# Patient Record
Sex: MALE | Race: WHITE | NOT HISPANIC OR LATINO | Employment: FULL TIME | URBAN - METROPOLITAN AREA
[De-identification: names, ages, dates, MRNs, and addresses within clinical notes are randomized per-mention and may not be internally consistent; named-entity substitution may affect disease eponyms.]

---

## 2023-02-08 ENCOUNTER — APPOINTMENT (EMERGENCY)
Dept: CT IMAGING | Facility: HOSPITAL | Age: 58
End: 2023-02-08

## 2023-02-08 ENCOUNTER — APPOINTMENT (EMERGENCY)
Dept: RADIOLOGY | Facility: HOSPITAL | Age: 58
End: 2023-02-08

## 2023-02-08 ENCOUNTER — HOSPITAL ENCOUNTER (EMERGENCY)
Facility: HOSPITAL | Age: 58
Discharge: HOME | End: 2023-02-08
Attending: EMERGENCY MEDICINE

## 2023-02-08 ENCOUNTER — TELEPHONE (OUTPATIENT)
Dept: HEMATOLOGY ONCOLOGY | Facility: CLINIC | Age: 58
End: 2023-02-08

## 2023-02-08 VITALS
OXYGEN SATURATION: 100 % | SYSTOLIC BLOOD PRESSURE: 126 MMHG | RESPIRATION RATE: 13 BRPM | HEIGHT: 73 IN | HEART RATE: 79 BPM | TEMPERATURE: 97.7 F | WEIGHT: 172 LBS | DIASTOLIC BLOOD PRESSURE: 81 MMHG | BODY MASS INDEX: 22.8 KG/M2

## 2023-02-08 DIAGNOSIS — M89.9 LYTIC BONE LESIONS ON XRAY: ICD-10-CM

## 2023-02-08 DIAGNOSIS — M54.9 BACK PAIN: Primary | ICD-10-CM

## 2023-02-08 LAB
2HR DELTA HS TROPONIN: 18 NG/L
4HR DELTA HS TROPONIN: -2 NG/L
ALBUMIN SERPL BCP-MCNC: 3.3 G/DL (ref 3.5–5)
ALP SERPL-CCNC: 166 U/L (ref 34–104)
ALT SERPL W P-5'-P-CCNC: 14 U/L (ref 7–52)
ANION GAP SERPL CALCULATED.3IONS-SCNC: 9 MMOL/L (ref 4–13)
AST SERPL W P-5'-P-CCNC: 23 U/L (ref 13–39)
BASOPHILS # BLD AUTO: 0.03 THOUSANDS/ÂΜL (ref 0–0.1)
BASOPHILS NFR BLD AUTO: 1 % (ref 0–1)
BILIRUB SERPL-MCNC: 0.36 MG/DL (ref 0.2–1)
BUN SERPL-MCNC: 21 MG/DL (ref 5–25)
CALCIUM ALBUM COR SERPL-MCNC: 11.3 MG/DL (ref 8.3–10.1)
CALCIUM SERPL-MCNC: 10.7 MG/DL (ref 8.4–10.2)
CARDIAC TROPONIN I PNL SERPL HS: 16 NG/L
CARDIAC TROPONIN I PNL SERPL HS: 18 NG/L
CARDIAC TROPONIN I PNL SERPL HS: 26 NG/L (ref 8–18)
CARDIAC TROPONIN I PNL SERPL HS: 36 NG/L
CHLORIDE SERPL-SCNC: 97 MMOL/L (ref 96–108)
CO2 SERPL-SCNC: 24 MMOL/L (ref 21–32)
CREAT SERPL-MCNC: 1.17 MG/DL (ref 0.6–1.3)
D DIMER PPP FEU-MCNC: 1.91 UG/ML FEU
EOSINOPHIL # BLD AUTO: 0.02 THOUSAND/ÂΜL (ref 0–0.61)
EOSINOPHIL NFR BLD AUTO: 0 % (ref 0–6)
ERYTHROCYTE [DISTWIDTH] IN BLOOD BY AUTOMATED COUNT: 14.5 % (ref 11.6–15.1)
FLUAV RNA RESP QL NAA+PROBE: NEGATIVE
FLUBV RNA RESP QL NAA+PROBE: NEGATIVE
GFR SERPL CREATININE-BSD FRML MDRD: 69 ML/MIN/1.73SQ M
GLUCOSE SERPL-MCNC: 151 MG/DL (ref 65–140)
HCT VFR BLD AUTO: 36 % (ref 36.5–49.3)
HGB BLD-MCNC: 12.6 G/DL (ref 12–17)
IGA SERPL-MCNC: 28 MG/DL (ref 70–400)
IGG SERPL-MCNC: 2900 MG/DL (ref 700–1600)
IGM SERPL-MCNC: 11 MG/DL (ref 40–230)
IMM GRANULOCYTES # BLD AUTO: 0.09 THOUSAND/UL (ref 0–0.2)
IMM GRANULOCYTES NFR BLD AUTO: 1 % (ref 0–2)
LACTATE SERPL-SCNC: 1.2 MMOL/L (ref 0.5–2)
LACTATE SERPL-SCNC: 2.5 MMOL/L (ref 0.5–2)
LIPASE SERPL-CCNC: 26 U/L (ref 11–82)
LYMPHOCYTES # BLD AUTO: 1.5 THOUSANDS/ÂΜL (ref 0.6–4.47)
LYMPHOCYTES NFR BLD AUTO: 23 % (ref 14–44)
MCH RBC QN AUTO: 33.7 PG (ref 26.8–34.3)
MCHC RBC AUTO-ENTMCNC: 35 G/DL (ref 31.4–37.4)
MCV RBC AUTO: 96 FL (ref 82–98)
MONOCYTES # BLD AUTO: 0.54 THOUSAND/ÂΜL (ref 0.17–1.22)
MONOCYTES NFR BLD AUTO: 8 % (ref 4–12)
NEUTROPHILS # BLD AUTO: 4.44 THOUSANDS/ÂΜL (ref 1.85–7.62)
NEUTS SEG NFR BLD AUTO: 67 % (ref 43–75)
NRBC BLD AUTO-RTO: 0 /100 WBCS
PLATELET # BLD AUTO: 194 THOUSANDS/UL (ref 149–390)
PMV BLD AUTO: 8.9 FL (ref 8.9–12.7)
POTASSIUM SERPL-SCNC: 3.8 MMOL/L (ref 3.5–5.3)
PROT SERPL-MCNC: 9.7 G/DL (ref 6.4–8.4)
RBC # BLD AUTO: 3.74 MILLION/UL (ref 3.88–5.62)
RSV RNA RESP QL NAA+PROBE: NEGATIVE
SARS-COV-2 RNA RESP QL NAA+PROBE: NEGATIVE
SODIUM SERPL-SCNC: 130 MMOL/L (ref 135–147)
TSH SERPL DL<=0.05 MIU/L-ACNC: 0.68 UIU/ML (ref 0.45–4.5)
WBC # BLD AUTO: 6.62 THOUSAND/UL (ref 4.31–10.16)

## 2023-02-08 RX ORDER — KETOROLAC TROMETHAMINE 30 MG/ML
15 INJECTION, SOLUTION INTRAMUSCULAR; INTRAVENOUS ONCE
Status: COMPLETED | OUTPATIENT
Start: 2023-02-08 | End: 2023-02-08

## 2023-02-08 RX ORDER — OXYCODONE HYDROCHLORIDE AND ACETAMINOPHEN 5; 325 MG/1; MG/1
1 TABLET ORAL EVERY 4 HOURS PRN
Qty: 18 TABLET | Refills: 0 | Status: SHIPPED | OUTPATIENT
Start: 2023-02-08 | End: 2023-02-11

## 2023-02-08 RX ORDER — OXYCODONE HYDROCHLORIDE AND ACETAMINOPHEN 5; 325 MG/1; MG/1
1 TABLET ORAL ONCE
Status: DISCONTINUED | OUTPATIENT
Start: 2023-02-08 | End: 2023-02-08

## 2023-02-08 RX ORDER — OXYCODONE HYDROCHLORIDE AND ACETAMINOPHEN 5; 325 MG/1; MG/1
1 TABLET ORAL ONCE
Status: COMPLETED | OUTPATIENT
Start: 2023-02-08 | End: 2023-02-08

## 2023-02-08 RX ADMIN — KETOROLAC TROMETHAMINE 15 MG: 30 INJECTION, SOLUTION INTRAMUSCULAR at 15:43

## 2023-02-08 RX ADMIN — IOHEXOL 85 ML: 350 INJECTION, SOLUTION INTRAVENOUS at 11:23

## 2023-02-08 RX ADMIN — SODIUM CHLORIDE 1000 ML: 0.9 INJECTION, SOLUTION INTRAVENOUS at 10:17

## 2023-02-08 RX ADMIN — OXYCODONE HYDROCHLORIDE AND ACETAMINOPHEN 1 TABLET: 5; 325 TABLET ORAL at 12:03

## 2023-02-08 NOTE — DISCHARGE INSTRUCTIONS
Use Tylenol every 4 hours or Motrin every 6 hours; you can alternate the 2 medications taking something every 3 hours for pain, if no improvement use Percocet  Follow-up with Hematologist/Oncologist Dr Edy Duncan

## 2023-02-08 NOTE — ED PROVIDER NOTES
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  None       History reviewed  No pertinent past medical history  Past Surgical History:   Procedure Laterality Date   • APPENDECTOMY         Family History   Problem Relation Age of Onset   • Diabetes Mother    • Heart disease Father    • Diabetes Father      I have reviewed and agree with the history as documented  E-Cigarette/Vaping   • E-Cigarette Use Never User      E-Cigarette/Vaping Substances     Social History     Tobacco Use   • Smoking status: Former   Vaping Use   • Vaping Use: Never used   Substance Use Topics   • Alcohol use: Yes     Comment: Mod   • Drug use: Yes     Types: Marijuana       Review of Systems   Constitutional: Negative for chills and fever  HENT: Negative for congestion, facial swelling, postnasal drip, rhinorrhea and trouble swallowing  Respiratory: Positive for cough  Negative for shortness of breath and wheezing  Cardiovascular: Positive for chest pain  Negative for palpitations and leg swelling  Gastrointestinal: Positive for nausea and vomiting  Genitourinary: Negative for hematuria  Musculoskeletal: Negative for myalgias  Skin: Negative for wound  Neurological: Negative for light-headedness and headaches  All other systems reviewed and are negative  Physical Exam  Physical Exam  Vitals and nursing note reviewed  Constitutional:       General: He is in acute distress (mild)  Appearance: He is well-developed  HENT:      Head: Normocephalic and atraumatic  Right Ear: External ear normal       Left Ear: External ear normal       Nose: Nose normal       Mouth/Throat:      Mouth: Mucous membranes are moist       Dentition: Abnormal dentition (poor dentition)  Pharynx: Oropharynx is clear  Eyes:      Conjunctiva/sclera: Conjunctivae normal    Neck:      Vascular: No JVD  Cardiovascular:      Rate and Rhythm: Regular rhythm  Tachycardia present  Heart sounds: Normal heart sounds  No murmur heard  Pulmonary:      Effort: Pulmonary effort is normal  No tachypnea  Breath sounds: Normal breath sounds  No decreased breath sounds, wheezing or rhonchi  Abdominal:      General: Bowel sounds are normal       Palpations: Abdomen is soft  Musculoskeletal:         General: Normal range of motion  Cervical back: Normal range of motion  No tenderness  Right lower leg: No edema  Left lower leg: No edema  Lymphadenopathy:      Cervical: No cervical adenopathy  Skin:     General: Skin is warm and dry  Neurological:      Mental Status: He is alert and oriented to person, place, and time  Mental status is at baseline  Motor: No weakness     Psychiatric:         Behavior: Behavior normal          Vital Signs  ED Triage Vitals [02/08/23 0947]   Temperature Pulse Respirations Blood Pressure SpO2   97 7 °F (36 5 °C) 102 17 168/98 97 %      Temp Source Heart Rate Source Patient Position - Orthostatic VS BP Location FiO2 (%)   Oral Monitor Lying Left arm --      Pain Score       5           Vitals:    02/08/23 1056 02/08/23 1204 02/08/23 1313 02/08/23 1444   BP: 144/82 144/99 137/74 126/81   Pulse: 83 82 88 79   Patient Position - Orthostatic VS: Lying Lying Lying Lying         Visual Acuity      ED Medications  Medications   ketorolac (TORADOL) injection 15 mg (has no administration in time range)   sodium chloride 0 9 % bolus 1,000 mL (0 mL Intravenous Stopped 2/8/23 1137)   iohexol (OMNIPAQUE) 350 MG/ML injection (SINGLE-DOSE) 100 mL (85 mL Intravenous Given 2/8/23 1123)   oxyCODONE-acetaminophen (PERCOCET) 5-325 mg per tablet 1 tablet (1 tablet Oral Given 2/8/23 1203)       Diagnostic Studies  Results Reviewed     Procedure Component Value Units Date/Time    High Sensitivity Troponin I Random [[id number]]  (Abnormal) Collected: 02/08/23 1401    Lab Status: Final result Specimen: Blood from Arm, Left Updated: 02/08/23 1509     HS TnI random 26 ng/L     Protein electrophoresis, serum [[id number]] Collected: 02/08/23 1401    Lab Status: In process Specimen: Blood from Arm, Left Updated: 02/08/23 1434    IgG, IgA, IgM [[id number]] Collected: 02/08/23 1401    Lab Status: In process Specimen: Blood from Arm, Left Updated: 02/08/23 1434    Immunoglobulin free LT chains blood [[id number]] Collected: 02/08/23 1401    Lab Status: In process Specimen: Blood from Arm, Left Updated: 02/08/23 1434    HS Troponin I 4hr [[id number]]  (Normal) Collected: 02/08/23 1231    Lab Status: Final result Specimen: Blood from Arm, Left Updated: 02/08/23 1312     hs TnI 4hr 16 ng/L      Delta 4hr hsTnI -2 ng/L     HS Troponin I 2hr [[id number]]  (Normal) Collected: 02/08/23 1206    Lab Status: Final result Specimen: Blood from Arm, Right Updated: 02/08/23 1238     hs TnI 2hr 36 ng/L      Delta 2hr hsTnI 18 ng/L     Lactic acid 2 Hours [[id number]] Collected: 02/08/23 1231    Lab Status: In process Specimen: Blood from Arm, Left Updated: 02/08/23 1236    Lactic acid [[id number]]  (Abnormal) Collected: 02/08/23 1016    Lab Status: Final result Specimen: Blood from Arm, Right Updated: 02/08/23 1132     LACTIC ACID 2 5 mmol/L     Narrative:      Result may be elevated if tourniquet was used during collection      FLU/RSV/COVID - if FLU/RSV clinically relevant [[id number]]  (Normal) Collected: 02/08/23 1007    Lab Status: Final result Specimen: Nares from Nasopharyngeal Swab Updated: 02/08/23 1138 SARS-CoV-2 Negative     INFLUENZA A PCR Negative     INFLUENZA B PCR Negative     RSV PCR Negative    Narrative:      FOR PEDIATRIC PATIENTS - copy/paste COVID Guidelines URL to browser: [email or web address]    SARS-CoV-2 assay is a Nucleic Acid Amplification assay intended for the  qualitative detection of nucleic acid from SARS-CoV-2 in nasopharyngeal  swabs  Results are for the presumptive identification of SARS-CoV-2 RNA  Positive results are indicative of infection with SARS-CoV-2, the virus  causing COVID-19, but do not rule out bacterial infection or co-infection  with other viruses  Laboratories within the [place] and its  territories are required to report all positive results to the appropriate  public health authorities  Negative results do not preclude SARS-CoV-2  infection and should not be used as the sole basis for treatment or other  patient management decisions  Negative results must be combined with  clinical observations, patient history, and epidemiological information  This test has not been FDA cleared or approved  This test has been authorized by FDA under an Emergency Use Authorization  (EUA)  This test is only authorized for the duration of time the  declaration that circumstances exist justifying the authorization of the  emergency use of an in vitro diagnostic tests for detection of SARS-CoV-2  virus and/or diagnosis of COVID-19 infection under section 564(b)(1) of  the Act, 21 U  S C  875PYA-0(A)(6), unless the authorization is terminated  or revoked sooner  The test has been validated but independent review by FDA  and CLIA is pending  Test performed using [employer] Gene[employer]pert: This RT-PCR assay targets N2,  a region unique to SARS-CoV-2  A conserved region in the E-gene was chosen  for pan-Sarbecovirus detection which includes SARS-CoV-2      According to CMS-2020-01-R, this platform meets the definition of high-throughput technology  TSH [[id number]]  (Normal) Collected: 02/08/23 1016    Lab Status: Final result Specimen: Blood from Arm, Right Updated: 02/08/23 1059     TSH 3RD GENERATON 0 680 uIU/mL     Narrative:      Patients undergoing fluorescein dye angiography may retain small amounts of fluorescein in the body for 48-72 hours post procedure  Samples containing fluorescein can produce falsely depressed TSH values  If the patient had this procedure,a specimen should be resubmitted post fluorescein clearance        Comprehensive metabolic panel [[id number]]  (Abnormal) Collected: 02/08/23 1016    Lab Status: Final result Specimen: Blood from Arm, Right Updated: 02/08/23 1052     Sodium 130 mmol/L      Potassium 3 8 mmol/L      Chloride 97 mmol/L      CO2 24 mmol/L      ANION GAP 9 mmol/L      BUN 21 mg/dL      Creatinine 1 17 mg/dL      Glucose 151 mg/dL      Calcium 10 7 mg/dL      Corrected Calcium 11 3 mg/dL      AST 23 U/L      ALT 14 U/L      Alkaline Phosphatase 166 U/L      Total Protein 9 7 g/dL      Albumin 3 3 g/dL      Total Bilirubin 0 36 mg/dL      eGFR 69 ml/min/1 73sq m     Narrative:      [facility] guidelines for Chronic Kidney Disease (CKD):   •  Stage 1 with normal or high GFR (GFR > 90 mL/min/1 73 square meters)  •  Stage 2 Mild CKD (GFR = 60-89 mL/min/1 73 square meters)  •  Stage 3A Moderate CKD (GFR = 45-59 mL/min/1 73 square meters)  •  Stage 3B Moderate CKD (GFR = 30-44 mL/min/1 73 square meters)  •  Stage 4 Severe CKD (GFR = 15-29 mL/min/1 73 square meters)  •  Stage 5 End Stage CKD (GFR <15 mL/min/1 73 square meters)  Note: GFR calculation is accurate only with a steady state creatinine    Lipase [[id number]]  (Normal) Collected: 02/08/23 1016    Lab Status: Final result Specimen: Blood from Arm, Right Updated: 02/08/23 1052     Lipase 26 u/L     HS Troponin 0hr (reflex protocol) [[id number]]  (Normal) Collected: 02/08/23 1016    Lab Status: Final result Specimen: Blood from Arm, Right Updated: 02/08/23 1050     hs TnI 0hr 18 ng/L     D-Dimer [[id number]]  (Abnormal) Collected: 02/08/23 1016    Lab Status: Final result Specimen: Blood from Arm, Right Updated: 02/08/23 1047     D-Dimer, Quant 1 91 ug/ml FEU     Narrative: In the evaluation for possible pulmonary embolism, in the appropriate (Well's Score of 4 or less) patient, the age adjusted d-dimer cutoff for this patient can be calculated as:    Age x 0 01 (in ug/mL) for Age-adjusted D-dimer exclusion threshold for a patient over 50 years  CBC and differential [[id number]]  (Abnormal) Collected: 02/08/23 1016    Lab Status: Final result Specimen: Blood from Arm, Right Updated: 02/08/23 1025     WBC 6 62 Thousand/uL      RBC 3 74 Million/uL      Hemoglobin 12 6 g/dL      Hematocrit 36 0 %      MCV 96 fL      MCH 33 7 pg      MCHC 35 0 g/dL      RDW 14 5 %      MPV 8 9 fL      Platelets 606 Thousands/uL      nRBC 0 /100 WBCs      Neutrophils Relative 67 %      Immat GRANS % 1 %      Lymphocytes Relative 23 %      Monocytes Relative 8 %      Eosinophils Relative 0 %      Basophils Relative 1 %      Neutrophils Absolute 4 44 Thousands/µL      Immature Grans Absolute 0 09 Thousand/uL      Lymphocytes Absolute 1 50 Thousands/µL      Monocytes Absolute 0 54 Thousand/µL      Eosinophils Absolute 0 02 Thousand/µL      Basophils Absolute 0 03 Thousands/µL                  CTA ED chest PE study   Final Result by Jazmin Torrez MD (02/08 1149)      No pulmonary embolus  No acute pulmonary disease  Diffuse lytic lesions throughout the skeleton with compression deformities in the spine  This is most likely due to multiple myeloma, less likely due to metastatic disease from an unknown primary         I personally discussed this study with LAURA FOUNTAIN on 2/8/2023 at 11:45 AM                       Workstation performed: [id number]         XR chest 1 view portable   ED Interpretation by Laila Francis PA-C (02/08 1007)   Chronic changes, no infiltrate      Final Result by Antoinette Westfall MD (02/08 1417)      No acute cardiopulmonary disease  Workstation performed: [id number]                    Procedures  ECG 12 Lead Documentation Only    Date/Time: 2/8/2023 10:04 AM  Performed by: Renetta Tirado PA-C  Authorized by: Renetta Tirado PA-C     Indications / Diagnosis:  Cp  ECG reviewed by me, the ED Provider: yes    Patient location:  ED  Previous ECG:     Comparison to cardiac monitor: Yes    Interpretation:     Interpretation: non-specific    Rate:     ECG rate:  106    ECG rate assessment: tachycardic    Rhythm:     Rhythm: sinus rhythm    Comments:      LVH, normal axis, no acute ischemic changes             ED Course  ED Course as of 02/08/23 1524   Wed Feb 08, 2023   1131 Lipase: 26   1131 TSH 3RD GENERATON: 0 680   1131 hs TnI 0hr: 18   1131 D-Dimer, Quant(!): 1 91  Abn, will order cta to r/o PE   1131 WBC: 6 62   1131 Sodium(!): 130  Viral panel negative   1423 Trop was 16, 2hr trop 36, delta 2hr 18, then another trop send about 30 minutes later was 16, so delta 2 5 hrs -2; discussed with attending who would like another repeat trop at 4hr  (Done at 1425)             HEART Risk Score    Flowsheet Row Most Recent Value   Heart Score Risk Calculator    History 0 Filed at: 02/08/2023 1246   ECG 1 Filed at: 02/08/2023 1246   Age 1 Filed at: 02/08/2023 1246   Risk Factors 0 Filed at: 02/08/2023 1246   Troponin 1 Filed at: 02/08/2023 1246   HEART Score 3 Filed at: 02/08/2023 1246                        SBIRT 22yo+    Flowsheet Row Most Recent Value   SBIRT (25 yo +)    In order to provide better care to our patients, we are screening all of our patients for alcohol and drug use  Would it be okay to ask you these screening questions?  No Filed at: 02/08/2023 0950                    Medical Decision Making  Pt presents c/o 5-day mid-back/chest pain, dry cough, daily ETOH,  Tachy, considered: CAD, PE, pancreatitis, chf, lvh, cardiomyopathy  LABS: wbc 6 62 Na 130, Ca 10 7 alk lgenis 166 CTA was done to r/o PE and shows: OSSEOUS STRUCTURES: Diffuse lytic lesions throughout the skeleton with mild compression deformity of T6 and moderate compression deformity of T10    IMPRESSION: No pulmonary embolus  No acute pulmonary disease  Diffuse lytic lesions throughout the skeleton with compression deformities in the spine  This is most likely due to multiple myeloma, less likely due to metastatic disease from an unknown primary  new diagnosis of possible multiple myeloma vs bony mets  Pt pain tolerable, reached out to heme/onc  HEART score 3, Trop 16, 36, 26, reviewed with cardiology  Pt continues to have no cp in ED, states back pain coming back alittle    Back pain: acute illness or injury  Lytic bone lesions on xray: acute illness or injury  Amount and/or Complexity of Data Reviewed  External Data Reviewed: notes  Labs: ordered  Decision-making details documented in ED Course  Radiology: ordered and independent interpretation performed  Discussion of management or test interpretation with external provider(s): [facility] Heme/Onc Dr Ami Rosario, regarding dispo and FU: agrees with plan, "Wouldn't think (cp) myeloma related  Boris Munira Boris Munira Please get, spep, serum free light chains, IgA, IgG, IgM  (ordered) Will get scheduled in office soon  TT cardio on call Dr Pond Pack regarding delta trop 18; states, "Sounds like non cardiac presentation    From cardiac end, seems okay for discharge    probably needs multiple myeloma workup "    Risk  OTC drugs  Prescription drug management  Decision regarding hospitalization            Disposition  Final diagnoses:   Back pain   Lytic bone lesions on xray - Multiple myeloma vs bony mets     Time reflects when diagnosis was documented in both MDM as applicable and the Disposition within this note     Time User Action Codes Description Comment    2/8/2023  1:39 PM Mayra Pineda Add [M54 9] Back pain     2/8/2023 1:40 PM Stone Dear Add [M89 9] Lytic bone lesions on xray     2/8/2023  1:40 PM Stone Dear Modify [M89 9] Lytic bone lesions on xray Multiple myeloma vs bony mets      ED Disposition     ED Disposition   Discharge    Condition   Stable    Date/Time   Wed Feb 8, 2023  3:22 PM    Comment   Jeane Bermudez discharge to home/self care                 Follow-up Information     Follow up With Specialties Details Why Teresa 57, DO Hematology and Oncology, Hematology, Oncology   [address] [place]  [address] [place] [address]  [phone]            Patient's Medications   Discharge Prescriptions    OXYCODONE-ACETAMINOPHEN (PERCOCET) 5-325 MG PER TABLET    Take 1 tablet by mouth every 4 (four) hours as needed for moderate pain or severe pain for up to 3 days Max Daily Amount: 6 tablets       Start Date: 2/8/2023  End Date: 2/11/2023       Order Dose: 1 tablet       Quantity: 18 tablet    Refills: 0           PDMP Review     None          ED Provider  Electronically Signed by           Adolfo Rodriguez PA-C  02/08/23 1689

## 2023-02-08 NOTE — Clinical Note
Martell Linn was seen and treated in our emergency department on 2/8/2023  Diagnosis:     Dilcia Worley  may return to work on return date  He may return on this date: 02/10/2023         If you have any questions or concerns, please don't hesitate to call        Laila Francis PA-C    ______________________________           _______________          _______________  Hospital Representative                              Date                                Time

## 2023-02-08 NOTE — TELEPHONE ENCOUNTER
Reached out to patient to schedule new patient appointment, left VM with [facility] number to schedule appointment

## 2023-02-09 ENCOUNTER — DOCUMENTATION (OUTPATIENT)
Dept: HEMATOLOGY ONCOLOGY | Facility: CLINIC | Age: 58
End: 2023-02-09

## 2023-02-09 LAB
ALBUMIN SERPL ELPH-MCNC: 2.64 G/DL (ref 3.5–5)
ALBUMIN SERPL ELPH-MCNC: 31.8 % (ref 52–65)
ALPHA1 GLOB SERPL ELPH-MCNC: 0.41 G/DL (ref 0.1–0.4)
ALPHA1 GLOB SERPL ELPH-MCNC: 4.9 % (ref 2.5–5)
ALPHA2 GLOB SERPL ELPH-MCNC: 1.13 G/DL (ref 0.4–1.2)
ALPHA2 GLOB SERPL ELPH-MCNC: 13.6 % (ref 7–13)
ATRIAL RATE: 106 BPM
BETA GLOB ABNORMAL SERPL ELPH-MCNC: 0.27 G/DL (ref 0.4–0.8)
BETA1 GLOB SERPL ELPH-MCNC: 3.3 % (ref 5–13)
BETA2 GLOB SERPL ELPH-MCNC: 4 % (ref 2–8)
BETA2+GAMMA GLOB SERPL ELPH-MCNC: 0.33 G/DL (ref 0.2–0.5)
GAMMA GLOB ABNORMAL SERPL ELPH-MCNC: 3.52 G/DL (ref 0.5–1.6)
GAMMA GLOB SERPL ELPH-MCNC: 42.4 % (ref 12–22)
IGG/ALB SER: 0.47 {RATIO} (ref 1.1–1.8)
INTERPRETATION UR IFE-IMP: NORMAL
M PROTEIN 1 MFR SERPL ELPH: 40.6 %
M PROTEIN 1 SERPL ELPH-MCNC: 3.37 G/DL
P AXIS: 75 DEGREES
PR INTERVAL: 130 MS
PROT PATTERN SERPL ELPH-IMP: ABNORMAL
PROT SERPL-MCNC: 8.3 G/DL (ref 6.4–8.2)
QRS AXIS: 51 DEGREES
QRSD INTERVAL: 84 MS
QT INTERVAL: 320 MS
QTC INTERVAL: 425 MS
T WAVE AXIS: 66 DEGREES
VENTRICULAR RATE: 106 BPM

## 2023-02-09 NOTE — PROGRESS NOTES
Intake received/ Chart reviewed for services completed outside of [facility]    Pathology completed: n/a    Imaging completed: xr 2/8/2023    All records needed are in patients chart  No records retrieval needed at this time

## 2023-02-10 LAB
KAPPA LC FREE SER-MCNC: 7.8 MG/L (ref 3.3–19.4)
KAPPA LC FREE/LAMBDA FREE SER: 0.01 {RATIO} (ref 0.26–1.65)
LAMBDA LC FREE SERPL-MCNC: 1176.6 MG/L (ref 5.7–26.3)

## 2023-02-21 ENCOUNTER — TELEPHONE (OUTPATIENT)
Dept: HEMATOLOGY ONCOLOGY | Facility: CLINIC | Age: 58
End: 2023-02-21

## 2023-02-21 NOTE — TELEPHONE ENCOUNTER
Appointment Cancellation Or Reschedule     Person calling in Spouse   If someone other than patient calling, are they listed on the communication consent form? No Patient gave verbal consent for me to speak with [facility]   Provider Dr Dalton Galan   Office Visit Date and Time  03/02/23 3:20PM   Office Visit Location [facility]   Did patient want to reschedule their office appointment? If so, when was it scheduled to? 02/27/23 9:40AM   Did you have STAR scheduled for this appointment? No   Do you need STAR set up for your new appointment? If yes, please send to "PATIENT RIDESHARE" pool for STAR rescheduling N/A   Is this patient calling to reschedule an infusion appointment? No   When is their next infusion appointment? N/A   Is this patient a Chemo patient? No   Reason for Cancellation or Reschedule Patient requesting sooner appointment   Was No show policy reviewed with patient if patient canceling within 24 hours? No     If the patient is cancelling an appointment and needs their STAR Transport cancelled, please route to [facility]  If the patient is a treatment patient, please route this to the office nurse  If the patient is not on treatment, please route to the Clerical pool based on location  If the patient is a surgical oncology patient, please route to surg/onc clinical pool  Route message as high priority if same day cancellation

## 2023-02-27 ENCOUNTER — TELEPHONE (OUTPATIENT)
Dept: HEMATOLOGY ONCOLOGY | Facility: MEDICAL CENTER | Age: 58
End: 2023-02-27

## 2023-02-27 ENCOUNTER — TELEPHONE (OUTPATIENT)
Dept: NUTRITION | Facility: CLINIC | Age: 58
End: 2023-02-27

## 2023-02-27 ENCOUNTER — PATIENT OUTREACH (OUTPATIENT)
Dept: CASE MANAGEMENT | Facility: HOSPITAL | Age: 58
End: 2023-02-27

## 2023-02-27 ENCOUNTER — CONSULT (OUTPATIENT)
Dept: HEMATOLOGY ONCOLOGY | Facility: MEDICAL CENTER | Age: 58
End: 2023-02-27

## 2023-02-27 VITALS
TEMPERATURE: 97.2 F | SYSTOLIC BLOOD PRESSURE: 130 MMHG | OXYGEN SATURATION: 95 % | WEIGHT: 160.8 LBS | BODY MASS INDEX: 21.31 KG/M2 | HEART RATE: 118 BPM | HEIGHT: 73 IN | RESPIRATION RATE: 18 BRPM | DIASTOLIC BLOOD PRESSURE: 80 MMHG

## 2023-02-27 DIAGNOSIS — D47.2 MONOCLONAL GAMMOPATHY: Primary | ICD-10-CM

## 2023-02-27 DIAGNOSIS — M54.9 BACK PAIN: ICD-10-CM

## 2023-02-27 RX ORDER — OXYCODONE AND ACETAMINOPHEN 10; 325 MG/1; MG/1
1 TABLET ORAL EVERY 6 HOURS PRN
Qty: 60 TABLET | Refills: 0 | Status: SHIPPED | OUTPATIENT
Start: 2023-02-27 | End: 2023-03-08 | Stop reason: ALTCHOICE

## 2023-02-27 NOTE — PROGRESS NOTES
Arlin Casillas  1965  [facility] [address] [address]  HEMATOLOGY/ONCOLOGY CONSULTATION REPORT    DISCUSSION/SUMMARY:    55-year-old male with no significant past medical history (but no medical follow-up recently) recently presenting to the ER with mid back pain and weight loss  Issues:    Concern for multiple myeloma  Patient was seen in the ER on February 8, 2023  CTA of the chest did not demonstrate any PE but patient had multiple lytic lesions and compression fractures in T6 and T10  Additional work-up demonstrated elevated total protein, elevated IgG and an elevated lambda  Immunofixation demonstrated IgG lambda monoclonal protein  CBC parameters and renal function were within normal limits  Patient, wife and I discussed the laboratory test results and the bone findings on the recent CTA/chest at length  Mr Andrés Gardner demonstrated a relatively good understanding of the situation, patient is still overwhelmed and in pain  Patient understands that he may have a primary bone marrow disorder, multiple myeloma  Patient is to go for a PET/CT and bone marrow biopsy  Eventually LDH and uric acid will be needed  If myeloma is diagnosed, patient will also be referred to [facility]iet[facility] for evaluation (neoadjuvant chemotherapy here to debulk, transplant, possible posttransplant maintenance treatment etc)  Thoracic lesions  MRI of the T-spine has been requested  Patient may need RT palliatively for pain control  Back pain  Patient is clearly symptomatic  Percocet 5/325 is not effective  Patient was given a prescription for Percocet 10/325 every 6 hours as needed  Nursing staff spent time with patient/wife today  Patient will call if his pain continues to be an issue  Patient has been referred to palliative care  We discussed potential side effects and toxicities including constipation  Weight loss    Patient has lost 20+ pounds over the past 3 months  Patient has been referred to oncology nutrition  Patient is to return in 3 weeks but this may change depending upon the above  Mr Didi Martinez knows to call the hematology/oncology office if there are any other questions or concerns  Carefully review your medication list and verify that the list is accurate and up-to-date  Please call the hematology/oncology office if there are medications missing from the list, medications on the list that you are not currently taking or if there is a dosage or instruction that is different from how you're taking that medication  Patient goals and areas of care: Complete work-up, pain control  Barriers to care: None  Patient is able to self-care   ______________________________________________________________________________________    Chief Complaint   Patient presents with   • Follow-up     Lytic bone lesions on xray     History of Present Illness: 65year old male with relatively good general health recently developing mid back pain  Mr Didi Martinez states that the pain began in early January 2023  Patient has lost approximately 20+ pounds over the past few months  No GI issues, no  issues or bleeding  Appetite has been decreased  Activities have also been decreased, patient was working as a cook at [employer]  No fevers, chills or sweats  No headaches, blurred vision or dizziness  No shortness of breath at rest or dyspnea on exertion  Patient does not have a PCP, no recent medical follow-ups, no COVID vaccines - personal decision  No COVID infections  Review of Systems   Constitutional: Positive for activity change, appetite change, fatigue and unexpected weight change  HENT: Negative  Eyes: Negative  Respiratory: Negative  Cardiovascular: Negative  Gastrointestinal: Negative  Endocrine: Negative  Genitourinary: Negative  Musculoskeletal: Positive for arthralgias and back pain  Skin: Negative  Allergic/Immunologic: Negative  Neurological: Negative  Hematological: Negative  Psychiatric/Behavioral: Negative  All other systems reviewed and are negative  Past Surgical History:   Procedure Laterality Date   • APPENDECTOMY     Past surgical history: No prior blood transfusions    Family History   Problem Relation Age of Onset   • Diabetes Mother    • Heart disease Father    • Diabetes Father    Family history: No known familial or genetic diseases, no children    Social History     Socioeconomic History   • Marital status: /Civil Union     Spouse name: Not on file   • Number of children: Not on file   • Years of education: Not on file   • Highest education level: Not on file   Occupational History   • Not on file   Tobacco Use   • Smoking status: Former   • Smokeless tobacco: Not on file   Vaping Use   • Vaping Use: Never used   Substance and Sexual Activity   • Alcohol use: Yes     Comment: Mod   • Drug use: Yes     Types: Marijuana   • Sexual activity: Not on file   Other Topics Concern   • Not on file   Social History Narrative    Most recent tobacco use screenin2018     Social Determinants of Health     Financial Resource Strain: Not on file   Food Insecurity: Not on file   Transportation Needs: Not on file   Physical Activity: Not on file   Stress: Not on file   Social Connections: Not on file   Intimate Partner Violence: Not on file   Housing Stability: Not on file   Social history: Patient smokes 3 to 4 cigarettes a day, recently discontinued, approximately 5 beers a day, also recently discontinued, no drug abuse, no toxic exposure    No Known Allergies    Vitals:    23 0941   BP: 130/80   Pulse: (!) 118   Resp: 18   Temp: (!) 97 2 °F (36 2 °C)   SpO2: 95%     Physical Exam  Constitutional:       Appearance: He is well-developed  Comments: Middle-age male, thin appearing, + evidence of pain   HENT:      Head: Normocephalic and atraumatic        Right Ear: External ear normal       Left Ear: External ear normal    Eyes:      Conjunctiva/sclera: Conjunctivae normal       Pupils: Pupils are equal, round, and reactive to light  Cardiovascular:      Rate and Rhythm: Normal rate and regular rhythm  Heart sounds: Normal heart sounds  Pulmonary:      Effort: Pulmonary effort is normal       Breath sounds: Normal breath sounds  Comments: Fair to good entry bilaterally, scattered rhonchi  Abdominal:      General: Bowel sounds are normal       Palpations: Abdomen is soft  Comments: + Bowel sounds, nontender, soft, no hepatosplenomegaly, no guarding   Musculoskeletal:         General: Normal range of motion  Cervical back: Normal range of motion and neck supple  Comments: + Tenderness in bilateral thoracic rib cage   Skin:     General: Skin is warm  Comments: Slightly pale, warm, moist, no petechiae or ecchymoses   Neurological:      Mental Status: He is alert and oriented to person, place, and time  Deep Tendon Reflexes: Reflexes are normal and symmetric  Psychiatric:         Behavior: Behavior normal          Thought Content:  Thought content normal          Judgment: Judgment normal      Extremities: Lower extreme edema bilaterally, no cords, pulses are 1+ l  ymphatics: No adenopathy in the neck, supraclavicular region, axilla bilaterally    Labs    2/8/2023 WBC = 6 6 hemoglobin = 12 6 hematocrit = 36 MCV = 96 platelet = 458 neutrophil = 67% bands = 1% lymphocyte = 23% monocyte = 8% basophil = 1% BUN = 21 creatinine = 1 17 calcium = 10 7 AST = 23 ALT = 14 alkaline phosphatase = 166 total protein = 9 7 albumin = 3 3 total bilirubin = 0 36 TSH = 0 680    2/8/2023 immunofixation demonstrated monoclonal gammopathy identified his IgG lambda (3 37 g/deciliter)    2/8/2023 SPEP showed a monoclonal peak in the gamma region, gamma concentration = 3 52 g/deciliter, total protein = 8 3    2/8/2023 IgA = 20 8 = decreased IgG = 2000 900 = elevated IgM = 11 = decreased    2/8/2023 Free kappa = 7 8 Free lambda = 1177 Kappa/lambda ratio = 0 01    2/8/2023 lactic acid = 2 5    Imaging    2/8/2023 CTA chest PE study    OSSEOUS STRUCTURES:  Diffuse lytic lesions throughout the skeleton with mild compression deformity of T6 and moderate compression deformity of T10     IMPRESSION:     No pulmonary embolus  No acute pulmonary disease  Diffuse lytic lesions throughout the skeleton with compression deformities in the spine  This is most likely due to multiple myeloma, less likely due to metastatic disease from an unknown primary  2/8/2023 chest x-ray  Impression stated no acute cardiopulmonary disease

## 2023-02-27 NOTE — TELEPHONE ENCOUNTER
Attempted to reach patient but voicemail was full  He has been scheduled for his follow up on Friday 3/31/23 at 12pm with Dr Joseph Issa

## 2023-02-27 NOTE — TELEPHONE ENCOUNTER
Received notification by Dr George Dong on 2/27/23 that pt has triggered for oncology nutrition care (reason for referral: Ambulatory referral for Patient has lost 20+ pounds over the past 3 months  )  Teresa Venegas today to establish care  No answer  Left voice message with the reason for today's call and this RD’s contact information asking that Lissa MITCHELL call back as able/desired

## 2023-02-28 ENCOUNTER — PATIENT OUTREACH (OUTPATIENT)
Dept: CASE MANAGEMENT | Facility: HOSPITAL | Age: 58
End: 2023-02-28

## 2023-02-28 ENCOUNTER — DOCUMENTATION (OUTPATIENT)
Dept: HEMATOLOGY ONCOLOGY | Facility: MEDICAL CENTER | Age: 58
End: 2023-02-28

## 2023-02-28 NOTE — PROGRESS NOTES
Biopsychosocial and Barriers Assessment    Cancer Diagnosis: multiple myeloma  Home/Cell Phone: [phone]   Emergency Contact: Dee Martinez ([place])[phone] (Home Phone)  Marital Status: Single   Interpretation concerns, speaks another language, preferred language: english  Cultural concerns: no   Ability to read or write: yes    Caregiver/Support: Mayur  Children: no   Child/Elder care: no     Housing: two story   Home Setup: one level   Lives With: SO  Daily Living Activities: independent   Durable Medical Equipment: no  Ambulation: independent    [facility] [place]   High co-pays with insurance: no   High co-pays with medication coverage: no  No medication coverage: no    Primary Care Provider: Joyce Nowak MD   Hx of [address]: no   Hx of Short term rehab: no   Mental Health Hx: no  Substance Abuse Hx: no  Employment: yes    Status/Location: no   Ability to pay bills: yes  No barriers to paying bills  POA/LW/AD: no   Karlie is healthcare representative  MSW emailed advance directive form  Transportation Plan/Concerns:  Patient states he transports self  MSW educated on [employer] transport services  What do you know about your Cancer Diagnosis    What has your doctor told you about your cancer diagnosis:  multiple myeloma    What has your doctor told you about your cancer treatment: chemo and radiation  What specific concerns do you have about your diagnosis and treatment: no concerns    Have you been made aware of any hair loss associated with treatment: yes    Additional Comments:    [email or web address]    MSW phoned and spoke with patient  Patient states he has support from his Karlie and that's all he needs  Patient states his cancer care team are informational and have answered his questions  Patient states she remains positive and rarely allows things to stress him out    Patient states he hopes for the best   MSW provided  Emotional support services  MSW emailed cancer support community calendar, website and phone number to apply for social security disability, and advance directive form to patient's e-mail address: [email or web address]   Patient is provided this MSW's phone number for future resource needs  MSW will close this case however will remain available for future referrals

## 2023-02-28 NOTE — PROGRESS NOTES
I spoke with the patients significant other and provided her with the name and phone number of the Nutritionist (Delilah Klein [phone]) who has tried to get a hold of them to schedule patient

## 2023-03-01 NOTE — PROGRESS NOTES
Outpatient Oncology Nutrition Consultation   Type of Consult: Initial Consult  Care Location: Office Visit  - met w/pt & significant other Rajendra King)    Reason for referral: Received notification by Dr Milagro Mcfadden on 23 that pt has triggered for oncology nutrition care (reason for referral: Ambulatory referral for Patient has lost 20+ pounds over the past 3 months  )  Nutrition Assessment:   Oncology Diagnosis & Treatments: Possible Multiple Myeloma   Planned to have a PET/CT and bone marrow biopsy  Follow up with Dr Milagro Mcfadden 3/24/23  Oncology History    No history exists  Past Medical & Surgical Hx:   Patient Active Problem List   Diagnosis   • Monoclonal gammopathy     No past medical history on file    Past Surgical History:   Procedure Laterality Date   • APPENDECTOMY         Review of Medications:   Vitamins, Supplements and Herbals: Med List Reviewed & pt is only takin-3 MVI and Discussed the potential interactions of high dose antioxidants with cancer tx and recommended exercising caution when taking these supplements    Current Outpatient Medications:   •  oxyCODONE-acetaminophen (Percocet)  mg per tablet, Take 1 tablet by mouth every 6 (six) hours as needed for moderate pain Max Daily Amount: 4 tablets, Disp: 60 tablet, Rfl: 0    Most Recent Lab Results:   Lab Results   Component Value Date    WBC 6 62 2023    NEUTROABS 4 44 2023    ALT 14 2023    AST 23 2023    ALB 3 3 (L) 2023    SODIUM 130 (L) 2023    K 3 8 2023    CL 97 2023    BUN 21 2023    CREATININE 1 17 2023    EGFR 69 2023    GLUC 151 (H) 2023    CALCIUM 10 7 (H) 2023       Anthropometric Measurements:   Height: 73"  Ht Readings from Last 1 Encounters:   23 6' 1" (1 854 m)     Wt Readings from Last 20 Encounters:   23 70 1 kg (154 lb 8 oz)   23 72 9 kg (160 lb 12 8 oz)   23 78 kg (172 lb)     Weight History:   Usual Weight: 230# (2020)  Notes: intentionally lost 30#, was maintaining 200#, then unintentioanlly lost ~40-50# since December 2022    Oncology Nutrition-Anthropometrics    [facility] Nutrition from 3/6/2023 in [facility] Oncology Dietitian Services   Patient age (years): 65 years   Patient (male) height (in): 68 in   Current weight (lbs): 154 5 lbs   Current weight to be used for anthropometric calculations (kg) 70 2 kg   BMI: 20 4   IBW male 184 lb   IBW (kg) male 83 6 kg   IBW % (male) 84 %   Adjusted BW (male): 176 6 lbs   Adjusted BW in kg (male): 80 3 kg   % weight change after 1 week: -3 9 %   Weight change after 1 week (lbs) -6 3 lbs   % weight change after 1 month: -10 2 %   Weight change after 1 month (lbs) -17 5 lbs          Nutrition-Focused Physical Findings: none observed    Food/Nutrition-Related History & Client/Social History:    Current Nutrition Impact Symptoms:  [] Nausea  [x] Reduced Appetite  [] Acid Reflux    [] Vomiting - 1x in the past week, no trigger per pt [x] Unintended Wt Loss -40-50# wt loss since December 2022  -per pt, he was told he has to gain wt  -significant x1 week and 1 mo [] Malabsorption    [] Diarrhea  [] Unintended Wt Gain  [] Dumping Syndrome    [] Constipation   -uses prunes prn [] Thick Mucous/Secretions  [] Abdominal Pain    [] Dysgeusia (Altered Taste)  [] Xerostomia (Dry Mouth)  [] Gas    [] Dysosmia (Altered Smell)  [] Thrush  [] Difficulty Chewing    [] Oral Mucositis (Sore Mouth)  [] Fatigue  [] Hyperglycemia   No results found for: HGBA1C   [] Odynophagia  [] Esophagitis  [] Other:    [] Dysphagia  [] Early Satiety  [] No Problems Eating      Food Allergies & Intolerances: no    Current Diet: Regular Diet, No Restrictions  Current Nutrition Intake: Less than usual   Appetite: Poor, Forcing self to eat  Nutrition Route: PO  Activity level: ADL's, limited d/t back pain    24 Hr Diet Recall:   Breakfast: does not eat breakfast, not sure if he can change that  Snack: none  Lunch: 2 pkts of fig bars  Snack: none  Dinner: 1 cup kraft mac and cheese (felt full)  Snack: 1 pkt fig bar    Beverages: water (32 oz x3), iced tea (occasionally)   -Averaging ~96 oz per day  Supplements:   was taking a wt gain protein powder + whole milk (1 per day)  but stopped d/t concern over elevated "total protien"    Oncology Nutrition-Estimated Needs    Flowsheet Row Nutrition from 3/6/2023 in [facility] Oncology Dietitian Services   Weight type used Actual weight   Weight in kilograms (kg) used for estimated needs 70 2 kg   Energy needs formula:  35-40 kcal/kg   Energy needs based on 35 kcal/k kcal   Energy needs based on 40 kcal/k kcal   Protein needs formula: 1 5-2 g/kg   Protein needs based on 1 5 g/kg 105 g   Protein needs based on 2 g/kg 140 g   Fluid needs formula: 30-35 mL/kg   Fluid needs based on 30 mL/kg 2115 mL   Fluid needs in ounces 71 oz   Fluid needs based on 35 mL/kg 2468 mL   Fluid needs in ounces 83 oz         Discussion & Intervention:   Yazmin Roland was evaluated today for an initial RD consultation regarding unintended weight loss  Yazmin Roland is currenlty undergoing workup for possible multiple myeloma  He reports a 40-50# wt loss in the past 3 months  He has lost ~6# in the past week  He is eating <500 kcal/day d/t a poor appetite  He says he was never a breakfast eater  He was drinking protein shakes but stopped because his doctor mentioned that his labs showed too high protein  Reviewed with pt that it is recommended that he start kcal/protein supplements d/t malnutrition  He is hydrating well and possibly filling up on water  Reviewed 24 hour recall, which revealed an inadequate po intake, and discussed ways to increase kcal, protein, and fluid intakes and optimize nutrient intake    Also reviewed the importance of wt management throughout the tx process and the role of a high kcal/ high protein diet in managing wt and overall health  Based on today's assessment, discussion included: MNT for: wt loss, poor appetite, a high kcal/protein diet & food choices to include at all meals & snacks (Examples of high kcal foods: cheese, full-fat dairy products, nut butter, plant-based fats, coconut oil/milk, avocado, butter, cream soup, etc  Examples of high protein foods: eggs, chicken, fish, beans/legumes, nuts/nut butters, bone broth, etc ) , fortifying foods for added kcal and protein (examples include: adding cheese to foods such as eggs, mashed potatoes, casseroles, etc ; Making oatmeal with whole milk rather than water; Making fortified mashed potatoes with cream, butter, dry milk powder, plain [place] yogurt, and cheese ), adequate hydration & fluid choices, sipping on calorie containing beverages (examples include: adding whole milk or cream to coffee, oral nutrition supplements, juice, electrolyte replacement beverages, milk, etc ), eating smaller more frequent meals every 2-3 hours (5-6 small meals/day), having consistent and planned snacks between meals, choosing higher kcal/protein oral nutrition supplements and starting oral nutrition supplements, recipe suggestions/resources, trying new recipes, & cooking at home more often and adding extra fat to foods while cooking such as butter, plant-based oil, coconut oil/milk, avocado, nut butters, etc    Moving forward, Twan Cope was encouraged to increase kcal, protein, and fluid intakes  Materials Provided: [facility] Eating Hints book, Ensure samples, Ensure Coupons, Oncology Milkshake &  Smoothie Recipe Packet and Commercial Nutrition Supplements List  All questions and concerns addressed during today’s visit  Twan Cope has RD contact information  Nutrition Diagnosis:   Inadequate Energy Intake related to physiological causes, disease state and treatment related issues as evidenced by food recall, wt loss and discussion with pt and/or family    Increased Nutrient Needs (kcal & pro) related to increased demand for nutrients and disease state as evidenced by cancer dx and pt undergoing tx for cancer  Patient has clinical indicators (or ASPEN criteria) consistent with severe protein-calorie malnutrition in the context of Chronic Illness as evidenced by >5% wt loss in 1 month and </=75% energy intake vs  Estimated needs for >/=1 month  Monitoring & Evaluation:   Goals:  weight maintenance/stabilization  adequate nutrition impact symptom management  pt to meet >/=75% estimated nutrition needs daily    Progress Towards Goals: Initiated    Nutrition Rx & Recommendations:  Diet: High Calorie, High Protein (for high calorie foods see pages 52-53, and for high protein foods see pages 49-51 in your Eating Hints book)  Small, frequent meals/snacks may be easier to tolerate than 3 large daily meals  Aim for 5-6 small meals per day (every 2-3 hours)  Include protein at all meals/snacks  Liquid nutrition may be better tolerated than solids at times  For appetite loss: try powdered or liquid nutrition supplements; eating by the clock every 2-3 hours; set a timer to remind yourself to eat, keep snacks nearby; add extra protein & calories to your diet; drink liquids in between meals, choose liquids that add calories; eat a bedtime snack; eat soft, cool or frozen foods; eat a larger meal when you feel well & rested; only sip small amounts of liquids during meals (see pages 10-12 in your Eating Hints book)    For weight loss: monitor your weight at home at least 2x/week, record your weight, start by adding 250-500 extra calories per day, eat 5-6 small scheduled meals every 2-3 hrs, choose foods that are high in protein and calories (see pages 49-53 in your Eating Hints book), drink liquids with calories for example: milkshakes/smoothies/juice/soup/whole milk/chocolate milk, cook with protein fortified milk (see recipe on page 36 in your Eating Hints book), consider ready-to-drink oral nutrition supplements such as Ensure Plus, Ensure Enlive, Boost Plus, or Boost Very High Calorie, avoid "diet" and "light" foods when possible, avoid drinking too much with meals, contact your dietitian with any continued weight loss over the course of 1 week  For more info see pages 35-37 in your Eating Hints book  Weigh yourself regularly  If you notice weight loss, make an effort to increase your daily food/calorie intake  If you continue to notice loss after these efforts, reach out to your dietitian to establish a plan to stabilize weight  Consider stopping all high dose antioxidant supplements (vitamin A, C, E, selenium, etc )  Refer to [facility] "About Herbs" website for more information on the safety of supplements     Choose liquids with calories: whole milk, Fairlife milk (higher protein/lactose-free milk), chocolate milk, drinkable yogurt, kefir, 100% fruit juice, diluted juice, bone broth (higher protein broth), creamy soups, sports drinks (Gatorade, Poweraide, Pedialyte, etc ), [place] ice, popsicles, milkshakes, smoothies, oral nutrition supplements (Ensure, Boost, etc ), gelatin/Jello, etc   Nutrition Supplements: 4-5 high calorie/protein shakes/smoothies daily (make sure each one has at least 300 calories and 15 grams of protein)  Examples: Ensure Complete, Ensure Plus, Boost Plus, Boost Very High Calorie, etc   See recipes for homemade shakes/smoothies    Follow Up Plan: 3/20 at 11am  Recommend Referral to Other Providers: Symptom Management/Palliative Care (has referral but has not made appt yet)

## 2023-03-01 NOTE — TELEPHONE ENCOUNTER
Second attempt made today to reach Mirian Castañeda STEPHEN to establish care and discuss his nutrition  Spoke with Mirian Castañeda today  Introduced myself and explained the reason for my call  Pt reports he has been losing wt and wants to regain wt  He states he has lost 40# in the past 3 months  Discussed oncology nutrition services available (options for in-person and phone consultation) and the benefits of meeting for a consultation  Initial RD consultation set up for 3/6 at 11am       Pt has RD contact info

## 2023-03-06 ENCOUNTER — NUTRITION (OUTPATIENT)
Dept: NUTRITION | Facility: CLINIC | Age: 58
End: 2023-03-06

## 2023-03-06 VITALS — WEIGHT: 154.5 LBS | BODY MASS INDEX: 20.38 KG/M2

## 2023-03-06 DIAGNOSIS — Z71.3 NUTRITIONAL COUNSELING: Primary | ICD-10-CM

## 2023-03-06 NOTE — PATIENT INSTRUCTIONS
Nutrition Rx & Recommendations:  Diet: High Calorie, High Protein (for high calorie foods see pages 52-53, and for high protein foods see pages 49-51 in your Eating Hints book)  Small, frequent meals/snacks may be easier to tolerate than 3 large daily meals  Aim for 5-6 small meals per day (every 2-3 hours)  Include protein at all meals/snacks  Liquid nutrition may be better tolerated than solids at times  For appetite loss: try powdered or liquid nutrition supplements; eating by the clock every 2-3 hours; set a timer to remind yourself to eat, keep snacks nearby; add extra protein & calories to your diet; drink liquids in between meals, choose liquids that add calories; eat a bedtime snack; eat soft, cool or frozen foods; eat a larger meal when you feel well & rested; only sip small amounts of liquids during meals (see pages 10-12 in your Eating Hints book)  For weight loss: monitor your weight at home at least 2x/week, record your weight, start by adding 250-500 extra calories per day, eat 5-6 small scheduled meals every 2-3 hrs, choose foods that are high in protein and calories (see pages 49-53 in your Eating Hints book), drink liquids with calories for example: milkshakes/smoothies/juice/soup/whole milk/chocolate milk, cook with protein fortified milk (see recipe on page 36 in your Eating Hints book), consider ready-to-drink oral nutrition supplements such as Ensure Plus, Ensure Enlive, Boost Plus, or Boost Very High Calorie, avoid "diet" and "light" foods when possible, avoid drinking too much with meals, contact your dietitian with any continued weight loss over the course of 1 week  For more info see pages 35-37 in your Eating Hints book  Weigh yourself regularly  If you notice weight loss, make an effort to increase your daily food/calorie intake  If you continue to notice loss after these efforts, reach out to your dietitian to establish a plan to stabilize weight    Consider stopping all high dose antioxidant supplements (vitamin A, C, E, selenium, etc )  Refer to [facility] "About Herbs" website for more information on the safety of supplements     Choose liquids with calories: whole milk, Fairlife milk (higher protein/lactose-free milk), chocolate milk, drinkable yogurt, kefir, 100% fruit juice, diluted juice, bone broth (higher protein broth), creamy soups, sports drinks (Gatorade, Poweraide, Pedialyte, etc ), [place] ice, popsicles, milkshakes, smoothies, oral nutrition supplements (Ensure, Boost, etc ), gelatin/Jello, etc   Nutrition Supplements: 4-5 high calorie/protein shakes/smoothies daily (make sure each one has at least 300 calories and 15 grams of protein)  Examples: Ensure Complete, Ensure Plus, Boost Plus, Boost Very High Calorie, etc   See recipes for homemade shakes/smoothies    Follow Up Plan: 3/20 at 11am  Recommend Referral to Other Providers: Symptom Management/Palliative Care

## 2023-03-07 ENCOUNTER — HOSPITAL ENCOUNTER (OUTPATIENT)
Dept: NUCLEAR MEDICINE | Facility: HOSPITAL | Age: 58
Discharge: HOME | End: 2023-03-07
Attending: INTERNAL MEDICINE

## 2023-03-07 DIAGNOSIS — D47.2 MONOCLONAL GAMMOPATHY: ICD-10-CM

## 2023-03-07 PROBLEM — M54.9 BACK PAIN: Status: ACTIVE | Noted: 2023-03-07

## 2023-03-07 LAB — GLUCOSE SERPL-MCNC: 113 MG/DL (ref 65–140)

## 2023-03-08 ENCOUNTER — DOCUMENTATION (OUTPATIENT)
Dept: HEMATOLOGY ONCOLOGY | Facility: MEDICAL CENTER | Age: 58
End: 2023-03-08

## 2023-03-08 ENCOUNTER — SOCIAL WORK (OUTPATIENT)
Dept: PALLIATIVE MEDICINE | Facility: CLINIC | Age: 58
End: 2023-03-08

## 2023-03-08 ENCOUNTER — CONSULT (OUTPATIENT)
Dept: PALLIATIVE MEDICINE | Facility: CLINIC | Age: 58
End: 2023-03-08

## 2023-03-08 VITALS
WEIGHT: 153 LBS | TEMPERATURE: 96.6 F | OXYGEN SATURATION: 97 % | DIASTOLIC BLOOD PRESSURE: 84 MMHG | SYSTOLIC BLOOD PRESSURE: 110 MMHG | BODY MASS INDEX: 20.19 KG/M2 | HEART RATE: 138 BPM | RESPIRATION RATE: 18 BRPM

## 2023-03-08 DIAGNOSIS — Z71.89 COUNSELING AND COORDINATION OF CARE: Primary | ICD-10-CM

## 2023-03-08 DIAGNOSIS — Z00.00 PREVENTATIVE HEALTH CARE: ICD-10-CM

## 2023-03-08 DIAGNOSIS — G89.3 CANCER RELATED PAIN: ICD-10-CM

## 2023-03-08 DIAGNOSIS — K59.03 THERAPEUTIC OPIOID-INDUCED CONSTIPATION (OIC): ICD-10-CM

## 2023-03-08 DIAGNOSIS — R63.0 LOSS OF APPETITE: ICD-10-CM

## 2023-03-08 DIAGNOSIS — Z71.89 GOALS OF CARE, COUNSELING/DISCUSSION: ICD-10-CM

## 2023-03-08 DIAGNOSIS — Z51.5 PALLIATIVE CARE PATIENT: ICD-10-CM

## 2023-03-08 DIAGNOSIS — K21.9 ACID REFLUX: ICD-10-CM

## 2023-03-08 DIAGNOSIS — Z71.89 ADVANCED CARE PLANNING/COUNSELING DISCUSSION: ICD-10-CM

## 2023-03-08 DIAGNOSIS — M54.9 BACK PAIN: ICD-10-CM

## 2023-03-08 DIAGNOSIS — T40.2X5A THERAPEUTIC OPIOID-INDUCED CONSTIPATION (OIC): ICD-10-CM

## 2023-03-08 DIAGNOSIS — D47.2 MONOCLONAL GAMMOPATHY: Primary | ICD-10-CM

## 2023-03-08 DIAGNOSIS — F10.11 HISTORY OF ALCOHOL ABUSE: ICD-10-CM

## 2023-03-08 DIAGNOSIS — F17.210 NICOTINE DEPENDENCE, CIGARETTES, UNCOMPLICATED: ICD-10-CM

## 2023-03-08 DIAGNOSIS — R11.2 NAUSEA & VOMITING: ICD-10-CM

## 2023-03-08 DIAGNOSIS — R63.4 UNINTENTIONAL WEIGHT LOSS: ICD-10-CM

## 2023-03-08 RX ORDER — ONDANSETRON 4 MG/1
4 TABLET, FILM COATED ORAL EVERY 8 HOURS PRN
Qty: 20 TABLET | Refills: 0 | Status: SHIPPED | OUTPATIENT
Start: 2023-03-08

## 2023-03-08 RX ORDER — OXYCODONE HYDROCHLORIDE 10 MG/1
10 TABLET ORAL EVERY 4 HOURS PRN
Qty: 90 TABLET | Refills: 0 | Status: SHIPPED | OUTPATIENT
Start: 2023-03-08

## 2023-03-08 RX ORDER — DEXAMETHASONE 1 MG
1 TABLET ORAL
Qty: 30 TABLET | Refills: 0 | Status: SHIPPED | OUTPATIENT
Start: 2023-03-08

## 2023-03-08 RX ORDER — PANTOPRAZOLE SODIUM 40 MG/1
40 TABLET, DELAYED RELEASE ORAL DAILY
Qty: 30 TABLET | Refills: 2 | Status: SHIPPED | OUTPATIENT
Start: 2023-03-08

## 2023-03-08 RX ORDER — ACETAMINOPHEN 500 MG
1000 TABLET ORAL 3 TIMES DAILY
Qty: 180 TABLET | Refills: 2 | Status: SHIPPED | OUTPATIENT
Start: 2023-03-08

## 2023-03-08 NOTE — PATIENT INSTRUCTIONS
It was good to see you today  Thank you for coming in  Stop the Percocet once you have the oxycodone  Take oxycodone, one 10mg tab every 4-6 hours as needed for cancer-related pain  This can be constipating! For back pain:  Try a heating pad or ice pack (you can alternate these about 30 minutes apart) for neck and back pain  You may consider topical lidocaine products as well (available over-the-counter; brand names include Salonpas, Biofreeze, Aspercreme, [address], etc)  These can be patches, creams or roll-on gels  Do not use topical product under a heating pad; ensure skin is clean and dry  Tylenol - take 1000mg three times per day every day  Safe and effective way to reduce chronic pain  Avoid Aleve and Motrn (naproxen and ibuprofen, etc) due to risk of stomach ulcers  Prescribing Zofran to use as needed for nausea or vomiting  When we use opioids for pain control, constipation is common and patients should act to prevent it:  Drink PLENTY of water  This is important to keep the gut moving  Some people have success w/ using prunes, prune juice, certain fruits or vegetables (apples, bananas, prunes, pears, raspberries, and vegetables like string beans, broccoli, spinach, kale, squash, lentils, peas, and beans), or fiber gummies  Try a probiotic  This could be yogurt or kefir, or fermented beverages such as kombucha, but probiotics are also available in capsule form  Aim for 10-15 billion colony-forming-units, w/ bacteria such as Lactobacillus / Saccharomyces / Actinomyces  Osmotic laxatives (Miralax, magnesium citrate, Milk of Magnesia) can be very useful for opioid-induced constipation (OIC); take daily to prevent OIC  Bulk laxatives (Citrucel, Metamucil, Fibercon, Benefiber, wheat germ) are useful for constipation in patients who are not taking opioids, but are not recommended if you are taking opioids    Colace is good for softening hard stools, or preventing constipation when opioids are being used - but does not stimulate the bowel to move things along once constipation has occurred  You can use senna, 1 to 2 tabs, once or twice daily as needed for constipation  Use as directed on the box/bottle  Senna is also available in a tea ("Smooth Move")  Should that not be enough for your constipation, you can try Dulcolax  Should that not be enough, consider an enema  All of these medications are available over-the-counter  Will trial dexamethasone, take 1mg with breakfast every day  This is to stimulate appetite and energy  A copy of the [facility] Advanced Directive was provided by us and the Five Wishes by another person; please review and discuss w/ your family  We can discuss it at our next visit  When complete, you may bring one of the documents in to any [address] [place] [address]'s appointment where staff can witness your signature and scan it in to the system  If there are any issues affording the co-pays on your medications, try GoodRx  This problem, or the pharmacist can use it for you  It can reduce the cost of your prescription present  Return in about 1 month  Call us for refills on medications that we supply, as needed  If something changes and you need to come in sooner, please call our office  PRESCRIPTION REFILL REMINDER:  All medication refills should be requested prior to [facility] on Friday  Any refill requests after noon on Friday would be addressed the following Monday  MEDICATION SAFETY ISSUES:  Do not drive under the influence of narcotics (including opioids), watch for adverse effects including confusion / altered mental status / respiratory depression (slowed breathing), keep medications stored in a safe/locked environment, do not use alcohol while opioids or other narcotics are in your system

## 2023-03-08 NOTE — PROGRESS NOTES
Consult to Palliative and [address] Neopit Mango Cerrato 65 y o  male [phone]    ASSESSMENT & PLAN:  1  Monoclonal gammopathy    2  Back pain    3  Unintentional weight loss    4  Goals of care, counseling/discussion    5  Advanced care planning/counseling discussion    6  Palliative care patient    7  Nicotine dependence, cigarettes, uncomplicated    8  Cancer related pain    9  Acid reflux    10  History of alcohol abuse    11  Nausea & vomiting    12  Loss of appetite          • Time spent introducing the concept of palliative care in general, and the [facility] in specific  Assessed patient's understanding of his palliative diagnosis and current plan of treatment  • Continue disease-directed cares  Patient wishes to pursue any offered treatment which might prolong his life or reduce symptom burden  He states he would not wish to prolong life via mechanical ventilation, etc, if there was no guarantee at ongoing quality of life or meaningful recovery  • Patient endorses multiple sources of pain, including likely cancer-related back pain, and epigastric / anterior chest wall pain which may be related to malignancy, may be related to reflux  He has been vomiting recently  o Start Zofran PRN N/V   o Start pantoprazole 40mg qAM for reflux  o As patient is requiring Tylenol in addition to Percocet 10-325mg, will make analgesic changes  - Stop Percocet  - Start oxyIR 10mg q4-6h PRN moderate-severe pain, max 6 tabs per day  #90 tabs for 15 days provided in Rx   - Recommended patient consider topical OTC products, local application of heat or cold, Tylenol 1000mg TID ATC  Do not use heat on top of topical agents  Do not mix topical agents  • Patient provided [facility] opioid agreement  Referral to [facility] generated  • Counseled on smoking cessation  • Counseled on bowel regimen for constipation  • Start dexamethasone 1mg qAM for N+V, anorexia + weight loss, fatigue    • Patient has abstained from EtOH since 01/2023; prior to that he was drinking about "5 beers" daily  Positive reinforcement provided  • Patient has no PCP listed; [facility] phone message w/ instructions on using [employer] to find a new PCP  • ACP: Patient has not completed any advanced healthcare directives  He accepts a copy of the [facility] Advanced Directive along with counseling today  Another [facility] team member mailed him the Five Wishes recently  ACP may be reviewed in detail at next visit  He states he is comfortable with his wife acting as his surrogate healthcare decision-maker  • Reviewed notes (Nutrition, Medical Oncology, Care Coordination, ED), labs (2/28/23 Cr 1 17, eGFR 69, alb 3 3, cCa 11 3, , lactate 2 5->1 2, Hb 12 6, igG 2900), imaging + procedures (2/8/23 CTA ED chest PE study + CX)  • Return in about 1 month (around 4/8/2023)  • Emotional support provided  • Medication safety issues addressed - no driving under the influence of narcotics (including opioids), watch for adverse effects including AMS or respiratory depression (slowed breathing), keep medications stored in a safe/locked environment, do not use alcohol while opioids or other narcotics are in one's system        Requested Prescriptions     Signed Prescriptions Disp Refills   • oxyCODONE (ROXICODONE) 10 MG TABS 90 tablet 0     Sig: Take 1 tablet (10 mg total) by mouth every 4 (four) hours as needed (cancer-related pain) Max Daily Amount: 60 mg   • pantoprazole (PROTONIX) 40 mg tablet 30 tablet 2     Sig: Take 1 tablet (40 mg total) by mouth daily   • acetaminophen (TYLENOL) 500 mg tablet 180 tablet 2     Sig: Take 2 tablets (1,000 mg total) by mouth 3 (three) times a day   • dexamethasone (DECADRON) 1 mg tablet 30 tablet 0     Sig: Take 1 tablet (1 mg total) by mouth daily with breakfast   • ondansetron (ZOFRAN) 4 mg tablet 20 tablet 0     Sig: Take 1 tablet (4 mg total) by mouth every 8 (eight) hours as needed for nausea or vomiting       Medications Discontinued During This Encounter   Medication Reason   • oxyCODONE-acetaminophen (Percocet)  mg per tablet Alternate therapy       Representatives have queried the patient's controlled substance dispensing history in the Prescription Drug Monitoring Program in compliance with regulations before I have prescribed any controlled substances  The prescription history is consistent with prescribed therapy and our practice policies  70+ minutes were spent in this ambulatory visit with greater than 50% of the time spent face to face with patient and his wife Duke Martin in counseling or coordination of care including symptom assessment and management, medication review, medication adjustment, psychosocial support, chart review, imaging review, lab review, advanced directives, goals of care, opioid titration, supportive listening and anticipatory guidance  All of the patient's questions were answered during this discussion  SUBJECTIVE:  Chief Complaint   Patient presents with   • Cancer   • Cancer Pain   • Back Pain   • Counseling   • Consult   • Goals   • Loss of Appetite   • Weight Loss   • Nausea        LILLIE Reardon is a 65 y o  male w/ monoclonal gammopathy (concern for multiple myeloma based on recent imaging showing multiple lytic lesions); back pain (likely s/t malignancy), unintentional weight loss  He follows w/ Dr Melody Evans (Medical Oncology)  Not yet on systemic treatment for malignancy  Patient is new to [facility] clinic; referred by Medical Oncology for symptom management  Patient endorses severe pain, likely cancer-related, which interferes w/ quality of life, interferes w/ sleep  He has had back pain, but today c/o severe anterior chest wall pain, epigastric pain, and a feeling of reflux into his esophagus  He has nausea and vomited today in the bathroom just prior to our visit       Patient denies depressed mood, denies overt anxiety - though he endorses feeling some stress about financial matters  His appetite is "not good" and he has been losing weight  He endorses constipation (has not tried medications for this yet)  Patient is frustrated, stating that he did not have chronic health issues prior to recent months, and was not requiring medications  He does report that he was drinking about "5 beers" daily prior to 01/2023 ("when this all started") but has been able to abstain from EtOH since  He denies EtOH withdrawal signs/symptoms  He is smoking cigarettes intermittently; last cigarette was about 2 days ago  Patient is  to [place]  They report they have no children  Patient had worked as a cook at [employer], 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 feet with significant physical activity, including frequently lifting 5lb or more  Patient states he has insurance, and so far co-pays for medications have been affordable  He does not have a listed PCP though Care Everywhere reveals there is a scheduled visit w/ Dr Jeri Olivo of [address] [place] [address] Medicine on 7/3/23 (no prior office visits to [address] [place] [address] seen)  PDMP shows no concerns  The following portions of the medical history were reviewed: past medical history, surgical history, problem list, medication list, family history, and social history  History reviewed  No pertinent past medical history  Past Surgical History:   Procedure Laterality Date   • APPENDECTOMY       Social History     Socioeconomic History   • Marital status: /Civil Union     Spouse name: None   • Number of children: None   • Years of education: None   • Highest education level: None   Occupational History   • None   Tobacco Use   • Smoking status: Former   • Smokeless tobacco: None   Vaping Use   • Vaping Use: Never used   Substance and Sexual Activity   • Alcohol use: Yes     Comment:  Mod   • Drug use: Yes     Types: Marijuana   • Sexual activity: None   Other Topics Concern   • None   Social History Narrative    From 2/28/23  note: Emergency Contact: Dee Martinez (IV)[phone] (Home Phone)    Marital Status: Single     Interpretation concerns, speaks another language, preferred language: english    Caregiver/Support: Mayur    Housing: two story     Home Setup: one level     Lives With: SO    Daily Living Activities: independent     Ambulation: independent    Employment: yes     Ira Status/Location: no     Ability to pay bills: yes  No barriers to paying bills  POA/LW/AD: no   Karlie is healthcare representative  MSW emailed advance directive form  Transportation Plan/Concerns: Patient states he transports self  MSW educated on [employer] transport services        Social Determinants of Health     Financial Resource Strain: Not on file   Food Insecurity: Not on file   Transportation Needs: Not on file   Physical Activity: Not on file   Stress: Not on file   Social Connections: Not on file   Intimate Partner Violence: Not on file   Housing Stability: Not on file     Family History   Problem Relation Age of Onset   • Diabetes Mother    • Heart disease Father    • Diabetes Father        Current Outpatient Medications:   •  acetaminophen (TYLENOL) 500 mg tablet, Take 2 tablets (1,000 mg total) by mouth 3 (three) times a day, Disp: 180 tablet, Rfl: 2  •  dexamethasone (DECADRON) 1 mg tablet, Take 1 tablet (1 mg total) by mouth daily with breakfast, Disp: 30 tablet, Rfl: 0  •  ondansetron (ZOFRAN) 4 mg tablet, Take 1 tablet (4 mg total) by mouth every 8 (eight) hours as needed for nausea or vomiting, Disp: 20 tablet, Rfl: 0  •  oxyCODONE (ROXICODONE) 10 MG TABS, Take 1 tablet (10 mg total) by mouth every 4 (four) hours as needed (cancer-related pain) Max Daily Amount: 60 mg, Disp: 90 tablet, Rfl: 0  •  pantoprazole (PROTONIX) 40 mg tablet, Take 1 tablet (40 mg total) by mouth daily, Disp: 30 tablet, Rfl: 2    Review of Systems   Constitutional: Positive for activity change, appetite change, fatigue and unexpected weight change  HENT: Positive for dental problem (chronic)  Cardiovascular: Positive for chest pain (anterior chest wall pain)  Gastrointestinal: Positive for abdominal pain, constipation, nausea and vomiting  Reflux  Musculoskeletal: Positive for back pain and gait problem (antalgic)  Allergic/Immunologic: Positive for immunocompromised state  Neurological: Positive for speech difficulty ("hurts to talk so I talk real fast")  Psychiatric/Behavioral: Positive for sleep disturbance (d/t pain)  All other systems reviewed and are negative  OBJECTIVE:  /84 (BP Location: Left arm, Patient Position: Sitting, Cuff Size: Standard)   Pulse (!) 138   Temp (!) 96 6 °F (35 9 °C) (Temporal)   Resp 18   Wt 69 4 kg (153 lb)   SpO2 97%   BMI 20 19 kg/m²   Physical Exam  Vitals reviewed  Constitutional:       General: He is not in acute distress  Appearance: He is underweight  He is not toxic-appearing  Comments: Reports he vomited in bathroom at start of visit today  HENT:      Head: Normocephalic and atraumatic  Right Ear: External ear normal       Left Ear: External ear normal       Mouth/Throat:      Dentition: Abnormal dentition (missing teeth)  Dental caries present  Eyes:      General: No scleral icterus  Right eye: No discharge  Left eye: No discharge  Extraocular Movements: Extraocular movements intact  Conjunctiva/sclera: Conjunctivae normal       Pupils: Pupils are equal, round, and reactive to light  Cardiovascular:      Rate and Rhythm: Tachycardia present  Pulmonary:      Effort: Pulmonary effort is normal  No tachypnea, bradypnea, accessory muscle usage or respiratory distress  Comments: Able to speak comfortably in complete sentences on room air at rest   Abdominal:      General: There is no distension  Tenderness: There is no guarding  Musculoskeletal:      Cervical back: Normal range of motion        Right lower leg: No edema  Left lower leg: No edema  Skin:     General: Skin is dry  Coloration: Skin is not pale  Neurological:      Mental Status: He is alert and oriented to person, place, and time  Cranial Nerves: No facial asymmetry  Gait: Gait abnormal (antalgic)  Psychiatric:         Attention and Perception: Attention normal          Mood and Affect: Mood normal  Affect is not inappropriate  Behavior: Behavior normal  Behavior is cooperative  Thought Content: Thought content normal          Cognition and Memory: Cognition and memory normal          Judgment: Judgment normal       Comments: Speech frequently rapid ("hurts to talk to I talk real fast")  Jarvis Genao MD  [facility] Palliative and Supportive Care  [phone]    Portions of this document may have been created using dictation software and as such some "sound alike" terms may have been generated by the system  Do not hesitate to contact me with any questions or clarifications

## 2023-03-08 NOTE — PROGRESS NOTES
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  Disability: Pt intends to apply for disability once he receives official diagnosis  Checklist, information booklet on disability application process, as well as local SS office information provided   Concerns regarding literacy: None   Spirituality/ Sabianism: None   Patient's strengths, social supports, and resources: Supportive spouse  Cultural information:   Mental Health current or previous: None  Pt reports current stress is related to financial matters moving forward as he is no longer working  Substance use or history: None   Sleep: Interrupted due to pain  Exercise: As tolerated  Diet/nutrition: Reports lack of appetite  Durable Medical Equipment needs: None  Transportation: Drives self   Financial concerns: Reports some financial stress, however currently able to pay bills  Advanced Directive: AD document given  Other medical or social work providers involved: Oncology  Patient/caregiver current level of coping: Pt appears to be coping well emotionally overall at this time    Understanding: Pt appears understanding of current medical status  Patient/family concerns and areas of need: Oncology appt 3/24; Pain; Lack of appetite; Pt provided info-link contact # to obtain PCP   Patient's interests: Spending time outside by the fire pit;  Watching wrestling    I have spent 30 minutes with Patient and family today in which greater than 50% of this time was spent in counseling/coordination of care regarding SW needs assessment  *All questions may not be answered due to constraints  Follow-up discussions may need to occur    [facility] SW met with patient/family to review Palliative and Supportive Care Psychosocial Contract  Opportunity was provided for questions and/or concerns  Document was signed by patient,  and Attending Physician  Document will be scanned into patient's chart

## 2023-03-08 NOTE — H&P (VIEW-ONLY)
Consult to Palliative and [place]  [address] [place] Mango Cerrato 65 y o  male [phone]    ASSESSMENT & PLAN:  1  Monoclonal gammopathy    2  Back pain    3  Unintentional weight loss    4  Goals of care, counseling/discussion    5  Advanced care planning/counseling discussion    6  Palliative care patient    7  Nicotine dependence, cigarettes, uncomplicated    8  Cancer related pain    9  Acid reflux    10  History of alcohol abuse    11  Nausea & vomiting    12  Loss of appetite          • Time spent introducing the concept of palliative care in general, and the [facility] in specific  Assessed patient's understanding of his palliative diagnosis and current plan of treatment  • Continue disease-directed cares  Patient wishes to pursue any offered treatment which might prolong his life or reduce symptom burden  He states he would not wish to prolong life via mechanical ventilation, etc, if there was no guarantee at ongoing quality of life or meaningful recovery  • Patient endorses multiple sources of pain, including likely cancer-related back pain, and epigastric / anterior chest wall pain which may be related to malignancy, may be related to reflux  He has been vomiting recently  o Start Zofran PRN N/V   o Start pantoprazole 40mg qAM for reflux  o As patient is requiring Tylenol in addition to Percocet 10-325mg, will make analgesic changes  - Stop Percocet  - Start oxyIR 10mg q4-6h PRN moderate-severe pain, max 6 tabs per day  #90 tabs for 15 days provided in Rx   - Recommended patient consider topical OTC products, local application of heat or cold, Tylenol 1000mg TID ATC  Do not use heat on top of topical agents  Do not mix topical agents  • Patient provided [facility] opioid agreement  Referral to [facility] Medicine generated  • Counseled on smoking cessation  • Counseled on bowel regimen for constipation  • Start dexamethasone 1mg qAM for N+V, anorexia + weight loss, fatigue    • Patient has abstained from EtOH since 01/2023; prior to that he was drinking about "5 beers" daily  Positive reinforcement provided  • Patient has no PCP listed; [facility] message w/ instructions on using [employer] to find a new PCP  • ACP: Patient has not completed any advanced healthcare directives  He accepts a copy of the [facility] Advanced Directive along with counseling today  Another [facility] team member mailed him the [facility] recently  ACP may be reviewed in detail at next visit  He states he is comfortable with his wife acting as his surrogate healthcare decision-maker  • Reviewed notes (Nutrition, Medical Oncology, Care Coordination, ED), labs (2/28/23 Cr 1 17, eGFR 69, alb 3 3, cCa 11 3, , lactate 2 5->1 2, Hb 12 6, igG 2900), imaging + procedures (2/8/23 CTA ED chest PE study + CX)  • Return in about 1 month (around 4/8/2023)  • Emotional support provided  • Medication safety issues addressed - no driving under the influence of narcotics (including opioids), watch for adverse effects including AMS or respiratory depression (slowed breathing), keep medications stored in a safe/locked environment, do not use alcohol while opioids or other narcotics are in one's system        Requested Prescriptions     Signed Prescriptions Disp Refills   • oxyCODONE (ROXICODONE) 10 MG TABS 90 tablet 0     Sig: Take 1 tablet (10 mg total) by mouth every 4 (four) hours as needed (cancer-related pain) Max Daily Amount: 60 mg   • pantoprazole (PROTONIX) 40 mg tablet 30 tablet 2     Sig: Take 1 tablet (40 mg total) by mouth daily   • acetaminophen (TYLENOL) 500 mg tablet 180 tablet 2     Sig: Take 2 tablets (1,000 mg total) by mouth 3 (three) times a day   • dexamethasone (DECADRON) 1 mg tablet 30 tablet 0     Sig: Take 1 tablet (1 mg total) by mouth daily with breakfast   • ondansetron (ZOFRAN) 4 mg tablet 20 tablet 0     Sig: Take 1 tablet (4 mg total) by mouth every 8 (eight) hours as needed for nausea or vomiting       Medications Discontinued During This Encounter   Medication Reason   • oxyCODONE-acetaminophen (Percocet)  mg per tablet Alternate therapy       Representatives have queried the patient's controlled substance dispensing history in the Prescription Drug Monitoring Program in compliance with regulations before I have prescribed any controlled substances  The prescription history is consistent with prescribed therapy and our practice policies  70+ minutes were spent in this ambulatory visit with greater than 50% of the time spent face to face with patient and his wife Sandeep Rice in counseling or coordination of care including symptom assessment and management, medication review, medication adjustment, psychosocial support, chart review, imaging review, lab review, advanced directives, goals of care, opioid titration, supportive listening and anticipatory guidance  All of the patient's questions were answered during this discussion  SUBJECTIVE:  Chief Complaint   Patient presents with   • Cancer   • Cancer Pain   • Back Pain   • Counseling   • Consult   • Goals   • Loss of Appetite   • Weight Loss   • Nausea        HPI    Gwen Quintana is a 65 y o  male w/ monoclonal gammopathy (concern for multiple myeloma based on recent imaging showing multiple lytic lesions); back pain (likely s/t malignancy), unintentional weight loss  He follows w/ Dr Odette Becerril (Medical Oncology)  Not yet on systemic treatment for malignancy  Patient is new to [facility] clinic; referred by Medical Oncology for symptom management  Patient endorses severe pain, likely cancer-related, which interferes w/ quality of life, interferes w/ sleep  He has had back pain, but today c/o severe anterior chest wall pain, epigastric pain, and a feeling of reflux into his esophagus  He has nausea and vomited today in the bathroom just prior to our visit       Patient denies depressed mood, denies overt anxiety - though he endorses feeling some stress about financial matters  His appetite is "not good" and he has been losing weight  He endorses constipation (has not tried medications for this yet)  Patient is frustrated, stating that he did not have chronic health issues prior to recent months, and was not requiring medications  He does report that he was drinking about "5 beers" daily prior to 01/2023 ("when this all started") but has been able to abstain from EtOH since  He denies EtOH withdrawal signs/symptoms  He is smoking cigarettes intermittently; last cigarette was about 2 days ago  Patient is  to [place]  They report they have no children  Patient had worked as a cook at [employer], 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 feet with significant physical activity, including frequently lifting 5lb or more  Patient states he has insurance, and so far co-pays for medications have been affordable  He does not have a listed PCP though Care Everywhere reveals there is a scheduled visit w/ Dr Apple Hickey of [address] [place] [address] on 7/3/23 (no prior office visits to [address] [place] [address] seen)  PDMP shows no concerns  The following portions of the medical history were reviewed: past medical history, surgical history, problem list, medication list, family history, and social history  History reviewed  No pertinent past medical history  Past Surgical History:   Procedure Laterality Date   • APPENDECTOMY       Social History     Socioeconomic History   • Marital status: /Civil Union     Spouse name: None   • Number of children: None   • Years of education: None   • Highest education level: None   Occupational History   • None   Tobacco Use   • Smoking status: Former   • Smokeless tobacco: None   Vaping Use   • Vaping Use: Never used   Substance and Sexual Activity   • Alcohol use: Yes     Comment:  Mod   • Drug use: Yes     Types: Marijuana   • Sexual activity: None   Other Topics Concern   • None   Social History Narrative    From 2/28/23  note: Emergency Contact: Dee Martinez (DP)[phone] (Home Phone)    Marital Status: Single     Interpretation concerns, speaks another language, preferred language: english    Caregiver/Support: Mayur    Housing: two story     Home Setup: one level     Lives With: SO    Daily Living Activities: independent     Ambulation: independent    Employment: yes     [place] Status/Location: no     Ability to pay bills: yes  No barriers to paying bills  POA/LW/AD: no   Karlie is healthcare representative  MSW emailed advance directive form  Transportation Plan/Concerns: Patient states he transports self  MSW educated on [employer] transport services        Social Determinants of Health     Financial Resource Strain: Not on file   Food Insecurity: Not on file   Transportation Needs: Not on file   Physical Activity: Not on file   Stress: Not on file   Social Connections: Not on file   Intimate Partner Violence: Not on file   Housing Stability: Not on file     Family History   Problem Relation Age of Onset   • Diabetes Mother    • Heart disease Father    • Diabetes Father        Current Outpatient Medications:   •  acetaminophen (TYLENOL) 500 mg tablet, Take 2 tablets (1,000 mg total) by mouth 3 (three) times a day, Disp: 180 tablet, Rfl: 2  •  dexamethasone (DECADRON) 1 mg tablet, Take 1 tablet (1 mg total) by mouth daily with breakfast, Disp: 30 tablet, Rfl: 0  •  ondansetron (ZOFRAN) 4 mg tablet, Take 1 tablet (4 mg total) by mouth every 8 (eight) hours as needed for nausea or vomiting, Disp: 20 tablet, Rfl: 0  •  oxyCODONE (ROXICODONE) 10 MG TABS, Take 1 tablet (10 mg total) by mouth every 4 (four) hours as needed (cancer-related pain) Max Daily Amount: 60 mg, Disp: 90 tablet, Rfl: 0  •  pantoprazole (PROTONIX) 40 mg tablet, Take 1 tablet (40 mg total) by mouth daily, Disp: 30 tablet, Rfl: 2    Review of Systems   Constitutional: Positive for activity change, appetite change, fatigue and unexpected weight change  HENT: Positive for dental problem (chronic)  Cardiovascular: Positive for chest pain (anterior chest wall pain)  Gastrointestinal: Positive for abdominal pain, constipation, nausea and vomiting  Reflux  Musculoskeletal: Positive for back pain and gait problem (antalgic)  Allergic/Immunologic: Positive for immunocompromised state  Neurological: Positive for speech difficulty ("hurts to talk so I talk real fast")  Psychiatric/Behavioral: Positive for sleep disturbance (d/t pain)  All other systems reviewed and are negative  OBJECTIVE:  /84 (BP Location: Left arm, Patient Position: Sitting, Cuff Size: Standard)   Pulse (!) 138   Temp (!) 96 6 °F (35 9 °C) (Temporal)   Resp 18   Wt 69 4 kg (153 lb)   SpO2 97%   BMI 20 19 kg/m²   Physical Exam  Vitals reviewed  Constitutional:       General: He is not in acute distress  Appearance: He is underweight  He is not toxic-appearing  Comments: Reports he vomited in bathroom at start of visit today  HENT:      Head: Normocephalic and atraumatic  Right Ear: External ear normal       Left Ear: External ear normal       Mouth/Throat:      Dentition: Abnormal dentition (missing teeth)  Dental caries present  Eyes:      General: No scleral icterus  Right eye: No discharge  Left eye: No discharge  Extraocular Movements: Extraocular movements intact  Conjunctiva/sclera: Conjunctivae normal       Pupils: Pupils are equal, round, and reactive to light  Cardiovascular:      Rate and Rhythm: Tachycardia present  Pulmonary:      Effort: Pulmonary effort is normal  No tachypnea, bradypnea, accessory muscle usage or respiratory distress  Comments: Able to speak comfortably in complete sentences on room air at rest   Abdominal:      General: There is no distension  Tenderness: There is no guarding  Musculoskeletal:      Cervical back: Normal range of motion        Right lower leg: No edema  Left lower leg: No edema  Skin:     General: Skin is dry  Coloration: Skin is not pale  Neurological:      Mental Status: He is alert and oriented to person, place, and time  Cranial Nerves: No facial asymmetry  Gait: Gait abnormal (antalgic)  Psychiatric:         Attention and Perception: Attention normal          Mood and Affect: Mood normal  Affect is not inappropriate  Behavior: Behavior normal  Behavior is cooperative  Thought Content: Thought content normal          Cognition and Memory: Cognition and memory normal          Judgment: Judgment normal       Comments: Speech frequently rapid ("hurts to talk to I talk real fast")  Yordan Hernandez MD  [facility]      Portions of this document may have been created using dictation software and as such some "sound alike" terms may have been generated by the system  Do not hesitate to contact me with any questions or clarifications

## 2023-03-08 NOTE — PROGRESS NOTES
Left voicemail for patient that his appointment of 3/24/23 at 12:20pm will be rescheduled to [facility]  3/27/23 at 2:40pm

## 2023-03-10 RX ORDER — SODIUM CHLORIDE 9 MG/ML
30 INJECTION, SOLUTION INTRAVENOUS CONTINUOUS
Status: CANCELLED | OUTPATIENT
Start: 2023-03-10

## 2023-03-10 NOTE — PROGRESS NOTES
Outpatient Oncology Nutrition Consultation   Type of Consult: Follow Up  Care Location: Office Visit  - met w/pt & significant other Calvin Mcfarlane  Nutrition Assessment:   Oncology Diagnosis & Treatments: Possible Multiple Myeloma   S/p PET/CT and bone marrow biopsy  Follow up with Dr Lucas Monteiro 3/27/23  Oncology History    No history exists       Past Medical & Surgical Hx:   Patient Active Problem List   Diagnosis   • Monoclonal gammopathy   • Back pain   • Palliative care patient   • Unintentional weight loss   • Nicotine dependence, cigarettes, uncomplicated   • History of alcohol abuse   • Acid reflux   • Nausea & vomiting   • Loss of appetite   • Therapeutic opioid-induced constipation (OIC)     Past Medical History:   Diagnosis Date   • Cancer (HCC)     bone-   • GERD (gastroesophageal reflux disease)      Past Surgical History:   Procedure Laterality Date   • APPENDECTOMY     • IR BIOPSY BONE MARROW  3/15/2023       Review of Medications:   Vitamins, Supplements and Herbals: No, pt denies taking supplements    Current Outpatient Medications:   •  acetaminophen (TYLENOL) 500 mg tablet, Take 2 tablets (1,000 mg total) by mouth 3 (three) times a day, Disp: 180 tablet, Rfl: 2  •  dexamethasone (DECADRON) 1 mg tablet, Take 1 tablet (1 mg total) by mouth daily with breakfast, Disp: 30 tablet, Rfl: 0  •  ondansetron (ZOFRAN) 4 mg tablet, Take 1 tablet (4 mg total) by mouth every 8 (eight) hours as needed for nausea or vomiting, Disp: 20 tablet, Rfl: 0  •  oxyCODONE (ROXICODONE) 10 MG TABS, Take 1 tablet (10 mg total) by mouth every 4 (four) hours as needed (cancer-related pain) Max Daily Amount: 60 mg, Disp: 90 tablet, Rfl: 0  •  pantoprazole (PROTONIX) 40 mg tablet, Take 1 tablet (40 mg total) by mouth daily, Disp: 30 tablet, Rfl: 2    Most Recent Lab Results:   Lab Results   Component Value Date    WBC 6 86 03/15/2023    NEUTROABS 4 73 03/15/2023    ALT 14 02/08/2023    AST 23 02/08/2023    ALB 3 3 (L) 02/08/2023    SODIUM 130 (L) 02/08/2023    K 3 8 02/08/2023    CL 97 02/08/2023    BUN 21 02/08/2023    CREATININE 1 17 02/08/2023    EGFR 69 02/08/2023    POCGLU 113 03/07/2023    GLUC 151 (H) 02/08/2023    CALCIUM 10 7 (H) 02/08/2023       Anthropometric Measurements:   Height: 73"  Ht Readings from Last 1 Encounters:   03/15/23 6' (1 829 m)     Wt Readings from Last 20 Encounters:   03/20/23 67 7 kg (149 lb 3 2 oz)   03/15/23 68 9 kg (152 lb)   03/08/23 69 4 kg (153 lb)   03/06/23 70 1 kg (154 lb 8 oz)   02/27/23 72 9 kg (160 lb 12 8 oz)   02/08/23 78 kg (172 lb)     Weight History:   Usual Weight: 230# (2020)  Home Weight: (3/15/23) 142#, (3/20/23) 144 8#  Notes: intentionally lost 30#, was maintaining 200#, then unintentioanlly lost ~40-50# since December 2022    Oncology Nutrition-Anthropometrics    Flowsheet Row Nutrition from 3/20/2023 in [facility] Nutrition from 3/6/2023 in [facility]   Patient age (years): 65 years 65 years   Patient (male) height (in): 68 in 68 in   Current weight (lbs): 149 2 lbs 154 5 lbs   Current weight to be used for anthropometric calculations (kg) 67 8 kg 70 2 kg   BMI: 19 7 20 4   IBW male 184 lb 184 lb   IBW (kg) male 83 6 kg 83 6 kg   IBW % (male) 81 1 % 84 %   Adjusted BW (male): 175 3 lbs 176 6 lbs   Adjusted BW in kg (male): 79 7 kg 80 3 kg   % weight change after 1 week: -1 8 % -3 9 %   Weight change after 1 week (lbs) -2 8 lbs -6 3 lbs   % weight change after 1 month: -7 2 % -10 2 %   Weight change after 1 month (lbs) -11 6 lbs -17 5 lbs        Nutrition-Focused Physical Findings: none observed    Food/Nutrition-Related History & Client/Social History:    Current Nutrition Impact Symptoms:  [] Nausea - taking zofran even though he is not nauseous [x] Reduced Appetite -   -now on steroid per [facility] [] Acid Reflux    [] Vomiting - denies [x] Unintended Wt Loss -40-50# wt loss since December 2022  -per pt, he was told he has to gain wt  -+wt loss since last visit but pt reports he is now regaining wt  -significant x1 mo [] Malabsorption    [] Diarrhea  [] Unintended Wt Gain  [] Dumping Syndrome    [] Constipation - last BM was 1 5 weeks ago, ?r/t zofran use  -now eating prunes and taking a stool softener  [] Thick Mucous/Secretions  [] Abdominal Pain    [] Dysgeusia (Altered Taste)  [] Xerostomia (Dry Mouth)  [] Gas    [] Dysosmia (Altered Smell)  [] Thrush  [] Difficulty Chewing    [] Oral Mucositis (Sore Mouth)  [] Fatigue  [] Hyperglycemia   No results found for: HGBA1C   [] Odynophagia  [] Esophagitis  [] Other:    [] Dysphagia  [] Early Satiety  [] No Problems Eating      Food Allergies & Intolerances: no    Current Diet: Regular Diet, No Restrictions  Current Nutrition Intake:  Increased since last visit  Appetite: Poor, Forcing self to eat  Nutrition Route: PO  Activity level: ADL's, limited d/t back pain    24 Hr Diet Recall: reports he recently had 2 slices of pizza with 4 slices monterey mayda cheese  Breakfast: now trying to eat breakfast or something small, yesterday: prunes, cheese Gabonese  Snack: none  Lunch: 2 Oreo's and milk  Snack: 2 tasty cakes  Dinner: chicken, mashed potatoes  Snack: cheese Gabonese    Beverages: water (16 oz x1), diluted Hawaiian punch or orange juice (32-64 oz), whole milk (8 oz x3)   -Averaging ~72 oz per day  Supplements:   Premier Protein Shake (11 oz, 160 kcal, 30 g pro) + vanilla ice cream - has at home, has not had any x9 days  drank 2 Ensure Plus since last visit, did not like strawberry flavor, has chocolate at home to try   "Not in the mood" for shakes d/t sweetness    Oncology Nutrition-Estimated Needs    Flowsheet Row Nutrition from 3/6/2023 in [facility]   Weight type used Actual weight   Weight in kilograms (kg) used for estimated needs 70 2 kg   Energy needs formula:  35-40 kcal/kg   Energy needs based on 35 kcal/k kcal Energy needs based on 40 kcal/k kcal   Protein needs formula: 1 5-2 g/kg   Protein needs based on 1 5 g/kg 105 g   Protein needs based on 2 g/kg 140 g   Fluid needs formula: 30-35 mL/kg   Fluid needs based on 30 mL/kg 2115 mL   Fluid needs in ounces 71 oz   Fluid needs based on 35 mL/kg 2468 mL   Fluid needs in ounces 83 oz         Discussion & Intervention:   Sid Dinero was evaluated today for an RD follow up regarding unintended weight loss  Sid Dinero is currenlty undergoing workup for possible multiple myeloma  He has lost wt since last visit but reports that his home scale is now showing a recent gain  He is trying to force himself to eat more  He has tried some kcal/protien supplements but says he is "not in the mood" for them because they are too sweet; discussed diluting them with milk to reduce sweetness  He is now drinking more calorie-containing fluids (diluted juice, whole milk)  We discussed making double strength milk  He has been taking zofran every day even though he is not nauseous or vomiting  He says he has not had a BM for 1 5 weeks  Reviewed 24 hour recall, which revealed an inadequate po intake, and discussed ways to increase kcal, protein, and fluid intakes and optimize nutrient intake  Also reviewed the importance of wt management throughout the tx process and the role of a high kcal/ high protein diet in managing wt and overall health    Based on today's assessment, discussion included: MNT for: wt loss, poor appetite, constipation, a high kcal/protein diet & food choices to include at all meals & snacks (Examples of high kcal foods: cheese, full-fat dairy products, nut butter, plant-based fats, coconut oil/milk, avocado, butter, cream soup, etc  Examples of high protein foods: eggs, chicken, fish, beans/legumes, nuts/nut butters, bone broth, etc ) , fortifying foods for added kcal and protein (examples include: adding cheese to foods such as eggs, mashed potatoes, casseroles, etc ; Making oatmeal with whole milk rather than water; Making fortified mashed potatoes with cream, butter, dry milk powder, plain [place] yogurt, and cheese ), adequate hydration & fluid choices, sipping on calorie containing beverages (examples include: adding whole milk or cream to coffee, oral nutrition supplements, juice, electrolyte replacement beverages, milk, etc ), eating smaller more frequent meals every 2-3 hours (5-6 small meals/day), having consistent and planned snacks between meals, eating when feeling most hungry, utilizing oral nutrition supplements, recipe suggestions/resources, trying new recipes, & cooking at home more often and adding extra fat to foods while cooking such as butter, plant-based oil, coconut oil/milk, avocado, nut butters, etc    Moving forward, Teresadulce Koehler was encouraged to increase kcal, protein, and fluid intakes  Materials Provided: declined  All questions and concerns addressed during today’s visit  Adrian Koehler has RD contact information  Nutrition Diagnosis:   Inadequate Energy Intake related to physiological causes, disease state and treatment related issues as evidenced by food recall, wt loss and discussion with pt and/or family  Increased Nutrient Needs (kcal & pro) related to increased demand for nutrients and disease state as evidenced by cancer dx and pt undergoing tx for cancer  Patient has clinical indicators (or ASPEN criteria) consistent with severe protein-calorie malnutrition in the context of Chronic Illness as evidenced by >5% wt loss in 1 month and </=75% energy intake vs  Estimated needs for >/=1 month    Monitoring & Evaluation:   Goals:  weight maintenance/stabilization  adequate nutrition impact symptom management  pt to meet >/=75% estimated nutrition needs daily    Progress Towards Goals: Progressing    Nutrition Rx & Recommendations:  Diet: High Calorie, High Protein (for high calorie foods see pages 52-53, and for high protein foods see pages 49-51 in your Eating Hints book)  Small, frequent meals/snacks may be easier to tolerate than 3 large daily meals  Aim for 5-6 small meals per day (every 2-3 hours)  Include protein at all meals/snacks  Liquid nutrition may be better tolerated than solids at times  For appetite loss: try powdered or liquid nutrition supplements; eating by the clock every 2-3 hours; set a timer to remind yourself to eat, keep snacks nearby; add extra protein & calories to your diet; drink liquids in between meals, choose liquids that add calories; eat a bedtime snack; eat soft, cool or frozen foods; eat a larger meal when you feel well & rested; only sip small amounts of liquids during meals (see pages 10-12 in your Eating Hints book)  For weight loss: monitor your weight at home at least 2x/week, record your weight, start by adding 250-500 extra calories per day, eat 5-6 small scheduled meals every 2-3 hrs, choose foods that are high in protein and calories (see pages 49-53 in your Eating Hints book), drink liquids with calories for example: milkshakes/smoothies/juice/soup/whole milk/chocolate milk, cook with protein fortified milk (see recipe on page 36 in your Eating Hints book), consider ready-to-drink oral nutrition supplements such as Ensure Plus, Ensure Enlive, Boost Plus, or Boost Very High Calorie, avoid "diet" and "light" foods when possible, avoid drinking too much with meals, contact your dietitian with any continued weight loss over the course of 1 week  For more info see pages 35-37 in your Eating Hints book  For constipation: drink plenty of fluids (>64 oz/day); drink hot liquids; eat high-fiber foods as tolerated (whole grains, beans, peas, nuts, seeds, fruits, vegetables, etc); increase physical activity as tolerated  Avoid increasing fiber intake too quickly, add fiber into your diet slowly; keep a record of your bowel movements (see pages 13-14 in you Eating Hints book)  Weigh yourself regularly   If you notice weight loss, make an effort to increase your daily food/calorie intake  If you continue to notice loss after these efforts, reach out to your dietitian to establish a plan to stabilize weight     Choose liquids with calories: whole milk, Fairlife milk (higher protein/lactose-free milk), chocolate milk, drinkable yogurt, kefir, 100% fruit juice, diluted juice, bone broth (higher protein broth), creamy soups, sports drinks (Gatorade, Poweraide, Pedialyte, etc ), [place] ice, popsicles, milkshakes, smoothies, oral nutrition supplements (Ensure, Boost, etc ), gelatin/Jello, etc   Try warm prune juice for constipation  Try adding dry milk powder to your whole milk  Nutrition Supplements: 4-5 high calorie/protein shakes/smoothies daily (make sure each one has at least 300 calories and 15 grams of protein)  Try smaller portions throughout the day  Try diluting them with whole milk to reduce the sweetness  Examples: Ensure Complete, Ensure Plus, Boost Plus, Boost Very High Calorie, etc   See recipes for homemade shakes/smoothies    Follow Up Plan: 4/5 at 8am  Recommend Referral to Other Providers: none at this time

## 2023-03-15 ENCOUNTER — HOSPITAL ENCOUNTER (OUTPATIENT)
Dept: CT IMAGING | Facility: HOSPITAL | Age: 58
Discharge: HOME | End: 2023-03-15
Attending: INTERNAL MEDICINE

## 2023-03-15 VITALS
WEIGHT: 152 LBS | OXYGEN SATURATION: 96 % | SYSTOLIC BLOOD PRESSURE: 136 MMHG | HEART RATE: 96 BPM | HEIGHT: 72 IN | DIASTOLIC BLOOD PRESSURE: 83 MMHG | RESPIRATION RATE: 16 BRPM | BODY MASS INDEX: 20.59 KG/M2 | TEMPERATURE: 97 F

## 2023-03-15 DIAGNOSIS — D47.2 MONOCLONAL GAMMOPATHY: ICD-10-CM

## 2023-03-15 LAB
BASOPHILS # BLD AUTO: 0.02 THOUSANDS/ÂΜL (ref 0–0.1)
BASOPHILS NFR BLD AUTO: 0 % (ref 0–1)
EOSINOPHIL # BLD AUTO: 0.01 THOUSAND/ÂΜL (ref 0–0.61)
EOSINOPHIL NFR BLD AUTO: 0 % (ref 0–6)
ERYTHROCYTE [DISTWIDTH] IN BLOOD BY AUTOMATED COUNT: 13.9 % (ref 11.6–15.1)
HCT VFR BLD AUTO: 36.6 % (ref 36.5–49.3)
HGB BLD-MCNC: 12.8 G/DL (ref 12–17)
IMM GRANULOCYTES # BLD AUTO: 0.04 THOUSAND/UL (ref 0–0.2)
IMM GRANULOCYTES NFR BLD AUTO: 1 % (ref 0–2)
LYMPHOCYTES # BLD AUTO: 1.53 THOUSANDS/ÂΜL (ref 0.6–4.47)
LYMPHOCYTES NFR BLD AUTO: 22 % (ref 14–44)
MCH RBC QN AUTO: 33.2 PG (ref 26.8–34.3)
MCHC RBC AUTO-ENTMCNC: 35 G/DL (ref 31.4–37.4)
MCV RBC AUTO: 95 FL (ref 82–98)
MONOCYTES # BLD AUTO: 0.53 THOUSAND/ÂΜL (ref 0.17–1.22)
MONOCYTES NFR BLD AUTO: 8 % (ref 4–12)
NEUTROPHILS # BLD AUTO: 4.73 THOUSANDS/ÂΜL (ref 1.85–7.62)
NEUTS SEG NFR BLD AUTO: 69 % (ref 43–75)
NRBC BLD AUTO-RTO: 0 /100 WBCS
PLATELET # BLD AUTO: 188 THOUSANDS/UL (ref 149–390)
PMV BLD AUTO: 9.4 FL (ref 8.9–12.7)
RBC # BLD AUTO: 3.86 MILLION/UL (ref 3.88–5.62)
WBC # BLD AUTO: 6.86 THOUSAND/UL (ref 4.31–10.16)

## 2023-03-15 RX ORDER — OXYCODONE HYDROCHLORIDE 5 MG/1
10 TABLET ORAL EVERY 6 HOURS PRN
Status: DISCONTINUED | OUTPATIENT
Start: 2023-03-15 | End: 2023-03-16 | Stop reason: HOSPADM

## 2023-03-15 RX ORDER — HYDROMORPHONE HYDROCHLORIDE 4 MG/ML
INJECTION, SOLUTION INTRAMUSCULAR; INTRAVENOUS; SUBCUTANEOUS AS NEEDED
Status: COMPLETED | OUTPATIENT
Start: 2023-03-15 | End: 2023-03-15

## 2023-03-15 RX ORDER — SODIUM CHLORIDE 9 MG/ML
30 INJECTION, SOLUTION INTRAVENOUS CONTINUOUS
Status: DISCONTINUED | OUTPATIENT
Start: 2023-03-15 | End: 2023-03-16 | Stop reason: HOSPADM

## 2023-03-15 RX ORDER — FENTANYL CITRATE 50 UG/ML
INJECTION, SOLUTION INTRAMUSCULAR; INTRAVENOUS AS NEEDED
Status: COMPLETED | OUTPATIENT
Start: 2023-03-15 | End: 2023-03-15

## 2023-03-15 RX ORDER — MIDAZOLAM HYDROCHLORIDE 2 MG/2ML
INJECTION, SOLUTION INTRAMUSCULAR; INTRAVENOUS AS NEEDED
Status: COMPLETED | OUTPATIENT
Start: 2023-03-15 | End: 2023-03-15

## 2023-03-15 RX ADMIN — MIDAZOLAM HYDROCHLORIDE 1 MG: 1 INJECTION, SOLUTION INTRAMUSCULAR; INTRAVENOUS at 08:47

## 2023-03-15 RX ADMIN — FENTANYL CITRATE 50 MCG: 50 INJECTION, SOLUTION INTRAMUSCULAR; INTRAVENOUS at 08:47

## 2023-03-15 RX ADMIN — MIDAZOLAM HYDROCHLORIDE 1 MG: 1 INJECTION, SOLUTION INTRAMUSCULAR; INTRAVENOUS at 08:52

## 2023-03-15 RX ADMIN — FENTANYL CITRATE 50 MCG: 50 INJECTION, SOLUTION INTRAMUSCULAR; INTRAVENOUS at 08:52

## 2023-03-15 RX ADMIN — OXYCODONE HYDROCHLORIDE 10 MG: 5 TABLET ORAL at 09:35

## 2023-03-15 RX ADMIN — HYDROMORPHONE HYDROCHLORIDE 0.5 MG: 4 INJECTION, SOLUTION INTRAMUSCULAR; INTRAVENOUS; SUBCUTANEOUS at 08:35

## 2023-03-15 NOTE — INTERVAL H&P NOTE
Update: (This section must be completed if the H&P was completed greater than 24 hrs to procedure or admission)    H&P reviewed  After examining the patient, I find no changed to the H&P since it had been written  64-year-old with Multiple bone lesions concern for myeloma, risks benefits alternatives discussed    He is clearly in pain we will need to give pain medicine     sedation    Mp2 asa3    Patient re-evaluated   Accept as history and physical     Riccardo Cleveland MD/March 15, 2023/8:48 AM

## 2023-03-15 NOTE — DISCHARGE INSTRUCTIONS
Bone Marrow Biopsy     WHAT YOU NEED TO KNOW:   A bone marrow biopsy is a procedure to remove a small amount of bone marrow from your bone  Bone marrow is the soft tissue inside your bone that helps to make blood cells  The sample is tested for disease or infection  DISCHARGE INSTRUCTIONS:     1  Limit your activities day of biopsy as directed by your doctor  2  Use medication as ordered  3  Return to your normal diet  Small sips of flat soda will help with nausea  4  Remove band-aid or dressing 24 hours after procedure  Contact Interventional Radiology at [phone] Patricia PATIENTS: Contact Interventional Radiology at [phone]) Salome Carlson PATIENTS: Contact Interventional Radiology at [phone]) if:    1  Difficulty breathing, nausea or vomiting  2  Chills or fever above 101 F     3  Pain at biopsy site not relieved by medication  4  Develop any redness, swelling, heat, unusual drainage, heavy bruising or bleeding from biopsy site

## 2023-03-17 ENCOUNTER — TELEPHONE (OUTPATIENT)
Dept: NUTRITION | Facility: CLINIC | Age: 58
End: 2023-03-17

## 2023-03-17 NOTE — TELEPHONE ENCOUNTER
Contacted [facility] Guadarr[facility] to confirm Monday's appointment  Spoke with Levi Murdock and appointment was confirmed  Additional Anesthesia Volume In Cc: 3

## 2023-03-18 LAB — SCAN RESULT: NORMAL

## 2023-03-20 ENCOUNTER — HOSPITAL ENCOUNTER (OUTPATIENT)
Dept: MRI IMAGING | Facility: HOSPITAL | Age: 58
Discharge: HOME | End: 2023-03-20

## 2023-03-20 ENCOUNTER — NUTRITION (OUTPATIENT)
Dept: NUTRITION | Facility: CLINIC | Age: 58
End: 2023-03-20

## 2023-03-20 VITALS — WEIGHT: 149.2 LBS | BODY MASS INDEX: 20.24 KG/M2

## 2023-03-20 DIAGNOSIS — D47.2 MONOCLONAL GAMMOPATHY: ICD-10-CM

## 2023-03-20 DIAGNOSIS — Z71.3 NUTRITIONAL COUNSELING: Primary | ICD-10-CM

## 2023-03-20 RX ADMIN — GADOBUTROL 6 ML: 604.72 INJECTION INTRAVENOUS at 13:29

## 2023-03-20 NOTE — PATIENT INSTRUCTIONS
Nutrition Rx & Recommendations:  Diet: High Calorie, High Protein (for high calorie foods see pages 52-53, and for high protein foods see pages 49-51 in your Eating Hints book)  Small, frequent meals/snacks may be easier to tolerate than 3 large daily meals  Aim for 5-6 small meals per day (every 2-3 hours)  Include protein at all meals/snacks  Liquid nutrition may be better tolerated than solids at times  For appetite loss: try powdered or liquid nutrition supplements; eating by the clock every 2-3 hours; set a timer to remind yourself to eat, keep snacks nearby; add extra protein & calories to your diet; drink liquids in between meals, choose liquids that add calories; eat a bedtime snack; eat soft, cool or frozen foods; eat a larger meal when you feel well & rested; only sip small amounts of liquids during meals (see pages 10-12 in your Eating Hints book)  For weight loss: monitor your weight at home at least 2x/week, record your weight, start by adding 250-500 extra calories per day, eat 5-6 small scheduled meals every 2-3 hrs, choose foods that are high in protein and calories (see pages 49-53 in your Eating Hints book), drink liquids with calories for example: milkshakes/smoothies/juice/soup/whole milk/chocolate milk, cook with protein fortified milk (see recipe on page 36 in your Eating Hints book), consider ready-to-drink oral nutrition supplements such as Ensure Plus, Ensure Enlive, Boost Plus, or Boost Very High Calorie, avoid "diet" and "light" foods when possible, avoid drinking too much with meals, contact your dietitian with any continued weight loss over the course of 1 week  For more info see pages 35-37 in your Eating Hints book  For constipation: drink plenty of fluids (>64 oz/day); drink hot liquids; eat high-fiber foods as tolerated (whole grains, beans, peas, nuts, seeds, fruits, vegetables, etc); increase physical activity as tolerated    Avoid increasing fiber intake too quickly, add fiber into your diet slowly; keep a record of your bowel movements (see pages 13-14 in you Eating Hints book)  Weigh yourself regularly  If you notice weight loss, make an effort to increase your daily food/calorie intake  If you continue to notice loss after these efforts, reach out to your dietitian to establish a plan to stabilize weight     Choose liquids with calories: whole milk, Fairlife milk (higher protein/lactose-free milk), chocolate milk, drinkable yogurt, kefir, 100% fruit juice, diluted juice, bone broth (higher protein broth), creamy soups, sports drinks (Gatorade, Poweraide, Pedialyte, etc ), [place] ice, popsicles, milkshakes, smoothies, oral nutrition supplements (Ensure, Boost, etc ), gelatin/Jello, etc   Try warm prune juice for constipation  Try adding dry milk powder to your whole milk  Nutrition Supplements: 4-5 high calorie/protein shakes/smoothies daily (make sure each one has at least 300 calories and 15 grams of protein)  Try smaller portions throughout the day  Try diluting them with whole milk to reduce the sweetness  Examples: Ensure Complete, Ensure Plus, Boost Plus, Boost Very High Calorie, etc   See recipes for homemade shakes/smoothies    Follow Up Plan: 4/5 at 8am  Recommend Referral to Other Providers: none at this time

## 2023-03-27 ENCOUNTER — TELEPHONE (OUTPATIENT)
Dept: HEMATOLOGY ONCOLOGY | Facility: MEDICAL CENTER | Age: 58
End: 2023-03-27

## 2023-03-27 ENCOUNTER — OFFICE VISIT (OUTPATIENT)
Dept: HEMATOLOGY ONCOLOGY | Facility: MEDICAL CENTER | Age: 58
End: 2023-03-27

## 2023-03-27 VITALS
BODY MASS INDEX: 20.45 KG/M2 | RESPIRATION RATE: 20 BRPM | OXYGEN SATURATION: 99 % | TEMPERATURE: 96.6 F | HEART RATE: 91 BPM | SYSTOLIC BLOOD PRESSURE: 128 MMHG | WEIGHT: 151 LBS | HEIGHT: 72 IN | DIASTOLIC BLOOD PRESSURE: 76 MMHG

## 2023-03-27 DIAGNOSIS — Z51.5 PALLIATIVE CARE PATIENT: ICD-10-CM

## 2023-03-27 DIAGNOSIS — C79.51 BONE METASTASES: ICD-10-CM

## 2023-03-27 DIAGNOSIS — C90.00 MULTIPLE MYELOMA NOT HAVING ACHIEVED REMISSION (HCC): Primary | ICD-10-CM

## 2023-03-27 RX ORDER — DEXAMETHASONE 4 MG/1
40 TABLET ORAL ONCE
Start: 2023-04-18

## 2023-03-27 RX ORDER — DEXAMETHASONE 4 MG/1
40 TABLET ORAL ONCE
Start: 2023-04-11

## 2023-03-27 RX ORDER — ACYCLOVIR 400 MG/1
400 TABLET ORAL 2 TIMES DAILY
Qty: 60 TABLET | Refills: 11 | Status: SHIPPED | OUTPATIENT
Start: 2023-03-27 | End: 2023-04-26

## 2023-03-27 NOTE — TELEPHONE ENCOUNTER
Pipe Pinon from [facility] Dr Cheryl Kaur office calling to obtain records for the patient  Pipe Pinon states Dr Mike Rosenthal referred him and they are looking for OVS from today and any recent ones, Demographics, insurance cards and bmbx report  Informed Pipe Pinon I can fax it to their office  Pipe Pinon provided fax [phone]

## 2023-03-27 NOTE — TELEPHONE ENCOUNTER
Tried to schedule patient for 3 weeks weeks with Dr Garcia for 4/17 at 840AM  I was unable to schedule follow up due to Arsen Josh being locked in patients account, Devante Llilian can you please add patient for 4/17 @840AM I did block the appointment request and put Belen Snare name so no one takes the slot  Thank you!

## 2023-03-27 NOTE — TELEPHONE ENCOUNTER
Patient seen by Dr Amy Ramires  Reviewed RVD treatment plan  Consent signed   [facility]  Patient aware of plan

## 2023-03-27 NOTE — PROGRESS NOTES
Lon Halsted   1965  [facility]  [address] [place] [id number]    DISCUSSION/SUMMARY:    3/27/2023 4 PM addendum  Dr Santos Thomson was kind enough to review the patient's thoracic MRI  He agrees that palliative RT may be indicated; agrees to the radiation oncology consult  Consult has been placed  60-year-old male with no significant past medical history (but no medical follow-up recently) recently presenting to the ER with mid back pain and weight loss  Issues:    Multiple myeloma  Patient was seen in the ER on February 8, 2023  CTA of the chest did not demonstrate any PE but patient had multiple lytic lesions and compression fractures in T6 and T10  Additional work-up demonstrated elevated total protein, elevated IgG and an elevated lambda  Immunofixation demonstrated IgG lambda monoclonal protein  CBC parameters and renal function were within normal limits  Bone marrow biopsy demonstrated a lambda restricted plasma cell neoplasm, 80% of the cells were malignant  PET/CT demonstrated multiple osseous hypermetabolic lytic lesions  We discussed the diagnosis at length; Mr Jacquelyn Goldman demonstrated a fairly good understanding of the situation  Patient understands that he has a malignancy that requires chemotherapy  Patient will also be referred to Dr Caprice Rodriguez, transplant director at [facility]  Patient may be a good candidate for neoadjuvant chemotherapy to debulk and then autotransplant  I will ask radiation oncology to look at the patient's MRI/spine to see if palliative RT is indicated  Respectfully, patient has severe dental caries  Mr Jacquelyn Goldman needs to start working on this now  Patient is absolutely not a candidate for anti-absorption therapy (and unfortunately the PET/CT demonstrated widespread osseous lesions) until the teeth are fixed  LDH, beta-2 microglobulin and uric acid have been requested    MM-ISS will be calculated when the beta-2 microglobulin results are available  I think it is important that patient begin treatment immediately  NCCN guidelines 3 2023 state that for patients with multiple myeloma in need of treatment, possible transplant candidate, preferred regimen is bortezomib, lenalidomide and dexamethasone  Regimen  Bortezomib 1 3 mg/m2 subcu on days 1, 4, 8 and 11  Lenalidomide 25 mg orally days 1-7 (dose reduced on the first cycle)  Dexamethasone 40 mg by mouth on days 1, 8 and 15  Cycle length = 21 days x 3-4 cycles    Back pain  Patient continues to be symptomatic, pain is better with/less than before but clearly not gone/controlled  Patient will call Dr Hattie Hemphill  Weight loss  Patient has lost 20+ pounds over the past 3 months  Patient has been referred to oncology nutrition  Patient is to return in 2-3 weeks but this may change depending upon the above  Mr Carmen Malave knows to call the hematology/oncology office if there are any other questions or concerns  Carefully review your medication list and verify that the list is accurate and up-to-date  Please call the hematology/oncology office if there are medications missing from the list, medications on the list that you are not currently taking or if there is a dosage or instruction that is different from how you're taking that medication  Patient goals and areas of care: Begin neoadjuvant treatment  Barriers to care: None  Patient is able to self-care   ______________________________________________________________________________________    Chief Complaint   Patient presents with   • Follow-up     Monoclonal gammopathy    • Multiple Myeloma   • Multiple myeloma not in remission     History of Present Illness: 65year old male with relatively good general health recently developing mid back pain  Mr Carmen Malave states that the pain began in early January 2023  Patient has lost approximately 20+ pounds over the past few months      Work-up included bone marrow biopsy and PET/CT  Patient has widespread osteolytic lesions  Bone marrow biopsy demonstrated greater than 80% plasma cells  Patient presents with his wife  Biggest complaint continues to be the mid thoracic back pain with radiation to the left rib cage  Limits activities, patient cannot work  No GI or  issues  Appetite is +/-  No respiratory issues  No fevers or signs of infection  Patient does not have a PCP, no recent medical follow-ups, no COVID vaccines - personal decision  No COVID infections  Review of Systems   Constitutional: Positive for activity change, appetite change, fatigue and unexpected weight change  HENT: Negative  Eyes: Negative  Respiratory: Negative  Cardiovascular: Negative  Gastrointestinal: Negative  Endocrine: Negative  Genitourinary: Negative  Musculoskeletal: Positive for arthralgias and back pain  Skin: Negative  Allergic/Immunologic: Negative  Neurological: Negative  Hematological: Negative  Psychiatric/Behavioral: Negative  All other systems reviewed and are negative  Past Surgical History:   Procedure Laterality Date   • APPENDECTOMY     • IR BIOPSY BONE MARROW  3/15/2023   Past surgical history: No prior blood transfusions    Family History   Problem Relation Age of Onset   • Diabetes Mother    • Heart disease Father    • Diabetes Father    Family history: No known familial or genetic diseases, no children    Social History     Socioeconomic History   • Marital status: /Civil Union     Spouse name: Not on file   • Number of children: Not on file   • Years of education: Not on file   • Highest education level: Not on file   Occupational History   • Not on file   Tobacco Use   • Smoking status: Former   • Smokeless tobacco: Not on file   Vaping Use   • Vaping Use: Never used   Substance and Sexual Activity   • Alcohol use: Yes     Comment:  Mod   • Drug use: Not Currently     Types: Marijuana   • Sexual activity: Not on file   Other Topics Concern   • Not on file   Social History Narrative    From 2/28/23  note:    Emergency Contact: JuanDee ([place])[phone] (Home Phone)    Marital Status: Single     Interpretation concerns, speaks another language, preferred language: english    Caregiver/Support: Mayur    Housing: two story     Home Setup: one level     Lives With: SO    Daily Living Activities: independent     Ambulation: independent    Employment: yes     [place] Status/Location: no     Ability to pay bills: yes  No barriers to paying bills  POA/LW/AD: no   ERWINTabby is healthcare representative  MSW emailed advance directive form  Transportation Plan/Concerns: Patient states he transports self  MSW educated on [employer] transport services  Social Determinants of Health     Financial Resource Strain: Not on file   Food Insecurity: Not on file   Transportation Needs: Not on file   Physical Activity: Not on file   Stress: Not on file   Social Connections: Not on file   Intimate Partner Violence: Not on file   Housing Stability: Not on file   Social history: Patient smokes 3 to 4 cigarettes a day, recently discontinued, approximately 5 beers a day, also recently discontinued, no drug abuse, no toxic exposure    No Known Allergies    Vitals:    03/27/23 1442   BP: 128/76   Pulse: 91   Resp: 20   Temp: (!) 96 6 °F (35 9 °C)   SpO2: 99%     Physical Exam  Constitutional:       Appearance: He is well-developed  Comments: Middle-age male, thin appearing, + evidence of pain   HENT:      Head: Normocephalic and atraumatic  Right Ear: External ear normal       Left Ear: External ear normal    Eyes:      Conjunctiva/sclera: Conjunctivae normal       Pupils: Pupils are equal, round, and reactive to light  Cardiovascular:      Rate and Rhythm: Normal rate and regular rhythm  Heart sounds: Normal heart sounds     Pulmonary:      Effort: Pulmonary effort is normal  Breath sounds: Normal breath sounds  Comments: Fair to good entry bilaterally, scattered rhonchi  Abdominal:      General: Bowel sounds are normal       Palpations: Abdomen is soft  Comments: + Bowel sounds, nontender, soft, no hepatosplenomegaly, no guarding   Musculoskeletal:         General: Normal range of motion  Cervical back: Normal range of motion and neck supple  Comments: + Tenderness in bilateral thoracic rib cage   Skin:     General: Skin is warm  Comments: Slightly pale, warm, moist, no petechiae or ecchymoses   Neurological:      Mental Status: He is alert and oriented to person, place, and time  Deep Tendon Reflexes: Reflexes are normal and symmetric  Psychiatric:         Behavior: Behavior normal          Thought Content: Thought content normal          Judgment: Judgment normal      Extremities: Lower extreme edema bilaterally, no cords, pulses are 1+ l  ymphatics: No adenopathy in the neck, supraclavicular region, axilla bilaterally    Labs    3/15/2023 WBC = 6 86 hemoglobin = 12 8 hematocrit = 36 6 platelet = 705 neutrophil = 69%    2/8/2023 WBC = 6 6 hemoglobin = 12 6 hematocrit = 36 MCV = 96 platelet = 018 neutrophil = 67% bands = 1% lymphocyte = 23% monocyte = 8% basophil = 1% BUN = 21 creatinine = 1 17 calcium = 10 7 AST = 23 ALT = 14 alkaline phosphatase = 166 total protein = 9 7 albumin = 3 3 total bilirubin = 0 36 TSH = 0 680  2/8/2023 immunofixation demonstrated monoclonal gammopathy identified his IgG lambda (3 37 g/deciliter)  2/8/2023 SPEP showed a monoclonal peak in the gamma region, gamma concentration = 3 52 g/deciliter, total protein = 8 3  2/8/2023 IgA = 20 8 = decreased IgG = 2000 900 = elevated IgM = 11 = decreased  2/8/2023 Free kappa = 7 8 Free lambda = 1177 Kappa/lambda ratio = 0 01  2/8/2023 lactic acid = 2 5    Imaging    3/20/2023 MRI thoracic spine    IMPRESSION:     1    Widespread infiltrative and discrete osseous lesions involving anterior and posterior elements of the thoracic and visualized cervical and lumbar spine in keeping with history of multiple myeloma  No extraosseous lesion  2   Compared to CTA chest 2/8/2023, progression of T6 wedge compression fracture with severe anterior height loss  There is small posterior osseous buckling without significant canal stenosis  3   Mild T8 compression fracture, progressed from CTA chest 2/8/2023  Small posterior osseous buckling without significant canal stenosis  4   Stable T8 wedge compression deformity with severe anterior height loss  Posterosuperior osseous buckling indents ventral thecal sac without significant canal stenosis  5   Discrete and marrow replacing lesions involving partially imaged ribs and sternum  Displaced sternal fracture, similar to PET CT 3/7/2023, new from CTA chest 2/8/2023  3/7/2023 PET/CT    IMPRESSION:  1  Widespread hypermetabolic lytic lesions throughout the axial and appendicular skeleton  Findings are most suggestive of multiple myeloma  Clinical correlation recommended  2   Several hypermetabolic thoracic compression deformities  T8 compression deformity is new from the prior chest CT  There is also a new displaced sternal fracture  3   No hypermetabolic soft tissue lesions      2/8/2023 CTA chest PE study    OSSEOUS STRUCTURES:  Diffuse lytic lesions throughout the skeleton with mild compression deformity of T6 and moderate compression deformity of T10     IMPRESSION:     No pulmonary embolus  No acute pulmonary disease  Diffuse lytic lesions throughout the skeleton with compression deformities in the spine  This is most likely due to multiple myeloma, less likely due to metastatic disease from an unknown primary  2/8/2023 chest x-ray  Impression stated no acute cardiopulmonary disease      Pathology    Case Report   Surgical Pathology Report                         Case: [id number]                                    Authorizing Provider: Kevin Aparicio Tyler Hatfield MD           Collected:           03/15/2023 0932               Ordering Location:     [facility]        Received:            03/15/2023 0932                                      [facility] CAT Scan                                                             Pathologist:           Christian Tinsley MD                                                           Specimens:   A) - Iliac Crest, Right, core                                                                        B) - Iliac Crest, Right, clot                                                                        C) - Iliac Crest, Right, slide                                                             Final Diagnosis   A -C   Bone marrow,  right iliac crest,  biopsy, clot, and aspirate:  -  Lambda restricted plasma cell neoplasm, >80% of total cellularity consistent with plasma cell myeloma, see note  -  Hypercellular bone marrow with markedly reduced trilineage hematopoiesis and no increase in blasts  -  Reduced iron stores, in a suboptimal sample, correlation with serum iron studies is recommended  -  Moderate to severe increase in reticulin fibrosis status      Note: The patient's presentation of multiple lytic lesions and compression fractures is noted  The current biopsy shows a marked increase in plasma cells comprising >80% of total cellularity  Immunostains show a lambda light chain restriction relative to kappa light chain by -HAILEE studies  Flow cytometry confirms the presence of a lambda restricted plasma cell population  Further supported by an IgG lambda monoclonal protein detected in the serum by immunofixation studies  FISH studies identify a gain in 1q21/CKS1B in 15 33% of evaluated nuclei  Cytogenetic studies reveal a normal male karyotype  Overall the current bone marrow biopsy is diagnostic for a plasma cell neoplasm best classified as plasma cell myeloma in the correct clinical context   Clinical correlation is advised  Electronically signed by Unique Rankin MD on 3/23/2023 at  8:24 PM   Preliminary result electronically signed by Unique Rankin MD on 3/22/2023 at  3:13 PM   Microscopic Description    Bone marrow aspirate smears:  The aspirate smears are aspicular, cellular, and suboptimal for evaluation  The aspirate smear sample is hemodilute with an increase in plasma cells  Mature neutrophils and lymphocytes are present  However, maturing myeloid elements and erythroid elements are not well represented in the aspirate smear slides  There is no definitive evidence of myelodysplasia or increase in blasts       Bone marrow core biopsy and aspirate clot:  Histologic sections of the aspirate clot and decalcified core biopsy are adequate for evaluation   Marrow space cellularity is hypercellular for patient age (>90%)  Myelopoiesis:  Markedly reduced (myeloblasts= <5%)  Erythropoiesis:  Markedly reduced  Megakaryocytes:  Markedly reduced  Lymphocytes:  Increased in small aggregates and interstitially distributed  Plasma cells:  Markedly increased  including a subset of immature forms      Special studies:   * Iron stain performed on the aspirate smear, clot, and core biopsy highlights reduced iron stores with no evidence of ring sideroblasts in a limited sample  Siderotic iron granules are present  Due to the aspiculate nature of the specimen these findings ma  * Reticulin stain performed on the core biopsy shows moderate to severe increase in reticulin fibers   2-3 of 3 by the  Consensus grading scheme  * Trichrome stain performed on the core biopsy show no increase in collagen fibrosis  * Immunohistochemical stains were performed with appropriate controls and show the following results:  -   highlights marked increase in plasma cells (>80%) with a lambda restriction relative to kappa light chain expression by kappa and lambda in situ hybridization studies   The plasma cells are negative for CD30 and GUANACO-HAILEE  Ki67 proliferation index is overall low (<10%)       CD20 highlights an increase in B-cells in aggregates and interstitially distributed (10-20% of total cellularity) and CD3 highlights T-cells in aggregates and interstitially scattered T-cells (5-10% of total cellularity)      CD34 shows no increase in blasts (<5% of total cellularity) and  shows no increase in immature cells (<5% of total cellularity)  Additionally,  highlights scattered mast cells      CD61 highlights a reduction in megakaryocytes       [place] red stain is negative for amyloid  Note    Component Ref Range & Units 3/15/23 0804 2/8/23 1016   WBC 4 31 - 10 16 Thousand/uL 6 86  6 62    RBC 3 88 - 5 62 Million/uL 3 86 Low   3 74 Low     Hemoglobin 12 0 - 17 0 g/dL 12 8  12 6    Hematocrit 36 5 - 49 3 % 36 6  36 0 Low     MCV 82 - 98 fL 95  96    MCH 26 8 - 34 3 pg 33 2  33 7    MCHC 31 4 - 37 4 g/dL 35 0  35 0    RDW 11 6 - 15 1 % 13 9  14 5    MPV 8 9 - 12 7 fL 9 4  8 9    Platelets 344 - 312 Thousands/uL 188  194    nRBC /100 WBCs 0  0       Component Ref Range & Units 2/8/23 1401    IGA 70 0 - 400 0 mg/dL 28 0 Low      0 - 1,600 0 mg/dL 2,900 0 High     IGM 40 0 - 230 0 mg/dL 11 0 Low        SPEP Interpretation   See Comment      Comment: The SPEP shows a monoclonal peak in the gamma region  Immunofixation to be performed  Serum immunofixation shows a monoclonal gammopathy identified as IgG lambda (3 37 g/dL)     10-COLOR FLOW CYTOMETRY ANALYSIS FOR ACUTE LEUKEMIA AND MYELOID/LYMPHOID NEOPLASMS ([facility] # [id number] ; please see separate report for further details):  BONE MARROW ASPIRATE:   1  Clonal plasma cells are detected (4 4% by flow cytometry), consistent with plasma cell neoplasm    - Phenotype: lambda+; CD38+, +, CD20-, CD19-, CD45-, CD56- and -    2  No evidence of acute leukemia or increased blasts  3  No evidence of an abnormal myeloid population   Abnormalities associated with myelodysplasia and myeloproliferative neoplasms are absent  4  No evidence of B-cell lymphoma or atypical T-cell population     Viability 7AAD: 95 57%   Monoclonal CD45(-)/CD38(bright) plasma cells with lambda-restriction are present (4 4%)  There is a mixed population of granulocytes/maturing myeloid precursors, blasts, monocytes and lymphocytes  +/HLA-DR+ myeloblasts comprise (0 18%) of total events  Myeloid-commited CD34+ population comprise (0 16%) of total events  Granulocytes/maturing myeloid precursors (70 32%) do not display an aberrant phenotype  The orthogonal side scatter is normal  No significant number of CD56+ or CD10-/CD16- granulocytes is noted  Monocytes (8 4%) appear mature (CD14+/CD64+) and do not display overt phenotypic atypia  B-cells (1 17%) are polytypic  T-cells (14 55%) show no deletion or abnormal dim expression of pan-T-cell antigens on significant subset of cells     FLUORESCENCE IN-SITU HYBRIDIZATION (FISH)  ([facility] # [id number] ; please see separate report for further details):  INTERPRETATION   1  Positive for 1q21/CKS1B gain in 15 33% nuclei  Comment: Gain of the CKS1B locus (cyclin kinase subunit 1B) in patients with plasma cell myeloma is a poor prognostic indicator associated with significantly shorter progression-free survival and shorter overall survival    2  No evidence of IGH-MAF [translocation t(14;16)] gene rearrangement   3  No evidence of RB1 monosomy (13q14 deletion)  4  No evidence of CCND1-IGH [translocation t(11;14)] gene rearrangement, and no evidence for trisomy 11 or gain of 11q  5  No evidence of p53 (17p13) deletion or amplification  6  No evidence of FGFR3-IGH [translocation t(4;14)] gene rearrangement       CYTOGENETICS Karyotype Analysis ([facility] # [id number] ; please see separate report for further details):  Karyotype: 46,XY[20]   Interpretation:   A normal male karyotype was observed in twenty metaphase cells analyzed

## 2023-03-27 NOTE — PROGRESS NOTES
Outpatient Oncology Nutrition Consultation   Type of Consult: Follow Up  Care Location: Office Visit  - met w/pt & significant other Jonas Dunn)  Nutrition Assessment:   Oncology Diagnosis & Treatments: Multiple Myeloma   Undergoing neoadjuvant (bortezomib, lenalidomide, dexamethasone) starting 4/11/23  Palliative RT to the mid T to L spine started 4/3/23, EOT planned for 4/14/23  Possible transplant post tx  Oncology History   Multiple myeloma not having achieved remission ([facility] )   3/2023 Initial Diagnosis    Multiple myeloma not having achieved remission ([facility] )     3/15/2023 Biopsy    A -C  Bone marrow,  right iliac crest,  biopsy, clot, and aspirate:  -  Lambda restricted plasma cell neoplasm, >80% of total cellularity consistent with plasma cell myeloma, see note  -  Hypercellular bone marrow with markedly reduced trilineage hematopoiesis and no increase in blasts  -  Reduced iron stores, in a suboptimal sample, correlation with serum iron studies is recommended  -  Moderate to severe increase in reticulin fibrosis status  Overall the current bone marrow biopsy is diagnostic for a plasma cell neoplasm best classified as plasma cell myeloma         4/11/2023 -  Chemotherapy    bortezomib (VELCADE), 1 3 mg/m2 = 2 5 mg, Subcutaneous, Once, 0 of 4 cycles     Bone metastases   3/15/2023 Biopsy    A -C  Bone marrow,  right iliac crest,  biopsy, clot, and aspirate:  -  Lambda restricted plasma cell neoplasm, >80% of total cellularity consistent with plasma cell myeloma, see note  -  Hypercellular bone marrow with markedly reduced trilineage hematopoiesis and no increase in blasts  -  Reduced iron stores, in a suboptimal sample, correlation with serum iron studies is recommended  -  Moderate to severe increase in reticulin fibrosis status       Overall the current bone marrow biopsy is diagnostic for a plasma cell neoplasm best classified as plasma cell myeloma         3/2023 Initial Diagnosis    Bone metastases [facility])       Past Medical & Surgical Hx:   Patient Active Problem List   Diagnosis   • Monoclonal gammopathy   • Back pain   • Palliative care patient   • Unintentional weight loss   • Nicotine dependence, cigarettes, uncomplicated   • History of alcohol abuse   • Acid reflux   • Nausea & vomiting   • Loss of appetite   • Therapeutic opioid-induced constipation (OIC)   • Multiple myeloma not having achieved remission ([facility])   • Bone metastases     Past Medical History:   Diagnosis Date   • Cancer ([facility] )     bone-   • GERD (gastroesophageal reflux disease)      Past Surgical History:   Procedure Laterality Date   • APPENDECTOMY     • IR BIOPSY BONE MARROW  3/15/2023     Review of Medications:   Vitamins, Supplements and Herbals: No, pt denies taking supplements    Current Outpatient Medications:   •  acetaminophen (TYLENOL) 500 mg tablet, Take 2 tablets (1,000 mg total) by mouth 3 (three) times a day, Disp: 180 tablet, Rfl: 2  •  acyclovir (ZOVIRAX) 400 MG tablet, Take 1 tablet (400 mg total) by mouth 2 (two) times a day, Disp: 60 tablet, Rfl: 11  •  dexamethasone (DECADRON) 1 mg tablet, Take 1 tablet (1 mg total) by mouth daily with breakfast, Disp: 30 tablet, Rfl: 0  •  lenalidomide (REVLIMID) 25 MG CAPS, 25 mg daily D1-7 of a 21 day cycle  auth [id number], Disp: 7 capsule, Rfl: 3  •  ondansetron (ZOFRAN) 4 mg tablet, Take 1 tablet (4 mg total) by mouth every 8 (eight) hours as needed for nausea or vomiting, Disp: 20 tablet, Rfl: 0  •  oxyCODONE (ROXICODONE) 10 MG TABS, Take 1 tablet (10 mg total) by mouth every 4 (four) hours as needed (cancer-related pain) Max Daily Amount: 60 mg, Disp: 90 tablet, Rfl: 0  •  pantoprazole (PROTONIX) 40 mg tablet, TAKE 1 TABLET BY MOUTH EVERY DAY, Disp: 90 tablet, Rfl: 0    Most Recent Lab Results:   Lab Results   Component Value Date    WBC 6 86 03/15/2023    NEUTROABS 4 73 03/15/2023    ALT 14 02/08/2023    AST 23 02/08/2023    ALB 3 3 (L) 02/08/2023    SODIUM 130 (L) 02/08/2023    K "3 8 02/08/2023    CL 97 02/08/2023    BUN 21 02/08/2023    CREATININE 1 17 02/08/2023    EGFR 69 02/08/2023    POCGLU 113 03/07/2023    GLUC 151 (H) 02/08/2023    CALCIUM 10 7 (H) 02/08/2023       Anthropometric Measurements:   Height: 73\"  Ht Readings from Last 1 Encounters:   03/29/23 6' (1 829 m)     Wt Readings from Last 20 Encounters:   04/05/23 69 6 kg (153 lb 8 oz)   03/29/23 69 3 kg (152 lb 12 8 oz)   03/27/23 68 5 kg (151 lb)   03/20/23 67 7 kg (149 lb 3 2 oz)   03/15/23 68 9 kg (152 lb)   03/08/23 69 4 kg (153 lb)   03/06/23 70 1 kg (154 lb 8 oz)   02/27/23 72 9 kg (160 lb 12 8 oz)   02/08/23 78 kg (172 lb)     Weight History:   Usual Weight: 230# (2020)  Home Weight: (3/15/23) 142#, (3/20/23) 144 8#  Varian: n/a  Notes: intentionally lost 30#, was maintaining 200#, then unintentioanlly lost ~40-50# since December 2022    Oncology Nutrition-Anthropometrics    Flowsheet Row Nutrition from 4/5/2023 in [facility] Nutrition from 3/20/2023 in [facility]   Patient [age] (years): 65 years 65 years   Patient (male) height (in): 68 in 68 in   Current weight (lbs): 153 5 lbs 149 2 lbs   Current weight to be used for anthropometric calculations (kg) 69 8 kg 67 8 kg   BMI: 20 2 19 7   IBW male 184 lb 184 lb   IBW (kg) male 83 6 kg 83 6 kg   IBW % (male) 83 4 % 81 1 %   Adjusted BW (male): 176 4 lbs 175 3 lbs   Adjusted BW in kg (male): 80 2 kg 79 7 kg   % weight change after 1 week: 0 5 % -1 8 %   Weight change after 1 week (lbs) 0 7 lbs -2 8 lbs   % weight change after 1 month: -0 6 % -7 2 %   Weight change after 1 month (lbs) -1 lbs -11 6 lbs        Nutrition-Focused Physical Findings: none observed    Food/Nutrition-Related History & Client/Social History:    Current Nutrition Impact Symptoms:  [] Nausea  [x] Reduced Appetite -   -on steroid per [facility] [] Acid Reflux    [] Vomiting  [] Unintended Wt Loss -  hx [] Malabsorption  " [] Diarrhea  [] Unintended Wt Gain  [] Dumping Syndrome    [] Constipation - eating 5 prunes daily  [] Thick Mucous/Secretions  [] Abdominal Pain    [] Dysgeusia (Altered Taste)  [] Xerostomia (Dry Mouth)  [] Gas    [] Dysosmia (Altered Smell)  [] Thrush  [] Difficulty Chewing    [] Oral Mucositis (Sore Mouth)  [] Fatigue  [] Hyperglycemia   No results found for: HGBA1C   [] Odynophagia  [] Esophagitis  [] Other:    [] Dysphagia  [] Early Satiety  [] No Problems Eating      Food Allergies & Intolerances: no    Current Diet: Regular Diet, No Restrictions  Current Nutrition Intake: Increased since last visit  Appetite: Fair   Nutrition Route: PO  Activity level: ADL's, limited d/t back pain    24 Hr Diet Recall: pt now keeping food journal which was reviewed today - pt lacking protein   Breakfast: nothing yesterday d/t sleeping in, has been having 4-5 prunes, coffee  Snack: none  Lunch: 1 5 c chili and mac and cheese  Snack: none  Dinner: 4 oz pork loin, 2 c mashed potatoes   Snack: 4 pb cookies, 2 twinkies, 1 pkt krimpets    Beverages: water (16 oz x1), diluted juice (32 oz 1-x1  5), whole milk (8 oz x2), coffee   -Averaging ~80 oz per day  Supplements:   Has been having some Ensure Complete and milkshakes made with Ensure when in the mood for them (1x this past week)   Has Premier protein powder that he will add to milkshakes     Oncology Nutrition-Estimated Needs    Flowsheet Row Nutrition from 3/6/2023 in [facility] Oncology Dietitian Services   Weight type used Actual weight   Weight in kilograms (kg) used for estimated needs 70 2 kg   Energy needs formula:  35-40 kcal/kg   Energy needs based on 35 kcal/k kcal   Energy needs based on 40 kcal/k kcal   Protein needs formula: 1 5-2 g/kg   Protein needs based on 1 5 g/kg 105 g   Protein needs based on 2 g/kg 140 g   Fluid needs formula: 30-35 mL/kg   Fluid needs based on 30 mL/kg 2115 mL   Fluid needs in ounces 71 oz   Fluid needs based on 35 mL/kg 2468 mL   Fluid needs in ounces 83 oz         Discussion & Intervention:   Inderjit Sosa was evaluated today for an RD follow up regarding unintended weight loss  Inderjit Sosa is currently undergoing tx for multiple myeloma  He is now getting RT and will be starting infusions next week  He is doing quite well today  His nutrition impact sx have improved/resolved  He is eating and drinking more  He is gaining wt and wishes to continue this trend  Melanie Simon has been keeping a food journal which we reviewed today  Pt has been eating a lot of sweets and is lacking a consistent/adequate protein intake, so we discussed ways to increase protein intake  Reviewed 24 hour recall, which revealed an suboptimal po intake, and discussed ways to increase kcal, protein, and fluid intakes and optimize nutrient intake  Also reviewed the importance of wt management throughout the tx process and the role of a high kcal/ high protein diet in managing wt and overall health    Based on today's assessment, discussion included: MNT for: wt regain, a high kcal/protein diet & food choices to include at all meals & snacks (Examples of high kcal foods: cheese, full-fat dairy products, nut butter, plant-based fats, coconut oil/milk, avocado, butter, cream soup, etc  Examples of high protein foods: eggs, chicken, fish, beans/legumes, nuts/nut butters, bone broth, etc ) , fortifying foods for added kcal and protein (examples include: adding cheese to foods such as eggs, mashed potatoes, casseroles, etc ; Making oatmeal with whole milk rather than water; Making fortified mashed potatoes with cream, butter, dry milk powder, plain [place] yogurt, and cheese ), adequate hydration & fluid choices, sipping on calorie containing beverages (examples include: adding whole milk or cream to coffee, oral nutrition supplements, juice, electrolyte replacement beverages, milk, etc ), eating smaller more frequent meals every 2-3 hours (5-6 small meals/day), eating when feeling most hungry, utilizing oral nutrition supplements, recipe suggestions/resources, trying new recipes, & cooking at home more often and adding extra fat to foods while cooking such as butter, plant-based oil, coconut oil/milk, avocado, nut butters, etc    Moving forward, Levi Hilario was encouraged to increase kcal, protein, and fluid intakes  Materials Provided: declined  All questions and concerns addressed during today’s visit  Levi Hilario has RD contact information  Nutrition Diagnosis:   Inadequate Energy Intake related to physiological causes, disease state and treatment related issues as evidenced by food recall, wt loss and discussion with pt and/or family  Increased Nutrient Needs (kcal & pro) related to increased demand for nutrients and disease state as evidenced by cancer dx and pt undergoing tx for cancer  Monitoring & Evaluation:   Goals:  weight maintenance/stabilization  adequate nutrition impact symptom management  pt to meet >/=75% estimated nutrition needs daily    Progress Towards Goals: Progressing    Nutrition Rx & Recommendations:  Diet: High Calorie, High Protein (for high calorie foods see pages 52-53, and for high protein foods see pages 49-51 in your Eating Hints book)  Small, frequent meals/snacks may be easier to tolerate than 3 large daily meals  Aim for 5-6 small meals per day (every 2-3 hours)  Include protein at all meals/snacks  For appetite loss: try powdered or liquid nutrition supplements; eating by the clock every 2-3 hours; set a timer to remind yourself to eat, keep snacks nearby; add extra protein & calories to your diet; drink liquids in between meals, choose liquids that add calories; eat a bedtime snack; eat soft, cool or frozen foods; eat a larger meal when you feel well & rested; only sip small amounts of liquids during meals (see pages 10-12 in your Eating Hints book)    For weight loss: monitor your weight at home at least 2x/week, "record your weight, start by adding 250-500 extra calories per day, eat 5-6 small scheduled meals every 2-3 hrs, choose foods that are high in protein and calories (see pages 49-53 in your Eating Hints book), drink liquids with calories for example: milkshakes/smoothies/juice/soup/whole milk/chocolate milk, cook with protein fortified milk (see recipe on page 36 in your Eating Hints book), consider ready-to-drink oral nutrition supplements such as Ensure Plus, Ensure Enlive, Boost Plus, or Boost Very High Calorie, avoid \"diet\" and \"light\" foods when possible, avoid drinking too much with meals, contact your dietitian with any continued weight loss over the course of 1 week  For more info see pages 35-37 in your Eating Hints book  Weigh yourself regularly  If you notice weight loss, make an effort to increase your daily food/calorie intake  If you continue to notice loss after these efforts, reach out to your dietitian to establish a plan to stabilize weight     Optimize your intake leading up to your infusions  Try to increase your protein intake, include a protein source at all meals/snacks (meat, poultry, fish, eggs, cheese, yogurt, milk, nuts, beans, lentils, etc )  Nutrition Supplements: aim for 1 per day    Follow Up Plan: 4/25 at 9am  Recommend Referral to Other Providers: none at this time   "

## 2023-03-28 ENCOUNTER — DOCUMENTATION (OUTPATIENT)
Dept: HEMATOLOGY ONCOLOGY | Facility: CLINIC | Age: 58
End: 2023-03-28

## 2023-03-28 NOTE — TELEPHONE ENCOUNTER
Left message on answering machine with time and date of first infusion, patient was provided with my teams number to return my call and confirm appts

## 2023-03-29 ENCOUNTER — DOCUMENTATION (OUTPATIENT)
Dept: NUTRITION | Facility: CLINIC | Age: 58
End: 2023-03-29

## 2023-03-29 ENCOUNTER — CLINICAL SUPPORT (OUTPATIENT)
Dept: RADIATION ONCOLOGY | Facility: HOSPITAL | Age: 58
End: 2023-03-29
Attending: RADIOLOGY

## 2023-03-29 ENCOUNTER — RADIATION ONCOLOGY CONSULT (OUTPATIENT)
Dept: RADIATION ONCOLOGY | Facility: HOSPITAL | Age: 58
End: 2023-03-29

## 2023-03-29 ENCOUNTER — TRANSCRIBE ORDERS (OUTPATIENT)
Dept: RADIATION ONCOLOGY | Facility: HOSPITAL | Age: 58
End: 2023-03-29

## 2023-03-29 VITALS
BODY MASS INDEX: 20.7 KG/M2 | DIASTOLIC BLOOD PRESSURE: 88 MMHG | OXYGEN SATURATION: 98 % | SYSTOLIC BLOOD PRESSURE: 122 MMHG | HEART RATE: 100 BPM | RESPIRATION RATE: 18 BRPM | WEIGHT: 152.8 LBS | HEIGHT: 72 IN | TEMPERATURE: 97.6 F

## 2023-03-29 DIAGNOSIS — C79.51 BONE METASTASES: Primary | ICD-10-CM

## 2023-03-29 DIAGNOSIS — C90.00 MULTIPLE MYELOMA NOT HAVING ACHIEVED REMISSION (HCC): ICD-10-CM

## 2023-03-29 DIAGNOSIS — C79.51 BONE METASTASES: ICD-10-CM

## 2023-03-29 DIAGNOSIS — C90.00 MULTIPLE MYELOMA NOT HAVING ACHIEVED REMISSION (HCC): Primary | ICD-10-CM

## 2023-03-29 DIAGNOSIS — C79.51 BONE METASTASIS: Primary | ICD-10-CM

## 2023-03-29 RX ORDER — LENALIDOMIDE 25 MG/1
CAPSULE ORAL
Qty: 7 CAPSULE | Refills: 3 | Status: SHIPPED | OUTPATIENT
Start: 2023-03-29

## 2023-03-29 NOTE — PROGRESS NOTES
3-27-23  Rcvd new oral chemo start-Revlimid // Per [facility] no rx coverage on file  Completed [employer] application forwarded to provider for signature with request of standalone script  Call to patient to discuss patient assistance program no sobia,    3-29-23  Follow up call to patient spoke with both patient and spouse  They will be stopping off at providers office with required documents and sign application       4-3-23  Received all required documents and signatures, Application faxed for patient assistance

## 2023-03-29 NOTE — PROGRESS NOTES
Received notification by [facility] TEMI KWAN ) on 3/29/23 that pt has triggered for oncology nutrition care (reason for referral: Malnutrition Screening Tool (MST) Triggers: scored a 4 indicating >/=34# (>/=15 4 kg) recent wt loss and is not eating poorly due to a decreased appetite  (Date of MST: 3/29/23))      Pt known to this RD and has a follow up appt on 4/5/23

## 2023-03-29 NOTE — PROGRESS NOTES
Consultation - Radiation Oncology      QED:[id number] : 1965  Encounter: [id number]  Patient Information: Steph Cardoza  CHIEF COMPLAINT  Chief Complaint   Patient presents with   • Consult     Radiation Oncology     Cancer Staging   No matching staging information was found for the patient  History of Present Illness   Steph Cardoza  is a 65y o  year old male who presents with Radiation Oncology consult for multiple myeloma, referred by Dr Lara Almazan for palliative RT to thoracic spine      Steph Cardoza  1965 is a 65 y o  male recently presented to the ED in 2023 with mid back pain and weight loss  CTA chest PE study showed multiple lytic lesions and compression fractures, concerning for multiple myeloma vs bone metastases  He was referred to Medical Oncology for work-up         23 CTA ED chest PE study (indication: chest, back pain, tachy, elevated d dimer)  No pulmonary embolus    No acute pulmonary disease    Diffuse lytic lesions throughout the skeleton with compression deformities in the spine   This is most likely due to multiple myeloma, less likely due to metastatic disease from an unknown primary         23 Med Onc, Dr Lara Almazan  Reviewed recent bloodwork and CTA chest study, discussed this may be primary bone marrow disorder, multiple myeloma   Plan for PET/CT and bone marrow biopsy     If myeloma is diagnosed, patient will also be referred to [facility] for evaluation (neoadjuvant chemotherapy here to debulk, transplant, possible posttransplant maintenance treatment etc)     Thoracic lesions: MRI of the T-spine ordered and patient may need RT palliatively for pain control    Pt has been symptomatic with pain and Percocet 5/325 is not effective   Patient was given a prescription for Percocet 10/325 every 6 hours as needed, and referred to palliative care     Referred to dietician for 20+ lb weight loss over 3 months          3/7/23 PET/CT  1  Romana Myers hypermetabolic lytic lesions throughout the axial and appendicular skeleton   Findings are most suggestive of multiple myeloma   Clinical correlation recommended  2   Several hypermetabolic thoracic compression deformities   T8 compression deformity is new from the prior chest CT   There is also a new displaced sternal fracture  3   No hypermetabolic soft tissue lesions         3/8/23 Palliative care  Pt has multiple sources of pain, likely cancer-related back pain, epigastric/anterior chest wall, he has been vomiting recently  Start zofran and pantoprazole  Stop percocet and start OxyIR 10 mg q 4-6 hrs prn  Start Dex 1 mg in AM for n/v, anorexia, weight loss and fatigue          3/15/23 IR bone marrow biopsy  A -C   Bone marrow,  right iliac crest,  biopsy, clot, and aspirate:  Merary Fuentes restricted plasma cell neoplasm, >80% of total cellularity consistent with plasma cell myeloma, see note  -  Hypercellular bone marrow with markedly reduced trilineage hematopoiesis and no increase in blasts  -  Reduced iron stores, in a suboptimal sample, correlation with serum iron studies is recommended    -  Moderate to severe increase in reticulin fibrosis status         3/20/23 MRI thoracic spine w wo contrast  1   Widespread infiltrative and discrete osseous lesions involving anterior and posterior elements of the thoracic and visualized cervical and lumbar spine in keeping with history of multiple myeloma   No extraosseous lesion    2   Compared to CTA chest 2/8/2023, progression of T6 wedge compression fracture with severe anterior height loss  Hui Moran is small posterior osseous buckling without significant canal stenosis    3   Mild T8 compression fracture, progressed from CTA chest 2/8/2023   Small posterior osseous buckling without significant canal stenosis    4   Stable T8 wedge compression deformity with severe anterior height loss   Posterosuperior osseous buckling indents ventral thecal sac without significant canal stenosis    5   Discrete and marrow replacing lesions involving partially imaged ribs and sternum   Displaced sternal fracture, similar to PET CT 3/7/2023, new from CTA chest 2023         3/27/23 Med Onc, Dr Montiel Him marrow biopsy demonstrated a lambda restricted plasma cell neoplasm, 80% of the cells were malignant   PET/CT demonstrated multiple osseous hypermetabolic lytic lesions     Referred to Dr Adrienne Krabbe, transplant director at [facility] may be a good candidate for neoadjuvant chemotherapy to debulk and then autotransplant    Pt is absolutely not a candidate for anti-absorption therapy until dental caries are fixed     Plan for chemo regimen  Bortezomib 1 3 mg/m2 subcu on days 1, 4, 8 and 11  Lenalidomide 25 mg orally days 1-7 (dose reduced on the first cycle)  Dexamethasone 40 mg by mouth on days 1, 8 and 15  Cycle length = 21 days x 3-4 cycles  **Spoke with Dr Martha Mcfadden to review MRI thoracic, he agreed palliative RT may be indicated, referral placed to Rad Onc          Upcomin23 Palliative care  23 Med Onc          Historical Information   Oncology History   Multiple myeloma not having achieved remission ([address] )   3/2023 Initial Diagnosis    Multiple myeloma not having achieved remission ([address] )     3/15/2023 Biopsy    A -C  Bone marrow,  right iliac crest,  biopsy, clot, and aspirate:  -  Lambda restricted plasma cell neoplasm, >80% of total cellularity consistent with plasma cell myeloma, see note  -  Hypercellular bone marrow with markedly reduced trilineage hematopoiesis and no increase in blasts  -  Reduced iron stores, in a suboptimal sample, correlation with serum iron studies is recommended  -  Moderate to severe increase in reticulin fibrosis status       Overall the current bone marrow biopsy is diagnostic for a plasma cell neoplasm best classified as plasma cell myeloma         2023 -  Chemotherapy    bortezomib (VELCADE), 1 3 mg/m2 = 2 5 mg, Subcutaneous, Once, 0 of 4 cycles     Bone metastases ([address] )   3/15/2023 Biopsy    A -C  Bone marrow,  right iliac crest,  biopsy, clot, and aspirate:  -  Lambda restricted plasma cell neoplasm, >80% of total cellularity consistent with plasma cell myeloma, see note  -  Hypercellular bone marrow with markedly reduced trilineage hematopoiesis and no increase in blasts  -  Reduced iron stores, in a suboptimal sample, correlation with serum iron studies is recommended  -  Moderate to severe increase in reticulin fibrosis status       Overall the current bone marrow biopsy is diagnostic for a plasma cell neoplasm best classified as plasma cell myeloma         3/2023 Initial Diagnosis    Bone metastases (HCC)           Past Medical History:   Diagnosis Date   • Cancer ([facility] [address] )     bone-   • GERD (gastroesophageal reflux disease)      Past Surgical History:   Procedure Laterality Date   • APPENDECTOMY     • IR BIOPSY BONE MARROW  3/15/2023       Family History   Problem Relation Age of Onset   • Diabetes Mother    • Heart disease Father    • Diabetes Father        Social History   Social History     Substance and Sexual Activity   Alcohol Use Not Currently     Social History     Substance and Sexual Activity   Drug Use Yes   • Types: Marijuana    Comment: once a month     Social History     Tobacco Use   Smoking Status Some Days   • Types: Cigarettes   Smokeless Tobacco Not on file   Tobacco Comments    Smoking 1 cigarette daily         Meds/Allergies     Current Outpatient Medications:   •  acetaminophen (TYLENOL) 500 mg tablet, Take 2 tablets (1,000 mg total) by mouth 3 (three) times a day, Disp: 180 tablet, Rfl: 2  •  acyclovir (ZOVIRAX) 400 MG tablet, Take 1 tablet (400 mg total) by mouth 2 (two) times a day, Disp: 60 tablet, Rfl: 11  •  dexamethasone (DECADRON) 1 mg tablet, Take 1 tablet (1 mg total) by mouth daily with breakfast, Disp: 30 tablet, Rfl: 0  •  ondansetron (ZOFRAN) 4 mg tablet, Take 1 tablet (4 mg total) by mouth every 8 (eight) hours as needed for nausea or vomiting, Disp: 20 tablet, Rfl: 0  •  oxyCODONE (ROXICODONE) 10 MG TABS, Take 1 tablet (10 mg total) by mouth every 4 (four) hours as needed (cancer-related pain) Max Daily Amount: 60 mg, Disp: 90 tablet, Rfl: 0  •  pantoprazole (PROTONIX) 40 mg tablet, Take 1 tablet (40 mg total) by mouth daily, Disp: 30 tablet, Rfl: 2  No Known Allergies      Review of Systems   Constitutional: Positive for activity change, appetite change, fatigue and unexpected weight change  Negative for fever  HENT: Negative for congestion, rhinorrhea, sneezing and sore throat  Eyes: Negative  Respiratory: Negative for cough and shortness of breath  Cardiovascular: Negative for chest pain, palpitations and leg swelling  Gastrointestinal: Negative for abdominal distention, abdominal pain, blood in stool, nausea and vomiting  Endocrine: Negative  Genitourinary: Negative for difficulty urinating, dysuria, flank pain and hematuria  Musculoskeletal: Positive for back pain and myalgias  Negative for arthralgias, gait problem and neck pain  Skin: Negative  Allergic/Immunologic: Negative  Neurological: Negative for dizziness, weakness, numbness and headaches  Hematological: Negative  Psychiatric/Behavioral: Negative  OBJECTIVE:   /88 (BP Location: Left arm, Patient Position: Sitting, Cuff Size: Standard)   Pulse 100   Temp 97 6 °F (36 4 °C) (Temporal)   Resp 18   Ht 6' (1 829 m)   Wt 69 3 kg (152 lb 12 8 oz)   SpO2 98%   BMI 20 72 kg/m²   Pain Assessment:  7  Performance Status: Karnofsky: 70 - Cares for self; unable to carry on normal activity or do normal work     Physical Exam  Constitutional:       General: He is not in acute distress  Appearance: He is not ill-appearing  HENT:      Nose: No congestion  Mouth/Throat:      Pharynx: Oropharynx is clear  Eyes:      Extraocular Movements: Extraocular movements intact  Pupils: Pupils are equal, round, and reactive to light  Cardiovascular:      Rate and Rhythm: Normal rate and regular rhythm  Heart sounds: Normal heart sounds  Pulmonary:      Effort: Pulmonary effort is normal       Breath sounds: Normal breath sounds  Abdominal:      Palpations: Abdomen is soft  Tenderness: There is no abdominal tenderness  Musculoskeletal:         General: No swelling or tenderness  Normal range of motion  Cervical back: Normal range of motion and neck supple  Lymphadenopathy:      Cervical: No cervical adenopathy  Skin:     General: Skin is dry  Findings: No lesion or rash  Neurological:      General: No focal deficit present  Mental Status: He is alert and oriented to person, place, and time  Psychiatric:         Mood and Affect: Mood normal          Behavior: Behavior normal             RESULTS  Lab Results    Chemistry        Component Value Date/Time    K 3 8 02/08/2023 1016    CL 97 02/08/2023 1016    CO2 24 02/08/2023 1016    BUN 21 02/08/2023 1016    CREATININE 1 17 02/08/2023 1016        Component Value Date/Time    CALCIUM 10 7 (H) 02/08/2023 1016    ALKPHOS 166 (H) 02/08/2023 1016    AST 23 02/08/2023 1016    ALT 14 02/08/2023 1016            Lab Results   Component Value Date    WBC 6 86 03/15/2023    HGB 12 8 03/15/2023    HCT 36 6 03/15/2023    MCV 95 03/15/2023     03/15/2023         Imaging Studies  MRI thoracic spine w wo contrast    Result Date: 3/21/2023  Narrative: MRI THORACIC SPINE WITH AND WITHOUT CONTRAST INDICATION: D47 2: Monoclonal gammopathy  COMPARISON:  CTA chest 2/8/2023 TECHNIQUE:  Multiplanar, multisequence imaging of the thoracic spine was performed before and after gadolinium administration     IV Contrast:  6 mL of Gadobutrol injection (SINGLE-DOSE) IMAGE QUALITY:  Diagnostic  FINDINGS: ALIGNMENT AND MARROW SIGNAL: There is exaggerated thoracic kyphosis with apex at T9-10   Diffuse infiltrative and discrete marrow replacing lesion involving anterior and posterior elements of thoracic and visualized cervical and lumbar spine  Interval progression of T6 compression fracture with severe anterior height loss  There is small posterior buckling without significant canal stenosis  Grossly stable T10 wedge compression deformity with severe anterior height loss  There is posterosuperior osseous buckling indenting ventral thecal sac without significant canal stenosis  Mild T8 compression fracture deformity, slightly progressed  Small posterior osseous buckling without significant canal stenosis  THORACIC CORD:  Normal signal within the thoracic cord  PREVERTEBRAL AND PARASPINAL SOFT TISSUES:   Normal  THORACIC DEGENERATIVE CHANGE:  Mild degenerative change most pronounced at level T11-12 with disc bulge and mild canal narrowing  Lesser degree disc bulge at level T12-L1 and L1-2 with mild canal narrowing  OTHER FINDINGS:  Additional lesions involving partially imaged bilateral ribs and the sternum  Displaced sternal fracture, similar to PET CT 3/7/2023, new from CTA chest 2/8/2023  Impression: 1  Widespread infiltrative and discrete osseous lesions involving anterior and posterior elements of the thoracic and visualized cervical and lumbar spine in keeping with history of multiple myeloma  No extraosseous lesion  2   Compared to CTA chest 2/8/2023, progression of T6 wedge compression fracture with severe anterior height loss  There is small posterior osseous buckling without significant canal stenosis  3   Mild T8 compression fracture, progressed from CTA chest 2/8/2023  Small posterior osseous buckling without significant canal stenosis  4   Stable T8 wedge compression deformity with severe anterior height loss  Posterosuperior osseous buckling indents ventral thecal sac without significant canal stenosis  5   Discrete and marrow replacing lesions involving partially imaged ribs and sternum    Displaced sternal fracture, similar to PET CT 3/7/2023, new from CTA chest 2/8/2023  Workstation performed: [id number]     NM PET CT skull base to mid thigh    Result Date: 3/8/2023  Narrative: PET/CT SCAN INDICATION: D47 2: Monoclonal gammopathy MODIFIER: PI COMPARISON: CT chest 2/8/2023 CELL TYPE:  None TECHNIQUE:   7 9 mCi F-18-FDG administered IV  Multiplanar attenuation corrected and non attenuation corrected PET images are available for interpretation, and contiguous, low dose, axial CT sections were obtained from the vertex through the femurs  Intravenous contrast material was not utilized  This examination, like all CT scans performed in the [facility], was performed utilizing techniques to minimize radiation dose exposure, including the use of iterative reconstruction and automated exposure control  Fasting serum glucose: 113 mg/dl FINDINGS: VISUALIZED BRAIN:   No acute abnormalities are seen  HEAD/NECK:   There is a physiologic distribution of FDG  No FDG avid cervical adenopathy is seen  CT images: Unremarkable  CHEST:   No FDG avid soft tissue lesions are seen  CT images: Bibasilar atelectasis  Left lower granulomas and calcified left hilar nodes  Coronary artery calcifications  Mild paraseptal emphysema  ABDOMEN:   No FDG avid soft tissue lesions are seen  CT images: Layering gallbladder sludge  Calcified splenic granulomas  PELVIS: No FDG avid soft tissue lesions are seen  CT images: Large left hydrocele  OSSEOUS STRUCTURES: Widespread lytic lesions throughout the axial and appendicular skeleton are again visualized, and demonstrating heterogeneous increased FDG activity  This is most suggestive of multiple myeloma  T10 compression deformity is again visualized, with mild FDG activity, SUV 4 2  There is new a compression deformity at T8 with intense FDG activity, SUV 6 3  T6 compression deformity is again visualized, SUV 4 7  There is a displaced sternal fracture with focal FDG activity, SUV 5 7    This is also new from the prior CT chest  Additional example lesions include the following: Proximal left humerus lesion SUV 5 7  Right lateral 8th rib lesion SUV 5 6  Right L5 lesion SUV 6 4  Right sacral lesion SUV 5 7  Left anterior sacral lesion SUV 6 4  Left posterior iliac lesion SUV 5 6  CT images: As above  Impression: 1  Widespread hypermetabolic lytic lesions throughout the axial and appendicular skeleton  Findings are most suggestive of multiple myeloma  Clinical correlation recommended  2   Several hypermetabolic thoracic compression deformities  T8 compression deformity is new from the prior chest CT  There is also a new displaced sternal fracture  3   No hypermetabolic soft tissue lesions  Workstation performed: [id number]     IR biopsy bone marrow    Result Date: 3/15/2023  Narrative: CT-guided bone marrow biopsy Clinical History: Monoclonal gammopathy biopsy for diagnosis Moderate sedation time: 15 minutes Procedure: After explaining the risks and benefits of the procedure to the patient, informed consent was obtained  CT was used to localize the right iliac bone   Radiation dose length product (DLP) for this visit:  138 mGy   This examination, like all CT scans performed in the [facility], was performed utilizing techniques to minimize radiation dose exposure, including the use of iterative reconstruction and automated exposure control  The overlying skin was prepped and draped in the usual sterile fashion  Local anesthesia was obtained with a 1% lidocaine solution  Using CT guidance, an 11-gauge coaxial needle was advanced  Marrow was aspirated  A core biopsy was obtained  The tissue was given to pathology  The preliminary interpretation is   The patient tolerated the procedure well and left the department in stable condition   Findings: needle in right iliac bone post procedural changes Specimens: core touch prep flow     Impression: Impression: CT guided bone marrow "biopsy Workstation performed: [id number]         Pathology:         ASSESSMENT  1  Multiple myeloma not having achieved remission [facility])  Ambulatory referral to Radiation Oncology      2  Bone metastases [facility])  Ambulatory referral to Radiation Oncology        Cancer Staging   No matching staging information was found for the patient  PLAN/DISCUSSION  No orders of the defined types were placed in this encounter  Fan Wood  is a 65y o  year old male with plasma cell neoplasm pain in the mid lower thoracic and upper lumbar spine pains     Patient already started oxycodone  He returns tomorrow for simulation and treatments to start Friday this week or Monday next week  If will be total dose 25 Gy with minimal side effects  CBC weekly          Karishma Alva MD  3/29/2023,2:00 PM      Portions of the record may have been created with voice recognition software  Occasional wrong word or \"sound a like\" substitutions may have occurred due to the inherent limitations of voice recognition software  Read the chart carefully and recognize, using context, where substitutions have occurred          "

## 2023-03-29 NOTE — PROGRESS NOTES
Radiation Oncology consult for multiple myeloma, referred by Dr Sudhir Kendrick for palliative RT to thoracic spine  Richmond Banks  1965 is a 65 y o  male recently presented to the ED in Feb 2023 with mid back pain and weight loss  CTA chest PE study showed multiple lytic lesions and compression fractures, concerning for multiple myeloma vs bone metastases  He was referred to Medical Oncology for work-up  2/8/23 CTA ED chest PE study (indication: chest, back pain, tachy, elevated d dimer)  No pulmonary embolus  No acute pulmonary disease  Diffuse lytic lesions throughout the skeleton with compression deformities in the spine  This is most likely due to multiple myeloma, less likely due to metastatic disease from an unknown primary  2/27/23 Med Onc, Dr Sudhir Kendrick  Reviewed recent bloodwork and CTA chest study, discussed this may be primary bone marrow disorder, multiple myeloma  Plan for PET/CT and bone marrow biopsy  If myeloma is diagnosed, patient will also be referred to [facility] for evaluation (neoadjuvant chemotherapy here to debulk, transplant, possible posttransplant maintenance treatment etc)  Thoracic lesions: MRI of the T-spine ordered and patient may need RT palliatively for pain control  Pt has been symptomatic with pain and Percocet 5/325 is not effective  Patient was given a prescription for Percocet 10/325 every 6 hours as needed, and referred to palliative care  Referred to dietician for 20+ lb weight loss over 3 months  3/7/23 PET/CT  1  Widespread hypermetabolic lytic lesions throughout the axial and appendicular skeleton  Findings are most suggestive of multiple myeloma  Clinical correlation recommended  2   Several hypermetabolic thoracic compression deformities  T8 compression deformity is new from the prior chest CT  There is also a new displaced sternal fracture  3   No hypermetabolic soft tissue lesions        3/8/23 Palliative care  Pt has multiple sources of pain, likely cancer-related back pain, epigastric/anterior chest wall, he has been vomiting recently  Start zofran and pantoprazole  Stop percocet and start OxyIR 10 mg q 4-6 hrs prn  Start Dex 1 mg in AM for n/v, anorexia, weight loss and fatigue  3/15/23 IR bone marrow biopsy  A -C  Bone marrow,  right iliac crest,  biopsy, clot, and aspirate:  -  Lambda restricted plasma cell neoplasm, >80% of total cellularity consistent with plasma cell myeloma, see note  -  Hypercellular bone marrow with markedly reduced trilineage hematopoiesis and no increase in blasts  -  Reduced iron stores, in a suboptimal sample, correlation with serum iron studies is recommended  -  Moderate to severe increase in reticulin fibrosis status  3/20/23 MRI thoracic spine w wo contrast  1  Widespread infiltrative and discrete osseous lesions involving anterior and posterior elements of the thoracic and visualized cervical and lumbar spine in keeping with history of multiple myeloma  No extraosseous lesion  2   Compared to CTA chest 2/8/2023, progression of T6 wedge compression fracture with severe anterior height loss  There is small posterior osseous buckling without significant canal stenosis  3   Mild T8 compression fracture, progressed from CTA chest 2/8/2023  Small posterior osseous buckling without significant canal stenosis  4   Stable T8 wedge compression deformity with severe anterior height loss  Posterosuperior osseous buckling indents ventral thecal sac without significant canal stenosis  5   Discrete and marrow replacing lesions involving partially imaged ribs and sternum  Displaced sternal fracture, similar to PET CT 3/7/2023, new from CTA chest 2/8/2023       3/27/23 Med Onc, Dr Tyshawn Johnson  Bone marrow biopsy demonstrated a lambda restricted plasma cell neoplasm, 80% of the cells were malignant  PET/CT demonstrated multiple osseous hypermetabolic lytic lesions      Referred to Dr Sabra Moyer, transplant director at [facility]  Patient may be a good candidate for neoadjuvant chemotherapy to debulk and then autotransplant  Pt is absolutely not a candidate for anti-absorption therapy until dental caries are fixed  Plan for chemo regimen  Bortezomib 1 3 mg/m2 subcu on days 1, 4, 8 and 11  Lenalidomide 25 mg orally days 1-7 (dose reduced on the first cycle)  Dexamethasone 40 mg by mouth on days 1, 8 and 15  Cycle length = 21 days x 3-4 cycles  **Spoke with Dr Jessee Arroyo to review MRI thoracic, he agreed palliative RT may be indicated, referral placed to [facility]  Upcomin23 Palliative care  23 Med Onc         Oncology History   Multiple myeloma not having achieved remission ([facility] [address] )   3/2023 Initial Diagnosis    Multiple myeloma not having achieved remission ([facility] [address] )     3/15/2023 Biopsy    A -C  Bone marrow,  right iliac crest,  biopsy, clot, and aspirate:  -  Lambda restricted plasma cell neoplasm, >80% of total cellularity consistent with plasma cell myeloma, see note  -  Hypercellular bone marrow with markedly reduced trilineage hematopoiesis and no increase in blasts  -  Reduced iron stores, in a suboptimal sample, correlation with serum iron studies is recommended  -  Moderate to severe increase in reticulin fibrosis status  Overall the current bone marrow biopsy is diagnostic for a plasma cell neoplasm best classified as plasma cell myeloma         2023 -  Chemotherapy    bortezomib (VELCADE), 1 3 mg/m2 = 2 5 mg, Subcutaneous, Once, 0 of 4 cycles     Bone metastases ([facility] [address] )   3/15/2023 Biopsy    A -C  Bone marrow,  right iliac crest,  biopsy, clot, and aspirate:  -  Lambda restricted plasma cell neoplasm, >80% of total cellularity consistent with plasma cell myeloma, see note  -  Hypercellular bone marrow with markedly reduced trilineage hematopoiesis and no increase in blasts    -  Reduced iron stores, in a suboptimal sample, correlation with serum iron studies is recommended  -  Moderate to severe increase in reticulin fibrosis status  Overall the current bone marrow biopsy is diagnostic for a plasma cell neoplasm best classified as plasma cell myeloma         3/2023 Initial Diagnosis    Bone metastases ([facility] [address] )         Review of Systems:  Review of Systems   Constitutional: Negative for appetite change and fatigue  HENT: Negative  Eyes:        Wears glasses   Respiratory: Positive for shortness of breath (sometimes due to pain, difficulty inhaling deeply)  Cardiovascular: Negative  Gastrointestinal: Positive for constipation (occasional)  Endocrine: Negative  Genitourinary: Negative  Musculoskeletal: Positive for back pain  Pain to right ribs front and back and under right arm   Skin: Negative  Allergic/Immunologic: Negative  Neurological: Negative  Hematological: Negative  Psychiatric/Behavioral: Positive for sleep disturbance         Clinical Trial: no        Pain assessment: 6    Prior Radiation: no    Teaching: NCI radiation packet    MST: completed     Implantable Devices (Port, pacemaker, pain stimulator): no     Hip Replacement: no    Health Maintenance   Topic Date Due   • Hepatitis C Screening  Never done   • COVID-19 Vaccine (1) Never done   • Hepatitis A Vaccine (1 of 2 - Risk 2-dose series) Never done   • Pneumococcal Vaccine: Pediatrics (0 to 5 Years) and At-Risk Patients (6 to 59 Years) (1 - PCV) Never done   • HIV Screening  Never done   • Annual Physical  Never done   • DTaP,Tdap,and Td Vaccines (1 - Tdap) Never done   • Colorectal Cancer Screening  Never done   • Influenza Vaccine (1) Never done   • Depression Screening  03/29/2024   • BMI: Adult  03/29/2024   • Hepatitis B Vaccine (1 of 3 - Risk 3-dose series) 09/16/2026   • HIB Vaccine  Aged Out   • IPV Vaccine  Aged Out   • Meningococcal ACWY Vaccine  Aged Out   • HPV Vaccine  Aged Out       Past Medical History:   Diagnosis Date   • Cancer ([facility] [address] )     bone-   • GERD (gastroesophageal reflux disease)        Past Surgical History:   Procedure Laterality Date   • APPENDECTOMY     • IR BIOPSY BONE MARROW  3/15/2023       Family History   Problem Relation Age of Onset   • Diabetes Mother    • Heart disease Father    • Diabetes Father        Social History     Tobacco Use   • Smoking status: Some Days     Types: Cigarettes   • Tobacco comments:     Smoking 1 cigarette daily   Vaping Use   • Vaping Use: Never used   Substance Use Topics   • Alcohol use: Not Currently   • Drug use: Yes     Types: Marijuana     Comment: once a month          Current Outpatient Medications:   •  acetaminophen (TYLENOL) 500 mg tablet, Take 2 tablets (1,000 mg total) by mouth 3 (three) times a day, Disp: 180 tablet, Rfl: 2  •  acyclovir (ZOVIRAX) 400 MG tablet, Take 1 tablet (400 mg total) by mouth 2 (two) times a day, Disp: 60 tablet, Rfl: 11  •  dexamethasone (DECADRON) 1 mg tablet, Take 1 tablet (1 mg total) by mouth daily with breakfast, Disp: 30 tablet, Rfl: 0  •  ondansetron (ZOFRAN) 4 mg tablet, Take 1 tablet (4 mg total) by mouth every 8 (eight) hours as needed for nausea or vomiting, Disp: 20 tablet, Rfl: 0  •  oxyCODONE (ROXICODONE) 10 MG TABS, Take 1 tablet (10 mg total) by mouth every 4 (four) hours as needed (cancer-related pain) Max Daily Amount: 60 mg, Disp: 90 tablet, Rfl: 0  •  pantoprazole (PROTONIX) 40 mg tablet, Take 1 tablet (40 mg total) by mouth daily, Disp: 30 tablet, Rfl: 2    No Known Allergies     Vitals:    03/29/23 1234   BP: 122/88   BP Location: Left arm   Patient Position: Sitting   Cuff Size: Standard   Pulse: 100   Resp: 18   Temp: 97 6 °F (36 4 °C)   TempSrc: Temporal   SpO2: 98%   Weight: 69 3 kg (152 lb 12 8 oz)   Height: 6' (1 829 m)       Pain Score:   5

## 2023-03-30 ENCOUNTER — HOSPITAL ENCOUNTER (OUTPATIENT)
Dept: RADIOLOGY | Facility: HOSPITAL | Age: 58
Setting detail: RADIATION/ONCOLOGY SERIES
Discharge: HOME | End: 2023-03-30
Attending: INTERNAL MEDICINE

## 2023-03-30 ENCOUNTER — TELEPHONE (OUTPATIENT)
Dept: HEMATOLOGY ONCOLOGY | Facility: MEDICAL CENTER | Age: 58
End: 2023-03-30

## 2023-03-30 ENCOUNTER — TELEPHONE (OUTPATIENT)
Dept: HEMATOLOGY ONCOLOGY | Facility: CLINIC | Age: 58
End: 2023-03-30

## 2023-03-30 ENCOUNTER — APPOINTMENT (OUTPATIENT)
Dept: RADIATION ONCOLOGY | Facility: HOSPITAL | Age: 58
End: 2023-03-30
Attending: RADIOLOGY

## 2023-03-30 DIAGNOSIS — C79.51 BONE METASTASIS: ICD-10-CM

## 2023-03-30 DIAGNOSIS — D47.2 MONOCLONAL GAMMOPATHY: ICD-10-CM

## 2023-03-30 DIAGNOSIS — Z51.5 PALLIATIVE CARE PATIENT: ICD-10-CM

## 2023-03-30 DIAGNOSIS — K21.9 ACID REFLUX: ICD-10-CM

## 2023-03-30 RX ORDER — PANTOPRAZOLE SODIUM 40 MG/1
TABLET, DELAYED RELEASE ORAL
Qty: 90 TABLET | Refills: 0 | Status: SHIPPED | OUTPATIENT
Start: 2023-03-30

## 2023-03-30 NOTE — TELEPHONE ENCOUNTER
Patient does not have 2021 tax returns for revlimid assistance   Patient given [address] number to advise  Will send Epic message as well  Patient aware of plan

## 2023-03-30 NOTE — TELEPHONE ENCOUNTER
Patient Call Form   Reason for patient call? Patient calling in regarding a W2 form he states [facility] requested  Patient would like to know if he could drop it off at the office or how [facility] would like to go about getting this document  Patient can be reached at [phone]   Patient's primary oncologist? Dr Lavonda Dance    Name of person the patient was calling for? [facility]   Does the patient issue require a call back? Yes   If call back required, inform patient that the message will be forwarded to the team and someone will get back to them as soon as possible    Did you relay this information to the patient?  Yes

## 2023-03-31 ENCOUNTER — APPOINTMENT (OUTPATIENT)
Dept: RADIATION ONCOLOGY | Facility: HOSPITAL | Age: 58
End: 2023-03-31
Attending: INTERNAL MEDICINE

## 2023-04-03 ENCOUNTER — APPOINTMENT (OUTPATIENT)
Dept: RADIATION ONCOLOGY | Facility: HOSPITAL | Age: 58
End: 2023-04-03
Attending: RADIOLOGY

## 2023-04-04 ENCOUNTER — TELEPHONE (OUTPATIENT)
Dept: NUTRITION | Facility: CLINIC | Age: 58
End: 2023-04-04

## 2023-04-04 NOTE — TELEPHONE ENCOUNTER
Contacted Fredy MITCHELL to confirm tomorrows appointment  Spoke with Kiran Powers and appointment was confirmed

## 2023-04-05 ENCOUNTER — OFFICE VISIT (OUTPATIENT)
Dept: PALLIATIVE MEDICINE | Facility: CLINIC | Age: 58
End: 2023-04-05

## 2023-04-05 ENCOUNTER — PATIENT OUTREACH (OUTPATIENT)
Dept: CASE MANAGEMENT | Facility: HOSPITAL | Age: 58
End: 2023-04-05

## 2023-04-05 ENCOUNTER — NUTRITION (OUTPATIENT)
Dept: NUTRITION | Facility: CLINIC | Age: 58
End: 2023-04-05

## 2023-04-05 VITALS
TEMPERATURE: 97.6 F | OXYGEN SATURATION: 99 % | WEIGHT: 153.5 LBS | DIASTOLIC BLOOD PRESSURE: 78 MMHG | RESPIRATION RATE: 18 BRPM | BODY MASS INDEX: 20.82 KG/M2 | SYSTOLIC BLOOD PRESSURE: 110 MMHG | HEART RATE: 96 BPM

## 2023-04-05 VITALS — BODY MASS INDEX: 20.82 KG/M2 | WEIGHT: 153.5 LBS

## 2023-04-05 DIAGNOSIS — K21.9 ACID REFLUX: ICD-10-CM

## 2023-04-05 DIAGNOSIS — R63.0 LOSS OF APPETITE: ICD-10-CM

## 2023-04-05 DIAGNOSIS — R11.2 NAUSEA & VOMITING: ICD-10-CM

## 2023-04-05 DIAGNOSIS — C90.00 MULTIPLE MYELOMA NOT HAVING ACHIEVED REMISSION (HCC): Primary | ICD-10-CM

## 2023-04-05 DIAGNOSIS — C79.51 METASTASIS TO BONE (HCC): ICD-10-CM

## 2023-04-05 DIAGNOSIS — C79.51 METASTASIS TO BONE (HCC): Primary | ICD-10-CM

## 2023-04-05 DIAGNOSIS — K59.03 THERAPEUTIC OPIOID-INDUCED CONSTIPATION (OIC): ICD-10-CM

## 2023-04-05 DIAGNOSIS — Z51.5 PALLIATIVE CARE PATIENT: ICD-10-CM

## 2023-04-05 DIAGNOSIS — Z71.89 ADVANCED CARE PLANNING/COUNSELING DISCUSSION: ICD-10-CM

## 2023-04-05 DIAGNOSIS — F17.210 NICOTINE DEPENDENCE, CIGARETTES, UNCOMPLICATED: ICD-10-CM

## 2023-04-05 DIAGNOSIS — Z71.3 NUTRITIONAL COUNSELING: Primary | ICD-10-CM

## 2023-04-05 DIAGNOSIS — M54.9 BACK PAIN: ICD-10-CM

## 2023-04-05 DIAGNOSIS — G89.3 CANCER RELATED PAIN: ICD-10-CM

## 2023-04-05 DIAGNOSIS — R63.4 UNINTENTIONAL WEIGHT LOSS: ICD-10-CM

## 2023-04-05 DIAGNOSIS — T40.2X5A THERAPEUTIC OPIOID-INDUCED CONSTIPATION (OIC): ICD-10-CM

## 2023-04-05 RX ORDER — DEXAMETHASONE 1 MG
1 TABLET ORAL
Qty: 30 TABLET | Refills: 0 | Status: SHIPPED | OUTPATIENT
Start: 2023-04-05

## 2023-04-05 RX ORDER — OXYCODONE HYDROCHLORIDE 10 MG/1
10 TABLET ORAL EVERY 4 HOURS PRN
Qty: 90 TABLET | Refills: 0 | Status: SHIPPED | OUTPATIENT
Start: 2023-04-05

## 2023-04-05 RX ORDER — GABAPENTIN 300 MG/1
600 CAPSULE ORAL
Qty: 60 CAPSULE | Refills: 2 | Status: SHIPPED | OUTPATIENT
Start: 2023-04-05

## 2023-04-05 NOTE — PROGRESS NOTES
"OSW approached by radiation oncology RN manager of clinical operations requesting assistance to Mr  and Mrs Nadya Simmons after their OTV with provider  Mr Nadya Simmons mentioned to team he is confused about SSDI disability  OSW noted MSW colleague s/w him on 2/28 and provided information to his email at that time  Also noted palliative care SW met with pt and wife on 3/8 and assisted as well  OSW introduced self to Mr  and Mrs Nadya Simmons stated he does not complete things online and does not understand the process for SSDI  OSW printed off a paper application and provided an overview of SSDI and a checklist  Also provided address and phone number to the [facility] office in [place]  Explained he will need to set up a telephone interview once the paper application is complete and turned into the local office  He understood same, but stated \"why can't they do these forms for me? \" Inquired if he has difficulty reading and he stated he has some difficulty understanding things  Mrs Nadya Simmons stated she collects SSDI and will try to help him complete the forms  She is also helping him to apply for MA, as his current health insurance from [employer] is not offering a lot of coverage  Provided business card and explained SW teams are available for support  They were both appreciative    "

## 2023-04-05 NOTE — PATIENT INSTRUCTIONS
"Nutrition Rx & Recommendations:  Diet: High Calorie, High Protein (for high calorie foods see pages 52-53, and for high protein foods see pages 49-51 in your Eating Hints book)  Small, frequent meals/snacks may be easier to tolerate than 3 large daily meals  Aim for 5-6 small meals per day (every 2-3 hours)  Include protein at all meals/snacks  For appetite loss: try powdered or liquid nutrition supplements; eating by the clock every 2-3 hours; set a timer to remind yourself to eat, keep snacks nearby; add extra protein & calories to your diet; drink liquids in between meals, choose liquids that add calories; eat a bedtime snack; eat soft, cool or frozen foods; eat a larger meal when you feel well & rested; only sip small amounts of liquids during meals (see pages 10-12 in your Eating Hints book)  For weight loss: monitor your weight at home at least 2x/week, record your weight, start by adding 250-500 extra calories per day, eat 5-6 small scheduled meals every 2-3 hrs, choose foods that are high in protein and calories (see pages 49-53 in your Eating Hints book), drink liquids with calories for example: milkshakes/smoothies/juice/soup/whole milk/chocolate milk, cook with protein fortified milk (see recipe on page 36 in your Eating Hints book), consider ready-to-drink oral nutrition supplements such as Ensure Plus, Ensure Enlive, Boost Plus, or Boost Very High Calorie, avoid \"diet\" and \"light\" foods when possible, avoid drinking too much with meals, contact your dietitian with any continued weight loss over the course of 1 week  For more info see pages 35-37 in your Eating Hints book  Weigh yourself regularly  If you notice weight loss, make an effort to increase your daily food/calorie intake  If you continue to notice loss after these efforts, reach out to your dietitian to establish a plan to stabilize weight     Optimize your intake leading up to your infusions  Try to increase your protein intake, include " a protein source at all meals/snacks (meat, poultry, fish, eggs, cheese, yogurt, milk, nuts, beans, lentils, etc )  Nutrition Supplements: aim for 1 per day    Follow Up Plan: 4/25 at 9am  Recommend Referral to Other Providers: none at this time

## 2023-04-05 NOTE — PATIENT INSTRUCTIONS
"It was good to see you today  Thank you for coming in  Lets try gabapentin, to help with your overnight pain  Start at 300 mg (1 capsule) just before bedtime  If after 3 nights you tolerate this well, but does not help you sleep through the night, increase to 2 capsules (600 mg)  This medication can be slightly constipating, as can your oxycodone  Continue oxycodone as before, continue Protonix, continue Decadron  Protonix should be at your pharmacy; was filled 3/30/23  If you have a lot of gas, consider simethicone (Beano, Gas-X)  When we use opioids for pain control, constipation is common and patients should act to prevent it:  Drink PLENTY of water  This is important to keep the gut moving  Some people have success w/ using prunes, prune juice, certain fruits or vegetables (apples, bananas, prunes, pears, raspberries, and vegetables like string beans, broccoli, spinach, kale, squash, lentils, peas, and beans), or fiber gummies  Try a probiotic  This could be yogurt or kefir, or fermented beverages such as kombucha, but probiotics are also available in capsule form  Aim for 10-15 billion colony-forming-units, w/ bacteria such as Lactobacillus / Saccharomyces / Actinomyces  Osmotic laxatives (Miralax, magnesium citrate, Milk of Magnesia) can be very useful for opioid-induced constipation (OIC); take daily to prevent OIC  Bulk laxatives (Citrucel, Metamucil, Fibercon, Benefiber, wheat germ) are useful for constipation in patients who are not taking opioids, but are not recommended if you are taking opioids  Colace is good for softening hard stools, or preventing constipation when opioids are being used - but does not stimulate the bowel to move things along once constipation has occurred  You can use senna, 1 to 2 tabs, once or twice daily as needed for constipation  Use as directed on the box/bottle  Senna is also available in a tea (\"Smooth Move\")   Should that not be enough for your constipation, " you can try Dulcolax  Should that not be enough, consider an enema  All of these medications are available over-the-counter  Return in about 1 month (around 5/5/2023)  Call us for refills on medications that we supply, as needed  If something changes and you need to come in sooner, please call our office  PRESCRIPTION REFILL REMINDER:  All medication refills should be requested prior to [facility] on Friday  Any refill requests after noon on Friday would be addressed the following Monday  MEDICATION SAFETY ISSUES:  Do not drive under the influence of narcotics (including opioids), watch for adverse effects including confusion / altered mental status / respiratory depression (slowed breathing), keep medications stored in a safe/locked environment, do not use alcohol while opioids or other narcotics are in your system

## 2023-04-05 NOTE — PROGRESS NOTES
Follow-up with Palliative and 101 Dates   65 y o  male [id number]    ASSESSMENT & PLAN:  1  Multiple myeloma not having achieved remission ([facility] )    2  Metastasis to bone ([facility] )    3  Acid reflux    4  Nicotine dependence, cigarettes, uncomplicated    5  Back pain    6  Cancer related pain    7  Unintentional weight loss    8  Nausea & vomiting    9  Loss of appetite    10  Therapeutic opioid-induced constipation (OIC)    11  Palliative care patient          • Continue disease-directed cares  Patient confirms today that he would not wish to prolong life via mechanical ventilation, etc, if there was no guarantee at ongoing quality of life or meaningful recovery  • Patient reports good control of chronic pain on his current regimen, though his pain can wake him from sleep at night as oxyIR 10mg only provides 4-5 hours of analgesia  He has multiple sources of pain, including cancer-related back pain, epigastric / anterior chest wall pain which may be related to malignancy and/or reflux  o Continue pantoprazole 40mg qAM for reflux  o Continue oxyIR 10mg q4-6h PRN moderate-severe pain, max 6 tabs per day  He has #18 tabs remaining from the 3/8/23 Rx of #90   o Consider topical OTC products, local application of heat or cold, Tylenol 1000mg TID ATC (Tylenol has been helpful)  Do not use heat on top of topical agents  Do not mix topical agents  o Continue dexamethasone; may help with pain  This has been helpful for appetite and fatigue   o Start gabapentin 300mg QHS; if tolerated but insufficient to provide overnight analgesia (in addition th QHS dose of oxyIR) can increase to 600mg QHS after three nights  • Counseled on trying simethicone for gas  • Continue Zofran PRN N/V; he has not needed this recently  • Counseled today on smoking cessation  • Counseled today on bowel regimen for constipation    • Patient confirms today that he has abstained from EtOH since around 01/2023; prior to that he "was drinking about \"5 beers\" daily  Positive reinforcement provided  • Patient has no PCP listed; [facility] left a phone message w/ instructions on using [employer] to find a new PCP  • ACP: Separate 20 minutes spent reviewing and completing advanced directives (the [facility] Advanced Directive)  Advanced directive counseling given  Reviewed with patient  All questions answered  Document signed and witnessed and will be scanned into the chart  • Reviewed notes (Dietician, Medical Oncology, Radiation Oncology), labs (3/15/23 Hb 12 8; 2/8/23 Cr 1 17, eGFR 69, alb 3 3, cCa 11 3, , lactate 2 5->1 2, Hb 12 6, igG 2900), imaging + procedures (3/20/23 MRI thoracic spine)  Return in about 1 month (around 5/5/2023)  • Emotional support provided  • Medication safety issues addressed - no driving under the influence of narcotics (including opioids), watch for adverse effects including AMS or respiratory depression (slowed breathing), keep medications stored in a safe/locked environment, do not use alcohol while opioids or other narcotics are in one's system  Requested Prescriptions     Signed Prescriptions Disp Refills   • oxyCODONE (ROXICODONE) 10 MG TABS 90 tablet 0     Sig: Take 1 tablet (10 mg total) by mouth every 4 (four) hours as needed (cancer-related pain) Max Daily Amount: 60 mg   • dexamethasone (DECADRON) 1 mg tablet 30 tablet 0     Sig: Take 1 tablet (1 mg total) by mouth daily with breakfast   • gabapentin (Neurontin) 300 mg capsule 60 capsule 2     Sig: Take 2 capsules (600 mg total) by mouth daily at bedtime       Medications Discontinued During This Encounter   Medication Reason   • oxyCODONE (ROXICODONE) 10 MG TABS Reorder   • dexamethasone (DECADRON) 1 mg tablet Reorder       Representatives have queried the patient's controlled substance dispensing history in the Prescription Drug Monitoring Program in compliance with regulations before I have prescribed any controlled substances   The prescription " history is consistent with prescribed therapy and our practice policies  30+ minutes (separate from and in addition to ACP time detailed above) were spent in this ambulatory visit with greater than 50% of the time spent face to face with patient and his wife Early Rein in counseling or coordination of care including symptom assessment and management, medication review, medication adjustment, psychosocial support, chart review, imaging review, lab review, advanced directives, goals of care, supportive listening and anticipatory guidance  All of the patient's questions were answered during this discussion  SUBJECTIVE:  Chief Complaint   Patient presents with   • Cancer   • Cancer Pain   • Insomnia     d/t pain   • Follow-up   • Counseling   • GERD   • Nicotine Dependence   • Loss of Appetite        LILLIE Miranda  is a 65 y o  male w/ multiple myeloma; back pain (s/t malignancy), unintentional weight loss  He follows w/ Dr Cristian Patel (Medical Oncology), Dr Rupert Kraus (Radiation Oncology)  Not yet on systemic treatment for malignancy, but plan is to start bortozemib + lenalidomide 4/11/23; also RT  Patient is [facility] to [facility]; seen in consult 3/8/23 for symptom assessment and management, medication review, medication adjustment, psychosocial support, chart review, imaging review, lab review, advanced directives, goals of care, opioid titration, supportive listening and anticipatory guidance  Patient states his chronic pain is better controlled since switching to oxyIR last visit  He is taking about 2 to 3 tabs per day based upon his pill count  He will wait until his pain is 7 or higher before using oxyIR  Tylenol has been helpful for pain as well  Unfortunately he wakes most nights after 4-5 hours of sleep as pain returns, despite taking oxyIR at bedtime  Patient is tolerating oxyIR well  Any constipation has thus far been managed by using prunes  He denies recent N/V   His appetite has slightly improved, though he does not eat breakfast most days (this is his baseline)  He endorses some financial stressors but denies overt anxiety  He denies dysphoric mood  His wife and best friend Conrad Jackson remains very supportive  PDMP shows no concerns  The following portions of the medical history were reviewed: past medical history, surgical history, problem list, medication list, family history, and social history  Current Outpatient Medications:   •  acetaminophen (TYLENOL) 500 mg tablet, Take 2 tablets (1,000 mg total) by mouth 3 (three) times a day, Disp: 180 tablet, Rfl: 2  •  dexamethasone (DECADRON) 1 mg tablet, Take 1 tablet (1 mg total) by mouth daily with breakfast, Disp: 30 tablet, Rfl: 0  •  gabapentin (Neurontin) 300 mg capsule, Take 2 capsules (600 mg total) by mouth daily at bedtime, Disp: 60 capsule, Rfl: 2  •  ondansetron (ZOFRAN) 4 mg tablet, Take 1 tablet (4 mg total) by mouth every 8 (eight) hours as needed for nausea or vomiting, Disp: 20 tablet, Rfl: 0  •  oxyCODONE (ROXICODONE) 10 MG TABS, Take 1 tablet (10 mg total) by mouth every 4 (four) hours as needed (cancer-related pain) Max Daily Amount: 60 mg, Disp: 90 tablet, Rfl: 0  •  pantoprazole (PROTONIX) 40 mg tablet, TAKE 1 TABLET BY MOUTH EVERY DAY, Disp: 90 tablet, Rfl: 0  •  acyclovir (ZOVIRAX) 400 MG tablet, Take 1 tablet (400 mg total) by mouth 2 (two) times a day, Disp: 60 tablet, Rfl: 11  •  lenalidomide (REVLIMID) 25 MG CAPS, 25 mg daily D1-7 of a 21 day cycle  auth [id number], Disp: 7 capsule, Rfl: 3    Review of Systems   Constitutional: Positive for activity change (improved), appetite change (improving), fatigue (slightly improved) and unexpected weight change (regaining some last weight)  HENT: Positive for dental problem (chronic)  Gastrointestinal: Positive for constipation (managed w/ prunes)  Negative for abdominal pain, diarrhea, nausea and vomiting  Reflux improved  Some gas / eructation  Musculoskeletal: Positive for back pain  Allergic/Immunologic: Positive for immunocompromised state  Psychiatric/Behavioral: Positive for sleep disturbance (d/t pain)  Negative for dysphoric mood  The patient is not nervous/anxious  All other systems reviewed and are negative  OBJECTIVE:  /78 (BP Location: Left arm, Patient Position: Sitting, Cuff Size: Standard)   Pulse 96   Temp 97 6 °F (36 4 °C) (Temporal)   Resp 18   Wt 69 6 kg (153 lb 8 oz)   SpO2 99%   BMI 20 82 kg/m²   Physical Exam  Vitals reviewed  Constitutional:       General: He is not in acute distress  Appearance: He is well-groomed and underweight  He is ill-appearing (chronically)  He is not toxic-appearing  HENT:      Head: Normocephalic and atraumatic  Right Ear: External ear normal       Left Ear: External ear normal       Mouth/Throat:      Dentition: Abnormal dentition (missing teeth)  Dental caries present  Eyes:      General: No scleral icterus  Right eye: No discharge  Left eye: No discharge  Extraocular Movements: Extraocular movements intact  Conjunctiva/sclera: Conjunctivae normal       Pupils: Pupils are equal, round, and reactive to light  Cardiovascular:      Rate and Rhythm: Normal rate  Pulmonary:      Effort: Pulmonary effort is normal  No tachypnea, bradypnea, accessory muscle usage or respiratory distress  Comments: Able to speak comfortably in complete sentences on room air at rest   Abdominal:      General: There is no distension  Tenderness: There is no guarding  Musculoskeletal:      Cervical back: Normal range of motion  Right lower leg: No edema  Left lower leg: No edema  Skin:     General: Skin is dry  Coloration: Skin is not pale  Neurological:      Mental Status: He is alert and oriented to person, place, and time  Cranial Nerves: No dysarthria or facial asymmetry  Gait: Gait is intact     Psychiatric: "Attention and Perception: Attention normal          Mood and Affect: Mood and affect normal          Speech: Speech normal          Behavior: Behavior normal  Behavior is cooperative  Thought Content: Thought content normal          Cognition and Memory: Cognition and memory normal          Judgment: Judgment normal           Megha Bazzi MD  [facility]      Portions of this document may have been created using dictation software and as such some \"sound alike\" terms may have been generated by the system  Do not hesitate to contact me with any questions or clarifications     "

## 2023-04-06 ENCOUNTER — TELEPHONE (OUTPATIENT)
Dept: HEMATOLOGY ONCOLOGY | Facility: MEDICAL CENTER | Age: 58
End: 2023-04-06

## 2023-04-06 NOTE — TELEPHONE ENCOUNTER
Lvm to the patient in regards to his lab work that needs to be completed prior to his appt on 4/17/23 at 8:40am

## 2023-04-17 NOTE — PROGRESS NOTES
Outpatient Oncology Nutrition Consultation   Type of Consult: Follow Up  Care Location: Office Visit  - met w/pt & significant other Remy Awad)  Nutrition Assessment:   Oncology Diagnosis & Treatments: Multiple Myeloma   S/p palliative RT to the mid T to L spine started 4/3/23-4/14/23  Undergoing neoadjuvant chemotherapy (bortezomib, lenalidomide, dexamethasone) which started 4/11/23  Possible transplant post tx  Oncology History   Multiple myeloma not having achieved remission ([facility] )   3/2023 Initial Diagnosis    Multiple myeloma not having achieved remission ([facility] )     3/15/2023 Biopsy    A -C  Bone marrow,  right iliac crest,  biopsy, clot, and aspirate:  -  Lambda restricted plasma cell neoplasm, >80% of total cellularity consistent with plasma cell myeloma, see note  -  Hypercellular bone marrow with markedly reduced trilineage hematopoiesis and no increase in blasts  -  Reduced iron stores, in a suboptimal sample, correlation with serum iron studies is recommended  -  Moderate to severe increase in reticulin fibrosis status  Overall the current bone marrow biopsy is diagnostic for a plasma cell neoplasm best classified as plasma cell myeloma         4/11/2023 -  Chemotherapy    bortezomib (VELCADE), 1 3 mg/m2 = 2 5 mg, Subcutaneous, Once, 1 of 4 cycles  Administration: 2 5 mg (4/11/2023), 2 5 mg (4/14/2023), 2 5 mg (4/18/2023), 2 5 mg (4/21/2023)     Metastasis to bone ([facility] )   3/15/2023 Biopsy    A -C  Bone marrow,  right iliac crest,  biopsy, clot, and aspirate:  -  Lambda restricted plasma cell neoplasm, >80% of total cellularity consistent with plasma cell myeloma, see note  -  Hypercellular bone marrow with markedly reduced trilineage hematopoiesis and no increase in blasts  -  Reduced iron stores, in a suboptimal sample, correlation with serum iron studies is recommended  -  Moderate to severe increase in reticulin fibrosis status       Overall the current bone marrow biopsy is diagnostic for a plasma cell neoplasm best classified as plasma cell myeloma         3/2023 Initial Diagnosis    Bone metastases [facility])       Past Medical & Surgical Hx:   Patient Active Problem List   Diagnosis   • Monoclonal gammopathy   • Back pain   • Palliative care patient   • Unintentional weight loss   • Nicotine dependence, cigarettes, uncomplicated   • History of alcohol abuse   • Acid reflux   • Nausea & vomiting   • Loss of appetite   • Therapeutic opioid-induced constipation (OIC)   • Multiple myeloma not having achieved remission (HCC)   • Metastasis to bone [facility])   • Cancer related pain     Past Medical History:   Diagnosis Date   • Cancer ([address] )     bone-   • GERD (gastroesophageal reflux disease)      Past Surgical History:   Procedure Laterality Date   • APPENDECTOMY     • IR BIOPSY BONE MARROW  3/15/2023     Review of Medications:   Vitamins, Supplements and Herbals: No, pt denies taking supplements    Current Outpatient Medications:   •  oxyCODONE (ROXICODONE) 10 MG TABS, Take 1 tablet (10 mg total) by mouth every 4 (four) hours as needed (cancer-related pain) Max Daily Amount: 60 mg, Disp: 90 tablet, Rfl: 0  •  acetaminophen (TYLENOL) 500 mg tablet, Take 2 tablets (1,000 mg total) by mouth 3 (three) times a day, Disp: 180 tablet, Rfl: 2  •  acyclovir (ZOVIRAX) 400 MG tablet, Take 1 tablet (400 mg total) by mouth 2 (two) times a day, Disp: 60 tablet, Rfl: 11  •  dexamethasone (DECADRON) 1 mg tablet, Take 1 tablet (1 mg total) by mouth daily with breakfast, Disp: 30 tablet, Rfl: 0  •  gabapentin (Neurontin) 300 mg capsule, Take 2 capsules (600 mg total) by mouth daily at bedtime, Disp: 60 capsule, Rfl: 2  •  lenalidomide (REVLIMID) 25 MG CAPS, 25 mg daily D1-7 of a 21 day cycle  auth [id number], Disp: 7 capsule, Rfl: 3  •  naloxone (NARCAN) 4 mg/0 1 mL nasal spray, Administer 1 spray into a nostril   If no response after 2-3 minutes, give another dose in the other nostril using a new spray , Disp: 1 each, Rfl: 1  • "ondansetron (ZOFRAN) 4 mg tablet, Take 1 tablet (4 mg total) by mouth every 8 (eight) hours as needed for nausea or vomiting, Disp: 20 tablet, Rfl: 0  •  oxyCODONE HCl ER (OxyCONTIN) 30 MG T12A, Take 1 tablet (30 mg total) by mouth every 12 (twelve) hours Max Daily Amount: 60 mg, Disp: 60 tablet, Rfl: 0  •  pantoprazole (PROTONIX) 40 mg tablet, TAKE 1 TABLET BY MOUTH EVERY DAY, Disp: 90 tablet, Rfl: 0    Most Recent Lab Results:   Lab Results   Component Value Date    WBC 2 82 (L) 04/18/2023    NEUTROABS 4 73 03/15/2023    ALT 11 04/18/2023    AST 22 04/18/2023    ALB 3 0 (L) 04/18/2023    SODIUM 134 (L) 04/18/2023    SODIUM 133 (L) 04/10/2023    K 3 8 04/18/2023    K 3 7 04/10/2023     04/18/2023    BUN 12 04/18/2023    BUN 10 04/10/2023    CREATININE 0 76 04/18/2023    CREATININE 0 89 04/10/2023    EGFR 101 04/18/2023    POCGLU 113 03/07/2023    GLUF 117 (H) 04/18/2023    GLUC 117 04/18/2023    CALCIUM 9 6 04/18/2023       Anthropometric Measurements:   Height: 73\"  Ht Readings from Last 1 Encounters:   04/21/23 5' 10 5\" (1 791 m)     Wt Readings from Last 20 Encounters:   04/25/23 68 7 kg (151 lb 8 oz)   04/21/23 66 5 kg (146 lb 8 oz)   04/18/23 65 8 kg (145 lb)   04/17/23 66 1 kg (145 lb 11 2 oz)   04/14/23 68 2 kg (150 lb 5 7 oz)   04/11/23 68 6 kg (151 lb 3 8 oz)   04/05/23 69 6 kg (153 lb 8 oz)   04/05/23 69 6 kg (153 lb 8 oz)   03/29/23 69 3 kg (152 lb 12 8 oz)   03/27/23 68 5 kg (151 lb)   03/20/23 67 7 kg (149 lb 3 2 oz)   03/15/23 68 9 kg (152 lb)   03/08/23 69 4 kg (153 lb)   03/06/23 70 1 kg (154 lb 8 oz)   02/27/23 72 9 kg (160 lb 12 8 oz)   02/08/23 78 kg (172 lb)     Weight History:   Usual Weight: 230# (2020)  Home Weight: (3/15/23) 142#, (3/20/23) 144 8#  Varian: n/a  Notes: intentionally lost 30#, was maintaining 200#, then unintentioanlly lost ~40-50# since December 2022    Oncology Nutrition-Anthropometrics    Flowsheet Row Nutrition from 4/25/2023 in [facility] Oncology " Dietitian Services Nutrition from 4/5/2023 in [facility] Oncology Dietitian Services   Patient age (years): 65 years 65 years   Patient (male) height (in): 68 in 68 in   Current weight (lbs): 151 5 lbs 153 5 lbs   Current weight to be used for anthropometric calculations (kg) 68 9 kg 69 8 kg   BMI: 20 20 2   IBW male 184 lb 184 lb   IBW (kg) male 83 6 kg 83 6 kg   IBW % (male) 82 3 % 83 4 %   Adjusted BW (male): 175 9 lbs 176 4 lbs   Adjusted BW in kg (male): 80 kg 80 2 kg   % weight change after 1 week: 4 5 % 0 5 %   Weight change after 1 week (lbs) 6 5 lbs 0 7 lbs   % weight change after 1 month: 0 3 % -0 6 %   Weight change after 1 month (lbs) 0 5 lbs -1 lbs        Nutrition-Focused Physical Findings: none observed    Food/Nutrition-Related History & Client/Social History:    Current Nutrition Impact Symptoms:  [] Nausea  [x] Reduced Appetite - when having pain  -on steroid per [facility] [] Acid Reflux    [] Vomiting  [x] Unintended Wt Loss -  Last week d/t pain and skipping meals/snacks, improved this week [] Malabsorption    [] Diarrhea  [] Unintended Wt Gain  [] Dumping Syndrome    [] Constipation - eating 5 prunes daily  [] Thick Mucous/Secretions  [] Abdominal Pain    [] Dysgeusia (Altered Taste)  [] Xerostomia (Dry Mouth)  [] Gas    [] Dysosmia (Altered Smell)  [] Thrush  [] Difficulty Chewing    [] Oral Mucositis (Sore Mouth)  [] Fatigue  [] Hyperglycemia   No results found for: HGBA1C   [] Odynophagia  [] Esophagitis  [x] Other: Pain has been affecting eating    [] Dysphagia  [] Early Satiety  [] No Problems Eating      Food Allergies & Intolerances: no    Current Diet: Regular Diet, No Restrictions  Current Nutrition Intake: Unchanged from last visit  Appetite: Good (Poor when having severe pain)  Nutrition Route: PO  Activity level: ADL's, limited d/t back pain    24 Hr Diet Recall: food journal reviewed today: some meal skipping, frequent snack skipping, some new protein foods "added  Breakfast: yesterday: coffee, poptart, 5 prunes; today: 2 cupcakes, 5 prunes, coffee  Snack: none  Lunch:  (3\") Luxbrendan hoagie, potato chips  Snack: none  Dinner: 12 pkt ramen noodles with cheese  Snack: swiss roll, coffee cake    Beverages: water (32 oz x1), nuvia aide (32 oz x1), whole milk (8 oz x1), coffee, Sprite or Coke (occasionally)  Supplements:   None   Jevity 1 5 + chocolate syrup - drank 1, did not like the taste  Has Ensure and Premier protein powder at home    [facility] Nutrition from 3/6/2023 in [facility] 107 Oncology Dietitian Services   Weight type used Actual weight   Weight in kilograms (kg) used for estimated needs 70 2 kg   Energy needs formula:  35-40 kcal/kg   Energy needs based on 35 kcal/k kcal   Energy needs based on 40 kcal/k kcal   Protein needs formula: 1 5-2 g/kg   Protein needs based on 1 5 g/kg 105 g   Protein needs based on 2 g/kg 140 g   Fluid needs formula: 30-35 mL/kg   Fluid needs based on 30 mL/kg 2115 mL   Fluid needs in ounces 71 oz   Fluid needs based on 35 mL/kg 2468 mL   Fluid needs in ounces 83 oz         Discussion & Intervention:   Loretta Hernandez was evaluated today for an RD follow up regarding unintended weight loss  Loretta Hernandez is currently undergoing tx for multiple myeloma  Since last visit he has finished RT and started chemotherapy  He is doing well overall but is having pain which will affect his eating when severe  He is trying to increase his protein intake and has tried different oral nutrition supplements  A family member bought him Jevity 1 5 which he tried but needed to add chocolate syrup to because he did not prefer the taste  He lost some wt last week but was able to regain it this week  Reviewed 24 hour recall, which revealed an suboptimal po intake, and discussed ways to increase kcal, protein, and fluid intakes and optimize nutrient intake    Also reviewed the importance " of wt management throughout the tx process and the role of a high kcal/ high protein diet in managing wt and overall health  Based on today's assessment, discussion included: MNT for: wt regain, a high kcal/protein diet & food choices to include at all meals & snacks (Examples of high kcal foods: cheese, full-fat dairy products, nut butter, plant-based fats, coconut oil/milk, avocado, butter, cream soup, etc  Examples of high protein foods: eggs, chicken, fish, beans/legumes, nuts/nut butters, bone broth, etc ) , fortifying foods for added kcal and protein (examples include: adding cheese to foods such as eggs, mashed potatoes, casseroles, etc ; Making oatmeal with whole milk rather than water; Making fortified mashed potatoes with cream, butter, dry milk powder, plain [place] yogurt, and cheese ), adequate hydration & fluid choices, sipping on calorie containing beverages (examples include: adding whole milk or cream to coffee, oral nutrition supplements, juice, electrolyte replacement beverages, milk, etc ), eating smaller more frequent meals every 2-3 hours (5-6 small meals/day), eating when feeling most hungry, utilizing oral nutrition supplements, recipe suggestions/resources, trying new recipes, & cooking at home more often and adding extra fat to foods while cooking such as butter, plant-based oil, coconut oil/milk, avocado, nut butters, etc    Moving forward, Teresomomo Angnt was encouraged to increase kcal, protein, and fluid intakes  Materials Provided: Ensure samples  All questions and concerns addressed during today’s visit  Tereso Lopez has RD contact information  Nutrition Diagnosis:   Inadequate Energy Intake related to physiological causes, disease state and treatment related issues as evidenced by food recall, wt loss and discussion with pt and/or family    Increased Nutrient Needs (kcal & pro) related to increased demand for nutrients and disease state as evidenced by cancer dx and pt undergoing tx for "cancer  Monitoring & Evaluation:   Goals:  weight maintenance/stabilization  adequate nutrition impact symptom management  pt to meet >/=75% estimated nutrition needs daily    Progress Towards Goals: Progressing    Nutrition Rx & Recommendations:  Diet: High Calorie, High Protein (for high calorie foods see pages 52-53, and for high protein foods see pages 49-51 in your Eating Hints book)  Small, frequent meals/snacks may be easier to tolerate than 3 large daily meals  Aim for 5-6 small meals per day (every 2-3 hours)  Include protein at all meals/snacks  For appetite loss: try powdered or liquid nutrition supplements; eating by the clock every 2-3 hours; set a timer to remind yourself to eat, keep snacks nearby; add extra protein & calories to your diet; drink liquids in between meals, choose liquids that add calories; eat a bedtime snack; eat soft, cool or frozen foods; eat a larger meal when you feel well & rested; only sip small amounts of liquids during meals (see pages 10-12 in your Eating Hints book)  For weight loss: monitor your weight at home at least 2x/week, record your weight, start by adding 250-500 extra calories per day, eat 5-6 small scheduled meals every 2-3 hrs, choose foods that are high in protein and calories (see pages 49-53 in your Eating Hints book), drink liquids with calories for example: milkshakes/smoothies/juice/soup/whole milk/chocolate milk, cook with protein fortified milk (see recipe on page 36 in your Eating Hints book), consider ready-to-drink oral nutrition supplements such as Ensure Plus, Ensure Enlive, Boost Plus, or Boost Very High Calorie, avoid \"diet\" and \"light\" foods when possible, avoid drinking too much with meals, contact your dietitian with any continued weight loss over the course of 1 week  For more info see pages 35-37 in your Eating Hints book  Weigh yourself regularly  If you notice weight loss, make an effort to increase your daily food/calorie intake   If " you continue to notice loss after these efforts, reach out to your dietitian to establish a plan to stabilize weight     Optimize your intake leading up to your infusions  Try to increase your protein intake, include a protein source at all meals/snacks (meat, poultry, fish, eggs, cheese, yogurt, milk, nuts, beans, lentils, etc )  Nutrition Supplements: drink 1/2 bottle daily as a snack  Avoid meal and snack skipping    Follow Up Plan: 5/9 during infusion  Recommend Referral to Other Providers: none at this time

## 2023-04-18 ENCOUNTER — HOSPITAL ENCOUNTER (OUTPATIENT)
Dept: INFUSION CENTER | Facility: CLINIC | Age: 58
Discharge: HOME | End: 2023-04-18

## 2023-04-18 ENCOUNTER — HOSPITAL ENCOUNTER (OUTPATIENT)
Dept: INFUSION CENTER | Facility: HOSPITAL | Age: 58
Discharge: HOME | End: 2023-04-18
Attending: INTERNAL MEDICINE

## 2023-04-18 VITALS
DIASTOLIC BLOOD PRESSURE: 70 MMHG | BODY MASS INDEX: 20.3 KG/M2 | HEART RATE: 98 BPM | HEIGHT: 71 IN | TEMPERATURE: 99.3 F | SYSTOLIC BLOOD PRESSURE: 106 MMHG | OXYGEN SATURATION: 99 % | RESPIRATION RATE: 18 BRPM | WEIGHT: 145 LBS

## 2023-04-18 DIAGNOSIS — C90.00 MULTIPLE MYELOMA NOT HAVING ACHIEVED REMISSION (HCC): Primary | ICD-10-CM

## 2023-04-18 LAB
ALBUMIN SERPL BCP-MCNC: 3 G/DL (ref 3.5–5)
ALP SERPL-CCNC: 258 U/L (ref 34–104)
ALT SERPL W P-5'-P-CCNC: 11 U/L (ref 7–52)
ANION GAP SERPL CALCULATED.3IONS-SCNC: 7 MMOL/L (ref 4–13)
AST SERPL W P-5'-P-CCNC: 22 U/L (ref 13–39)
BASOPHILS # BLD MANUAL: 0 THOUSAND/UL (ref 0–0.1)
BASOPHILS NFR MAR MANUAL: 0 % (ref 0–1)
BILIRUB SERPL-MCNC: 0.48 MG/DL (ref 0.2–1)
BUN SERPL-MCNC: 12 MG/DL (ref 5–25)
CALCIUM ALBUM COR SERPL-MCNC: 10.4 MG/DL (ref 8.3–10.1)
CALCIUM SERPL-MCNC: 9.6 MG/DL (ref 8.4–10.2)
CHLORIDE SERPL-SCNC: 103 MMOL/L (ref 96–108)
CO2 SERPL-SCNC: 24 MMOL/L (ref 21–32)
CREAT SERPL-MCNC: 0.76 MG/DL (ref 0.6–1.3)
EOSINOPHIL # BLD MANUAL: 0.06 THOUSAND/UL (ref 0–0.4)
EOSINOPHIL NFR BLD MANUAL: 2 % (ref 0–6)
ERYTHROCYTE [DISTWIDTH] IN BLOOD BY AUTOMATED COUNT: 15.5 % (ref 11.6–15.1)
GFR SERPL CREATININE-BSD FRML MDRD: 101 ML/MIN/1.73SQ M
GLUCOSE P FAST SERPL-MCNC: 117 MG/DL (ref 65–99)
GLUCOSE SERPL-MCNC: 117 MG/DL (ref 65–140)
HCT VFR BLD AUTO: 34.3 % (ref 36.5–49.3)
HGB BLD-MCNC: 11.4 G/DL (ref 12–17)
LYMPHOCYTES # BLD AUTO: 0.28 THOUSAND/UL (ref 0.6–4.47)
LYMPHOCYTES # BLD AUTO: 10 % (ref 14–44)
MCH RBC QN AUTO: 32.8 PG (ref 26.8–34.3)
MCHC RBC AUTO-ENTMCNC: 33.2 G/DL (ref 31.4–37.4)
MCV RBC AUTO: 99 FL (ref 82–98)
MONOCYTES # BLD AUTO: 0.31 THOUSAND/UL (ref 0–1.22)
MONOCYTES NFR BLD: 11 % (ref 4–12)
NEUTROPHILS # BLD MANUAL: 2.17 THOUSAND/UL (ref 1.85–7.62)
NEUTS SEG NFR BLD AUTO: 77 % (ref 43–75)
PLATELET # BLD AUTO: 142 THOUSANDS/UL (ref 149–390)
PLATELET BLD QL SMEAR: ABNORMAL
PMV BLD AUTO: 8.6 FL (ref 8.9–12.7)
POTASSIUM SERPL-SCNC: 3.8 MMOL/L (ref 3.5–5.3)
PROT SERPL-MCNC: 9.4 G/DL (ref 6.4–8.4)
RBC # BLD AUTO: 3.48 MILLION/UL (ref 3.88–5.62)
RBC MORPH BLD: NORMAL
SODIUM SERPL-SCNC: 134 MMOL/L (ref 135–147)
WBC # BLD AUTO: 2.82 THOUSAND/UL (ref 4.31–10.16)

## 2023-04-18 RX ORDER — DEXAMETHASONE 4 MG/1
40 TABLET ORAL ONCE
Status: COMPLETED | OUTPATIENT
Start: 2023-04-18 | End: 2023-04-18

## 2023-04-18 RX ADMIN — DEXAMETHASONE 40 MG: 4 TABLET ORAL at 09:12

## 2023-04-18 RX ADMIN — BORTEZOMIB 2.5 MG: 1 INJECTION, POWDER, LYOPHILIZED, FOR SOLUTION INTRAVENOUS; SUBCUTANEOUS at 09:45

## 2023-04-18 NOTE — PROGRESS NOTES
Patient is here for cycle 1 day 8 of velcade  Labs draw per dr 's orders and will wait for results  Patient complains of sternal pain which started 2 weeks ago  5/10 pain  He complains of back pain when moving and states that has been since November  Dr's are aware   He is taking ocycodone for the pain

## 2023-04-18 NOTE — PROGRESS NOTES
Patient due for cycle 1 day 8 Vecade today  Patient has blood work ordered for today  No blood work drawn  Subsequent cycles only have blood work ordered for Day 1 of each cycle  Messaged Julian Munoz RN in Dr Abi Solomon office for clarification  Per Julian Munoz, per Dr Emmanuel Creedward draw STAT CBC and CMP today  Can proceed with just CBC results

## 2023-04-21 ENCOUNTER — HOSPITAL ENCOUNTER (OUTPATIENT)
Dept: INFUSION CENTER | Facility: CLINIC | Age: 58
Discharge: HOME | End: 2023-04-21

## 2023-04-21 VITALS
WEIGHT: 146.5 LBS | OXYGEN SATURATION: 94 % | DIASTOLIC BLOOD PRESSURE: 78 MMHG | SYSTOLIC BLOOD PRESSURE: 119 MMHG | RESPIRATION RATE: 18 BRPM | TEMPERATURE: 98.6 F | BODY MASS INDEX: 20.51 KG/M2 | HEIGHT: 71 IN

## 2023-04-21 DIAGNOSIS — C90.00 MULTIPLE MYELOMA NOT HAVING ACHIEVED REMISSION (HCC): Primary | ICD-10-CM

## 2023-04-21 RX ADMIN — BORTEZOMIB 2.5 MG: 1 INJECTION, POWDER, LYOPHILIZED, FOR SOLUTION INTRAVENOUS; SUBCUTANEOUS at 08:55

## 2023-04-21 NOTE — PROGRESS NOTES
Pt tolerated velcade injection to right abd well, no adverse reaction noted  Pt offered AVS, declined as they have a print out already and told me there next appt date and time

## 2023-04-21 NOTE — PROGRESS NOTES
Pt here for velcade injection  Pt offered no complaints today  Labs 4/18 reviewed  Pt resting in chair comfortably with call bell in place

## 2023-04-24 ENCOUNTER — TELEPHONE (OUTPATIENT)
Dept: NUTRITION | Facility: CLINIC | Age: 58
End: 2023-04-24

## 2023-04-24 NOTE — TELEPHONE ENCOUNTER
Contacted Fredy MITCHELL to confirm tomorrows appointment  Spoke with Gaby Potter and appointment was confirmed

## 2023-04-25 ENCOUNTER — TELEPHONE (OUTPATIENT)
Dept: HEMATOLOGY ONCOLOGY | Facility: CLINIC | Age: 58
End: 2023-04-25

## 2023-04-25 ENCOUNTER — TELEPHONE (OUTPATIENT)
Dept: SURGICAL ONCOLOGY | Facility: CLINIC | Age: 58
End: 2023-04-25

## 2023-04-25 ENCOUNTER — NUTRITION (OUTPATIENT)
Dept: NUTRITION | Facility: CLINIC | Age: 58
End: 2023-04-25

## 2023-04-25 VITALS — WEIGHT: 151.5 LBS | BODY MASS INDEX: 21.43 KG/M2

## 2023-04-25 DIAGNOSIS — Z71.3 NUTRITIONAL COUNSELING: Primary | ICD-10-CM

## 2023-04-25 NOTE — TELEPHONE ENCOUNTER
Velcade assistance was no longer needed so I called [facility] and spoke with Kennedy Hazard 04/24/2023 at [phone] to cancel the request for Velcade

## 2023-04-25 NOTE — TELEPHONE ENCOUNTER
LVM to the patient in regards to some questions I have in regards to filling out his disability paperwork  Informed patient to call the office back at [phone]

## 2023-04-25 NOTE — PATIENT INSTRUCTIONS
"Nutrition Rx & Recommendations:  Diet: High Calorie, High Protein (for high calorie foods see pages 52-53, and for high protein foods see pages 49-51 in your Eating Hints book)  Small, frequent meals/snacks may be easier to tolerate than 3 large daily meals  Aim for 5-6 small meals per day (every 2-3 hours)  Include protein at all meals/snacks  For appetite loss: try powdered or liquid nutrition supplements; eating by the clock every 2-3 hours; set a timer to remind yourself to eat, keep snacks nearby; add extra protein & calories to your diet; drink liquids in between meals, choose liquids that add calories; eat a bedtime snack; eat soft, cool or frozen foods; eat a larger meal when you feel well & rested; only sip small amounts of liquids during meals (see pages 10-12 in your Eating Hints book)  For weight loss: monitor your weight at home at least 2x/week, record your weight, start by adding 250-500 extra calories per day, eat 5-6 small scheduled meals every 2-3 hrs, choose foods that are high in protein and calories (see pages 49-53 in your Eating Hints book), drink liquids with calories for example: milkshakes/smoothies/juice/soup/whole milk/chocolate milk, cook with protein fortified milk (see recipe on page 36 in your Eating Hints book), consider ready-to-drink oral nutrition supplements such as Ensure Plus, Ensure Enlive, Boost Plus, or Boost Very High Calorie, avoid \"diet\" and \"light\" foods when possible, avoid drinking too much with meals, contact your dietitian with any continued weight loss over the course of 1 week  For more info see pages 35-37 in your Eating Hints book  Weigh yourself regularly  If you notice weight loss, make an effort to increase your daily food/calorie intake  If you continue to notice loss after these efforts, reach out to your dietitian to establish a plan to stabilize weight     Optimize your intake leading up to your infusions  Try to increase your protein intake, include " a protein source at all meals/snacks (meat, poultry, fish, eggs, cheese, yogurt, milk, nuts, beans, lentils, etc )  Nutrition Supplements: drink 1/2 bottle daily as a snack  Avoid meal and snack skipping    Follow Up Plan: 5/9 during infusion  Recommend Referral to Other Providers: none at this time

## 2023-04-25 NOTE — TELEPHONE ENCOUNTER
Per oral chemo team:  4-24-23  Rcvd notice from [address] [place] [address]   REMS correction verified and patients file has been transferred to free drug program  Must allow 48-72 hrs for final determination    Patient aware of status via voicemail  Patient has my TEAMs number for contact

## 2023-04-27 ENCOUNTER — TELEPHONE (OUTPATIENT)
Dept: HEMATOLOGY ONCOLOGY | Facility: CLINIC | Age: 58
End: 2023-04-27

## 2023-04-27 RX ORDER — DEXAMETHASONE 4 MG/1
40 TABLET ORAL ONCE
Start: 2023-05-09

## 2023-04-27 RX ORDER — DEXAMETHASONE 4 MG/1
40 TABLET ORAL ONCE
Status: CANCELLED
Start: 2023-05-02

## 2023-04-27 NOTE — TELEPHONE ENCOUNTER
I spoke with patient in regards to his Disability paperwork  Patient answered some questions that were on the paperwork  Informed patient that it will be ready tomorrow and he can either pick it up then or on Monday  Patient states he will pick it up on Monday

## 2023-04-27 NOTE — TELEPHONE ENCOUNTER
Patient Call    Who are you speaking with? wife   If it is not the patient, are they listed on an active communication consent form? yes   What is the reason for this call? Returning call about disability   Does this require a call back? yes   If a call back is required, please list best call back number [phone]   If a call back is required, advise that a message will be forwarded to their care team and someone will return their call as soon as possible  Did you relay this information to the patient?  yes

## 2023-04-28 DIAGNOSIS — C90.00 MULTIPLE MYELOMA NOT HAVING ACHIEVED REMISSION (HCC): ICD-10-CM

## 2023-04-28 RX ORDER — LENALIDOMIDE 25 MG/1
CAPSULE ORAL
Qty: 7 CAPSULE | Refills: 0 | Status: SHIPPED | OUTPATIENT
Start: 2023-04-28 | End: 2023-05-03 | Stop reason: SDUPTHER

## 2023-05-01 ENCOUNTER — APPOINTMENT (OUTPATIENT)
Dept: LAB | Facility: HOSPITAL | Age: 58
End: 2023-05-01
Attending: INTERNAL MEDICINE

## 2023-05-01 DIAGNOSIS — C90.00 MULTIPLE MYELOMA NOT HAVING ACHIEVED REMISSION (HCC): ICD-10-CM

## 2023-05-01 LAB
ALBUMIN SERPL BCP-MCNC: 2.8 G/DL (ref 3.5–5)
ALP SERPL-CCNC: 326 U/L (ref 34–104)
ALT SERPL W P-5'-P-CCNC: 19 U/L (ref 7–52)
ANION GAP SERPL CALCULATED.3IONS-SCNC: 7 MMOL/L (ref 4–13)
AST SERPL W P-5'-P-CCNC: 28 U/L (ref 13–39)
BASOPHILS # BLD AUTO: 0 THOUSANDS/ΜL (ref 0–0.1)
BASOPHILS NFR BLD AUTO: 0 % (ref 0–1)
BILIRUB SERPL-MCNC: 0.37 MG/DL (ref 0.2–1)
BUN SERPL-MCNC: 8 MG/DL (ref 5–25)
CALCIUM ALBUM COR SERPL-MCNC: 9.5 MG/DL (ref 8.3–10.1)
CALCIUM SERPL-MCNC: 8.5 MG/DL (ref 8.4–10.2)
CHLORIDE SERPL-SCNC: 102 MMOL/L (ref 96–108)
CO2 SERPL-SCNC: 23 MMOL/L (ref 21–32)
CREAT SERPL-MCNC: 0.68 MG/DL (ref 0.6–1.3)
EOSINOPHIL # BLD AUTO: 0.02 THOUSAND/ΜL (ref 0–0.61)
EOSINOPHIL NFR BLD AUTO: 0 % (ref 0–6)
ERYTHROCYTE [DISTWIDTH] IN BLOOD BY AUTOMATED COUNT: 17.2 % (ref 11.6–15.1)
GFR SERPL CREATININE-BSD FRML MDRD: 106 ML/MIN/1.73SQ M
GLUCOSE SERPL-MCNC: 128 MG/DL (ref 65–140)
HCT VFR BLD AUTO: 31.5 % (ref 36.5–49.3)
HGB BLD-MCNC: 10.3 G/DL (ref 12–17)
IMM GRANULOCYTES # BLD AUTO: 0.07 THOUSAND/UL (ref 0–0.2)
IMM GRANULOCYTES NFR BLD AUTO: 1 % (ref 0–2)
LYMPHOCYTES # BLD AUTO: 0.22 THOUSANDS/ΜL (ref 0.6–4.47)
LYMPHOCYTES NFR BLD AUTO: 4 % (ref 14–44)
MCH RBC QN AUTO: 32.8 PG (ref 26.8–34.3)
MCHC RBC AUTO-ENTMCNC: 32.7 G/DL (ref 31.4–37.4)
MCV RBC AUTO: 100 FL (ref 82–98)
MONOCYTES # BLD AUTO: 0.48 THOUSAND/ΜL (ref 0.17–1.22)
MONOCYTES NFR BLD AUTO: 9 % (ref 4–12)
NEUTROPHILS # BLD AUTO: 4.74 THOUSANDS/ΜL (ref 1.85–7.62)
NEUTS SEG NFR BLD AUTO: 86 % (ref 43–75)
NRBC BLD AUTO-RTO: 1 /100 WBCS
PLATELET # BLD AUTO: 165 THOUSANDS/UL (ref 149–390)
PMV BLD AUTO: 8.8 FL (ref 8.9–12.7)
POTASSIUM SERPL-SCNC: 4 MMOL/L (ref 3.5–5.3)
PROT SERPL-MCNC: 8.6 G/DL (ref 6.4–8.4)
RBC # BLD AUTO: 3.14 MILLION/UL (ref 3.88–5.62)
SODIUM SERPL-SCNC: 132 MMOL/L (ref 135–147)
WBC # BLD AUTO: 5.53 THOUSAND/UL (ref 4.31–10.16)

## 2023-05-02 ENCOUNTER — HOSPITAL ENCOUNTER (OUTPATIENT)
Dept: INFUSION CENTER | Facility: CLINIC | Age: 58
Discharge: HOME | End: 2023-05-02

## 2023-05-02 ENCOUNTER — HOSPITAL ENCOUNTER (OUTPATIENT)
Dept: INFUSION CENTER | Facility: HOSPITAL | Age: 58
End: 2023-05-02
Attending: INTERNAL MEDICINE

## 2023-05-02 ENCOUNTER — TELEPHONE (OUTPATIENT)
Dept: HEMATOLOGY ONCOLOGY | Facility: CLINIC | Age: 58
End: 2023-05-02

## 2023-05-02 VITALS
RESPIRATION RATE: 18 BRPM | SYSTOLIC BLOOD PRESSURE: 115 MMHG | OXYGEN SATURATION: 97 % | HEIGHT: 71 IN | TEMPERATURE: 98.3 F | DIASTOLIC BLOOD PRESSURE: 74 MMHG | WEIGHT: 149.5 LBS | HEART RATE: 93 BPM | BODY MASS INDEX: 20.93 KG/M2

## 2023-05-02 DIAGNOSIS — C90.00 MULTIPLE MYELOMA NOT HAVING ACHIEVED REMISSION (HCC): Primary | ICD-10-CM

## 2023-05-02 RX ORDER — DEXAMETHASONE 4 MG/1
40 TABLET ORAL ONCE
Status: COMPLETED | OUTPATIENT
Start: 2023-05-02 | End: 2023-05-02

## 2023-05-02 RX ADMIN — DEXAMETHASONE 40 MG: 4 TABLET ORAL at 08:47

## 2023-05-02 RX ADMIN — BORTEZOMIB 2.5 MG: 1 INJECTION, POWDER, LYOPHILIZED, FOR SOLUTION INTRAVENOUS; SUBCUTANEOUS at 09:17

## 2023-05-02 NOTE — TELEPHONE ENCOUNTER
Patient Call    Who are you speaking with? Anisha Whitney from [address] [place] [address]    If it is not the patient, are they listed on an active communication consent form? N/A   What is the reason for this call? Anisha Whitney calling to confirm whether or not patient has received his first dispense of revlimid at this time and/or when he is expected to begin treatment  Does this require a call back? Yes   If a call back is required, please list best call back number [phone]   If a call back is required, advise that a message will be forwarded to their care team and someone will return their call as soon as possible  Did you relay this information to the patient?  Yes

## 2023-05-03 ENCOUNTER — TELEPHONE (OUTPATIENT)
Dept: HEMATOLOGY ONCOLOGY | Facility: CLINIC | Age: 58
End: 2023-05-03

## 2023-05-03 DIAGNOSIS — C90.00 MULTIPLE MYELOMA NOT HAVING ACHIEVED REMISSION (HCC): ICD-10-CM

## 2023-05-03 RX ORDER — LENALIDOMIDE 25 MG/1
CAPSULE ORAL
Qty: 7 CAPSULE | Refills: 0 | Status: SHIPPED | OUTPATIENT
Start: 2023-05-03

## 2023-05-03 NOTE — TELEPHONE ENCOUNTER
"Received voicemail: \"Hi, this is Dawson Shafer by Mendy Martinez  We are needing some and you [employer] authorization number for a patient Marlon Hou AM last name LE date of birth September 16, 1965  This is in regards to Revlimid  If we could please get a call back here, Phone number is [phone]  Again that's [phone] option 3  Thank you  \"    Returned phone call to [place] to provide them with new authorization code for patient's Revlimid  Auth # [id number]    No other questions or concerns at this moment        "

## 2023-05-04 ENCOUNTER — TELEPHONE (OUTPATIENT)
Dept: HEMATOLOGY ONCOLOGY | Facility: CLINIC | Age: 58
End: 2023-05-04

## 2023-05-04 NOTE — TELEPHONE ENCOUNTER
Spoke with pt regarding new start Revlimid  Pt notified he was approved for free drug program      Consent uploaded: yes    Confirmed drug, dose and schedule: Revlimid 25 mg days 1-7 every 21 days    Start date: awaiting shipment; will confirm start date with RN    Adherence: no barriers identified during this call; discussed no cutting or crushing med and to take with or without food at same time each day    Med storage discussed: discussed cool dry place away from pets or children    Confirmed pt reviewed [employer] med sheet: mailed to pt per his request     Side effect discussion: side effect profile reviewed  Advised pt may want to have Imodium on hand  Pt knows how to refill med: deferred     Pt has contact info for office RN, Glo and Oral Chemo Coordinator: Pt requested these be included on the [employer] printout that is mailed to him    Other topics discussed:     Pt aware of follow up appt/labs/testing: follow up appt with Dr Av Dunbar 5/12  Labs orders with Velcade  Plan for next call: Confirm start date  Next outreach approx  7 days after starting med

## 2023-05-04 NOTE — TELEPHONE ENCOUNTER
Left voicemail for pt requesting call back to discuss Revlimid  Provided my direct line and office hours

## 2023-05-05 ENCOUNTER — HOSPITAL ENCOUNTER (OUTPATIENT)
Dept: INFUSION CENTER | Facility: CLINIC | Age: 58
End: 2023-05-05

## 2023-05-05 VITALS
HEART RATE: 91 BPM | SYSTOLIC BLOOD PRESSURE: 122 MMHG | HEIGHT: 71 IN | WEIGHT: 150 LBS | TEMPERATURE: 97 F | BODY MASS INDEX: 21 KG/M2 | DIASTOLIC BLOOD PRESSURE: 72 MMHG | OXYGEN SATURATION: 97 % | RESPIRATION RATE: 16 BRPM

## 2023-05-05 DIAGNOSIS — G89.3 CANCER RELATED PAIN: ICD-10-CM

## 2023-05-05 DIAGNOSIS — M54.9 BACK PAIN: ICD-10-CM

## 2023-05-05 DIAGNOSIS — Z51.5 PALLIATIVE CARE PATIENT: ICD-10-CM

## 2023-05-05 DIAGNOSIS — C90.00 MULTIPLE MYELOMA NOT HAVING ACHIEVED REMISSION (HCC): Primary | ICD-10-CM

## 2023-05-05 DIAGNOSIS — C79.51 METASTASIS TO BONE (HCC): ICD-10-CM

## 2023-05-05 DIAGNOSIS — C90.00 MULTIPLE MYELOMA NOT HAVING ACHIEVED REMISSION (HCC): ICD-10-CM

## 2023-05-05 RX ORDER — OXYCODONE HYDROCHLORIDE 10 MG/1
10 TABLET ORAL EVERY 4 HOURS PRN
Qty: 90 TABLET | Refills: 0 | Status: SHIPPED | OUTPATIENT
Start: 2023-05-05

## 2023-05-05 RX ADMIN — BORTEZOMIB 2.5 MG: 1 INJECTION, POWDER, LYOPHILIZED, FOR SOLUTION INTRAVENOUS; SUBCUTANEOUS at 09:48

## 2023-05-05 NOTE — PROGRESS NOTES
Patient tolerated Velcade injection to right abdomen, tolerated without incident  Next appointment confirmed  AVS offered and declined

## 2023-05-05 NOTE — TELEPHONE ENCOUNTER
Primary palliative medicine provider:   Dr Yaw Muniz    Last appointment:  4/5/2023  Next scheduled appointment:  5/9/2023    Pharmacy: [facility]  [address]  [place]

## 2023-05-05 NOTE — PROGRESS NOTES
"Patient presents today for day 4 Velcade  Patient complains of right shoulder pain  Patient states he \"tweaked\" his shoulder while working at home  Relieved with his pain medication  No lab parameters on plan     "

## 2023-05-07 DIAGNOSIS — G89.3 CANCER RELATED PAIN: ICD-10-CM

## 2023-05-07 DIAGNOSIS — M54.9 BACK PAIN: ICD-10-CM

## 2023-05-07 DIAGNOSIS — C90.00 MULTIPLE MYELOMA NOT HAVING ACHIEVED REMISSION (HCC): ICD-10-CM

## 2023-05-07 DIAGNOSIS — R11.2 NAUSEA & VOMITING: ICD-10-CM

## 2023-05-07 DIAGNOSIS — C79.51 METASTASIS TO BONE (HCC): ICD-10-CM

## 2023-05-07 DIAGNOSIS — R63.4 UNINTENTIONAL WEIGHT LOSS: ICD-10-CM

## 2023-05-07 DIAGNOSIS — R63.0 LOSS OF APPETITE: ICD-10-CM

## 2023-05-07 DIAGNOSIS — Z51.5 PALLIATIVE CARE PATIENT: ICD-10-CM

## 2023-05-08 RX ORDER — DEXAMETHASONE 1 MG
TABLET ORAL
Qty: 30 TABLET | Refills: 0 | Status: SHIPPED | OUTPATIENT
Start: 2023-05-08

## 2023-05-09 ENCOUNTER — HOSPITAL ENCOUNTER (OUTPATIENT)
Dept: INFUSION CENTER | Facility: CLINIC | Age: 58
Discharge: HOME | End: 2023-05-09

## 2023-05-09 ENCOUNTER — TELEPHONE (OUTPATIENT)
Dept: HEMATOLOGY ONCOLOGY | Facility: CLINIC | Age: 58
End: 2023-05-09

## 2023-05-09 ENCOUNTER — TELEPHONE (OUTPATIENT)
Age: 58
End: 2023-05-09

## 2023-05-09 VITALS
OXYGEN SATURATION: 96 % | SYSTOLIC BLOOD PRESSURE: 121 MMHG | TEMPERATURE: 97.8 F | BODY MASS INDEX: 20.62 KG/M2 | WEIGHT: 144 LBS | DIASTOLIC BLOOD PRESSURE: 74 MMHG | HEIGHT: 70 IN | RESPIRATION RATE: 18 BRPM | HEART RATE: 96 BPM

## 2023-05-09 DIAGNOSIS — C90.00 MULTIPLE MYELOMA NOT HAVING ACHIEVED REMISSION (HCC): Primary | ICD-10-CM

## 2023-05-09 RX ORDER — DEXAMETHASONE 4 MG/1
40 TABLET ORAL ONCE
Status: COMPLETED | OUTPATIENT
Start: 2023-05-09 | End: 2023-05-09

## 2023-05-09 RX ADMIN — DEXAMETHASONE 40 MG: 4 TABLET ORAL at 08:14

## 2023-05-09 RX ADMIN — BORTEZOMIB 2.5 MG: 1 INJECTION, POWDER, LYOPHILIZED, FOR SOLUTION INTRAVENOUS; SUBCUTANEOUS at 08:45

## 2023-05-09 NOTE — TELEPHONE ENCOUNTER
Spoke with pt's wife who is requesting transportation for 5/12 follow up appt with Dr Colin Camejo  She reports pt is no longer able to drive  Per wife they have not yet signed up for [facility]  Advised I will request office call back - please call [phone]

## 2023-05-09 NOTE — TELEPHONE ENCOUNTER
I spoke with patient and reviewed [facility] [employer] paperwork  Patient understood and gave his consent  Informed patient that I have signed on his behalf and when he comes into his appt on Friday 5/12/23 I will have him sign the paperwork so I can resend to have on file  Patient states understanding

## 2023-05-09 NOTE — PROGRESS NOTES
Patient tolerated Velcade injection into left lower abdomen today without complications   Patient confirmed next appointment and declined AVS

## 2023-05-09 NOTE — TELEPHONE ENCOUNTER
Spoke with Oncology DAVY Brandon who confirmed she received request today to complete [facility] application for pt  She will be reaching out to pt/family to complete

## 2023-05-09 NOTE — TELEPHONE ENCOUNTER
Left voicemail for pt asking to confirm he he received/started Revlimid yet  Provided my direct line

## 2023-05-09 NOTE — TELEPHONE ENCOUNTER
I spoke with the patients wife and informed her that the patient is now set up for [facility] for his appointments on 5/12/23  Maria Eugenia states understanding

## 2023-05-09 NOTE — TELEPHONE ENCOUNTER
Patient called office stating he is unable to come to appointment tomorrow due to transportation  He is not able to drive  He is having increase  pain in back today  Patient does not have virtual capabilities  We will need to reschedule apptointment  Spoke to patient and spouse about possibly getting set up with [facility]  They were interested in this  Pt called office regarding back pain  Pt was asked the following questions to get a better understanding of their concern  WHERE is the pain:  Upper back area  WHAT kind of pain? (Burning/Sharp/Aching/Dull): What relieves the pain? HOW are you taking your pain medication:     10:00am - 2 Oxycodone 10mg tablets    2:00pm - 1 Oxycodone 10mg tablet with 1 extra strength Tylenol tablet    6:00pm -1 Oxycodone 10mg tablet with 1 extra strength Tylenol tablet  10:00pm -  2 Oxycodone 10mg tablet     2:00am - 2 capsules Gabapentin 300mg      Patient stated he did not  Oxycodone 30mg 12 hour tablets due to cost of medication  It looks like Janine Arciniega (nurse) was in process of trying to pursue a prior authorization

## 2023-05-09 NOTE — TELEPHONE ENCOUNTER
"Called patient and his wife  Patient reports his pain has improved somewhat but that overall he is having a \"bad day\" in terms of pain  They received his new Medicaid card today  Laid out three plans to move forward:  1  Take the Medicaid card to the pharmacy and ask them to run the oxyIR 30mg Rx again; if affordable continue with that  2  If cost prohibitive with new insurance let us know and we can instead prescribe MSER 30mg q12h ATC instead of oxyER (OME is lower but this is a fair dose to start at to assess tolerance and response)  GoodRx cost without insurance at [facility] is $30 73   3  Alternately can try fentanyl patch 25mcg/hr  In all cases would continue oxyIR for breakthrough pain, continue gabapentin  They are amenable to any alternative but will try path 1 first     Radha Luke re: potential prior auths            "

## 2023-05-09 NOTE — TELEPHONE ENCOUNTER
LVM to patient to give office a call back to go over [employer] paperwork so we can get him set up for his appt

## 2023-05-10 ENCOUNTER — TELEPHONE (OUTPATIENT)
Dept: OTHER | Facility: OTHER | Age: 58
End: 2023-05-10

## 2023-05-10 NOTE — TELEPHONE ENCOUNTER
Pt gf states plan A did not work so they would need to go with plan B     Please give her a call back

## 2023-05-11 ENCOUNTER — APPOINTMENT (EMERGENCY)
Dept: CT IMAGING | Facility: HOSPITAL | Age: 58
End: 2023-05-11

## 2023-05-11 ENCOUNTER — APPOINTMENT (EMERGENCY)
Dept: RADIOLOGY | Facility: HOSPITAL | Age: 58
End: 2023-05-11

## 2023-05-11 ENCOUNTER — HOSPITAL ENCOUNTER (EMERGENCY)
Facility: HOSPITAL | Age: 58
Discharge: HOME | End: 2023-05-11
Attending: EMERGENCY MEDICINE

## 2023-05-11 ENCOUNTER — TELEPHONE (OUTPATIENT)
Dept: PALLIATIVE MEDICINE | Facility: CLINIC | Age: 58
End: 2023-05-11

## 2023-05-11 VITALS
BODY MASS INDEX: 20.38 KG/M2 | SYSTOLIC BLOOD PRESSURE: 135 MMHG | OXYGEN SATURATION: 98 % | HEART RATE: 118 BPM | RESPIRATION RATE: 20 BRPM | WEIGHT: 143.96 LBS | DIASTOLIC BLOOD PRESSURE: 86 MMHG | TEMPERATURE: 98.3 F

## 2023-05-11 DIAGNOSIS — G89.3 CANCER RELATED PAIN: ICD-10-CM

## 2023-05-11 DIAGNOSIS — M54.9 BACK PAIN: ICD-10-CM

## 2023-05-11 DIAGNOSIS — C90.00 MULTIPLE MYELOMA NOT HAVING ACHIEVED REMISSION (HCC): Primary | ICD-10-CM

## 2023-05-11 DIAGNOSIS — C79.51 METASTASIS TO BONE (HCC): ICD-10-CM

## 2023-05-11 DIAGNOSIS — R07.9 CHEST PAIN, UNSPECIFIED TYPE: Primary | ICD-10-CM

## 2023-05-11 DIAGNOSIS — C90.00 MULTIPLE MYELOMA (HCC): ICD-10-CM

## 2023-05-11 DIAGNOSIS — Z51.5 PALLIATIVE CARE PATIENT: ICD-10-CM

## 2023-05-11 LAB
2HR DELTA HS TROPONIN: 0 NG/L
ALBUMIN SERPL BCP-MCNC: 2.7 G/DL (ref 3.5–5)
ALP SERPL-CCNC: 302 U/L (ref 34–104)
ALT SERPL W P-5'-P-CCNC: 9 U/L (ref 7–52)
ANION GAP SERPL CALCULATED.3IONS-SCNC: 8 MMOL/L (ref 4–13)
AST SERPL W P-5'-P-CCNC: 41 U/L (ref 13–39)
ATRIAL RATE: 127 BPM
BASOPHILS # BLD AUTO: 0.01 THOUSANDS/ÂΜL (ref 0–0.1)
BASOPHILS NFR BLD AUTO: 0 % (ref 0–1)
BILIRUB SERPL-MCNC: 0.37 MG/DL (ref 0.2–1)
BUN SERPL-MCNC: 20 MG/DL (ref 5–25)
CALCIUM ALBUM COR SERPL-MCNC: 10.3 MG/DL (ref 8.3–10.1)
CALCIUM SERPL-MCNC: 9.3 MG/DL (ref 8.4–10.2)
CARDIAC TROPONIN I PNL SERPL HS: 13 NG/L
CARDIAC TROPONIN I PNL SERPL HS: 13 NG/L
CHLORIDE SERPL-SCNC: 101 MMOL/L (ref 96–108)
CO2 SERPL-SCNC: 23 MMOL/L (ref 21–32)
CREAT SERPL-MCNC: 0.54 MG/DL (ref 0.6–1.3)
D DIMER PPP FEU-MCNC: 4.25 UG/ML FEU
EOSINOPHIL # BLD AUTO: 0 THOUSAND/ÂΜL (ref 0–0.61)
EOSINOPHIL NFR BLD AUTO: 0 % (ref 0–6)
ERYTHROCYTE [DISTWIDTH] IN BLOOD BY AUTOMATED COUNT: 16.3 % (ref 11.6–15.1)
GFR SERPL CREATININE-BSD FRML MDRD: 117 ML/MIN/1.73SQ M
GLUCOSE SERPL-MCNC: 114 MG/DL (ref 65–140)
HCT VFR BLD AUTO: 32.7 % (ref 36.5–49.3)
HGB BLD-MCNC: 11.2 G/DL (ref 12–17)
IMM GRANULOCYTES # BLD AUTO: 0.11 THOUSAND/UL (ref 0–0.2)
IMM GRANULOCYTES NFR BLD AUTO: 2 % (ref 0–2)
LYMPHOCYTES # BLD AUTO: 0.21 THOUSANDS/ÂΜL (ref 0.6–4.47)
LYMPHOCYTES NFR BLD AUTO: 3 % (ref 14–44)
MCH RBC QN AUTO: 32.3 PG (ref 26.8–34.3)
MCHC RBC AUTO-ENTMCNC: 34.3 G/DL (ref 31.4–37.4)
MCV RBC AUTO: 94 FL (ref 82–98)
MONOCYTES # BLD AUTO: 0.43 THOUSAND/ÂΜL (ref 0.17–1.22)
MONOCYTES NFR BLD AUTO: 6 % (ref 4–12)
NEUTROPHILS # BLD AUTO: 6.68 THOUSANDS/ÂΜL (ref 1.85–7.62)
NEUTS SEG NFR BLD AUTO: 89 % (ref 43–75)
NRBC BLD AUTO-RTO: 0 /100 WBCS
P AXIS: 59 DEGREES
PLATELET # BLD AUTO: 155 THOUSANDS/UL (ref 149–390)
PMV BLD AUTO: 9.5 FL (ref 8.9–12.7)
POTASSIUM SERPL-SCNC: 3.9 MMOL/L (ref 3.5–5.3)
PR INTERVAL: 128 MS
PROT SERPL-MCNC: 8.4 G/DL (ref 6.4–8.4)
QRS AXIS: 10 DEGREES
QRSD INTERVAL: 84 MS
QT INTERVAL: 310 MS
QTC INTERVAL: 450 MS
RBC # BLD AUTO: 3.47 MILLION/UL (ref 3.88–5.62)
SODIUM SERPL-SCNC: 132 MMOL/L (ref 135–147)
T WAVE AXIS: 41 DEGREES
VENTRICULAR RATE: 127 BPM
WBC # BLD AUTO: 7.44 THOUSAND/UL (ref 4.31–10.16)

## 2023-05-11 RX ORDER — MORPHINE SULFATE 30 MG/1
30 TABLET, FILM COATED, EXTENDED RELEASE ORAL 2 TIMES DAILY
Qty: 60 TABLET | Refills: 0 | Status: SHIPPED | OUTPATIENT
Start: 2023-05-11

## 2023-05-11 RX ORDER — HYDROMORPHONE HCL/PF 1 MG/ML
1 SYRINGE (ML) INJECTION ONCE
Status: COMPLETED | OUTPATIENT
Start: 2023-05-11 | End: 2023-05-11

## 2023-05-11 RX ORDER — FENTANYL CITRATE 50 UG/ML
1 INJECTION, SOLUTION INTRAMUSCULAR; INTRAVENOUS ONCE
Status: COMPLETED | OUTPATIENT
Start: 2023-05-11 | End: 2023-05-11

## 2023-05-11 RX ADMIN — HYDROMORPHONE HYDROCHLORIDE 1 MG: 1 INJECTION, SOLUTION INTRAMUSCULAR; INTRAVENOUS; SUBCUTANEOUS at 11:11

## 2023-05-11 RX ADMIN — SODIUM CHLORIDE 1000 ML: 0.9 INJECTION, SOLUTION INTRAVENOUS at 10:30

## 2023-05-11 RX ADMIN — IOHEXOL 85 ML: 350 INJECTION, SOLUTION INTRAVENOUS at 11:44

## 2023-05-11 NOTE — DISCHARGE INSTRUCTIONS
Encourage fluids    Follow up with your oncologist - Dr Trini Gardner- tomorrow as scheduled  Follow up with your palliative care physician - Dr Margaret Shipley- regarding management of your pain     Return to ED for new or worsening symptoms

## 2023-05-11 NOTE — TELEPHONE ENCOUNTER
Wife called stated he just got home about half hr ago and the pain only subsided a very little  Patient wants to discuss morphine they would like a call back to discuss next steps

## 2023-05-11 NOTE — TELEPHONE ENCOUNTER
Returned call  Ramandeep Roberson is in the process of getting the patient to the ED for evaluation

## 2023-05-11 NOTE — TELEPHONE ENCOUNTER
Thang Adame  Canceling oxyER  Prescribing MSER  Will require prior auth  Counseled on GoodRx use, patient should pay out of pocket at this time  Continue oxyIR PRN breakthrough pain  See note from 5/9/23

## 2023-05-11 NOTE — ED PROVIDER NOTES
History  Chief Complaint   Patient presents with   • Chest Pain     Pt reports chest pain as well as all over pain, history of Multiple Myeloma  Patient is a 50-year-old white male with history of multiple myeloma who  reports several week history  of pain felt to his torso front and back  States he is on oxycodone 10 mg every 4 hours  Reports this has not been helping his pain for the last 3 days  He comes to the ER via rescue squad  He was given fentanyl 100 mcg en route  Denies fever or chills  Denies shortness of breath  Denies abdominal pain, nausea vomiting or diarrhea  Prior to Admission Medications   Prescriptions Last Dose Informant Patient Reported? Taking?   acetaminophen (TYLENOL) 500 mg tablet   No No   Sig: Take 2 tablets (1,000 mg total) by mouth 3 (three) times a day   acyclovir (ZOVIRAX) 400 MG tablet   No No   Sig: Take 1 tablet (400 mg total) by mouth 2 (two) times a day   dexamethasone (DECADRON) 1 mg tablet   No No   Sig: TAKE 1 TABLET BY MOUTH DAILY WITH BREAKFAST    gabapentin (Neurontin) 300 mg capsule   No No   Sig: Take 2 capsules (600 mg total) by mouth daily at bedtime   lenalidomide (REVLIMID) 25 MG CAPS   No No   Sig: Take 25 mg by mouth daily on days 1-7 of a 21 day cycle   naloxone (NARCAN) 4 mg/0 1 mL nasal spray   No No   Sig: Administer 1 spray into a nostril  If no response after 2-3 minutes, give another dose in the other nostril using a new spray     ondansetron (ZOFRAN) 4 mg tablet   No No   Sig: Take 1 tablet (4 mg total) by mouth every 8 (eight) hours as needed for nausea or vomiting   oxyCODONE (ROXICODONE) 10 MG TABS   No No   Sig: Take 1 tablet (10 mg total) by mouth every 4 (four) hours as needed (cancer-related pain) Max Daily Amount: 60 mg   oxyCODONE HCl ER (OxyCONTIN) 30 MG T12A   No No   Sig: Take 1 tablet (30 mg total) by mouth every 12 (twelve) hours Max Daily Amount: 60 mg   pantoprazole (PROTONIX) 40 mg tablet   No No   Sig: TAKE 1 TABLET BY MOUTH EVERY DAY      Facility-Administered Medications: None       Past Medical History:   Diagnosis Date   • Cancer ([address] )     bone-   • GERD (gastroesophageal reflux disease)    • Multiple myeloma (HCC)        Past Surgical History:   Procedure Laterality Date   • APPENDECTOMY     • IR BIOPSY BONE MARROW  3/15/2023       Family History   Problem Relation Age of Onset   • Diabetes Mother    • Heart disease Father    • Diabetes Father      I have reviewed and agree with the history as documented  E-Cigarette/Vaping   • E-Cigarette Use Never User      E-Cigarette/Vaping Substances     Social History     Tobacco Use   • Smoking status: Some Days     Types: Cigarettes   • Tobacco comments:     Smoking 1 cigarette daily   Vaping Use   • Vaping Use: Never used   Substance Use Topics   • Alcohol use: Not Currently   • Drug use: Not Currently     Types: Marijuana     Comment: once a month       Review of Systems   Constitutional: Negative for chills and fever  HENT: Negative for ear pain and sore throat  Respiratory: Negative for cough and shortness of breath  Cardiovascular: Positive for chest pain  Negative for palpitations  Gastrointestinal: Negative for abdominal pain, diarrhea, nausea and vomiting  Genitourinary: Negative for dysuria and hematuria  Musculoskeletal: Positive for back pain  Negative for arthralgias  Skin: Negative for color change and rash  Neurological: Negative for syncope  All other systems reviewed and are negative  Physical Exam  Physical Exam  Vitals and nursing note reviewed  Constitutional:       General: He is not in acute distress  Appearance: He is well-developed  He is not ill-appearing, toxic-appearing or diaphoretic  HENT:      Head: Atraumatic  Eyes:      Extraocular Movements: Extraocular movements intact  Pupils: Pupils are equal, round, and reactive to light  Cardiovascular:      Rate and Rhythm: Tachycardia present        Heart sounds: Normal heart sounds  Pulmonary:      Effort: Pulmonary effort is normal       Breath sounds: Normal breath sounds  Chest:      Chest wall: No tenderness  Abdominal:      General: Bowel sounds are normal       Palpations: Abdomen is soft  Musculoskeletal:         General: Normal range of motion  Cervical back: Normal range of motion and neck supple  Comments: (+) kyphosis    Skin:     General: Skin is warm and dry  Capillary Refill: Capillary refill takes less than 2 seconds  Neurological:      General: No focal deficit present  Mental Status: He is alert and oriented to person, place, and time           Vital Signs  ED Triage Vitals   Temperature Pulse Respirations Blood Pressure SpO2   05/11/23 0934 05/11/23 0934 05/11/23 0934 05/11/23 0934 05/11/23 0934   98 3 °F (36 8 °C) (!) 142 22 126/86 98 %      Temp Source Heart Rate Source Patient Position - Orthostatic VS BP Location FiO2 (%)   05/11/23 0934 05/11/23 0934 05/11/23 1121 05/11/23 1121 --   Oral Monitor Lying Right arm       Pain Score       05/11/23 0934       5           Vitals:    05/11/23 0934 05/11/23 1121   BP: 126/86 135/86   Pulse: (!) 142 (!) 118   Patient Position - Orthostatic VS:  Lying         Visual Acuity      ED Medications  Medications   fentanyl citrate (PF) (FOR EMS ONLY) 100 mcg/2 mL injection 100 mcg (0 mcg Does not apply Given to EMS 5/11/23 1028)   sodium chloride 0 9 % bolus 1,000 mL (0 mL Intravenous Stopped 5/11/23 1120)   HYDROmorphone (DILAUDID) injection 1 mg (1 mg Intravenous Given 5/11/23 1111)   iohexol (OMNIPAQUE) 350 MG/ML injection (SINGLE-DOSE) 85 mL (85 mL Intravenous Given 5/11/23 1144)       Diagnostic Studies  Results Reviewed     Procedure Component Value Units Date/Time    HS Troponin I 2hr [[id number]]  (Normal) Collected: 05/11/23 1121    Lab Status: Final result Specimen: Blood from Arm, Left Updated: 05/11/23 1231     hs TnI 2hr 13 ng/L      Delta 2hr hsTnI 0 ng/L     HS Troponin I 4hr [[id number]]     Lab Status: No result Specimen: Blood     D-Dimer [[id number]]  (Abnormal) Collected: 05/11/23 0951    Lab Status: Final result Specimen: Blood from Arm, Left Updated: 05/11/23 1120     D-Dimer, Quant 4 25 ug/ml FEU     Narrative: In the evaluation for possible pulmonary embolism, in the appropriate (Well's Score of 4 or less) patient, the age adjusted d-dimer cutoff for this patient can be calculated as:    Age x 0 01 (in ug/mL) for Age-adjusted D-dimer exclusion threshold for a patient over 50 years      HS Troponin 0hr (reflex protocol) [[id number]]  (Normal) Collected: 05/11/23 0951    Lab Status: Final result Specimen: Blood from Arm, Left Updated: 05/11/23 1018     hs TnI 0hr 13 ng/L     Comprehensive metabolic panel [[id number]]  (Abnormal) Collected: 05/11/23 0951    Lab Status: Final result Specimen: Blood from Arm, Left Updated: 05/11/23 1014     Sodium 132 mmol/L      Potassium 3 9 mmol/L      Chloride 101 mmol/L      CO2 23 mmol/L      ANION GAP 8 mmol/L      BUN 20 mg/dL      Creatinine 0 54 mg/dL      Glucose 114 mg/dL      Calcium 9 3 mg/dL      Corrected Calcium 10 3 mg/dL      AST 41 U/L      ALT 9 U/L      Alkaline Phosphatase 302 U/L      Total Protein 8 4 g/dL      Albumin 2 7 g/dL      Total Bilirubin 0 37 mg/dL      eGFR 117 ml/min/1 73sq m     Narrative:      Kit guidelines for Chronic Kidney Disease (CKD):   •  Stage 1 with normal or high GFR (GFR > 90 mL/min/1 73 square meters)  •  Stage 2 Mild CKD (GFR = 60-89 mL/min/1 73 square meters)  •  Stage 3A Moderate CKD (GFR = 45-59 mL/min/1 73 square meters)  •  Stage 3B Moderate CKD (GFR = 30-44 mL/min/1 73 square meters)  •  Stage 4 Severe CKD (GFR = 15-29 mL/min/1 73 square meters)  •  Stage 5 End Stage CKD (GFR <15 mL/min/1 73 square meters)  Note: GFR calculation is accurate only with a steady state creatinine    CBC and differential [[id number]]  (Abnormal) Collected: 05/11/23 0951    Lab Status: Final result Specimen: Blood from Arm, Left Updated: 05/11/23 1002     WBC 7 44 Thousand/uL      RBC 3 47 Million/uL      Hemoglobin 11 2 g/dL      Hematocrit 32 7 %      MCV 94 fL      MCH 32 3 pg      MCHC 34 3 g/dL      RDW 16 3 %      MPV 9 5 fL      Platelets 951 Thousands/uL      nRBC 0 /100 WBCs      Neutrophils Relative 89 %      Immat GRANS % 2 %      Lymphocytes Relative 3 %      Monocytes Relative 6 %      Eosinophils Relative 0 %      Basophils Relative 0 %      Neutrophils Absolute 6 68 Thousands/µL      Immature Grans Absolute 0 11 Thousand/uL      Lymphocytes Absolute 0 21 Thousands/µL      Monocytes Absolute 0 43 Thousand/µL      Eosinophils Absolute 0 00 Thousand/µL      Basophils Absolute 0 01 Thousands/µL                  CTA ED chest PE study   Final Result by Doreen Nino MD (05/11 1235)      No pulmonary embolus  No acute pulmonary disease  Multiple lytic lesions in the skeleton from multiple myeloma with worsening lytic lesions in a few ribs with developing soft tissue masses, 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  Healing sternal body fracture, new compression deformities, and new kyphosis since February 2023  Workstation performed: [id number]         XR chest 1 view portable   Final Result by Doreen Nino MD (05/11 1024)      No acute cardiopulmonary disease  Lucency in the skeleton and old bilateral rib fractures due to multiple myeloma           Workstation performed: [id number]                    Procedures  ECG 12 Lead Documentation Only    Date/Time: 5/11/2023 10:42 AM  Performed by: Marge Henson PA-C  Authorized by: Marge Henson PA-C     ECG reviewed by me, the ED Provider: yes    Patient location:  ED  Rate:     ECG rate:  127    ECG rate assessment: tachycardic    Rhythm:     Rhythm: sinus tachycardia    Ectopy:     Ectopy: PAC    QRS:     QRS intervals:  Normal  ST segments:     ST segments:  Normal             ED Course SBIRT 20yo+    Flowsheet Row Most Recent Value   Initial Alcohol Screen: US AUDIT-C     1  How often do you have a drink containing alcohol? 0 Filed at: 05/11/2023 0936   2  How many drinks containing alcohol do you have on a typical day you are drinking? 0 Filed at: 05/11/2023 0936   3a  Male UNDER 65: How often do you have five or more drinks on one occasion? 0 Filed at: 05/11/2023 0936   Audit-C Score 0 Filed at: 05/11/2023 5385   DAYNA: How many times in the past year have you    Used an illegal drug or used a prescription medication for non-medical reasons? Never Filed at: 05/11/2023 0155                    Medical Decision Making  52-year-old white male with history of multiple myeloma presenting with progression of pain felt circumferentially to his torso  Ordered blood work  Elevation of D-dimer was followed by CT PE study which shows no acute pulmonary embolism or acute pulmonary pathology  Patient was given IV fluid hydration  He was given IV Dilaudid for pain  Patient adamantly wants to be discharged  States he has chemotherapy at 8 AM tomorrow morning and appointment with his oncologist Dr Jovani Aly tomorrow afternoon at 1 PM   Noelle Tan texted Dr Jovani Aly to make him aware of patient's presentation to the ER  Of note, patient was persistently tachycardic 115-120 in the ED  Suspect secondary to pain  History of tachycardia on chart review of prior ED visits  Return precautions given     Amount and/or Complexity of Data Reviewed  Labs: ordered  Radiology: ordered  Risk  Prescription drug management            Disposition  Final diagnoses:   Chest pain, unspecified type   Multiple myeloma ([facility] [address] )     Time reflects when diagnosis was documented in both MDM as applicable and the Disposition within this note     Time User Action Codes Description Comment    5/11/2023  1:10 PM Lianne Wong Add [R07 9] Chest pain, unspecified type     5/11/2023  1:10 PM Zaina Vidales Add [C90 00] Multiple myeloma [facility])       ED Disposition     ED Disposition   Discharge    Condition   Stable    Date/Time   Thu May 11, 2023  1:10 PM    Comment   Delmis Gaona  discharge to home/self care  Follow-up Information     Follow up With Specialties Details Why Contact Info    Komal Smith MD Hematology and Oncology, Hematology, Oncology   [address] [place] [address]  [phone]      Ed Edmondson MD Palliative Care, Family Medicine   [place] [phone]            Patient's Medications   Discharge Prescriptions    No medications on file       No discharge procedures on file      PDMP Review       Value Time User    PDMP Reviewed  Yes 5/9/2023  3:15 PM Ed Edmondson MD          ED Provider  Electronically Signed by           Adan Yoo PA-C  05/11/23 1866

## 2023-05-11 NOTE — TELEPHONE ENCOUNTER
Please call the pharmacy to check on the prior auth status of oxyER now that Honey Gonzalez has provided the new insurance information  I had anticipated they would need a prior auth but did not hear that one was initiated  We need to do that before I prescribe an alternate, would only try option 2 if option 1 (oxyER) is truly invalid  See my 5/9/23 3:21pm note  When speaking to [address] [place] [address], please  on patience as the authorization process can be challenging and can take time

## 2023-05-11 NOTE — TELEPHONE ENCOUNTER
None of his medications are working anymore and he is having  chest pain  Also having pain all over meds not working anymore for him  I informed her being that she is calling and stated he has chest pain I informed her she needs to take him to the ER Immediately for a work up  She would like a call back on her cell asap

## 2023-05-12 ENCOUNTER — OFFICE VISIT (OUTPATIENT)
Dept: HEMATOLOGY ONCOLOGY | Facility: MEDICAL CENTER | Age: 58
End: 2023-05-12

## 2023-05-12 ENCOUNTER — HOSPITAL ENCOUNTER (OUTPATIENT)
Dept: INFUSION CENTER | Facility: CLINIC | Age: 58
Discharge: HOME | End: 2023-05-12

## 2023-05-12 ENCOUNTER — TELEPHONE (OUTPATIENT)
Dept: NUTRITION | Facility: CLINIC | Age: 58
End: 2023-05-12

## 2023-05-12 VITALS
HEART RATE: 136 BPM | SYSTOLIC BLOOD PRESSURE: 133 MMHG | WEIGHT: 143.96 LBS | OXYGEN SATURATION: 98 % | HEIGHT: 70 IN | BODY MASS INDEX: 20.61 KG/M2 | TEMPERATURE: 98.9 F | DIASTOLIC BLOOD PRESSURE: 83 MMHG | RESPIRATION RATE: 22 BRPM

## 2023-05-12 VITALS
OXYGEN SATURATION: 93 % | HEART RATE: 140 BPM | TEMPERATURE: 98.2 F | SYSTOLIC BLOOD PRESSURE: 134 MMHG | DIASTOLIC BLOOD PRESSURE: 84 MMHG | RESPIRATION RATE: 20 BRPM

## 2023-05-12 DIAGNOSIS — C79.51 METASTASIS TO BONE (HCC): Primary | ICD-10-CM

## 2023-05-12 DIAGNOSIS — C90.00 MULTIPLE MYELOMA NOT HAVING ACHIEVED REMISSION (HCC): ICD-10-CM

## 2023-05-12 DIAGNOSIS — C90.00 MULTIPLE MYELOMA NOT HAVING ACHIEVED REMISSION (HCC): Primary | ICD-10-CM

## 2023-05-12 RX ADMIN — BORTEZOMIB 2.5 MG: 1 INJECTION, POWDER, LYOPHILIZED, FOR SOLUTION INTRAVENOUS; SUBCUTANEOUS at 09:35

## 2023-05-12 NOTE — TELEPHONE ENCOUNTER
Contacted pt again today to reschedule his missed follow up  No answer  Left a voice message requesting a call back to discuss

## 2023-05-12 NOTE — TELEPHONE ENCOUNTER
FU call placed to [facility] pharmacy re new  MS Contin order sent yesterday  Per pharmacist, pt picked up script as ordered  Was paid by primary insurnace ([employer]) out of pocket to pt was $27    Has 2nd insurance [facility]  Not needed  Verified short acting Oxycodone was paid for also  At this time no indication for PA   LMOM of pt  Inquired on effect of his new medication  Brief review on how to administer( scheduled) instructed on continued use of Oxycodone every 4 hours IF Needed for breakthrough pain  Instructed to keep a journal/log on times he takes his meds, severity of his pain to best manage and evaluate response to medications  Instructed to call office to speak with nurse if he needs

## 2023-05-12 NOTE — PROGRESS NOTES
Pt here for D11 Velcade  Vitals stable; labs within parameters for treatment  Pt reporting severe pain in his chest and shoulder for which he had gone to the ED yesterday  The ED wanted to admit him but he declined  Pt does have pain medications at home that he says do help  Pt does want chemo today  He has a follow up appt with Dr Vickie Castellano this afternoon and Ant Coulter in  [facility] office notified of pt's condition  Callbell within reach

## 2023-05-12 NOTE — PROGRESS NOTES
Melvin Diaz   1965  [facility]  [address] [place] [id number]    DISCUSSION/SUMMARY:    60-year-old male with no significant past medical history (but no medical follow-up recently) recently presenting to the ER with mid back pain and weight loss  Issues:    Multiple myeloma  Patient was seen in the ER on February 8, 2023  CTA of the chest did not demonstrate any PE but patient had multiple lytic lesions and compression fractures in T6 and T10  Additional work-up demonstrated elevated total protein, elevated IgG and an elevated lambda  Immunofixation demonstrated IgG lambda monoclonal protein  CBC parameters and renal function were within normal limits  Bone marrow biopsy demonstrated a lambda restricted plasma cell neoplasm, 80% of the cells were malignant  PET/CT demonstrated multiple osseous hypermetabolic lytic lesions  Patient was seen by Dr Wilian Nye in second opinion  The plan is RVD x 4 cycles and then auto stem cell transplant if no evidence of progression and no complications  Multiple myeloma international staging system = II, median survival is 44 months (albumin = 2 2, beta-2 microglobulin = 2 8)    NCCN guidelines 3 2023 state that for patients with multiple myeloma in need of treatment, possible transplant candidate, preferred regimen is bortezomib, lenalidomide and dexamethasone  Regimen  Bortezomib 1 3 mg/m2 subcu on days 1, 4, 8 and 11  Lenalidomide 25 mg orally days 1-7 (dose reduced on the first cycle)  Dexamethasone 40 mg by mouth on days 1, 8 and 15  Cycle length = 21 days x 3-4 cycles    Patient is in his second cycle  Once again there have been issues with insurance and payment; Revlimid was not given during the first cycle  Patient's performance status is also not very good    Mr Yang Lowery will need to get significantly better with good/acceptable laboratory test results, response in the tumor markers and a better performance status before he can be reevaluated for transplant  Back pain - better  Dr Jonelle Kc was kind enough to review the patient's thoracic MRI (unofficially) and agreed that a radiation oncology evaluation was indicated  RT to the spine was completed last week  Other body aches  Patient has sternal and rib cage pain  Patient has been seen by palliative care  Pain control is ongoing, this has been difficult because of patient's insurance and inability to pay out-of-pocket  Weight loss  Patient has lost 20+ pounds over the past 3 months  Patient has been referred to oncology nutrition  Poor dentition  Patient denies any pain, swelling or bleeding  Apparently Mr Safia Mirza has had significantly carried teeth for many years  Because of this, antiresorptive therapy is contraindicated  Patient is to return in 4 weeks  Mr Safia Mirza knows to call the hematology/oncology office if there are any other questions or concerns  Carefully review your medication list and verify that the list is accurate and up-to-date  Please call the hematology/oncology office if there are medications missing from the list, medications on the list that you are not currently taking or if there is a dosage or instruction that is different from how you're taking that medication  Patient goals and areas of care: Continue with neoadjuvant treatment  Barriers to care: None  Patient is able to self-care   ______________________________________________________________________________________    Chief Complaint   Patient presents with   • Follow-up     Metastasis to bone    • Multiple Myeloma     History of Present Illness: 65year old male with relatively good general health recently developing mid back pain  Mr Safia Mirza states that the pain began in early January 2023  Patient had lost approximately 20+ pounds over the past few months  Work-up included bone marrow biopsy and PET/CT    Patient has widespread osteolytic lesions  Bone marrow biopsy demonstrated greater than 80% plasma cells  Patient received RT to the spine  Mr Sushma Reyes is now on systemic treatment - RVD  Patient states feeling +/-  Still with chest and rib cage pain  Activities are extremely limited  Fatigue is about the same as before  Appetite is +/- but no nausea or vomiting  No specific problems with the chemotherapy  Patient does not have a PCP, no recent medical follow-ups, no COVID vaccines - personal decision  No COVID infections  Review of Systems   Constitutional: Positive for activity change, appetite change, fatigue and unexpected weight change  HENT: Negative  Eyes: Negative  Respiratory: Negative  Cardiovascular: Negative  Gastrointestinal: Negative  Endocrine: Negative  Genitourinary: Negative  Musculoskeletal: Positive for arthralgias  Negative for back pain  Skin: Negative  Allergic/Immunologic: Negative  Neurological: Negative  Hematological: Negative  Psychiatric/Behavioral: Negative  All other systems reviewed and are negative      Past Surgical History:   Procedure Laterality Date   • APPENDECTOMY     • IR BIOPSY BONE MARROW  3/15/2023   Past surgical history: No prior blood transfusions    Family History   Problem Relation Age of Onset   • Diabetes Mother    • Heart disease Father    • Diabetes Father    Family history: No known familial or genetic diseases, no children    Social History     Socioeconomic History   • Marital status: /Civil Union     Spouse name: Not on file   • Number of children: Not on file   • Years of education: Not on file   • Highest education level: Not on file   Occupational History   • Not on file   Tobacco Use   • Smoking status: Some Days     Types: Cigarettes   • Smokeless tobacco: Not on file   • Tobacco comments:     Smoking 1 cigarette daily   Vaping Use   • Vaping Use: Never used   Substance and Sexual Activity   • Alcohol use: Not Currently   • Drug use: Not Currently     Types: Marijuana     Comment: once a month   • Sexual activity: Not on file   Other Topics Concern   • Not on file   Social History Narrative    From 2/28/23  note:    Emergency Contact: Dee Martinez (CATHY)[phone] (Home Phone)    Marital Status: Single     Interpretation concerns, speaks another language, preferred language: english    Caregiver/Support: Mayur    Housing: two story     Home Setup: one level     Lives With: SO    Daily Living Activities: independent     Ambulation: independent    Employment: yes      Status/Location: no     Ability to pay bills: yes  No barriers to paying bills  POA/LW/AD: no   NAVIJesus is healthcare representative  MSW emailed advance directive form  Transportation Plan/Concerns: Patient states he transports self  MSW educated on [employer] transport services  Social Determinants of Health     Financial Resource Strain: Not on file   Food Insecurity: Not on file   Transportation Needs: Not on file   Physical Activity: Not on file   Stress: Not on file   Social Connections: Not on file   Intimate Partner Violence: Not on file   Housing Stability: Not on file   Social history: Patient smokes 3 to 4 cigarettes a day, recently discontinued, approximately 5 beers a day, also recently discontinued, no drug abuse, no toxic exposure    No Known Allergies    Vitals:    05/12/23 1019   BP: 134/84   Pulse: (!) 140   Resp: 20   Temp: 98 2 °F (36 8 °C)   SpO2: 93%     Physical Exam  Constitutional:       Appearance: He is well-developed  Comments: Middle-age male, thin appearing, + evidence of pain   HENT:      Head: Normocephalic and atraumatic  Right Ear: External ear normal       Left Ear: External ear normal    Eyes:      Conjunctiva/sclera: Conjunctivae normal       Pupils: Pupils are equal, round, and reactive to light  Cardiovascular:      Rate and Rhythm: Normal rate and regular rhythm  Heart sounds: Normal heart sounds  Pulmonary:      Effort: Pulmonary effort is normal       Breath sounds: Normal breath sounds  Comments: Fair to good entry bilaterally, scattered rhonchi  Abdominal:      General: Bowel sounds are normal       Palpations: Abdomen is soft  Comments: + Bowel sounds, nontender, soft, no hepatosplenomegaly, no guarding   Musculoskeletal:         General: Normal range of motion  Cervical back: Normal range of motion and neck supple  Comments: + Tenderness in bilateral thoracic rib cage   Skin:     General: Skin is warm  Comments: Slightly pale, warm, moist, no petechiae or ecchymoses   Neurological:      Mental Status: He is alert and oriented to person, place, and time  Deep Tendon Reflexes: Reflexes are normal and symmetric  Psychiatric:         Behavior: Behavior normal          Thought Content:  Thought content normal          Judgment: Judgment normal      Extremities: Lower extreme edema bilaterally, no cords, pulses are 1+ l  ymphatics: No adenopathy in the neck, supraclavicular region, axilla bilaterally    Labs    4/10/2023 WBC = 3 14 hemoglobin = 10 3 hematocrit = 32 5 platelet = 986 neutrophil = 81% calcium = 10 8 BUN = 10 creatinine = 0 89 total protein = 9 8 albumin = 2 2 LDH = 347 beta-2 microglobulin = 2 8    3/15/2023 WBC = 6 86 hemoglobin = 12 8 hematocrit = 36 6 platelet = 343 neutrophil = 69%  2/8/2023 WBC = 6 6 hemoglobin = 12 6 hematocrit = 36 MCV = 96 platelet = 818 neutrophil = 67% bands = 1% lymphocyte = 23% monocyte = 8% basophil = 1% BUN = 21 creatinine = 1 17 calcium = 10 7 AST = 23 ALT = 14 alkaline phosphatase = 166 total protein = 9 7 albumin = 3 3 total bilirubin = 0 36 TSH = 0 680  2/8/2023 immunofixation demonstrated monoclonal gammopathy identified his IgG lambda (3 37 g/deciliter)  2/8/2023 SPEP showed a monoclonal peak in the gamma region, gamma concentration = 3 52 g/deciliter, total protein = 8 3  2/8/2023 IgA = 20 8 = decreased IgG = 2000 900 = elevated IgM = 11 = decreased  2/8/2023 Free kappa = 7 8 Free lambda = 1177 Kappa/lambda ratio = 0 01  2/8/2023 lactic acid = 2 5    Imaging    3/20/2023 MRI thoracic spine    IMPRESSION:     1  Widespread infiltrative and discrete osseous lesions involving anterior and posterior elements of the thoracic and visualized cervical and lumbar spine in keeping with history of multiple myeloma  No extraosseous lesion  2   Compared to CTA chest 2/8/2023, progression of T6 wedge compression fracture with severe anterior height loss  There is small posterior osseous buckling without significant canal stenosis  3   Mild T8 compression fracture, progressed from CTA chest 2/8/2023  Small posterior osseous buckling without significant canal stenosis  4   Stable T8 wedge compression deformity with severe anterior height loss  Posterosuperior osseous buckling indents ventral thecal sac without significant canal stenosis  5   Discrete and marrow replacing lesions involving partially imaged ribs and sternum  Displaced sternal fracture, similar to PET CT 3/7/2023, new from CTA chest 2/8/2023  3/7/2023 PET/CT    IMPRESSION:  1  Widespread hypermetabolic lytic lesions throughout the axial and appendicular skeleton  Findings are most suggestive of multiple myeloma  Clinical correlation recommended  2   Several hypermetabolic thoracic compression deformities  T8 compression deformity is new from the prior chest CT  There is also a new displaced sternal fracture  3   No hypermetabolic soft tissue lesions      2/8/2023 CTA chest PE study    OSSEOUS STRUCTURES:  Diffuse lytic lesions throughout the skeleton with mild compression deformity of T6 and moderate compression deformity of T10     IMPRESSION:     No pulmonary embolus  No acute pulmonary disease  Diffuse lytic lesions throughout the skeleton with compression deformities in the spine    This is most likely due to multiple myeloma, less likely due to metastatic disease from an unknown primary  2/8/2023 chest x-ray  Impression stated no acute cardiopulmonary disease  Pathology    Case Report   Surgical Pathology Report                         Case: [id number]                                    Authorizing Provider: Jeanette Martin MD           Collected:           03/15/2023 0932               Ordering Location:     [facility]        Received:            03/15/2023 0932                                      [facility] Scan                                                             Pathologist:           Enoch Jasso MD                                                           Specimens:   A) - Iliac Crest, Right, core                                                                        B) - Iliac Crest, Right, clot                                                                        C) - Iliac Crest, Right, slide                                                             Final Diagnosis   A -C   Bone marrow,  right iliac crest,  biopsy, clot, and aspirate:  -  Lambda restricted plasma cell neoplasm, >80% of total cellularity consistent with plasma cell myeloma, see note  -  Hypercellular bone marrow with markedly reduced trilineage hematopoiesis and no increase in blasts  -  Reduced iron stores, in a suboptimal sample, correlation with serum iron studies is recommended  -  Moderate to severe increase in reticulin fibrosis status      Note: The patient's presentation of multiple lytic lesions and compression fractures is noted  The current biopsy shows a marked increase in plasma cells comprising >80% of total cellularity  Immunostains show a lambda light chain restriction relative to kappa light chain by -HAILEE studies  Flow cytometry confirms the presence of a lambda restricted plasma cell population  Further supported by an IgG lambda monoclonal protein detected in the serum by immunofixation studies   FISH studies identify a gain in 1q21/CKS1B in 15 33% of evaluated nuclei  Cytogenetic studies reveal a normal male karyotype  Overall the current bone marrow biopsy is diagnostic for a plasma cell neoplasm best classified as plasma cell myeloma in the correct clinical context  Clinical correlation is advised  Electronically signed by Dwight Hubbard MD on 3/23/2023 at  8:24 PM   Preliminary result electronically signed by Dwight Hubbard MD on 3/22/2023 at  3:13 PM   Microscopic Description    Bone marrow aspirate smears:  The aspirate smears are aspicular, cellular, and suboptimal for evaluation  The aspirate smear sample is hemodilute with an increase in plasma cells  Mature neutrophils and lymphocytes are present  However, maturing myeloid elements and erythroid elements are not well represented in the aspirate smear slides  There is no definitive evidence of myelodysplasia or increase in blasts       Bone marrow core biopsy and aspirate clot:  Histologic sections of the aspirate clot and decalcified core biopsy are adequate for evaluation   Marrow space cellularity is hypercellular for patient age (>90%)  Myelopoiesis:  Markedly reduced (myeloblasts= <5%)  Erythropoiesis:  Markedly reduced  Megakaryocytes:  Markedly reduced  Lymphocytes:  Increased in small aggregates and interstitially distributed  Plasma cells:  Markedly increased  including a subset of immature forms      Special studies:   * Iron stain performed on the aspirate smear, clot, and core biopsy highlights reduced iron stores with no evidence of ring sideroblasts in a limited sample  Siderotic iron granules are present  Due to the aspiculate nature of the specimen these findings ma  * Reticulin stain performed on the core biopsy shows moderate to severe increase in reticulin fibers   2-3 of 3 by the  Consensus grading scheme  * Trichrome stain performed on the core biopsy show no increase in collagen fibrosis     * Immunohistochemical stains were performed with appropriate controls and show the following results:  -   highlights marked increase in plasma cells (>80%) with a lambda restriction relative to kappa light chain expression by kappa and lambda in situ hybridization studies  The plasma cells are negative for CD30 and GUANACO-HAILEE  Ki67 proliferation index is overall low (<10%)       CD20 highlights an increase in B-cells in aggregates and interstitially distributed (10-20% of total cellularity) and CD3 highlights T-cells in aggregates and interstitially scattered T-cells (5-10% of total cellularity)      CD34 shows no increase in blasts (<5% of total cellularity) and  shows no increase in immature cells (<5% of total cellularity)  Additionally,  highlights scattered mast cells      CD61 highlights a reduction in megakaryocytes       [place] red stain is negative for amyloid  Note    Component Ref Range & Units 3/15/23 0804 2/8/23 1016   WBC 4 31 - 10 16 Thousand/uL 6 86  6 62    RBC 3 88 - 5 62 Million/uL 3 86 Low   3 74 Low     Hemoglobin 12 0 - 17 0 g/dL 12 8  12 6    Hematocrit 36 5 - 49 3 % 36 6  36 0 Low     MCV 82 - 98 fL 95  96    MCH 26 8 - 34 3 pg 33 2  33 7    MCHC 31 4 - 37 4 g/dL 35 0  35 0    RDW 11 6 - 15 1 % 13 9  14 5    MPV 8 9 - 12 7 fL 9 4  8 9    Platelets 005 - 294 Thousands/uL 188  194    nRBC /100 WBCs 0  0       Component Ref Range & Units 2/8/23 1401    IGA 70 0 - 400 0 mg/dL 28 0 Low      0 - 1,600 0 mg/dL 2,900 0 High     IGM 40 0 - 230 0 mg/dL 11 0 Low        SPEP Interpretation   See Comment      Comment: The SPEP shows a monoclonal peak in the gamma region  Immunofixation to be performed  Serum immunofixation shows a monoclonal gammopathy identified as IgG lambda (3 37 g/dL)     10-COLOR FLOW CYTOMETRY ANALYSIS FOR ACUTE LEUKEMIA AND MYELOID/LYMPHOID NEOPLASMS ([facility] # [id number] ; please see separate report for further details):  BONE MARROW ASPIRATE:   1   Clonal plasma cells are detected (4 4% by flow cytometry), consistent with plasma cell neoplasm    - Phenotype: lambda+; CD38+, +, CD20-, CD19-, CD45-, CD56- and -    2  No evidence of acute leukemia or increased blasts  3  No evidence of an abnormal myeloid population  Abnormalities associated with myelodysplasia and myeloproliferative neoplasms are absent  4  No evidence of B-cell lymphoma or atypical T-cell population     Viability 7AAD: 95 57%   Monoclonal CD45(-)/CD38(bright) plasma cells with lambda-restriction are present (4 4%)  There is a mixed population of granulocytes/maturing myeloid precursors, blasts, monocytes and lymphocytes  +/HLA-DR+ myeloblasts comprise (0 18%) of total events  Myeloid-commited CD34+ population comprise (0 16%) of total events  Granulocytes/maturing myeloid precursors (70 32%) do not display an aberrant phenotype  The orthogonal side scatter is normal  No significant number of CD56+ or CD10-/CD16- granulocytes is noted  Monocytes (8 4%) appear mature (CD14+/CD64+) and do not display overt phenotypic atypia  B-cells (1 17%) are polytypic  T-cells (14 55%) show no deletion or abnormal dim expression of pan-T-cell antigens on significant subset of cells     FLUORESCENCE IN-SITU HYBRIDIZATION (FISH)  ([facility] # [id number] ; please see separate report for further details):  INTERPRETATION   1  Positive for 1q21/CKS1B gain in 15 33% nuclei  Comment: Gain of the CKS1B locus (cyclin kinase subunit 1B) in patients with plasma cell myeloma is a poor prognostic indicator associated with significantly shorter progression-free survival and shorter overall survival    2  No evidence of IGH-MAF [translocation t(14;16)] gene rearrangement   3  No evidence of RB1 monosomy (13q14 deletion)  4  No evidence of CCND1-IGH [translocation t(11;14)] gene rearrangement, and no evidence for trisomy 11 or gain of 11q  5  No evidence of p53 (17p13) deletion or amplification     6  No evidence of FGFR3-IGH [translocation t(4;14)] gene rearrangement       CYTOGENETICS Karyotype Analysis ([facility] # [id number] ; please see separate report for further details):  Karyotype: 46,XY[20]   Interpretation:   A normal male karyotype was observed in twenty metaphase cells analyzed

## 2023-05-15 ENCOUNTER — TELEPHONE (OUTPATIENT)
Dept: HEMATOLOGY ONCOLOGY | Facility: CLINIC | Age: 58
End: 2023-05-15

## 2023-05-15 ENCOUNTER — TELEPHONE (OUTPATIENT)
Dept: HEMATOLOGY ONCOLOGY | Facility: MEDICAL CENTER | Age: 58
End: 2023-05-15

## 2023-05-15 NOTE — TELEPHONE ENCOUNTER
Received voicemail from pt's wife asking about transportation to 5/17 radiation appt  Email sent to [facility] for follow up

## 2023-05-15 NOTE — TELEPHONE ENCOUNTER
Voicemail left for patient:  [facility] has been set up for 9am with radiation dept - [address]     Will send message to [facility] to also transport to chemo appts

## 2023-05-16 RX ORDER — DEXAMETHASONE 4 MG/1
40 TABLET ORAL ONCE
Status: CANCELLED
Start: 2023-05-23

## 2023-05-16 RX ORDER — DEXAMETHASONE 4 MG/1
40 TABLET ORAL ONCE
Start: 2023-05-30

## 2023-05-17 ENCOUNTER — RADIATION ONCOLOGY FOLLOW-UP (OUTPATIENT)
Dept: RADIATION ONCOLOGY | Facility: HOSPITAL | Age: 58
End: 2023-05-17

## 2023-05-17 ENCOUNTER — CLINICAL SUPPORT (OUTPATIENT)
Dept: RADIATION ONCOLOGY | Facility: HOSPITAL | Age: 58
End: 2023-05-17
Attending: INTERNAL MEDICINE

## 2023-05-17 VITALS
RESPIRATION RATE: 18 BRPM | TEMPERATURE: 97.5 F | BODY MASS INDEX: 20.47 KG/M2 | WEIGHT: 143 LBS | SYSTOLIC BLOOD PRESSURE: 126 MMHG | HEART RATE: 129 BPM | HEIGHT: 70 IN | DIASTOLIC BLOOD PRESSURE: 80 MMHG | OXYGEN SATURATION: 96 %

## 2023-05-17 DIAGNOSIS — C90.00 MULTIPLE MYELOMA NOT HAVING ACHIEVED REMISSION (HCC): Primary | ICD-10-CM

## 2023-05-17 DIAGNOSIS — C79.51 METASTASIS TO BONE (HCC): ICD-10-CM

## 2023-05-17 DIAGNOSIS — C79.51 METASTASIS TO BONE (HCC): Primary | ICD-10-CM

## 2023-05-17 NOTE — PROGRESS NOTES
Dominik Retana  1965 is a 65 y o  male  With history of multiple myeloma  Completed palliative radiation to thoracic spine on 4/14/23  Today's visit is an EOT follow-up  Currently receiving Velcade injections  4/10/23 seen by [facility] for evaluation of bone marrow transplant  5/02/23 Bone Marrow Bx  Final Diagnosis   A -C  Bone marrow,  right iliac crest,  biopsy, clot, and aspirate:  -  Lambda restricted plasma cell neoplasm, >80% of total cellularity consistent with plasma cell myeloma, see note  -  Hypercellular bone marrow with markedly reduced trilineage hematopoiesis and no increase in blasts  -  Reduced iron stores, in a suboptimal sample, correlation with serum iron studies is recommended  -  Moderate to severe increase in reticulin fibrosis status  5/11/23 in [facility] ED for chest pain  CTA completed showing:  Multiple lytic lesions in the skeleton from multiple myeloma with worsening lytic lesions in a few ribs with developing soft tissue masses, 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  Healing sternal body fracture, new compression deformities, and new kyphosis since February 2023 5/12/23 Infusion   Received velcade injection     5/12/23 Dr Awa Jewell marrow biopsy demonstrated a lambda restricted plasma cell neoplasm, 80% of the cells were malignant  PET/CT demonstrated multiple osseous hypermetabolic lytic lesions  Patient was seen by Dr Abbi Huynh in second opinion  The plan is RVD x 4 cycles and then auto stem cell transplant if no evidence of progression and no complications  Patient is in his second cycle  Once again there have been issues with insurance and payment; Revlimid was not given during the first cycle  Patient's performance status is also not very good  Mr Alina Becerril will need to get significantly better with good/acceptable laboratory test results, response in the tumor markers and a better performance status before he can be reevaluated for transplant  Upcoming  5/24/23 Palliative         Follow up visit     Oncology History   Multiple myeloma not having achieved remission ([address] )   3/2023 Initial Diagnosis    Multiple myeloma not having achieved remission ([facility] [address] )     3/15/2023 Biopsy    A -C  Bone marrow,  right iliac crest,  biopsy, clot, and aspirate:  -  Lambda restricted plasma cell neoplasm, >80% of total cellularity consistent with plasma cell myeloma, see note  -  Hypercellular bone marrow with markedly reduced trilineage hematopoiesis and no increase in blasts  -  Reduced iron stores, in a suboptimal sample, correlation with serum iron studies is recommended  -  Moderate to severe increase in reticulin fibrosis status  Overall the current bone marrow biopsy is diagnostic for a plasma cell neoplasm best classified as plasma cell myeloma         4/11/2023 -  Chemotherapy    bortezomib (VELCADE), 1 3 mg/m2 = 2 5 mg, Subcutaneous, Once, 2 of 4 cycles  Administration: 2 5 mg (4/11/2023), 2 5 mg (4/14/2023), 2 5 mg (4/18/2023), 2 5 mg (4/21/2023), 2 5 mg (5/2/2023), 2 5 mg (5/5/2023), 2 5 mg (5/9/2023), 2 5 mg (5/12/2023)     Metastasis to bone ([facility] [address] )   3/15/2023 Biopsy    A -C  Bone marrow,  right iliac crest,  biopsy, clot, and aspirate:  -  Lambda restricted plasma cell neoplasm, >80% of total cellularity consistent with plasma cell myeloma, see note  -  Hypercellular bone marrow with markedly reduced trilineage hematopoiesis and no increase in blasts  -  Reduced iron stores, in a suboptimal sample, correlation with serum iron studies is recommended  -  Moderate to severe increase in reticulin fibrosis status       Overall the current bone marrow biopsy is diagnostic for a plasma cell neoplasm best classified as plasma cell myeloma         3/2023 Initial Diagnosis    Bone metastases ([facility] [address] )     4/3/2023 - 4/14/2023 Radiation    Treatment:  Course: C1    Plan ID Energy Fractions Dose per Fraction (cGy) Dose Correction (cGy) Total Dose Delivered (cGy) Elapsed Days   [id number] Spine 10X 10 / 10 250 0 2,500 11      Treatment dates:  C1: 4/3/2023 - 4/14/2023         Review of Systems:  Review of Systems   Constitutional: Negative  HENT: Negative  Eyes: Negative  Wears glasses    Respiratory: Positive for shortness of breath  Cardiovascular: Negative  Gastrointestinal: Negative  Endocrine: Negative  Genitourinary: Negative  Musculoskeletal: Positive for back pain and gait problem  In wheelchair    Skin: Negative  Allergic/Immunologic: Negative  Hematological: Negative  Psychiatric/Behavioral: Negative          Clinical Trial: no    Teaching no    Health Maintenance   Topic Date Due   • Hepatitis C Screening  Never done   • COVID-19 Vaccine (1) Never done   • Hepatitis A Vaccine (1 of 2 - Risk 2-dose series) Never done   • Pneumococcal Vaccine: Pediatrics (0 to 5 Years) and At-Risk Patients (6 to 59 Years) (1 - PCV) Never done   • HIV Screening  Never done   • Annual Physical  Never done   • DTaP,Tdap,and Td Vaccines (1 - Tdap) Never done   • Colorectal Cancer Screening  Never done   • Influenza Vaccine (Season Ended) 09/01/2023   • Depression Screening  03/29/2024   • BMI: Adult  05/12/2024   • Hepatitis B Vaccine (1 of 3 - Risk 3-dose series) 09/16/2026   • HIB Vaccine  Aged Out   • IPV Vaccine  Aged Out   • Meningococcal ACWY Vaccine  Aged Out   • HPV Vaccine  Aged Out     Patient Active Problem List   Diagnosis   • Monoclonal gammopathy   • Back pain   • Palliative care patient   • Unintentional weight loss   • Nicotine dependence, cigarettes, uncomplicated   • History of alcohol abuse   • Acid reflux   • Nausea & vomiting   • Loss of appetite   • Therapeutic opioid-induced constipation (OIC)   • Multiple myeloma not having achieved remission (HCC)   • Metastasis [facility] bone [facility])   • Cancer related pain     Past Medical History:   Diagnosis Date   • Cancer ([facility] [address] )     bone-   • GERD (gastroesophageal reflux disease)    • Multiple myeloma (HCC)      Past Surgical History:   Procedure Laterality Date   • APPENDECTOMY     • IR BIOPSY BONE MARROW  3/15/2023     Family History   Problem Relation Age of Onset   • Diabetes Mother    • Heart disease Father    • Diabetes Father      Social History     Socioeconomic History   • Marital status: /Civil Union     Spouse name: Not on file   • Number of children: Not on file   • Years of education: Not on file   • Highest education level: Not on file   Occupational History   • Not on file   Tobacco Use   • Smoking status: Some Days     Types: Cigarettes   • Smokeless tobacco: Not on file   • Tobacco comments:     Smoking 1 cigarette daily   Vaping Use   • Vaping Use: Never used   Substance and Sexual Activity   • Alcohol use: Not Currently   • Drug use: Not Currently     Types: Marijuana     Comment: once a month   • Sexual activity: Not on file   Other Topics Concern   • Not on file   Social History Narrative    From 2/28/23  note:    Emergency Contact: Dee Martinez ([phone] (Home Phone)    Marital Status: Single     Interpretation concerns, speaks another language, preferred language: english    Caregiver/Support: Mayur    Housing: two story     Home Setup: one level     Lives With: SO    Daily Living Activities: independent     Ambulation: independent    Employment: yes      Status/Location: no     Ability to pay bills: yes  No barriers to paying bills  POA/LW/AD: no   Karlie is healthcare representative  MSW emailed advance directive form  Transportation Plan/Concerns: Patient states he transports self  MSW educated on [employer] transport services        Social Determinants of Health     Financial Resource Strain: Not on file   Food Insecurity: Not on file   Transportation Needs: Not on file   Physical Activity: Not on file   Stress: Not on file   Social Connections: Not on file   Intimate Partner Violence: Not on file   Housing Stability: Not on file       Current Outpatient Medications:   •  acetaminophen (TYLENOL) 500 mg tablet, Take 2 tablets (1,000 mg total) by mouth 3 (three) times a day, Disp: 180 tablet, Rfl: 2  •  acyclovir (ZOVIRAX) 400 MG tablet, Take 1 tablet (400 mg total) by mouth 2 (two) times a day, Disp: 60 tablet, Rfl: 11  •  dexamethasone (DECADRON) 1 mg tablet, TAKE 1 TABLET BY MOUTH DAILY WITH BREAKFAST , Disp: 30 tablet, Rfl: 0  •  gabapentin (Neurontin) 300 mg capsule, Take 2 capsules (600 mg total) by mouth daily at bedtime, Disp: 60 capsule, Rfl: 2  •  lenalidomide (REVLIMID) 25 MG CAPS, Take 25 mg by mouth daily on days 1-7 of a 21 day cycle, Disp: 7 capsule, Rfl: 0  •  morphine (MS CONTIN) 30 mg 12 hr tablet, Take 1 tablet (30 mg total) by mouth 2 (two) times a day Max Daily Amount: 60 mg, Disp: 60 tablet, Rfl: 0  •  naloxone (NARCAN) 4 mg/0 1 mL nasal spray, Administer 1 spray into a nostril  If no response after 2-3 minutes, give another dose in the other nostril using a new spray , Disp: 1 each, Rfl: 1  •  ondansetron (ZOFRAN) 4 mg tablet, Take 1 tablet (4 mg total) by mouth every 8 (eight) hours as needed for nausea or vomiting, Disp: 20 tablet, Rfl: 0  •  oxyCODONE (ROXICODONE) 10 MG TABS, Take 1 tablet (10 mg total) by mouth every 4 (four) hours as needed (cancer-related pain) Max Daily Amount: 60 mg, Disp: 90 tablet, Rfl: 0  •  pantoprazole (PROTONIX) 40 mg tablet, TAKE 1 TABLET BY MOUTH EVERY DAY, Disp: 90 tablet, Rfl: 0  No Known Allergies  There were no vitals filed for this visit

## 2023-05-17 NOTE — PROGRESS NOTES
Follow-up - Radiation Oncology   Keila Sims  1965 65 y o  male [id number]      History of Present Illness   Cancer Staging   No matching staging information was found for the patient  Keila Sims  1965 is a 65 y o  male  With history of multiple myeloma  Completed palliative radiation to thoracic spine on 4/14/23  Today's visit is an EOT follow-up      Currently receiving Velcade injections       4/10/23 seen by [facility] for evaluation of bone marrow transplant       5/02/23 Bone Marrow Bx  Final Diagnosis   A -C   Bone marrow,  right iliac crest,  biopsy, clot, and aspirate:  Maxine Dial restricted plasma cell neoplasm, >80% of total cellularity consistent with plasma cell myeloma, see note  -  Hypercellular bone marrow with markedly reduced trilineage hematopoiesis and no increase in blasts  -  Reduced iron stores, in a suboptimal sample, correlation with serum iron studies is recommended  -  Moderate to severe increase in reticulin fibrosis status          5/11/23 in [facility] ED for chest pain  CTA completed showing:  Multiple lytic lesions in the skeleton from multiple myeloma with worsening lytic lesions in a few ribs with developing soft tissue masses, 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  Healing sternal body fracture, new compression deformities, and new kyphosis since February 2023 5/12/23 Infusion   Received velcade injection      5/12/23 Dr Alona Swenson marrow biopsy demonstrated a lambda restricted plasma cell neoplasm, 80% of the cells were malignant   PET/CT demonstrated multiple osseous hypermetabolic lytic lesions   Patient was seen by Dr Dennis Morales in second opinion  Lynnette East is RVD x 4 cycles and then auto stem cell transplant if no evidence of progression and no complications     Patient is in his second cycle   Once again there have been issues with insurance and payment;  Revlimid was not given during the first cycle   Patient's performance status is also not very good   Mr Mary Dee will need to get significantly better with good/acceptable laboratory test results, response in the tumor markers and a better performance status before he can be reevaluated for transplant         Upcoming  5/24/23 Palliative            Interval History: Overall pain is better however it is difficult for him to travel  Historical Information   Oncology History   Multiple myel[facility] not having achieved [facility] )   3/2023 Initial Diagnosis    Multiple myeloma not having achieved remission ([facility] )     3/15/2023 Biopsy    A -C  Bone marrow,  right iliac crest,  biopsy, clot, and aspirate:  -  Lambda restricted plasma cell neoplasm, >80% of total cellularity consistent with plasma cell myeloma, see note  -  Hypercellular bone marrow with markedly reduced trilineage hematopoiesis and no increase in blasts  -  Reduced iron stores, in a suboptimal sample, correlation with serum iron studies is recommended  -  Moderate to severe increase in reticulin fibrosis status  Overall the current bone marrow biopsy is diagnostic for a plasma cell neoplasm best classified as plasma cell myeloma         4/11/2023 -  Chemotherapy    bortezomib (VELCADE), 1 3 mg/m2 = 2 5 mg, Subcutaneous, Once, 2 of 4 cycles  Administration: 2 5 mg (4/11/2023), 2 5 mg (4/14/2023), 2 5 mg (4/18/2023), 2 5 mg (4/21/2023), 2 5 mg (5/2/2023), 2 5 mg (5/5/2023), 2 5 mg (5/9/2023), 2 5 mg (5/12/2023)     Metastasis to bone ([facility] )   3/15/2023 Biopsy    A -C  Bone marrow,  right iliac crest,  biopsy, clot, and aspirate:  -  Lambda restricted plasma cell neoplasm, >80% of total cellularity consistent with plasma cell myeloma, see note  -  Hypercellular bone marrow with markedly reduced trilineage hematopoiesis and no increase in blasts  -  Reduced iron stores, in a suboptimal sample, correlation with serum iron studies is recommended  -  Moderate to severe increase in reticulin fibrosis status       Overall the current bone marrow biopsy is diagnostic for a plasma cell neoplasm best classified as plasma cell myeloma         3/2023 Initial Diagnosis    Bone metastases ([facility] )     4/3/2023 - 4/14/2023 Radiation    Treatment:  Course: C1    Plan ID Energy Fractions Dose per Fraction (cGy) Dose Correction (cGy) Total Dose Delivered (cGy) Elapsed Days   [id number] Spine 10X 10 / 10 250 0 2,500 11      Treatment dates:  C1: 4/3/2023 - 4/14/2023         Past Medical History:   Diagnosis Date   • Cancer ([facility] )     bone-   • GERD (gastroesophageal reflux disease)    • Multiple myeloma (HCC)      Past Surgical History:   Procedure Laterality Date   • APPENDECTOMY     • IR BIOPSY BONE MARROW  3/15/2023       Social History   Social History     Substance and Sexual Activity   Alcohol Use Not Currently     Social History     Substance and Sexual Activity   Drug Use Not Currently   • Types: Marijuana    Comment: once a month     Social History     Tobacco Use   Smoking Status Some Days   • Types: Cigarettes   Smokeless Tobacco Not on file   Tobacco Comments    Smoking 1 cigarette daily         Meds/Allergies     Current Outpatient Medications:   •  acetaminophen (TYLENOL) 500 mg tablet, Take 2 tablets (1,000 mg total) by mouth 3 (three) times a day, Disp: 180 tablet, Rfl: 2  •  dexamethasone (DECADRON) 1 mg tablet, TAKE 1 TABLET BY MOUTH DAILY WITH BREAKFAST , Disp: 30 tablet, Rfl: 0  •  gabapentin (Neurontin) 300 mg capsule, Take 2 capsules (600 mg total) by mouth daily at bedtime, Disp: 60 capsule, Rfl: 2  •  lenalidomide (REVLIMID) 25 MG CAPS, Take 25 mg by mouth daily on days 1-7 of a 21 day cycle, Disp: 7 capsule, Rfl: 0  •  morphine (MS CONTIN) 30 mg 12 hr tablet, Take 1 tablet (30 mg total) by mouth 2 (two) times a day Max Daily Amount: 60 mg, Disp: 60 tablet, Rfl: 0  •  naloxone (NARCAN) 4 mg/0 1 mL nasal spray, Administer 1 spray into a nostril   If no response after 2-3 minutes, give another dose in the other nostril using a new spray , Disp: 1 each, Rfl: 1  • "ondansetron (ZOFRAN) 4 mg tablet, Take 1 tablet (4 mg total) by mouth every 8 (eight) hours as needed for nausea or vomiting, Disp: 20 tablet, Rfl: 0  •  oxyCODONE (ROXICODONE) 10 MG TABS, Take 1 tablet (10 mg total) by mouth every 4 (four) hours as needed (cancer-related pain) Max Daily Amount: 60 mg, Disp: 90 tablet, Rfl: 0  •  pantoprazole (PROTONIX) 40 mg tablet, TAKE 1 TABLET BY MOUTH EVERY DAY, Disp: 90 tablet, Rfl: 0  •  acyclovir (ZOVIRAX) 400 MG tablet, Take 1 tablet (400 mg total) by mouth 2 (two) times a day, Disp: 60 tablet, Rfl: 11  No Known Allergies      Review of Systems   Constitutional: Positive for activity change, appetite change and fatigue  Negative for fever and unexpected weight change  HENT: Negative for congestion, rhinorrhea, sneezing and sore throat  Eyes: Negative  Respiratory: Negative for cough and choking  Cardiovascular: Positive for palpitations  Negative for chest pain and leg swelling  Gastrointestinal: Negative for abdominal pain, blood in stool, diarrhea and nausea  Endocrine: Negative  Genitourinary: Negative for difficulty urinating, dysuria, flank pain and hematuria  Musculoskeletal: Positive for back pain and gait problem  Skin: Negative  Allergic/Immunologic: Negative  Neurological: Negative for dizziness, weakness, numbness and headaches  Hematological: Negative  Psychiatric/Behavioral: Negative  OBJECTIVE:   /80 (BP Location: Left arm, Patient Position: Sitting, Cuff Size: Standard)   Pulse (!) 129   Temp 97 5 °F (36 4 °C) (Temporal)   Resp 18   Ht 5' 10\" (1 778 m)   Wt 64 9 kg (143 lb)   SpO2 96%   BMI 20 52 kg/m²   Pain Assessment:  5  Karnofsky: 70 - Cares for self; unable to carry on normal activity or do normal work     Physical Exam  Constitutional:       General: He is not in acute distress  Appearance: Normal appearance  HENT:      Nose: No congestion        Mouth/Throat:      Pharynx: Oropharynx is " clear    Eyes:      Extraocular Movements: Extraocular movements intact  Pupils: Pupils are equal, round, and reactive to light  Cardiovascular:      Rate and Rhythm: Regular rhythm  Tachycardia present  Heart sounds: Normal heart sounds  Pulmonary:      Effort: Pulmonary effort is normal       Breath sounds: Normal breath sounds  Abdominal:      Palpations: Abdomen is soft  There is no mass  Tenderness: There is no abdominal tenderness  Musculoskeletal:         General: No swelling  Normal range of motion  Cervical back: Normal range of motion  Skin:     General: Skin is dry  Findings: No lesion or rash  Neurological:      General: No focal deficit present  Mental Status: He is alert and oriented to person, place, and time     Psychiatric:         Mood and Affect: Mood normal          Behavior: Behavior normal               RESULTS    Lab Results:   Recent Results (from the past 672 hour(s))   CBC and differential    Collection Time: 05/01/23 11:17 AM   Result Value Ref Range    WBC 5 53 4 31 - 10 16 Thousand/uL    RBC 3 14 (L) 3 88 - 5 62 Million/uL    Hemoglobin 10 3 (L) 12 0 - 17 0 g/dL    Hematocrit 31 5 (L) 36 5 - 49 3 %     (H) 82 - 98 fL    MCH 32 8 26 8 - 34 3 pg    MCHC 32 7 31 4 - 37 4 g/dL    RDW 17 2 (H) 11 6 - 15 1 %    MPV 8 8 (L) 8 9 - 12 7 fL    Platelets 109 556 - 236 Thousands/uL    nRBC 1 /100 WBCs    Neutrophils Relative 86 (H) 43 - 75 %    Immat GRANS % 1 0 - 2 %    Lymphocytes Relative 4 (L) 14 - 44 %    Monocytes Relative 9 4 - 12 %    Eosinophils Relative 0 0 - 6 %    Basophils Relative 0 0 - 1 %    Neutrophils Absolute 4 74 1 85 - 7 62 Thousands/µL    Immature Grans Absolute 0 07 0 00 - 0 20 Thousand/uL    Lymphocytes Absolute 0 22 (L) 0 60 - 4 47 Thousands/µL    Monocytes Absolute 0 48 0 17 - 1 22 Thousand/µL    Eosinophils Absolute 0 02 0 00 - 0 61 Thousand/µL    Basophils Absolute 0 00 0 00 - 0 10 Thousands/µL   Comprehensive metabolic panel    Collection Time: 05/01/23 11:17 AM   Result Value Ref Range    Sodium 132 (L) 135 - 147 mmol/L    Potassium 4 0 3 5 - 5 3 mmol/L    Chloride 102 96 - 108 mmol/L    CO2 23 21 - 32 mmol/L    ANION GAP 7 4 - 13 mmol/L    BUN 8 5 - 25 mg/dL    Creatinine 0 68 0 60 - 1 30 mg/dL    Glucose 128 65 - 140 mg/dL    Calcium 8 5 8 4 - 10 2 mg/dL    Corrected Calcium 9 5 8 3 - 10 1 mg/dL    AST 28 13 - 39 U/L    ALT 19 7 - 52 U/L    Alkaline Phosphatase 326 (H) 34 - 104 U/L    Total Protein 8 6 (H) 6 4 - 8 4 g/dL    Albumin 2 8 (L) 3 5 - 5 0 g/dL    Total Bilirubin 0 37 0 20 - 1 00 mg/dL    eGFR 106 ml/min/1 73sq m   ECG 12 lead    Collection Time: 05/11/23  9:39 AM   Result Value Ref Range    Ventricular Rate 127 BPM    Atrial Rate 127 BPM    PA Interval 128 ms    QRSD Interval 84 ms    QT Interval 310 ms    QTC Interval 450 ms    P [place] 59 degrees    QRS Axis 10 degrees    T Wave Axis 41 degrees   CBC and differential    Collection Time: 05/11/23  9:51 AM   Result Value Ref Range    WBC 7 44 4 31 - 10 16 Thousand/uL    RBC 3 47 (L) 3 88 - 5 62 Million/uL    Hemoglobin 11 2 (L) 12 0 - 17 0 g/dL    Hematocrit 32 7 (L) 36 5 - 49 3 %    MCV 94 82 - 98 fL    MCH 32 3 26 8 - 34 3 pg    MCHC 34 3 31 4 - 37 4 g/dL    RDW 16 3 (H) 11 6 - 15 1 %    MPV 9 5 8 9 - 12 7 fL    Platelets 474 769 - 896 Thousands/uL    nRBC 0 /100 WBCs    Neutrophils Relative 89 (H) 43 - 75 %    Immat GRANS % 2 0 - 2 %    Lymphocytes Relative 3 (L) 14 - 44 %    Monocytes Relative 6 4 - 12 %    Eosinophils Relative 0 0 - 6 %    Basophils Relative 0 0 - 1 %    Neutrophils Absolute 6 68 1 85 - 7 62 Thousands/µL    Immature Grans Absolute 0 11 0 00 - 0 20 Thousand/uL    Lymphocytes Absolute 0 21 (L) 0 60 - 4 47 Thousands/µL    Monocytes Absolute 0 43 0 17 - 1 22 Thousand/µL    Eosinophils Absolute 0 00 0 00 - 0 61 Thousand/µL    Basophils Absolute 0 01 0 00 - 0 10 Thousands/µL   Comprehensive metabolic panel    Collection Time: 05/11/23  9:51 AM   Result "Value Ref Range    Sodium 132 (L) 135 - 147 mmol/L    Potassium 3 9 3 5 - 5 3 mmol/L    Chloride 101 96 - 108 mmol/L    CO2 23 21 - 32 mmol/L    ANION GAP 8 4 - 13 mmol/L    BUN 20 5 - 25 mg/dL    Creatinine 0 54 (L) 0 60 - 1 30 mg/dL    Glucose 114 65 - 140 mg/dL    Calcium 9 3 8 4 - 10 2 mg/dL    Corrected Calcium 10 3 (H) 8 3 - 10 1 mg/dL    AST 41 (H) 13 - 39 U/L    ALT 9 7 - 52 U/L    Alkaline Phosphatase 302 (H) 34 - 104 U/L    Total Protein 8 4 6 4 - 8 4 g/dL    Albumin 2 7 (L) 3 5 - 5 0 g/dL    Total Bilirubin 0 37 0 20 - 1 00 mg/dL    eGFR 117 ml/min/1 73sq m   HS Troponin 0hr (reflex protocol)    Collection Time: 05/11/23  9:51 AM   Result Value Ref Range    hs TnI 0hr 13 \"Refer to ACS Flowchart\"- see link ng/L   D-Dimer    Collection Time: 05/11/23  9:51 AM   Result Value Ref Range    D-Dimer, Quant 4 25 (H) <0 50 ug/ml FEU   HS Troponin I 2hr    Collection Time: 05/11/23 11:21 AM   Result Value Ref Range    hs TnI 2hr 13 \"Refer to ACS Flowchart\"- see link ng/L    Delta 2hr hsTnI 0 <20 ng/L       Imaging Studies:XR chest 1 view portable    Result Date: 5/11/2023  Narrative: CHEST INDICATION:   chest pain  History of multiple myeloma  COMPARISON: Radiation therapy planning CT 3/30/2023  EXAM PERFORMED/VIEWS:  XR CHEST PORTABLE  FINDINGS: Cardiomediastinal silhouette normal  Lungs clear  No effusion or pneumothorax  Upper abdomen normal  Lucency in the skeleton and old bilateral rib fractures due to multiple myeloma  Impression: No acute cardiopulmonary disease  Lucency in the skeleton and old bilateral rib fractures due to multiple myeloma  Workstation performed: [id number]     CTA ED chest PE study    Result Date: 5/11/2023  Narrative: CTA - CHEST WITH IV CONTRAST - PULMONARY ANGIOGRAM INDICATION:   mult myeloma, tachycardic, d dimer 4 25  COMPARISON: CXR from today, radiation therapy planning CT 3/30/2023, PET/CT 3/7/2023, chest CT 2/8/2023   TECHNIQUE: CT angiogram timed for optimal opacification of " the pulmonary arteries  Axial, sagittal, and coronal 2D reformats created from source data  Coronal 3D MIP postprocessing on the acquisition scanner  Radiation dose length product (DLP):  352 mGy-cm   Radiation dose exposure minimized using iterative reconstruction and automated exposure control  IV Contrast:  85 mL of iohexol (OMNIPAQUE) FINDINGS: PULMONARY ARTERIES:  No pulmonary embolus  LUNGS: No acute disease  Mild benign bilateral lower lobe atelectasis  Benign calcified granulomas  Mild paraseptal emphysema  AIRWAYS: No significant filling defects  PLEURA:  Unremarkable  HEART/GREAT VESSELS:  Normal for age  MEDIASTINUM AND PADMINI:  Unremarkable  CHEST WALL AND LOWER NECK: Unremarkable  UPPER ABDOMEN: Benign calcified splenic granuloma  OSSEOUS STRUCTURES: Multiple lytic lesions throughout the skeleton from multiple myeloma  Stable 2 5 cm soft tissue lesion associated with the anterior left second rib compared with 3/30/2023  New 2 0 cm soft tissue lesion associated with new destruction  of the anterolateral left third rib (407/59) and progressive with mild new soft tissue associated with several right ribs compared with 3/30/2023  The 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  Healing sternal body fracture, new compression deformities, and new kyphosis since 2/8/2023  Impression: No pulmonary embolus  No acute pulmonary disease  Multiple lytic lesions in the skeleton from multiple myeloma with worsening lytic lesions in a few ribs with developing soft tissue masses, 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  Healing sternal body fracture, new compression deformities, and new kyphosis since February 2023  Workstation performed: [id number]           Assessment/Plan:  No orders of the defined types were placed in this encounter  Luis Juarez  is a 65y o  year old male with advanced multiple myeloma not in remission and is 1 month after palliative radiation therapy to the lower thoracic and lumbar spine   He is "residential through his chemotherapy course  At home he is comfortable and not in any severe pain as long as he does not move around  He takes morphine and oxycodone managed by palliative care  Today when he is in some pain because the wheelchairr is uncomfortable too small for his height  He himself states that overall his pain is better manage  Hopefully can get a good response from his chemotherapy so will be evaluated for stem cell transplant in [place]  We will see him as needed  Cristina Davis MD  0/00/7626,1:56 AM    Portions of the record may have been created with voice recognition software   Occasional wrong word or \"sound a like\" substitutions may have occurred due to the inherent limitations of voice recognition software   Read the chart carefully and recognize, using context, where substitutions have occurred        "

## 2023-05-19 ENCOUNTER — TELEPHONE (OUTPATIENT)
Dept: HEMATOLOGY ONCOLOGY | Facility: MEDICAL CENTER | Age: 58
End: 2023-05-19

## 2023-05-19 NOTE — TELEPHONE ENCOUNTER
Per [facility] team:  Mr Aric Hernández will not be able to come to [facility] BMT with his current insurance plan  We wanted your team to be aware  If Mr Aric Hernández is interested in pursuing Autologous Stem Cell Transplant he should contact Libra Oliver from [employer] to discuss switching insurance policies  Ten Garcia’s number is [phone]  Thank you!     SHERYL Person, RN, OCN    Left voicemail for patient to call Pull number to discuss

## 2023-05-23 ENCOUNTER — HOSPITAL ENCOUNTER (OUTPATIENT)
Dept: INFUSION CENTER | Facility: CLINIC | Age: 58
Discharge: HOME | End: 2023-05-23

## 2023-05-23 VITALS
BODY MASS INDEX: 20.47 KG/M2 | TEMPERATURE: 98.9 F | SYSTOLIC BLOOD PRESSURE: 122 MMHG | HEART RATE: 105 BPM | OXYGEN SATURATION: 96 % | RESPIRATION RATE: 18 BRPM | WEIGHT: 143 LBS | DIASTOLIC BLOOD PRESSURE: 76 MMHG | HEIGHT: 70 IN

## 2023-05-23 DIAGNOSIS — C90.00 MULTIPLE MYELOMA NOT HAVING ACHIEVED REMISSION (HCC): ICD-10-CM

## 2023-05-23 DIAGNOSIS — C90.00 MULTIPLE MYELOMA NOT HAVING ACHIEVED REMISSION (HCC): Primary | ICD-10-CM

## 2023-05-23 LAB
ALBUMIN SERPL BCP-MCNC: 2.9 G/DL (ref 3.5–5)
ALP SERPL-CCNC: 530 U/L (ref 34–104)
ALT SERPL W P-5'-P-CCNC: 10 U/L (ref 7–52)
ANION GAP SERPL CALCULATED.3IONS-SCNC: 4 MMOL/L (ref 4–13)
AST SERPL W P-5'-P-CCNC: 13 U/L (ref 13–39)
BASOPHILS # BLD AUTO: 0.02 THOUSANDS/ÂΜL (ref 0–0.1)
BASOPHILS NFR BLD AUTO: 0 % (ref 0–1)
BILIRUB SERPL-MCNC: 0.42 MG/DL (ref 0.2–1)
BUN SERPL-MCNC: 8 MG/DL (ref 5–25)
CALCIUM ALBUM COR SERPL-MCNC: 8.8 MG/DL (ref 8.3–10.1)
CALCIUM SERPL-MCNC: 7.9 MG/DL (ref 8.4–10.2)
CHLORIDE SERPL-SCNC: 100 MMOL/L (ref 96–108)
CO2 SERPL-SCNC: 28 MMOL/L (ref 21–32)
CREAT SERPL-MCNC: 0.45 MG/DL (ref 0.6–1.3)
EOSINOPHIL # BLD AUTO: 0.01 THOUSAND/ÂΜL (ref 0–0.61)
EOSINOPHIL NFR BLD AUTO: 0 % (ref 0–6)
ERYTHROCYTE [DISTWIDTH] IN BLOOD BY AUTOMATED COUNT: 17 % (ref 11.6–15.1)
GFR SERPL CREATININE-BSD FRML MDRD: 126 ML/MIN/1.73SQ M
GLUCOSE SERPL-MCNC: 115 MG/DL (ref 65–140)
HCT VFR BLD AUTO: 36.1 % (ref 36.5–49.3)
HGB BLD-MCNC: 11.7 G/DL (ref 12–17)
IMM GRANULOCYTES # BLD AUTO: 0.09 THOUSAND/UL (ref 0–0.2)
IMM GRANULOCYTES NFR BLD AUTO: 1 % (ref 0–2)
LYMPHOCYTES # BLD AUTO: 0.64 THOUSANDS/ÂΜL (ref 0.6–4.47)
LYMPHOCYTES NFR BLD AUTO: 9 % (ref 14–44)
MCH RBC QN AUTO: 31.7 PG (ref 26.8–34.3)
MCHC RBC AUTO-ENTMCNC: 32.4 G/DL (ref 31.4–37.4)
MCV RBC AUTO: 98 FL (ref 82–98)
MONOCYTES # BLD AUTO: 0.77 THOUSAND/ÂΜL (ref 0.17–1.22)
MONOCYTES NFR BLD AUTO: 11 % (ref 4–12)
NEUTROPHILS # BLD AUTO: 5.37 THOUSANDS/ÂΜL (ref 1.85–7.62)
NEUTS SEG NFR BLD AUTO: 79 % (ref 43–75)
NRBC BLD AUTO-RTO: 0 /100 WBCS
PLATELET # BLD AUTO: 242 THOUSANDS/UL (ref 149–390)
PMV BLD AUTO: 8.2 FL (ref 8.9–12.7)
POTASSIUM SERPL-SCNC: 3.4 MMOL/L (ref 3.5–5.3)
PROT SERPL-MCNC: 6.4 G/DL (ref 6.4–8.4)
RBC # BLD AUTO: 3.69 MILLION/UL (ref 3.88–5.62)
SODIUM SERPL-SCNC: 132 MMOL/L (ref 135–147)
WBC # BLD AUTO: 6.9 THOUSAND/UL (ref 4.31–10.16)

## 2023-05-23 RX ORDER — LENALIDOMIDE 25 MG/1
CAPSULE ORAL
Qty: 7 CAPSULE | Refills: 0 | Status: SHIPPED | OUTPATIENT
Start: 2023-05-23

## 2023-05-23 RX ORDER — DEXAMETHASONE 4 MG/1
40 TABLET ORAL ONCE
Status: COMPLETED | OUTPATIENT
Start: 2023-05-23 | End: 2023-05-23

## 2023-05-23 RX ADMIN — BORTEZOMIB 2.5 MG: 1 INJECTION, POWDER, LYOPHILIZED, FOR SOLUTION INTRAVENOUS; SUBCUTANEOUS at 09:46

## 2023-05-23 RX ADMIN — DEXAMETHASONE 40 MG: 4 TABLET ORAL at 09:21

## 2023-05-23 NOTE — PROGRESS NOTES
Patient is here for day 1 cycle 3 of velcade  Patient states his pain is better with the pain medication  Patient is ambulating with a cane and this is improved from wheelchair  Patient did not have his labs drawn  He states this is because he does not drive and has no way to get to the lab  He states Dr Dedrick Nicole told him he can have same day labs drawn on day of treatment  Patient and wife given number to [facility]'s mobile lab and instructed patient to call and see if the lab is able to come to patient's home to draw labs for treatment  They verbalized understanding

## 2023-05-24 ENCOUNTER — OFFICE VISIT (OUTPATIENT)
Dept: PALLIATIVE MEDICINE | Facility: CLINIC | Age: 58
End: 2023-05-24

## 2023-05-24 ENCOUNTER — TELEPHONE (OUTPATIENT)
Dept: LAB | Facility: HOSPITAL | Age: 58
End: 2023-05-24

## 2023-05-24 VITALS
SYSTOLIC BLOOD PRESSURE: 110 MMHG | OXYGEN SATURATION: 97 % | BODY MASS INDEX: 20.76 KG/M2 | WEIGHT: 145 LBS | HEART RATE: 98 BPM | TEMPERATURE: 99.5 F | DIASTOLIC BLOOD PRESSURE: 80 MMHG | HEIGHT: 70 IN

## 2023-05-24 DIAGNOSIS — R63.0 LOSS OF APPETITE: ICD-10-CM

## 2023-05-24 DIAGNOSIS — K59.03 THERAPEUTIC OPIOID-INDUCED CONSTIPATION (OIC): ICD-10-CM

## 2023-05-24 DIAGNOSIS — R11.2 NAUSEA & VOMITING: ICD-10-CM

## 2023-05-24 DIAGNOSIS — R63.4 UNINTENTIONAL WEIGHT LOSS: ICD-10-CM

## 2023-05-24 DIAGNOSIS — F17.210 NICOTINE DEPENDENCE, CIGARETTES, UNCOMPLICATED: ICD-10-CM

## 2023-05-24 DIAGNOSIS — C79.51 METASTASIS TO BONE (HCC): ICD-10-CM

## 2023-05-24 DIAGNOSIS — G89.3 CANCER RELATED PAIN: ICD-10-CM

## 2023-05-24 DIAGNOSIS — G47.00 INSOMNIA: ICD-10-CM

## 2023-05-24 DIAGNOSIS — M54.9 BACK PAIN: ICD-10-CM

## 2023-05-24 DIAGNOSIS — C90.00 MULTIPLE MYELOMA NOT HAVING ACHIEVED REMISSION (HCC): Primary | ICD-10-CM

## 2023-05-24 DIAGNOSIS — Z51.5 PALLIATIVE CARE PATIENT: ICD-10-CM

## 2023-05-24 DIAGNOSIS — T40.2X5A THERAPEUTIC OPIOID-INDUCED CONSTIPATION (OIC): ICD-10-CM

## 2023-05-24 DIAGNOSIS — K21.9 ACID REFLUX: ICD-10-CM

## 2023-05-24 DIAGNOSIS — F10.11 HISTORY OF ALCOHOL ABUSE: ICD-10-CM

## 2023-05-24 RX ORDER — DEXAMETHASONE 1 MG
1 TABLET ORAL
Qty: 30 TABLET | Refills: 0 | Status: SHIPPED | OUTPATIENT
Start: 2023-06-05

## 2023-05-24 RX ORDER — MORPHINE SULFATE 30 MG/1
30 TABLET, FILM COATED, EXTENDED RELEASE ORAL 2 TIMES DAILY
Qty: 60 TABLET | Refills: 0 | Status: SHIPPED | OUTPATIENT
Start: 2023-06-08

## 2023-05-24 RX ORDER — OXYCODONE HYDROCHLORIDE 10 MG/1
10 TABLET ORAL EVERY 4 HOURS PRN
Qty: 90 TABLET | Refills: 0 | Status: SHIPPED | OUTPATIENT
Start: 2023-05-27

## 2023-05-24 NOTE — PROGRESS NOTES
Follow-up with Palliative and 101 Dates   65 y o  male [id number]    ASSESSMENT & PLAN:  1  Multiple myeloma not having achieved remission ([facility] [address] )    2  Metastasis to bone ([facility] [address] )    3  Acid reflux    4  History of alcohol abuse    5  Nicotine dependence, cigarettes, uncomplicated    6  Cancer related pain    7  Back pain    8  Unintentional weight loss    9  Insomnia    10  Loss of appetite    11  Therapeutic opioid-induced constipation (OIC)    12  Palliative care patient    13  Nausea & vomiting          • Continue disease-directed cares  • ACP: Patient has completed advanced directives (the [facility] Advanced Directive) naming his wife as default surrogate healthcare decision-maker(s)  In EMR, reviewed today  Advanced directive counseling given  • Patient reports control of chronic pain has improved since last visit  He has multiple sources of pain, including cancer-related back pain, epigastric / anterior chest wall pain which may be related to malignancy and/or reflux  o Continue pantoprazole 40mg qAM for reflux  o Continue MSER 30mg q12h ATC for long-acting analgesia  Tolerated well  Effective  He declines offer of increased frequency / q8h dosing  o Continue oxyIR 10mg q4-6h PRN moderate-severe pain, max 6 tabs per day  He states today he has #18 tabs remaining from his 5/7/23 Rx of #90 tabs  o Recommended topical OTC products, local application of heat or cold, Tylenol 1000mg TID ATC  Do not use heat on top of topical agents  Do not mix topical agents  o Continue dexamethasone 1mg qAM for appetite and fatigue; may also help with pain  Skip on chemo days as he is getting a large amount of dexamethasone in separate dose that day   o Patient discontinue gabapentin  • May continue Zofran PRN N/V; not needed recently  • Counseled on bowel regimen for constipation  • Patient declined offer of medication for insomnia    • Patient confirms again today that he has abstained from EtOH since "around 01/2023; prior to that he was drinking about \"5 beers\" daily  Positive reinforcement provided  • Reviewed notes (Dietician, [facility], [facility]), labs (5/23/23 Cr 0 45, Na 132, K 3 4, Ca 7 9, , alb 2 9, Hb 11 7; 5/1/23 Na 132, Cr 0 68, alb 2 8, tProt 8 6, , Hb 10 3; 4/10/23 cca 12 2, LDH 2 8), imaging + procedures (5/11/23 CTA ED chest PE study + CXR)  • Return in about 1 month (around 6/24/2023)  • Emotional support provided  • Medication safety issues addressed - no driving under the influence of narcotics (including opioids), watch for adverse effects including AMS or respiratory depression (slowed breathing), keep medications stored in a safe/locked environment, do not use alcohol while opioids or other narcotics are in one's system  Requested Prescriptions     Signed Prescriptions Disp Refills   • oxyCODONE (ROXICODONE) 10 MG TABS 90 tablet 0     Sig: Take 1 tablet (10 mg total) by mouth every 4 (four) hours as needed (cancer-related pain) Max Daily Amount: 60 mg Do not start before May 27, 2023  • morphine (MS CONTIN) 30 mg 12 hr tablet 60 tablet 0     Sig: Take 1 tablet (30 mg total) by mouth 2 (two) times a day Max Daily Amount: 60 mg Do not start before June 8, 2023  • dexamethasone (DECADRON) 1 mg tablet 30 tablet 0     Sig: Take 1 tablet (1 mg total) by mouth daily with breakfast - Skip on chemo day  Do not start before June 5, 2023  Medications Discontinued During This Encounter   Medication Reason   • gabapentin (Neurontin) 300 mg capsule Alternate therapy   • oxyCODONE (ROXICODONE) 10 MG TABS Reorder   • dexamethasone (DECADRON) 1 mg tablet Reorder   • morphine (MS CONTIN) 30 mg 12 hr tablet Reorder       Representatives have queried the patient's controlled substance dispensing history in the Prescription Drug Monitoring Program in compliance with regulations before I have prescribed any controlled substances   The prescription history is " "consistent with prescribed therapy and our practice policies  20+ minutes were spent in this ambulatory visit with greater than 50% of the time spent face to face with patient and his wife in counseling or coordination of care including symptom assessment and management, medication review, psychosocial support, chart review, imaging review, lab review, advanced directives, supportive listening and anticipatory guidance  All of the patient's questions were answered during this discussion  SUBJECTIVE:  Chief Complaint   Patient presents with   • Follow-up   • Medication Refill   • Cancer   • Pain   • Nicotine Dependence   • Counseling   • Weight Loss        HPI    Juan Carlos Bronson  is a 65 y o  male w/ multiple myeloma s/p RT, on RVd therapy; back pain (s/t malignancy), unintentional weight loss  He follows w/ Dr Eunice Perez (Medical Oncology), Dr Mark Lion (Radiation Oncology), Dr Mcbride Sexton [facility])  Patient is known to [facility] [place], operated by [facility]; seen 4/18/23 for symptom assessment and management, medication review, medication adjustment, psychosocial support, chart review, imaging review, lab review, advanced directives, goals of care, supportive listening and anticipatory guidance  Patient reports that with MSER on board, his chronic pain has overall improved  He is not using 10mg oxyIR about 4 times per day  He has discontinued Tylenol (though [facility] recommends he continue) and discontinued gabapentin, as he felt those medications were not helpful  He is tolerating his opioid regimen with only some intermittent constipation, which is managed w/ dietary changes and OTC medications  With better pain control, patient reports he can be more active (though he is responsible about his activity and does not push himself too hard)  When pain was poorly controlled, he could \"barely get out of the chair\"  He has ongoing sleep issues despite pain relief, though   He states \"I don't sleep\", later clarifying that some nights he can get " "\"three or four hours\" of sleep  He is comfortable resting and napping during they day, as \"I got nothing else to do\"  Patient endorses some stress / anxiousness about financial concerns  His new insurance is helping with that aspect, as medications are better-covered  His appetite has improved somewhat and weight loss seems to have slowed (but he has not regained weight)  PDMP shows no concerns  The following portions of the medical history were reviewed: past medical history, surgical history, problem list, medication list, family history, and social history  Current Outpatient Medications:   •  acetaminophen (TYLENOL) 500 mg tablet, Take 2 tablets (1,000 mg total) by mouth 3 (three) times a day, Disp: 180 tablet, Rfl: 2  •  [START ON 6/5/2023] dexamethasone (DECADRON) 1 mg tablet, Take 1 tablet (1 mg total) by mouth daily with breakfast - Skip on chemo day  Do not start before June 5, 2023 , Disp: 30 tablet, Rfl: 0  •  lenalidomide (REVLIMID) 25 MG CAPS, Take 25 mg by mouth daily on days 1-7 of a 21 day cycle [id number], Disp: 7 capsule, Rfl: 0  •  [START ON 6/8/2023] morphine (MS CONTIN) 30 mg 12 hr tablet, Take 1 tablet (30 mg total) by mouth 2 (two) times a day Max Daily Amount: 60 mg Do not start before June 8, 2023 , Disp: 60 tablet, Rfl: 0  •  naloxone (NARCAN) 4 mg/0 1 mL nasal spray, Administer 1 spray into a nostril   If no response after 2-3 minutes, give another dose in the other nostril using a new spray , Disp: 1 each, Rfl: 1  •  ondansetron (ZOFRAN) 4 mg tablet, Take 1 tablet (4 mg total) by mouth every 8 (eight) hours as needed for nausea or vomiting, Disp: 20 tablet, Rfl: 0  •  [START ON 5/27/2023] oxyCODONE (ROXICODONE) 10 MG TABS, Take 1 tablet (10 mg total) by mouth every 4 (four) hours as needed (cancer-related pain) Max Daily Amount: 60 mg Do not start before May 27, 2023 , Disp: 90 tablet, Rfl: 0  •  pantoprazole (PROTONIX) 40 mg tablet, TAKE 1 TABLET BY MOUTH EVERY DAY, Disp: 90 " "tablet, Rfl: 0  •  acyclovir (ZOVIRAX) 400 MG tablet, Take 1 tablet (400 mg total) by mouth 2 (two) times a day, Disp: 60 tablet, Rfl: 11  No current facility-administered medications for this visit  Facility-Administered Medications Ordered in Other Visits:   •  alteplase (CATHFLO) injection 2 mg, 2 mg, Intracatheter, Q1MIN PRN, Felix Cannon MD    Review of Systems   Constitutional: Positive for activity change (improving), appetite change (improved), fatigue and unexpected weight change (weight loss has stabilized in recent weeks)  HENT: Positive for dental problem (chronic)  Gastrointestinal: Positive for constipation (managed w/ diet / OTC medications)  Negative for nausea and vomiting  Reflux managed  Some gas / bloating / eructation  Musculoskeletal: Positive for back pain (improved)  Allergic/Immunologic: Positive for immunocompromised state  Psychiatric/Behavioral: Positive for sleep disturbance  The patient is nervous/anxious (stress d/t financial issues)  All other systems reviewed and are negative  OBJECTIVE:  /80 (BP Location: Left arm, Patient Position: Sitting, Cuff Size: Standard)   Pulse 98   Temp 99 5 °F (37 5 °C) (Temporal)   Ht 5' 10\" (1 778 m)   Wt 65 8 kg (145 lb)   SpO2 97%   BMI 20 81 kg/m²   Physical Exam  Vitals reviewed  Constitutional:       General: He is not in acute distress  Appearance: He is well-groomed and underweight  He is ill-appearing (chronically)  He is not toxic-appearing  HENT:      Head: Normocephalic and atraumatic  Right Ear: External ear normal       Left Ear: External ear normal       Mouth/Throat:      Dentition: Abnormal dentition (missing teeth)  Dental caries present  Eyes:      General: No scleral icterus  Right eye: No discharge  Left eye: No discharge  Extraocular Movements: Extraocular movements intact        Conjunctiva/sclera: Conjunctivae normal       Pupils: Pupils are equal, round, " "and reactive to light  Cardiovascular:      Rate and Rhythm: Normal rate  Pulmonary:      Effort: Pulmonary effort is normal  No tachypnea, bradypnea, accessory muscle usage or respiratory distress  Comments: Able to speak comfortably in complete sentences on room air at rest   Abdominal:      General: There is no distension  Tenderness: There is no guarding  Musculoskeletal:      Cervical back: Normal range of motion  Right lower leg: No edema  Left lower leg: No edema  Skin:     General: Skin is dry  Coloration: Skin is not pale  Neurological:      Mental Status: He is alert and oriented to person, place, and time  Cranial Nerves: No dysarthria or facial asymmetry  Gait: Gait abnormal (using 1PC)  Psychiatric:         Attention and Perception: Attention normal          Mood and Affect: Mood and affect normal          Speech: Speech is rapid and pressured (at times)  Behavior: Behavior normal  Behavior is cooperative  Thought Content: Thought content normal          Cognition and Memory: Cognition and memory normal           Patrice Hagan MD  [facility]  [phone]    Portions of this document may have been created using dictation software and as such some \"sound alike\" terms may have been generated by the system  Do not hesitate to contact me with any questions or clarifications     "

## 2023-05-24 NOTE — PATIENT INSTRUCTIONS
It was good to see you today  Thank you for coming in  Tylenol, 1000mg three times per day every day, can be a safe and effective way to reduce chronic pain  I'm glad the long-acting morphine is helping  Continue at every 12 hours for now  Refill on/after 6/8/23  Continue oxycodone as needed / as indicated  Refill on/after 5/27/23 (Saturday)  For the back / side pain:  Try a heating pad or ice pack (you can alternate these about 30 minutes apart) for neck and back pain  You may consider topical 4% lidocaine products as well (available over-the-counter; brand names include Salonpas, Biofreeze, Aspercreme, [address], etc)  These can be patches, creams or roll-on gels  Do not use topical product under a heating pad; ensure skin is clean and dry  When we use opioids for pain control, constipation is common and patients should act to prevent it:  Drink PLENTY of water  This is important to keep the gut moving  Some people have success w/ using prunes, prune juice, certain fruits or vegetables (apples, bananas, prunes, pears, raspberries, and vegetables like string beans, broccoli, spinach, kale, squash, lentils, peas, and beans), or fiber gummies  Try a probiotic  This could be yogurt or kefir, or fermented beverages such as kombucha, but probiotics are also available in capsule form  Aim for 10-15 billion colony-forming-units, w/ bacteria such as Lactobacillus / Saccharomyces / Actinomyces  Osmotic laxatives (Miralax, magnesium citrate, Milk of Magnesia) can be very useful for opioid-induced constipation (OIC); take daily to prevent OIC  Bulk laxatives (Citrucel, Metamucil, Fibercon, Benefiber, wheat germ) are useful for constipation in patients who are not taking opioids, but are not recommended if you are taking opioids  Colace is good for softening hard stools, or preventing constipation when opioids are being used - but does not stimulate the bowel to move things along once constipation has occurred    You "can use senna, 1 to 2 tabs, once or twice daily as needed for constipation  Use as directed on the box/bottle  Senna is also available in a tea (\"Smooth Move\")  Should that not be enough for your constipation, you can try Dulcolax  Should that not be enough, consider an enema  All of these medications are available over-the-counter  Return in about 1 month (around 6/24/2023)  Call us for refills on medications that we supply, as needed  If something changes and you need to come in sooner, please call our office  PRESCRIPTION REFILL REMINDER:  All medication refills should be requested prior to [facility] on Friday  Any refill requests after noon on Friday would be addressed the following Monday  MEDICATION SAFETY ISSUES:  Do not drive under the influence of narcotics (including opioids), watch for adverse effects including confusion / altered mental status / respiratory depression (slowed breathing), keep medications stored in a safe/locked environment, do not use alcohol while opioids or other narcotics are in your system     "

## 2023-05-26 ENCOUNTER — HOSPITAL ENCOUNTER (OUTPATIENT)
Dept: INFUSION CENTER | Facility: CLINIC | Age: 58
End: 2023-05-26

## 2023-05-26 VITALS
RESPIRATION RATE: 16 BRPM | SYSTOLIC BLOOD PRESSURE: 114 MMHG | HEIGHT: 70 IN | WEIGHT: 147.5 LBS | DIASTOLIC BLOOD PRESSURE: 66 MMHG | BODY MASS INDEX: 21.11 KG/M2 | HEART RATE: 92 BPM | TEMPERATURE: 97.9 F | OXYGEN SATURATION: 94 %

## 2023-05-26 DIAGNOSIS — C90.00 MULTIPLE MYELOMA NOT HAVING ACHIEVED REMISSION (HCC): Primary | ICD-10-CM

## 2023-05-26 RX ADMIN — BORTEZOMIB 2.5 MG: 1 INJECTION, POWDER, LYOPHILIZED, FOR SOLUTION INTRAVENOUS; SUBCUTANEOUS at 08:54

## 2023-05-26 NOTE — PROGRESS NOTES
Patient here for Carlie 32 reviewed form 5/23/23  Injection given in Right Abdomen and he tolerated well    Next appointment confirmed, he declined AVS

## 2023-05-30 ENCOUNTER — HOSPITAL ENCOUNTER (OUTPATIENT)
Dept: INFUSION CENTER | Facility: CLINIC | Age: 58
Discharge: HOME | End: 2023-05-30
Payer: COMMERCIAL

## 2023-05-30 VITALS
OXYGEN SATURATION: 98 % | TEMPERATURE: 97.5 F | DIASTOLIC BLOOD PRESSURE: 69 MMHG | WEIGHT: 153 LBS | HEIGHT: 70 IN | SYSTOLIC BLOOD PRESSURE: 109 MMHG | RESPIRATION RATE: 16 BRPM | BODY MASS INDEX: 21.9 KG/M2 | HEART RATE: 98 BPM

## 2023-05-30 DIAGNOSIS — C90.00 MULTIPLE MYELOMA NOT HAVING ACHIEVED REMISSION (HCC): Primary | ICD-10-CM

## 2023-05-30 PROCEDURE — 96401 CHEMO ANTI-NEOPL SQ/IM: CPT

## 2023-05-30 RX ORDER — DEXAMETHASONE 4 MG/1
40 TABLET ORAL ONCE
Status: COMPLETED | OUTPATIENT
Start: 2023-05-30 | End: 2023-05-30

## 2023-05-30 RX ADMIN — DEXAMETHASONE 40 MG: 4 TABLET ORAL at 08:18

## 2023-05-30 RX ADMIN — BORTEZOMIB 2.5 MG: 1 INJECTION, POWDER, LYOPHILIZED, FOR SOLUTION INTRAVENOUS; SUBCUTANEOUS at 08:20

## 2023-05-30 NOTE — PROGRESS NOTES
Pt to clinic for day 8 of velcade   Pt tolerated in LLQ without complications, aware of next appointment, declined avs

## 2023-06-02 ENCOUNTER — HOSPITAL ENCOUNTER (OUTPATIENT)
Dept: INFUSION CENTER | Facility: CLINIC | Age: 58
End: 2023-06-02
Payer: COMMERCIAL

## 2023-06-02 VITALS
SYSTOLIC BLOOD PRESSURE: 103 MMHG | WEIGHT: 156 LBS | HEIGHT: 70 IN | RESPIRATION RATE: 16 BRPM | OXYGEN SATURATION: 97 % | HEART RATE: 91 BPM | DIASTOLIC BLOOD PRESSURE: 70 MMHG | TEMPERATURE: 98.5 F | BODY MASS INDEX: 22.33 KG/M2

## 2023-06-02 DIAGNOSIS — C90.00 MULTIPLE MYELOMA NOT HAVING ACHIEVED REMISSION (HCC): Primary | ICD-10-CM

## 2023-06-02 PROCEDURE — 96401 CHEMO ANTI-NEOPL SQ/IM: CPT

## 2023-06-02 RX ADMIN — BORTEZOMIB 2.5 MG: 1 INJECTION, POWDER, LYOPHILIZED, FOR SOLUTION INTRAVENOUS; SUBCUTANEOUS at 08:46

## 2023-06-02 NOTE — PROGRESS NOTES
Patient tolerated Velcade injection into right lower abdomen today without complications   Patient confirmed upcoming appointment and declined AVS

## 2023-06-05 ENCOUNTER — APPOINTMENT (OUTPATIENT)
Dept: LAB | Facility: HOSPITAL | Age: 58
End: 2023-06-05
Attending: INTERNAL MEDICINE
Payer: COMMERCIAL

## 2023-06-05 DIAGNOSIS — C90.00 MULTIPLE MYELOMA NOT HAVING ACHIEVED REMISSION (HCC): ICD-10-CM

## 2023-06-05 LAB
ALBUMIN SERPL BCP-MCNC: 2 G/DL (ref 3.5–5)
ALP SERPL-CCNC: 814 U/L (ref 46–116)
ALT SERPL W P-5'-P-CCNC: 12 U/L (ref 12–78)
ANION GAP SERPL CALCULATED.3IONS-SCNC: 2 MMOL/L (ref 4–13)
ANISOCYTOSIS BLD QL SMEAR: PRESENT
AST SERPL W P-5'-P-CCNC: 14 U/L (ref 5–45)
BASOPHILS # BLD MANUAL: 0 THOUSAND/UL (ref 0–0.1)
BASOPHILS NFR MAR MANUAL: 0 % (ref 0–1)
BILIRUB SERPL-MCNC: 0.57 MG/DL (ref 0.2–1)
BUN SERPL-MCNC: 6 MG/DL (ref 5–25)
CALCIUM ALBUM COR SERPL-MCNC: 9.4 MG/DL (ref 8.3–10.1)
CALCIUM SERPL-MCNC: 7.8 MG/DL (ref 8.3–10.1)
CHLORIDE SERPL-SCNC: 108 MMOL/L (ref 96–108)
CO2 SERPL-SCNC: 25 MMOL/L (ref 21–32)
CREAT SERPL-MCNC: 0.42 MG/DL (ref 0.6–1.3)
EOSINOPHIL # BLD MANUAL: 0 THOUSAND/UL (ref 0–0.4)
EOSINOPHIL NFR BLD MANUAL: 0 % (ref 0–6)
ERYTHROCYTE [DISTWIDTH] IN BLOOD BY AUTOMATED COUNT: 18.3 % (ref 11.6–15.1)
GFR SERPL CREATININE-BSD FRML MDRD: 130 ML/MIN/1.73SQ M
GLUCOSE P FAST SERPL-MCNC: 112 MG/DL (ref 65–99)
HCT VFR BLD AUTO: 31.3 % (ref 36.5–49.3)
HGB BLD-MCNC: 10.2 G/DL (ref 12–17)
LG PLATELETS BLD QL SMEAR: PRESENT
LYMPHOCYTES # BLD AUTO: 0.15 THOUSAND/UL (ref 0.6–4.47)
LYMPHOCYTES # BLD AUTO: 3 % (ref 14–44)
MCH RBC QN AUTO: 32.6 PG (ref 26.8–34.3)
MCHC RBC AUTO-ENTMCNC: 32.6 G/DL (ref 31.4–37.4)
MCV RBC AUTO: 100 FL (ref 82–98)
MONOCYTES # BLD AUTO: 0.15 THOUSAND/UL (ref 0–1.22)
MONOCYTES NFR BLD: 3 % (ref 4–12)
NEUTROPHILS # BLD MANUAL: 4.69 THOUSAND/UL (ref 1.85–7.62)
NEUTS BAND NFR BLD MANUAL: 4 % (ref 0–8)
NEUTS SEG NFR BLD AUTO: 87 % (ref 43–75)
PLATELET # BLD AUTO: 58 THOUSANDS/UL (ref 149–390)
PLATELET BLD QL SMEAR: ABNORMAL
PMV BLD AUTO: 10.8 FL (ref 8.9–12.7)
POIKILOCYTOSIS BLD QL SMEAR: PRESENT
POLYCHROMASIA BLD QL SMEAR: PRESENT
POTASSIUM SERPL-SCNC: 3.9 MMOL/L (ref 3.5–5.3)
PROMYELOCYTES NFR BLD MANUAL: 1 % (ref 0–0)
PROT SERPL-MCNC: 5.3 G/DL (ref 6.4–8.4)
RBC # BLD AUTO: 3.13 MILLION/UL (ref 3.88–5.62)
RBC MORPH BLD: PRESENT
SODIUM SERPL-SCNC: 135 MMOL/L (ref 135–147)
TARGETS BLD QL SMEAR: PRESENT
VARIANT LYMPHS # BLD AUTO: 2 %
WBC # BLD AUTO: 5.15 THOUSAND/UL (ref 4.31–10.16)

## 2023-06-05 PROCEDURE — 36415 COLL VENOUS BLD VENIPUNCTURE: CPT

## 2023-06-05 PROCEDURE — 80053 COMPREHEN METABOLIC PANEL: CPT

## 2023-06-05 PROCEDURE — 85007 BL SMEAR W/DIFF WBC COUNT: CPT

## 2023-06-05 PROCEDURE — 85027 COMPLETE CBC AUTOMATED: CPT

## 2023-06-06 RX ORDER — DEXAMETHASONE 4 MG/1
40 TABLET ORAL ONCE
Start: 2023-06-20

## 2023-06-06 RX ORDER — DEXAMETHASONE 4 MG/1
40 TABLET ORAL ONCE
Status: CANCELLED
Start: 2023-06-13

## 2023-06-07 ENCOUNTER — TELEPHONE (OUTPATIENT)
Dept: HEMATOLOGY ONCOLOGY | Facility: MEDICAL CENTER | Age: 58
End: 2023-06-07

## 2023-06-07 DIAGNOSIS — C90.00 MULTIPLE MYELOMA NOT HAVING ACHIEVED REMISSION (HCC): Primary | ICD-10-CM

## 2023-06-07 NOTE — TELEPHONE ENCOUNTER
Per Chelsi Banuelos RN:  Patient will need PFT,ECHO, EKG and CXR prior to stem cell transplant  Spoke with wife Trenton Marquez set up [facility] transport

## 2023-06-08 ENCOUNTER — TELEPHONE (OUTPATIENT)
Dept: HEMATOLOGY ONCOLOGY | Facility: CLINIC | Age: 58
End: 2023-06-08

## 2023-06-08 NOTE — TELEPHONE ENCOUNTER
Returned call to spouse  Patient is scheduled for [employer] [facility] duty on July 10th   She is aware I will write letter excusing patient and will fax to [phone]

## 2023-06-08 NOTE — TELEPHONE ENCOUNTER
Patient Call    Who are you speaking with? Spouse    If it is not the patient, are they listed on an active communication consent form? Yes   What is the reason for this call? Brooklyn Xie calling in stating that she needs a written excuse stating that the patient is unable to do jury duty  She states it can be faxed to [phone]   Does this require a call back? No   If a call back is required, please list best call back number [phone]   If a call back is required, advise that a message will be forwarded to their care team and someone will return their call as soon as possible  Did you relay this information to the patient?  No

## 2023-06-13 ENCOUNTER — HOSPITAL ENCOUNTER (OUTPATIENT)
Dept: INFUSION CENTER | Facility: CLINIC | Age: 58
Discharge: HOME | End: 2023-06-13
Payer: COMMERCIAL

## 2023-06-13 VITALS
WEIGHT: 154.5 LBS | DIASTOLIC BLOOD PRESSURE: 74 MMHG | OXYGEN SATURATION: 97 % | HEART RATE: 79 BPM | SYSTOLIC BLOOD PRESSURE: 105 MMHG | HEIGHT: 70 IN | TEMPERATURE: 97.1 F | BODY MASS INDEX: 22.12 KG/M2 | RESPIRATION RATE: 16 BRPM

## 2023-06-13 DIAGNOSIS — C90.00 MULTIPLE MYELOMA NOT HAVING ACHIEVED REMISSION (HCC): Primary | ICD-10-CM

## 2023-06-13 RX ORDER — DEXAMETHASONE 4 MG/1
40 TABLET ORAL ONCE
Status: COMPLETED | OUTPATIENT
Start: 2023-06-13 | End: 2023-06-13

## 2023-06-13 RX ADMIN — DEXAMETHASONE 40 MG: 4 TABLET ORAL at 09:21

## 2023-06-13 RX ADMIN — BORTEZOMIB 2.5 MG: 1 INJECTION, POWDER, LYOPHILIZED, FOR SOLUTION INTRAVENOUS; SUBCUTANEOUS at 10:09

## 2023-06-13 NOTE — PROGRESS NOTES
Pt tolerated velcade injection well to LQ abd  Pt and wife aware of next appt and stated already having a print out

## 2023-06-13 NOTE — PROGRESS NOTES
Pt here for velcade injection  Pt offered no complaints today  Platelets=58 Messaged Quinteros Crimes RN with Dr Ely Plummer  Aware of count  And treatment parameters adjusted  Ok to treat today  And ok to use labs from 6/5/23 (8 days)

## 2023-06-14 RX ORDER — DEXAMETHASONE 4 MG/1
40 TABLET ORAL ONCE
Start: 2023-07-03

## 2023-06-14 RX ORDER — DEXAMETHASONE 4 MG/1
40 TABLET ORAL ONCE
Start: 2023-07-10

## 2023-06-16 ENCOUNTER — HOSPITAL ENCOUNTER (OUTPATIENT)
Dept: INFUSION CENTER | Facility: CLINIC | Age: 58
End: 2023-06-16
Payer: COMMERCIAL

## 2023-06-16 ENCOUNTER — TELEPHONE (OUTPATIENT)
Dept: HEMATOLOGY ONCOLOGY | Facility: CLINIC | Age: 58
End: 2023-06-16

## 2023-06-16 VITALS
OXYGEN SATURATION: 98 % | DIASTOLIC BLOOD PRESSURE: 63 MMHG | RESPIRATION RATE: 18 BRPM | BODY MASS INDEX: 22.6 KG/M2 | HEART RATE: 71 BPM | SYSTOLIC BLOOD PRESSURE: 101 MMHG | HEIGHT: 70 IN | TEMPERATURE: 96.9 F | WEIGHT: 157.85 LBS

## 2023-06-16 DIAGNOSIS — Z51.5 PALLIATIVE CARE PATIENT: ICD-10-CM

## 2023-06-16 DIAGNOSIS — M54.9 BACK PAIN: ICD-10-CM

## 2023-06-16 DIAGNOSIS — C79.51 METASTASIS TO BONE (HCC): ICD-10-CM

## 2023-06-16 DIAGNOSIS — G89.3 CANCER RELATED PAIN: ICD-10-CM

## 2023-06-16 DIAGNOSIS — C90.00 MULTIPLE MYELOMA NOT HAVING ACHIEVED REMISSION (HCC): Primary | ICD-10-CM

## 2023-06-16 DIAGNOSIS — C90.00 MULTIPLE MYELOMA NOT HAVING ACHIEVED REMISSION (HCC): ICD-10-CM

## 2023-06-16 PROCEDURE — 96401 CHEMO ANTI-NEOPL SQ/IM: CPT

## 2023-06-16 RX ORDER — OXYCODONE HYDROCHLORIDE 10 MG/1
10 TABLET ORAL EVERY 4 HOURS PRN
Qty: 90 TABLET | Refills: 0 | Status: SHIPPED | OUTPATIENT
Start: 2023-06-16

## 2023-06-16 RX ADMIN — BORTEZOMIB 2.5 MG: 1 INJECTION, POWDER, LYOPHILIZED, FOR SOLUTION INTRAVENOUS; SUBCUTANEOUS at 08:57

## 2023-06-16 NOTE — PROGRESS NOTES
Velcade admin  SQ in RUQ without incident  Pt  states he will contact Dr Beckwith Section office to see if he should have labs drawn before next appt  for day 8 or day 11 treatment  AVS declined  Next appt  confirmed

## 2023-06-16 NOTE — TELEPHONE ENCOUNTER
Lab Inquiry   Who are you speaking with? wife     If it is not the patient, are they listed on an active communication consent form? yes   Name of ordering provider Li Liu   What is being requested? Lab orders to mobile lab coming to home to draw samples   Lab draw location home   What is the best call back number?  [phone]

## 2023-06-16 NOTE — PROGRESS NOTES
Pt  offers no complaints  Labs are dated 6/5  Okay to proceed with treatment today, Day 4, without drawing new labs per Kyle Marroquin RN  Discussed with the patient and his wife the need for labwork to be done within 7 days of his next treatment cycle, they both verbalized understanding

## 2023-06-19 ENCOUNTER — DOCUMENTATION (OUTPATIENT)
Dept: HEMATOLOGY ONCOLOGY | Facility: MEDICAL CENTER | Age: 58
End: 2023-06-19

## 2023-06-19 ENCOUNTER — TELEPHONE (OUTPATIENT)
Dept: LAB | Facility: HOSPITAL | Age: 58
End: 2023-06-19

## 2023-06-19 NOTE — PROGRESS NOTES
Left voicemail message informing patient of his follow up with Dr Kaylyn Clark on Wed 6/21/23 at [address] [place] [address] Transport was also notified  I asked that he call back to confirm appointment and confirm if [facility] is needed

## 2023-06-20 ENCOUNTER — HOSPITAL ENCOUNTER (OUTPATIENT)
Dept: INFUSION CENTER | Facility: CLINIC | Age: 58
Discharge: HOME | End: 2023-06-20
Payer: COMMERCIAL

## 2023-06-20 VITALS
BODY MASS INDEX: 21.21 KG/M2 | WEIGHT: 148.15 LBS | SYSTOLIC BLOOD PRESSURE: 116 MMHG | OXYGEN SATURATION: 97 % | RESPIRATION RATE: 16 BRPM | DIASTOLIC BLOOD PRESSURE: 76 MMHG | HEIGHT: 70 IN | HEART RATE: 90 BPM | TEMPERATURE: 98.5 F

## 2023-06-20 DIAGNOSIS — C90.00 MULTIPLE MYELOMA NOT HAVING ACHIEVED REMISSION (HCC): Primary | ICD-10-CM

## 2023-06-20 PROCEDURE — 96401 CHEMO ANTI-NEOPL SQ/IM: CPT

## 2023-06-20 RX ORDER — DEXAMETHASONE 4 MG/1
40 TABLET ORAL ONCE
Status: COMPLETED | OUTPATIENT
Start: 2023-06-20 | End: 2023-06-20

## 2023-06-20 RX ADMIN — DEXAMETHASONE 40 MG: 4 TABLET ORAL at 08:49

## 2023-06-20 RX ADMIN — BORTEZOMIB 2.5 MG: 1 INJECTION, POWDER, LYOPHILIZED, FOR SOLUTION INTRAVENOUS; SUBCUTANEOUS at 09:06

## 2023-06-21 ENCOUNTER — OFFICE VISIT (OUTPATIENT)
Dept: HEMATOLOGY ONCOLOGY | Facility: CLINIC | Age: 58
End: 2023-06-21
Payer: COMMERCIAL

## 2023-06-21 VITALS
DIASTOLIC BLOOD PRESSURE: 74 MMHG | BODY MASS INDEX: 21.33 KG/M2 | TEMPERATURE: 97.9 F | WEIGHT: 149 LBS | HEART RATE: 90 BPM | OXYGEN SATURATION: 98 % | SYSTOLIC BLOOD PRESSURE: 126 MMHG | HEIGHT: 70 IN

## 2023-06-21 DIAGNOSIS — C79.51 METASTASIS TO BONE (HCC): Primary | ICD-10-CM

## 2023-06-21 DIAGNOSIS — C90.00 MULTIPLE MYELOMA NOT HAVING ACHIEVED REMISSION (HCC): ICD-10-CM

## 2023-06-21 PROCEDURE — 99214 OFFICE O/P EST MOD 30 MIN: CPT | Performed by: INTERNAL MEDICINE

## 2023-06-21 NOTE — PROGRESS NOTES
Gallito Nunez   1965  [facility]  [address] [place] [id number]    DISCUSSION/SUMMARY:    42-year-old male with no significant past medical history (but no medical follow-up recently) recently presenting to the ER with mid back pain and weight loss  Issues:    Multiple myeloma  Patient was seen in the ER on February 8, 2023  CTA of the chest did not demonstrate any PE but patient had multiple lytic lesions and compression fractures in T6 and T10  Additional work-up demonstrated elevated total protein, elevated IgG and an elevated lambda  Immunofixation demonstrated IgG lambda monoclonal protein  CBC parameters and renal function were within normal limits  Bone marrow biopsy demonstrated a lambda restricted plasma cell neoplasm, 80% of the cells were malignant  PET/CT demonstrated multiple osseous hypermetabolic lytic lesions  Patient was seen by Dr Fredi Ulloa in second opinion  The plan is RVD x 4 cycles and then auto stem cell transplant if no evidence of progression and no complications  Multiple myeloma international staging system = II, median survival is 44 months (albumin = 2 2, beta-2 microglobulin = 2 8)    NCCN guidelines 3 2023 state that for patients with multiple myeloma in need of treatment, possible transplant candidate, preferred regimen is bortezomib, lenalidomide and dexamethasone  Regimen  Bortezomib 1 3 mg/m2 subcu on days 1, 4, 8 and 11  Lenalidomide 25 mg orally days 1-7 (dose reduced on the first cycle)  Dexamethasone 40 mg by mouth on days 1, 8 and 15  Cycle length = 21 days x 3-4 cycles    Patient is in the middle of his fourth cycle  Patient seems to be tolerating the chemotherapy relatively well  Back pain and rib cage pain is less/better  WBC and hemoglobin level are okay; platelet count has been more effective, 58 K  Patient denies any bruising or bleeding issues    This is being monitored  The plan is to complete out the fourth cycle  Patient has a pending tentative appointment for mid July 2023 down at [facility]  Back pain - better  Dr Mike Kaur was kind enough to review the patient's thoracic MRI (unofficially) previously and agreed that a radiation oncology evaluation was indicated  RT to the spine has been completed  Other body aches  Patient has sternal and rib cage pain  Patient has been seen by palliative care  Pain control is ongoing, this has been difficult because of patient's insurance and inability to pay out-of-pocket  Weight loss  This has been more stable recently  Patient will continue to monitor  Poor dentition  Patient denies any pain, swelling or bleeding  Apparently Mr Reyes Lie has had significantly carried teeth for many years  Because of this, antiresorptive therapy is contraindicated  Patient is to return in 2 months but this may change depending upon the above     Mr Reyes Lie knows to call the hematology/oncology office if there are any other questions or concerns  Patient states having all required medications at home  Carefully review your medication list and verify that the list is accurate and up-to-date  Please call the hematology/oncology office if there are medications missing from the list, medications on the list that you are not currently taking or if there is a dosage or instruction that is different from how you're taking that medication  Patient goals and areas of care: Continue with neoadjuvant treatment  Barriers to care: None  Patient is able to self-care  ______________________________________________________________________________________    Chief Complaint   Patient presents with   • Follow-up   • Multiple myeloma on chemotherapy     History of Present Illness: 65year old male with relatively good general health recently developing mid back pain  Mr Reyes Lie states that the pain began in early January 2023    Patient had lost approximately 20+ pounds over the past few months  Work-up included bone marrow biopsy and PET/CT  Patient has widespread osteolytic lesions  Bone marrow biopsy demonstrated greater than 80% plasma cells  Patient received RT to the spine  Mr Marleen Pearson is now on systemic treatment - RVD  Patient is in the middle of his fourth cycle  Patient states feeling okay, better than before  Back pain and rib cage pain is less/better than before  Activities have improved  No fevers or signs of infection  Appetite is okay, weight is stable  No other problems with the chemotherapy  Patient does not have a PCP, no recent medical follow-ups, no COVID vaccines - personal decision  No COVID infections  Review of Systems   Constitutional: Positive for activity change, appetite change, fatigue and unexpected weight change  HENT: Negative  Eyes: Negative  Respiratory: Negative  Cardiovascular: Negative  Gastrointestinal: Negative  Endocrine: Negative  Genitourinary: Negative  Musculoskeletal: Positive for arthralgias  Negative for back pain  Skin: Negative  Allergic/Immunologic: Negative  Neurological: Negative  Hematological: Negative  Psychiatric/Behavioral: Negative  All other systems reviewed and are negative      Past Surgical History:   Procedure Laterality Date   • APPENDECTOMY     • IR BIOPSY BONE MARROW  3/15/2023   Past surgical history: No prior blood transfusions    Family History   Problem Relation Age of Onset   • Diabetes Mother    • Heart disease Father    • Diabetes Father    Family history: No known familial or genetic diseases, no children    Social History     Socioeconomic History   • Marital status: /Civil Union     Spouse name: Not on file   • Number of children: Not on file   • Years of education: Not on file   • Highest education level: Not on file   Occupational History   • Not on file   Tobacco Use   • Smoking status: Some Days     Types: Cigarettes   • Smokeless tobacco: Not on file   • Tobacco comments:     Smoking 1 cigarette daily   Vaping Use   • Vaping Use: Never used   Substance and Sexual Activity   • Alcohol use: Not Currently   • Drug use: Not Currently     Types: Marijuana     Comment: once a month   • Sexual activity: Not on file   Other Topics Concern   • Not on file   Social History Narrative    From 2/28/23  note:    Emergency Contact: Dee Martinez (LINDA)[phone] (Home Phone)    Marital Status: Single     Interpretation concerns, speaks another language, preferred language: english    Caregiver/Support: Mayur    Housing: two story     Home Setup: one level     Lives With: SO    Daily Living Activities: independent     Ambulation: independent    Employment: yes     [place] Status/Location: no     Ability to pay bills: yes  No barriers to paying bills  POA/LW/AD: no   Karlie is healthcare representative  MSW emailed advance directive form  Transportation Plan/Concerns: Patient states he transports self  MSW educated on [employer] transport services  Social Determinants of Health     Financial Resource Strain: Not on file   Food Insecurity: Not on file   Transportation Needs: Not on file   Physical Activity: Not on file   Stress: Not on file   Social Connections: Not on file   Intimate Partner Violence: Not on file   Housing Stability: Not on file   Social history: Patient smokes 3 to 4 cigarettes a day, recently discontinued, approximately 5 beers a day, also recently discontinued, no drug abuse, no toxic exposure    No Known Allergies    Vitals:    06/21/23 0824   BP: 126/74   Pulse: 90   Temp: 97 9 °F (36 6 °C)   SpO2: 98%     Physical Exam  Constitutional:       Appearance: He is well-developed  Comments: Middle-age male, thin appearing, + evidence of pain   HENT:      Head: Normocephalic and atraumatic        Right Ear: External ear normal       Left Ear: External ear normal    Eyes: Conjunctiva/sclera: Conjunctivae normal       Pupils: Pupils are equal, round, and reactive to light  Cardiovascular:      Rate and Rhythm: Normal rate and regular rhythm  Heart sounds: Normal heart sounds  Pulmonary:      Effort: Pulmonary effort is normal       Breath sounds: Normal breath sounds  Comments: Fair to good entry bilaterally, scattered rhonchi  Abdominal:      General: Bowel sounds are normal       Palpations: Abdomen is soft  Comments: + Bowel sounds, nontender, soft, no hepatosplenomegaly, no guarding   Musculoskeletal:         General: Normal range of motion  Cervical back: Normal range of motion and neck supple  Comments: + Tenderness in bilateral thoracic rib cage   Skin:     General: Skin is warm  Comments: Slightly pale, warm, moist, no petechiae or ecchymoses   Neurological:      Mental Status: He is alert and oriented to person, place, and time  Deep Tendon Reflexes: Reflexes are normal and symmetric  Psychiatric:         Behavior: Behavior normal          Thought Content:  Thought content normal          Judgment: Judgment normal      Extremities: Lower extreme edema bilaterally, no cords, pulses are 1+ l  ymphatics: No adenopathy in the neck, supraclavicular region, axilla bilaterally    Labs    6/5/2023 WBC = 5 15 hemoglobin = 10 2 hematocrit = 31 3 platelet = 58 BUN = 6 creatinine = 0 42 calcium = 7 8 AST = 14 ALT = 12 alkaline phosphatase = 814 total protein = 5 3 total bilirubin = 0 57    4/10/2023 WBC = 3 14 hemoglobin = 10 3 hematocrit = 32 5 platelet = 104 neutrophil = 81% calcium = 10 8 BUN = 10 creatinine = 0 89 total protein = 9 8 albumin = 2 2 LDH = 347 beta-2 microglobulin = 2 8    3/15/2023 WBC = 6 86 hemoglobin = 12 8 hematocrit = 36 6 platelet = 374 neutrophil = 69%  2/8/2023 WBC = 6 6 hemoglobin = 12 6 hematocrit = 36 MCV = 96 platelet = 636 neutrophil = 67% bands = 1% lymphocyte = 23% monocyte = 8% basophil = 1% BUN = 21 creatinine = 1 17 calcium = 10 7 AST = 23 ALT = 14 alkaline phosphatase = 166 total protein = 9 7 albumin = 3 3 total bilirubin = 0 36 TSH = 0 680  2/8/2023 immunofixation demonstrated monoclonal gammopathy identified his IgG lambda (3 37 g/deciliter)  2/8/2023 SPEP showed a monoclonal peak in the gamma region, gamma concentration = 3 52 g/deciliter, total protein = 8 3  2/8/2023 IgA = 20 8 = decreased IgG = 2000 900 = elevated IgM = 11 = decreased  2/8/2023 Free kappa = 7 8 Free lambda = 1177 Kappa/lambda ratio = 0 01  2/8/2023 lactic acid = 2 5    Imaging    3/20/2023 MRI thoracic spine    IMPRESSION:     1  Widespread infiltrative and discrete osseous lesions involving anterior and posterior elements of the thoracic and visualized cervical and lumbar spine in keeping with history of multiple myeloma  No extraosseous lesion  2   Compared to CTA chest 2/8/2023, progression of T6 wedge compression fracture with severe anterior height loss  There is small posterior osseous buckling without significant canal stenosis  3   Mild T8 compression fracture, progressed from CTA chest 2/8/2023  Small posterior osseous buckling without significant canal stenosis  4   Stable T8 wedge compression deformity with severe anterior height loss  Posterosuperior osseous buckling indents ventral thecal sac without significant canal stenosis  5   Discrete and marrow replacing lesions involving partially imaged ribs and sternum  Displaced sternal fracture, similar to PET CT 3/7/2023, new from CTA chest 2/8/2023  3/7/2023 PET/CT    IMPRESSION:  1  Widespread hypermetabolic lytic lesions throughout the axial and appendicular skeleton  Findings are most suggestive of multiple myeloma  Clinical correlation recommended  2   Several hypermetabolic thoracic compression deformities  T8 compression deformity is new from the prior chest CT  There is also a new displaced sternal fracture    3   No hypermetabolic soft tissue lesions      2/8/2023 CTA chest PE study    OSSEOUS STRUCTURES:  Diffuse lytic lesions throughout the skeleton with mild compression deformity of T6 and moderate compression deformity of T10     IMPRESSION:     No pulmonary embolus  No acute pulmonary disease  Diffuse lytic lesions throughout the skeleton with compression deformities in the spine  This is most likely due to multiple myeloma, less likely due to metastatic disease from an unknown primary  2/8/2023 chest x-ray  Impression stated no acute cardiopulmonary disease  Pathology    Case Report   Surgical Pathology Report                         Case: [id number]                                    Authorizing Provider: Valerie Gomez MD           Collected:           03/15/2023 0932               Ordering Location:     [facility]        Received:            03/15/2023 09                                      [facility] CAT Scan                                                             Pathologist:           Candice Sanabria MD                                                           Specimens:   A) - Iliac Crest, Right, core                                                                        B) - Iliac Crest, Right, clot                                                                        C) - Iliac Crest, Right, slide                                                             Final Diagnosis   A -C   Bone marrow,  right iliac crest,  biopsy, clot, and aspirate:  -  Lambda restricted plasma cell neoplasm, >80% of total cellularity consistent with plasma cell myeloma, see note  -  Hypercellular bone marrow with markedly reduced trilineage hematopoiesis and no increase in blasts  -  Reduced iron stores, in a suboptimal sample, correlation with serum iron studies is recommended  -  Moderate to severe increase in reticulin fibrosis status      Note: The patient's presentation of multiple lytic lesions and compression fractures is noted  The current biopsy shows a marked increase in plasma cells comprising >80% of total cellularity  Immunostains show a lambda light chain restriction relative to kappa light chain by -HAILEE studies  Flow cytometry confirms the presence of a lambda restricted plasma cell population  Further supported by an IgG lambda monoclonal protein detected in the serum by immunofixation studies  FISH studies identify a gain in 1q21/CKS1B in 15 33% of evaluated nuclei  Cytogenetic studies reveal a normal male karyotype  Overall the current bone marrow biopsy is diagnostic for a plasma cell neoplasm best classified as plasma cell myeloma in the correct clinical context  Clinical correlation is advised  Electronically signed by Vanessa Foote MD on 3/23/2023 at  8:24 PM   Preliminary result electronically signed by Vanessa Foote MD on 3/22/2023 at  3:13 PM   Microscopic Description    Bone marrow aspirate smears:  The aspirate smears are aspicular, cellular, and suboptimal for evaluation  The aspirate smear sample is hemodilute with an increase in plasma cells  Mature neutrophils and lymphocytes are present  However, maturing myeloid elements and erythroid elements are not well represented in the aspirate smear slides  There is no definitive evidence of myelodysplasia or increase in blasts       Bone marrow core biopsy and aspirate clot:  Histologic sections of the aspirate clot and decalcified core biopsy are adequate for evaluation   Marrow space cellularity is hypercellular for patient age (>90%)  Myelopoiesis:  Markedly reduced (myeloblasts= <5%)    Erythropoiesis:  Markedly reduced  Megakaryocytes:  Markedly reduced  Lymphocytes:  Increased in small aggregates and interstitially distributed  Plasma cells:  Markedly increased  including a subset of immature forms      Special studies:   * Iron stain performed on the aspirate smear, clot, and core biopsy highlights reduced iron stores with no evidence of ring sideroblasts in a limited sample  Siderotic iron granules are present  Due to the aspiculate nature of the specimen these findings ma  * Reticulin stain performed on the core biopsy shows moderate to severe increase in reticulin fibers   2-3 of 3 by the  Consensus grading scheme  * Trichrome stain performed on the core biopsy show no increase in collagen fibrosis  * Immunohistochemical stains were performed with appropriate controls and show the following results:  -   highlights marked increase in plasma cells (>80%) with a lambda restriction relative to kappa light chain expression by kappa and lambda in situ hybridization studies  The plasma cells are negative for CD30 and GUANACO-HAILEE  Ki67 proliferation index is overall low (<10%)       CD20 highlights an increase in B-cells in aggregates and interstitially distributed (10-20% of total cellularity) and CD3 highlights T-cells in aggregates and interstitially scattered T-cells (5-10% of total cellularity)      CD34 shows no increase in blasts (<5% of total cellularity) and  shows no increase in immature cells (<5% of total cellularity)  Additionally,  highlights scattered mast cells      CD61 highlights a reduction in megakaryocytes       [place] red stain is negative for amyloid     Note    Component Ref Range & Units 3/15/23 0804 2/8/23 1016   WBC 4 31 - 10 16 Thousand/uL 6 86  6 62    RBC 3 88 - 5 62 Million/uL 3 86 Low   3 74 Low     Hemoglobin 12 0 - 17 0 g/dL 12 8  12 6    Hematocrit 36 5 - 49 3 % 36 6  36 0 Low     MCV 82 - 98 fL 95  96    MCH 26 8 - 34 3 pg 33 2  33 7    MCHC 31 4 - 37 4 g/dL 35 0  35 0    RDW 11 6 - 15 1 % 13 9  14 5    MPV 8 9 - 12 7 fL 9 4  8 9    Platelets 300 - 966 Thousands/uL 188  194    nRBC /100 WBCs 0  0       Component Ref Range & Units 2/8/23 1401    IGA 70 0 - 400 0 mg/dL 28 0 Low      0 - 1,600 0 mg/dL 2,900 0 High     IGM 40 0 - 230 0 mg/dL 11 0 Low        SPEP Interpretation   See Comment    Comment: The SPEP shows a monoclonal peak in the gamma region  Immunofixation to be performed  Serum immunofixation shows a monoclonal gammopathy identified as IgG lambda (3 37 g/dL)     10-COLOR FLOW CYTOMETRY ANALYSIS FOR ACUTE LEUKEMIA AND MYELOID/LYMPHOID NEOPLASMS ([facility] # [id number] ; please see separate report for further details):  BONE MARROW ASPIRATE:   1  Clonal plasma cells are detected (4 4% by flow cytometry), consistent with plasma cell neoplasm    - Phenotype: lambda+; CD38+, +, CD20-, CD19-, CD45-, CD56- and -    2  No evidence of acute leukemia or increased blasts  3  No evidence of an abnormal myeloid population  Abnormalities associated with myelodysplasia and myeloproliferative neoplasms are absent  4  No evidence of B-cell lymphoma or atypical T-cell population     Viability 7AAD: 95 57%   Monoclonal CD45(-)/CD38(bright) plasma cells with lambda-restriction are present (4 4%)  There is a mixed population of granulocytes/maturing myeloid precursors, blasts, monocytes and lymphocytes  +/HLA-DR+ myeloblasts comprise (0 18%) of total events  Myeloid-commited CD34+ population comprise (0 16%) of total events  Granulocytes/maturing myeloid precursors (70 32%) do not display an aberrant phenotype  The orthogonal side scatter is normal  No significant number of CD56+ or CD10-/CD16- granulocytes is noted  Monocytes (8 4%) appear mature (CD14+/CD64+) and do not display overt phenotypic atypia  B-cells (1 17%) are polytypic  T-cells (14 55%) show no deletion or abnormal dim expression of pan-T-cell antigens on significant subset of cells     FLUORESCENCE IN-SITU HYBRIDIZATION (FISH)  ([facility] # [id number] ; please see separate report for further details):  INTERPRETATION   1  Positive for 1q21/CKS1B gain in 15 33% nuclei     Comment: Gain of the CKS1B locus (cyclin kinase subunit 1B) in patients with plasma cell myeloma is a poor prognostic indicator associated with significantly shorter progression-free survival and shorter overall survival    2  No evidence of IGH-MAF translocation t(14;16) gene rearrangement   3  No evidence of RB1 monosomy (13q14 deletion)  4  No evidence of CCND1-IGH translocation t(11;14) gene rearrangement, and no evidence for trisomy 11 or gain of 11q  5  No evidence of p53 (17p13) deletion or amplification  6  No evidence of FGFR3-IGH translocation t(4;14) gene rearrangement       CYTOGENETICS Karyotype Analysis ([facility] # [id number] ; please see separate report for further details):  Karyotype: 46,XY 20  Interpretation:   A normal male karyotype was observed in twenty metaphase cells analyzed

## 2023-06-22 ENCOUNTER — OFFICE VISIT (OUTPATIENT)
Dept: PALLIATIVE MEDICINE | Facility: CLINIC | Age: 58
End: 2023-06-22

## 2023-06-22 VITALS
OXYGEN SATURATION: 97 % | SYSTOLIC BLOOD PRESSURE: 110 MMHG | HEIGHT: 70 IN | BODY MASS INDEX: 21.83 KG/M2 | HEART RATE: 102 BPM | WEIGHT: 152.5 LBS | TEMPERATURE: 97.3 F | DIASTOLIC BLOOD PRESSURE: 70 MMHG | RESPIRATION RATE: 18 BRPM

## 2023-06-22 DIAGNOSIS — R63.4 UNINTENTIONAL WEIGHT LOSS: ICD-10-CM

## 2023-06-22 DIAGNOSIS — C79.51 METASTASIS TO BONE (HCC): ICD-10-CM

## 2023-06-22 DIAGNOSIS — K08.9 DENTAL DISEASE: ICD-10-CM

## 2023-06-22 DIAGNOSIS — K21.9 ACID REFLUX: ICD-10-CM

## 2023-06-22 DIAGNOSIS — G89.3 CANCER RELATED PAIN: ICD-10-CM

## 2023-06-22 DIAGNOSIS — Z51.5 PALLIATIVE CARE PATIENT: ICD-10-CM

## 2023-06-22 DIAGNOSIS — F17.210 NICOTINE DEPENDENCE, CIGARETTES, UNCOMPLICATED: ICD-10-CM

## 2023-06-22 DIAGNOSIS — C90.00 MULTIPLE MYELOMA NOT HAVING ACHIEVED REMISSION (HCC): Primary | ICD-10-CM

## 2023-06-22 DIAGNOSIS — M54.9 BACK PAIN: ICD-10-CM

## 2023-06-22 DIAGNOSIS — R63.0 LOSS OF APPETITE: ICD-10-CM

## 2023-06-22 RX ORDER — LENALIDOMIDE 25 MG/1
CAPSULE ORAL
Qty: 7 CAPSULE | Refills: 0 | Status: SHIPPED | OUTPATIENT
Start: 2023-06-22

## 2023-06-22 RX ORDER — DEXAMETHASONE 1 MG
1 TABLET ORAL
Qty: 30 TABLET | Refills: 0 | Status: SHIPPED | OUTPATIENT
Start: 2023-06-22

## 2023-06-22 RX ORDER — MORPHINE SULFATE 30 MG/1
30 TABLET, FILM COATED, EXTENDED RELEASE ORAL 2 TIMES DAILY
Qty: 60 TABLET | Refills: 0 | Status: SHIPPED | OUTPATIENT
Start: 2023-07-07

## 2023-06-22 RX ORDER — PANTOPRAZOLE SODIUM 40 MG/1
40 TABLET, DELAYED RELEASE ORAL DAILY
Qty: 90 TABLET | Refills: 0 | Status: SHIPPED | OUTPATIENT
Start: 2023-06-22

## 2023-06-22 NOTE — PROGRESS NOTES
"Follow-up with Palliative and [facility] Dates   65 y o  male [id number]    ASSESSMENT & PLAN:  1  Multiple myeloma not having achieved remission ([facility] [address] )    2  Metastasis to bone ([facility] [address] )    3  Nicotine dependence, cigarettes, uncomplicated    4  Acid reflux    5  Dental disease    6  Cancer related pain    7  Loss of appetite    8  Unintentional weight loss    9  Back pain    10  Palliative care patient          • Continue disease-directed cares  • ACP: Patient has completed advanced directives (the [facility] Advanced Directive) naming his wife Brenda Palma as default surrogate healthcare decision-maker(s)  In EMR  Advanced directive counseling given  • Brenda Palma provides a portion of history today, as she participates in his care  • Patient reports his chronic pain is well-controlled on his current regimen  Etiology of his pain includes cancer-related back pain, epigastric / anterior chest wall pain which may be related to malignancy and/or reflux, MSK pain  o Continue pantoprazole 40mg qAM for reflux  Recommended daily use instead of PRN use   o Continue MSER 30mg q12h ATC for long-acting analgesia  Tolerating  Effective   o Continue oxyIR 10mg q4-6h PRN breakthrough pain  o Recommend topical OTC products, local application of heat or cold, Tylenol 1000mg TID ATC  Do not use heat on top of topical agents  Do not mix topical agents  • Continue dexamethasone 1mg qAM for appetite and fatigue; may also help with pain  Skip on chemo days as he is getting a large amount of dexamethasone in separate dose that day  Appetite waxes and wanes, patient is defending his weight  • Patient declines offer of additional appetite stimulant  He declines offer of mood medication / sleep medication  • May use Zofran PRN N/V  No recent nausea  • Patient states today that he has been able to abstain from EtOH \"since this all started\" (01/2023); prior to that he was drinking about \"5 beers\" daily   Positive reinforcement " provided  • Patient has significant chronic dental disease - recommended Dental f/u but patient defers for now  • Counseled on nicotine cessation - he is not interested in quitting at this time  • Reviewed notes ([address] BMT), labs (6/5/23 Cr 0 42, cCa 9 4, alb 2 0, tProt 5 3, , Hb 10 2; 5/23/23 Cr 0 45, Na 132, K 3 4, Ca 7 9, , alb 2 9, Hb 11 7), imaging + procedures (5/11/23 CTA ED chest PE study)  Return in about 2 months (around 8/22/2023)  • Emotional support provided  • Medication safety issues addressed - no driving under the influence of narcotics (including opioids), watch for adverse effects including AMS or respiratory depression (slowed breathing), keep medications stored in a safe/locked environment, do not use alcohol while opioids or other narcotics are in one's system  Requested Prescriptions     Signed Prescriptions Disp Refills   • dexamethasone (DECADRON) 1 mg tablet 30 tablet 0     Sig: Take 1 tablet (1 mg total) by mouth daily with breakfast - Skip on chemo day  • pantoprazole (PROTONIX) 40 mg tablet 90 tablet 0     Sig: Take 1 tablet (40 mg total) by mouth daily - in the morning   • morphine (MS CONTIN) 30 mg 12 hr tablet 60 tablet 0     Sig: Take 1 tablet (30 mg total) by mouth 2 (two) times a day Max Daily Amount: 60 mg Do not start before July 7, 2023  Medications Discontinued During This Encounter   Medication Reason   • pantoprazole (PROTONIX) 40 mg tablet Reorder   • morphine (MS CONTIN) 30 mg 12 hr tablet Reorder   • dexamethasone (DECADRON) 1 mg tablet Reorder       Representatives have queried the patient's controlled substance dispensing history in the Prescription Drug Monitoring Program in compliance with regulations before I have prescribed any controlled substances  The prescription history is consistent with prescribed therapy and our practice policies        30+ minutes were spent in this ambulatory visit with greater than "50% of the time spent face to face with patient and his wife Ivone Chow in counseling or coordination of care including symptom assessment and management, medication review, psychosocial support, chart review, imaging review, lab review, goals of care, supportive listening and anticipatory guidance  All of the patient's questions were answered during this discussion  SUBJECTIVE:  Chief Complaint   Patient presents with   • Follow-up   • Cancer   • Cancer Pain   • Loss of Appetite   • Counseling   • Weight Loss        LILLIE Nunez  is a 65 y o  male w/ multiple myeloma on RVd therapy; back pain (s/t malignancy, s/p localized RT), unintentional weight loss  He follows w/ Dr Jaylene Marinelli (Medical Oncology), Dr Syl Zamora (Radiation Oncology), Dr Rai Buffalo [facility])  Patient is known to [facility] clinic; seen 5/24/23 for symptom assessment and management, medication review, psychosocial support, chart review, imaging review, lab review, advanced directives, supportive listening and anticipatory guidance  Patient states he feels \"the same\" as last visit, and indicates that his chronic pain is well-controlled on his current regimen  He is smoking about 1/2 to 1 pack of cigarettes daily, though, with no interest in quitting  He confirms he has abstained from EtOH since January 2023  Patient denies N/V, denies diarrhea, denies constipation  His appetite can rise and fall, but he has done well in terms of maintaining his weight in recent weeks  He states his sleep is \"alright\"; he is getting about \"five hours\" nightly but will often nap during the daytime  Patient has significant dental caries and missing teeth are noted  He confirms that he had been recommended for dental extraction in the past but felt such procedures would be too much for him to tolerate in addition to all of his other health concerns  He does not wish to see a dentist or oral surgeon at this time  PDMP shows no concerns      The following " portions of the medical history were reviewed: past medical history, surgical history, problem list, medication list, family history, and social history  Current Outpatient Medications:   •  acetaminophen (TYLENOL) 500 mg tablet, Take 2 tablets (1,000 mg total) by mouth 3 (three) times a day, Disp: 180 tablet, Rfl: 2  •  acyclovir (ZOVIRAX) 400 MG tablet, Take 1 tablet (400 mg total) by mouth 2 (two) times a day, Disp: 60 tablet, Rfl: 11  •  dexamethasone (DECADRON) 1 mg tablet, Take 1 tablet (1 mg total) by mouth daily with breakfast - Skip on chemo day , Disp: 30 tablet, Rfl: 0  •  lenalidomide (REVLIMID) 25 MG CAPS, Take 25 mg by mouth daily on days 1-7 of a 21 day cycle [id number], Disp: 7 capsule, Rfl: 0  •  [START ON 7/7/2023] morphine (MS CONTIN) 30 mg 12 hr tablet, Take 1 tablet (30 mg total) by mouth 2 (two) times a day Max Daily Amount: 60 mg Do not start before July 7, 2023 , Disp: 60 tablet, Rfl: 0  •  naloxone (NARCAN) 4 mg/0 1 mL nasal spray, Administer 1 spray into a nostril  If no response after 2-3 minutes, give another dose in the other nostril using a new spray , Disp: 1 each, Rfl: 1  •  ondansetron (ZOFRAN) 4 mg tablet, Take 1 tablet (4 mg total) by mouth every 8 (eight) hours as needed for nausea or vomiting, Disp: 20 tablet, Rfl: 0  •  oxyCODONE (ROXICODONE) 10 MG TABS, Take 1 tablet (10 mg total) by mouth every 4 (four) hours as needed (cancer-related pain) Max Daily Amount: 60 mg, Disp: 90 tablet, Rfl: 0  •  pantoprazole (PROTONIX) 40 mg tablet, Take 1 tablet (40 mg total) by mouth daily - in the morning, Disp: 90 tablet, Rfl: 0  No current facility-administered medications for this visit      Facility-Administered Medications Ordered in Other Visits:   •  alteplase (CATHFLO) injection 2 mg, 2 mg, Intracatheter, Q1MIN PRN, Ronny Loya MD    Review of Systems   Constitutional: Positive for activity change, appetite change (waxes and wanes), fatigue and unexpected weight change (defending "his weight recently)  HENT: Positive for dental problem (chronic)  Gastrointestinal:        Bowel function is \"pretty good\"  GERD is managed / at goal  Eructation ongoing  Musculoskeletal: Positive for back pain  Psychiatric/Behavioral: Positive for sleep disturbance  All other systems reviewed and are negative  OBJECTIVE:  /70 (BP Location: Left arm, Patient Position: Sitting, Cuff Size: Standard)   Pulse 102   Temp (!) 97 3 °F (36 3 °C) (Temporal)   Resp 18   Ht 5' 10\" (1 778 m)   Wt 69 2 kg (152 lb 8 oz)   SpO2 97%   BMI 21 88 kg/m²   Physical Exam  Vitals reviewed  Constitutional:       General: He is not in acute distress  Appearance: He is well-groomed and underweight  He is not toxic-appearing  HENT:      Head: Normocephalic and atraumatic  Right Ear: External ear normal       Left Ear: External ear normal       Mouth/Throat:      Dentition: Abnormal dentition  Dental caries present  Eyes:      General: No scleral icterus  Right eye: No discharge  Left eye: No discharge  Extraocular Movements: Extraocular movements intact  Conjunctiva/sclera: Conjunctivae normal       Pupils: Pupils are equal, round, and reactive to light  Cardiovascular:      Rate and Rhythm: Tachycardia present  Pulmonary:      Effort: Pulmonary effort is normal  No tachypnea, bradypnea, accessory muscle usage or respiratory distress  Comments: Able to speak comfortably in complete sentences    at rest   Abdominal:      General: There is no distension  Tenderness: There is no guarding  Musculoskeletal:      Cervical back: Normal range of motion  Right lower leg: No edema  Left lower leg: No edema  Skin:     General: Skin is dry  Coloration: Skin is not pale  Neurological:      Mental Status: He is alert and oriented to person, place, and time  Cranial Nerves: No dysarthria or facial asymmetry  Gait: Gait abnormal (using 1PC)   " "  Psychiatric:         Attention and Perception: Attention normal          Mood and Affect: Mood and affect normal          Speech: Speech is rapid and pressured  Behavior: Behavior normal  Behavior is cooperative  Thought Content: Thought content normal          Cognition and Memory: Cognition and memory normal          Judgment: Judgment normal           Prem Mayes MD  [facility]      Portions of this document may have been created using dictation software and as such some \"sound alike\" terms may have been generated by the system  Do not hesitate to contact me with any questions or clarifications     "

## 2023-06-22 NOTE — PATIENT INSTRUCTIONS
It was good to see you today  Thank you for coming in  Continue current medications  Return in about 2 months (around 8/22/2023)  Call us for refills on medications that we supply, as needed  If something changes and you need to come in sooner, please call our office  PRESCRIPTION REFILL REMINDER:  All medication refills should be requested prior to [facility] on Friday  Any refill requests after noon on Friday would be addressed the following Monday  MEDICATION SAFETY ISSUES:  Do not drive under the influence of narcotics (including opioids), watch for adverse effects including confusion / altered mental status / respiratory depression (slowed breathing), keep medications stored in a safe/locked environment, do not use alcohol while opioids or other narcotics are in your system

## 2023-06-23 ENCOUNTER — HOSPITAL ENCOUNTER (OUTPATIENT)
Dept: INFUSION CENTER | Facility: CLINIC | Age: 58
End: 2023-06-23
Payer: COMMERCIAL

## 2023-06-23 VITALS
SYSTOLIC BLOOD PRESSURE: 111 MMHG | OXYGEN SATURATION: 98 % | HEIGHT: 70 IN | HEART RATE: 78 BPM | DIASTOLIC BLOOD PRESSURE: 70 MMHG | RESPIRATION RATE: 18 BRPM | WEIGHT: 151.5 LBS | TEMPERATURE: 98.4 F | BODY MASS INDEX: 21.69 KG/M2

## 2023-06-23 DIAGNOSIS — C90.00 MULTIPLE MYELOMA NOT HAVING ACHIEVED REMISSION (HCC): Primary | ICD-10-CM

## 2023-06-23 PROCEDURE — 96401 CHEMO ANTI-NEOPL SQ/IM: CPT

## 2023-06-23 RX ADMIN — BORTEZOMIB 2.5 MG: 1 INJECTION, POWDER, LYOPHILIZED, FOR SOLUTION INTRAVENOUS; SUBCUTANEOUS at 09:17

## 2023-06-23 NOTE — PROGRESS NOTES
Patient is here for day 11 cycle 4 of velcade, offers no complaints  Injection given in right abdomen  Patient does have a 5th cycle scheduled; per Juan Luis Singh she will be coordinating with Elia Jimenez to see whether he will need this cycle and she will let Mr Martin Cmapos know   He is aware of next appointments, declined AVS

## 2023-06-28 ENCOUNTER — TELEPHONE (OUTPATIENT)
Dept: HEMATOLOGY ONCOLOGY | Facility: CLINIC | Age: 58
End: 2023-06-28

## 2023-06-28 ENCOUNTER — TELEPHONE (OUTPATIENT)
Dept: HEMATOLOGY ONCOLOGY | Facility: MEDICAL CENTER | Age: 58
End: 2023-06-28

## 2023-06-28 NOTE — TELEPHONE ENCOUNTER
Reviewed treatment plan with patient's wife  Patient will complete one week on of revlimid on 7/1/2023 with 2 weeks off there after  And C5 of chemo on 7/13/2023    Patient has appt with [facility] on 7/17/2023 to plan stem cell transplant   Discussed  plan with [facility] coordinator See Lucas RN  No further local treatment needed per coordinator    Will update [facility] team

## 2023-06-28 NOTE — TELEPHONE ENCOUNTER
Patient Call    Who are you speaking with? wife   If it is not the patient, are they listed on an active communication consent form? yes   What is the reason for this call? Questions about treatment plan   Does this require a call back? yes   If a call back is required, please list best call back number [phone]   If a call back is required, advise that a message will be forwarded to their care team and someone will return their call as soon as possible  Did you relay this information to the patient?  yes

## 2023-06-29 ENCOUNTER — APPOINTMENT (OUTPATIENT)
Dept: LAB | Facility: HOSPITAL | Age: 58
End: 2023-06-29
Attending: INTERNAL MEDICINE
Payer: COMMERCIAL

## 2023-06-29 DIAGNOSIS — C90.00 MULTIPLE MYELOMA NOT HAVING ACHIEVED REMISSION (HCC): ICD-10-CM

## 2023-06-29 LAB
ALBUMIN SERPL BCP-MCNC: 3.1 G/DL (ref 3.5–5)
ALP SERPL-CCNC: 315 U/L (ref 34–104)
ALT SERPL W P-5'-P-CCNC: 6 U/L (ref 7–52)
ANION GAP SERPL CALCULATED.3IONS-SCNC: 6 MMOL/L
AST SERPL W P-5'-P-CCNC: 12 U/L (ref 13–39)
BASOPHILS # BLD AUTO: 0.02 THOUSANDS/ÂΜL (ref 0–0.1)
BASOPHILS NFR BLD AUTO: 0 % (ref 0–1)
BILIRUB SERPL-MCNC: 0.62 MG/DL (ref 0.2–1)
BUN SERPL-MCNC: 7 MG/DL (ref 5–25)
CALCIUM ALBUM COR SERPL-MCNC: 8.7 MG/DL (ref 8.3–10.1)
CALCIUM SERPL-MCNC: 8 MG/DL (ref 8.4–10.2)
CHLORIDE SERPL-SCNC: 105 MMOL/L (ref 96–108)
CO2 SERPL-SCNC: 26 MMOL/L (ref 21–32)
CREAT SERPL-MCNC: 0.44 MG/DL (ref 0.6–1.3)
EOSINOPHIL # BLD AUTO: 0.14 THOUSAND/ÂΜL (ref 0–0.61)
EOSINOPHIL NFR BLD AUTO: 2 % (ref 0–6)
ERYTHROCYTE [DISTWIDTH] IN BLOOD BY AUTOMATED COUNT: 17.4 % (ref 11.6–15.1)
GFR SERPL CREATININE-BSD FRML MDRD: 127 ML/MIN/1.73SQ M
GLUCOSE SERPL-MCNC: 76 MG/DL (ref 65–140)
HCT VFR BLD AUTO: 38.9 % (ref 36.5–49.3)
HGB BLD-MCNC: 12.3 G/DL (ref 12–17)
IMM GRANULOCYTES # BLD AUTO: 0.12 THOUSAND/UL (ref 0–0.2)
IMM GRANULOCYTES NFR BLD AUTO: 2 % (ref 0–2)
LYMPHOCYTES # BLD AUTO: 0.18 THOUSANDS/ÂΜL (ref 0.6–4.47)
LYMPHOCYTES NFR BLD AUTO: 3 % (ref 14–44)
MCH RBC QN AUTO: 31.9 PG (ref 26.8–34.3)
MCHC RBC AUTO-ENTMCNC: 31.6 G/DL (ref 31.4–37.4)
MCV RBC AUTO: 101 FL (ref 82–98)
MONOCYTES # BLD AUTO: 0.43 THOUSAND/ÂΜL (ref 0.17–1.22)
MONOCYTES NFR BLD AUTO: 7 % (ref 4–12)
NEUTROPHILS # BLD AUTO: 5.72 THOUSANDS/ÂΜL (ref 1.85–7.62)
NEUTS SEG NFR BLD AUTO: 86 % (ref 43–75)
NRBC BLD AUTO-RTO: 1 /100 WBCS
PLATELET # BLD AUTO: 93 THOUSANDS/UL (ref 149–390)
PMV BLD AUTO: 11.1 FL (ref 8.9–12.7)
POTASSIUM SERPL-SCNC: 3.7 MMOL/L (ref 3.5–5.3)
PROT SERPL-MCNC: 5.4 G/DL (ref 6.4–8.4)
RBC # BLD AUTO: 3.86 MILLION/UL (ref 3.88–5.62)
SODIUM SERPL-SCNC: 137 MMOL/L (ref 135–147)
WBC # BLD AUTO: 6.61 THOUSAND/UL (ref 4.31–10.16)

## 2023-06-29 PROCEDURE — 80053 COMPREHEN METABOLIC PANEL: CPT

## 2023-06-29 PROCEDURE — 36415 COLL VENOUS BLD VENIPUNCTURE: CPT

## 2023-06-29 PROCEDURE — 85025 COMPLETE CBC W/AUTO DIFF WBC: CPT

## 2023-07-03 ENCOUNTER — APPOINTMENT (OUTPATIENT)
Dept: LAB | Facility: CLINIC | Age: 58
End: 2023-07-03
Payer: COMMERCIAL

## 2023-07-03 ENCOUNTER — TELEPHONE (OUTPATIENT)
Dept: HEMATOLOGY ONCOLOGY | Facility: CLINIC | Age: 58
End: 2023-07-03

## 2023-07-03 ENCOUNTER — HOSPITAL ENCOUNTER (OUTPATIENT)
Dept: RADIOLOGY | Facility: HOSPITAL | Age: 58
Discharge: HOME | End: 2023-07-03
Payer: COMMERCIAL

## 2023-07-03 ENCOUNTER — HOSPITAL ENCOUNTER (OUTPATIENT)
Dept: INFUSION CENTER | Facility: CLINIC | Age: 58
Discharge: HOME | End: 2023-07-03
Payer: COMMERCIAL

## 2023-07-03 ENCOUNTER — HOSPITAL ENCOUNTER (OUTPATIENT)
Dept: PULMONOLOGY | Facility: HOSPITAL | Age: 58
Discharge: HOME | End: 2023-07-03
Attending: INTERNAL MEDICINE
Payer: COMMERCIAL

## 2023-07-03 ENCOUNTER — HOSPITAL ENCOUNTER (OUTPATIENT)
Dept: NON INVASIVE DIAGNOSTICS | Facility: CLINIC | Age: 58
Discharge: HOME | End: 2023-07-03
Payer: COMMERCIAL

## 2023-07-03 VITALS
WEIGHT: 142.5 LBS | BODY MASS INDEX: 20.4 KG/M2 | HEART RATE: 84 BPM | SYSTOLIC BLOOD PRESSURE: 102 MMHG | TEMPERATURE: 98 F | DIASTOLIC BLOOD PRESSURE: 70 MMHG | RESPIRATION RATE: 18 BRPM | OXYGEN SATURATION: 99 % | HEIGHT: 70 IN

## 2023-07-03 VITALS
BODY MASS INDEX: 20.39 KG/M2 | HEART RATE: 87 BPM | WEIGHT: 142.42 LBS | DIASTOLIC BLOOD PRESSURE: 70 MMHG | SYSTOLIC BLOOD PRESSURE: 102 MMHG | HEIGHT: 70 IN

## 2023-07-03 DIAGNOSIS — C90.00 MULTIPLE MYELOMA NOT HAVING ACHIEVED REMISSION (HCC): ICD-10-CM

## 2023-07-03 DIAGNOSIS — C90.00 MULTIPLE MYELOMA NOT HAVING ACHIEVED REMISSION (HCC): Primary | ICD-10-CM

## 2023-07-03 LAB
AORTIC ROOT: 3.3 CM
APICAL FOUR CHAMBER EJECTION FRACTION: 56 %
ASCENDING AORTA: 3.4 CM
E WAVE DECELERATION TIME: 214 MS
FRACTIONAL SHORTENING: 35 (ref 28–44)
INTERVENTRICULAR SEPTUM IN DIASTOLE (PARASTERNAL SHORT AXIS VIEW): 0.9 CM
INTERVENTRICULAR SEPTUM: 0.9 CM (ref 0.6–1.1)
LAAS-AP2: 19.6 CM2
LAAS-AP4: 19.3 CM2
LEFT ATRIUM SIZE: 2.9 CM
LEFT ATRIUM VOLUME (MOD BIPLANE): 53 ML
LEFT INTERNAL DIMENSION IN SYSTOLE: 3.1 CM (ref 2.1–4)
LEFT VENTRICULAR INTERNAL DIMENSION IN DIASTOLE: 4.8 CM (ref 3.5–6)
LEFT VENTRICULAR POSTERIOR WALL IN END DIASTOLE: 0.9 CM
LEFT VENTRICULAR STROKE VOLUME: 70 ML
LVSV (TEICH): 70 ML
MV E'TISSUE VEL-SEP: 12 CM/S
MV PEAK A VEL: 0.55 M/S
MV PEAK E VEL: 70 CM/S
MV STENOSIS PRESSURE HALF TIME: 62 MS
MV VALVE AREA P 1/2 METHOD: 3.55
RIGHT ATRIUM AREA SYSTOLE A4C: 18 CM2
RIGHT VENTRICLE ID DIMENSION: 4.2 CM
SL CV LEFT ATRIUM LENGTH A2C: 5.3 CM
SL CV LV EF: 55
SL CV PED ECHO LEFT VENTRICLE DIASTOLIC VOLUME (MOD BIPLANE) 2D: 109 ML
SL CV PED ECHO LEFT VENTRICLE SYSTOLIC VOLUME (MOD BIPLANE) 2D: 39 ML
TR MAX PG: 22 MMHG
TR PEAK VELOCITY: 2.4 M/S
TRICUSPID ANNULAR PLANE SYSTOLIC EXCURSION: 2.4 CM
TRICUSPID VALVE PEAK REGURGITATION VELOCITY: 2.37 M/S

## 2023-07-03 PROCEDURE — 94729 DIFFUSING CAPACITY: CPT

## 2023-07-03 PROCEDURE — 94726 PLETHYSMOGRAPHY LUNG VOLUMES: CPT | Performed by: INTERNAL MEDICINE

## 2023-07-03 PROCEDURE — 94729 DIFFUSING CAPACITY: CPT | Performed by: INTERNAL MEDICINE

## 2023-07-03 PROCEDURE — 93356 MYOCRD STRAIN IMG SPCKL TRCK: CPT

## 2023-07-03 PROCEDURE — 93005 ELECTROCARDIOGRAM TRACING: CPT

## 2023-07-03 PROCEDURE — 94760 N-INVAS EAR/PLS OXIMETRY 1: CPT

## 2023-07-03 PROCEDURE — 71046 X-RAY EXAM CHEST 2 VIEWS: CPT

## 2023-07-03 PROCEDURE — 94060 EVALUATION OF WHEEZING: CPT

## 2023-07-03 PROCEDURE — 93306 TTE W/DOPPLER COMPLETE: CPT | Performed by: STUDENT IN AN ORGANIZED HEALTH CARE EDUCATION/TRAINING PROGRAM

## 2023-07-03 PROCEDURE — 93356 MYOCRD STRAIN IMG SPCKL TRCK: CPT | Performed by: STUDENT IN AN ORGANIZED HEALTH CARE EDUCATION/TRAINING PROGRAM

## 2023-07-03 PROCEDURE — 94726 PLETHYSMOGRAPHY LUNG VOLUMES: CPT

## 2023-07-03 PROCEDURE — 94060 EVALUATION OF WHEEZING: CPT | Performed by: INTERNAL MEDICINE

## 2023-07-03 PROCEDURE — 93306 TTE W/DOPPLER COMPLETE: CPT

## 2023-07-03 RX ORDER — ALBUTEROL SULFATE 2.5 MG/3ML
2.5 SOLUTION RESPIRATORY (INHALATION) ONCE
Status: COMPLETED | OUTPATIENT
Start: 2023-07-03 | End: 2023-07-03

## 2023-07-03 RX ORDER — DEXAMETHASONE 4 MG/1
40 TABLET ORAL ONCE
Status: COMPLETED | OUTPATIENT
Start: 2023-07-03 | End: 2023-07-03

## 2023-07-03 RX ADMIN — ALBUTEROL SULFATE 2.5 MG: 2.5 SOLUTION RESPIRATORY (INHALATION) at 14:55

## 2023-07-03 RX ADMIN — BORTEZOMIB 2.5 MG: 1 INJECTION, POWDER, LYOPHILIZED, FOR SOLUTION INTRAVENOUS; SUBCUTANEOUS at 08:59

## 2023-07-03 RX ADMIN — DEXAMETHASONE 40 MG: 4 TABLET ORAL at 08:30

## 2023-07-03 NOTE — TELEPHONE ENCOUNTER
Patient Call    Who are you speaking with? Significant Other    If it is not the patient, are they listed on an active communication consent form? Yes   What is the reason for this call? Archibald Dubin called to ask if the office has received the fax for Dr. Leta Singleton to fill out for transportation to an appt the pt has on 7/17   Does this require a call back? Yes   If a call back is required, please list best call back number [phone]   If a call back is required, advise that a message will be forwarded to their care team and someone will return their call as soon as possible. Did you relay this information to the patient?  Yes

## 2023-07-04 DIAGNOSIS — Z51.5 PALLIATIVE CARE PATIENT: ICD-10-CM

## 2023-07-04 DIAGNOSIS — C90.00 MULTIPLE MYELOMA NOT HAVING ACHIEVED REMISSION (HCC): ICD-10-CM

## 2023-07-04 DIAGNOSIS — M54.9 BACK PAIN: ICD-10-CM

## 2023-07-04 DIAGNOSIS — C79.51 METASTASIS TO BONE (HCC): ICD-10-CM

## 2023-07-04 DIAGNOSIS — G89.3 CANCER RELATED PAIN: ICD-10-CM

## 2023-07-04 NOTE — TELEPHONE ENCOUNTER
Medication Refill Request      Name Oxycodone 10 mg  Dose/Frequency Take 1 tablet by mouth every 4 hours as needed Max daily 60 mg  Quantity 270 tablet  Verified pharmacy   Verified ordering Provider   Does patient have enough for the next 3 days?  Yes No  Patient has 6 more days left of medication

## 2023-07-05 RX ORDER — OXYCODONE HYDROCHLORIDE 10 MG/1
10 TABLET ORAL EVERY 4 HOURS PRN
Qty: 90 TABLET | Refills: 0 | Status: SHIPPED | OUTPATIENT
Start: 2023-07-05

## 2023-07-06 ENCOUNTER — HOSPITAL ENCOUNTER (OUTPATIENT)
Dept: INFUSION CENTER | Facility: CLINIC | Age: 58
Discharge: HOME | End: 2023-07-06
Payer: COMMERCIAL

## 2023-07-06 VITALS
DIASTOLIC BLOOD PRESSURE: 52 MMHG | SYSTOLIC BLOOD PRESSURE: 96 MMHG | WEIGHT: 143 LBS | HEIGHT: 70 IN | TEMPERATURE: 98.1 F | OXYGEN SATURATION: 98 % | HEART RATE: 65 BPM | RESPIRATION RATE: 18 BRPM | BODY MASS INDEX: 20.47 KG/M2

## 2023-07-06 DIAGNOSIS — C90.00 MULTIPLE MYELOMA NOT HAVING ACHIEVED REMISSION (HCC): Primary | ICD-10-CM

## 2023-07-06 PROCEDURE — 96401 CHEMO ANTI-NEOPL SQ/IM: CPT

## 2023-07-06 RX ADMIN — BORTEZOMIB 2.5 MG: 1 INJECTION, POWDER, LYOPHILIZED, FOR SOLUTION INTRAVENOUS; SUBCUTANEOUS at 14:07

## 2023-07-10 ENCOUNTER — HOSPITAL ENCOUNTER (OUTPATIENT)
Dept: INFUSION CENTER | Facility: CLINIC | Age: 58
Discharge: HOME | End: 2023-07-10
Payer: COMMERCIAL

## 2023-07-10 VITALS
HEIGHT: 70 IN | WEIGHT: 141 LBS | DIASTOLIC BLOOD PRESSURE: 77 MMHG | TEMPERATURE: 98.2 F | BODY MASS INDEX: 20.19 KG/M2 | OXYGEN SATURATION: 98 % | RESPIRATION RATE: 18 BRPM | HEART RATE: 80 BPM | SYSTOLIC BLOOD PRESSURE: 114 MMHG

## 2023-07-10 DIAGNOSIS — Z51.5 PALLIATIVE CARE PATIENT: ICD-10-CM

## 2023-07-10 DIAGNOSIS — G89.3 CANCER RELATED PAIN: ICD-10-CM

## 2023-07-10 DIAGNOSIS — C79.51 METASTASIS TO BONE (HCC): ICD-10-CM

## 2023-07-10 DIAGNOSIS — C90.00 MULTIPLE MYELOMA NOT HAVING ACHIEVED REMISSION (HCC): Primary | ICD-10-CM

## 2023-07-10 DIAGNOSIS — C90.00 MULTIPLE MYELOMA NOT HAVING ACHIEVED REMISSION (HCC): ICD-10-CM

## 2023-07-10 DIAGNOSIS — M54.9 BACK PAIN: ICD-10-CM

## 2023-07-10 LAB
ATRIAL RATE: 78 BPM
PR INTERVAL: 130 MS
QRS AXIS: -27 DEGREES
QRSD INTERVAL: 92 MS
QT INTERVAL: 402 MS
QTC INTERVAL: 458 MS
T WAVE AXIS: 45 DEGREES
VENTRICULAR RATE: 78 BPM

## 2023-07-10 PROCEDURE — 96401 CHEMO ANTI-NEOPL SQ/IM: CPT

## 2023-07-10 PROCEDURE — 93010 ELECTROCARDIOGRAM REPORT: CPT | Performed by: INTERNAL MEDICINE

## 2023-07-10 RX ORDER — DEXAMETHASONE 4 MG/1
40 TABLET ORAL ONCE
Status: COMPLETED | OUTPATIENT
Start: 2023-07-10 | End: 2023-07-10

## 2023-07-10 RX ADMIN — DEXAMETHASONE 40 MG: 4 TABLET ORAL at 11:59

## 2023-07-10 RX ADMIN — BORTEZOMIB 2.5 MG: 1 INJECTION, POWDER, LYOPHILIZED, FOR SOLUTION INTRAVENOUS; SUBCUTANEOUS at 12:20

## 2023-07-10 NOTE — TELEPHONE ENCOUNTER
Medication Refill Request     Name Morphine   Dose/Frequency 30 mg/ 1 tab by mouth BID  Quantity 60 tab  Verified pharmacy   [x]  Verified ordering Provider   [x]  Does patient have enough for the next 3 days?  Yes [x] No []    Pt's spouse mentioned that a new script needs to be sent since  only 6 days of the medication was able to be filled since the pharmacy did not have enough to fill the full script

## 2023-07-11 RX ORDER — MORPHINE SULFATE 30 MG/1
30 TABLET, FILM COATED, EXTENDED RELEASE ORAL 2 TIMES DAILY
Qty: 60 TABLET | Refills: 0 | OUTPATIENT
Start: 2023-07-11

## 2023-07-13 ENCOUNTER — HOSPITAL ENCOUNTER (OUTPATIENT)
Dept: INFUSION CENTER | Facility: CLINIC | Age: 58
Discharge: HOME | End: 2023-07-13
Payer: COMMERCIAL

## 2023-07-13 VITALS
BODY MASS INDEX: 19.97 KG/M2 | SYSTOLIC BLOOD PRESSURE: 122 MMHG | OXYGEN SATURATION: 98 % | RESPIRATION RATE: 18 BRPM | HEIGHT: 70 IN | DIASTOLIC BLOOD PRESSURE: 75 MMHG | WEIGHT: 139.5 LBS | HEART RATE: 68 BPM | TEMPERATURE: 97.7 F

## 2023-07-13 DIAGNOSIS — C90.00 MULTIPLE MYELOMA NOT HAVING ACHIEVED REMISSION (HCC): Primary | ICD-10-CM

## 2023-07-13 PROCEDURE — 96401 CHEMO ANTI-NEOPL SQ/IM: CPT

## 2023-07-13 RX ADMIN — BORTEZOMIB 2.5 MG: 3.5 INJECTION, POWDER, LYOPHILIZED, FOR SOLUTION INTRAVENOUS; SUBCUTANEOUS at 14:13

## 2023-07-13 NOTE — PROGRESS NOTES
Pt tolerated Velcade injection in right abdomen without incident. No other Velcade injections ordered at this time.  Declined AVS.

## 2023-07-14 DIAGNOSIS — G89.3 CANCER RELATED PAIN: ICD-10-CM

## 2023-07-14 DIAGNOSIS — C79.51 METASTASIS TO BONE (HCC): ICD-10-CM

## 2023-07-14 DIAGNOSIS — Z51.5 PALLIATIVE CARE PATIENT: ICD-10-CM

## 2023-07-14 DIAGNOSIS — C90.00 MULTIPLE MYELOMA NOT HAVING ACHIEVED REMISSION (HCC): ICD-10-CM

## 2023-07-14 DIAGNOSIS — M54.9 BACK PAIN: ICD-10-CM

## 2023-07-14 RX ORDER — MORPHINE SULFATE 30 MG/1
30 TABLET, FILM COATED, EXTENDED RELEASE ORAL 2 TIMES DAILY
Qty: 60 TABLET | Refills: 0 | Status: SHIPPED | OUTPATIENT
Start: 2023-07-14 | End: 2023-07-19 | Stop reason: SDUPTHER

## 2023-07-14 NOTE — TELEPHONE ENCOUNTER
Patient only received 20 pills not the full script he is in need of another script. He only had enough for 10 days and he takes 2 a day. Please advise.

## 2023-07-14 NOTE — TELEPHONE ENCOUNTER
Patient only had a fill for 10 days. He needs another script.          Last appointment:6/22    Next scheduled appointment:8/23    [facility]

## 2023-07-19 DIAGNOSIS — C79.51 METASTASIS TO BONE (HCC): ICD-10-CM

## 2023-07-19 DIAGNOSIS — M54.9 BACK PAIN: ICD-10-CM

## 2023-07-19 DIAGNOSIS — Z51.5 PALLIATIVE CARE PATIENT: ICD-10-CM

## 2023-07-19 DIAGNOSIS — C90.00 MULTIPLE MYELOMA NOT HAVING ACHIEVED REMISSION (HCC): ICD-10-CM

## 2023-07-19 DIAGNOSIS — G89.3 CANCER RELATED PAIN: ICD-10-CM

## 2023-07-19 RX ORDER — MORPHINE SULFATE 30 MG/1
30 TABLET, FILM COATED, EXTENDED RELEASE ORAL 2 TIMES DAILY
Qty: 60 TABLET | Refills: 0 | Status: SHIPPED | OUTPATIENT
Start: 2023-07-19

## 2023-07-19 NOTE — TELEPHONE ENCOUNTER
Patient stated the pharmacy this script was sent to doesn't have it I stock please resend to [facility] attached cx last script.          Last appointment:6/22    Next scheduled appointment:08/23    [facility]

## 2023-08-08 ENCOUNTER — PATIENT OUTREACH (OUTPATIENT)
Dept: CASE MANAGEMENT | Facility: OTHER | Age: 58
End: 2023-08-08

## 2023-08-08 ENCOUNTER — TELEPHONE (OUTPATIENT)
Dept: HEMATOLOGY ONCOLOGY | Facility: CLINIC | Age: 58
End: 2023-08-08

## 2023-08-08 NOTE — PROGRESS NOTES
OSW received a TC from pts girlfriend, Oren Dawn. She is looking for guidance regarding applying for disability. She states that he has applied once and they received a letter that he has been denied and that he should reapply. OSW expressed that unfortunately most people get denied the first time. OSW encouraged her to make an in person appointment to reapply. Marbella Burrell states that he is currently in [place] and his immune system will be so compromised he will not be able to be around people. OSW encouraged her to reapply over the phone then, as she states this is how he applied the first time. Marbella Burrell states that she received SSI and they have been living on a very limited income. OSW validated how frustrating the process is. She states that he did get approved for $500 through the [employer], which was helpful. OSW suggested that the pt apply for the waiver program as well. OSW educated on the program and provided her with their contact number. OSW expressed that I am unsure if she would be approved to be his paid caregiver, since she receives disability and they may have restrictions for that, but at least if he is approved she can have some assistance with him, as she is his main caregiver. She states she will call and was appreciative of the information. She also works with a SW in [place] and when she is there tomorrow will ask her if she can assist her with reapplying for disability for him. OSW encouraged her to call with any other questions/concerns.

## 2023-08-08 NOTE — TELEPHONE ENCOUNTER
Patient Call    Who are you speaking with? Significant Other    If it is not the patient, are they listed on an active communication consent form? Yes   What is the reason for this call? Cecil calling to speak with Carmen Dawson. Erin Beal it is very important   Does this require a call back? Yes   If a call back is required, please list best call back number [phone]   If a call back is required, advise that a message will be forwarded to their care team and someone will return their call as soon as possible. Did you relay this information to the patient?  Yes

## 2023-08-16 ENCOUNTER — TELEPHONE (OUTPATIENT)
Dept: HEMATOLOGY ONCOLOGY | Facility: MEDICAL CENTER | Age: 58
End: 2023-08-16

## 2023-08-16 DIAGNOSIS — C90.00 MULTIPLE MYELOMA NOT HAVING ACHIEVED REMISSION (HCC): Primary | ICD-10-CM

## 2023-08-16 NOTE — TELEPHONE ENCOUNTER
Received notice from  fellow:  Mr. Franck John ([birth year]) will be discharged today. A pt with IgG MM, s/p auto SCT on 8/4. He will be getting discharged likely tomorrow and we wanted him to get a CBC, CMP, Mg and Phos on Monday. We will arrange for a telemedicine appt with [facility] team early next week.  He said that his labs are done thru mobile lab    Spoke with patient's wife   Orders placed for lab work   Wife will call [facility]'s mobile lab to arrange for lab work to be done 8/21/2023

## 2023-08-18 ENCOUNTER — TELEPHONE (OUTPATIENT)
Dept: LAB | Facility: HOSPITAL | Age: 58
End: 2023-08-18

## 2023-08-23 ENCOUNTER — APPOINTMENT (OUTPATIENT)
Dept: LAB | Facility: HOSPITAL | Age: 58
End: 2023-08-23
Attending: INTERNAL MEDICINE
Payer: COMMERCIAL

## 2023-08-23 ENCOUNTER — OFFICE VISIT (OUTPATIENT)
Dept: PALLIATIVE MEDICINE | Facility: CLINIC | Age: 58
End: 2023-08-23
Payer: COMMERCIAL

## 2023-08-23 VITALS
OXYGEN SATURATION: 97 % | TEMPERATURE: 97.6 F | BODY MASS INDEX: 19.54 KG/M2 | SYSTOLIC BLOOD PRESSURE: 92 MMHG | WEIGHT: 136.5 LBS | HEART RATE: 130 BPM | HEIGHT: 70 IN | DIASTOLIC BLOOD PRESSURE: 72 MMHG

## 2023-08-23 DIAGNOSIS — R63.4 UNINTENTIONAL WEIGHT LOSS: ICD-10-CM

## 2023-08-23 DIAGNOSIS — T40.2X5A THERAPEUTIC OPIOID-INDUCED CONSTIPATION (OIC): ICD-10-CM

## 2023-08-23 DIAGNOSIS — C79.51 METASTASIS TO BONE (HCC): ICD-10-CM

## 2023-08-23 DIAGNOSIS — Z51.5 PALLIATIVE CARE PATIENT: ICD-10-CM

## 2023-08-23 DIAGNOSIS — K21.9 ACID REFLUX: ICD-10-CM

## 2023-08-23 DIAGNOSIS — M54.9 BACK PAIN: ICD-10-CM

## 2023-08-23 DIAGNOSIS — C90.00 MULTIPLE MYELOMA NOT HAVING ACHIEVED REMISSION (HCC): ICD-10-CM

## 2023-08-23 DIAGNOSIS — K59.03 THERAPEUTIC OPIOID-INDUCED CONSTIPATION (OIC): ICD-10-CM

## 2023-08-23 DIAGNOSIS — R63.0 LOSS OF APPETITE: ICD-10-CM

## 2023-08-23 DIAGNOSIS — F10.11 HISTORY OF ALCOHOL ABUSE: ICD-10-CM

## 2023-08-23 DIAGNOSIS — G47.00 INSOMNIA: ICD-10-CM

## 2023-08-23 DIAGNOSIS — G89.3 CANCER RELATED PAIN: ICD-10-CM

## 2023-08-23 DIAGNOSIS — C90.00 MULTIPLE MYELOMA NOT HAVING ACHIEVED REMISSION (HCC): Primary | ICD-10-CM

## 2023-08-23 LAB
ALBUMIN SERPL BCP-MCNC: 2.9 G/DL (ref 3.5–5)
ALP SERPL-CCNC: 105 U/L (ref 34–104)
ALT SERPL W P-5'-P-CCNC: 10 U/L (ref 7–52)
ANION GAP SERPL CALCULATED.3IONS-SCNC: 6 MMOL/L
AST SERPL W P-5'-P-CCNC: 16 U/L (ref 13–39)
BASOPHILS # BLD AUTO: 0.03 THOUSANDS/ÂΜL (ref 0–0.1)
BASOPHILS NFR BLD AUTO: 1 % (ref 0–1)
BILIRUB SERPL-MCNC: 0.46 MG/DL (ref 0.2–1)
BUN SERPL-MCNC: 4 MG/DL (ref 5–25)
CALCIUM ALBUM COR SERPL-MCNC: 8.6 MG/DL (ref 8.3–10.1)
CALCIUM SERPL-MCNC: 7.7 MG/DL (ref 8.4–10.2)
CHLORIDE SERPL-SCNC: 106 MMOL/L (ref 96–108)
CO2 SERPL-SCNC: 27 MMOL/L (ref 21–32)
CREAT SERPL-MCNC: 0.34 MG/DL (ref 0.6–1.3)
EOSINOPHIL # BLD AUTO: 0 THOUSAND/ÂΜL (ref 0–0.61)
EOSINOPHIL NFR BLD AUTO: 0 % (ref 0–6)
ERYTHROCYTE [DISTWIDTH] IN BLOOD BY AUTOMATED COUNT: 17.1 % (ref 11.6–15.1)
GFR SERPL CREATININE-BSD FRML MDRD: 141 ML/MIN/1.73SQ M
GLUCOSE P FAST SERPL-MCNC: 121 MG/DL (ref 65–99)
HCT VFR BLD AUTO: 39.8 % (ref 36.5–49.3)
HGB BLD-MCNC: 13.1 G/DL (ref 12–17)
IMM GRANULOCYTES # BLD AUTO: 0.07 THOUSAND/UL (ref 0–0.2)
IMM GRANULOCYTES NFR BLD AUTO: 1 % (ref 0–2)
LYMPHOCYTES # BLD AUTO: 0.14 THOUSANDS/ÂΜL (ref 0.6–4.47)
LYMPHOCYTES NFR BLD AUTO: 3 % (ref 14–44)
MAGNESIUM SERPL-MCNC: 1.6 MG/DL (ref 1.9–2.7)
MCH RBC QN AUTO: 30.6 PG (ref 26.8–34.3)
MCHC RBC AUTO-ENTMCNC: 32.9 G/DL (ref 31.4–37.4)
MCV RBC AUTO: 93 FL (ref 82–98)
MONOCYTES # BLD AUTO: 0.28 THOUSAND/ÂΜL (ref 0.17–1.22)
MONOCYTES NFR BLD AUTO: 6 % (ref 4–12)
NEUTROPHILS # BLD AUTO: 4.47 THOUSANDS/ÂΜL (ref 1.85–7.62)
NEUTS SEG NFR BLD AUTO: 89 % (ref 43–75)
NRBC BLD AUTO-RTO: 0 /100 WBCS
PHOSPHATE SERPL-MCNC: 3.1 MG/DL (ref 2.7–4.5)
PLATELET # BLD AUTO: 175 THOUSANDS/UL (ref 149–390)
PMV BLD AUTO: 9.5 FL (ref 8.9–12.7)
POTASSIUM SERPL-SCNC: 4.4 MMOL/L (ref 3.5–5.3)
PROT SERPL-MCNC: 4.7 G/DL (ref 6.4–8.4)
RBC # BLD AUTO: 4.28 MILLION/UL (ref 3.88–5.62)
SODIUM SERPL-SCNC: 139 MMOL/L (ref 135–147)
WBC # BLD AUTO: 4.99 THOUSAND/UL (ref 4.31–10.16)

## 2023-08-23 PROCEDURE — 36415 COLL VENOUS BLD VENIPUNCTURE: CPT

## 2023-08-23 PROCEDURE — 83735 ASSAY OF MAGNESIUM: CPT

## 2023-08-23 PROCEDURE — 84100 ASSAY OF PHOSPHORUS: CPT

## 2023-08-23 PROCEDURE — 99214 OFFICE O/P EST MOD 30 MIN: CPT | Performed by: INTERNAL MEDICINE

## 2023-08-23 PROCEDURE — 85025 COMPLETE CBC W/AUTO DIFF WBC: CPT

## 2023-08-23 PROCEDURE — 80053 COMPREHEN METABOLIC PANEL: CPT

## 2023-08-23 RX ORDER — POTASSIUM CHLORIDE 20 MEQ/1
TABLET, EXTENDED RELEASE ORAL
COMMUNITY

## 2023-08-23 RX ORDER — DEXAMETHASONE 1 MG
1 TABLET ORAL
Qty: 60 TABLET | Refills: 0 | Status: SHIPPED | OUTPATIENT
Start: 2023-08-23

## 2023-08-23 RX ORDER — OXYCODONE HYDROCHLORIDE 10 MG/1
10 TABLET ORAL EVERY 4 HOURS PRN
Qty: 180 TABLET | Refills: 0 | Status: SHIPPED | OUTPATIENT
Start: 2023-08-23 | End: 2023-08-25 | Stop reason: SDUPTHER

## 2023-08-23 RX ORDER — PREDNISONE 10 MG/1
TABLET ORAL DAILY
COMMUNITY

## 2023-08-23 RX ORDER — MORPHINE SULFATE 30 MG/1
30 TABLET, FILM COATED, EXTENDED RELEASE ORAL 2 TIMES DAILY
Qty: 60 TABLET | Refills: 0 | Status: SHIPPED | OUTPATIENT
Start: 2023-08-23

## 2023-08-23 NOTE — PATIENT INSTRUCTIONS
It was good to see you today. Thank you for coming in. Use Zofran (ondansetron) as needed for nausea / vomiting / dry heaves. Increase dexamethasone (Decadron) to 1mg before breakfast and before lunch (so twice per day). This can help increase energy, in crease appetite, and reduce nausea/vomiting. Skip on days of infusion. Increase long-acting morphine to 60mg every 12 hours. Continue oxycodone for breakthrough pain, up to every 4 hours as needed. For back pain:  Try a heating pad or ice pack (you can alternate these about 30 minutes apart) for neck and back pain. You may consider topical lidocaine products as well (available over-the-counter; brand names include Salonpas, Biofreeze, Aspercreme, [address], etc). These can be patches, creams or roll-on gels. Do not use topical product under a heating pad; ensure skin is clean and dry. Tylenol, 1000mg three times per day every day, can be a safe and effective way to reduce chronic pain. When we use opioids for pain control, constipation is common and patients should act to prevent it:  Drink PLENTY of water. This is important to keep the gut moving. Some people have success w/ using prunes, prune juice, certain fruits or vegetables (apples, bananas, prunes, pears, raspberries, and vegetables like string beans, broccoli, spinach, kale, squash, lentils, peas, and beans), or fiber gummies. Try a probiotic. This could be yogurt or kefir, or fermented beverages such as kombucha, but probiotics are also available in capsule form. Aim for 10-15 billion colony-forming-units, w/ bacteria such as Lactobacillus / Saccharomyces / Actinomyces. Osmotic laxatives (Miralax, magnesium citrate, Milk of Magnesia) can be very useful for opioid-induced constipation (OIC); take daily to prevent OIC.   Bulk laxatives (Citrucel, Metamucil, Fibercon, Benefiber, wheat germ) are useful for constipation in patients who are not taking opioids, but are not recommended if you are taking opioids. Colace is good for softening hard stools, or preventing constipation when opioids are being used - but does not stimulate the bowel to move things along once constipation has occurred. You can use senna, 1 to 2 tabs, once or twice daily as needed for constipation. Use as directed on the box/bottle. Senna is also available in a tea ("Smooth Move"). Should that not be enough for your constipation, you can try Dulcolax. Should that not be enough, consider an enema. All of these medications are available over-the-counter. Return in about 1 month (around 9/23/2023). Call us for refills on medications that we supply, as needed. If something changes and you need to come in sooner, please call our office. PRESCRIPTION REFILL REMINDER:  All medication refills should be requested prior to [facility] on Friday. Any refill requests after noon on Friday would be addressed the following Monday. MEDICATION SAFETY ISSUES:  Do not drive under the influence of narcotics (including opioids), watch for adverse effects including confusion / altered mental status / respiratory depression (slowed breathing), keep medications stored in a safe/locked environment, do not use alcohol while opioids or other narcotics are in your system.

## 2023-08-23 NOTE — PROGRESS NOTES
Follow-up with Palliative and [address] [place] [address]. 65 y.o. male [id number]    ASSESSMENT & PLAN:  1. Multiple myeloma not having achieved remission ([address])    2. Metastasis to bone ([address])    3. Acid reflux    4. History of alcohol abuse    5. Nicotine dependence, cigarettes, uncomplicated    6. Cancer related pain    7. Back pain    8. Therapeutic opioid-induced constipation (OIC)    9. Loss of appetite    10. Unintentional weight loss    11. Insomnia    12. Palliative care patient          • Continue disease-directed cares. Patient is s/p recent ASCT. • ACP: Patient has completed advanced directives (the [facility] Advanced Directive) naming his wife Riddhi Moraes as default surrogate healthcare decision-maker(s). In EMR. • Patient's wife Riddhi Moraes provides some history today, as she participates in his care and medication management. • Patient reports his chronic pain is poorly managed. He asks for increase in opioid regimen. Etiology of his pain includes cancer-related back pain, epigastric / anterior chest wall pain which may be related to malignancy and/or reflux, MSK pain. o Continue pantoprazole 40mg qAM for reflux.  o Increase MSER to 60mg q12h ATC for long-acting analgesia. Tolerated 30mg q12h dosing. o Continue oxyIR 10mg q4h PRN breakthrough pain. He had been taking q6h ATC with insufficient relief. o Recommend topical OTC products, local application of heat or cold, Tylenol 1000mg TID ATC. Do not use heat on top of topical agents. Do not mix topical agents. • Increase dexamethasone to 1mg BID (breakfast/lunch) as this may help with pain, and can increase appetite and decreased fatigue. Skip on chemo days (should infusions resume). • Continue Zofran PRN N/V. Recommended more frequent use as he has been having "dry heaves".   • Reviewed notes ([facility], [facility]), labs (8/16/23 , alb 2.4, Cr 0.42, K 3.4, Ca 7.8, Hb 12.2), imaging + procedures (7/3/23 PFTs; 8/12/23 CXR; 7/3/23 echo). Some data gathered from [facility]. • Return in about 1 month (around 9/23/2023). • Emotional support provided. • Medication safety issues addressed - no driving under the influence of narcotics (including opioids), watch for adverse effects including AMS or respiratory depression (slowed breathing), keep medications stored in a safe/locked environment, do not use alcohol while opioids or other narcotics are in one's system. Requested Prescriptions     Signed Prescriptions Disp Refills   • morphine (MS CONTIN) 30 mg 12 hr tablet 60 tablet 0     Sig: Take 1 tablet (30 mg total) by mouth 2 (two) times a day Max Daily Amount: 60 mg   • oxyCODONE (ROXICODONE) 10 MG TABS 180 tablet 0     Sig: Take 1 tablet (10 mg total) by mouth every 4 (four) hours as needed (breakthrough cancer-related pain) Max Daily Amount: 60 mg   • dexamethasone (DECADRON) 1 mg tablet 60 tablet 0     Sig: Take 1 tablet (1 mg total) by mouth 2 (two) times a day before breakfast and lunch - Skip on infusion day. Medications Discontinued During This Encounter   Medication Reason   • dexamethasone (DECADRON) 1 mg tablet Reorder   • oxyCODONE (ROXICODONE) 10 MG TABS Reorder   • morphine (MS CONTIN) 30 mg 12 hr tablet Reorder       Representatives have queried the patient's controlled substance dispensing history in the Prescription Drug Monitoring Program in compliance with regulations before I have prescribed any controlled substances. The prescription history is consistent with prescribed therapy and our practice policies.       25 minutes were spent in this ambulatory visit with greater than 50% of the time spent face to face with patient and his wife Giuliana Gates in counseling or coordination of care including symptom assessment and management, medication review, medication adjustment, psychosocial support, chart review, imaging review, lab review, advanced directives, opioid titration, supportive listening and anticipatory guidance. All of the patient's questions were answered during this discussion. SUBJECTIVE:  Chief Complaint   Patient presents with   • Follow-up   • Medication Refill   • Cancer   • Cancer Pain   • Fatigue   • Loss of Appetite   • Counseling        LILLIE    Taisha Lanza is a 65 y.o. male w/ multiple myeloma most recently on RVd therapy, now s/p ASCT on 8/4/23; back pain (s/t malignancy, s/p localized RT), unintentional weight loss. He follows w/ Dr Rhiannon Stephen (Medical Oncology), Dr Marianna Parisi (Radiation Oncology), Dr Kandice Aguero [facility]). Patient is known to [facility] clinic; seen 6/22/23 for symptom assessment and management, medication review, psychosocial support, chart review, imaging review, lab review, goals of care, supportive listening and anticipatory guidance. Patient reports his cancer-related pain, focused today in his back at 7/10, has worsened since his transplant earlier this month. Pain is worse w/ movement. He has been taking 30mg MSER q12h ATC and asks for an increase. He has been taking 10mg oxyIR every 6 hours ATC. Pain can improve with this regimen but is definitely not at goal; pain is limiting QOL. He has tolerated his opioid therapies well thus far. Patient endorses vomiting and "dry heaves", sometimes without a precursor of nausea. His weight fluctuates but continues its downward trend. Appetite is low. PDMP shows no concerns. The following portions of the medical history were reviewed: past medical history, surgical history, problem list, medication list, family history, and social history.       Current Outpatient Medications:   •  acetaminophen (TYLENOL) 500 mg tablet, Take 2 tablets (1,000 mg total) by mouth 3 (three) times a day, Disp: 180 tablet, Rfl: 2  •  dexamethasone (DECADRON) 1 mg tablet, Take 1 tablet (1 mg total) by mouth 2 (two) times a day before breakfast and lunch - Skip on infusion day., Disp: 60 tablet, Rfl: 0  •  morphine (MS CONTIN) 30 mg 12 hr tablet, Take 1 tablet (30 mg total) by mouth 2 (two) times a day Max Daily Amount: 60 mg, Disp: 60 tablet, Rfl: 0  •  naloxone (NARCAN) 4 mg/0.1 mL nasal spray, Administer 1 spray into a nostril. If no response after 2-3 minutes, give another dose in the other nostril using a new spray., Disp: 1 each, Rfl: 1  •  ondansetron (ZOFRAN) 4 mg tablet, Take 1 tablet (4 mg total) by mouth every 8 (eight) hours as needed for nausea or vomiting, Disp: 20 tablet, Rfl: 0  •  oxyCODONE (ROXICODONE) 10 MG TABS, Take 1 tablet (10 mg total) by mouth every 4 (four) hours as needed (breakthrough cancer-related pain) Max Daily Amount: 60 mg, Disp: 180 tablet, Rfl: 0  •  pantoprazole (PROTONIX) 40 mg tablet, Take 1 tablet (40 mg total) by mouth daily - in the morning, Disp: 90 tablet, Rfl: 0  •  potassium chloride (K-DUR,KLOR-CON) 20 mEq tablet, Take by mouth, Disp: , Rfl:   •  predniSONE 10 mg tablet, Take by mouth daily, Disp: , Rfl:   •  acyclovir (ZOVIRAX) 400 MG tablet, Take 1 tablet (400 mg total) by mouth 2 (two) times a day, Disp: 60 tablet, Rfl: 11  •  lenalidomide (REVLIMID) 25 MG CAPS, Take 25 mg by mouth daily on days 1-7 of a 21 day cycle [id number] (Patient not taking: Reported on 8/23/2023), Disp: 7 capsule, Rfl: 0    Review of Systems   Constitutional: Positive for activity change, appetite change, fatigue and unexpected weight change. HENT: Positive for dental problem (chronic). Gastrointestinal: Positive for nausea and vomiting (and "dry heaves"). Eructation. Stool color change (green) since ASCT. GERD managed / at goal.   Musculoskeletal: Positive for back pain (progressive; severe; worse w/ movement). Allergic/Immunologic: Positive for immunocompromised state. Psychiatric/Behavioral: Positive for dysphoric mood and sleep disturbance. The patient is nervous/anxious. All other systems reviewed and are negative.       OBJECTIVE:  BP 92/72 (BP Location: Left arm, Patient Position: Sitting, Cuff Size: Standard)   Pulse (!) 130   Temp 97.6 °F (36.4 °C) (Temporal)   Ht 5' 10" (1.778 m)   Wt 61.9 kg (136 lb 8 oz)   SpO2 97%   BMI 19.59 kg/m²   Physical Exam  Vitals reviewed. Constitutional:       Appearance: He is well-groomed and underweight. He is ill-appearing. He is not toxic-appearing. Comments: In discomfort (back pain, worse w/ movement). HENT:      Head: Normocephalic and atraumatic. Right Ear: External ear normal.      Left Ear: External ear normal.      Mouth/Throat:      Comments: Wearing paper surgical mask. Eyes:      General: No scleral icterus. Right eye: No discharge. Left eye: No discharge. Extraocular Movements: Extraocular movements intact. Conjunctiva/sclera: Conjunctivae normal.      Pupils: Pupils are equal, round, and reactive to light. Cardiovascular:      Rate and Rhythm: Tachycardia present. Pulmonary:      Effort: Pulmonary effort is normal. No tachypnea, bradypnea, accessory muscle usage or respiratory distress. Comments: Able to speak comfortably in short phrases on room air at rest.  Abdominal:      General: There is no distension. Tenderness: There is no guarding. Musculoskeletal:      Cervical back: Normal range of motion. Right lower leg: No edema. Left lower leg: No edema. Skin:     General: Skin is dry. Coloration: Skin is not pale. Neurological:      Mental Status: He is alert and oriented to person, place, and time. Cranial Nerves: No dysarthria or facial asymmetry. Motor: Weakness present. Gait: Gait abnormal (using 1PC; antalgic gait). Psychiatric:         Attention and Perception: Attention normal.         Mood and Affect: Mood is anxious and depressed. Affect is not inappropriate. Speech: Speech normal.         Behavior: Behavior normal. Behavior is cooperative. Thought Content:  Thought content normal.         Cognition and Memory: Cognition and memory normal. Judgment: Judgment normal.          Jose Marie MD  [facility]  [phone]    Portions of this document may have been created using dictation software and as such some "sound alike" terms may have been generated by the system. Do not hesitate to contact me with any questions or clarifications.

## 2023-08-24 DIAGNOSIS — C79.51 METASTASIS TO BONE (HCC): ICD-10-CM

## 2023-08-24 DIAGNOSIS — M54.9 BACK PAIN: ICD-10-CM

## 2023-08-24 DIAGNOSIS — R63.4 UNINTENTIONAL WEIGHT LOSS: ICD-10-CM

## 2023-08-24 DIAGNOSIS — R11.2 NAUSEA & VOMITING: ICD-10-CM

## 2023-08-24 DIAGNOSIS — Z51.5 PALLIATIVE CARE PATIENT: ICD-10-CM

## 2023-08-24 DIAGNOSIS — G89.3 CANCER RELATED PAIN: ICD-10-CM

## 2023-08-24 DIAGNOSIS — C90.00 MULTIPLE MYELOMA NOT HAVING ACHIEVED REMISSION (HCC): ICD-10-CM

## 2023-08-24 RX ORDER — ONDANSETRON 4 MG/1
4 TABLET, FILM COATED ORAL EVERY 8 HOURS PRN
Qty: 40 TABLET | Refills: 2 | Status: SHIPPED | OUTPATIENT
Start: 2023-08-24

## 2023-08-25 RX ORDER — OXYCODONE HYDROCHLORIDE 10 MG/1
10 TABLET ORAL EVERY 4 HOURS PRN
Qty: 180 TABLET | Refills: 0 | Status: SHIPPED | OUTPATIENT
Start: 2023-08-25

## 2023-08-25 NOTE — TELEPHONE ENCOUNTER
Spouse called to report pharmacy does not have Oxycodone in stock. [facility] has in [address] [place] [address]. LMOM for pt x 2 on availability of med. Requesting refill be sent to [facility]. Phone number provided to check if order sent.      Thank you

## 2023-08-29 ENCOUNTER — OFFICE VISIT (OUTPATIENT)
Dept: HEMATOLOGY ONCOLOGY | Facility: CLINIC | Age: 58
End: 2023-08-29
Payer: COMMERCIAL

## 2023-08-29 ENCOUNTER — TELEPHONE (OUTPATIENT)
Dept: PALLIATIVE MEDICINE | Facility: CLINIC | Age: 58
End: 2023-08-29

## 2023-08-29 VITALS
WEIGHT: 126 LBS | HEART RATE: 115 BPM | BODY MASS INDEX: 18.04 KG/M2 | TEMPERATURE: 99.3 F | OXYGEN SATURATION: 96 % | RESPIRATION RATE: 16 BRPM | SYSTOLIC BLOOD PRESSURE: 120 MMHG | HEIGHT: 70 IN | DIASTOLIC BLOOD PRESSURE: 88 MMHG

## 2023-08-29 DIAGNOSIS — K59.03 THERAPEUTIC OPIOID-INDUCED CONSTIPATION (OIC): Primary | ICD-10-CM

## 2023-08-29 DIAGNOSIS — T40.2X5A THERAPEUTIC OPIOID-INDUCED CONSTIPATION (OIC): Primary | ICD-10-CM

## 2023-08-29 DIAGNOSIS — C90.00 MULTIPLE MYELOMA NOT HAVING ACHIEVED REMISSION (HCC): ICD-10-CM

## 2023-08-29 DIAGNOSIS — C79.51 METASTASIS TO BONE (HCC): ICD-10-CM

## 2023-08-29 PROCEDURE — 99215 OFFICE O/P EST HI 40 MIN: CPT | Performed by: INTERNAL MEDICINE

## 2023-08-29 RX ORDER — ACYCLOVIR 800 MG/1
TABLET ORAL
COMMUNITY
Start: 2023-08-16

## 2023-08-29 NOTE — TELEPHONE ENCOUNTER
Prior Authorization completed  for   Oxycodone 10 mg IR  via [facility]      KEY#  [id number]    Pharmacy:      Additional Clinical information attached

## 2023-08-29 NOTE — TELEPHONE ENCOUNTER
Prior Authorization needed for Oxycodone 10mg     ID# [id number]  Phone# [phone] (may not be correct)  PCN: [id number]  BIN: [id number]  GRP: [id number]    Pharmacy: [address] filled 120 tabs for 30 days in order to provide pt with a supply of medication. The remaining 60 tabs will be voided and will require a new rx. A Prior Hafsa Knightk can be started proactively to pursue coverage of rx moving forward.

## 2023-08-29 NOTE — PROGRESS NOTES
Jefferson Alvares.  1965  1 [facility]  [address] [facility] [id number]    DISCUSSION/SUMMARY:    52-year-old male with no significant past medical history (but no medical follow-up recently) recently presenting to the ER with mid back pain and weight loss. Issues:    Multiple myeloma. Patient was seen in the ER on February 8, 2023. CTA of the chest did not demonstrate any PE but patient had multiple lytic lesions and compression fractures in T6 and T10. Additional work-up demonstrated elevated total protein, elevated IgG and an elevated lambda. Immunofixation demonstrated IgG lambda monoclonal protein. CBC parameters and renal function were within normal limits. Bone marrow biopsy demonstrated a lambda restricted plasma cell neoplasm, 80% of the cells were malignant. PET/CT demonstrated multiple osseous hypermetabolic lytic lesions. Patient was seen by Dr. Merri Closs in second opinion. The plan has been RVD x 4 cycles and then auto stem cell transplant if no evidence of progression and no complications. Multiple myeloma international staging system = II, median survival is 44 months (albumin = 2.2, beta-2 microglobulin = 2.8)    NCCN guidelines 3.2023 state that for patients with multiple myeloma in need of treatment, possible transplant candidate, preferred regimen is bortezomib, lenalidomide and dexamethasone. Regimen  Bortezomib 1.3 mg/m2 subcu on days 1, 4, 8 and 11  Lenalidomide 25 mg orally days 1-7 (dose reduced on the first cycle)  Dexamethasone 40 mg by mouth on days 1, 8 and 15  Cycle length = 21 days x 3-4 cycles    Patient was able to complete the 4 cycles of neoadjuvant chemotherapy without significant side effects or toxicities. Patient then went on to transplant - also did well.   The below information comes from a telemedicine visit from August 25, 2023 from [facility]    ASSESSMENT AND PLAN:   # IgG Lambda Multiple Myeloma  - Currently Day+ 21 s/p Autologous stem cell transplant  - Counts have recovered  -Restaging to BMBx and PET to be done ~ Day + 60; email sent to scheduling  -Supportive care listed below.  -Continue with Calcium/Vitamin D    # Immunocompromised Host  -Viral ppx: Acyclovir 800 mg po BID  -Ms. Linda Quesada knows to go to ED if he has a temp of 100.4    # Vaccine Plan   -IPV, HIB, DTaP and pneumococcal x 3 sets, as well as Shingrix x 2 doses once 6-months post transplant. -MMR once 2-years post transplant.   -Annual flu vaccine is recommended. -COVID education provided. Will recommend vaccine at 3 months post transplant. # Pain  -Continue with MS Contin and Oxycodone    DISPO: RTC on Monday for NP, labs.  And Mrs. Fahad Styles knows to call if he has any questions or concerns. Patient has a pending follow-up PET/CT in September 2023. Patient also has a follow-up bone marrow biopsy a few weeks afterwards. Patient will follow-up at [facility] as directed. In the meantime, patient is to have blood work drawn every 2 weeks. Patient is to return here in 6 weeks. Back pain - better. Dr. Jose Will was kind enough to review the patient's thoracic MRI (unofficially) previously and agreed that a radiation oncology evaluation was indicated. RT to the spine has been completed. Weight loss. This has been more stable recently. Patient will continue to monitor. Poor dentition. Patient denies any pain, swelling or bleeding. Apparently Mr. Fahad Styles has had significantly carried teeth for many years. Because of this, antiresorptive therapy is contraindicated. PCP. Patient has a pending appointment with a new PCP for tomorrow. Mr. Fahad Styles knows to call the hematology/oncology office if there are any other questions or concerns. Patient states having all required medications at home. Carefully review your medication list and verify that the list is accurate and up-to-date.  Please call the hematology/oncology office if there are medications missing from the list, medications on the list that you are not currently taking or if there is a dosage or instruction that is different from how you're taking that medication. Patient goals and areas of care: Posttransplant care  Barriers to care: None  Patient is able to self-care  ______________________________________________________________________________________    Chief Complaint   Patient presents with   • Follow-up   • Multiple Myeloma     History of Present Illness: 65year old male with relatively good general health recently developing mid back pain. Mr. Cathy Ramirez states that the pain began in early January 2023. Patient had lost approximately 20+ pounds over the past few months. Work-up included bone marrow biopsy and PET/CT. Patient has widespread osteolytic lesions. Bone marrow biopsy demonstrated greater than 80% plasma cells. Patient received RT to the spine. Mr. Cathy Ramirez was then treated with RVD, completed 4 cycles. Patient subsequently went on to transplant down at [facility]. Mr. Cathy Ramirez states feeling okay, about the same as before. Fatigue is the same as before. Activities are limited because of the back pain but the pain is less/better than before. No fevers or signs of infection. No bruising or bleeding issues. Appetite is good, patient has lost approximately 30 pounds since beginning treatment. Wife states that patient is eating. No fevers or signs of infection. Review of Systems   Constitutional: Positive for activity change, appetite change, fatigue and unexpected weight change. HENT: Negative. Eyes: Negative. Respiratory: Negative. Cardiovascular: Negative. Gastrointestinal: Negative. Endocrine: Negative. Genitourinary: Negative. Musculoskeletal: Positive for arthralgias. Negative for back pain. Skin: Negative. Allergic/Immunologic: Negative. Neurological: Negative.     Hematological: Negative. Psychiatric/Behavioral: Negative. All other systems reviewed and are negative. Past Surgical History:   Procedure Laterality Date   • APPENDECTOMY     • IR BIOPSY BONE MARROW  3/15/2023   Past surgical history: No prior blood transfusions    Family History   Problem Relation Age of Onset   • Diabetes Mother    • Heart disease Father    • Diabetes Father    Family history: No known familial or genetic diseases, no children    Social History     Socioeconomic History   • Marital status: /Civil Union     Spouse name: Not on file   • Number of children: Not on file   • Years of education: Not on file   • Highest education level: Not on file   Occupational History   • Not on file   Tobacco Use   • Smoking status: Some Days     Types: Cigarettes   • Smokeless tobacco: Not on file   • Tobacco comments:     Smoking 1 cigarette daily   Vaping Use   • Vaping Use: Never used   Substance and Sexual Activity   • Alcohol use: Not Currently   • Drug use: Not Currently     Types: Marijuana     Comment: once a month   • Sexual activity: Not on file   Other Topics Concern   • Not on file   Social History Narrative    From 2/28/23  note:    Emergency Contact: Dee Martinez (NS)[phone] (Home Phone)    Marital Status: Single     Interpretation concerns, speaks another language, preferred language: english    Caregiver/Support: Mayur    Housing: two story     Home Setup: one level     Lives With: SO    Daily Living Activities: independent     Ambulation: independent    Employment: yes     [place] Status/Location: no     Ability to pay bills: yes. No barriers to paying bills. POA/LW/AD: no.  Karlie is healthcare representative. MSW emailed advance directive form. Transportation Plan/Concerns: Patient states he transports self. MSW educated on [employer] transport services.       Social Determinants of Health     Financial Resource Strain: Not on file   Food Insecurity: Not on file Transportation Needs: Not on file   Physical Activity: Not on file   Stress: Not on file   Social Connections: Not on file   Intimate Partner Violence: Not on file   Housing Stability: Not on file   Social history: Patient smokes 3 to 4 cigarettes a day, recently discontinued, approximately 5 beers a day, also recently discontinued, no drug abuse, no toxic exposure    No Known Allergies    There were no vitals filed for this visit. Physical Exam  Constitutional:       Appearance: He is well-developed. Comments: Middle-age male, thin appearing, no evidence of pain   HENT:      Head: Normocephalic and atraumatic. Right Ear: External ear normal.      Left Ear: External ear normal.   Eyes:      Conjunctiva/sclera: Conjunctivae normal.      Pupils: Pupils are equal, round, and reactive to light. Cardiovascular:      Rate and Rhythm: Normal rate and regular rhythm. Heart sounds: Normal heart sounds. Pulmonary:      Effort: Pulmonary effort is normal.      Breath sounds: Normal breath sounds. Comments: Fair to good entry bilaterally, scattered rhonchi  Abdominal:      General: Bowel sounds are normal.      Palpations: Abdomen is soft. Comments: + Bowel sounds, nontender, soft, no hepatosplenomegaly, no guarding   Musculoskeletal:         General: Normal range of motion. Cervical back: Normal range of motion and neck supple. Comments: + Tenderness in bilateral thoracic rib cage   Skin:     General: Skin is warm. Comments: Slightly pale, warm, moist, no petechiae or ecchymoses   Neurological:      Mental Status: He is alert and oriented to person, place, and time. Deep Tendon Reflexes: Reflexes are normal and symmetric. Psychiatric:         Behavior: Behavior normal.         Thought Content:  Thought content normal.         Judgment: Judgment normal.     Extremities: Lower extreme edema bilaterally, no cords, pulses are 1+ l  ymphatics: No adenopathy in the neck, supraclavicular region, axilla bilaterally    Labs    8/28/2023 ([facility])  WBC = 7.2 hemoglobin = 15.3 hematocrit = 45.5 platelet = 454 neutrophil = 91% BUN = 5 creatinine = 0.47 calcium = 8.4 total protein = 5.7 albumin = 3.2 LFTs WNL    6/5/2023 WBC = 5.15 hemoglobin = 10.2 hematocrit = 31.3 platelet = 58 BUN = 6 creatinine = 0.42 calcium = 7.8 AST = 14 ALT = 12 alkaline phosphatase = 814 total protein = 5.3 total bilirubin = 0.57  4/10/2023 WBC = 3.14 hemoglobin = 10.3 hematocrit = 32.5 platelet = 759 neutrophil = 81% calcium = 10.8 BUN = 10 creatinine = 0.89 total protein = 9.8 albumin = 2.2 LDH = 347 beta-2 microglobulin = 2.8  3/15/2023 WBC = 6.86 hemoglobin = 12.8 hematocrit = 36.6 platelet = 241 neutrophil = 69%  2/8/2023 WBC = 6.6 hemoglobin = 12.6 hematocrit = 36 MCV = 96 platelet = 690 neutrophil = 67% bands = 1% lymphocyte = 23% monocyte = 8% basophil = 1% BUN = 21 creatinine = 1.17 calcium = 10.7 AST = 23 ALT = 14 alkaline phosphatase = 166 total protein = 9.7 albumin = 3.3 total bilirubin = 0.36 TSH = 0.680  2/8/2023 immunofixation demonstrated monoclonal gammopathy identified his IgG lambda (3.37 g/deciliter)  2/8/2023 SPEP showed a monoclonal peak in the gamma region, gamma concentration = 3.52 g/deciliter, total protein = 8.3  2/8/2023 IgA = 20 8 = decreased IgG = 2000 900 = elevated IgM = 11 = decreased  2/8/2023 Free kappa = 7.8 Free lambda = 1177 Kappa/lambda ratio = 0.01  2/8/2023 lactic acid = 2.5    Imaging    3/20/2023 MRI thoracic spine    IMPRESSION:     1. Widespread infiltrative and discrete osseous lesions involving anterior and posterior elements of the thoracic and visualized cervical and lumbar spine in keeping with history of multiple myeloma. No extraosseous lesion. 2.  Compared to CTA chest 2/8/2023, progression of T6 wedge compression fracture with severe anterior height loss. There is small posterior osseous buckling without significant canal stenosis.   3.  Mild T8 compression fracture, progressed from CTA chest 2/8/2023. Small posterior osseous buckling without significant canal stenosis. 4.  Stable T8 wedge compression deformity with severe anterior height loss. Posterosuperior osseous buckling indents ventral thecal sac without significant canal stenosis. 5.  Discrete and marrow replacing lesions involving partially imaged ribs and sternum. Displaced sternal fracture, similar to PET CT 3/7/2023, new from CTA chest 2/8/2023. 3/7/2023 PET/CT    IMPRESSION:  1. Widespread hypermetabolic lytic lesions throughout the axial and appendicular skeleton. Findings are most suggestive of multiple myeloma. Clinical correlation recommended. 2.  Several hypermetabolic thoracic compression deformities. T8 compression deformity is new from the prior chest CT. There is also a new displaced sternal fracture. 3.  No hypermetabolic soft tissue lesions.     2/8/2023 CTA chest PE study    OSSEOUS STRUCTURES:  Diffuse lytic lesions throughout the skeleton with mild compression deformity of T6 and moderate compression deformity of T10.    IMPRESSION:     No pulmonary embolus. No acute pulmonary disease. Diffuse lytic lesions throughout the skeleton with compression deformities in the spine. This is most likely due to multiple myeloma, less likely due to metastatic disease from an unknown primary. 2/8/2023 chest x-ray. Impression stated no acute cardiopulmonary disease.     Pathology    Case Report   Surgical Pathology Report                         Case: [id number]                                    Authorizing Provider: Christel Silva MD           Collected:           03/15/2023 0932               Ordering Location:     [facility]        Received:            03/15/2023 0932                                      [facility] CAT Scan                                                             Pathologist:           Dominic Norman MD                                                           Specimens:   A) - Iliac Crest, Right, core                                                                        B) - Iliac Crest, Right, clot                                                                        C) - Iliac Crest, Right, slide                                                             Final Diagnosis   A -C.  Bone marrow,  right iliac crest,  biopsy, clot, and aspirate:  -  Lambda restricted plasma cell neoplasm, >80% of total cellularity consistent with plasma cell myeloma, see note. -  Hypercellular bone marrow with markedly reduced trilineage hematopoiesis and no increase in blasts. -  Reduced iron stores, in a suboptimal sample, correlation with serum iron studies is recommended. -  Moderate to severe increase in reticulin fibrosis status.     Note: The patient's presentation of multiple lytic lesions and compression fractures is noted. The current biopsy shows a marked increase in plasma cells comprising >80% of total cellularity. Immunostains show a lambda light chain restriction relative to kappa light chain by -HAILEE studies. Flow cytometry confirms the presence of a lambda restricted plasma cell population. Further supported by an IgG lambda monoclonal protein detected in the serum by immunofixation studies. FISH studies identify a gain in 1q21/CKS1B in 15.33% of evaluated nuclei. Cytogenetic studies reveal a normal male karyotype. Overall the current bone marrow biopsy is diagnostic for a plasma cell neoplasm best classified as plasma cell myeloma in the correct clinical context. Clinical correlation is advised. Electronically signed by Dominic Norman MD on 3/23/2023 at  8:24 PM   Preliminary result electronically signed by Dominic Norman MD on 3/22/2023 at  3:13 PM   Microscopic Description    Bone marrow aspirate smears:  The aspirate smears are aspicular, cellular, and suboptimal for evaluation.  The aspirate smear sample is hemodilute with an increase in plasma cells. Mature neutrophils and lymphocytes are present. However, maturing myeloid elements and erythroid elements are not well represented in the aspirate smear slides. There is no definitive evidence of myelodysplasia or increase in blasts.      Bone marrow core biopsy and aspirate clot:  Histologic sections of the aspirate clot and decalcified core biopsy are adequate for evaluation.  Marrow space cellularity is hypercellular for patient age (>90%). Myelopoiesis:  Markedly reduced (myeloblasts= <5%). Erythropoiesis:  Markedly reduced  Megakaryocytes:  Markedly reduced  Lymphocytes:  Increased in small aggregates and interstitially distributed  Plasma cells:  Markedly increased  including a subset of immature forms.     Special studies:   * Iron stain performed on the aspirate smear, clot, and core biopsy highlights reduced iron stores with no evidence of ring sideroblasts in a limited sample. Siderotic iron granules are present. Due to the aspiculate nature of the specimen these findings ma  * Reticulin stain performed on the core biopsy shows moderate to severe increase in reticulin fibers.  2-3 of 3 by the  Consensus grading scheme. * Trichrome stain performed on the core biopsy show no increase in collagen fibrosis. * Immunohistochemical stains were performed with appropriate controls and show the following results:  -   highlights marked increase in plasma cells (>80%) with a lambda restriction relative to kappa light chain expression by kappa and lambda in situ hybridization studies. The plasma cells are negative for CD30 and GUANACO-HAILEE.  Ki67 proliferation index is overall low (<10%).      CD20 highlights an increase in B-cells in aggregates and interstitially distributed (10-20% of total cellularity) and CD3 highlights T-cells in aggregates and interstitially scattered T-cells (5-10% of total cellularity).     CD34 shows no increase in blasts (<5% of total cellularity) and  shows no increase in immature cells (<5% of total cellularity). Additionally,  highlights scattered mast cells.     CD61 highlights a reduction in megakaryocytes.      [place] red stain is negative for amyloid. Note    Component Ref Range & Units 3/15/23 0804 2/8/23 1016   WBC 4.31 - 10.16 Thousand/uL 6.86  6.62    RBC 3.88 - 5.62 Million/uL 3.86 Low   3.74 Low     Hemoglobin 12.0 - 17.0 g/dL 12.8  12.6    Hematocrit 36.5 - 49.3 % 36.6  36.0 Low     MCV 82 - 98 fL 95  96    MCH 26.8 - 34.3 pg 33.2  33.7    MCHC 31.4 - 37.4 g/dL 35.0  35.0    RDW 11.6 - 15.1 % 13.9  14.5    MPV 8.9 - 12.7 fL 9.4  8.9    Platelets 515 - 970 Thousands/uL 188  194    nRBC /100 WBCs 0  0       Component Ref Range & Units 2/8/23 1401    IGA 70.0 - 400.0 mg/dL 28.0 Low     .0 - 1,600.0 mg/dL 2,900.0 High     IGM 40.0 - 230.0 mg/dL 11.0 Low        SPEP Interpretation   See Comment      Comment: The SPEP shows a monoclonal peak in the gamma region. Immunofixation to be performed. 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    10-COLOR FLOW CYTOMETRY ANALYSIS FOR ACUTE LEUKEMIA AND MYELOID/LYMPHOID NEOPLASMS ([facility] # [id number] ; please see separate report for further details):  BONE MARROW ASPIRATE:   1. Clonal plasma cells are detected (4.4% by flow cytometry), consistent with plasma cell neoplasm.   - Phenotype: lambda+; CD38+, +, CD20-, CD19-, CD45-, CD56- and -.   2. No evidence of acute leukemia or increased blasts. 3. No evidence of an abnormal myeloid population. Abnormalities associated with myelodysplasia and myeloproliferative neoplasms are absent. 4. No evidence of B-cell lymphoma or atypical T-cell population     Viability 7AAD: 95.57%   Monoclonal CD45(-)/CD38(bright) plasma cells with lambda-restriction are present (4.4%). There is a mixed population of granulocytes/maturing myeloid precursors, blasts, monocytes and lymphocytes.  +/HLA-DR+ myeloblasts comprise (0.18%) of total events. Myeloid-commited CD34+ population comprise (0.16%) of total events. Granulocytes/maturing myeloid precursors (70.32%) do not display an aberrant phenotype. The orthogonal side scatter is normal. No significant number of CD56+ or CD10-/CD16- granulocytes is noted. Monocytes (8.4%) appear mature (CD14+/CD64+) and do not display overt phenotypic atypia. B-cells (1.17%) are polytypic. T-cells (14.55%) show no deletion or abnormal dim expression of pan-T-cell antigens on significant subset of cells     FLUORESCENCE IN-SITU HYBRIDIZATION (FISH)  ([facility] # [id number] ; please see separate report for further details):  INTERPRETATION   1. Positive for 1q21/CKS1B gain in 15.33% nuclei. Comment: Gain of the CKS1B locus (cyclin kinase subunit 1B) in patients with plasma cell myeloma is a poor prognostic indicator associated with significantly shorter progression-free survival and shorter overall survival.   2. No evidence of IGH-MAF translocation t(14;16) gene rearrangement   3. No evidence of RB1 monosomy (13q14 deletion). 4. No evidence of CCND1-IGH translocation t(11;14) gene rearrangement, and no evidence for trisomy 11 or gain of 11q. 5. No evidence of p53 (17p13) deletion or amplification. 6. No evidence of FGFR3-IGH translocation t(4;14) gene rearrangement.      CYTOGENETICS Karyotype Analysis ([facility] # [id number] ; please see separate report for further details):  Karyotype: 46,XY 20  Interpretation:   A normal male karyotype was observed in twenty metaphase cells analyzed.

## 2023-08-29 NOTE — TELEPHONE ENCOUNTER
Received prior authorization approval for oxycodone HCL 10MG through 8/29/2023-2/29/2024. Faxed results to Pharmacy. Pharmacy has been asked to inform pt of outcome.

## 2023-09-14 ENCOUNTER — TELEPHONE (OUTPATIENT)
Dept: LAB | Facility: HOSPITAL | Age: 58
End: 2023-09-14

## 2023-09-14 DIAGNOSIS — C79.51 METASTASIS TO BONE (HCC): ICD-10-CM

## 2023-09-14 DIAGNOSIS — Z51.5 PALLIATIVE CARE PATIENT: ICD-10-CM

## 2023-09-14 DIAGNOSIS — C90.00 MULTIPLE MYELOMA NOT HAVING ACHIEVED REMISSION (HCC): ICD-10-CM

## 2023-09-14 DIAGNOSIS — G89.3 CANCER RELATED PAIN: ICD-10-CM

## 2023-09-14 DIAGNOSIS — R63.4 UNINTENTIONAL WEIGHT LOSS: ICD-10-CM

## 2023-09-14 DIAGNOSIS — R63.0 LOSS OF APPETITE: ICD-10-CM

## 2023-09-14 DIAGNOSIS — M54.9 BACK PAIN: ICD-10-CM

## 2023-09-15 ENCOUNTER — TELEPHONE (OUTPATIENT)
Dept: LAB | Facility: HOSPITAL | Age: 58
End: 2023-09-15

## 2023-09-15 RX ORDER — DEXAMETHASONE 1 MG
TABLET ORAL
Qty: 60 TABLET | Refills: 0 | Status: SHIPPED | OUTPATIENT
Start: 2023-09-15

## 2023-09-20 ENCOUNTER — TELEPHONE (OUTPATIENT)
Dept: OTHER | Facility: OTHER | Age: 58
End: 2023-09-20

## 2023-09-20 NOTE — TELEPHONE ENCOUNTER
Patient is calling regarding cancelling an appointment.     Date/Time: 9/20/2023, 8:40 AM    Patient was rescheduled: YES [] NO [x]    Patient requesting call back to reschedule: YES [x] NO []

## 2023-09-22 ENCOUNTER — APPOINTMENT (OUTPATIENT)
Dept: LAB | Facility: HOSPITAL | Age: 58
End: 2023-09-22
Attending: INTERNAL MEDICINE
Payer: COMMERCIAL

## 2023-09-22 ENCOUNTER — TELEPHONE (OUTPATIENT)
Dept: LAB | Facility: HOSPITAL | Age: 58
End: 2023-09-22

## 2023-09-22 LAB
ALBUMIN SERPL BCP-MCNC: 3.1 G/DL (ref 3.5–5)
ALP SERPL-CCNC: 94 U/L (ref 34–104)
ALT SERPL W P-5'-P-CCNC: 11 U/L (ref 7–52)
ANION GAP SERPL CALCULATED.3IONS-SCNC: 5 MMOL/L
AST SERPL W P-5'-P-CCNC: 15 U/L (ref 13–39)
BASOPHILS # BLD AUTO: 0.01 THOUSANDS/ÂΜL (ref 0–0.1)
BASOPHILS NFR BLD AUTO: 0 % (ref 0–1)
BILIRUB SERPL-MCNC: 0.49 MG/DL (ref 0.2–1)
BUN SERPL-MCNC: 6 MG/DL (ref 5–25)
CALCIUM ALBUM COR SERPL-MCNC: 9.3 MG/DL (ref 8.3–10.1)
CALCIUM SERPL-MCNC: 8.6 MG/DL (ref 8.4–10.2)
CHLORIDE SERPL-SCNC: 103 MMOL/L (ref 96–108)
CO2 SERPL-SCNC: 28 MMOL/L (ref 21–32)
CREAT SERPL-MCNC: 0.43 MG/DL (ref 0.6–1.3)
EOSINOPHIL # BLD AUTO: 0.02 THOUSAND/ÂΜL (ref 0–0.61)
EOSINOPHIL NFR BLD AUTO: 0 % (ref 0–6)
ERYTHROCYTE [DISTWIDTH] IN BLOOD BY AUTOMATED COUNT: 19.5 % (ref 11.6–15.1)
GFR SERPL CREATININE-BSD FRML MDRD: 127 ML/MIN/1.73SQ M
GLUCOSE SERPL-MCNC: 125 MG/DL (ref 65–140)
HCT VFR BLD AUTO: 42.1 % (ref 36.5–49.3)
HGB BLD-MCNC: 14.4 G/DL (ref 12–17)
IMM GRANULOCYTES # BLD AUTO: 0.03 THOUSAND/UL (ref 0–0.2)
IMM GRANULOCYTES NFR BLD AUTO: 0 % (ref 0–2)
LYMPHOCYTES # BLD AUTO: 0.24 THOUSANDS/ÂΜL (ref 0.6–4.47)
LYMPHOCYTES NFR BLD AUTO: 3 % (ref 14–44)
MCH RBC QN AUTO: 32.4 PG (ref 26.8–34.3)
MCHC RBC AUTO-ENTMCNC: 34.2 G/DL (ref 31.4–37.4)
MCV RBC AUTO: 95 FL (ref 82–98)
MONOCYTES # BLD AUTO: 0.73 THOUSAND/ÂΜL (ref 0.17–1.22)
MONOCYTES NFR BLD AUTO: 9 % (ref 4–12)
NEUTROPHILS # BLD AUTO: 7.19 THOUSANDS/ÂΜL (ref 1.85–7.62)
NEUTS SEG NFR BLD AUTO: 88 % (ref 43–75)
NRBC BLD AUTO-RTO: 0 /100 WBCS
PLATELET # BLD AUTO: 143 THOUSANDS/UL (ref 149–390)
PMV BLD AUTO: 8.9 FL (ref 8.9–12.7)
POTASSIUM SERPL-SCNC: 4 MMOL/L (ref 3.5–5.3)
PROT SERPL-MCNC: 5 G/DL (ref 6.4–8.4)
RBC # BLD AUTO: 4.44 MILLION/UL (ref 3.88–5.62)
SODIUM SERPL-SCNC: 136 MMOL/L (ref 135–147)
WBC # BLD AUTO: 8.22 THOUSAND/UL (ref 4.31–10.16)

## 2023-09-22 NOTE — TELEPHONE ENCOUNTER
9/22 - pt was just calling to see what time phleb was coming so she could let the dog  out - called ketan - she will be there around 11am

## 2023-09-24 DIAGNOSIS — C79.51 METASTASIS TO BONE (HCC): ICD-10-CM

## 2023-09-24 DIAGNOSIS — C90.00 MULTIPLE MYELOMA NOT HAVING ACHIEVED REMISSION (HCC): ICD-10-CM

## 2023-09-24 DIAGNOSIS — K21.9 ACID REFLUX: ICD-10-CM

## 2023-09-24 DIAGNOSIS — Z51.5 PALLIATIVE CARE PATIENT: ICD-10-CM

## 2023-09-24 RX ORDER — PANTOPRAZOLE SODIUM 40 MG/1
40 TABLET, DELAYED RELEASE ORAL DAILY
Qty: 90 TABLET | Refills: 0 | Status: SHIPPED | OUTPATIENT
Start: 2023-09-24

## 2023-09-26 DIAGNOSIS — G89.3 CANCER RELATED PAIN: ICD-10-CM

## 2023-09-26 DIAGNOSIS — Z51.5 PALLIATIVE CARE PATIENT: ICD-10-CM

## 2023-09-26 DIAGNOSIS — C79.51 METASTASIS TO BONE (HCC): ICD-10-CM

## 2023-09-26 DIAGNOSIS — C90.00 MULTIPLE MYELOMA NOT HAVING ACHIEVED REMISSION (HCC): ICD-10-CM

## 2023-09-26 DIAGNOSIS — M54.9 BACK PAIN: ICD-10-CM

## 2023-09-26 RX ORDER — MORPHINE SULFATE 30 MG/1
30 TABLET, FILM COATED, EXTENDED RELEASE ORAL 2 TIMES DAILY
Qty: 60 TABLET | Refills: 0 | Status: SHIPPED | OUTPATIENT
Start: 2023-09-26 | End: 2023-09-29 | Stop reason: SDUPTHER

## 2023-09-26 NOTE — TELEPHONE ENCOUNTER
Reviewed most recent [address] note, reviewed PDMP. Refilling medication ([facility]). Changing pharmacy to [facility]. I cannot send controlled substances via interstate mail order.  Last filled at [facility].

## 2023-09-29 ENCOUNTER — TELEPHONE (OUTPATIENT)
Dept: LAB | Facility: HOSPITAL | Age: 58
End: 2023-09-29

## 2023-09-29 ENCOUNTER — OFFICE VISIT (OUTPATIENT)
Dept: PALLIATIVE MEDICINE | Facility: CLINIC | Age: 58
End: 2023-09-29
Payer: COMMERCIAL

## 2023-09-29 VITALS
SYSTOLIC BLOOD PRESSURE: 118 MMHG | RESPIRATION RATE: 16 BRPM | DIASTOLIC BLOOD PRESSURE: 78 MMHG | HEART RATE: 81 BPM | BODY MASS INDEX: 16.75 KG/M2 | WEIGHT: 117 LBS | OXYGEN SATURATION: 86 % | TEMPERATURE: 97.5 F | HEIGHT: 70 IN

## 2023-09-29 DIAGNOSIS — C79.51 METASTASIS TO BONE (HCC): ICD-10-CM

## 2023-09-29 DIAGNOSIS — G47.00 INSOMNIA: ICD-10-CM

## 2023-09-29 DIAGNOSIS — R63.0 LOSS OF APPETITE: ICD-10-CM

## 2023-09-29 DIAGNOSIS — R11.2 NAUSEA & VOMITING: ICD-10-CM

## 2023-09-29 DIAGNOSIS — M54.9 BACK PAIN: ICD-10-CM

## 2023-09-29 DIAGNOSIS — K21.9 ACID REFLUX: ICD-10-CM

## 2023-09-29 DIAGNOSIS — F17.210 NICOTINE DEPENDENCE, CIGARETTES, UNCOMPLICATED: ICD-10-CM

## 2023-09-29 DIAGNOSIS — G89.3 CANCER RELATED PAIN: ICD-10-CM

## 2023-09-29 DIAGNOSIS — T40.2X5A THERAPEUTIC OPIOID-INDUCED CONSTIPATION (OIC): ICD-10-CM

## 2023-09-29 DIAGNOSIS — Z51.5 PALLIATIVE CARE PATIENT: ICD-10-CM

## 2023-09-29 DIAGNOSIS — R63.4 UNINTENTIONAL WEIGHT LOSS: ICD-10-CM

## 2023-09-29 DIAGNOSIS — C90.00 MULTIPLE MYELOMA, REMISSION STATUS UNSPECIFIED (HCC): Primary | ICD-10-CM

## 2023-09-29 DIAGNOSIS — K59.03 THERAPEUTIC OPIOID-INDUCED CONSTIPATION (OIC): ICD-10-CM

## 2023-09-29 PROCEDURE — 99214 OFFICE O/P EST MOD 30 MIN: CPT | Performed by: INTERNAL MEDICINE

## 2023-09-29 RX ORDER — OXYCODONE HYDROCHLORIDE 10 MG/1
10 TABLET ORAL EVERY 4 HOURS PRN
Qty: 180 TABLET | Refills: 0 | Status: SHIPPED | OUTPATIENT
Start: 2023-09-29

## 2023-09-29 RX ORDER — MORPHINE SULFATE 30 MG/1
30 TABLET, FILM COATED, EXTENDED RELEASE ORAL EVERY 8 HOURS
Qty: 90 TABLET | Refills: 0 | Status: SHIPPED | OUTPATIENT
Start: 2023-09-29

## 2023-09-29 RX ORDER — ONDANSETRON 4 MG/1
4 TABLET, FILM COATED ORAL EVERY 8 HOURS PRN
Qty: 40 TABLET | Refills: 2 | Status: SHIPPED | OUTPATIENT
Start: 2023-09-29

## 2023-09-29 RX ORDER — DEXAMETHASONE 1 MG
1 TABLET ORAL
Qty: 30 TABLET | Refills: 0 | Status: SHIPPED | OUTPATIENT
Start: 2023-09-29

## 2023-09-29 NOTE — PATIENT INSTRUCTIONS
It was good to see you today. Thank you for coming in. Change dexamethasone to one 1mg tab per day, in the AM just before breakfast.  Try to take in at least one protein shake per day. Increase morphine long-acting to one 30mg tablet every 8 hours around the clock. Continue other medications. Return in about 1 month (around 10/29/2023). Call us for refills on medications that we supply, as needed. If something changes and you need to come in sooner, please call our office. PRESCRIPTION REFILL REMINDER:  All medication refills should be requested prior to [facility] on Friday. Any refill requests after noon on Friday would be addressed the following Monday. MEDICATION SAFETY ISSUES:  Do not drive under the influence of narcotics (including opioids), watch for adverse effects including confusion / altered mental status / respiratory depression (slowed breathing), keep medications stored in a safe/locked environment, do not use alcohol while opioids or other narcotics are in your system.

## 2023-09-29 NOTE — PROGRESS NOTES
Follow-up with Palliative and [address] [place] [address]. 63 y.o. male [id number]    ASSESSMENT & PLAN:  1. Multiple myeloma, remission status unspecified ([address])    2. Metastasis to bone ([address])    3. Nicotine dependence, cigarettes, uncomplicated    4. Acid reflux    5. Cancer related pain    6. Back pain    7. Loss of appetite    8. Nausea & vomiting    9. Unintentional weight loss    10. Therapeutic opioid-induced constipation (OIC)    11. Insomnia    12. Palliative care patient          • Continue disease-directed cares. Patient is not currently receiving treatment and it appears the next steps include imaging. • ACP: Patient has completed advanced directives (the [facility] Advanced Directive) naming his wife Nilsa Pratt as default surrogate healthcare decision-maker(s). In EMR. • Patient reports his chronic cancer-related pain has improved; last visit he was in crisis d/t pain.  o I had intended to increase his Persian therapy and we had agreed on that plan, but my orders did not reflect that. I apologized to the patient and his wife. He accepted the apology and expressed understanding.  o He is amenable to changing MSER to 30mg q8h ATC (lower than prior intended increase, but higher than current regimen) as his pain is not as severe as last month.  o Etiology of his pain includes cancer-related back pain, epigastric / anterior chest wall pain which may be related to malignancy and/or reflux, MSK pain. o Continue pantoprazole 40mg qAM for reflux. o Continue oxyIR 10mg q4h PRN breakthrough pain. He has only been taking 4 per day recently as the pharmacy only provided him with #120 tabs (requested #180). o New opioid prescriptions sent to [address] [place] [address]. o Recommend topical OTC products, local application of heat or cold, Tylenol 1000mg TID ATC. Do not use heat on top of topical agents. Do not mix topical agents.   • Decrease dexamethasone to 1mg qAM (before breakfast) as he feels insomnia is worse since beginning steroids. His appetite is good and energy level had improved somewhat.  o Counseled on risks of long-term steroid therapies. • Patient declined offer of referral to [facility]. • Encouraged use of protein shakes, free samples of Ensure provided (vanilla). • Continue Zofran PRN N/V. Reinforced recommendation to use more often. • Encouraged smoking cessation. • Patient states he has abstained from alcohol since 01/2023. Provided positive reinforcement. • Reviewed notes ([address], PCP, [place] [facility]), labs (9/22/23 Cr 0.43, alb 3.1, tProt 5.0, Hb 14.4; 8/23/23 Cr 0.34, BUN 4, , tProt 4.7, alb 2.9, Mg 1.6, Hb 13.1), imaging + procedures (8/12/23 CXR). Some data gathered from [facility]. Return in about 1 month (around 10/29/2023). • Emotional support provided. • Medication safety issues addressed - no driving under the influence of narcotics (including opioids), watch for adverse effects including AMS or respiratory depression (slowed breathing), keep medications stored in a safe/locked environment, do not use alcohol while opioids or other narcotics are in one's system.       Requested Prescriptions     Signed Prescriptions Disp Refills   • morphine (MS CONTIN) 30 mg 12 hr tablet 90 tablet 0     Sig: Take 1 tablet (30 mg total) by mouth every 8 (eight) hours Max Daily Amount: 90 mg   • oxyCODONE (ROXICODONE) 10 MG TABS 180 tablet 0     Sig: Take 1 tablet (10 mg total) by mouth every 4 (four) hours as needed (breakthrough cancer-related pain) Max Daily Amount: 60 mg   • dexamethasone (DECADRON) 1 mg tablet 30 tablet 0     Sig: Take 1 tablet (1 mg total) by mouth daily before breakfast   • ondansetron (ZOFRAN) 4 mg tablet 40 tablet 2     Sig: Take 1 tablet (4 mg total) by mouth every 8 (eight) hours as needed for nausea or vomiting       Medications Discontinued During This Encounter   Medication Reason   • predniSONE 10 mg tablet Alternate therapy   • ondansetron (ZOFRAN) 4 mg tablet Reorder   • oxyCODONE (ROXICODONE) 10 MG TABS Reorder   • dexamethasone (DECADRON) 1 mg tablet Reorder   • morphine (MS CONTIN) 30 mg 12 hr tablet Reorder       Representatives have queried the patient's controlled substance dispensing history in the Prescription Drug Monitoring Program in compliance with regulations before I have prescribed any controlled substances. The prescription history is consistent with prescribed therapy and our practice policies. 35 minutes were spent in this ambulatory visit with greater than 50% of the time spent face to face with patient and his wife Giuliana Gates in counseling or coordination of care including symptom assessment and management, medication review, medication adjustment, psychosocial support, chart review, imaging review, lab review, goals of care, opioid titration, supportive listening and anticipatory guidance. All of the patient's questions were answered during this discussion. SUBJECTIVE:  Chief Complaint   Patient presents with   • Follow-up   • Medication Refill     Morphine, oxycodone, and pantoprazole   • Cancer   • Cancer Pain   • Counseling   • Insomnia   • Weight Loss        HPI    Thuy Kerr is a 63 y.o. male w/ multiple myeloma most recently on RVd therapy, now s/p ASCT on 8/4/23; back pain (s/t malignancy, s/p localized RT), unintentional weight loss. He follows w/ Dr Nighat Lynch (Medical Oncology), Dr Alfredo Hester (Radiation Oncology), Dr Kelley Larger [facility]). Patient is [facility] to [facility] clinic; seen 8/23/23 for symptom assessment and management, medication review, medication adjustment, psychosocial support, chart review, imaging review, lab review, advanced directives, opioid titration, supportive listening and anticipatory guidance. Patient reports that his chronic cancer related pain, focused on his back, has improved since his prior visit though it can still be debilitating and limit his quality of life.   Back pain can be 4-5 out of 10 in severity at rest and can frequently get to 6 or 7 out of 10, or higher. His current analgesic regimen does help, but does not completely meet his needs. He is managing any mild opioid-induced constipation with dietary changes, including prunes. Patient feels his appetite has been getting better in recent weeks, he is eating "many small meals" per day (about 4-5). Unfortunately he is unable to defend his weight. He does endorse frequent nausea, and "dry heaves" are ongoing, sometimes without warning. He states he does not wish to speak with the Registered Dietitian, but accepts counseling on this today. His wife Giuliana Gates has been encouraging him to supplement his PO intake with Ensure. Patient reports he is smoking multiple packs of cigarettes per day. He also reports that he has been able to abstain from alcohol since January 2023. PDMP shows no concerns. The following portions of the medical history were reviewed: past medical history, surgical history, problem list, medication list, family history, and social history. Current Outpatient Medications:   •  acetaminophen (TYLENOL) 500 mg tablet, Take 2 tablets (1,000 mg total) by mouth 3 (three) times a day, Disp: 180 tablet, Rfl: 2  •  dexamethasone (DECADRON) 1 mg tablet, Take 1 tablet (1 mg total) by mouth daily before breakfast, Disp: 30 tablet, Rfl: 0  •  morphine (MS CONTIN) 30 mg 12 hr tablet, Take 1 tablet (30 mg total) by mouth every 8 (eight) hours Max Daily Amount: 90 mg, Disp: 90 tablet, Rfl: 0  •  naloxone (NARCAN) 4 mg/0.1 mL nasal spray, Administer 1 spray into a nostril.  If no response after 2-3 minutes, give another dose in the other nostril using a new spray., Disp: 1 each, Rfl: 1  •  ondansetron (ZOFRAN) 4 mg tablet, Take 1 tablet (4 mg total) by mouth every 8 (eight) hours as needed for nausea or vomiting, Disp: 40 tablet, Rfl: 2  •  oxyCODONE (ROXICODONE) 10 MG TABS, Take 1 tablet (10 mg total) by mouth every 4 (four) hours as needed (breakthrough cancer-related pain) Max Daily Amount: 60 mg, Disp: 180 tablet, Rfl: 0  •  pantoprazole (PROTONIX) 40 mg tablet, TAKE 1 TABLET (40 MG TOTAL) BY MOUTH DAILY - IN THE MORNING, Disp: 90 tablet, Rfl: 0  •  acyclovir (ZOVIRAX) 400 MG tablet, Take 1 tablet (400 mg total) by mouth 2 (two) times a day (Patient not taking: Reported on 8/29/2023), Disp: 60 tablet, Rfl: 11  •  acyclovir (ZOVIRAX) 800 mg tablet, TAKE 1 TABLET (800 MG TOTAL) BY MOUTH 2 TIMES DAILY  DAYS (Patient not taking: Reported on 9/29/2023), Disp: , Rfl:   •  Calcium Carb-Cholecalciferol (calcium carbonate-vitamin D) 500 mg-5 mcg tablet, TAKE 1 TABLET BY MOUTH 2 TIMES DAILY (WITH MEALS)  DAYS. (Patient not taking: Reported on 9/29/2023), Disp: , Rfl:   •  lenalidomide (REVLIMID) 25 MG CAPS, Take 25 mg by mouth daily on days 1-7 of a 21 day cycle [id number] (Patient not taking: Reported on 8/23/2023), Disp: 7 capsule, Rfl: 0  •  potassium chloride (K-DUR,KLOR-CON) 20 mEq tablet, Take by mouth, Disp: , Rfl:     Review of Systems   Constitutional: Positive for activity change, appetite change, fatigue and unexpected weight change. HENT: Positive for dental problem (chronic). Gastrointestinal: Positive for constipation (managed w/ prunes), nausea and vomiting ("dry heaves"). Frequent eructation. GERD (managed / at goal). Musculoskeletal: Positive for back pain (improved since last visit). Allergic/Immunologic: Positive for immunocompromised state. Psychiatric/Behavioral: Positive for dysphoric mood and sleep disturbance. The patient is nervous/anxious. All other systems reviewed and are negative. OBJECTIVE:  /78 (BP Location: Left arm, Patient Position: Sitting, Cuff Size: Standard)   Pulse 81   Temp 97.5 °F (36.4 °C) (Tympanic)   Resp 16   Ht 5' 10" (1.778 m)   Wt 53.1 kg (117 lb)   SpO2 (!) 86%   BMI 16.79 kg/m²   Physical Exam  Vitals reviewed.    Constitutional:       General: He is not in acute distress. Appearance: He is well-groomed. He is cachectic. He is ill-appearing (chronically). He is not toxic-appearing. HENT:      Head: Normocephalic and atraumatic. Right Ear: External ear normal.      Left Ear: External ear normal.   Eyes:      General: No scleral icterus. Right eye: No discharge. Left eye: No discharge. Extraocular Movements: Extraocular movements intact. Conjunctiva/sclera: Conjunctivae normal.      Pupils: Pupils are equal, round, and reactive to light. Cardiovascular:      Rate and Rhythm: Normal rate. Pulmonary:      Effort: Pulmonary effort is normal. No tachypnea, bradypnea, accessory muscle usage or respiratory distress. Comments: Able to speak comfortably in complete sentences on room air at rest.  Abdominal:      General: There is no distension. Tenderness: There is no guarding. Musculoskeletal:      Cervical back: Normal range of motion. Right lower leg: No edema. Left lower leg: No edema. Skin:     General: Skin is dry. Coloration: Skin is not pale. Neurological:      Mental Status: He is alert and oriented to person, place, and time. Cranial Nerves: No dysarthria or facial asymmetry. Gait: Gait abnormal (using 1PC). Psychiatric:         Attention and Perception: Attention normal.         Mood and Affect: Mood and affect normal.         Behavior: Behavior normal. Behavior is cooperative. Thought Content: Thought content normal.         Cognition and Memory: Cognition and memory normal.         Judgment: Judgment normal.      Comments: Rapid speech (his usual pattern). Jose Marie MD  [facility]  [phone]    Portions of this document may have been created using dictation software and as such some "sound alike" terms may have been generated by the system. Do not hesitate to contact me with any questions or clarifications.

## 2023-10-06 ENCOUNTER — APPOINTMENT (OUTPATIENT)
Dept: LAB | Facility: HOSPITAL | Age: 58
End: 2023-10-06
Attending: INTERNAL MEDICINE
Payer: COMMERCIAL

## 2023-10-12 ENCOUNTER — OFFICE VISIT (OUTPATIENT)
Dept: HEMATOLOGY ONCOLOGY | Facility: CLINIC | Age: 58
End: 2023-10-12
Payer: COMMERCIAL

## 2023-10-12 VITALS
HEART RATE: 105 BPM | OXYGEN SATURATION: 96 % | BODY MASS INDEX: 16.68 KG/M2 | SYSTOLIC BLOOD PRESSURE: 112 MMHG | HEIGHT: 70 IN | WEIGHT: 116.5 LBS | RESPIRATION RATE: 17 BRPM | TEMPERATURE: 98.2 F | DIASTOLIC BLOOD PRESSURE: 82 MMHG

## 2023-10-12 DIAGNOSIS — C90.00 MULTIPLE MYELOMA NOT HAVING ACHIEVED REMISSION (HCC): Primary | ICD-10-CM

## 2023-10-12 PROCEDURE — 99214 OFFICE O/P EST MOD 30 MIN: CPT | Performed by: INTERNAL MEDICINE

## 2023-10-12 NOTE — PROGRESS NOTES
[birth year] .  1965  1 [facility]AT[facility] [place] [address] [facility] [id number]    DISCUSSION/SUMMARY:    63year-old male with no significant past medical history (but no medical follow-up recently) recently presenting to the ER with mid back pain and weight loss. Issues:    Multiple myeloma. Patient was seen in the ER on February 8, 2023. CTA of the chest did not demonstrate any PE but patient had multiple lytic lesions and compression fractures in T6 and T10. Additional work-up demonstrated elevated total protein, elevated IgG and an elevated lambda. Immunofixation demonstrated IgG lambda monoclonal protein. CBC parameters and renal function were within normal limits. Bone marrow biopsy demonstrated a lambda restricted plasma cell neoplasm, 80% of the cells were malignant. PET/CT demonstrated multiple osseous hypermetabolic lytic lesions. Patient was seen by Dr. Jocy Chavez in second opinion. The plan was RVD x 4 cycles and then auto stem cell transplant if no evidence of progression and no complications. Multiple myeloma international staging system = II, median survival is 44 months (albumin = 2.2, beta-2 microglobulin = 2.8)    NCCN guidelines 3.2023 state that for patients with multiple myeloma in need of treatment, possible transplant candidate, preferred regimen is bortezomib, lenalidomide and dexamethasone. Regimen  Bortezomib 1.3 mg/m2 subcu on days 1, 4, 8 and 11  Lenalidomide 25 mg orally days 1-7 (dose reduced on the first cycle)  Dexamethasone 40 mg by mouth on days 1, 8 and 15  Cycle length = 21 days x 3-4 cycles    Patient was able to complete the 4 cycles of neoadjuvant chemotherapy without significant side effects or toxicities. Patient then went on to transplant - also did well.   The below information comes from a telemedicine visit from August 25, 2023 from [facility]    ASSESSMENT AND PLAN:   # IgG Lambda Multiple Myeloma  - Currently Day+ 21 s/p Autologous stem cell transplant  - Counts have recovered  -Restaging to BMBx and PET to be done ~ Day + 60; email sent to scheduling  -Supportive care listed below.  -Continue with Calcium/Vitamin D    # Immunocompromised Host  -Viral ppx: Acyclovir 800 mg po BID  -Ms. Migel Gaytan knows to go to ED if he has a temp of 100.4    # Vaccine Plan   -IPV, HIB, DTaP and pneumococcal x 3 sets, as well as Shingrix x 2 doses once 6-months post transplant. -MMR once 2-years post transplant.   -Annual flu vaccine is recommended. -COVID education provided. Will recommend vaccine at 3 months post transplant. # Pain  -Continue with MS Contin and Oxycodone    DISPO: RTC on Monday for NP, labs.  And Mrs. Cathy Ramirez knows to call if he has any questions or concerns. Mr. Cathy Ramirez continues to feel pretty well from a hematology standpoint. No concerning clinical findings. Back pain is controlled. More recently patient has had stomach pain but this predates the myeloma diagnosis. Patient understands that he needs to follow-up with his PCP regarding this. Recent blood work was good/acceptable. Patient underwent bone marrow biopsy approximately 1 week ago at [facility], results are still pending. Specifics not presently available but PET/CT has not been performed. This has been reordered. Patient will follow-up at [facility] as directed. Patient is to return here in 8 weeks. Back pain - better. Dr. Jin Petersen was kind enough to review the patient's thoracic MRI (unofficially) previously and agreed that a radiation oncology evaluation was indicated. RT to the spine has been completed. Weight loss, abdominal pain. The weight loss is likely multifactorial and includes the myeloma, treatment, transplant  etc.  As discussed above, this predates the myeloma diagnosis. Patient needs to return to his PCP for work-up. Poor dentition. Patient denies any pain, swelling or bleeding. Apparently Mr. JOEY GABRIEL has had significantly carried teeth for many years. Because of this, antiresorptive therapy is contraindicated. Mr. JOEY GABRIEL knows to call the hematology/oncology office if there are any other questions or concerns. Patient states having all required medications at home. Carefully review your medication list and verify that the list is accurate and up-to-date. Please call the hematology/oncology office if there are medications missing from the list, medications on the list that you are not currently taking or if there is a dosage or instruction that is different from how you're taking that medication. Patient goals and areas of care: Posttransplant care  Barriers to care: None  Patient is able to self-care  ______________________________________________________________________________________    Chief Complaint   Patient presents with    Follow-up    Multiple myeloma status post transplant     History of Present Illness: 63year old male with relatively good general health recently developing mid back pain. Mr. JOEY GABRIEL states that the pain began in early January 2023. Patient had lost approximately 20+ pounds over the past few months. Work-up included bone marrow biopsy and PET/CT. Patient has widespread osteolytic lesions. Bone marrow biopsy demonstrated greater than 80% plasma cells. Patient received RT to the spine. Mr. JOEY GABRIEL was then treated with RVD, completed 4 cycles. Patient subsequently went on to transplant down at [facility]. Mr. JOEY GABRIEL states feeling okay, about the same as before. Fatigue is the same as before. Activities are limited because of the back pain but the pain is less/better than before. No fevers or signs of infection. No bruising or bleeding issues. Appetite is okay but patient has had stomach pains off and on for years. Patient has not been able to put on any additional weight since the transplant. No problems with bruising or bleeding.   No fevers or signs of infections. No hospitalizations. Review of Systems   Constitutional:  Positive for activity change, appetite change, fatigue and unexpected weight change. HENT: Negative. Eyes: Negative. Respiratory: Negative. Cardiovascular: Negative. Gastrointestinal: Negative. Endocrine: Negative. Genitourinary: Negative. Musculoskeletal:  Positive for arthralgias. Negative for back pain. Skin: Negative. Allergic/Immunologic: Negative. Neurological: Negative. Hematological: Negative. Psychiatric/Behavioral: Negative. All other systems reviewed and are negative.     Past Surgical History:   Procedure Laterality Date    APPENDECTOMY      IR BIOPSY BONE MARROW  3/15/2023   Past surgical history: No prior blood transfusions    Family History   Problem Relation Age of Onset    Diabetes Mother     Heart disease Father     Diabetes Father    Family history: No known familial or genetic diseases, no children    Social History     Socioeconomic History    Marital status: /Civil Union     Spouse name: Not on file    Number of children: Not on file    Years of education: Not on file    Highest education level: Not on file   Occupational History    Not on file   Tobacco Use    Smoking status: Some Days     Types: Cigarettes    Smokeless tobacco: Not on file    Tobacco comments:     Smoking 1 cigarette daily   Vaping Use    Vaping Use: Never used   Substance and Sexual Activity    Alcohol use: Not Currently    Drug use: Not Currently     Types: Marijuana     Comment: once a month    Sexual activity: Not on file   Other Topics Concern    Not on file   Social History Narrative    From 2/28/23  note:    Emergency Contact: Dee Martinez ([phone] (Home Phone)    Marital Status: Single     Interpretation concerns, speaks another language, preferred language: english    Caregiver/Support: Mayur    Housing: two story     Home Setup: one level     Lives With: SO    Daily Living Activities: independent     Ambulation: independent    Employment: yes     [place] Status/Location: no     Ability to pay bills: yes. No barriers to paying bills. POA/LW/AD: no.  Karlie is healthcare representative. MSW emailed advance directive form. Transportation Plan/Concerns: Patient states he transports self. MSW educated on [employer] transport services. Social Determinants of Health     Financial Resource Strain: Not on file   Food Insecurity: Not on file   Transportation Needs: Not on file   Physical Activity: Not on file   Stress: Not on file   Social Connections: Not on file   Intimate Partner Violence: Not on file   Housing Stability: Not on file   Social history: Patient smokes 3 to 4 cigarettes a day, recently discontinued, approximately 5 beers a day, also recently discontinued, no drug abuse, no toxic exposure    No Known Allergies    Vitals:    10/12/23 0830   BP: 112/82   Pulse: 105   Resp: 17   Temp: 98.2 °F (36.8 °C)   SpO2: 96%     Physical Exam  Constitutional:       Appearance: He is well-developed. Comments: Middle-age male, thin appearing, no evidence of pain   HENT:      Head: Normocephalic and atraumatic. Right Ear: External ear normal.      Left Ear: External ear normal.   Eyes:      Conjunctiva/sclera: Conjunctivae normal.      Pupils: Pupils are equal, round, and reactive to light. Cardiovascular:      Rate and Rhythm: Normal rate and regular rhythm. Heart sounds: Normal heart sounds. Pulmonary:      Effort: Pulmonary effort is normal.      Breath sounds: Normal breath sounds. Comments: Fair to good entry bilaterally, scattered rhonchi  Abdominal:      General: Bowel sounds are normal.      Palpations: Abdomen is soft. Comments: + Bowel sounds, nontender, soft, no hepatosplenomegaly, no guarding   Musculoskeletal:         General: Normal range of motion. Cervical back: Normal range of motion and neck supple.       Comments: Prior rib cage tenderness is gone, decreased range of motion in spine   Skin:     General: Skin is warm. Comments: Good color, warm, moist, no petechiae or ecchymoses   Neurological:      Mental Status: He is alert and oriented to person, place, and time. Deep Tendon Reflexes: Reflexes are normal and symmetric. Psychiatric:         Behavior: Behavior normal.         Thought Content:  Thought content normal.         Judgment: Judgment normal.     Extremities: Lower extreme edema bilaterally, no cords, pulses are 1+ l  ymphatics: No adenopathy in the neck, supraclavicular region, axilla bilaterally    Labs    10/6/2023 WBC = 10.15 hemoglobin = 15.5 hematocrit = 45.2 platelet = 145 neutrophil = 89% bands = 3% lymphocyte = 2% monocyte = 5% eosinophil = 1% BUN = 4 creatinine = 0.44 calcium = 9.0 LFTs WNL total protein = 5.7 albumin = 3.6    10/3/2023 [facility]) Free kappa = 9.1 Free lambda = 7.5 Kappa/lambda ratio = 1.21 beta-2 microglobulin = 1.9 LDH = 112 IgM <30 IgG = 425 IgA = 70 calcium = 8.9 total protein = 6.1 albumin = 3.4 SPEP demonstrated a faint band in the gamma region    8/28/2023 ([place])  WBC = 7.2 hemoglobin = 15.3 hematocrit = 45.5 platelet = 801 neutrophil = 91% BUN = 5 creatinine = 0.47 calcium = 8.4 total protein = 5.7 albumin = 3.2 LFTs WNL  6/5/2023 WBC = 5.15 hemoglobin = 10.2 hematocrit = 31.3 platelet = 58 BUN = 6 creatinine = 0.42 calcium = 7.8 AST = 14 ALT = 12 alkaline phosphatase = 814 total protein = 5.3 total bilirubin = 0.57  4/10/2023 WBC = 3.14 hemoglobin = 10.3 hematocrit = 32.5 platelet = 396 neutrophil = 81% calcium = 10.8 BUN = 10 creatinine = 0.89 total protein = 9.8 albumin = 2.2 LDH = 347 beta-2 microglobulin = 2.8  3/15/2023 WBC = 6.86 hemoglobin = 12.8 hematocrit = 36.6 platelet = 687 neutrophil = 69%  2/8/2023 WBC = 6.6 hemoglobin = 12.6 hematocrit = 36 MCV = 96 platelet = 301 neutrophil = 67% bands = 1% lymphocyte = 23% monocyte = 8% basophil = 1% BUN = 21 creatinine = 1.17 calcium = 10.7 AST = 23 ALT = 14 alkaline phosphatase = 166 total protein = 9.7 albumin = 3.3 total bilirubin = 0.36 TSH = 0.680  2/8/2023 immunofixation demonstrated monoclonal gammopathy identified his IgG lambda (3.37 g/deciliter)  2/8/2023 SPEP showed a monoclonal peak in the gamma region, gamma concentration = 3.52 g/deciliter, total protein = 8.3  2/8/2023 IgA = 20 8 = decreased IgG = 2000 900 = elevated IgM = 11 = decreased  2/8/2023 Free kappa = 7.8 Free lambda = 1177 Kappa/lambda ratio = 0.01  2/8/2023 lactic acid = 2.5    Imaging    3/20/2023 MRI thoracic spine    IMPRESSION:     1. Widespread infiltrative and discrete osseous lesions involving anterior and posterior elements of the thoracic and visualized cervical and lumbar spine in keeping with history of multiple myeloma. No extraosseous lesion. 2.  Compared to CTA chest 2/8/2023, progression of T6 wedge compression fracture with severe anterior height loss. There is small posterior osseous buckling without significant canal stenosis. 3.  Mild T8 compression fracture, progressed from CTA chest 2/8/2023. Small posterior osseous buckling without significant canal stenosis. 4.  Stable T8 wedge compression deformity with severe anterior height loss. Posterosuperior osseous buckling indents ventral thecal sac without significant canal stenosis. 5.  Discrete and marrow replacing lesions involving partially imaged ribs and sternum. Displaced sternal fracture, similar to PET CT 3/7/2023, new from CTA chest 2/8/2023. 3/7/2023 PET/CT    IMPRESSION:  1. Widespread hypermetabolic lytic lesions throughout the axial and appendicular skeleton. Findings are most suggestive of multiple myeloma. Clinical correlation recommended. 2.  Several hypermetabolic thoracic compression deformities. T8 compression deformity is new from the prior chest CT. There is also a new displaced sternal fracture. 3.  No hypermetabolic soft tissue lesions.      2/8/2023 CTA chest PE study    OSSEOUS STRUCTURES:  Diffuse lytic lesions throughout the skeleton with mild compression deformity of T6 and moderate compression deformity of T10.    IMPRESSION:     No pulmonary embolus. No acute pulmonary disease. Diffuse lytic lesions throughout the skeleton with compression deformities in the spine. This is most likely due to multiple myeloma, less likely due to metastatic disease from an unknown primary. 2/8/2023 chest x-ray. Impression stated no acute cardiopulmonary disease. Pathology    Case Report   Surgical Pathology Report                         Case: [id number]                                    Authorizing Provider:  Fred Haddad MD           Collected:           03/15/2023 0932               Ordering Location:     [address] [place] [address]        Received:            03/15/2023 [address] [place] [address] Scan                                                             Pathologist:           Hernando Alamo MD                                                           Specimens:   A) - Iliac Crest, Right, core                                                                        B) - Iliac Crest, Right, clot                                                                        C) - Iliac Crest, Right, slide                                                             Final Diagnosis   A -C. Bone marrow,  right iliac crest,  biopsy, clot, and aspirate:  -  Lambda restricted plasma cell neoplasm, >80% of total cellularity consistent with plasma cell myeloma, see note. -  Hypercellular bone marrow with markedly reduced trilineage hematopoiesis and no increase in blasts. -  Reduced iron stores, in a suboptimal sample, correlation with serum iron studies is recommended. -  Moderate to severe increase in reticulin fibrosis status. Note: The patient's presentation of multiple lytic lesions and compression fractures is noted.  The current biopsy shows a marked increase in plasma cells comprising >80% of total cellularity. Immunostains show a lambda light chain restriction relative to kappa light chain by -HAILEE studies. Flow cytometry confirms the presence of a lambda restricted plasma cell population. Further supported by an IgG lambda monoclonal protein detected in the serum by immunofixation studies. FISH studies identify a gain in 1q21/CKS1B in 15.33% of evaluated nuclei. Cytogenetic studies reveal a normal male karyotype. Overall the current bone marrow biopsy is diagnostic for a plasma cell neoplasm best classified as plasma cell myeloma in the correct clinical context. Clinical correlation is advised. Electronically signed by Beth Dillon MD on 3/23/2023 at  8:24 PM   Preliminary result electronically signed by Beth Dillon MD on 3/22/2023 at  3:13 PM   Microscopic Description    Bone marrow aspirate smears: The aspirate smears are aspicular, cellular, and suboptimal for evaluation. The aspirate smear sample is hemodilute with an increase in plasma cells. Mature neutrophils and lymphocytes are present. However, maturing myeloid elements and erythroid elements are not well represented in the aspirate smear slides. There is no definitive evidence of myelodysplasia or increase in blasts. Bone marrow core biopsy and aspirate clot:  Histologic sections of the aspirate clot and decalcified core biopsy are adequate for evaluation. Marrow space cellularity is hypercellular for patient age (>90%). Myelopoiesis:  Markedly reduced (myeloblasts= <5%). Erythropoiesis:  Markedly reduced  Megakaryocytes:  Markedly reduced  Lymphocytes:  Increased in small aggregates and interstitially distributed  Plasma cells:  Markedly increased  including a subset of immature forms.      Special studies:   * Iron stain performed on the aspirate smear, clot, and core biopsy highlights reduced iron stores with no evidence of ring sideroblasts in a limited sample. Siderotic iron granules are present. Due to the aspiculate nature of the specimen these findings ma  * Reticulin stain performed on the core biopsy shows moderate to severe increase in reticulin fibers. 2-3 of 3 by the  Consensus grading scheme. * Trichrome stain performed on the core biopsy show no increase in collagen fibrosis. * Immunohistochemical stains were performed with appropriate controls and show the following results:  -   highlights marked increase in plasma cells (>80%) with a lambda restriction relative to kappa light chain expression by kappa and lambda in situ hybridization studies. The plasma cells are negative for CD30 and GUANACO-HAILEE. Ki67 proliferation index is overall low (<10%). CD20 highlights an increase in B-cells in aggregates and interstitially distributed (10-20% of total cellularity) and CD3 highlights T-cells in aggregates and interstitially scattered T-cells (5-10% of total cellularity). CD34 shows no increase in blasts (<5% of total cellularity) and  shows no increase in immature cells (<5% of total cellularity). Additionally,  highlights scattered mast cells. CD61 highlights a reduction in megakaryocytes. Congo red stain is negative for amyloid.    Note    Component Ref Range & Units 3/15/23 0804 2/8/23 1016   WBC 4.31 - 10.16 Thousand/uL 6.86  6.62    RBC 3.88 - 5.62 Million/uL 3.86 Low   3.74 Low     Hemoglobin 12.0 - 17.0 g/dL 12.8  12.6    Hematocrit 36.5 - 49.3 % 36.6  36.0 Low     MCV 82 - 98 fL 95  96    MCH 26.8 - 34.3 pg 33.2  33.7    MCHC 31.4 - 37.4 g/dL 35.0  35.0    RDW 11.6 - 15.1 % 13.9  14.5    MPV 8.9 - 12.7 fL 9.4  8.9    Platelets 090 - 399 Thousands/uL 188  194    nRBC /100 WBCs 0  0       Component Ref Range & Units 2/8/23 1401    IGA 70.0 - 400.0 mg/dL 28.0 Low     .0 - 1,600.0 mg/dL 2,900.0 High     IGM 40.0 - 230.0 mg/dL 11.0 Low        SPEP Interpretation   See Comment      Comment: The SPEP shows a monoclonal peak in the gamma region. Immunofixation to be performed. 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 10-COLOR FLOW CYTOMETRY ANALYSIS FOR ACUTE LEUKEMIA AND MYELOID/LYMPHOID NEOPLASMS ([facility] # [id number] ; please see separate report for further details):  BONE MARROW ASPIRATE:   1. Clonal plasma cells are detected (4.4% by flow cytometry), consistent with plasma cell neoplasm.   - Phenotype: lambda+; CD38+, +, CD20-, CD19-, CD45-, CD56- and -.   2. No evidence of acute leukemia or increased blasts. 3. No evidence of an abnormal myeloid population. Abnormalities associated with myelodysplasia and myeloproliferative neoplasms are absent. 4. No evidence of B-cell lymphoma or atypical T-cell population     Viability 7AAD: 95.57%   Monoclonal CD45(-)/CD38(bright) plasma cells with lambda-restriction are present (4.4%). There is a mixed population of granulocytes/maturing myeloid precursors, blasts, monocytes and lymphocytes. +/HLA-DR+ myeloblasts comprise (0.18%) of total events. Myeloid-commited CD34+ population comprise (0.16%) of total events. Granulocytes/maturing myeloid precursors (70.32%) do not display an aberrant phenotype. The orthogonal side scatter is normal. No significant number of CD56+ or CD10-/CD16- granulocytes is noted. Monocytes (8.4%) appear mature (CD14+/CD64+) and do not display overt phenotypic atypia. B-cells (1.17%) are polytypic. T-cells (14.55%) show no deletion or abnormal dim expression of pan-T-cell antigens on significant subset of cells     FLUORESCENCE IN-SITU HYBRIDIZATION (FISH)  ([facility] # [id number] ; please see separate report for further details):  INTERPRETATION   1. Positive for 1q21/CKS1B gain in 15.33% nuclei.    Comment: Gain of the CKS1B locus (cyclin kinase subunit 1B) in patients with plasma cell myeloma is a poor prognostic indicator associated with significantly shorter progression-free survival and shorter overall survival.   2. No evidence of IGH-MAF translocation t(14;16) gene rearrangement   3. No evidence of RB1 monosomy (13q14 deletion). 4. No evidence of CCND1-IGH translocation t(11;14) gene rearrangement, and no evidence for trisomy 11 or gain of 11q. 5. No evidence of p53 (17p13) deletion or amplification. 6. No evidence of FGFR3-IGH translocation t(4;14) gene rearrangement. CYTOGENETICS Karyotype Analysis ([facility] # [id number] ; please see separate report for further details):  Karyotype: 46,XY 20  Interpretation:   A normal male karyotype was observed in twenty metaphase cells analyzed.

## 2023-10-13 ENCOUNTER — TELEPHONE (OUTPATIENT)
Dept: LAB | Facility: HOSPITAL | Age: 58
End: 2023-10-13

## 2023-10-18 ENCOUNTER — TELEPHONE (OUTPATIENT)
Dept: LAB | Facility: HOSPITAL | Age: 58
End: 2023-10-18

## 2023-10-24 ENCOUNTER — APPOINTMENT (OUTPATIENT)
Dept: LAB | Facility: HOSPITAL | Age: 58
End: 2023-10-24
Attending: INTERNAL MEDICINE
Payer: COMMERCIAL

## 2023-10-25 ENCOUNTER — HOSPITAL ENCOUNTER (OUTPATIENT)
Dept: NUCLEAR MEDICINE | Facility: HOSPITAL | Age: 58
Discharge: HOME | End: 2023-10-25
Attending: INTERNAL MEDICINE
Payer: COMMERCIAL

## 2023-10-25 DIAGNOSIS — C90.00 MULTIPLE MYELOMA NOT HAVING ACHIEVED REMISSION (HCC): ICD-10-CM

## 2023-10-25 LAB — GLUCOSE SERPL-MCNC: 108 MG/DL (ref 65–140)

## 2023-10-25 PROCEDURE — 78815 PET IMAGE W/CT SKULL-THIGH: CPT

## 2023-10-25 PROCEDURE — 82948 REAGENT STRIP/BLOOD GLUCOSE: CPT

## 2023-10-25 PROCEDURE — A9552 F18 FDG: HCPCS

## 2023-10-25 PROCEDURE — G1004 CDSM NDSC: HCPCS

## 2023-10-27 ENCOUNTER — OFFICE VISIT (OUTPATIENT)
Dept: PALLIATIVE MEDICINE | Facility: CLINIC | Age: 58
End: 2023-10-27
Payer: COMMERCIAL

## 2023-10-27 VITALS
HEART RATE: 86 BPM | SYSTOLIC BLOOD PRESSURE: 102 MMHG | OXYGEN SATURATION: 97 % | DIASTOLIC BLOOD PRESSURE: 80 MMHG | BODY MASS INDEX: 17.75 KG/M2 | WEIGHT: 124 LBS | HEIGHT: 70 IN | TEMPERATURE: 97.2 F

## 2023-10-27 DIAGNOSIS — G47.00 INSOMNIA: ICD-10-CM

## 2023-10-27 DIAGNOSIS — F10.11 HISTORY OF ALCOHOL ABUSE: ICD-10-CM

## 2023-10-27 DIAGNOSIS — R63.4 UNINTENTIONAL WEIGHT LOSS: ICD-10-CM

## 2023-10-27 DIAGNOSIS — R63.0 LOSS OF APPETITE: ICD-10-CM

## 2023-10-27 DIAGNOSIS — T40.2X5A THERAPEUTIC OPIOID-INDUCED CONSTIPATION (OIC): ICD-10-CM

## 2023-10-27 DIAGNOSIS — C79.51 METASTASIS TO BONE (HCC): ICD-10-CM

## 2023-10-27 DIAGNOSIS — K21.9 ACID REFLUX: ICD-10-CM

## 2023-10-27 DIAGNOSIS — C90.00 MULTIPLE MYELOMA, REMISSION STATUS UNSPECIFIED (HCC): Primary | ICD-10-CM

## 2023-10-27 DIAGNOSIS — K59.03 THERAPEUTIC OPIOID-INDUCED CONSTIPATION (OIC): ICD-10-CM

## 2023-10-27 DIAGNOSIS — Z51.5 PALLIATIVE CARE PATIENT: ICD-10-CM

## 2023-10-27 DIAGNOSIS — M54.9 BACK PAIN: ICD-10-CM

## 2023-10-27 DIAGNOSIS — G89.3 CANCER RELATED PAIN: ICD-10-CM

## 2023-10-27 DIAGNOSIS — F17.210 NICOTINE DEPENDENCE, CIGARETTES, UNCOMPLICATED: ICD-10-CM

## 2023-10-27 PROCEDURE — 99214 OFFICE O/P EST MOD 30 MIN: CPT | Performed by: INTERNAL MEDICINE

## 2023-10-27 RX ORDER — LANOLIN ALCOHOL/MO/W.PET/CERES
3 CREAM (GRAM) TOPICAL
Qty: 30 TABLET | Refills: 2 | Status: SHIPPED | OUTPATIENT
Start: 2023-10-27

## 2023-10-27 RX ORDER — MORPHINE SULFATE 30 MG/1
30 TABLET, FILM COATED, EXTENDED RELEASE ORAL EVERY 8 HOURS
Qty: 90 TABLET | Refills: 0 | Status: SHIPPED | OUTPATIENT
Start: 2023-10-27

## 2023-10-27 RX ORDER — DEXAMETHASONE 1 MG
1 TABLET ORAL
Qty: 30 TABLET | Refills: 0 | Status: SHIPPED | OUTPATIENT
Start: 2023-10-27

## 2023-10-27 NOTE — PATIENT INSTRUCTIONS
It was good to see you today. Thank you for coming in. I recommend taking 1000mg of Tylenol with every dose of long-acting morphine. Tylenol, 1000mg three times per day every day, can be a safe and effective way to reduce chronic pain. When using opioids for pain control, constipation is common and patients should act to prevent it:  Drink PLENTY of water. This is important to keep the gut moving. Some people have success w/ using prunes, prune juice, certain fruits or vegetables (apples, bananas, prunes, pears, raspberries, and vegetables like string beans, broccoli, spinach, kale, squash, lentils, peas, and beans), or fiber gummies. Try a probiotic. This could be yogurt or kefir, or fermented beverages such as kombucha, but probiotics are also available in capsule form. Aim for 10-15 billion colony-forming-units, w/ bacteria such as Lactobacillus / Saccharomyces / Actinomyces. Osmotic laxatives (Miralax, magnesium citrate, Milk of Magnesia) can be very useful for opioid-induced constipation (OIC); take daily to prevent OIC. Bulk laxatives (Citrucel, Metamucil, Fibercon, Benefiber, wheat germ) are useful for constipation in patients who are not taking opioids, but are not recommended if you are taking opioids. Colace is good for softening hard stools, or preventing constipation when opioids are being used - but does not stimulate the bowel to move things along once constipation has occurred. You can use senna, 1 to 2 tabs, once or twice daily as needed for constipation. Use as directed on the box/bottle. Senna is also available in a tea ("Smooth Move"). Should that not be enough for your constipation, you can try Dulcolax. Should that not be enough, consider an enema. All of these medications are available over-the-counter. Try melatonin to see if sleep improves. Continue other medications. Return in about 1 month (around 11/27/2023). Call us for refills on medications that we supply, as needed. If something changes and you need to come in sooner, please call our office. PRESCRIPTION REFILL REMINDER:  All medication refills should be requested prior to [facility] on Friday. Any refill requests after noon on Friday would be addressed the following Monday. MEDICATION SAFETY ISSUES:  Do not drive under the influence of narcotics (including opioids), watch for adverse effects including confusion / altered mental status / respiratory depression (slowed breathing), keep medications stored in a safe/locked environment, do not use alcohol while opioids or other narcotics are in your system.

## 2023-10-27 NOTE — PROGRESS NOTES
Follow-up with Palliative and [address] [place] [address]. 63 y.o. male [id number]    ASSESSMENT & PLAN:  1. Multiple myeloma, remission status unspecified ([address])    2. Metastasis to bone ([address])    3. Acid reflux    4. History of alcohol abuse    5. Cancer related pain    6. Insomnia    7. Therapeutic opioid-induced constipation (OIC)    8. Palliative care patient    9. Nicotine dependence, cigarettes, uncomplicated    10. Loss of appetite    11. Back pain    12. Unintentional weight loss          Continue disease-directed cares. While patient is not currently receiving treatment, he states he is waiting to hear the results of his recent biopsy and PET scan and whether or not he has reached remission status. Patient reports his chronic cancer-related pain remains well managed on his current regimen. Encouraged patient to take 1000 mg of Tylenol TID ATC, preferably when he takes his morphine doses, for synergistic effects of analgesia. Continue MS ER 30mg q8h ATC. He is tolerating [place] therapy. Etiology of his pain includes cancer-related back pain, epigastric / anterior chest wall pain which may be related to malignancy and/or reflux, MSK pain. Continue pantoprazole 40mg qAM for reflux. Effective. Continue oxyIR 10mg q4h PRN breakthrough pain. He states he is taking about 3 tabs per day. Recommend topical OTC products, local application of heat or cold, Tylenol 1000mg TID ATC. Do not use heat on top of topical agents. Do not mix topical agents. Continue dexamethasone at 1mg qAM (before breakfast). Insomnia has improved, appetite is adequate, and he has gained a modest amount of weight. He endorses some hypomania, but states that his normal energy level. Counseled today on risks of long-term steroid therapies. Patient declines today's offer of referral to Ambulatory Physical Therapy to improve exercise capacity. Patient had declined offer of referral to Oncology Nutrition.   Start trial of melatonin QHS to see if insomnia improves. May use Zofran PRN N/V. Not needed recently. Encouraged smoking cessation today; patient is pre-contemplative. Patient and his wife confirm again today that he has abstained from alcohol since 01/2023. Provided positive reinforcement. ACP: Patient has completed advanced directives (the [facility] Advanced Directive) naming his wife Lyndon Proctor as default surrogate healthcare decision-maker(s). In EMR. Reviewed notes ([facility]), labs (10/24/23 Cr 0.46, alb 4.0, tProt 6.2, Hb 17.1; Hb 15.5 on 10/6/23), imaging + procedures (10/25/23 PET CT showing favorable disease response). Some data may have been gathered from [email or web address]. Return in about 1 month (around 11/27/2023). Emotional support provided. Medication safety issues addressed - no driving under the influence of narcotics (including opioids), watch for adverse effects including AMS or respiratory depression (slowed breathing), keep medications stored in a safe/locked environment, do not use alcohol while opioids or other narcotics are in one's system. Requested Prescriptions     Signed Prescriptions Disp Refills    dexamethasone (DECADRON) 1 mg tablet 30 tablet 0     Sig: Take 1 tablet (1 mg total) by mouth daily before breakfast    morphine (MS CONTIN) 30 mg 12 hr tablet 90 tablet 0     Sig: Take 1 tablet (30 mg total) by mouth every 8 (eight) hours Max Daily Amount: 90 mg    melatonin 3 mg 30 tablet 2     Sig: Take 1 tablet (3 mg total) by mouth daily at bedtime       Medications Discontinued During This Encounter   Medication Reason    morphine (MS CONTIN) 30 mg 12 hr tablet Reorder    dexamethasone (DECADRON) 1 mg tablet Reorder       Representatives have queried the patient's controlled substance dispensing history in the Prescription Drug Monitoring Program in compliance with regulations before I have prescribed any controlled substances.  The prescription history is consistent with prescribed therapy and our practice policies. 30+ minutes were spent in this ambulatory visit with greater than 50% of the time spent face to face with patient and his wife Becky Cortez in counseling or coordination of care including symptom assessment and management, medication review, medication adjustment, psychosocial support, chart review, imaging review, lab review, goals of care, supportive listening, and anticipatory guidance. All of the patient's questions were answered during this discussion. SUBJECTIVE:  Chief Complaint   Patient presents with    Follow-up    Medication Refill    Cancer    Cancer Pain    Counseling    Nicotine Dependence        HPI    Justin Caldwell is a 63 y.o. male w/ multiple myeloma most recently on RVd therapy, s/p ASCT on 8/4/23; back pain (s/t malignancy, s/p localized RT), unintentional weight loss. He follows w/ Dr Yuki Dumont (Medical Oncology), Dr Sandeep Holland (Radiation Oncology), Dr Radha Castillo [facility]). Patient is known to [facility] clinic; seen 9/29/23 for symptom assessment and management, medication review, medication adjustment, psychosocial support, chart review, imaging review, lab review, goals of care, opioid titration, supportive listening and anticipatory guidance. Patient states that no one has discussed his biopsy results with him yet, "they were waiting to get the results of the PET scan". He states he would like to know if he is in remission or not, and what his neck steps might be. Patient's wife states that he has gained 7 pounds recently, which is good news. He has been defending his weight fairly well in recent weeks. He is appreciative of the dexamethasone, and wishes to continue. He states the 1 mg once per day dose has helped improve his sleep, though he still is only getting 4 to 5 hours of sleep per night. He is amenable to trying melatonin for sleep.  His energy level is excellent, and he is actually endorsing hypomanic levels of activity (engaging in cleaning, building furniture, baking cakes, cooking -- he says he is trying to keep from "being bored"). He states this is his normal level of activity. He is not bothered by this. Patient denies nausea, denies vomiting. He states he is moving his bowels every day, not using any medicines for this. Reflux is managed. He endorses 5/10 back pain today, and feels his analgesic regimen is beneficial. He is only taking about 3 tabs of oxycodone per day, and is taking his MSER as prescribed. Mood is stable; his wife states he is "happy-go-maxine". All patient feels weaker than his prior baseline, he does not feel that physical therapy is needed. He states he is lifting (small) weights regularly and walking up and down stairs to try and improve his exercise capacity. Patient states he is smoking half pack per day. He states he is not ready to quit. He and his wife confirm that he has not had any alcohol since January 2023. PDMP shows no concerns. Review of Systems   All other systems reviewed and are negative. The following portions of the medical history were reviewed: past medical history, surgical history, problem list, medication list, family history, and social history. Current Outpatient Medications:     dexamethasone (DECADRON) 1 mg tablet, Take 1 tablet (1 mg total) by mouth daily before breakfast, Disp: 30 tablet, Rfl: 0    melatonin 3 mg, Take 1 tablet (3 mg total) by mouth daily at bedtime, Disp: 30 tablet, Rfl: 2    morphine (MS CONTIN) 30 mg 12 hr tablet, Take 1 tablet (30 mg total) by mouth every 8 (eight) hours Max Daily Amount: 90 mg, Disp: 90 tablet, Rfl: 0    naloxone (NARCAN) 4 mg/0.1 mL nasal spray, Administer 1 spray into a nostril.  If no response after 2-3 minutes, give another dose in the other nostril using a new spray., Disp: 1 each, Rfl: 1    ondansetron (ZOFRAN) 4 mg tablet, Take 1 tablet (4 mg total) by mouth every 8 (eight) hours as needed for nausea or vomiting, Disp: 40 tablet, Rfl: 2 oxyCODONE (ROXICODONE) 10 MG TABS, Take 1 tablet (10 mg total) by mouth every 4 (four) hours as needed (breakthrough cancer-related pain) Max Daily Amount: 60 mg, Disp: 180 tablet, Rfl: 0    pantoprazole (PROTONIX) 40 mg tablet, TAKE 1 TABLET (40 MG TOTAL) BY MOUTH DAILY - IN THE MORNING, Disp: 90 tablet, Rfl: 0    potassium chloride (K-DUR,KLOR-CON) 20 mEq tablet, Take by mouth, Disp: , Rfl:     acetaminophen (TYLENOL) 500 mg tablet, Take 2 tablets (1,000 mg total) by mouth 3 (three) times a day (Patient not taking: Reported on 10/27/2023), Disp: 180 tablet, Rfl: 2    acyclovir (ZOVIRAX) 400 MG tablet, Take 1 tablet (400 mg total) by mouth 2 (two) times a day (Patient not taking: Reported on 8/29/2023), Disp: 60 tablet, Rfl: 11    acyclovir (ZOVIRAX) 800 mg tablet, TAKE 1 TABLET (800 MG TOTAL) BY MOUTH 2 TIMES DAILY  DAYS (Patient not taking: Reported on 9/29/2023), Disp: , Rfl:     Calcium Carb-Cholecalciferol (calcium carbonate-vitamin D) 500 mg-5 mcg tablet, TAKE 1 TABLET BY MOUTH 2 TIMES DAILY (WITH MEALS)  DAYS. (Patient not taking: Reported on 9/29/2023), Disp: , Rfl:     lenalidomide (REVLIMID) 25 MG CAPS, Take 25 mg by mouth daily on days 1-7 of a 21 day cycle [id number] (Patient not taking: Reported on 8/23/2023), Disp: 7 capsule, Rfl: 0    OBJECTIVE:  /80 (BP Location: Left arm, Patient Position: Sitting, Cuff Size: Standard)   Pulse 86   Temp (!) 97.2 °F (36.2 °C) (Temporal)   Ht 5' 10" (1.778 m)   Wt 56.2 kg (124 lb)   SpO2 97%   BMI 17.79 kg/m²   Physical Exam  Vitals reviewed. Constitutional:       General: He is not in acute distress. Appearance: He is well-groomed. He is cachectic. He is ill-appearing (chronically). He is not toxic-appearing. Comments: Frail. HENT:      Head: Normocephalic and atraumatic. Right Ear: External ear normal.      Left Ear: External ear normal.      Mouth/Throat:      Dentition: Abnormal dentition. Dental caries present.    Eyes: General: No scleral icterus. Right eye: No discharge. Left eye: No discharge. Extraocular Movements: Extraocular movements intact. Conjunctiva/sclera: Conjunctivae normal.      Pupils: Pupils are equal, round, and reactive to light. Cardiovascular:      Rate and Rhythm: Normal rate. Pulmonary:      Effort: Pulmonary effort is normal. No tachypnea, bradypnea, accessory muscle usage or respiratory distress. Comments: Able to speak comfortably in complete sentences on room air at rest.  Abdominal:      General: There is no distension. Tenderness: There is no guarding. Musculoskeletal:      Cervical back: Normal range of motion. Right lower leg: No edema. Left lower leg: No edema. Skin:     General: Skin is dry. Coloration: Skin is not pale. Neurological:      Mental Status: He is alert and oriented to person, place, and time. Cranial Nerves: No dysarthria or facial asymmetry. Gait: Gait abnormal (usinfg 1PC). Psychiatric:         Attention and Perception: Attention normal.         Mood and Affect: Mood and affect normal.         Speech: Speech is rapid and pressured (which is his normal speech pattern). Behavior: Behavior normal. Behavior is cooperative. Thought Content: Thought content normal.         Cognition and Memory: Cognition and memory normal.         Judgment: Judgment normal.          Harvinder Richardson MD  [facility]  [phone]    Portions of this document may have been created using dictation software and as such some "sound alike" terms may have been generated by the system. Do not hesitate to contact me with any questions or clarifications.

## 2023-11-03 ENCOUNTER — TELEPHONE (OUTPATIENT)
Dept: HEMATOLOGY ONCOLOGY | Facility: CLINIC | Age: 58
End: 2023-11-03

## 2023-11-03 NOTE — TELEPHONE ENCOUNTER
Left voicemail for patient to call [email or web address] number to review pet scan results per patient request

## 2023-11-03 NOTE — TELEPHONE ENCOUNTER
Patient Call    Who are you speaking with? Spouse    If it is not the patient, are they listed on an active communication consent form? Yes   What is the reason for this call? Patients wife calling in asking for the test results from the PET scan he had done on 10/25/23   Does this require a call back? Yes   If a call back is required, please list [facility] call back number [phone]   If a call back is required, advise that a message will be forwarded to their care team and someone will return their call as soon as possible. Did you relay this information to the patient?  Yes

## 2023-11-06 ENCOUNTER — TELEPHONE (OUTPATIENT)
Dept: HEMATOLOGY ONCOLOGY | Facility: CLINIC | Age: 58
End: 2023-11-06

## 2023-11-06 ENCOUNTER — TELEPHONE (OUTPATIENT)
Dept: HEMATOLOGY ONCOLOGY | Facility: MEDICAL CENTER | Age: 58
End: 2023-11-06

## 2023-11-06 NOTE — TELEPHONE ENCOUNTER
Patient Call    Who are you speaking with? Spouse    If it is not the patient, are they listed on an active communication consent form? Yes   What is the reason for this call? Reno Monge is calling to go over the results form the patient's PET CT. Does this require a call back? Yes   If a call back is required, please list best call back number [phone]   If a call back is required, advise that a message will be forwarded to their care team and someone will return their call as soon as possible. Did you relay this information to the patient?  Yes

## 2023-11-07 ENCOUNTER — APPOINTMENT (OUTPATIENT)
Dept: LAB | Facility: HOSPITAL | Age: 58
End: 2023-11-07
Attending: INTERNAL MEDICINE
Payer: COMMERCIAL

## 2023-11-09 DIAGNOSIS — C90.00 MULTIPLE MYELOMA NOT HAVING ACHIEVED REMISSION (HCC): ICD-10-CM

## 2023-11-09 DIAGNOSIS — R11.2 NAUSEA & VOMITING: ICD-10-CM

## 2023-11-09 DIAGNOSIS — K21.9 ACID REFLUX: ICD-10-CM

## 2023-11-09 DIAGNOSIS — R63.4 UNINTENTIONAL WEIGHT LOSS: ICD-10-CM

## 2023-11-09 DIAGNOSIS — Z51.5 PALLIATIVE CARE PATIENT: ICD-10-CM

## 2023-11-09 DIAGNOSIS — C79.51 METASTASIS TO BONE (HCC): ICD-10-CM

## 2023-11-09 RX ORDER — PANTOPRAZOLE SODIUM 40 MG/1
40 TABLET, DELAYED RELEASE ORAL DAILY
Qty: 90 TABLET | Refills: 0 | Status: SHIPPED | OUTPATIENT
Start: 2023-11-09

## 2023-11-09 RX ORDER — ONDANSETRON 4 MG/1
4 TABLET, FILM COATED ORAL EVERY 8 HOURS PRN
Qty: 40 TABLET | Refills: 2 | Status: SHIPPED | OUTPATIENT
Start: 2023-11-09

## 2023-11-09 NOTE — TELEPHONE ENCOUNTER
Last appointment: 10/27/2023    Next scheduled appointment: 11/29/2023    Pharmacy: [facility]

## 2023-11-10 ENCOUNTER — TELEPHONE (OUTPATIENT)
Dept: HEMATOLOGY ONCOLOGY | Facility: MEDICAL CENTER | Age: 58
End: 2023-11-10

## 2023-11-21 ENCOUNTER — APPOINTMENT (OUTPATIENT)
Dept: LAB | Facility: HOSPITAL | Age: 58
End: 2023-11-21
Attending: INTERNAL MEDICINE
Payer: COMMERCIAL

## 2023-11-21 ENCOUNTER — TELEPHONE (OUTPATIENT)
Dept: LAB | Facility: HOSPITAL | Age: 58
End: 2023-11-21

## 2023-11-21 DIAGNOSIS — C90.00 MULTIPLE MYELOMA NOT HAVING ACHIEVED REMISSION (HCC): ICD-10-CM

## 2023-11-21 LAB
ALBUMIN SERPL BCP-MCNC: 4 G/DL (ref 3.5–5)
ALP SERPL-CCNC: 116 U/L (ref 34–104)
ALT SERPL W P-5'-P-CCNC: 11 U/L (ref 7–52)
ANION GAP SERPL CALCULATED.3IONS-SCNC: 7 MMOL/L
AST SERPL W P-5'-P-CCNC: 18 U/L (ref 13–39)
BASOPHILS # BLD AUTO: 0.01 THOUSANDS/ÂΜL (ref 0–0.1)
BASOPHILS NFR BLD AUTO: 0 % (ref 0–1)
BILIRUB SERPL-MCNC: 0.56 MG/DL (ref 0.2–1)
BUN SERPL-MCNC: 10 MG/DL (ref 5–25)
CALCIUM SERPL-MCNC: 9.1 MG/DL (ref 8.4–10.2)
CHLORIDE SERPL-SCNC: 104 MMOL/L (ref 96–108)
CO2 SERPL-SCNC: 26 MMOL/L (ref 21–32)
CREAT SERPL-MCNC: 0.45 MG/DL (ref 0.6–1.3)
EOSINOPHIL # BLD AUTO: 0 THOUSAND/ÂΜL (ref 0–0.61)
EOSINOPHIL NFR BLD AUTO: 0 % (ref 0–6)
ERYTHROCYTE [DISTWIDTH] IN BLOOD BY AUTOMATED COUNT: 14 % (ref 11.6–15.1)
GFR SERPL CREATININE-BSD FRML MDRD: 125 ML/MIN/1.73SQ M
GLUCOSE SERPL-MCNC: 119 MG/DL (ref 65–140)
HCT VFR BLD AUTO: 48.2 % (ref 36.5–49.3)
HGB BLD-MCNC: 16.4 G/DL (ref 12–17)
IMM GRANULOCYTES # BLD AUTO: 0.03 THOUSAND/UL (ref 0–0.2)
IMM GRANULOCYTES NFR BLD AUTO: 0 % (ref 0–2)
LYMPHOCYTES # BLD AUTO: 0.28 THOUSANDS/ÂΜL (ref 0.6–4.47)
LYMPHOCYTES NFR BLD AUTO: 4 % (ref 14–44)
MCH RBC QN AUTO: 34.5 PG (ref 26.8–34.3)
MCHC RBC AUTO-ENTMCNC: 34 G/DL (ref 31.4–37.4)
MCV RBC AUTO: 102 FL (ref 82–98)
MONOCYTES # BLD AUTO: 0.5 THOUSAND/ÂΜL (ref 0.17–1.22)
MONOCYTES NFR BLD AUTO: 7 % (ref 4–12)
NEUTROPHILS # BLD AUTO: 6.44 THOUSANDS/ÂΜL (ref 1.85–7.62)
NEUTS SEG NFR BLD AUTO: 89 % (ref 43–75)
NRBC BLD AUTO-RTO: 0 /100 WBCS
PLATELET # BLD AUTO: 124 THOUSANDS/UL (ref 149–390)
PMV BLD AUTO: 8.6 FL (ref 8.9–12.7)
POTASSIUM SERPL-SCNC: 4.3 MMOL/L (ref 3.5–5.3)
PROT SERPL-MCNC: 6.1 G/DL (ref 6.4–8.4)
RBC # BLD AUTO: 4.75 MILLION/UL (ref 3.88–5.62)
SODIUM SERPL-SCNC: 137 MMOL/L (ref 135–147)
WBC # BLD AUTO: 7.26 THOUSAND/UL (ref 4.31–10.16)

## 2023-11-21 PROCEDURE — 85025 COMPLETE CBC W/AUTO DIFF WBC: CPT

## 2023-11-21 PROCEDURE — 80053 COMPREHEN METABOLIC PANEL: CPT

## 2023-11-21 PROCEDURE — 36415 COLL VENOUS BLD VENIPUNCTURE: CPT

## 2023-11-29 ENCOUNTER — OFFICE VISIT (OUTPATIENT)
Dept: PALLIATIVE MEDICINE | Facility: CLINIC | Age: 58
End: 2023-11-29
Payer: COMMERCIAL

## 2023-11-29 VITALS
HEART RATE: 88 BPM | DIASTOLIC BLOOD PRESSURE: 70 MMHG | OXYGEN SATURATION: 96 % | TEMPERATURE: 98.7 F | HEIGHT: 70 IN | WEIGHT: 132 LBS | SYSTOLIC BLOOD PRESSURE: 110 MMHG | BODY MASS INDEX: 18.9 KG/M2

## 2023-11-29 DIAGNOSIS — C90.00 MULTIPLE MYELOMA, REMISSION STATUS UNSPECIFIED (HCC): ICD-10-CM

## 2023-11-29 DIAGNOSIS — F17.210 NICOTINE DEPENDENCE, CIGARETTES, UNCOMPLICATED: ICD-10-CM

## 2023-11-29 DIAGNOSIS — R63.0 LOSS OF APPETITE: ICD-10-CM

## 2023-11-29 DIAGNOSIS — K59.03 THERAPEUTIC OPIOID-INDUCED CONSTIPATION (OIC): ICD-10-CM

## 2023-11-29 DIAGNOSIS — T40.2X5A THERAPEUTIC OPIOID-INDUCED CONSTIPATION (OIC): ICD-10-CM

## 2023-11-29 DIAGNOSIS — G89.3 CANCER RELATED PAIN: ICD-10-CM

## 2023-11-29 DIAGNOSIS — R63.4 UNINTENTIONAL WEIGHT LOSS: ICD-10-CM

## 2023-11-29 DIAGNOSIS — C79.51 METASTASIS TO BONE (HCC): ICD-10-CM

## 2023-11-29 DIAGNOSIS — C90.01 MULTIPLE MYELOMA IN REMISSION (HCC): Primary | ICD-10-CM

## 2023-11-29 DIAGNOSIS — Z51.5 PALLIATIVE CARE PATIENT: ICD-10-CM

## 2023-11-29 DIAGNOSIS — M54.9 BACK PAIN: ICD-10-CM

## 2023-11-29 DIAGNOSIS — G47.00 INSOMNIA: ICD-10-CM

## 2023-11-29 DIAGNOSIS — K21.9 ACID REFLUX: ICD-10-CM

## 2023-11-29 PROCEDURE — 99214 OFFICE O/P EST MOD 30 MIN: CPT | Performed by: INTERNAL MEDICINE

## 2023-11-29 RX ORDER — OXYCODONE HYDROCHLORIDE 5 MG/1
10 TABLET ORAL EVERY 4 HOURS PRN
Qty: 180 TABLET | Refills: 0 | Status: SHIPPED | OUTPATIENT
Start: 2023-11-29

## 2023-11-29 RX ORDER — DEXAMETHASONE 1 MG
1 TABLET ORAL
Qty: 30 TABLET | Refills: 2 | Status: SHIPPED | OUTPATIENT
Start: 2023-11-29

## 2023-11-29 RX ORDER — OXYCODONE HYDROCHLORIDE 10 MG/1
10 TABLET ORAL EVERY 4 HOURS PRN
Qty: 180 TABLET | Refills: 0 | Status: SHIPPED | OUTPATIENT
Start: 2023-11-29 | End: 2023-11-29 | Stop reason: SDUPTHER

## 2023-11-29 RX ORDER — MORPHINE SULFATE 30 MG/1
30 TABLET, FILM COATED, EXTENDED RELEASE ORAL EVERY 8 HOURS
Qty: 90 TABLET | Refills: 0 | Status: SHIPPED | OUTPATIENT
Start: 2023-11-29

## 2023-11-29 NOTE — PROGRESS NOTES
Follow-up with Palliative and [address] [place] [address]. 63 y.o. male [id number]    ASSESSMENT & PLAN:  1. Multiple myeloma in remission ([address])    2. Metastasis to bone ([address])    3. Acid reflux    4. Nicotine dependence, cigarettes, uncomplicated    5. Back pain    6. Cancer related pain    7. Therapeutic opioid-induced constipation (OIC)    8. Insomnia    9. Palliative care patient    10. Multiple myeloma, remission status unspecified ([address])    11. Loss of appetite    12. Unintentional weight loss          Continue disease-directed cares. Patient states he is in remission. [facility] notes state the patient is a candidate for 1 of 4 possible (local) maintenance therapy options, to be discussed w/ Dr Ugo Nelson. Patient amenable to maintenance therapy. Patient reports his chronic cancer-related pain is well-managed on his current regimen. Encouraged patient to take 1000 mg of Tylenol TID ATC, preferably when he takes his morphine doses, for synergistic effects of analgesia. Continue MS ER 30mg q8h ATC. He is tolerating long-acting opioid therapy w/o major issues. Continue oxyIR 10mg PRN breakthrough pain. Consider topical OTC products, local application of heat or cold for chronic pain. Do not use heat on top of topical agents. Do not mix topical agents. Etiology of his pain includes cancer-related back pain, epigastric / anterior chest wall pain which may be related to malignancy and/or reflux, MSK pain. Continue pantoprazole 40mg qAM for reflux. Effective. Counseled today on bowel regimen for constipation. Continue dexamethasone at 1mg qAM (before breakfast). Appetite and energy have improved and he continues to gain weight. Counseled again today on risks of long-term steroid therapies. Patient has declined offer of referral to Ambulatory Physical Therapy to improve exercise capacity. Patient has declined offer of referral to Oncology Nutrition.   Melatonin QHS was ineffective for insomnia even at 10mg QHS. Discontinuing. Patient declines offer of alternate sleep aid. May use Zofran PRN N/V. No recent N/V. Encouraged smoking cessation today/  Patient and his wife confirm again today that he has abstained from alcohol since 01/2023. Positive reinforcement provided. ACP: Patient has completed advanced directives (the [facility] Advanced Directive) naming his wife Aubrey Maurice as default surrogate healthcare decision-maker(s). In EMR. Reviewed notes ([facility]), labs (11/21/23 Cr 0.45, alb 4.0, tProt 6.1, , Hb16.4; 10/24/23 Hb 17.1; Hb 15.5 on 10/6/23), imaging + procedures (10/25/23 PET CT). Some data may have been gathered from [address]. Return in about 1 month (around 12/29/2023). Emotional support provided. Medication safety issues addressed - no driving under the influence of narcotics (including opioids), watch for adverse effects including AMS or respiratory depression (slowed breathing), keep medications stored in a safe/locked environment, do not use alcohol while opioids or other narcotics are in one's system.       Requested Prescriptions     Signed Prescriptions Disp Refills    morphine (MS CONTIN) 30 mg 12 hr tablet 90 tablet 0     Sig: Take 1 tablet (30 mg total) by mouth every 8 (eight) hours Max Daily Amount: 90 mg    oxyCODONE (ROXICODONE) 10 MG TABS 180 tablet 0     Sig: Take 1 tablet (10 mg total) by mouth every 4 (four) hours as needed (breakthrough cancer-related pain) Max Daily Amount: 60 mg    dexamethasone (DECADRON) 1 mg tablet 30 tablet 2     Sig: Take 1 tablet (1 mg total) by mouth daily before breakfast       Medications Discontinued During This Encounter   Medication Reason    melatonin 3 mg Therapy completed    oxyCODONE (ROXICODONE) 10 MG TABS Reorder    dexamethasone (DECADRON) 1 mg tablet Reorder    morphine (MS CONTIN) 30 mg 12 hr tablet Reorder       Representatives have queried the patient's controlled substance dispensing history in the Prescription Drug Monitoring Program in compliance with regulations before I have prescribed any controlled substances. The prescription history is consistent with prescribed therapy and our practice policies. 30+ minutes were spent in this ambulatory visit with greater than 50% of the time spent face to face with patient and his wife Ulysses Wu in counseling or coordination of care including symptom assessment and management, medication review, medication adjustment, psychosocial support, chart review, imaging review, lab review, goals of care, supportive listening, and anticipatory guidance. All of the patient's questions were answered during this discussion. SUBJECTIVE:  Chief Complaint   Patient presents with    Follow-up    Medication Refill    Cancer    Cancer Pain    Counseling    Constipation    Insomnia    Weight Loss        HPI    Rhoda Camp. is a 63 y.o. male w/ multiple myeloma most recently on RVd therapy, s/p ASCT on 8/4/23; back pain (s/t malignancy, s/p localized RT), unintentional weight loss. He follows w/ Dr Darlin Ronquillo (Medical Oncology), Dr Gricelda Nelson (Radiation Oncology), Dr Cierra Duncan [facility]). Patient is known to [facility] clinic; seen 10/27/23 for symptom assessment and management, medication review, medication adjustment, psychosocial support, chart review, imaging review, lab review, goals of care, supportive listening, and anticipatory guidance. Per Dr Finnegan [place] 11/22/23 note, recommendation is to start maintenance w/ Revlimid or similar therapy. Patient reports his pain is well controlled with MS ER 30 mg q.8 hours ATC, and about 3-4 tabs of oxycodone IR 10 mg/day. He has been counseled on using Tylenol for adjunct and synergistic analgesia, but he has not been using this often. He is tolerating his opioid therapy well, with only minimal constipation, which he manages using prunes. Patient denies recent nausea, denies recent vomiting.  He states his appetite is "much better" and he has been able to continue to slowly gain weight. He denies anxiousness, denies dysphoric mood. Andriette Matter states he is "happy-go-maxine". He feels his energy level is "better" than it had been in prior visits. His sleep is problematic; he reports melatonin was ineffective even at 10 mg QHS dosing, so he discontinued. He declines offer of alternative sleep medications. Patient is happy to state that he is in remission. He is amenable to maintenance therapy, as had been suggested by Dr Rachel Dozier recently. He plans to discuss with [address] [facility] soon. Patient is smoking 1/2 pack of cigarettes or more per day. He has no intention of quitting. He and Andriette Matter confirm again that he has not had any alcohol since January 2023. PDMP shows no concerns. Review of Systems   All other systems reviewed and are negative. The following portions of the medical history were reviewed: past medical history, surgical history, problem list, medication list, family history, and social history. Current Outpatient Medications:     dexamethasone (DECADRON) 1 mg tablet, Take 1 tablet (1 mg total) by mouth daily before breakfast, Disp: 30 tablet, Rfl: 2    morphine (MS CONTIN) 30 mg 12 hr tablet, Take 1 tablet (30 mg total) by mouth every 8 (eight) hours Max Daily Amount: 90 mg, Disp: 90 tablet, Rfl: 0    naloxone (NARCAN) 4 mg/0.1 mL nasal spray, Administer 1 spray into a nostril.  If no response after 2-3 minutes, give another dose in the other nostril using a new spray., Disp: 1 each, Rfl: 1    ondansetron (ZOFRAN) 4 mg tablet, Take 1 tablet (4 mg total) by mouth every 8 (eight) hours as needed for nausea or vomiting, Disp: 40 tablet, Rfl: 2    oxyCODONE (ROXICODONE) 10 MG TABS, Take 1 tablet (10 mg total) by mouth every 4 (four) hours as needed (breakthrough cancer-related pain) Max Daily Amount: 60 mg, Disp: 180 tablet, Rfl: 0    pantoprazole (PROTONIX) 40 mg tablet, Take 1 tablet (40 mg total) by mouth daily - in the morning, Disp: 90 tablet, Rfl: 0 potassium chloride (K-DUR,KLOR-CON) 20 mEq tablet, Take by mouth, Disp: , Rfl:     acetaminophen (TYLENOL) 500 mg tablet, Take 2 tablets (1,000 mg total) by mouth 3 (three) times a day (Patient not taking: Reported on 10/27/2023), Disp: 180 tablet, Rfl: 2    acyclovir (ZOVIRAX) 400 MG tablet, Take 1 tablet (400 mg total) by mouth 2 (two) times a day (Patient not taking: Reported on 8/29/2023), Disp: 60 tablet, Rfl: 11    acyclovir (ZOVIRAX) 800 mg tablet, TAKE 1 TABLET (800 MG TOTAL) BY MOUTH 2 TIMES DAILY  DAYS (Patient not taking: Reported on 9/29/2023), Disp: , Rfl:     Calcium Carb-Cholecalciferol (calcium carbonate-vitamin D) 500 mg-5 mcg tablet, TAKE 1 TABLET BY MOUTH 2 TIMES DAILY (WITH MEALS)  DAYS. (Patient not taking: Reported on 11/29/2023), Disp: , Rfl:     lenalidomide (REVLIMID) 25 MG CAPS, Take 25 mg by mouth daily on days 1-7 of a 21 day cycle [id number] (Patient not taking: Reported on 8/23/2023), Disp: 7 capsule, Rfl: 0    OBJECTIVE:  /70 (BP Location: Left arm, Patient Position: Sitting, Cuff Size: Standard)   Pulse 88   Temp 98.7 °F (37.1 °C) (Temporal)   Ht 5' 10" (1.778 m)   Wt 59.9 kg (132 lb)   SpO2 96%   BMI 18.94 kg/m²   Physical Exam  Vitals reviewed. Constitutional:       General: He is not in acute distress. Appearance: He is well-groomed and underweight. He is ill-appearing (chronically). He is not toxic-appearing. HENT:      Head: Normocephalic and atraumatic. Right Ear: External ear normal.      Left Ear: External ear normal.   Eyes:      General: No scleral icterus. Right eye: No discharge. Left eye: No discharge. Extraocular Movements: Extraocular movements intact. Conjunctiva/sclera: Conjunctivae normal.      Pupils: Pupils are equal, round, and reactive to light. Cardiovascular:      Rate and Rhythm: Normal rate.    Pulmonary:      Effort: Pulmonary effort is normal. No tachypnea, bradypnea, accessory muscle usage or respiratory distress. Comments: Able to speak comfortably in complete sentences on room air at rest.  Abdominal:      General: There is no distension. Tenderness: There is no guarding. Musculoskeletal:      Cervical back: Normal range of motion. Right lower leg: No edema. Left lower leg: No edema. Skin:     General: Skin is dry. Coloration: Skin is not pale. Findings: Ecchymosis present. Neurological:      Mental Status: He is alert and oriented to person, place, and time. Cranial Nerves: No dysarthria or facial asymmetry. Gait: Gait abnormal (using 1PC). Psychiatric:         Attention and Perception: Attention normal.         Mood and Affect: Mood and affect normal.         Speech: Speech is rapid and pressured (this is his normal speech pattern). Behavior: Behavior normal. Behavior is cooperative. Thought Content: Thought content normal.         Cognition and Memory: Cognition and memory normal.         Judgment: Judgment normal.          Emma Card MD  [facility]  [phone]    Portions of this document may have been created using dictation software and as such some "sound alike" terms may have been generated by the system. Do not hesitate to contact me with any questions or clarifications.

## 2023-11-29 NOTE — PATIENT INSTRUCTIONS
It was good to see you today. Thank you for coming in. Will check with our home program as your quality of life may be better with less travel. I'm sorry the melatonin wasn't helpful. If constipation is an issue:  Drink PLENTY of water. This is important to keep the gut moving. Some people have success w/ using prunes, prune juice, certain fruits or vegetables (apples, bananas, prunes, pears, raspberries, and vegetables like string beans, broccoli, spinach, kale, squash, lentils, peas, and beans), or fiber gummies. Try a probiotic. This could be yogurt or kefir, or fermented beverages such as kombucha, but probiotics are also available in capsule form. Aim for 10-15 billion colony-forming-units, w/ bacteria such as Lactobacillus / Saccharomyces / Actinomyces. Osmotic laxatives (Miralax, magnesium citrate, Milk of Magnesia) can be very useful for opioid-induced constipation (OIC); take daily to prevent OIC. Bulk laxatives (Citrucel, Metamucil, Fibercon, Benefiber, wheat germ) are useful for constipation in patients who are not taking opioids, but are not recommended if you are taking opioids. Colace is good for softening hard stools, or preventing constipation when opioids are being used - but does not stimulate the bowel to move things along once constipation has occurred. You can use senna, 1 to 2 tabs, once or twice daily as needed for constipation. Use as directed on the box/bottle. Senna is also available in a tea ("Smooth Move"). Should that not be enough for your constipation, you can try Dulcolax. Should that not be enough, consider an enema. All of these medications are available over-the-counter. Continue other medications. Return in about 1 month (around 12/29/2023). If you establish with the home program, it would replace our visits. Call us for refills on medications that we supply, as needed. If something changes and you need to come in sooner, please call our office.     PRESCRIPTION REFILL REMINDER:  All medication refills should be requested prior to [facility] on Friday. Any refill requests after noon on Friday would be addressed the following Monday. MEDICATION SAFETY ISSUES:  Do not drive under the influence of narcotics (including opioids), watch for adverse effects including confusion / altered mental status / respiratory depression (slowed breathing), keep medications stored in a safe/locked environment, do not use alcohol while opioids or other narcotics are in your system.

## 2023-11-29 NOTE — Clinical Note
Patient would benefit from home program as travel is very challenging for him and his wife, especially in the winter months.

## 2023-12-05 ENCOUNTER — APPOINTMENT (OUTPATIENT)
Dept: LAB | Facility: HOSPITAL | Age: 58
End: 2023-12-05
Attending: INTERNAL MEDICINE
Payer: COMMERCIAL

## 2023-12-19 ENCOUNTER — DOCUMENTATION (OUTPATIENT)
Dept: HEMATOLOGY ONCOLOGY | Facility: MEDICAL CENTER | Age: 58
End: 2023-12-19

## 2023-12-19 ENCOUNTER — OFFICE VISIT (OUTPATIENT)
Dept: HEMATOLOGY ONCOLOGY | Facility: CLINIC | Age: 58
End: 2023-12-19

## 2023-12-19 VITALS
RESPIRATION RATE: 17 BRPM | SYSTOLIC BLOOD PRESSURE: 112 MMHG | TEMPERATURE: 98 F | DIASTOLIC BLOOD PRESSURE: 62 MMHG | WEIGHT: 129 LBS | HEIGHT: 70 IN | OXYGEN SATURATION: 96 % | BODY MASS INDEX: 18.47 KG/M2 | HEART RATE: 95 BPM

## 2023-12-19 DIAGNOSIS — C90.00 MULTIPLE MYELOMA, REMISSION STATUS UNSPECIFIED (HCC): Primary | ICD-10-CM

## 2023-12-19 DIAGNOSIS — C79.51 METASTASIS TO BONE (HCC): ICD-10-CM

## 2023-12-19 DIAGNOSIS — C90.00 MULTIPLE MYELOMA NOT HAVING ACHIEVED REMISSION (HCC): Primary | ICD-10-CM

## 2023-12-19 DIAGNOSIS — C90.00 MULTIPLE MYELOMA NOT HAVING ACHIEVED REMISSION (HCC): ICD-10-CM

## 2023-12-19 NOTE — PROGRESS NOTES
Fredy Martinez Jr.  1965  [facility]  [address] [place] [id number]    DISCUSSION/SUMMARY:    Multiple myeloma. 58-year-old-male with no significant past medical history presented to the ER on February 8, 2023.  CTA of the chest did not demonstrate any PE but patient had multiple lytic lesions and compression fractures in T6 and T10.  Additional work-up demonstrated elevated total protein, elevated IgG and an elevated lambda. Immunofixation demonstrated IgG lambda monoclonal protein.  CBC parameters and renal function were within normal limits.    Bone marrow biopsy demonstrated a lambda restricted plasma cell neoplasm, 80% of the cells were malignant.  PET/CT demonstrated multiple osseous hypermetabolic lytic lesions.  Patient was seen by Dr. Foss in second opinion. The plan was RVD x 4 cycles and then auto stem cell transplant if no evidence of progression and no complications.      Multiple myeloma international staging system = II, median survival is 44 months (albumin = 2.2, beta-2 microglobulin = 2.8)    NCCN guidelines 3.2023 state that for patients with multiple myeloma in need of treatment, possible transplant candidate, preferred regimen is bortezomib, lenalidomide and dexamethasone.    Regimen  Bortezomib 1.3 mg/m2 subcu on days 1, 4, 8 and 11  Lenalidomide 25 mg orally days 1-7 (dose reduced on the first cycle)  Dexamethasone 40 mg by mouth on days 1, 8 and 15  Cycle length = 21 days x 3-4 cycles    Patient was able to complete the 4 cycles of neoadjuvant chemotherapy without significant side effects or toxicities.  Patient then went on to transplant - also did well.  The below information comes from a telemedicine visit from August 25, 2023 from [facility]    ASSESSMENT AND PLAN:   # IgG Lambda Multiple Myeloma  - Currently Day+ 21 s/p Autologous stem cell transplant  - Counts have recovered  -Restaging to BMBx and PET  to be done ~ Day + 60; email sent to scheduling  -Supportive care listed below.  -Continue with Calcium/Vitamin D    # Immunocompromised Host  -Viral ppx: Acyclovir 800 mg po BID  -Ms. Emanuel knows to go to ED if he has a temp of 100.4    # Vaccine Plan   -IPV, HIB, DTaP and pneumococcal x 3 sets, as well as Shingrix x 2 doses once 6-months post transplant.   -MMR once 2-years post transplant.   -Annual flu vaccine is recommended.  -COVID education provided. Will recommend vaccine at 3 months post transplant.     # Pain  -Continue with MS Contin and Oxycodone    DISPO: RTC on Monday for NP, labs.  And Mrs. Martinez knows to call if he has any questions or concerns.    Mr. Martinez continues to feel pretty well from a hematology standpoint. No concerning clinical findings.  Back pain is controlled with Oxycodone and Morphine.      Recent blood work including MM work up was good/acceptable. Bone marrow biopsy on 10/3/2023 showed hypocellular marrow (<10%) for age with serous atrophy and scant cellular marrow. Recent PET/CT is stable with metabolic response and less bone lesions. Stable kidney function and no hypercalcemia.     Back pain: Dr. Raza was kind enough to review the patient's thoracic MRI (unofficially) previously and agreed that a radiation oncology evaluation was indicated. RT to the spine has been completed. Back pain is controlled with morphine and Oxycodone.     Weight loss: Good appetite,but patient is not gaining or losing weight.     Poor dentition.  Patient denies any pain, swelling or bleeding.  Apparently Mr. Martinez has had significantly carried teeth for many years. Patient states he will follow up with a dentist starting next year.     Patient will be started on Revlimid 10 mg and should follow up with his PCP regarding vaccinations. Will see him in 2 months.     Mr. Martinez knows to call the hematology/oncology office if there are any other questions or concerns.  Patient states having all required  medications at home.    Carefully review your medication list and verify that the list is accurate and up-to-date. Please call the hematology/oncology office if there are medications missing from the list, medications on the list that you are not currently taking or if there is a dosage or instruction that is different from how you're taking that medication.    Patient goals and areas of care: Posttransplant care  Barriers to care: None  Patient is able to self-care  ______________________________________________________________________________________    Chief Complaint   Patient presents with    Follow-up     History of Present Illness: 58 year old male with relatively good general health recently developing mid back pain.  Mr. Martinez states that the pain began in early January 2023.  Patient had lost approximately 20+ pounds over the past few months.    Work-up included bone marrow biopsy and PET/CT.  Patient has widespread osteolytic lesions.  Bone marrow biopsy demonstrated greater than 80% plasma cells.    Patient received RT to the spine.  Mr. Martinez was then treated with RVD, completed 4 cycles.  Patient subsequently went on to transplant down at [place]. Recent Biopsy shows less than 10% plasma cells.     Mr. Martinez states feeling generally well. Denies fatigue. Back pain is relatively the same. No fevers or signs of infection. No bruising or bleeding issues. Appetite is okay but he has not gained weight since the transplant. No hospitalizations. Complained of urine dribbling and weak stream.     Review of Systems   Constitutional:  Negative for chills, fatigue and fever.   HENT:  Positive for dental problem and rhinorrhea. Negative for sinus pressure.    Respiratory:  Negative for cough, chest tightness and shortness of breath.    Cardiovascular:  Negative for chest pain.   Gastrointestinal:  Negative for abdominal pain, constipation, nausea and vomiting.   Genitourinary:  Negative for dysuria and frequency.         Dribbling and weak stream    Musculoskeletal:  Positive for back pain. Negative for arthralgias.   Neurological:  Negative for light-headedness, numbness and headaches.   Psychiatric/Behavioral:  Negative for sleep disturbance.    All other systems reviewed and are negative.    Past Surgical History:   Procedure Laterality Date    APPENDECTOMY      IR BIOPSY BONE MARROW  3/15/2023   Past surgical history: No prior blood transfusions    Family History   Problem Relation Age of Onset    Diabetes Mother     Heart disease Father     Diabetes Father    Family history: No known familial or genetic diseases, no children    Social History     Socioeconomic History    Marital status: /Civil Union     Spouse name: Not on file    Number of children: Not on file    Years of education: Not on file    Highest education level: Not on file   Occupational History    Not on file   Tobacco Use    Smoking status: Some Days     Types: Cigarettes    Smokeless tobacco: Not on file    Tobacco comments:     Smoking 1 cigarette daily   Vaping Use    Vaping status: Never Used   Substance and Sexual Activity    Alcohol use: Not Currently    Drug use: Not Currently     Types: Marijuana     Comment: once a month    Sexual activity: Not on file   Other Topics Concern    Not on file   Social History Narrative    From 2/28/23  note:    Emergency Contact: Dee Martinez (NAVI)[phone] (Home Phone)    Marital Status: Single     Interpretation concerns, speaks another language, preferred language: english    Caregiver/Support: Mayur    Housing: two story     Home Setup: one level     Lives With: NAVI    Daily Living Activities: independent     Ambulation: independent    Employment: yes     Boling Status/Location: no     Ability to pay bills: yes. No barriers to paying bills.     POA/LW/AD: no.  Karlie is healthcare representative. MSW emailed advance directive form.    Transportation Plan/Concerns: Patient states he transports  self.  MSW educated on Dining Secretary services.      Social Determinants of Health     Financial Resource Strain: Not on file   Food Insecurity: Not on file   Transportation Needs: Not on file   Physical Activity: Not on file   Stress: Not on file   Social Connections: Not on file   Intimate Partner Violence: Not on file   Housing Stability: Not on file   Social history: Patient smokes 3 to 4 cigarettes a day, recently discontinued, approximately 5 beers a day, also recently discontinued, no drug abuse, no toxic exposure    No Known Allergies    Vitals:    12/19/23 1422   BP: 112/62   Pulse: 95   Resp: 17   Temp: 98 °F (36.7 °C)   SpO2: 96%       Physical Exam  Constitutional:       Appearance: He is underweight. He is not ill-appearing.   HENT:      Head: Normocephalic and atraumatic.      Nose: Nose normal.      Mouth/Throat:      Mouth: Mucous membranes are moist.      Comments: Dentition issue with multiple lost teeth   Eyes:      Extraocular Movements: Extraocular movements intact.      Pupils: Pupils are equal, round, and reactive to light.   Cardiovascular:      Rate and Rhythm: Normal rate and regular rhythm.      Pulses: Normal pulses.      Heart sounds: Normal heart sounds.   Pulmonary:      Effort: Pulmonary effort is normal.      Breath sounds: Normal breath sounds.   Abdominal:      General: Abdomen is flat.      Tenderness: There is no abdominal tenderness.   Musculoskeletal:         General: Normal range of motion.      Cervical back: Normal range of motion.   Skin:     General: Skin is warm.   Neurological:      Mental Status: He is alert and oriented to person, place, and time.           Labs    12/05/2023 WBC= 8 hemoglobin= 17.5 hematocrit= 50.8 platelet= 160 neutrophil = 89% lymphocyte= 03% monocytes= 7% eosinophil= 0% BUN= 12 creatinine= 0.42 calcium= 9.2 LFT WNL total protein= 6.2 albumin=4.1    10/6/2023 WBC = 10.15 hemoglobin = 15.5 hematocrit = 45.2 platelet = 154 neutrophil = 89% bands =  3% lymphocyte = 2% monocyte = 5% eosinophil = 1% BUN = 4 creatinine = 0.44 calcium = 9.0 LFTs WNL total protein = 5.7 albumin = 3.6    10/3/2023 ([place]) Free kappa = 9.1 Free lambda = 7.5 Kappa/lambda ratio = 1.21 beta-2 microglobulin = 1.9 LDH = 112 IgM <30 IgG = 425 IgA = 70 calcium = 8.9 total protein = 6.1 albumin = 3.4 SPEP demonstrated a faint band in the gamma region    8/28/2023 ([place])  WBC = 7.2 hemoglobin = 15.3 hematocrit = 45.5 platelet = 212 neutrophil = 91% BUN = 5 creatinine = 0.47 calcium = 8.4 total protein = 5.7 albumin = 3.2 LFTs WNL  6/5/2023 WBC = 5.15 hemoglobin = 10.2 hematocrit = 31.3 platelet = 58 BUN = 6 creatinine = 0.42 calcium = 7.8 AST = 14 ALT = 12 alkaline phosphatase = 814 total protein = 5.3 total bilirubin = 0.57  4/10/2023 WBC = 3.14 hemoglobin = 10.3 hematocrit = 32.5 platelet = 194 neutrophil = 81% calcium = 10.8 BUN = 10 creatinine = 0.89 total protein = 9.8 albumin = 2.2 LDH = 347 beta-2 microglobulin = 2.8  3/15/2023 WBC = 6.86 hemoglobin = 12.8 hematocrit = 36.6 platelet = 188 neutrophil = 69%  2/8/2023 WBC = 6.6 hemoglobin = 12.6 hematocrit = 36 MCV = 96 platelet = 194 neutrophil = 67% bands = 1% lymphocyte = 23% monocyte = 8% basophil = 1% BUN = 21 creatinine = 1.17 calcium = 10.7 AST = 23 ALT = 14 alkaline phosphatase = 166 total protein = 9.7 albumin = 3.3 total bilirubin = 0.36 TSH = 0.680  2/8/2023 immunofixation demonstrated monoclonal gammopathy identified his IgG lambda (3.37 g/deciliter)  2/8/2023 SPEP showed a monoclonal peak in the gamma region, gamma concentration = 3.52 g/deciliter, total protein = 8.3  2/8/2023 IgA = 20 8 = decreased IgG = 2000 900 = elevated IgM = 11 = decreased  2/8/2023 Free kappa = 7.8 Free lambda = 1177 Kappa/lambda ratio = 0.01  2/8/2023 lactic acid = 2.5    Imaging    10/25/2023 PET/CT     IMPRESSION:     1. Significant interval favorable metabolic response to therapy with interval decrease in number of hypermetabolic osseous  lesions, previously widespread and much greater than 20, currently multifocal and less than 10.     2. Indeterminant 7 mm and 5 mm mural-based tracheal nodules which could reflect secretions with developing soft tissue nodules, particularly along the nondependent lateral right tracheal wall not excluded. Findings can be further characterized with   direct visualization and/or short interval follow-up imaging as clinically indicated.     3/20/2023 MRI thoracic spine    IMPRESSION:     1.  Widespread infiltrative and discrete osseous lesions involving anterior and posterior elements of the thoracic and visualized cervical and lumbar spine in keeping with history of multiple myeloma.  No extraosseous lesion.  2.  Compared to CTA chest 2/8/2023, progression of T6 wedge compression fracture with severe anterior height loss.  There is small posterior osseous buckling without significant canal stenosis.  3.  Mild T8 compression fracture, progressed from CTA chest 2/8/2023.  Small posterior osseous buckling without significant canal stenosis.  4.  Stable T8 wedge compression deformity with severe anterior height loss.  Posterosuperior osseous buckling indents ventral thecal sac without significant canal stenosis.  5.  Discrete and marrow replacing lesions involving partially imaged ribs and sternum.  Displaced sternal fracture, similar to PET CT 3/7/2023, new from CTA chest 2/8/2023.    3/7/2023 PET/CT    IMPRESSION:  1.  Widespread hypermetabolic lytic lesions throughout the axial and appendicular skeleton.  Findings are most suggestive of multiple myeloma.  Clinical correlation recommended.  2.  Several hypermetabolic thoracic compression deformities.  T8 compression deformity is new from the prior chest CT.  There is also a new displaced sternal fracture.  3.  No hypermetabolic soft tissue lesions.     2/8/2023 CTA chest PE study    OSSEOUS STRUCTURES:  Diffuse lytic lesions throughout the skeleton with mild compression  deformity of T6 and moderate compression deformity of T10.    IMPRESSION:     No pulmonary embolus.  No acute pulmonary disease.  Diffuse lytic lesions throughout the skeleton with compression deformities in the spine.  This is most likely due to multiple myeloma, less likely due to metastatic disease from an unknown primary.    2/8/2023 chest x-ray.  Impression stated no acute cardiopulmonary disease.    Pathology    Case Report   Surgical Pathology Report                         Case: [id number]                                    Authorizing Provider:  Davis Garcia MD           Collected:           03/15/2023 [facility]               Ordering Location:     [facility]        Received:            03/15/2023 [address] [place] [address] CAT Scan                                                             Pathologist:           Ashok Allen MD                                                           Specimens:   A) - Iliac Crest, Right, core                                                                        B) - Iliac Crest, Right, clot                                                                        C) - Iliac Crest, Right, slide                                                             Final Diagnosis   A -C.  Bone marrow,  right iliac crest,  biopsy, clot, and aspirate:  -  Lambda restricted plasma cell neoplasm, >80% of total cellularity consistent with plasma cell myeloma, see note.  -  Hypercellular bone marrow with markedly reduced trilineage hematopoiesis and no increase in blasts.  -  Reduced iron stores, in a suboptimal sample, correlation with serum iron studies is recommended.  -  Moderate to severe increase in reticulin fibrosis status.     Note: The patient's presentation of multiple lytic lesions and compression fractures is noted. The current biopsy shows a marked increase in plasma cells comprising >80% of total cellularity. Immunostains show a lambda light  chain restriction relative to kappa light chain by -HAILEE studies.  Flow cytometry confirms the presence of a lambda restricted plasma cell population. Further supported by an IgG lambda monoclonal protein detected in the serum by immunofixation studies. FISH studies identify a gain in 1q21/CKS1B in 15.33% of evaluated nuclei. Cytogenetic studies reveal a normal male karyotype.               Overall the current bone marrow biopsy is diagnostic for a plasma cell neoplasm best classified as plasma cell myeloma in the correct clinical context. Clinical correlation is advised.   Electronically signed by Ashok Allen MD on 3/23/2023 at  8:24 PM   Preliminary result electronically signed by Ashok Allen MD on 3/22/2023 at  3:13 PM   Microscopic Description    Bone marrow aspirate smears:  The aspirate smears are aspicular, cellular, and suboptimal for evaluation. The aspirate smear sample is hemodilute with an increase in plasma cells. Mature neutrophils and lymphocytes are present. However, maturing myeloid elements and erythroid elements are not well represented in the aspirate smear slides. There is no definitive evidence of myelodysplasia or increase in blasts.      Bone marrow core biopsy and aspirate clot:  Histologic sections of the aspirate clot and decalcified core biopsy are adequate for evaluation.  Marrow space cellularity is hypercellular for patient age (>90%).  Myelopoiesis:  Markedly reduced (myeloblasts= <5%).  Erythropoiesis:  Markedly reduced  Megakaryocytes:  Markedly reduced  Lymphocytes:  Increased in small aggregates and interstitially distributed  Plasma cells:  Markedly increased  including a subset of immature forms.     Special studies:   * Iron stain performed on the aspirate smear, clot, and core biopsy highlights reduced iron stores with no evidence of ring sideroblasts in a limited sample. Siderotic iron granules are present. Due to the aspiculate nature of the specimen these findings  ma  * Reticulin stain performed on the core biopsy shows moderate to severe increase in reticulin fibers.  2-3 of 3 by the  Consensus grading scheme.  * Trichrome stain performed on the core biopsy show no increase in collagen fibrosis.   * Immunohistochemical stains were performed with appropriate controls and show the following results:  -   highlights marked increase in plasma cells (>80%) with a lambda restriction relative to kappa light chain expression by kappa and lambda in situ hybridization studies. The plasma cells are negative for CD30 and GUANACO-HAILEE. Ki67 proliferation index is overall low (<10%).      CD20 highlights an increase in B-cells in aggregates and interstitially distributed (10-20% of total cellularity) and CD3 highlights T-cells in aggregates and interstitially scattered T-cells (5-10% of total cellularity).     CD34 shows no increase in blasts (<5% of total cellularity) and  shows no increase in immature cells (<5% of total cellularity). Additionally,  highlights scattered mast cells.     CD61 highlights a reduction in megakaryocytes.      [place] red stain is negative for amyloid.   Note    Component Ref Range & Units 3/15/23 0804 2/8/23 1016   WBC 4.31 - 10.16 Thousand/uL 6.86  6.62    RBC 3.88 - 5.62 Million/uL 3.86 Low   3.74 Low     Hemoglobin 12.0 - 17.0 g/dL 12.8  12.6    Hematocrit 36.5 - 49.3 % 36.6  36.0 Low     MCV 82 - 98 fL 95  96    MCH 26.8 - 34.3 pg 33.2  33.7    MCHC 31.4 - 37.4 g/dL 35.0  35.0    RDW 11.6 - 15.1 % 13.9  14.5    MPV 8.9 - 12.7 fL 9.4  8.9    Platelets 149 - 390 Thousands/uL 188  194    nRBC /100 WBCs 0  0       Component Ref Range & Units 2/8/23 1401    IGA 70.0 - 400.0 mg/dL 28.0 Low     .0 - 1,600.0 mg/dL 2,900.0 High     IGM 40.0 - 230.0 mg/dL 11.0 Low        SPEP Interpretation   See Comment      Comment: The SPEP shows a monoclonal peak in the gamma region.Immunofixation to be performed.  Serum immunofixation shows a monoclonal  gammopathy identified as IgG lambda (3.37 g/dL).         10-COLOR FLOW CYTOMETRY ANALYSIS FOR ACUTE LEUKEMIA AND MYELOID/LYMPHOID NEOPLASMS ([facility] # [id number] ; please see separate report for further details):  BONE MARROW ASPIRATE:   1. Clonal plasma cells are detected (4.4% by flow cytometry), consistent with plasma cell neoplasm.   - Phenotype: lambda+; CD38+, +, CD20-, CD19-, CD45-, CD56- and -.   2. No evidence of acute leukemia or increased blasts.   3. No evidence of an abnormal myeloid population. Abnormalities associated with myelodysplasia and myeloproliferative neoplasms are absent.   4. No evidence of B-cell lymphoma or atypical T-cell population     Viability 7AAD: 95.57%   Monoclonal CD45(-)/CD38(bright) plasma cells with lambda-restriction are present (4.4%).  There is a mixed population of granulocytes/maturing myeloid precursors, blasts, monocytes and lymphocytes. +/HLA-DR+ myeloblasts comprise (0.18%) of total events. Myeloid-commited CD34+ population comprise (0.16%) of total events. Granulocytes/maturing myeloid precursors (70.32%) do not display an aberrant phenotype. The orthogonal side scatter is normal. No significant number of CD56+ or CD10-/CD16- granulocytes is noted. Monocytes (8.4%) appear mature (CD14+/CD64+) and do not display overt phenotypic atypia. B-cells (1.17%) are polytypic. T-cells (14.55%) show no deletion or abnormal dim expression of pan-T-cell antigens on significant subset of cells     FLUORESCENCE IN-SITU HYBRIDIZATION (FISH)  ([facility] # [id number] ; please see separate report for further details):  INTERPRETATION   1. Positive for 1q21/CKS1B gain in 15.33% nuclei.   Comment: Gain of the CKS1B locus (cyclin kinase subunit 1B) in patients with plasma cell myeloma is a poor prognostic indicator associated with significantly shorter progression-free survival and shorter overall survival.   2. No evidence of IGH-MAF translocation t(14;16) gene rearrangement    3. No evidence of RB1 monosomy (13q14 deletion).   4. No evidence of CCND1-IGH translocation t(11;14) gene rearrangement, and no evidence for trisomy 11 or gain of 11q.   5. No evidence of p53 (17p13) deletion or amplification.   6. No evidence of FGFR3-IGH translocation t(4;14) gene rearrangement.      CYTOGENETICS Karyotype Analysis ([facility] # [id number] ; please see separate report for further details):  Karyotype: 46,XY 20  Interpretation:   A normal male karyotype was observed in twenty metaphase cells analyzed.          Tobacco and Toxic Substance Assessment and Intervention:     Alcohol cessation counseling provided

## 2023-12-20 DIAGNOSIS — C90.00 MULTIPLE MYELOMA NOT HAVING ACHIEVED REMISSION (HCC): Primary | ICD-10-CM

## 2023-12-20 RX ORDER — LENALIDOMIDE 5 MG/1
5 CAPSULE ORAL DAILY
Qty: 28 CAPSULE | Refills: 0 | Status: SHIPPED | OUTPATIENT
Start: 2023-12-20 | End: 2023-12-26 | Stop reason: SDUPTHER

## 2023-12-26 ENCOUNTER — TELEPHONE (OUTPATIENT)
Dept: HEMATOLOGY ONCOLOGY | Facility: CLINIC | Age: 58
End: 2023-12-26

## 2023-12-26 ENCOUNTER — TELEPHONE (OUTPATIENT)
Age: 58
End: 2023-12-26

## 2023-12-26 DIAGNOSIS — C90.00 MULTIPLE MYELOMA NOT HAVING ACHIEVED REMISSION (HCC): ICD-10-CM

## 2023-12-26 NOTE — TELEPHONE ENCOUNTER
"Safety checklist prior to [facility] will visit:    \"Thank you for inviting us to your home.  In order to ensure we arrive politely, and conduct ourselves safely in your home, please answer the following questions:\"    1 - Where should we park when we arrive?       Street parking    2 - Are there security doors, oliveira, or fences that we will need a code for?       no    3 - Who else will be present at the visit to support you? Patient and his wife Mary will be present for visit       \"We want to conduct the visit with you in the way that feels most comfortable for you.  If we need to schedule around another person's time, please let us know.\"    4 - Do you have any pets or other animals in your home?       Yes a dog, who will be put in backyard during visit.        \"If you have a dog, please make sure they are crated, leashed, or placed in a separate room.  Birds, reptiles, and other small animals like gerbils should be kept in a safe enclosure.  This helps our team stay on track with your visit.  We are here to see you, not your pets!\"    5 - Do you have any firearms or other weapons in the home?       no       6 - Please have all medicines prescribed to you set up where they may be reviewed for safety.  Reviewed with patient  7 - If you use marijuana medically, or any recreational drugs, please store these out of sight prior to our arrival.   "

## 2023-12-26 NOTE — TELEPHONE ENCOUNTER
Patient Call    Who are you speaking with? Pharmacy    If it is not the patient, are they listed on an active communication consent form? N/A   What is the reason for this call? [facility] is calling to confirm they are the correct pharmacy to refill the Revlimid   Does this require a call back? Yes   If a call back is required, please list best call back number [phone]   If a call back is required, advise that a message will be forwarded to their care team and someone will return their call as soon as possible.   Did you relay this information to the patient? Yes

## 2023-12-27 ENCOUNTER — CLINICAL SUPPORT (OUTPATIENT)
Age: 58
End: 2023-12-27

## 2023-12-27 ENCOUNTER — TELEPHONE (OUTPATIENT)
Dept: HEMATOLOGY ONCOLOGY | Facility: CLINIC | Age: 58
End: 2023-12-27

## 2023-12-27 ENCOUNTER — OFFICE VISIT (OUTPATIENT)
Age: 58
End: 2023-12-27
Payer: COMMERCIAL

## 2023-12-27 DIAGNOSIS — M54.9 BACK PAIN: ICD-10-CM

## 2023-12-27 DIAGNOSIS — R64 MALIGNANT CACHEXIA (HCC): Primary | ICD-10-CM

## 2023-12-27 DIAGNOSIS — G89.3 CANCER RELATED PAIN: ICD-10-CM

## 2023-12-27 DIAGNOSIS — Z71.89 COMPLEX CARE COORDINATION: Primary | ICD-10-CM

## 2023-12-27 DIAGNOSIS — C79.51 METASTASIS TO BONE (HCC): ICD-10-CM

## 2023-12-27 DIAGNOSIS — C90.00 MULTIPLE MYELOMA NOT HAVING ACHIEVED REMISSION (HCC): ICD-10-CM

## 2023-12-27 DIAGNOSIS — Z51.5 PALLIATIVE CARE PATIENT: ICD-10-CM

## 2023-12-27 DIAGNOSIS — C90.00 MULTIPLE MYELOMA, REMISSION STATUS UNSPECIFIED (HCC): ICD-10-CM

## 2023-12-27 PROCEDURE — 99349 HOME/RES VST EST MOD MDM 40: CPT | Performed by: FAMILY MEDICINE

## 2023-12-27 RX ORDER — LENALIDOMIDE 5 MG/1
5 CAPSULE ORAL DAILY
Qty: 28 CAPSULE | Refills: 0 | Status: SHIPPED | OUTPATIENT
Start: 2023-12-27

## 2023-12-27 NOTE — TELEPHONE ENCOUNTER
Received voicemail from pt's spouse:  Yes, this is for Fredy Martinez gave birth [birth year]. When is he getting the replacement? Just give me a call back and leave a message on my machine, [phone]. And this is his wife, Mary. Thank you. Davon.    Left voicemail advising I do not have a shipment date, but I am keeping an eye on it to make sure there are no issues.

## 2023-12-27 NOTE — TELEPHONE ENCOUNTER
Left voicemail for pt requesting a call back to discuss Revlimid maintenance. Provided my direct line.

## 2023-12-27 NOTE — Clinical Note
Pt does not have acyclovir in his home; is he now supposed to be on 400mg PO BID, or an 800mg dose?  Please help identify correct dosing, and I will ensure that pharmacy has orders for at least the next month.

## 2023-12-27 NOTE — PROGRESS NOTES
Initial Consultation and Intake - [facility]  Fredy STEPHEN Martinez Jr. 58 y.o. male [id number]    Assessment & Plan  Problem List Items Addressed This Visit       Back pain    Relevant Medications    morphine (MS CONTIN) 30 mg 12 hr tablet    oxyCODONE (ROXICODONE) 5 immediate release tablet    cyclobenzaprine (FLEXERIL) 5 mg tablet    Cancer related pain    Relevant Medications    morphine (MS CONTIN) 30 mg 12 hr tablet    oxyCODONE (ROXICODONE) 5 immediate release tablet    cyclobenzaprine (FLEXERIL) 5 mg tablet    Metastasis to bone (HCC)    Relevant Medications    morphine (MS CONTIN) 30 mg 12 hr tablet    oxyCODONE (ROXICODONE) 5 immediate release tablet    Multiple myeloma not having achieved remission (HCC)    Relevant Medications    morphine (MS CONTIN) 30 mg 12 hr tablet    oxyCODONE (ROXICODONE) 5 immediate release tablet    Palliative care patient    Relevant Medications    morphine (MS CONTIN) 30 mg 12 hr tablet    oxyCODONE (ROXICODONE) 5 immediate release tablet    cyclobenzaprine (FLEXERIL) 5 mg tablet     Other Visit Diagnoses       Multiple myeloma, remission status unspecified (HCC)        Relevant Medications    morphine (MS CONTIN) 30 mg 12 hr tablet    oxyCODONE (ROXICODONE) 5 immediate release tablet            Medications adjusted this encounter:  Requested Prescriptions     Signed Prescriptions Disp Refills    morphine (MS CONTIN) 30 mg 12 hr tablet 90 tablet 0     Sig: Take 1 tablet (30 mg total) by mouth every 8 (eight) hours Take on a schedule to prevent and reduce cancer pain Max Daily Amount: 90 mg    oxyCODONE (ROXICODONE) 5 immediate release tablet 150 tablet 0     Sig: Take 1 tablet (5 mg total) by mouth every 4 (four) hours as needed (cancer pain) Max 5 tabs / day. Max Daily Amount: 25 mg    cyclobenzaprine (FLEXERIL) 5 mg tablet 70 tablet 0     Sig: Take 1 tablet (5 mg total) by mouth 3 (three) times a day as needed for muscle spasms     No orders of the defined types were  placed in this encounter.    Medications Discontinued During This Encounter   Medication Reason    acyclovir (ZOVIRAX) 800 mg tablet     morphine (MS CONTIN) 30 mg 12 hr tablet Reorder    oxyCODONE (ROXICODONE) 5 immediate release tablet Reorder       PPS: 40      Fredy Martinez Jr. was seen in their home today for symptoms and care planning related to above diagnoses.  Above orders were sent electronically.  I have reviewed the patient's controlled substance dispensing history in the Prescription Drug Monitoring Program in compliance with the [facility] regulations before prescribing any controlled substances.    We appreciate the referral, and after consideration of our in-home service to this patient, the patient/family do wish to continue with [facility].  We will continue to follow with this patient through our home program.  If there are questions or concerns, please contact us through our clinic/answering service 24 hours a day, seven days a week.    Jin Albarran MD  [facility]  A service provided by [facility]  [phone]        Visit Information    Accompanied By: Family member    Source of History: Self, Family member    History Limitations: None    Contacts: wife Mary - [phone], [phone]    Chief Complaint  No chief complaint on file.      Narrative and Interval History      Fredy Martinez Jr. is a 58 y.o. male who presents as a referral from Dr. Reardon of Palliative and Supportive Care for primary palliative diagnosis of multiple myeloma.  He follows with Dr. Foss at [facility], and with Dr. MCKENZIE Garcia locally.  He completed autologus stem cell Xplant at [facility] in early August 2023.  Consultation is requested for: symptom management, emotional support in the setting of serious illness.  The patient is seen in their home due to ambulatory difficulties related to their advanced illness.      Pt last seen by Dr. Reardon in his clinic on  "11/29/23:  \"PLAN:  Continue disease-directed cares. Patient states he is in remission. [facility] notes state the patient is a candidate for 1 of 4 possible (local) maintenance therapy options, to be discussed w/ Dr JACK Garcia. Patient amenable to maintenance therapy.  Patient reports his chronic cancer-related pain is well-managed on his current regimen.  Encouraged patient to take 1000 mg of Tylenol TID ATC, preferably when he takes his morphine doses, for synergistic effects of analgesia.  Continue MS ER 30mg q8h ATC. He is tolerating long-acting opioid therapy w/o major issues.  Continue oxyIR 10mg PRN breakthrough pain.  Consider topical OTC products, local application of heat or cold for chronic pain. Do not use heat on top of topical agents. Do not mix topical agents.  Etiology of his pain includes cancer-related back pain, epigastric / anterior chest wall pain which may be related to malignancy and/or reflux, MSK pain.  Continue pantoprazole 40mg qAM for reflux. Effective.  Counseled today on bowel regimen for constipation.  Continue dexamethasone at 1mg qAM (before breakfast). Appetite and energy have improved and he continues to gain weight. Counseled again today on risks of long-term steroid therapies.  Patient has declined offer of referral to Ambulatory Physical Therapy to improve exercise capacity.  Patient has declined offer of referral to Oncology Nutrition.  Melatonin QHS was ineffective for insomnia even at 10mg QHS. Discontinuing. Patient declines offer of alternate sleep aid.  May use Zofran PRN N/V. No recent N/V.  Encouraged smoking cessation today/  Patient and his wife confirm again today that he has abstained from alcohol since 01/2023. Positive reinforcement provided.  ACP: Patient has completed advanced directives [facility]) naming his wife Mary as default surrogate healthcare decision-maker(s). In EMR.  Reviewed notes ([facility]), labs (11/21/23 Cr 0.45, alb 4.0, tProt " "6.1, , Hb16.4; 10/24/23 Hb 17.1; Hb 15.5 on 10/6/23), imaging + procedures (10/25/23 PET CT). Some data may have been gathered from Care Everywhere.  Return in about 1 month (around 12/29/2023).  Emotional support provided.  Medication safety issues addressed - no driving under the influence of narcotics (including opioids), watch for adverse effects including AMS or respiratory depression (slowed breathing), keep medications stored in a safe/locked environment, do not use alcohol while opioids or other narcotics are in one's system.      Fredy Martinez Jr. is a 58 y.o. male w/ multiple myeloma most recently on RVd therapy, s/p ASCT on 8/4/23; back pain (s/t malignancy, s/p localized RT), unintentional weight loss. He follows w/ Dr JACK Garcia (Medical Oncology), Dr Mccormick (Radiation Oncology), Dr Foss ([facility]).    Patient is known to [facility] clinic; seen 10/27/23 for symptom assessment and management, medication review, medication adjustment, psychosocial support, chart review, imaging review, lab review, goals of care, supportive listening, and anticipatory guidance. Per Dr Foss’s 11/22/23 note, recommendation is to start maintenance w/ Revlimid or similar therapy.    Patient reports his pain is well controlled with MS ER 30 mg q.8 hours ATC, and about 3-4 tabs of oxycodone IR 10 mg/day. He has been counseled on using Tylenol for adjunct and synergistic analgesia, but he has not been using this often. He is tolerating his opioid therapy well, with only minimal constipation, which he manages using prunes.    Patient denies recent nausea, denies recent vomiting. He states his appetite is \"much better\" and he has been able to continue to slowly gain weight. He denies anxiousness, denies dysphoric mood. Mary states he is \"happy-go-maxine\". He feels his energy level is \"better\" than it had been in prior visits. His sleep is problematic; he reports melatonin was ineffective even at 10 mg QHS dosing, so he " "discontinued. He declines offer of alternative sleep medications.    Patient is happy to state that he is in remission. He is amenable to maintenance therapy, as had been suggested by Dr Foss recently. He plans to discuss with [facility] Oncology soon.    Patient is smoking 1/2 pack of cigarettes or more per day. He has no intention of quitting. He and Mary confirm again that he has not had any alcohol since January 2023.\"      In a recent visit with Dr. Garcia, pt is noted to have the following rec'd plan:    \"As discussed above, patient has been followed by Dr. Foss at [facility].  His note from November 20, 2023 details options for maintenance.  Patient is to start lenalidomide 5 mg a day, daily.  If no issues, patient can increase the Revlimid to 10 mg a day.  Patient will also need to begin aspirin 81 mg a day for DVT prophylaxis.    Patient should also continue with immune compromised host prophylaxis, acyclovir 800 mg twice a day for 6 months posttransplant.    Patient will follow-up with his PCP, needs to post transplant vaccine plan.  # Vaccine Plan   -IPV, HIB, DTaP and pneumococcal x 3 sets, as well as Shingrix x 2 doses once 6-months post transplant.   -MMR once 2-years post transplant.   -Annual flu vaccine is recommended.  -COVID education provided. Will recommend vaccine at 3 months post transplant.     Patient will return to the office in 2 months with repeat blood work cleaning tumor markers before.\"      Today in the home, we find that the pt is largely confined to one level of his home, given his weakness.  It is all he can do to navigate the 4 DONYA home; he cannot ascend stairs to second level of home.  There is a toilet and shower on this first level, as well as the kitchen, living room, and his recliner. He sleep there, given its ability to help him stand from sitting, and reposition if he is feeling weak.    Pt and wife awaiting new scans, due on 1/11, before changing any plans of care.    " Pain control is improved after treatments as above.  He is occ skipping an MS CONTIN dose, and getting by with just two tabs a day.  His use of oxyIR is never more than 5 tabs a day.  He does note ongoing back spasms, and attributes this to poor use of his back muscles.  He trialled one of his wife's cyclobenzaprines, and found this helpful.    Pertinent Palliative Care Domains    Physical Symptoms:ambulates with difficulty    Psychological Symptoms:mild anxiety, good insight    Social Aspects: support system relies heavily on wife     In-home services and caregivers: wife, only    DME inventory: single post cane.  Bedside commode.  Walk-in shower with built-in seat.  Recliner    Spiritual Aspects: former     Advance Directive and Living Will: Yes    Power of :    POLST:      Historical Data  Past Medical History:   Diagnosis Date    Cancer (HCC)     bone-    GERD (gastroesophageal reflux disease)     Multiple myeloma (HCC)      Past Surgical History:   Procedure Laterality Date    APPENDECTOMY      IR BIOPSY BONE MARROW  3/15/2023     Social History     Socioeconomic History    Marital status: /Civil Union     Spouse name: Not on file    Number of children: Not on file    Years of education: Not on file    Highest education level: Not on file   Occupational History    Not on file   Tobacco Use    Smoking status: Some Days     Types: Cigarettes    Smokeless tobacco: Not on file    Tobacco comments:     Smoking 1 cigarette daily   Vaping Use    Vaping status: Never Used   Substance and Sexual Activity    Alcohol use: Not Currently    Drug use: Not Currently     Types: Marijuana     Comment: once a month    Sexual activity: Not on file   Other Topics Concern    Not on file   Social History Narrative    From 2/28/23  note:    Emergency Contact: Dee Martinez (NAVI)[phone] (Home Phone)    Marital Status: Single     Interpretation concerns, speaks another language, preferred language: english     Caregiver/Support: ER[employer]ee    Housing: two story     Home Setup: one level     Lives With: SO    Daily Living Activities: independent     Ambulation: independent    Employment: yes      Status/Location: no     Ability to pay bills: yes. No barriers to paying bills.     POA/LW/AD: no.  Karlie is healthcare representative. MSW emailed advance directive form.    Transportation Plan/Concerns: Patient states he transports self.  MSW educated on [employer] transport services.      Social Determinants of Health     Financial Resource Strain: Not on file   Food Insecurity: Not on file   Transportation Needs: Not on file   Physical Activity: Not on file   Stress: Not on file   Social Connections: Not on file   Intimate Partner Violence: Not on file   Housing Stability: Not on file     Family History   Problem Relation Age of Onset    Diabetes Mother     Heart disease Father     Diabetes Father      No Known Allergies  Current Outpatient Medications   Medication Sig Dispense Refill    cyclobenzaprine (FLEXERIL) 5 mg tablet Take 1 tablet (5 mg total) by mouth 3 (three) times a day as needed for muscle spasms 70 tablet 0    morphine (MS CONTIN) 30 mg 12 hr tablet Take 1 tablet (30 mg total) by mouth every 8 (eight) hours Take on a schedule to prevent and reduce cancer pain Max Daily Amount: 90 mg 90 tablet 0    oxyCODONE (ROXICODONE) 5 immediate release tablet Take 1 tablet (5 mg total) by mouth every 4 (four) hours as needed (cancer pain) Max 5 tabs / day. Max Daily Amount: 25 mg 150 tablet 0    acetaminophen (TYLENOL) 500 mg tablet Take 2 tablets (1,000 mg total) by mouth 3 (three) times a day (Patient not taking: Reported on 10/27/2023) 180 tablet 2    acyclovir (ZOVIRAX) 400 MG tablet Take 1 tablet (400 mg total) by mouth 2 (two) times a day (Patient not taking: Reported on 8/29/2023) 60 tablet 11    Calcium Carb-Cholecalciferol (calcium carbonate-vitamin D) 500 mg-5 mcg tablet TAKE 1 TABLET BY MOUTH 2  TIMES DAILY (WITH MEALS)  DAYS. (Patient not taking: Reported on 11/29/2023)      dexamethasone (DECADRON) 1 mg tablet Take 1 tablet (1 mg total) by mouth daily before breakfast 30 tablet 2    lenalidomide (REVLIMID) 5 MG CAPS Take 1 capsule (5 mg total) by mouth daily 28 capsule 0    naloxone (NARCAN) 4 mg/0.1 mL nasal spray Administer 1 spray into a nostril. If no response after 2-3 minutes, give another dose in the other nostril using a new spray. 1 each 1    ondansetron (ZOFRAN) 4 mg tablet Take 1 tablet (4 mg total) by mouth every 8 (eight) hours as needed for nausea or vomiting 40 tablet 2    pantoprazole (PROTONIX) 40 mg tablet Take 1 tablet (40 mg total) by mouth daily - in the morning 90 tablet 0    potassium chloride (K-DUR,KLOR-CON) 20 mEq tablet Take by mouth       No current facility-administered medications for this visit.       Review of Systems   Constitutional: Positive for decreased appetite and weight loss. Negative for weight gain.   HENT:  Negative for hoarse voice and nosebleeds.    Eyes:  Negative for vision loss in left eye and vision loss in right eye.   Cardiovascular:  Negative for chest pain and dyspnea on exertion.   Respiratory:  Negative for cough and shortness of breath.    Endocrine: Negative for polydipsia, polyphagia and polyuria.   Skin:  Negative for flushing and itching.   Musculoskeletal:  Negative for falls.   Gastrointestinal:  Positive for nausea. Negative for anorexia, jaundice and vomiting.   Genitourinary:  Negative for frequency.   Neurological:  Negative for dizziness.   Psychiatric/Behavioral:  Negative for depression and memory loss. The patient is not nervous/anxious.          Vital Signs  There were no vitals taken for this visit.    Physical Exam and Objective Data  Physical Exam  Constitutional:       General: He is not in acute distress.     Appearance: He is ill-appearing. He is not toxic-appearing or diaphoretic.      Comments: Frail, cachectic   HENT:       Head: Normocephalic and atraumatic.      Right Ear: External ear normal.      Left Ear: External ear normal.   Eyes:      General:         Right eye: No discharge.         Left eye: No discharge.      Conjunctiva/sclera: Conjunctivae normal.      Pupils: Pupils are equal, round, and reactive to light.   Neck:      Trachea: No tracheal deviation.   Cardiovascular:      Rate and Rhythm: Regular rhythm. Tachycardia present.   Pulmonary:      Effort: Pulmonary effort is normal. No respiratory distress.      Breath sounds: No stridor.   Abdominal:      Palpations: Abdomen is soft.      Comments: scaphoid   Skin:     General: Skin is warm and dry.      Coloration: Skin is not pale.      Findings: No erythema or rash.   Neurological:      General: No focal deficit present.      Mental Status: He is oriented to person, place, and time. Mental status is at baseline.      Cranial Nerves: No cranial nerve deficit.   Psychiatric:         Mood and Affect: Mood normal.         Behavior: Behavior normal.         Thought Content: Thought content normal.         Judgment: Judgment normal.           Radiology and Laboratory:  I personally reviewed and interpreted the following results: none new    I have spent a total time of 45+ minutes on 01/03/24 in caring for this patient including chart review; symptom pursuit; supportive listening; anticipatory guidance. .    All of the patient's / Agent's questions were answered during this discussion.  100% of documented time was spent in the home.

## 2023-12-29 NOTE — PROGRESS NOTES
Palliative in-Home Assessment of Need    CMOC completed an assessment of need with patient and significant other, Maria Eugenia   Relationship status: common law    Duration of relationship: 12 years  Name of significant other: Maria Eugenia  Children and Ages: no children  Pets: dog  Other important family information:   Living situation (where and whom): Patient resides with partner Maria Eugenia in [place], [place]    Patient's primary caregiver:  self, Maria Eugenia  Any limitations of caregiver:  Environmental concerns or barriers:    history: no  Employment history/source of income: SSI, receives food stamps  Disability:    Concerns regarding literacy:   Spirituality/ Buddhist:    Patient's strengths, social supports, and resources:  Patient and Maria Eugenia report limited support system. Maria Eugenia has adult children she does not have contact with.   Cultural information:   Mental Health current or previous:   Substance use or history:  past alcohol abuse  Sleep: sleeps in recliner, reports he can sleep about 3-4 hours at a time with out muscle/pain spasms waking him up  Exercise: Patient showed provider and CMOC a series of exercises he does to maintain physical strength.  Diet/nutrition: Patient reports he has a good appetite and enjoys cooking  Durable Medical Equipment needs: uses cane, has shower with built in seat, previously utilized commode  Transportation: public transit, and neighbors help with transport needs  Financial concerns:  Advanced Directive: has ACP on file  Other medical or social work providers involved:  radiation onc, onc   Patient/caregiver current level of coping:  Understanding: Patient was in good spirits when speaking to provider and CMOC during appointment. Reported taking illness one day at a time.   Patient/family concerns and areas of need: further coordination of care regarding speciality medicine needed after transplant.   Patient's interests: cooking    I have spent 60 minutes with  Patient and family today in which greater than 50% of this time was spent in counseling/coordination of care.    *All questions may not be answered due to constraints.  Follow-up discussions may need to occur

## 2024-01-01 ENCOUNTER — HOME CARE VISIT (OUTPATIENT)
Dept: HOME HOSPICE | Facility: HOSPICE | Age: 59
End: 2024-01-01
Payer: COMMERCIAL

## 2024-01-01 ENCOUNTER — HOME CARE VISIT (OUTPATIENT)
Dept: HOME HEALTH SERVICES | Facility: HOME HEALTHCARE | Age: 59
End: 2024-01-01
Payer: COMMERCIAL

## 2024-01-01 ENCOUNTER — HOSPICE ADMISSION (OUTPATIENT)
Dept: HOME HOSPICE | Facility: HOSPICE | Age: 59
End: 2024-01-01
Payer: COMMERCIAL

## 2024-01-01 VITALS — DIASTOLIC BLOOD PRESSURE: 40 MMHG | SYSTOLIC BLOOD PRESSURE: 80 MMHG | HEART RATE: 140 BPM

## 2024-01-01 VITALS — DIASTOLIC BLOOD PRESSURE: 35 MMHG | SYSTOLIC BLOOD PRESSURE: 70 MMHG | HEART RATE: 148 BPM

## 2024-01-01 PROCEDURE — T2042 HOSPICE ROUTINE HOME CARE: HCPCS

## 2024-01-01 PROCEDURE — G0155 HHCP-SVS OF CSW,EA 15 MIN: HCPCS

## 2024-01-01 PROCEDURE — G0299 HHS/HOSPICE OF RN EA 15 MIN: HCPCS

## 2024-01-01 PROCEDURE — 10330064 UNDERPAD, REUSE 36X36 1DZ     BECKCL

## 2024-01-01 PROCEDURE — 10330057 MEDICATION, GENERAL

## 2024-01-01 PROCEDURE — 10330064 BRIEF, WINGS CHOICE PLUS QUILTED MED (12

## 2024-01-01 PROCEDURE — 10330064

## 2024-01-01 PROCEDURE — G0156 HHCP-SVS OF AIDE,EA 15 MIN: HCPCS

## 2024-01-01 RX ADMIN — HALOPERIDOL 1 MG: 2 SOLUTION ORAL at 11:33

## 2024-01-03 ENCOUNTER — HOME HEALTH ADMISSION (OUTPATIENT)
Dept: HOME HEALTH SERVICES | Facility: HOME HEALTHCARE | Age: 59
End: 2024-01-03
Payer: COMMERCIAL

## 2024-01-03 VITALS — WEIGHT: 124.2 LBS | HEART RATE: 98 BPM | RESPIRATION RATE: 14 BRPM | TEMPERATURE: 97.8 F | BODY MASS INDEX: 17.82 KG/M2

## 2024-01-03 RX ORDER — MORPHINE SULFATE 30 MG/1
30 TABLET, FILM COATED, EXTENDED RELEASE ORAL EVERY 8 HOURS
Qty: 90 TABLET | Refills: 0 | Status: SHIPPED | OUTPATIENT
Start: 2024-01-03

## 2024-01-03 RX ORDER — OXYCODONE HYDROCHLORIDE 5 MG/1
5 TABLET ORAL EVERY 4 HOURS PRN
Qty: 150 TABLET | Refills: 0 | Status: SHIPPED | OUTPATIENT
Start: 2024-01-03

## 2024-01-03 RX ORDER — CYCLOBENZAPRINE HCL 5 MG
5 TABLET ORAL 3 TIMES DAILY PRN
Qty: 70 TABLET | Refills: 0 | Status: SHIPPED | OUTPATIENT
Start: 2024-01-03

## 2024-01-04 ENCOUNTER — HOME CARE VISIT (OUTPATIENT)
Dept: HOME HEALTH SERVICES | Facility: HOME HEALTHCARE | Age: 59
End: 2024-01-04
Payer: COMMERCIAL

## 2024-01-04 VITALS
TEMPERATURE: 98.7 F | BODY MASS INDEX: 18.06 KG/M2 | RESPIRATION RATE: 18 BRPM | DIASTOLIC BLOOD PRESSURE: 72 MMHG | WEIGHT: 129 LBS | SYSTOLIC BLOOD PRESSURE: 122 MMHG | OXYGEN SATURATION: 97 % | HEIGHT: 71 IN | HEART RATE: 99 BPM

## 2024-01-04 PROCEDURE — 400013 VN SOC

## 2024-01-04 PROCEDURE — G0299 HHS/HOSPICE OF RN EA 15 MIN: HCPCS

## 2024-01-05 ENCOUNTER — HOME CARE VISIT (OUTPATIENT)
Dept: HOME HEALTH SERVICES | Facility: HOME HEALTHCARE | Age: 59
End: 2024-01-05
Payer: COMMERCIAL

## 2024-01-05 VITALS — OXYGEN SATURATION: 96 % | HEART RATE: 92 BPM | DIASTOLIC BLOOD PRESSURE: 80 MMHG | SYSTOLIC BLOOD PRESSURE: 125 MMHG

## 2024-01-05 VITALS — OXYGEN SATURATION: 94 % | HEART RATE: 99 BPM

## 2024-01-05 PROCEDURE — G0152 HHCP-SERV OF OT,EA 15 MIN: HCPCS

## 2024-01-05 PROCEDURE — G0151 HHCP-SERV OF PT,EA 15 MIN: HCPCS

## 2024-01-05 NOTE — CASE COMMUNICATION
[facility] has admitted your patient to Home Health service with the following disciplines:      PT, OT and SN  This report is informational only, no response is needed  Primary focus of home health care- Musculoskeletal   Patient stated goals of care- improve fatigue and mobility.  Anticipated visit pattern and next visit date- Next SN visit for 1/8 1w1 2w1 1w3   See medication list - meds in home differ from AVS  Significant clini emiliana findings- After reconciling his medications, pt is missing  acyclovir, cyclobenzaprine, morphine, oxycodone, naloxone, and ondansetron.  He is also not currently taking potassium chloride and calcium carbonate vitamin D.    Pt still currently has a small amount of morphine and oxycodone for a couple of days.  I do see there was a prescription sent to [facility] and on [facility].  Pt states they use [facility] at [place] due to covera ge issues and medication expense.      Potential barriers to goal achievement- fatigues easily       Thank you for allowing us to participate in the care of your patient.      Stacia Clemons RN

## 2024-01-05 NOTE — CASE COMMUNICATION
Hello, I am the home health nurse who visited Mr Martinez this morning.  After reconciling his medications, I wanted to make you aware the pt is missing  acyclovir, cyclobenzaprine, morphine, oxycodone, naloxone, and ondansetron.  He is also not currently taking potassium chloride and calcium carbonate vitamin D.      Pt still currently has a small amount of morphine and oxycodone for a couple of days.  I do see there was a prescription sent  to [facility] and on [facility].  Pt states they use [facility] at [place] due to coverage issues and medication expense.      Thank you,    Stacia Clemons RN

## 2024-01-08 ENCOUNTER — TELEPHONE (OUTPATIENT)
Dept: HEMATOLOGY ONCOLOGY | Facility: CLINIC | Age: 59
End: 2024-01-08

## 2024-01-08 DIAGNOSIS — M54.9 BACK PAIN: ICD-10-CM

## 2024-01-08 DIAGNOSIS — C79.51 METASTASIS TO BONE (HCC): ICD-10-CM

## 2024-01-08 DIAGNOSIS — C90.00 MULTIPLE MYELOMA, REMISSION STATUS UNSPECIFIED (HCC): ICD-10-CM

## 2024-01-08 DIAGNOSIS — Z51.5 PALLIATIVE CARE PATIENT: ICD-10-CM

## 2024-01-08 DIAGNOSIS — G89.3 CANCER RELATED PAIN: ICD-10-CM

## 2024-01-08 RX ORDER — MORPHINE SULFATE 30 MG/1
30 TABLET, FILM COATED, EXTENDED RELEASE ORAL EVERY 8 HOURS
Qty: 90 TABLET | Refills: 0 | Status: SHIPPED | OUTPATIENT
Start: 2024-01-08

## 2024-01-08 NOTE — TELEPHONE ENCOUNTER
Pt spouse left a message on main line. She would like a call back from Dr. Reardon. Call back number [phone].

## 2024-01-08 NOTE — TELEPHONE ENCOUNTER
Last appointment: 12/27/2023    Next appointment:01/18/2024    Pharmacy: [facility]      Spoke with pt spouse. She just needed medication refill. Please advice.

## 2024-01-08 NOTE — TELEPHONE ENCOUNTER
Patient Call    Who are you speaking with? Pharmacy    If it is not the patient, are they listed on an active communication consent form? N/A   What is the reason for this call? Letty DELATORRE calling from [facility] wanting to know if the patient was still on the free Drug program call [facility]. They need to knw as its being denied.  They need to know abiut the program or if he is no longer in it piyush they can't refrill his medication .   Does this require a call back? Yes   If a call back is required, please list best call back number [phone]    If a call back is required, advise that a message will be forwarded to their care team and someone will return their call as soon as possible.   Did you relay this information to the patient? Yes

## 2024-01-08 NOTE — TELEPHONE ENCOUNTER
This Rx was sent by Dr Albarran to this pharmacy on 1/5/24, not yet picked up. Will send again. Thank you.    Unless the patient instead needed a refill of oxyIR?

## 2024-01-09 ENCOUNTER — TELEPHONE (OUTPATIENT)
Age: 59
End: 2024-01-09

## 2024-01-09 ENCOUNTER — HOME CARE VISIT (OUTPATIENT)
Dept: HOME HEALTH SERVICES | Facility: HOME HEALTHCARE | Age: 59
End: 2024-01-09
Payer: COMMERCIAL

## 2024-01-09 VITALS
SYSTOLIC BLOOD PRESSURE: 102 MMHG | TEMPERATURE: 97.8 F | RESPIRATION RATE: 16 BRPM | HEART RATE: 88 BPM | DIASTOLIC BLOOD PRESSURE: 66 MMHG

## 2024-01-09 PROCEDURE — G0299 HHS/HOSPICE OF RN EA 15 MIN: HCPCS

## 2024-01-09 NOTE — TELEPHONE ENCOUNTER
Pharmacy calling Morphine 30mg 12hr tablet needs Prior-Authorization    PA COMPLETED  Key : [id number]  Pharmacy : [facility]  Phone : [phone]    Bonnie can you please help me with this ?     The  is not the PA processor for this patient. Please contact this patient for updated insurance information or call Pharmacy Customer Care at [phone] if you believe this message is incorrect.

## 2024-01-09 NOTE — TELEPHONE ENCOUNTER
Spoke to patient's wife to inquire if patient had medications prescribed by palliative providers. [place] patient would rather return to OP with previous provider.

## 2024-01-10 NOTE — TELEPHONE ENCOUNTER
2nd attempt to submit PA MSER 30 mg via cmm    [place]  [id number]    Form    [employer] [place] Managed Medicaid Long-Acting [employer] Prior Authorization Form    Long-Acting Opioid [employer]ges[employer] Prior Authorization Form for [employer] [place] Managed Medicaid Members      [phone]    [phone]

## 2024-01-11 NOTE — TELEPHONE ENCOUNTER
Call placed to [facility] per information on insurance cards in system.  Representative able to clarify current coverage.  Pt is covered by [employer] using [employer] .  Spoke with Vadim arana at 1 [phone].  Reviewed record. Stated need for PA re quantity exception. This nurse began to instruct that we were not given questions to justify need for higher quantity. Rep then noted that pt already has an approval in place and is covered until February. She was able to get an override and verified a paid test claim. Instructed this nurse to call [employer] at 1 [phone] to get correct form or information to complete future PA without incident.     Pharmacy notified of same. Paid claim completed. Pt may  meds today. Pt notified of same. Reports he received new cards. Instructed pt and spouse to bring cards to any upcoming appointment to have cards updated.     Instructed pt/spouse to call for refill end of January early February to request refill and remind this office of NEED for PA for next fill. May try to initiate if [employer] allows.       Done task

## 2024-01-12 ENCOUNTER — TELEPHONE (OUTPATIENT)
Dept: HEMATOLOGY ONCOLOGY | Facility: CLINIC | Age: 59
End: 2024-01-12

## 2024-01-16 ENCOUNTER — HOME CARE VISIT (OUTPATIENT)
Dept: HOME HEALTH SERVICES | Facility: HOME HEALTHCARE | Age: 59
End: 2024-01-16
Payer: COMMERCIAL

## 2024-01-19 ENCOUNTER — TELEPHONE (OUTPATIENT)
Dept: HEMATOLOGY ONCOLOGY | Facility: CLINIC | Age: 59
End: 2024-01-19

## 2024-01-19 NOTE — TELEPHONE ENCOUNTER
Left voicemail for pt requesting a call back to discuss how he's doing on Revlimid maintenance. Provided my direct line.

## 2024-01-24 ENCOUNTER — TELEPHONE (OUTPATIENT)
Dept: HEMATOLOGY ONCOLOGY | Facility: CLINIC | Age: 59
End: 2024-01-24

## 2024-01-24 NOTE — TELEPHONE ENCOUNTER
Mary calling to schedule patient for [facility] [employer] for his appointment tomorrow with Palliative Care.  I called [facility] [employer] to schedule patient.  I advised Mary to have Palliative Care send [facility] [employer] patient's schedule so patient can be scheduled for transportation right away and she would not have to call in to schedule.  Mary verbalized her understanding and appreciation.

## 2024-01-25 ENCOUNTER — OFFICE VISIT (OUTPATIENT)
Dept: PALLIATIVE MEDICINE | Facility: CLINIC | Age: 59
End: 2024-01-25
Payer: COMMERCIAL

## 2024-01-25 VITALS
HEART RATE: 110 BPM | HEIGHT: 70 IN | SYSTOLIC BLOOD PRESSURE: 110 MMHG | BODY MASS INDEX: 19.47 KG/M2 | DIASTOLIC BLOOD PRESSURE: 72 MMHG | WEIGHT: 136 LBS | TEMPERATURE: 98.2 F | OXYGEN SATURATION: 96 %

## 2024-01-25 DIAGNOSIS — C90.00 MULTIPLE MYELOMA (HCC): Primary | ICD-10-CM

## 2024-01-25 DIAGNOSIS — K21.9 ACID REFLUX: ICD-10-CM

## 2024-01-25 DIAGNOSIS — R63.0 LOSS OF APPETITE: ICD-10-CM

## 2024-01-25 DIAGNOSIS — M54.9 BACK PAIN: ICD-10-CM

## 2024-01-25 DIAGNOSIS — Z51.5 PALLIATIVE CARE PATIENT: ICD-10-CM

## 2024-01-25 DIAGNOSIS — G89.3 CANCER RELATED PAIN: ICD-10-CM

## 2024-01-25 DIAGNOSIS — R11.2 NAUSEA & VOMITING: ICD-10-CM

## 2024-01-25 DIAGNOSIS — C79.51 METASTASIS TO BONE (HCC): ICD-10-CM

## 2024-01-25 DIAGNOSIS — G47.00 INSOMNIA: ICD-10-CM

## 2024-01-25 PROCEDURE — 99214 OFFICE O/P EST MOD 30 MIN: CPT | Performed by: INTERNAL MEDICINE

## 2024-01-25 RX ORDER — DEXAMETHASONE 1 MG
0.5 TABLET ORAL
Qty: 15 TABLET | Refills: 2 | Status: SHIPPED | OUTPATIENT
Start: 2024-01-25

## 2024-01-25 RX ORDER — OXYCODONE HYDROCHLORIDE 10 MG/1
10 TABLET ORAL EVERY 4 HOURS PRN
Start: 2024-01-25

## 2024-01-25 RX ORDER — PANTOPRAZOLE SODIUM 40 MG/1
40 TABLET, DELAYED RELEASE ORAL DAILY
Qty: 90 TABLET | Refills: 0 | Status: SHIPPED | OUTPATIENT
Start: 2024-01-25

## 2024-01-25 RX ORDER — MORPHINE SULFATE 30 MG/1
30 TABLET, FILM COATED, EXTENDED RELEASE ORAL EVERY 12 HOURS
Qty: 60 TABLET | Refills: 0
Start: 2024-01-25

## 2024-01-25 RX ORDER — ONDANSETRON 4 MG/1
4 TABLET, FILM COATED ORAL EVERY 8 HOURS PRN
Qty: 40 TABLET | Refills: 2 | Status: SHIPPED | OUTPATIENT
Start: 2024-01-25

## 2024-01-25 NOTE — PATIENT INSTRUCTIONS
It was good to see you today. Thank you for coming in.    Updated medications as discussed.  Will trial a very slow taper of the dexamethasone. Long-term use of corticosteroids can cause issues that we've discussed. I have let Dr. Garcia know and he is comfortable with trying this out. If your fatigue increases, or your pain increases, or your appetite decreases we can always go back up.  Tylenol, 1000mg three times per day every day, can be a safe and effective way to reduce chronic pain.  Let me know if at any time you would like a referral to ambulatory Oncology Physical Therapy.  Return in about 1 month (around 2/25/2024).  Call us for refills on medications that we supply, as needed.  If something changes and you need to come in sooner, please call our office.    PRESCRIPTION REFILL REMINDER:  All medication refills should be requested prior to Noon on Friday. Any refill requests after noon on Friday would be addressed the following Monday.    MEDICATION SAFETY ISSUES:  Do not drive under the influence of narcotics (including opioids), watch for adverse effects including confusion / altered mental status / respiratory depression (slowed breathing), keep medications stored in a safe/locked environment, do not use alcohol while opioids or other narcotics are in your system.

## 2024-01-25 NOTE — ASSESSMENT & PLAN NOTE
Melatonin was discontinued as it was ineffective even at 10 mg dosing. Patient has declined offer of alternate sleep aids.

## 2024-01-25 NOTE — PROGRESS NOTES
Follow-up with Palliative and Supportive Care  Fredy Martinez . 58 y.o. male [id number]    ASSESSMENT & PLAN:    1. Multiple myeloma (HCC)  Assessment & Plan:  Continue disease directed cares. Patient seems to be doing well.  Case discussed today with Dr. Davis Garcia of Medical Oncology. He is comfortable with the slow taper of dexamethasone (from 1 mg/day to 0.5 mg/day).    Orders:  -     oxyCODONE (ROXICODONE) 10 MG TABS; Take 1 tablet (10 mg total) by mouth every 4 (four) hours as needed (cancer pain) Max Daily Amount: 60 mg  -     morphine (MS CONTIN) 30 mg 12 hr tablet; Take 1 tablet (30 mg total) by mouth every 12 (twelve) hours Take on a schedule to prevent and reduce cancer pain Max Daily Amount: 60 mg  -     ondansetron (ZOFRAN) 4 mg tablet; Take 1 tablet (4 mg total) by mouth every 8 (eight) hours as needed for nausea or vomiting  -     pantoprazole (PROTONIX) 40 mg tablet; Take 1 tablet (40 mg total) by mouth daily - in the morning  -     dexamethasone (DECADRON) 1 mg tablet; Take 0.5 tablets (0.5 mg total) by mouth daily before breakfast    2. Metastasis to bone (HCC)  -     oxyCODONE (ROXICODONE) 10 MG TABS; Take 1 tablet (10 mg total) by mouth every 4 (four) hours as needed (cancer pain) Max Daily Amount: 60 mg  -     morphine (MS CONTIN) 30 mg 12 hr tablet; Take 1 tablet (30 mg total) by mouth every 12 (twelve) hours Take on a schedule to prevent and reduce cancer pain Max Daily Amount: 60 mg  -     ondansetron (ZOFRAN) 4 mg tablet; Take 1 tablet (4 mg total) by mouth every 8 (eight) hours as needed for nausea or vomiting  -     pantoprazole (PROTONIX) 40 mg tablet; Take 1 tablet (40 mg total) by mouth daily - in the morning  -     dexamethasone (DECADRON) 1 mg tablet; Take 0.5 tablets (0.5 mg total) by mouth daily before breakfast    3. Acid reflux  Assessment & Plan:  Continue pantoprazole.    Orders:  -     pantoprazole (PROTONIX) 40 mg tablet; Take 1 tablet (40 mg total) by mouth daily - in the  "morning    4. Cancer related pain  Assessment & Plan:  Patient states his cancer-related pain is adequately managed as long as he takes 2 tablets of MS ER 30 mg and approximately 4 tablets of oxycodone 10 mg (or equivalent dose and 5 mg tabs) per day -on average.  Patient prefers to use the 10 mg tabs as he does not take any 5 mg doses. Updated prescription that it may be refilled appropriately when the time comes. He does not need a refill currently.  Patient states he is taking 30 mg of MS ER approximately every 12 hours around-the-clock. Continue. Updating prescription.  She has a naloxone prescription.  Patient is not experiencing any adverse effects from opioid therapy.  Steroids may be contributing to pain control.  Encourage patient to use up to 3000 mg of Tylenol per 24-hour period for synergistic analgesic effects with opioids.  Consider topical OTC products, local application of heat or cold for chronic pain. Do not use heat on top of topical agents. Do not mix topical agents.  Etiology of his pain includes cancer-related back pain, epigastric / anterior chest wall pain which may be related to malignancy and/or reflux, MSK pain.    Orders:  -     oxyCODONE (ROXICODONE) 10 MG TABS; Take 1 tablet (10 mg total) by mouth every 4 (four) hours as needed (cancer pain) Max Daily Amount: 60 mg  -     morphine (MS CONTIN) 30 mg 12 hr tablet; Take 1 tablet (30 mg total) by mouth every 12 (twelve) hours Take on a schedule to prevent and reduce cancer pain Max Daily Amount: 60 mg  -     dexamethasone (DECADRON) 1 mg tablet; Take 0.5 tablets (0.5 mg total) by mouth daily before breakfast    5. Back pain  Assessment & Plan:  See \"cancer related pain\".    Orders:  -     oxyCODONE (ROXICODONE) 10 MG TABS; Take 1 tablet (10 mg total) by mouth every 4 (four) hours as needed (cancer pain) Max Daily Amount: 60 mg  -     morphine (MS CONTIN) 30 mg 12 hr tablet; Take 1 tablet (30 mg total) by mouth every 12 (twelve) hours Take " on a schedule to prevent and reduce cancer pain Max Daily Amount: 60 mg  -     dexamethasone (DECADRON) 1 mg tablet; Take 0.5 tablets (0.5 mg total) by mouth daily before breakfast    6. Insomnia  Assessment & Plan:  Melatonin was discontinued as it was ineffective even at 10 mg dosing. Patient has declined offer of alternate sleep aids.      7. Loss of appetite  Assessment & Plan:  Patient reports his appetite is good and his weight has been increasing in recent months.  Patient been counseled on the risks versus the benefits of glucocorticoid therapy. He has been on dexamethasone for quite some time, 1 mg/day being the most recent dosage. Dexamethasone as part of the therapy for multiple myeloma in conjunction with the Revlimid.  Will trial a taper to 0.5 mg/day to see if appetite remains adequate. Asked patient to call if appetite worsens, pain worsens, or if fatigue becomes a major issue.  Patient has declined referral to Oncology Nutrition.        Orders:  -     dexamethasone (DECADRON) 1 mg tablet; Take 0.5 tablets (0.5 mg total) by mouth daily before breakfast    8. Nausea & vomiting  Assessment & Plan:  Continue Zofran PRN N/V. Effective.    Orders:  -     ondansetron (ZOFRAN) 4 mg tablet; Take 1 tablet (4 mg total) by mouth every 8 (eight) hours as needed for nausea or vomiting    9. Palliative care patient  Assessment & Plan:  Patient states he wishes [facility], and does not want his medications sent to [facility].  Patient declines today's offer of referral to ambulatory ecology PT for fatigue, exercise tolerance, chronic pain. As offer has been made multiple times. Literature provided.  Emotional / psychosocial support provided today.  States he would prefer to continue to see me in the office rather than continuing with our home-based program. This is primarily due to familiarity.  ACP: Patient has completed advanced directives (the [facility] Advanced Directive) naming his wife Mary as  default surrogate healthcare decision-maker(s). In EMR.  Reviewed notes ([facility] Medical Oncology, Home Health, PCP, [place] [facility]), labs (12/5/23 Cr 0.42, , alb 4.1, tProt 6.2, Hb 17.5; 11/21/23 Cr 0.45, alb 4.0, tProt 6.1, , Hb16.4), imaging + procedures (10/25/23 PET CT). Some data may have been gathered from Care Everywhere.    Orders:  -     oxyCODONE (ROXICODONE) 10 MG TABS; Take 1 tablet (10 mg total) by mouth every 4 (four) hours as needed (cancer pain) Max Daily Amount: 60 mg  -     morphine (MS CONTIN) 30 mg 12 hr tablet; Take 1 tablet (30 mg total) by mouth every 12 (twelve) hours Take on a schedule to prevent and reduce cancer pain Max Daily Amount: 60 mg  -     ondansetron (ZOFRAN) 4 mg tablet; Take 1 tablet (4 mg total) by mouth every 8 (eight) hours as needed for nausea or vomiting  -     pantoprazole (PROTONIX) 40 mg tablet; Take 1 tablet (40 mg total) by mouth daily - in the morning  -     dexamethasone (DECADRON) 1 mg tablet; Take 0.5 tablets (0.5 mg total) by mouth daily before breakfast    Return in about 1 month (around 2/25/2024).  Medication safety issues addressed - no driving under the influence of narcotics (including opioids), watch for adverse effects including AMS or respiratory depression (slowed breathing), keep medications stored in a safe/locked environment, do not use alcohol while opioids or other narcotics are in one's system.      Requested Prescriptions     Signed Prescriptions Disp Refills    oxyCODONE (ROXICODONE) 10 MG TABS       Sig: Take 1 tablet (10 mg total) by mouth every 4 (four) hours as needed (cancer pain) Max Daily Amount: 60 mg    morphine (MS CONTIN) 30 mg 12 hr tablet 60 tablet 0     Sig: Take 1 tablet (30 mg total) by mouth every 12 (twelve) hours Take on a schedule to prevent and reduce cancer pain Max Daily Amount: 60 mg    ondansetron (ZOFRAN) 4 mg tablet 40 tablet 2     Sig: Take 1 tablet (4 mg total) by mouth every 8 (eight) hours as needed for  nausea or vomiting    pantoprazole (PROTONIX) 40 mg tablet 90 tablet 0     Sig: Take 1 tablet (40 mg total) by mouth daily - in the morning    dexamethasone (DECADRON) 1 mg tablet 15 tablet 2     Sig: Take 0.5 tablets (0.5 mg total) by mouth daily before breakfast       Medications Discontinued During This Encounter   Medication Reason    pantoprazole (PROTONIX) 40 mg tablet Reorder    ondansetron (ZOFRAN) 4 mg tablet Reorder    dexamethasone (DECADRON) 1 mg tablet Reorder    oxyCODONE (ROXICODONE) 5 immediate release tablet Reorder    morphine (MS CONTIN) 30 mg 12 hr tablet Reorder       Representatives have queried the patient's controlled substance dispensing history in the Prescription Drug Monitoring Program in compliance with regulations before I have prescribed any controlled substances. The prescription history is consistent with prescribed therapy and our practice policies.      30+ minutes were spent in this ambulatory visit with greater than 50% of the time spent face to face with patient and his wife Mary in counseling or coordination of care including symptom assessment and management, medication review, medication adjustment, psychosocial support, chart review, imaging review, lab review, supportive listening, and anticipatory guidance. All of the patient's questions were answered during this discussion.    SUBJECTIVE:  Chief Complaint   Patient presents with    Follow-up    Cancer    Pain    Counseling    Nausea        HPI    Fredy Martinez Jr. is a 58 y.o. male w/ multiple myeloma, s/p ASCT on 8/4/23, on lenalidomide; back pain (s/t malignancy, s/p localized RT), unintentional weight loss. He follows w/ Dr JACK Garcia (Medical Oncology), Dr Mccormick (Radiation Oncology), Dr Foss ([facility]).    Patient is known to [facility]; seen by our home program 12/27/23.    Patient states he would prefer to continue to come to the the [facility] office, rather than he was our home-based program. He states he has a  "trusting relationship with this provider, and appreciates the history we have had together.    Patient endorses 2/10 pain at time of visit today, focused in his back. He states he would like to continue 10 mg doses of oxycodone as he has been taking, not decreased to 5 mg doses. He is taking 10 mg of oxycodone IR 3 times per day on average. He will occasionally take 2 doses per day, and rarely takes 4 doses per day. He is also taking morphine ER 30 mg approximately every 12 hours. He is not taking 3 times per day as he had been prescribed in the past. He is comfortable with this analgesic regimen. He does note some occasional discomfort in his legs when he \"works out\". He states he is walking up and down his stairs regularly to try and improve his exercise tolerance. He states he is doing PT exercises on his own at home. He does not want to go to ambulatory PT at this time.    Patient states his appetite is good, and he has gained weight. He is happy to trial a taper of the dexamethasone. He has occasional nausea, and notes that Zofran helps provide relief. He has been able to move his bowels regularly using prunes.    Patient had his first round of immunization catch-up, noting he got 5 immunizations on the same day. This caused nausea and vomiting, which resolved quickly.    PDMP shows no concerns.    Review of Systems   All other systems reviewed and are negative.    The following portions of the medical history were reviewed: past medical history, surgical history, problem list, medication list, family history, and social history.      Current Outpatient Medications:     cyclobenzaprine (FLEXERIL) 5 mg tablet, Take 1 tablet (5 mg total) by mouth 3 (three) times a day as needed for muscle spasms, Disp: 70 tablet, Rfl: 0    dexamethasone (DECADRON) 1 mg tablet, Take 0.5 tablets (0.5 mg total) by mouth daily before breakfast, Disp: 15 tablet, Rfl: 2    lenalidomide (REVLIMID) 5 MG CAPS, Take 1 capsule (5 mg total) " "by mouth daily, Disp: 28 capsule, Rfl: 0    morphine (MS CONTIN) 30 mg 12 hr tablet, Take 1 tablet (30 mg total) by mouth every 12 (twelve) hours Take on a schedule to prevent and reduce cancer pain Max Daily Amount: 60 mg, Disp: 60 tablet, Rfl: 0    naloxone (NARCAN) 4 mg/0.1 mL nasal spray, Administer 1 spray into a nostril. If no response after 2-3 minutes, give another dose in the other nostril using a new spray., Disp: 1 each, Rfl: 1    ondansetron (ZOFRAN) 4 mg tablet, Take 1 tablet (4 mg total) by mouth every 8 (eight) hours as needed for nausea or vomiting, Disp: 40 tablet, Rfl: 2    oxyCODONE (ROXICODONE) 10 MG TABS, Take 1 tablet (10 mg total) by mouth every 4 (four) hours as needed (cancer pain) Max Daily Amount: 60 mg, Disp: , Rfl:     pantoprazole (PROTONIX) 40 mg tablet, Take 1 tablet (40 mg total) by mouth daily - in the morning, Disp: 90 tablet, Rfl: 0    acetaminophen (TYLENOL) 500 mg tablet, Take 2 tablets (1,000 mg total) by mouth 3 (three) times a day (Patient not taking: Reported on 10/27/2023), Disp: 180 tablet, Rfl: 2    acyclovir (ZOVIRAX) 400 MG tablet, Take 1 tablet (400 mg total) by mouth 2 (two) times a day (Patient not taking: Reported on 8/29/2023), Disp: 60 tablet, Rfl: 11    Calcium Carb-Cholecalciferol (calcium carbonate-vitamin D) 500 mg-5 mcg tablet, TAKE 1 TABLET BY MOUTH 2 TIMES DAILY (WITH MEALS)  DAYS. (Patient not taking: Reported on 11/29/2023), Disp: , Rfl:     potassium chloride (K-DUR,KLOR-CON) 20 mEq tablet, Take by mouth (Patient not taking: Reported on 1/25/2024), Disp: , Rfl:     OBJECTIVE:  /72 (BP Location: Left arm, Patient Position: Sitting, Cuff Size: Standard)   Pulse (!) 110   Temp 98.2 °F (36.8 °C) (Temporal)   Ht 5' 10\" (1.778 m)   Wt 61.7 kg (136 lb)   SpO2 96%   BMI 19.51 kg/m²   Physical Exam  Vitals reviewed.   Constitutional:       General: He is not in acute distress.     Appearance: He is well-groomed and underweight. He is " "ill-appearing (chronically). He is not toxic-appearing.   HENT:      Head: Normocephalic and atraumatic.      Right Ear: External ear normal.      Left Ear: External ear normal.   Eyes:      General: No scleral icterus.        Right eye: No discharge.         Left eye: No discharge.      Extraocular Movements: Extraocular movements intact.      Conjunctiva/sclera: Conjunctivae normal.      Pupils: Pupils are equal, round, and reactive to light.   Cardiovascular:      Rate and Rhythm: Tachycardia present.   Pulmonary:      Effort: Pulmonary effort is normal. No tachypnea, bradypnea, accessory muscle usage or respiratory distress.      Comments: Able to speak comfortably in complete sentences on room air at rest.  Abdominal:      General: There is no distension.      Tenderness: There is no guarding.   Musculoskeletal:      Cervical back: Normal range of motion.      Right lower leg: No edema.      Left lower leg: No edema.   Skin:     General: Skin is dry.      Coloration: Skin is not pale.   Neurological:      Mental Status: He is alert and oriented to person, place, and time.      Cranial Nerves: No dysarthria or facial asymmetry.      Gait: Gait abnormal (using 1PC).   Psychiatric:         Attention and Perception: Attention normal.         Mood and Affect: Mood and affect normal.         Speech: Speech is rapid and pressured (which is his baseline).         Behavior: Behavior normal. Behavior is cooperative.         Thought Content: Thought content normal.         Cognition and Memory: Cognition and memory normal.         Judgment: Judgment normal.          Can Reardon MD  [facility] Palliative and Supportive Care  [phone]    Portions of this document may have been created using dictation software and as such some \"sound alike\" terms may have been generated by the system. Do not hesitate to contact me with any questions or clarifications.   "

## 2024-01-25 NOTE — ASSESSMENT & PLAN NOTE
Patient states he wishes to [facility] his [facility], and does not want his medications sent to [facility].  Patient declines today's offer of referral to ambulatory ecology PT for fatigue, exercise tolerance, chronic pain. As offer has been made multiple times. Literature provided.  Emotional / psychosocial support provided today.  States he would prefer to continue to see me in the office rather than continuing with our home-based program. This is primarily due to familiarity.  ACP: Patient has completed advanced directives (the [facility] Advanced Directive) naming his wife Mary as default surrogate healthcare decision-maker(s). In EMR.  Reviewed notes ([facility] Medical Oncology, Home Health, PCP, [place] [facility]), labs (12/5/23 Cr 0.42, , alb 4.1, tProt 6.2, Hb 17.5; 11/21/23 Cr 0.45, alb 4.0, tProt 6.1, , Hb16.4), imaging + procedures (10/25/23 PET CT). Some data may have been gathered from Care Everywhere.

## 2024-01-25 NOTE — ASSESSMENT & PLAN NOTE
Patient reports his appetite is good and his weight has been increasing in recent months.  Patient been counseled on the risks versus the benefits of glucocorticoid therapy. He has been on dexamethasone for quite some time, 1 mg/day being the most recent dosage. Dexamethasone as part of the therapy for multiple myeloma in conjunction with the Revlimid.  Will trial a taper to 0.5 mg/day to see if appetite remains adequate. Asked patient to call if appetite worsens, pain worsens, or if fatigue becomes a major issue.  Patient has declined referral to Oncology Nutrition.

## 2024-01-25 NOTE — ASSESSMENT & PLAN NOTE
Patient states his cancer-related pain is adequately managed as long as he takes 2 tablets of MS ER 30 mg and approximately 4 tablets of oxycodone 10 mg (or equivalent dose and 5 mg tabs) per day -on average.  Patient prefers to use the 10 mg tabs as he does not take any 5 mg doses. Updated prescription that it may be refilled appropriately when the time comes. He does not need a refill currently.  Patient states he is taking 30 mg of MS ER approximately every 12 hours around-the-clock. Continue. Updating prescription.  She has a naloxone prescription.  Patient is not experiencing any adverse effects from opioid therapy.  Steroids may be contributing to pain control.  Encourage patient to use up to 3000 mg of Tylenol per 24-hour period for synergistic analgesic effects with opioids.  Consider topical OTC products, local application of heat or cold for chronic pain. Do not use heat on top of topical agents. Do not mix topical agents.  Etiology of his pain includes cancer-related back pain, epigastric / anterior chest wall pain which may be related to malignancy and/or reflux, MSK pain.

## 2024-01-25 NOTE — ASSESSMENT & PLAN NOTE
Continue disease directed cares. Patient seems to be doing well.  Case discussed today with Dr. Davis Garcia of Medical Oncology. He is comfortable with the slow taper of dexamethasone (from 1 mg/day to 0.5 mg/day).

## 2024-01-30 DIAGNOSIS — C90.00 MULTIPLE MYELOMA NOT HAVING ACHIEVED REMISSION (HCC): ICD-10-CM

## 2024-02-02 RX ORDER — LENALIDOMIDE 5 MG/1
CAPSULE ORAL
Qty: 28 CAPSULE | Refills: 0 | Status: SHIPPED | OUTPATIENT
Start: 2024-02-02

## 2024-02-11 DIAGNOSIS — M54.9 BACK PAIN: ICD-10-CM

## 2024-02-11 DIAGNOSIS — G89.3 CANCER RELATED PAIN: ICD-10-CM

## 2024-02-11 DIAGNOSIS — Z51.5 PALLIATIVE CARE PATIENT: ICD-10-CM

## 2024-02-12 RX ORDER — CYCLOBENZAPRINE HCL 5 MG
5 TABLET ORAL 3 TIMES DAILY PRN
Qty: 70 TABLET | Refills: 0 | Status: SHIPPED | OUTPATIENT
Start: 2024-02-12

## 2024-02-15 ENCOUNTER — TELEPHONE (OUTPATIENT)
Dept: HEMATOLOGY ONCOLOGY | Facility: CLINIC | Age: 59
End: 2024-02-15

## 2024-02-15 ENCOUNTER — TELEPHONE (OUTPATIENT)
Dept: LAB | Facility: HOSPITAL | Age: 59
End: 2024-02-15

## 2024-02-15 NOTE — TELEPHONE ENCOUNTER
Spoke with patient 's wife, Mary, regarding labs needed to be drawn prior to appointment.  Indicated the scripts are in the system, they are non-fasting and patient can go to any [facility] facility to have the labs drawn.  Mary verbalized understanding.

## 2024-02-20 ENCOUNTER — TELEPHONE (OUTPATIENT)
Dept: LAB | Facility: HOSPITAL | Age: 59
End: 2024-02-20

## 2024-02-20 ENCOUNTER — OFFICE VISIT (OUTPATIENT)
Dept: HEMATOLOGY ONCOLOGY | Facility: CLINIC | Age: 59
End: 2024-02-20
Payer: COMMERCIAL

## 2024-02-20 VITALS
OXYGEN SATURATION: 98 % | HEIGHT: 70 IN | TEMPERATURE: 98.2 F | WEIGHT: 133 LBS | BODY MASS INDEX: 19.04 KG/M2 | DIASTOLIC BLOOD PRESSURE: 76 MMHG | HEART RATE: 88 BPM | SYSTOLIC BLOOD PRESSURE: 120 MMHG | RESPIRATION RATE: 15 BRPM

## 2024-02-20 DIAGNOSIS — C79.51 METASTASIS TO BONE (HCC): Primary | ICD-10-CM

## 2024-02-20 DIAGNOSIS — C90.01 MULTIPLE MYELOMA IN REMISSION (HCC): ICD-10-CM

## 2024-02-20 PROCEDURE — 99214 OFFICE O/P EST MOD 30 MIN: CPT | Performed by: INTERNAL MEDICINE

## 2024-02-20 NOTE — PROGRESS NOTES
Fredy Martinez Jr.  1965  [address] [facility]  [address]  [place] [address]     DISCUSSION/SUMMARY:    2/21/2024 addendum: Laboratory test results were added to the patient's note 1 day after patient was seen.    58-year-old male diagnosed with multiple myeloma in March 2023.  Issues:    Multiple myeloma.  Newly presented to the ER on February 8, 2023.  CTA of the chest did not demonstrate any PE but patient had multiple lytic lesions and compression fractures in T6 and T10.  Additional work-up demonstrated elevated total protein, elevated IgG and an elevated lambda. Immunofixation demonstrated IgG lambda monoclonal protein.  CBC parameters and renal function were within normal limits.    Bone marrow biopsy demonstrated a lambda restricted plasma cell neoplasm, 80% of the cells were malignant.  PET/CT demonstrated multiple osseous hypermetabolic lytic lesions.  Patient was seen by Dr. Foss in second opinion. The plan was RVD x 4 cycles and then auto stem cell transplant if no evidence of progression and no complications.      Multiple myeloma international staging system = II, median survival is 44 months (albumin = 2.2, beta-2 microglobulin = 2.8)    NCCN guidelines 3.2023 state that for patients with multiple myeloma in need of treatment, possible transplant candidate, preferred regimen is bortezomib, lenalidomide and dexamethasone.    Regimen (completed)  Bortezomib 1.3 mg/m2 subcu on days 1, 4, 8 and 11  Lenalidomide 25 mg orally days 1-7 (dose reduced on the first cycle)  Dexamethasone 40 mg by mouth on days 1, 8 and 15  Cycle length = 21 days x 3-4 cycles    Patient was able to complete the 4 cycles of neoadjuvant chemotherapy without significant   side effects or toxicities.  Patient then went on to transplant - also did well.  The below information comes from a telemedicine visit from August 25, 2023 from /[facility]    ASSESSMENT AND  PLAN:   # IgG Lambda Multiple Myeloma  - Currently Day+ 21 s/p Autologous stem cell transplant  - Counts have recovered  -Restaging to BMBx and PET to be done ~ Day + 60; email sent to scheduling  -Supportive care listed below.  -Continue with Calcium/Vitamin D    # Immunocompromised Host  -Viral ppx: Acyclovir 800 mg po BID  -Ms. Emanuel knows to go to ED if he has a temp of 100.4    # Vaccine Plan   -IPV, HIB, DTaP and pneumococcal x 3 sets, as well as Shingrix x 2 doses once 6-months post transplant.   -MMR once 2-years post transplant.   -Annual flu vaccine is recommended.  -COVID education provided. Will recommend vaccine at 3 months post transplant.     # Pain  -Continue with MS Contin and Oxycodone    DISPO: RTC on Monday for NP, labs.  And Mrs. Martinez knows to call if he has any questions or concerns.    Mr. Martinez is well and clinically there are no concerning findings.  Part of the myeloma blood work results have been completed, all good/acceptable.  Heavy chains, free light chains and beta-2 microglobulin are still pending.  The plan is for patient to continue the Revlimid 5 mg a day.  Patient goes for blood work every 6 weeks; I will see the patient in 3 months.    Patient will be called back earlier if there is significant abnormalities in the pending laboratory tests.    Back pain: Dr. Raza previously was kind enough to review the patient's thoracic MRI (unofficially) and agreed that radiation oncology evaluation was indicated.  Patient received RT to the spine.  Back pain is under control.    Weight loss: This has been an ongoing issue.  Patient has a good appetite but has lost a 3 pounds.  Mr. Martinez will continue to monitor.    Poor dentition.  This has been a significant issue since before the diagnosis.  Patient states not having any money to go see a dentist.  Patient's teeth are in extremely poor condition.  Because of this, patient has not received any anti-resorption therapy.    Mr. Martinez knows to  call the hematology/oncology office if there are any other questions or concerns.  Patient states having all required medications at home.    Carefully review your medication list and verify that the list is accurate and up-to-date. Please call the hematology/oncology office if there are medications missing from the list, medications on the list that you are not currently taking or if there is a dosage or instruction that is different from how you're taking that medication.    Patient goals and areas of care: Continue with Revlimid  Barriers to care: None  Patient is able to self-care  ______________________________________________________________________________________    Chief Complaint   Patient presents with    Follow-up    Audible myeloma on Revlimid     History of Present Illness: 58 year old male with relatively good general health recently developing mid back pain.  Mr. Martinez states that the pain began in early January 2023.  Patient had lost approximately 20+ pounds over the past few months.    Work-up included bone marrow biopsy and PET/CT.  Patient has widespread osteolytic lesions.  Bone marrow biopsy demonstrated greater than 80% plasma cells.    Patient received RT to the spine.  Mr. Martinez was then treated with RVD, completed 4 cycles.  Patient subsequently went on to transplant down at [place].  Recent follow-up bone marrow biopsy demonstrated less than 10% plasma cells.  Patient is now on Revlimid and returns for follow-up.    Mr. Martinez states feeling generally well.  Appetite is okay, patient states that he has a well-balanced and nutritious diet.  Patient states that he has lost 3 pounds.  Activities are the same as before, patient walks with a cane.  No pain control issues, pain is minimal.  No fevers or signs of infection.  No bruising or bleeding issues.  Patient is in the process of undergoing his vaccines.    Review of Systems   Constitutional:  Negative for chills, fatigue and fever.   HENT:   Positive for dental problem. Negative for rhinorrhea and sinus pressure.    Respiratory:  Negative for cough, chest tightness and shortness of breath.    Cardiovascular:  Negative for chest pain.   Gastrointestinal:  Negative for abdominal pain, constipation, nausea and vomiting.   Genitourinary:  Negative for dysuria and frequency.   Musculoskeletal:  Positive for back pain. Negative for arthralgias.   Neurological:  Negative for light-headedness, numbness and headaches.   Psychiatric/Behavioral:  Negative for sleep disturbance.    All other systems reviewed and are negative.    Past Surgical History:   Procedure Laterality Date    APPENDECTOMY      IR BIOPSY BONE MARROW  3/15/2023   Past surgical history: No prior blood transfusions    Family History   Problem Relation Age of Onset    Diabetes Mother     Heart disease Father     Diabetes Father    Family history: No known familial or genetic diseases, no children    Social History     Socioeconomic History    Marital status: /Civil Union     Spouse name: Not on file    Number of children: Not on file    Years of education: Not on file    Highest education level: Not on file   Occupational History    Not on file   Tobacco Use    Smoking status: Some Days     Types: Cigarettes    Smokeless tobacco: Not on file    Tobacco comments:     Smoking 1 cigarette daily   Vaping Use    Vaping status: Never Used   Substance and Sexual Activity    Alcohol use: Not Currently    Drug use: Not Currently     Types: Marijuana     Comment: once a month    Sexual activity: Not on file   Other Topics Concern    Not on file   Social History Narrative    From 2/28/23  note:    Emergency Contact: Dee Martinez ()[phone] (Home Phone)    Marital Status: Single     Interpretation concerns, speaks another language, preferred language: english    Caregiver/Support: Mayur    Housing: two story     Home Setup: one level     Lives With: NAV[place]    Daily Living Activities:  independent     Ambulation: independent    Employment: yes     [place] Status/Location: no     Ability to pay bills: yes. No barriers to paying bills.     POA/LW/AD: no.  Karlie is healthcare representative. MSW emailed advance directive form.    Transportation Plan/Concerns: Patient states he transports self.  MSW educated on [employer] transport services.      Social Determinants of Health     Financial Resource Strain: Not on file   Food Insecurity: Not on file   Transportation Needs: Not on file   Physical Activity: Not on file   Stress: Not on file   Social Connections: Not on file   Intimate Partner Violence: Not on file   Housing Stability: Not on file   Social history: Patient smokes 3 to 4 cigarettes a day, recently discontinued, approximately 5 beers a day, also recently discontinued, no drug abuse, no toxic exposure    No Known Allergies    Vitals:    02/20/24 1237   BP: 120/76   Pulse: 88   Resp: 15   Temp: 98.2 °F (36.8 °C)   SpO2: 98%     Physical Exam  Constitutional:       Appearance: He is underweight. He is not ill-appearing.      Comments: Thin, somewhat frail-appearing male, appears about the same as before, no evidence of pain   HENT:      Head: Normocephalic and atraumatic.      Nose: Nose normal.      Mouth/Throat:      Mouth: Mucous membranes are moist.      Comments: Dentition issue with multiple lost teeth   Eyes:      Extraocular Movements: Extraocular movements intact.      Pupils: Pupils are equal, round, and reactive to light.   Cardiovascular:      Rate and Rhythm: Normal rate and regular rhythm.      Pulses: Normal pulses.      Heart sounds: Normal heart sounds.   Pulmonary:      Effort: Pulmonary effort is normal.      Breath sounds: Normal breath sounds.      Comments: Scattered bilateral rhonchi, fair to good entry bilaterally  Abdominal:      General: Abdomen is flat.      Tenderness: There is no abdominal tenderness.   Musculoskeletal:         General: Normal range of  motion.      Cervical back: Normal range of motion.   Skin:     General: Skin is warm.      Comments: Relatively good color, warm, moist, no petechiae or ecchymoses   Neurological:      Mental Status: He is alert and oriented to person, place, and time.     Extremities: No lower extremity edema bilaterally, no cords, pulses are 1+ bilaterally  Lymphatic: No adenopathy in the neck, supraclavicular region, axilla bilaterally    Labs    2/21/2024 WBC = 4.59 hemoglobin = 16.3 hematocrit = 49 platelet = 152 neutrophil = 71% BUN = 9 creatinine = 0.55 calcium = 8.9 total protein = 6.0 AST = 11 ALT = 7 alkaline phosphatase = 144 total bilirubin = 0.63 uric acid = 3.2 LDH = 244    12/05/2023 WBC= 8 hemoglobin= 17.5 hematocrit= 50.8 platelet= 160 neutrophil = 89% lymphocyte= 03% monocytes= 7% eosinophil= 0% BUN= 12 creatinine= 0.42 calcium= 9.2 LFT WNL total protein= 6.2 albumin=4.1  10/6/2023 WBC = 10.15 hemoglobin = 15.5 hematocrit = 45.2 platelet = 154 neutrophil = 89% bands = 3% lymphocyte = 2% monocyte = 5% eosinophil = 1% BUN = 4 creatinine = 0.44 calcium = 9.0 LFTs WNL total protein = 5.7 albumin = 3.  10/3/2023 ([facility]) Free kappa = 9.1 Free lambda = 7.5 Kappa/lambda ratio = 1.21 beta-2 microglobulin = 1.9 LDH = 112 IgM <30 IgG = 425 IgA = 70 calcium = 8.9 total protein = 6.1 albumin = 3.4 SPEP demonstrated a faint band in the gamma region  8/28/2023 ([facility])  WBC = 7.2 hemoglobin = 15.3 hematocrit = 45.5 platelet = 212 neutrophil = 91% BUN = 5 creatinine = 0.47 calcium = 8.4 total protein = 5.7 albumin = 3.2 LFTs WNL  6/5/2023 WBC = 5.15 hemoglobin = 10.2 hematocrit = 31.3 platelet = 58 BUN = 6 creatinine = 0.42 calcium = 7.8 AST = 14 ALT = 12 alkaline phosphatase = 814 total protein = 5.3 total bilirubin = 0.57  4/10/2023 WBC = 3.14 hemoglobin = 10.3 hematocrit = 32.5 platelet = 194 neutrophil = 81% calcium = 10.8 BUN = 10 creatinine = 0.89 total protein = 9.8 albumin = 2.2 LDH = 347 beta-2 microglobulin =  2.8  3/15/2023 WBC = 6.86 hemoglobin = 12.8 hematocrit = 36.6 platelet = 188 neutrophil = 69%  2/8/2023 WBC = 6.6 hemoglobin = 12.6 hematocrit = 36 MCV = 96 platelet = 194 neutrophil = 67% bands = 1% lymphocyte = 23% monocyte = 8% basophil = 1% BUN = 21 creatinine = 1.17 calcium = 10.7 AST = 23 ALT = 14 alkaline phosphatase = 166 total protein = 9.7 albumin = 3.3 total bilirubin = 0.36 TSH = 0.680    Imaging    10/25/2023 PET/CT     IMPRESSION:     1. Significant interval favorable metabolic response to therapy with interval decrease in number of hypermetabolic osseous lesions, previously widespread and much greater than 20, currently multifocal and less than 10.     2. Indeterminant 7 mm and 5 mm mural-based tracheal nodules which could reflect secretions with developing soft tissue nodules, particularly along the nondependent lateral right tracheal wall not excluded. Findings can be further characterized with   direct visualization and/or short interval follow-up imaging as clinically indicated.     3/20/2023 MRI thoracic spine    IMPRESSION:     1.  Widespread infiltrative and discrete osseous lesions involving anterior and posterior elements of the thoracic and visualized cervical and lumbar spine in keeping with history of multiple myeloma.  No extraosseous lesion.  2.  Compared to CTA chest 2/8/2023, progression of T6 wedge compression fracture with severe anterior height loss.  There is small posterior osseous buckling without significant canal stenosis.  3.  Mild T8 compression fracture, progressed from CTA chest 2/8/2023.  Small posterior osseous buckling without significant canal stenosis.  4.  Stable T8 wedge compression deformity with severe anterior height loss.  Posterosuperior osseous buckling indents ventral thecal sac without significant canal stenosis.  5.  Discrete and marrow replacing lesions involving partially imaged ribs and sternum.  Displaced sternal fracture, similar to PET CT 3/7/2023, new  from CTA chest 2/8/2023.    3/7/2023 PET/CT    IMPRESSION:  1.  Widespread hypermetabolic lytic lesions throughout the axial and appendicular skeleton.  Findings are most suggestive of multiple myeloma.  Clinical correlation recommended.  2.  Several hypermetabolic thoracic compression deformities.  T8 compression deformity is new from the prior chest CT.  There is also a new displaced sternal fracture.  3.  No hypermetabolic soft tissue lesions.     2/8/2023 CTA chest PE study    OSSEOUS STRUCTURES:  Diffuse lytic lesions throughout the skeleton with mild compression deformity of T6 and moderate compression deformity of T10.    IMPRESSION:     No pulmonary embolus.  No acute pulmonary disease.  Diffuse lytic lesions throughout the skeleton with compression deformities in the spine.  This is most likely due to multiple myeloma, less likely due to metastatic disease from an unknown primary.    2/8/2023 chest x-ray.  Impression stated no acute cardiopulmonary disease.    Pathology    Case Report   Surgical Pathology Report                         Case: [id number]                                    Authorizing Provider:  Davis Garcia MD           Collected:           03/15/2023 09               Ordering Location:     [facility]        Received:            03/15/2023 [address] [place] [address] CAT Scan                                                             Pathologist:           Ashok Allen MD                                                           Specimens:   A) - Iliac Crest, Right, core                                                                        B) - Iliac Crest, Right, clot                                                                        C) - Iliac Crest, Right, slide                                                             Final Diagnosis   A -C.  Bone marrow,  right iliac crest,  biopsy, clot, and aspirate:  -  Lambda restricted plasma cell  neoplasm, >80% of total cellularity consistent with plasma cell myeloma, see note.  -  Hypercellular bone marrow with markedly reduced trilineage hematopoiesis and no increase in blasts.  -  Reduced iron stores, in a suboptimal sample, correlation with serum iron studies is recommended.  -  Moderate to severe increase in reticulin fibrosis status.     Note: The patient's presentation of multiple lytic lesions and compression fractures is noted. The current biopsy shows a marked increase in plasma cells comprising >80% of total cellularity. Immunostains show a lambda light chain restriction relative to kappa light chain by -HAILEE studies.  Flow cytometry confirms the presence of a lambda restricted plasma cell population. Further supported by an IgG lambda monoclonal protein detected in the serum by immunofixation studies. FISH studies identify a gain in 1q21/CKS1B in 15.33% of evaluated nuclei. Cytogenetic studies reveal a normal male karyotype.               Overall the current bone marrow biopsy is diagnostic for a plasma cell neoplasm best classified as plasma cell myeloma in the correct clinical context. Clinical correlation is advised.   Electronically signed by Ashok Allen MD on 3/23/2023 at  8:24 PM   Preliminary result electronically signed by Ashok Allen MD on 3/22/2023 at  3:13 PM   Microscopic Description    Bone marrow aspirate smears:  The aspirate smears are aspicular, cellular, and suboptimal for evaluation. The aspirate smear sample is hemodilute with an increase in plasma cells. Mature neutrophils and lymphocytes are present. However, maturing myeloid elements and erythroid elements are not well represented in the aspirate smear slides. There is no definitive evidence of myelodysplasia or increase in blasts.      Bone marrow core biopsy and aspirate clot:  Histologic sections of the aspirate clot and decalcified core biopsy are adequate for evaluation.  Marrow space cellularity is  hypercellular for patient age (>90%).  Myelopoiesis:  Markedly reduced (myeloblasts= <5%).  Erythropoiesis:  Markedly reduced  Megakaryocytes:  Markedly reduced  Lymphocytes:  Increased in small aggregates and interstitially distributed  Plasma cells:  Markedly increased  including a subset of immature forms.     Special studies:   * Iron stain performed on the aspirate smear, clot, and core biopsy highlights reduced iron stores with no evidence of ring sideroblasts in a limited sample. Siderotic iron granules are present. Due to the aspiculate nature of the specimen these findings ma  * Reticulin stain performed on the core biopsy shows moderate to severe increase in reticulin fibers.  2-3 of 3 by the  Consensus grading scheme.  * Trichrome stain performed on the core biopsy show no increase in collagen fibrosis.   * Immunohistochemical stains were performed with appropriate controls and show the following results:  -   highlights marked increase in plasma cells (>80%) with a lambda restriction relative to kappa light chain expression by kappa and lambda in situ hybridization studies. The plasma cells are negative for CD30 and GUANACO-HAILEE. Ki67 proliferation index is overall low (<10%).      CD20 highlights an increase in B-cells in aggregates and interstitially distributed (10-20% of total cellularity) and CD3 highlights T-cells in aggregates and interstitially scattered T-cells (5-10% of total cellularity).     CD34 shows no increase in blasts (<5% of total cellularity) and  shows no increase in immature cells (<5% of total cellularity). Additionally,  highlights scattered mast cells.     CD61 highlights a reduction in megakaryocytes.      [place] red stain is negative for amyloid.   Note    Component Ref Range & Units 3/15/23 0804 2/8/23 1016   WBC 4.31 - 10.16 Thousand/uL 6.86  6.62    RBC 3.88 - 5.62 Million/uL 3.86 Low   3.74 Low     Hemoglobin 12.0 - 17.0 g/dL 12.8  12.6    Hematocrit 36.5  - 49.3 % 36.6  36.0 Low     MCV 82 - 98 fL 95  96    MCH 26.8 - 34.3 pg 33.2  33.7    MCHC 31.4 - 37.4 g/dL 35.0  35.0    RDW 11.6 - 15.1 % 13.9  14.5    MPV 8.9 - 12.7 fL 9.4  8.9    Platelets 149 - 390 Thousands/uL 188  194    nRBC /100 WBCs 0  0       Component Ref Range & Units 2/8/23 1401    IGA 70.0 - 400.0 mg/dL 28.0 Low     .0 - 1,600.0 mg/dL 2,900.0 High     IGM 40.0 - 230.0 mg/dL 11.0 Low        SPEP Interpretation   See Comment      Comment: The SPEP shows a monoclonal peak in the gamma region.Immunofixation to be performed.  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         10-COLOR FLOW CYTOMETRY ANALYSIS FOR ACUTE LEUKEMIA AND MYELOID/LYMPHOID NEOPLASMS ([facility] # [id number] ; please see separate report for further details):  BONE MARROW ASPIRATE:   1. Clonal plasma cells are detected (4.4% by flow cytometry), consistent with plasma cell neoplasm.   - Phenotype: lambda+; CD38+, +, CD20-, CD19-, CD45-, CD56- and -.   2. No evidence of acute leukemia or increased blasts.   3. No evidence of an abnormal myeloid population. Abnormalities associated with myelodysplasia and myeloproliferative neoplasms are absent.   4. No evidence of B-cell lymphoma or atypical T-cell population     Viability 7AAD: 95.57%   Monoclonal CD45(-)/CD38(bright) plasma cells with lambda-restriction are present (4.4%).  There is a mixed population of granulocytes/maturing myeloid precursors, blasts, monocytes and lymphocytes. +/HLA-DR+ myeloblasts comprise (0.18%) of total events. Myeloid-commited CD34+ population comprise (0.16%) of total events. Granulocytes/maturing myeloid precursors (70.32%) do not display an aberrant phenotype. The orthogonal side scatter is normal. No significant number of CD56+ or CD10-/CD16- granulocytes is noted. Monocytes (8.4%) appear mature (CD14+/CD64+) and do not display overt phenotypic atypia. B-cells (1.17%) are polytypic. T-cells  (14.55%) show no deletion or abnormal dim expression of pan-T-cell antigens on significant subset of cells     FLUORESCENCE IN-SITU HYBRIDIZATION (FISH)  ([facility] # [id number] ; please see separate report for further details):  INTERPRETATION   1. Positive for 1q21/CKS1B gain in 15.33% nuclei.   Comment: Gain of the CKS1B locus (cyclin kinase subunit 1B) in patients with plasma cell myeloma is a poor prognostic indicator associated with significantly shorter progression-free survival and shorter overall survival.   2. No evidence of IGH-MAF translocation t(14;16) gene rearrangement   3. No evidence of RB1 monosomy (13q14 deletion).   4. No evidence of CCND1-IGH translocation t(11;14) gene rearrangement, and no evidence for trisomy 11 or gain of 11q.   5. No evidence of p53 (17p13) deletion or amplification.   6. No evidence of FGFR3-IGH translocation t(4;14) gene rearrangement.      CYTOGENETICS Karyotype Analysis ([facility] # [id number] ; please see separate report for further details):  Karyotype: 46,XY 20  Interpretation:   A normal male karyotype was observed in twenty metaphase cells analyzed.          Tobacco and Toxic Substance Assessment and Intervention:     Alcohol cessation counseling provided

## 2024-02-21 ENCOUNTER — APPOINTMENT (OUTPATIENT)
Dept: LAB | Facility: HOSPITAL | Age: 59
End: 2024-02-21
Attending: INTERNAL MEDICINE
Payer: COMMERCIAL

## 2024-02-21 DIAGNOSIS — C90.00 MULTIPLE MYELOMA NOT HAVING ACHIEVED REMISSION (HCC): ICD-10-CM

## 2024-02-21 LAB
ALBUMIN SERPL BCP-MCNC: 3.6 G/DL (ref 3.5–5)
ALP SERPL-CCNC: 144 U/L (ref 34–104)
ALT SERPL W P-5'-P-CCNC: 7 U/L (ref 7–52)
ANION GAP SERPL CALCULATED.3IONS-SCNC: 6 MMOL/L
AST SERPL W P-5'-P-CCNC: 11 U/L (ref 13–39)
BASOPHILS # BLD AUTO: 0.03 THOUSANDS/ÂΜL (ref 0–0.1)
BASOPHILS NFR BLD AUTO: 1 % (ref 0–1)
BILIRUB SERPL-MCNC: 0.63 MG/DL (ref 0.2–1)
BUN SERPL-MCNC: 9 MG/DL (ref 5–25)
CALCIUM SERPL-MCNC: 8.9 MG/DL (ref 8.4–10.2)
CHLORIDE SERPL-SCNC: 103 MMOL/L (ref 96–108)
CO2 SERPL-SCNC: 28 MMOL/L (ref 21–32)
CREAT SERPL-MCNC: 0.55 MG/DL (ref 0.6–1.3)
EOSINOPHIL # BLD AUTO: 0.27 THOUSAND/ÂΜL (ref 0–0.61)
EOSINOPHIL NFR BLD AUTO: 6 % (ref 0–6)
ERYTHROCYTE [DISTWIDTH] IN BLOOD BY AUTOMATED COUNT: 14.2 % (ref 11.6–15.1)
GFR SERPL CREATININE-BSD FRML MDRD: 115 ML/MIN/1.73SQ M
GLUCOSE SERPL-MCNC: 100 MG/DL (ref 65–140)
HCT VFR BLD AUTO: 49.3 % (ref 36.5–49.3)
HGB BLD-MCNC: 16.3 G/DL (ref 12–17)
IMM GRANULOCYTES # BLD AUTO: 0.03 THOUSAND/UL (ref 0–0.2)
IMM GRANULOCYTES NFR BLD AUTO: 1 % (ref 0–2)
LDH SERPL-CCNC: 244 U/L (ref 140–271)
LYMPHOCYTES # BLD AUTO: 0.46 THOUSANDS/ÂΜL (ref 0.6–4.47)
LYMPHOCYTES NFR BLD AUTO: 10 % (ref 14–44)
MCH RBC QN AUTO: 31.9 PG (ref 26.8–34.3)
MCHC RBC AUTO-ENTMCNC: 33.1 G/DL (ref 31.4–37.4)
MCV RBC AUTO: 97 FL (ref 82–98)
MONOCYTES # BLD AUTO: 0.5 THOUSAND/ÂΜL (ref 0.17–1.22)
MONOCYTES NFR BLD AUTO: 11 % (ref 4–12)
NEUTROPHILS # BLD AUTO: 3.3 THOUSANDS/ÂΜL (ref 1.85–7.62)
NEUTS SEG NFR BLD AUTO: 71 % (ref 43–75)
NRBC BLD AUTO-RTO: 0 /100 WBCS
PLATELET # BLD AUTO: 152 THOUSANDS/UL (ref 149–390)
PLATELET BLD QL SMEAR: ADEQUATE
PMV BLD AUTO: 8.7 FL (ref 8.9–12.7)
POTASSIUM SERPL-SCNC: 3.4 MMOL/L (ref 3.5–5.3)
PROT SERPL-MCNC: 6 G/DL (ref 6.4–8.4)
RBC # BLD AUTO: 5.11 MILLION/UL (ref 3.88–5.62)
RBC MORPH BLD: NORMAL
SODIUM SERPL-SCNC: 137 MMOL/L (ref 135–147)
URATE SERPL-MCNC: 3.2 MG/DL (ref 3.5–8.5)
WBC # BLD AUTO: 4.59 THOUSAND/UL (ref 4.31–10.16)

## 2024-02-21 PROCEDURE — 83521 IG LIGHT CHAINS FREE EACH: CPT

## 2024-02-21 PROCEDURE — 82232 ASSAY OF BETA-2 PROTEIN: CPT

## 2024-02-21 PROCEDURE — 84550 ASSAY OF BLOOD/URIC ACID: CPT

## 2024-02-21 PROCEDURE — 36415 COLL VENOUS BLD VENIPUNCTURE: CPT

## 2024-02-21 PROCEDURE — 83615 LACTATE (LD) (LDH) ENZYME: CPT

## 2024-02-21 PROCEDURE — 80053 COMPREHEN METABOLIC PANEL: CPT

## 2024-02-21 PROCEDURE — 85025 COMPLETE CBC W/AUTO DIFF WBC: CPT

## 2024-02-21 PROCEDURE — 82784 ASSAY IGA/IGD/IGG/IGM EACH: CPT

## 2024-02-22 ENCOUNTER — OFFICE VISIT (OUTPATIENT)
Dept: PALLIATIVE MEDICINE | Facility: CLINIC | Age: 59
End: 2024-02-22
Payer: COMMERCIAL

## 2024-02-22 VITALS
TEMPERATURE: 97.1 F | WEIGHT: 134 LBS | HEART RATE: 90 BPM | OXYGEN SATURATION: 96 % | BODY MASS INDEX: 19.18 KG/M2 | DIASTOLIC BLOOD PRESSURE: 58 MMHG | HEIGHT: 70 IN | SYSTOLIC BLOOD PRESSURE: 98 MMHG

## 2024-02-22 DIAGNOSIS — C90.01 MULTIPLE MYELOMA IN REMISSION (HCC): Primary | ICD-10-CM

## 2024-02-22 DIAGNOSIS — G89.3 CANCER RELATED PAIN: ICD-10-CM

## 2024-02-22 DIAGNOSIS — R63.0 LOSS OF APPETITE: ICD-10-CM

## 2024-02-22 DIAGNOSIS — G47.00 INSOMNIA: ICD-10-CM

## 2024-02-22 DIAGNOSIS — Z51.5 PALLIATIVE CARE PATIENT: ICD-10-CM

## 2024-02-22 LAB
IGA SERPL-MCNC: 50 MG/DL (ref 66–433)
IGG SERPL-MCNC: 585 MG/DL (ref 635–1741)
IGM SERPL-MCNC: <20 MG/DL (ref 45–281)
KAPPA LC FREE SER-MCNC: 14.1 MG/L (ref 3.3–19.4)
KAPPA LC FREE/LAMBDA FREE SER: 0.23 {RATIO} (ref 0.26–1.65)
LAMBDA LC FREE SERPL-MCNC: 61.3 MG/L (ref 5.7–26.3)

## 2024-02-22 PROCEDURE — 99214 OFFICE O/P EST MOD 30 MIN: CPT | Performed by: INTERNAL MEDICINE

## 2024-02-22 NOTE — PROGRESS NOTES
"Follow-up with Palliative and Supportive Care  Fredy Martinez Jr. 58 y.o. male [id number]    ASSESSMENT & PLAN:    1. Multiple myeloma in remission (HCC)  Assessment & Plan:  Multiple myeloma, s/p ASCT on 8/4/23, on lenalidomide + dexamethasone.  Continue disease-directed cares.      2. Cancer related pain  Assessment & Plan:  Patient reports his chronic cancer-related pain remains adequately managed on his current regimen.  Continue morphine ER. Patient is taking 30 mg of morphine ER twice per day on most days, occasionally forgetting to take his nighttime dose.  Patient is taking 10 mg of oxycodone IR 3-4 times per day on average. He does not need a refill yet. When it is time for refill he would refer the 10 mg tabs to be used.  Patient has been provided with naloxone prescription.  Patient is tolerating long-term opioid therapy without adverse effects.  Patient has been encouraged to use Tylenol, max 3000 mg per 24-hours.  Recommend topical OTC products, local application of heat or cold, Tylenol up to 1000mg TID for chronic pain. Do not use heat on top of topical agents. Do not mix topical agents.  Etiology of his chronic pain includes cancer related back pain, epigastric / anterior chest wall pain which may be related to malignancy and/or reflux, musculoskeletal pain.  Patient again declines offer of referral to Oncology PT.      3. Loss of appetite  Assessment & Plan:  Patient reports his appetite is \"the same\" even considering dexamethasone was decreased to 0.5 mg/day. He is satisfied with his overall recent weight trend. He has declined referrals to Oncology Dietitian.      4. Insomnia  Assessment & Plan:  Patient reports his sleep is fragmented, but he is able to tolerate this. He does not wish to change his medications.  Melatonin was discontinued as it was ineffective even at 10 mg dosing.      5. Palliative care patient  Assessment & Plan:  Patient states he wishes to use [facility] as his " preferred pharmacy, and does not wish that his medications go to [facility].  Emotional / psychosocial support provided today.  Patient has stated he wishes to continue to see me in the office rather than use our home-based program.  ACP: Patient has completed advanced directives (the [facility] Advanced Directive) naming his wife Mary as default surrogate healthcare decision-maker(s). In EMR.  Reviewed notes ([facility], PCP), labs (12/5/23 Cr 0.42, , alb 4.1, tProt 6.2, Hb 17.5; 11/21/23 Cr 0.45, alb 4.0, tProt 6.1, , Hb16.4), imaging + procedures (10/25/23 PET CT). See below for more data.  Return in about 3 months (around 5/22/2024).  Medication safety issues addressed - no driving under the influence of narcotics (including opioids), watch for adverse effects including AMS or respiratory depression (slowed breathing), keep medications stored in a safe/locked environment, do not use alcohol while opioids or other narcotics are in one's system.  I have personally queried the patient's controlled substance dispensing history in the Prescription Drug Monitoring Program in compliance with regulations before I have prescribed any controlled substances. The prescription history is consistent with prescribed therapy and our practice policies.          30 minutes were spent in this ambulatory visit with greater than 50% of the time spent face to face with patient and his wife Mary in counseling or coordination of care including symptom assessment and management, medication review, psychosocial support, chart review, imaging review, lab review, supportive listening, and anticipatory guidance. All of the patient's questions were answered during this discussion.    SUBJECTIVE:  Chief Complaint   Patient presents with    Follow-up    Medication Refill    Cancer    Pain    Counseling        HPI    Fredy Martinez . is a 58 y.o. male w/ multiple myeloma, s/p ASCT on 8/4/23, on lenalidomide + dexamethasone;  "back pain (s/t malignancy, s/p localized RT), unintentional weight loss. He follows w/ Dr JACK Garcia (Medical Oncology), Dr Foss ([facility]).    Patient states he is feeling \"better\", his \"back is better\", though he \"pulled Ida\" indicating he has strained one of his hamstrings which is uncomfortable. He endorses 3/10 pain today. He states he has been quite active, doing his home-based exercises, walking up and down stairs. He does not feel that ambulatory PT is appropriate for him    Patient denies anxiousness, denies dysphoric mood. His sleep is fragmented (he states he sleeps 2 to 3 hours at a time, wakes up, returns to sleep for another 2 to 3 hours). He is comfortable with this, and does not feel sleep medications are warranted. He feels his appetite is \"the same\" even though dexamethasone was lowered last visit. Denies nausea, denies vomiting, denies bowel issues.    Patient is taking morphine ER 30 mg twice per day on most days, though he admits to occasionally forgetting his nighttime dose. He is taking 10 mg doses of oxycodone approximately every 6 hours (which she equates to 3 or 4 times per day depending upon wakefulness).    Review of Systems   All other systems reviewed and are negative.    The following portions of the medical history were reviewed: past medical history, surgical history, problem list, medication list, family history, and social history.      Current Outpatient Medications:     cyclobenzaprine (FLEXERIL) 5 mg tablet, TAKE 1 TABLET BY MOUTH THREE TIMES A DAY AS NEEDED FOR MUSCLE SPASMS, Disp: 70 tablet, Rfl: 0    dexamethasone (DECADRON) 1 mg tablet, Take 0.5 tablets (0.5 mg total) by mouth daily before breakfast, Disp: 15 tablet, Rfl: 2    lenalidomide (Revlimid) 5 MG CAPS, TAKE 1 CAPSULE BY MOUTH 1 TIME A DAY, Disp: 28 capsule, Rfl: 0    morphine (MS CONTIN) 30 mg 12 hr tablet, Take 1 tablet (30 mg total) by mouth every 12 (twelve) hours Take on a schedule to prevent and reduce cancer " "pain Max Daily Amount: 60 mg, Disp: 60 tablet, Rfl: 0    naloxone (NARCAN) 4 mg/0.1 mL nasal spray, Administer 1 spray into a nostril. If no response after 2-3 minutes, give another dose in the other nostril using a new spray., Disp: 1 each, Rfl: 1    ondansetron (ZOFRAN) 4 mg tablet, Take 1 tablet (4 mg total) by mouth every 8 (eight) hours as needed for nausea or vomiting, Disp: 40 tablet, Rfl: 2    oxyCODONE (ROXICODONE) 10 MG TABS, Take 1 tablet (10 mg total) by mouth every 4 (four) hours as needed (cancer pain) Max Daily Amount: 60 mg, Disp: , Rfl:     pantoprazole (PROTONIX) 40 mg tablet, Take 1 tablet (40 mg total) by mouth daily - in the morning, Disp: 90 tablet, Rfl: 0    acetaminophen (TYLENOL) 500 mg tablet, Take 2 tablets (1,000 mg total) by mouth 3 (three) times a day (Patient not taking: Reported on 10/27/2023), Disp: 180 tablet, Rfl: 2    acyclovir (ZOVIRAX) 400 MG tablet, Take 1 tablet (400 mg total) by mouth 2 (two) times a day (Patient not taking: Reported on 8/29/2023), Disp: 60 tablet, Rfl: 11    Calcium Carb-Cholecalciferol (calcium carbonate-vitamin D) 500 mg-5 mcg tablet, TAKE 1 TABLET BY MOUTH 2 TIMES DAILY (WITH MEALS)  DAYS. (Patient not taking: Reported on 2/22/2024), Disp: , Rfl:     potassium chloride (K-DUR,KLOR-CON) 20 mEq tablet, Take by mouth (Patient not taking: Reported on 1/25/2024), Disp: , Rfl:     OBJECTIVE:  BP 98/58 (BP Location: Left arm, Patient Position: Sitting, Cuff Size: Standard)   Pulse 90   Temp (!) 97.1 °F (36.2 °C) (Temporal)   Ht 5' 10\" (1.778 m)   Wt 60.8 kg (134 lb)   SpO2 96%   BMI 19.23 kg/m²   Physical Exam  Vitals reviewed.   Constitutional:       General: He is not in acute distress.     Appearance: He is well-groomed. He is ill-appearing (chronically). He is not toxic-appearing.   HENT:      Head: Normocephalic and atraumatic.      Right Ear: External ear normal.      Left Ear: External ear normal.      Mouth/Throat:      Dentition: Abnormal " dentition. Dental caries present.   Eyes:      General: No scleral icterus.        Right eye: No discharge.         Left eye: No discharge.      Extraocular Movements: Extraocular movements intact.      Conjunctiva/sclera: Conjunctivae normal.      Pupils: Pupils are equal, round, and reactive to light.   Cardiovascular:      Rate and Rhythm: Normal rate.   Pulmonary:      Effort: Pulmonary effort is normal. No tachypnea, bradypnea, accessory muscle usage or respiratory distress.      Comments: Able to speak comfortably in short phrases on room air at rest.  Abdominal:      General: There is no distension.      Tenderness: There is no guarding.   Musculoskeletal:      Cervical back: Normal range of motion.      Right lower leg: No edema.      Left lower leg: No edema.   Skin:     General: Skin is dry.      Coloration: Skin is not pale.   Neurological:      Mental Status: He is alert and oriented to person, place, and time.      Cranial Nerves: No dysarthria or facial asymmetry.      Gait: Gait abnormal (using 1PC).   Psychiatric:         Attention and Perception: Attention normal.         Mood and Affect: Mood and affect normal.         Speech: Speech is rapid and pressured (his baseline).         Behavior: Behavior normal. Behavior is cooperative.         Thought Content: Thought content normal.         Cognition and Memory: Cognition and memory normal.         Judgment: Judgment normal.          Recent labs:  Lab Results   Component Value Date/Time    SODIUM 137 02/21/2024 12:56 PM    K 3.4 (L) 02/21/2024 12:56 PM    BUN 9 02/21/2024 12:56 PM    CREATININE 0.55 (L) 02/21/2024 12:56 PM    GLUC 100 02/21/2024 12:56 PM    CALCIUM 8.9 02/21/2024 12:56 PM    AST 11 (L) 02/21/2024 12:56 PM    ALT 7 02/21/2024 12:56 PM    ALB 3.6 02/21/2024 12:56 PM    TP 6.0 (L) 02/21/2024 12:56 PM    EGFR 115 02/21/2024 12:56 PM     Lab Results   Component Value Date/Time    HGB 16.3 02/21/2024 12:56 PM    WBC 4.59 02/21/2024 12:56 PM      02/21/2024 12:56 PM     Lab Results   Component Value Date/Time    [id number] 0.680 02/08/2023 10:16 AM       Most Recent Imaging [last 120 days]:  Procedure: NM PET CT skull base to mid thigh    Result Date: 10/26/2023  Narrative: PET/CT SCAN INDICATION: C90.00: Multiple myeloma not having achieved remission, restaging MODIFIER: PS COMPARISON: PET/CT dated 3/7/2023 CELL TYPE:  lambda restricted plasma cell neoplasm (> 80% malignant) (bx: 3/15/23 R iliac crest +) TECHNIQUE:   8.3 mCi F-18-FDG administered IV. Multiplanar attenuation corrected and non attenuation corrected PET images are available for interpretation, and contiguous, low dose, axial CT sections were obtained from the skull vertex through the femurs. Intravenous contrast material was not utilized. This examination, like all CT scans performed in the  Network, was performed utilizing techniques to minimize radiation dose exposure, including the use of iterative reconstruction and automated exposure control. Fasting serum glucose: 108 mg/dl FINDINGS: VISUALIZED BRAIN: No acute abnormalities are seen. HEAD/NECK: There is a physiologic distribution of FDG. No FDG avid cervical adenopathy is seen. CT images: Unremarkable. CHEST: No FDG avid soft tissue lesions are seen. CT images: Mild emphysematous changes. Tiny calcified granulomas involving the left lung. On image 76 of series 4 there is a nodular focus involving the dependent intrathoracic tracheal wall at the level of the aortic arch measuring 0.7 cm and on image 74 of series 4 slightly more superiorly along the right lateral tracheal wall towards the thoracic inlet is a 5 mm mural based nodule. Calcified left hilar lymph node consistent with prior granulomatous disease. ABDOMEN: No FDG avid soft tissue lesions are seen. CT images: Cholelithiasis. Calcification involving the spleen consistent with prior granulomatous disease. Atherosclerotic vascular calcifications are  noted. PELVIS: No FDG avid soft tissue lesions are seen. CT images: Large left-sided hydrocele. OSSEOUS STRUCTURES: Significant interval favorable metabolic response to therapy with significant interval decrease in number of hypermetabolic osseous lesions, previously widespread and much greater than 20, currently multifocal and less than 10. For example, on the current scan, hypermetabolic osseous lesions involve the proximal right humerus (PET image 82 with max SUV of 3.7, previously more extensive with max SUV of 4.4), distal sternum on PET image 110 (max SUV of 8.4, previously more extensive with max SUV of 5.7), bilateral aspects of the pelvis (for example medial left iliac bone on PET image 155 with max SUV of 5.1, previously more extensive with max SUV of 5.8), and proximal left femur on PET image 203 (max SUV of 2.8, previously  more extensive with max SUV of 4.9). CT images: Diffuse osteolytic lesions involving the axial and appendicular skeletal system is again noted multiple bilateral rib fracture deformities. Multilevel compression deformities involving the spine. Sternal fracture again noted.     Impression: 1. Significant interval favorable metabolic response to therapy with interval decrease in number of hypermetabolic osseous lesions, previously widespread and much greater than 20, currently multifocal and less than 10. 2. Indeterminant 7 mm and 5 mm mural-based tracheal nodules which could reflect secretions with developing soft tissue nodules, particularly along the nondependent lateral right tracheal wall not excluded. Findings can be further characterized with direct visualization and/or short interval follow-up imaging as clinically indicated. Please see above for details and additional findings. The study was marked in EPIC for significant notification. Workstation performed: [id number]      Can Reardon MD  [facility] Supportive [facility]  [phone]    Portions of this document  "may have been created using dictation software and as such some \"sound alike\" terms may have been generated by the system. Do not hesitate to contact me with any questions or clarifications.   "

## 2024-02-22 NOTE — ASSESSMENT & PLAN NOTE
Patient reports his sleep is fragmented, but he is able to tolerate this. He does not wish to change his medications.  Melatonin was discontinued as it was ineffective even at 10 mg dosing.

## 2024-02-22 NOTE — ASSESSMENT & PLAN NOTE
Multiple myeloma, s/p ASCT on 8/4/23, on lenalidomide + dexamethasone.  Continue disease-directed cares.

## 2024-02-22 NOTE — PATIENT INSTRUCTIONS
"It was good to see you today. Thank you for coming in.    For \"pulled muscle\" pain:  Tylenol, 1000mg three times per day every day, can be a safe and effective way to reduce chronic pain.  You may consider topical products for pain as well (over-the-counter lidocaine, CBD, capsaicin +/- menthol, diclofenac). Brand names include Salonpas, Biofreeze, Aspercreme, Icy Hot, Voltaren, etc. These can be patches, creams, lotions, or roll-on gels.  Try a heating pad or ice pack (you can alternate these about 30 minutes apart) for neck and back pain. Do not use a heating pad for more than 20 minutes out of any single hour.  Do not use topical product under a heating pad; ensure skin is clean and dry.  Continue current medications. I will send a refill on the morphine ER later today or tomorrow, when the system comes back online.  Return in about 3 months (around 5/22/2024).  Call us for refills on medications that we supply, as needed.  If something changes and you need to come in sooner, please call our office.    PRESCRIPTION REFILL REMINDER:  All medication refills should be requested prior to Noon on Friday. Any refill requests after noon on Friday would be addressed the following Monday.    MEDICATION SAFETY ISSUES:  Do not drive under the influence of narcotics (including opioids), watch for adverse effects including confusion / altered mental status / respiratory depression (slowed breathing), keep medications stored in a safe/locked environment, do not use alcohol while opioids or other narcotics are in your system.   "

## 2024-02-22 NOTE — ASSESSMENT & PLAN NOTE
"Patient reports his appetite is \"the same\" even considering dexamethasone was decreased to 0.5 mg/day. He is satisfied with his overall recent weight trend. He has declined referrals to Oncology Dietitian.  "

## 2024-02-22 NOTE — ASSESSMENT & PLAN NOTE
Patient reports his chronic cancer-related pain remains adequately managed on his current regimen.  Continue morphine ER. Patient is taking 30 mg of morphine ER twice per day on most days, occasionally forgetting to take his nighttime dose.  Patient is taking 10 mg of oxycodone IR 3-4 times per day on average. He does not need a refill yet. When it is time for refill he would refer the 10 mg tabs to be used.  Patient has been provided with naloxone prescription.  Patient is tolerating long-term opioid therapy without adverse effects.  Patient has been encouraged to use Tylenol, max 3000 mg per 24-hours.  Recommend topical OTC products, local application of heat or cold, Tylenol up to 1000mg TID for chronic pain. Do not use heat on top of topical agents. Do not mix topical agents.  Etiology of his chronic pain includes cancer related back pain, epigastric / anterior chest wall pain which may be related to malignancy and/or reflux, musculoskeletal pain.  Patient again declines offer of referral to Oncology PT.

## 2024-02-22 NOTE — ASSESSMENT & PLAN NOTE
Patient states he wishes to use [facility] as his preferred pharmacy, and does not wish that his medications go to [facility].  Emotional / psychosocial support provided today.  Patient has stated he wishes to continue to see me in the office rather than use our home-based program.  ACP: Patient has completed advanced directives (the [facility] Advanced Directive) naming his wife Mary as default surrogate healthcare decision-maker(s). In EMR.  Reviewed notes ([facility], PCP), labs (12/5/23 Cr 0.42, , alb 4.1, tProt 6.2, Hb 17.5; 11/21/23 Cr 0.45, alb 4.0, tProt 6.1, , Hb16.4), imaging + procedures (10/25/23 PET CT). See below for more data.  Return in about 3 months (around 5/22/2024).  Medication safety issues addressed - no driving under the influence of narcotics (including opioids), watch for adverse effects including AMS or respiratory depression (slowed breathing), keep medications stored in a safe/locked environment, do not use alcohol while opioids or other narcotics are in one's system.  I have personally queried the patient's controlled substance dispensing history in the Prescription Drug Monitoring Program in compliance with regulations before I have prescribed any controlled substances. The prescription history is consistent with prescribed therapy and our practice policies.

## 2024-02-23 LAB — B2 MICROGLOB SERPL-MCNC: 1.7 MG/L (ref 0.6–2.4)

## 2024-02-26 DIAGNOSIS — C90.00 MULTIPLE MYELOMA NOT HAVING ACHIEVED REMISSION (HCC): ICD-10-CM

## 2024-02-26 RX ORDER — LENALIDOMIDE 5 MG/1
5 CAPSULE ORAL DAILY
Qty: 28 CAPSULE | Refills: 0 | Status: SHIPPED | OUTPATIENT
Start: 2024-02-26

## 2024-02-27 DIAGNOSIS — M54.9 BACK PAIN: ICD-10-CM

## 2024-02-27 DIAGNOSIS — G89.3 CANCER RELATED PAIN: ICD-10-CM

## 2024-02-27 DIAGNOSIS — C79.51 METASTASIS TO BONE (HCC): ICD-10-CM

## 2024-02-27 DIAGNOSIS — Z51.5 PALLIATIVE CARE PATIENT: ICD-10-CM

## 2024-02-27 DIAGNOSIS — C90.00 MULTIPLE MYELOMA (HCC): ICD-10-CM

## 2024-02-27 RX ORDER — OXYCODONE HYDROCHLORIDE 10 MG/1
10 TABLET ORAL EVERY 4 HOURS PRN
Qty: 150 TABLET | Refills: 0 | Status: SHIPPED | OUTPATIENT
Start: 2024-02-27

## 2024-02-27 RX ORDER — MORPHINE SULFATE 30 MG/1
30 TABLET, FILM COATED, EXTENDED RELEASE ORAL EVERY 12 HOURS
Qty: 60 TABLET | Refills: 0 | Status: SHIPPED | OUTPATIENT
Start: 2024-02-27

## 2024-03-05 DIAGNOSIS — R11.2 NAUSEA & VOMITING: ICD-10-CM

## 2024-03-05 DIAGNOSIS — C79.51 METASTASIS TO BONE (HCC): ICD-10-CM

## 2024-03-05 DIAGNOSIS — Z51.5 PALLIATIVE CARE PATIENT: ICD-10-CM

## 2024-03-05 DIAGNOSIS — C90.00 MULTIPLE MYELOMA (HCC): ICD-10-CM

## 2024-03-05 RX ORDER — ONDANSETRON 4 MG/1
4 TABLET, FILM COATED ORAL EVERY 8 HOURS PRN
Qty: 40 TABLET | Refills: 2 | Status: SHIPPED | OUTPATIENT
Start: 2024-03-05

## 2024-03-15 DIAGNOSIS — M54.9 BACK PAIN: ICD-10-CM

## 2024-03-15 DIAGNOSIS — G89.3 CANCER RELATED PAIN: ICD-10-CM

## 2024-03-15 DIAGNOSIS — Z51.5 PALLIATIVE CARE PATIENT: ICD-10-CM

## 2024-03-15 RX ORDER — CYCLOBENZAPRINE HCL 5 MG
TABLET ORAL
Qty: 70 TABLET | Refills: 0 | Status: SHIPPED | OUTPATIENT
Start: 2024-03-15

## 2024-03-25 DIAGNOSIS — C90.00 MULTIPLE MYELOMA NOT HAVING ACHIEVED REMISSION (HCC): ICD-10-CM

## 2024-03-25 RX ORDER — LENALIDOMIDE 5 MG/1
5 CAPSULE ORAL DAILY
Qty: 28 CAPSULE | Refills: 0 | Status: ON HOLD | OUTPATIENT
Start: 2024-03-25

## 2024-03-30 ENCOUNTER — APPOINTMENT (EMERGENCY)
Dept: RADIOLOGY | Facility: HOSPITAL | Age: 59
End: 2024-03-30
Payer: COMMERCIAL

## 2024-03-30 ENCOUNTER — APPOINTMENT (EMERGENCY)
Dept: CT IMAGING | Facility: HOSPITAL | Age: 59
End: 2024-03-30
Payer: COMMERCIAL

## 2024-03-30 ENCOUNTER — HOSPITAL ENCOUNTER (EMERGENCY)
Facility: HOSPITAL | Age: 59
End: 2024-03-30
Attending: EMERGENCY MEDICINE
Payer: COMMERCIAL

## 2024-03-30 ENCOUNTER — HOSPITAL ENCOUNTER (INPATIENT)
Facility: HOSPITAL | Age: 59
LOS: 6 days | Discharge: NURSING, REHAB OR LONG-TERM CARE | DRG: 308 | End: 2024-04-05
Attending: INTERNAL MEDICINE | Admitting: INTERNAL MEDICINE
Payer: COMMERCIAL

## 2024-03-30 VITALS
RESPIRATION RATE: 23 BRPM | DIASTOLIC BLOOD PRESSURE: 87 MMHG | SYSTOLIC BLOOD PRESSURE: 144 MMHG | HEART RATE: 108 BPM | OXYGEN SATURATION: 94 % | TEMPERATURE: 97.5 F

## 2024-03-30 DIAGNOSIS — S72.002A LEFT DISPLACED FEMORAL NECK FRACTURE (HCC): ICD-10-CM

## 2024-03-30 DIAGNOSIS — C90.00 MULTIPLE MYELOMA (HCC): ICD-10-CM

## 2024-03-30 DIAGNOSIS — T40.2X5A THERAPEUTIC OPIOID-INDUCED CONSTIPATION (OIC): ICD-10-CM

## 2024-03-30 DIAGNOSIS — M54.9 BACK PAIN: ICD-10-CM

## 2024-03-30 DIAGNOSIS — S72.92XA FEMUR FRACTURE, LEFT (HCC): Primary | ICD-10-CM

## 2024-03-30 DIAGNOSIS — Z01.818 PRE-OPERATIVE CLEARANCE: ICD-10-CM

## 2024-03-30 DIAGNOSIS — C79.51 METASTASIS TO BONE (HCC): ICD-10-CM

## 2024-03-30 DIAGNOSIS — C90.00 MULTIPLE MYELOMA NOT HAVING ACHIEVED REMISSION (HCC): ICD-10-CM

## 2024-03-30 DIAGNOSIS — I26.99 PULMONARY EMBOLISM (HCC): Primary | ICD-10-CM

## 2024-03-30 DIAGNOSIS — K59.03 THERAPEUTIC OPIOID-INDUCED CONSTIPATION (OIC): ICD-10-CM

## 2024-03-30 DIAGNOSIS — Z51.5 PALLIATIVE CARE PATIENT: ICD-10-CM

## 2024-03-30 DIAGNOSIS — C90.00 MULTIPLE MYELOMA, REMISSION STATUS UNSPECIFIED (HCC): ICD-10-CM

## 2024-03-30 DIAGNOSIS — I47.10 SVT (SUPRAVENTRICULAR TACHYCARDIA): ICD-10-CM

## 2024-03-30 DIAGNOSIS — E87.1 HYPONATREMIA: ICD-10-CM

## 2024-03-30 DIAGNOSIS — I26.99 ACUTE PULMONARY EMBOLISM WITHOUT ACUTE COR PULMONALE (HCC): ICD-10-CM

## 2024-03-30 DIAGNOSIS — R00.0 SINUS TACHYCARDIA: ICD-10-CM

## 2024-03-30 DIAGNOSIS — G89.3 CANCER RELATED PAIN: ICD-10-CM

## 2024-03-30 LAB
2HR DELTA HS TROPONIN: -4 NG/L
4HR DELTA HS TROPONIN: -3 NG/L
ABO GROUP BLD: NORMAL
ABO GROUP BLD: NORMAL
ALBUMIN SERPL BCP-MCNC: 3.6 G/DL (ref 3.5–5)
ALP SERPL-CCNC: 114 U/L (ref 34–104)
ALT SERPL W P-5'-P-CCNC: 6 U/L (ref 7–52)
ANION GAP SERPL CALCULATED.3IONS-SCNC: 16 MMOL/L (ref 4–13)
APTT PPP: 31 SECONDS (ref 23–37)
APTT PPP: 64 SECONDS (ref 23–37)
AST SERPL W P-5'-P-CCNC: 12 U/L (ref 13–39)
BASOPHILS # BLD AUTO: 0.06 THOUSANDS/ÂΜL (ref 0–0.1)
BASOPHILS NFR BLD AUTO: 1 % (ref 0–1)
BILIRUB SERPL-MCNC: 0.75 MG/DL (ref 0.2–1)
BLD GP AB SCN SERPL QL: NEGATIVE
BNP SERPL-MCNC: 148 PG/ML (ref 0–100)
BUN SERPL-MCNC: 16 MG/DL (ref 5–25)
CALCIUM SERPL-MCNC: 9.6 MG/DL (ref 8.4–10.2)
CARDIAC TROPONIN I PNL SERPL HS: 49 NG/L
CARDIAC TROPONIN I PNL SERPL HS: 50 NG/L
CARDIAC TROPONIN I PNL SERPL HS: 53 NG/L
CHLORIDE SERPL-SCNC: 92 MMOL/L (ref 96–108)
CO2 SERPL-SCNC: 23 MMOL/L (ref 21–32)
CREAT SERPL-MCNC: 0.63 MG/DL (ref 0.6–1.3)
D DIMER PPP FEU-MCNC: 4.25 UG/ML FEU
EOSINOPHIL # BLD AUTO: 0.09 THOUSAND/ÂΜL (ref 0–0.61)
EOSINOPHIL NFR BLD AUTO: 1 % (ref 0–6)
ERYTHROCYTE [DISTWIDTH] IN BLOOD BY AUTOMATED COUNT: 14.2 % (ref 11.6–15.1)
GFR SERPL CREATININE-BSD FRML MDRD: 109 ML/MIN/1.73SQ M
GLUCOSE SERPL-MCNC: 121 MG/DL (ref 65–140)
HCT VFR BLD AUTO: 48.9 % (ref 36.5–49.3)
HGB BLD-MCNC: 16.9 G/DL (ref 12–17)
IMM GRANULOCYTES # BLD AUTO: 0.1 THOUSAND/UL (ref 0–0.2)
IMM GRANULOCYTES NFR BLD AUTO: 1 % (ref 0–2)
INR PPP: 1.1 (ref 0.84–1.19)
INR PPP: 1.14 (ref 0.84–1.19)
LYMPHOCYTES # BLD AUTO: 0.42 THOUSANDS/ÂΜL (ref 0.6–4.47)
LYMPHOCYTES NFR BLD AUTO: 5 % (ref 14–44)
MAGNESIUM SERPL-MCNC: 1.7 MG/DL (ref 1.9–2.7)
MCH RBC QN AUTO: 31.4 PG (ref 26.8–34.3)
MCHC RBC AUTO-ENTMCNC: 34.6 G/DL (ref 31.4–37.4)
MCV RBC AUTO: 91 FL (ref 82–98)
MONOCYTES # BLD AUTO: 0.99 THOUSAND/ÂΜL (ref 0.17–1.22)
MONOCYTES NFR BLD AUTO: 13 % (ref 4–12)
NEUTROPHILS # BLD AUTO: 6.17 THOUSANDS/ÂΜL (ref 1.85–7.62)
NEUTS SEG NFR BLD AUTO: 79 % (ref 43–75)
NRBC BLD AUTO-RTO: 0 /100 WBCS
PLATELET # BLD AUTO: 187 THOUSANDS/UL (ref 149–390)
PMV BLD AUTO: 9 FL (ref 8.9–12.7)
POTASSIUM SERPL-SCNC: 3.4 MMOL/L (ref 3.5–5.3)
PROT SERPL-MCNC: 6.7 G/DL (ref 6.4–8.4)
PROTHROMBIN TIME: 14.5 SECONDS (ref 11.6–14.5)
PROTHROMBIN TIME: 14.9 SECONDS (ref 11.6–14.5)
RBC # BLD AUTO: 5.38 MILLION/UL (ref 3.88–5.62)
RH BLD: POSITIVE
RH BLD: POSITIVE
SODIUM SERPL-SCNC: 131 MMOL/L (ref 135–147)
SPECIMEN EXPIRATION DATE: NORMAL
WBC # BLD AUTO: 7.83 THOUSAND/UL (ref 4.31–10.16)

## 2024-03-30 PROCEDURE — 83880 ASSAY OF NATRIURETIC PEPTIDE: CPT

## 2024-03-30 PROCEDURE — 96375 TX/PRO/DX INJ NEW DRUG ADDON: CPT

## 2024-03-30 PROCEDURE — 73502 X-RAY EXAM HIP UNI 2-3 VIEWS: CPT

## 2024-03-30 PROCEDURE — 99222 1ST HOSP IP/OBS MODERATE 55: CPT | Performed by: INTERNAL MEDICINE

## 2024-03-30 PROCEDURE — 86850 RBC ANTIBODY SCREEN: CPT | Performed by: PHYSICIAN ASSISTANT

## 2024-03-30 PROCEDURE — 96361 HYDRATE IV INFUSION ADD-ON: CPT

## 2024-03-30 PROCEDURE — 85610 PROTHROMBIN TIME: CPT | Performed by: EMERGENCY MEDICINE

## 2024-03-30 PROCEDURE — 71275 CT ANGIOGRAPHY CHEST: CPT

## 2024-03-30 PROCEDURE — 85379 FIBRIN DEGRADATION QUANT: CPT | Performed by: EMERGENCY MEDICINE

## 2024-03-30 PROCEDURE — 86901 BLOOD TYPING SEROLOGIC RH(D): CPT | Performed by: PHYSICIAN ASSISTANT

## 2024-03-30 PROCEDURE — 96365 THER/PROPH/DIAG IV INF INIT: CPT

## 2024-03-30 PROCEDURE — 73552 X-RAY EXAM OF FEMUR 2/>: CPT

## 2024-03-30 PROCEDURE — 93005 ELECTROCARDIOGRAM TRACING: CPT

## 2024-03-30 PROCEDURE — 86900 BLOOD TYPING SEROLOGIC ABO: CPT | Performed by: PHYSICIAN ASSISTANT

## 2024-03-30 PROCEDURE — 85610 PROTHROMBIN TIME: CPT

## 2024-03-30 PROCEDURE — 96376 TX/PRO/DX INJ SAME DRUG ADON: CPT

## 2024-03-30 PROCEDURE — 36415 COLL VENOUS BLD VENIPUNCTURE: CPT | Performed by: EMERGENCY MEDICINE

## 2024-03-30 PROCEDURE — 96366 THER/PROPH/DIAG IV INF ADDON: CPT

## 2024-03-30 PROCEDURE — 85730 THROMBOPLASTIN TIME PARTIAL: CPT

## 2024-03-30 PROCEDURE — 85025 COMPLETE CBC W/AUTO DIFF WBC: CPT | Performed by: EMERGENCY MEDICINE

## 2024-03-30 PROCEDURE — 83735 ASSAY OF MAGNESIUM: CPT

## 2024-03-30 PROCEDURE — 99284 EMERGENCY DEPT VISIT MOD MDM: CPT

## 2024-03-30 PROCEDURE — 85730 THROMBOPLASTIN TIME PARTIAL: CPT | Performed by: EMERGENCY MEDICINE

## 2024-03-30 PROCEDURE — 99285 EMERGENCY DEPT VISIT HI MDM: CPT | Performed by: EMERGENCY MEDICINE

## 2024-03-30 PROCEDURE — 84484 ASSAY OF TROPONIN QUANT: CPT | Performed by: EMERGENCY MEDICINE

## 2024-03-30 PROCEDURE — NC001 PR NO CHARGE: Performed by: PHYSICIAN ASSISTANT

## 2024-03-30 PROCEDURE — 80053 COMPREHEN METABOLIC PANEL: CPT | Performed by: EMERGENCY MEDICINE

## 2024-03-30 RX ORDER — OXYCODONE HYDROCHLORIDE 5 MG/1
5 TABLET ORAL EVERY 4 HOURS PRN
Status: DISCONTINUED | OUTPATIENT
Start: 2024-03-30 | End: 2024-04-01

## 2024-03-30 RX ORDER — CEFAZOLIN SODIUM 2 G/50ML
2000 SOLUTION INTRAVENOUS
Status: COMPLETED | OUTPATIENT
Start: 2024-03-31 | End: 2024-04-01

## 2024-03-30 RX ORDER — OXYCODONE HYDROCHLORIDE 10 MG/1
10 TABLET ORAL EVERY 4 HOURS PRN
Status: DISCONTINUED | OUTPATIENT
Start: 2024-03-30 | End: 2024-04-01

## 2024-03-30 RX ORDER — CYCLOBENZAPRINE HCL 5 MG
5 TABLET ORAL 3 TIMES DAILY PRN
Status: ON HOLD | COMMUNITY
End: 2024-04-01 | Stop reason: HOSPADM

## 2024-03-30 RX ORDER — LIDOCAINE 50 MG/G
1 PATCH TOPICAL ONCE
Status: DISCONTINUED | OUTPATIENT
Start: 2024-03-30 | End: 2024-03-30 | Stop reason: HOSPADM

## 2024-03-30 RX ORDER — SENNOSIDES 8.6 MG
1 TABLET ORAL DAILY
Status: DISCONTINUED | OUTPATIENT
Start: 2024-03-31 | End: 2024-04-02

## 2024-03-30 RX ORDER — METOPROLOL TARTRATE 1 MG/ML
5 INJECTION, SOLUTION INTRAVENOUS EVERY 6 HOURS PRN
Status: DISCONTINUED | OUTPATIENT
Start: 2024-03-30 | End: 2024-04-05 | Stop reason: HOSPADM

## 2024-03-30 RX ORDER — MORPHINE SULFATE 4 MG/ML
4 INJECTION, SOLUTION INTRAMUSCULAR; INTRAVENOUS ONCE
Status: COMPLETED | OUTPATIENT
Start: 2024-03-30 | End: 2024-03-30

## 2024-03-30 RX ORDER — HYDROMORPHONE HCL/PF 1 MG/ML
0.5 SYRINGE (ML) INJECTION ONCE
Status: COMPLETED | OUTPATIENT
Start: 2024-03-30 | End: 2024-03-30

## 2024-03-30 RX ORDER — HEPARIN SODIUM 10000 [USP'U]/100ML
3-30 INJECTION, SOLUTION INTRAVENOUS
Status: DISPENSED | OUTPATIENT
Start: 2024-03-30 | End: 2024-04-01

## 2024-03-30 RX ORDER — DEXAMETHASONE 0.5 MG/1
0.5 TABLET ORAL
Status: DISCONTINUED | OUTPATIENT
Start: 2024-03-31 | End: 2024-04-05 | Stop reason: HOSPADM

## 2024-03-30 RX ORDER — HEPARIN SODIUM 1000 [USP'U]/ML
2400 INJECTION, SOLUTION INTRAVENOUS; SUBCUTANEOUS EVERY 6 HOURS PRN
Status: DISCONTINUED | OUTPATIENT
Start: 2024-03-30 | End: 2024-03-30 | Stop reason: HOSPADM

## 2024-03-30 RX ORDER — HEPARIN SODIUM 1000 [USP'U]/ML
4800 INJECTION, SOLUTION INTRAVENOUS; SUBCUTANEOUS EVERY 6 HOURS PRN
Status: DISCONTINUED | OUTPATIENT
Start: 2024-03-30 | End: 2024-03-30 | Stop reason: HOSPADM

## 2024-03-30 RX ORDER — HEPARIN SODIUM 1000 [USP'U]/ML
2400 INJECTION, SOLUTION INTRAVENOUS; SUBCUTANEOUS EVERY 6 HOURS PRN
Status: DISCONTINUED | OUTPATIENT
Start: 2024-03-30 | End: 2024-04-02

## 2024-03-30 RX ORDER — HEPARIN SODIUM 10000 [USP'U]/100ML
3-30 INJECTION, SOLUTION INTRAVENOUS
Status: DISCONTINUED | OUTPATIENT
Start: 2024-03-30 | End: 2024-03-30 | Stop reason: HOSPADM

## 2024-03-30 RX ORDER — ACETAMINOPHEN 325 MG/1
975 TABLET ORAL ONCE
Status: COMPLETED | OUTPATIENT
Start: 2024-03-30 | End: 2024-03-30

## 2024-03-30 RX ORDER — CYCLOBENZAPRINE HCL 10 MG
5 TABLET ORAL 3 TIMES DAILY PRN
Status: DISCONTINUED | OUTPATIENT
Start: 2024-03-30 | End: 2024-03-31

## 2024-03-30 RX ORDER — METHOCARBAMOL 500 MG/1
500 TABLET, FILM COATED ORAL ONCE
Status: COMPLETED | OUTPATIENT
Start: 2024-03-30 | End: 2024-03-30

## 2024-03-30 RX ORDER — ONDANSETRON 2 MG/ML
4 INJECTION INTRAMUSCULAR; INTRAVENOUS EVERY 6 HOURS PRN
Status: DISCONTINUED | OUTPATIENT
Start: 2024-03-30 | End: 2024-04-05 | Stop reason: HOSPADM

## 2024-03-30 RX ORDER — METOPROLOL TARTRATE 1 MG/ML
5 INJECTION, SOLUTION INTRAVENOUS ONCE
Status: COMPLETED | OUTPATIENT
Start: 2024-03-30 | End: 2024-03-30

## 2024-03-30 RX ORDER — HEPARIN SODIUM 1000 [USP'U]/ML
4800 INJECTION, SOLUTION INTRAVENOUS; SUBCUTANEOUS EVERY 6 HOURS PRN
Status: DISCONTINUED | OUTPATIENT
Start: 2024-03-30 | End: 2024-04-02

## 2024-03-30 RX ORDER — HYDROMORPHONE HCL/PF 1 MG/ML
1 SYRINGE (ML) INJECTION ONCE
Status: COMPLETED | OUTPATIENT
Start: 2024-03-30 | End: 2024-03-30

## 2024-03-30 RX ORDER — POTASSIUM CHLORIDE 20 MEQ/1
40 TABLET, EXTENDED RELEASE ORAL ONCE
Status: COMPLETED | OUTPATIENT
Start: 2024-03-30 | End: 2024-03-30

## 2024-03-30 RX ORDER — HEPARIN SODIUM 1000 [USP'U]/ML
4800 INJECTION, SOLUTION INTRAVENOUS; SUBCUTANEOUS ONCE
Status: COMPLETED | OUTPATIENT
Start: 2024-03-30 | End: 2024-03-30

## 2024-03-30 RX ORDER — HYDROMORPHONE HCL/PF 1 MG/ML
0.5 SYRINGE (ML) INJECTION EVERY 2 HOUR PRN
Status: DISCONTINUED | OUTPATIENT
Start: 2024-03-30 | End: 2024-04-05 | Stop reason: HOSPADM

## 2024-03-30 RX ORDER — PANTOPRAZOLE SODIUM 40 MG/1
40 TABLET, DELAYED RELEASE ORAL DAILY
Status: DISCONTINUED | OUTPATIENT
Start: 2024-03-31 | End: 2024-04-05 | Stop reason: HOSPADM

## 2024-03-30 RX ORDER — LENALIDOMIDE 5 MG/1
5 CAPSULE ORAL DAILY
Status: DISCONTINUED | OUTPATIENT
Start: 2024-04-01 | End: 2024-04-05 | Stop reason: HOSPADM

## 2024-03-30 RX ORDER — OXYCODONE HYDROCHLORIDE 5 MG/1
5 TABLET ORAL EVERY 4 HOURS PRN
Status: DISCONTINUED | OUTPATIENT
Start: 2024-03-30 | End: 2024-03-30

## 2024-03-30 RX ORDER — MAGNESIUM SULFATE HEPTAHYDRATE 40 MG/ML
4 INJECTION, SOLUTION INTRAVENOUS ONCE
Qty: 100 ML | Refills: 0 | Status: COMPLETED | OUTPATIENT
Start: 2024-03-30 | End: 2024-03-31

## 2024-03-30 RX ORDER — POLYETHYLENE GLYCOL 3350 17 G/17G
17 POWDER, FOR SOLUTION ORAL DAILY
Status: DISCONTINUED | OUTPATIENT
Start: 2024-03-31 | End: 2024-04-05 | Stop reason: HOSPADM

## 2024-03-30 RX ORDER — ACETAMINOPHEN 325 MG/1
975 TABLET ORAL EVERY 8 HOURS SCHEDULED
Status: DISCONTINUED | OUTPATIENT
Start: 2024-03-30 | End: 2024-04-05 | Stop reason: HOSPADM

## 2024-03-30 RX ORDER — HYDROMORPHONE HCL IN WATER/PF 6 MG/30 ML
0.2 PATIENT CONTROLLED ANALGESIA SYRINGE INTRAVENOUS EVERY 2 HOUR PRN
Status: DISCONTINUED | OUTPATIENT
Start: 2024-03-30 | End: 2024-03-30

## 2024-03-30 RX ORDER — MORPHINE SULFATE 30 MG/1
30 TABLET, FILM COATED, EXTENDED RELEASE ORAL EVERY 12 HOURS
Status: DISCONTINUED | OUTPATIENT
Start: 2024-03-30 | End: 2024-04-05 | Stop reason: HOSPADM

## 2024-03-30 RX ADMIN — MORPHINE SULFATE 4 MG: 4 INJECTION INTRAVENOUS at 14:20

## 2024-03-30 RX ADMIN — POTASSIUM CHLORIDE 40 MEQ: 1500 TABLET, EXTENDED RELEASE ORAL at 18:29

## 2024-03-30 RX ADMIN — METHOCARBAMOL 500 MG: 500 TABLET ORAL at 11:40

## 2024-03-30 RX ADMIN — HYDROMORPHONE HYDROCHLORIDE 0.2 MG: 0.2 INJECTION, SOLUTION INTRAMUSCULAR; INTRAVENOUS; SUBCUTANEOUS at 21:52

## 2024-03-30 RX ADMIN — ACETAMINOPHEN 975 MG: 325 TABLET, FILM COATED ORAL at 17:21

## 2024-03-30 RX ADMIN — HYDROMORPHONE HYDROCHLORIDE 0.5 MG: 1 INJECTION, SOLUTION INTRAMUSCULAR; INTRAVENOUS; SUBCUTANEOUS at 10:54

## 2024-03-30 RX ADMIN — HEPARIN SODIUM 4800 UNITS: 1000 INJECTION INTRAVENOUS; SUBCUTANEOUS at 14:27

## 2024-03-30 RX ADMIN — OXYCODONE HYDROCHLORIDE 10 MG: 10 TABLET ORAL at 20:50

## 2024-03-30 RX ADMIN — SODIUM CHLORIDE 1000 ML: 0.9 INJECTION, SOLUTION INTRAVENOUS at 09:46

## 2024-03-30 RX ADMIN — CYCLOBENZAPRINE 5 MG: 10 TABLET, FILM COATED ORAL at 20:51

## 2024-03-30 RX ADMIN — METOPROLOL TARTRATE 5 MG: 5 INJECTION INTRAVENOUS at 09:44

## 2024-03-30 RX ADMIN — HYDROMORPHONE HYDROCHLORIDE 0.5 MG: 1 INJECTION, SOLUTION INTRAMUSCULAR; INTRAVENOUS; SUBCUTANEOUS at 10:14

## 2024-03-30 RX ADMIN — HEPARIN SODIUM 18 UNITS/KG/HR: 10000 INJECTION, SOLUTION INTRAVENOUS at 14:30

## 2024-03-30 RX ADMIN — MORPHINE SULFATE 4 MG: 4 INJECTION INTRAVENOUS at 11:40

## 2024-03-30 RX ADMIN — ACETAMINOPHEN 975 MG: 325 TABLET, FILM COATED ORAL at 11:40

## 2024-03-30 RX ADMIN — HYDROMORPHONE HYDROCHLORIDE 1 MG: 1 INJECTION, SOLUTION INTRAMUSCULAR; INTRAVENOUS; SUBCUTANEOUS at 17:21

## 2024-03-30 RX ADMIN — MORPHINE SULFATE 4 MG: 4 INJECTION INTRAVENOUS at 09:02

## 2024-03-30 RX ADMIN — MORPHINE SULFATE 30 MG: 30 TABLET, EXTENDED RELEASE ORAL at 18:29

## 2024-03-30 RX ADMIN — ACETAMINOPHEN 975 MG: 325 TABLET, FILM COATED ORAL at 22:26

## 2024-03-30 RX ADMIN — MAGNESIUM SULFATE HEPTAHYDRATE 4 G: 40 INJECTION, SOLUTION INTRAVENOUS at 20:40

## 2024-03-30 RX ADMIN — IOHEXOL 100 ML: 350 INJECTION, SOLUTION INTRAVENOUS at 12:19

## 2024-03-30 RX ADMIN — LIDOCAINE 1 PATCH: 700 PATCH TOPICAL at 11:40

## 2024-03-30 NOTE — ED PROVIDER NOTES
History  Chief Complaint   Patient presents with    Leg Pain     Brought in by EMS, pt reports L hip pain radiating to upper thigh x 10 days, getting worse overnight. Pt denies known injury/trauma, unable to move leg. Pt has been taking tylenol without relief     58-year-old male with history of multiple myeloma, GERD who presents for evaluation of left leg pain.  Patient reports ongoing pain for the past 10 days.  He states that he pulled his leg up while laying in bed and thought that he pulled his hamstring.  He has had continued worsening pain since that time.  The pain is primarily in the left hip and thigh.  He denies any falls or traumatic injuries.  He is otherwise feeling at baseline and denies chest pain, shortness of breath, abdominal pain.  No fevers.  He has been taking Tylenol and his chronic oxycodone and morphine with minimal relief.        Prior to Admission Medications   Prescriptions Last Dose Informant Patient Reported? Taking?   Calcium Carb-Cholecalciferol (calcium carbonate-vitamin D) 500 mg-5 mcg tablet Not Taking Self, Spouse/Significant Other Yes No   Sig: TAKE 1 TABLET BY MOUTH 2 TIMES DAILY (WITH MEALS)  DAYS.   Patient not taking: Reported on 2/22/2024   acetaminophen (TYLENOL) 500 mg tablet Not Taking Self, Spouse/Significant Other No No   Sig: Take 2 tablets (1,000 mg total) by mouth 3 (three) times a day   Patient not taking: Reported on 10/27/2023   acyclovir (ZOVIRAX) 400 MG tablet  Self No No   Sig: Take 1 tablet (400 mg total) by mouth 2 (two) times a day   Patient not taking: Reported on 8/29/2023   cyclobenzaprine (FLEXERIL) 5 mg tablet 3/30/2024  No Yes   Sig: TAKE 1 TABLET BY MOUTH THREE TIMES A DAY AS NEEDED FOR MUSCLE SPASM   dexamethasone (DECADRON) 1 mg tablet 3/30/2024  No Yes   Sig: Take 0.5 tablets (0.5 mg total) by mouth daily before breakfast   lenalidomide (REVLIMID) 5 MG CAPS 3/30/2024  No Yes   Sig: Take 1 capsule (5 mg total) by mouth daily   morphine (MS  CONTIN) 30 mg 12 hr tablet 3/29/2024  No Yes   Sig: Take 1 tablet (30 mg total) by mouth every 12 (twelve) hours Take on a schedule to prevent and reduce cancer pain Max Daily Amount: 60 mg   naloxone (NARCAN) 4 mg/0.1 mL nasal spray Not Taking Self, Spouse/Significant Other No No   Sig: Administer 1 spray into a nostril. If no response after 2-3 minutes, give another dose in the other nostril using a new spray.   Patient not taking: Reported on 3/30/2024   ondansetron (ZOFRAN) 4 mg tablet 3/30/2024  No Yes   Sig: Take 1 tablet (4 mg total) by mouth every 8 (eight) hours as needed for nausea or vomiting   oxyCODONE (ROXICODONE) 10 MG TABS 3/29/2024  No Yes   Sig: Take 1 tablet (10 mg total) by mouth every 4 (four) hours as needed (cancer pain) Max Daily Amount: 60 mg   pantoprazole (PROTONIX) 40 mg tablet 3/30/2024  No Yes   Sig: Take 1 tablet (40 mg total) by mouth daily - in the morning   potassium chloride (K-DUR,KLOR-CON) 20 mEq tablet Not Taking Self, Spouse/Significant Other Yes No   Sig: Take by mouth   Patient not taking: Reported on 1/25/2024      Facility-Administered Medications: None       Past Medical History:   Diagnosis Date    Cancer (HCC)     bone-    GERD (gastroesophageal reflux disease)     Multiple myeloma (HCC)        Past Surgical History:   Procedure Laterality Date    APPENDECTOMY      IR BIOPSY BONE MARROW  3/15/2023       Family History   Problem Relation Age of Onset    Diabetes Mother     Heart disease Father     Diabetes Father      I have reviewed and agree with the history as documented.    E-Cigarette/Vaping    E-Cigarette Use Never User      E-Cigarette/Vaping Substances     Social History     Tobacco Use    Smoking status: Some Days     Types: Cigarettes    Tobacco comments:     Smoking 1 cigarette daily   Vaping Use    Vaping status: Never Used   Substance Use Topics    Alcohol use: Not Currently    Drug use: Not Currently     Types: Marijuana     Comment: once a month        Review of Systems   Constitutional:  Negative for chills and fever.   Respiratory:  Negative for shortness of breath.    Cardiovascular:  Negative for chest pain.   Gastrointestinal:  Negative for abdominal pain, nausea and vomiting.   Genitourinary:  Negative for flank pain.   Musculoskeletal:  Positive for arthralgias and gait problem.   Skin:  Negative for rash.   Neurological:  Negative for weakness and numbness.   All other systems reviewed and are negative.      Physical Exam  Physical Exam  Vitals and nursing note reviewed.   Constitutional:       Appearance: He is not ill-appearing.      Comments: Patient appears uncomfortable secondary to pain.   HENT:      Head: Normocephalic and atraumatic.      Nose: Nose normal.      Mouth/Throat:      Mouth: Mucous membranes are moist.   Eyes:      Conjunctiva/sclera: Conjunctivae normal.   Cardiovascular:      Rate and Rhythm: Regular rhythm. Tachycardia present.      Heart sounds: No murmur heard.     No friction rub. No gallop.      Comments: Dopplerable DP pulses bilaterally.  Pulmonary:      Effort: Pulmonary effort is normal.      Breath sounds: Normal breath sounds. No wheezing, rhonchi or rales.   Abdominal:      General: There is no distension.      Palpations: Abdomen is soft.      Tenderness: There is no abdominal tenderness.   Musculoskeletal:      Cervical back: Normal range of motion and neck supple.      Comments: Range of motion of the left hip significantly limited due to pain.  There is diffuse tenderness throughout the left hip and thigh.  2+ pitting edema to the left lower extremity.   Skin:     General: Skin is warm and dry.      Findings: No rash.      Comments: No warmth or erythema noted over the left hip joint.   Neurological:      General: No focal deficit present.      Mental Status: He is alert and oriented to person, place, and time.      Comments: Motor and sensation intact to the left lower extremity.   Psychiatric:          Behavior: Behavior normal.         Vital Signs  ED Triage Vitals   Temperature Pulse Respirations Blood Pressure SpO2   03/30/24 0843 03/30/24 0844 03/30/24 0844 03/30/24 0844 03/30/24 0844   97.5 °F (36.4 °C) (!) 118 20 158/95 95 %      Temp Source Heart Rate Source Patient Position - Orthostatic VS BP Location FiO2 (%)   03/30/24 0843 -- -- 03/30/24 0943 --   Tympanic   Left arm       Pain Score       03/30/24 0902       10 - Worst Possible Pain           Vitals:    03/30/24 0943 03/30/24 1013 03/30/24 1100 03/30/24 1330   BP: 118/95 145/93 129/93 144/87   Pulse:  104 (!) 115 (!) 108         Visual Acuity      ED Medications  Medications   morphine injection 4 mg (4 mg Intravenous Given 3/30/24 0902)   sodium chloride 0.9 % bolus 1,000 mL (0 mL Intravenous Stopped 3/30/24 1234)   metoprolol (LOPRESSOR) injection 5 mg (5 mg Intravenous Given 3/30/24 0944)   HYDROmorphone (DILAUDID) injection 0.5 mg (0.5 mg Intravenous Given 3/30/24 1014)   HYDROmorphone (DILAUDID) injection 0.5 mg (0.5 mg Intravenous Given 3/30/24 1054)   morphine injection 4 mg (4 mg Intravenous Given 3/30/24 1140)   acetaminophen (TYLENOL) tablet 975 mg (975 mg Oral Given 3/30/24 1140)   methocarbamol (ROBAXIN) tablet 500 mg (500 mg Oral Given 3/30/24 1140)   iohexol (OMNIPAQUE) 350 MG/ML injection (SINGLE-DOSE) 100 mL (100 mL Intravenous Given 3/30/24 1219)   morphine injection 4 mg (4 mg Intravenous Given 3/30/24 1420)   heparin (porcine) injection 4,800 Units (4,800 Units Intravenous Given 3/30/24 1427)       Diagnostic Studies  Results Reviewed       Procedure Component Value Units Date/Time    B-Type Natriuretic Peptide(BNP) [[id number]] Updated: 03/30/24 1458    Lab Status: No result Specimen: Blood from Arm, Left     HS Troponin I 4hr [[id number]]  (Abnormal) Collected: 03/30/24 1411    Lab Status: Final result Specimen: Blood from Arm, Right Updated: 03/30/24 1453     hs TnI 4hr 50 ng/L      Delta 4hr hsTnI -3 ng/L     HS Troponin I 2hr  [[id number]]  (Normal) Collected: 03/30/24 1100    Lab Status: Final result Specimen: Blood from Arm, Left Updated: 03/30/24 1147     hs TnI 2hr 49 ng/L      Delta 2hr hsTnI -4 ng/L     HS Troponin 0hr (reflex protocol) [[id number]]  (Abnormal) Collected: 03/30/24 0954    Lab Status: Final result Specimen: Blood from Arm, Right Updated: 03/30/24 1027     hs TnI 0hr 53 ng/L     D-Dimer [[id number]]  (Abnormal) Collected: 03/30/24 0954    Lab Status: Final result Specimen: Blood from Arm, Right Updated: 03/30/24 1027     D-Dimer, Quant 4.25 ug/ml FEU     Narrative:      In the evaluation for possible pulmonary embolism, in the appropriate (Well's Score of 4 or less) patient, the age adjusted d-dimer cutoff for this patient can be calculated as:    Age x 0.01 (in ug/mL) for Age-adjusted D-dimer exclusion threshold for a patient over 50 years.    Protime-INR [[id number]]  (Normal) Collected: 03/30/24 0954    Lab Status: Final result Specimen: Blood from Arm, Right Updated: 03/30/24 1009     Protime 14.5 seconds      INR 1.14    APTT [[id number]]  (Normal) Collected: 03/30/24 0954    Lab Status: Final result Specimen: Blood from Arm, Right Updated: 03/30/24 1009     PTT 31 seconds     Comprehensive metabolic panel [[id number]]  (Abnormal) Collected: 03/30/24 0907    Lab Status: Final result Specimen: Blood from Arm, Left Updated: 03/30/24 0929     Sodium 131 mmol/L      Potassium 3.4 mmol/L      Chloride 92 mmol/L      CO2 23 mmol/L      ANION GAP 16 mmol/L      BUN 16 mg/dL      Creatinine 0.63 mg/dL      Glucose 121 mg/dL      Calcium 9.6 mg/dL      AST 12 U/L      ALT 6 U/L      Alkaline Phosphatase 114 U/L      Total Protein 6.7 g/dL      Albumin 3.6 g/dL      Total Bilirubin 0.75 mg/dL      eGFR 109 ml/min/1.73sq m     Narrative:      National Kid[employer] Foundation guidelines for Chronic Kidney Disease (CKD):     Stage 1 with normal or high GFR (GFR > 90 mL/min/1.73 square meters)    Stage 2 Mild CKD (GFR = 60-89  mL/min/1.73 square meters)    Stage 3A Moderate CKD (GFR = 45-59 mL/min/1.73 square meters)    Stage 3B Moderate CKD (GFR = 30-44 mL/min/1.73 square meters)    Stage 4 Severe CKD (GFR = 15-29 mL/min/1.73 square meters)    Stage 5 End Stage CKD (GFR <15 mL/min/1.73 square meters)  Note: GFR calculation is accurate only with a steady state creatinine    CBC and differential [[id number]]  (Abnormal) Collected: 03/30/24 0907    Lab Status: Final result Specimen: Blood from Arm, Left Updated: 03/30/24 0914     WBC 7.83 Thousand/uL      RBC 5.38 Million/uL      Hemoglobin 16.9 g/dL      Hematocrit 48.9 %      MCV 91 fL      MCH 31.4 pg      MCHC 34.6 g/dL      RDW 14.2 %      MPV 9.0 fL      Platelets 187 Thousands/uL      nRBC 0 /100 WBCs      Neutrophils Relative 79 %      Immature Grans % 1 %      Lymphocytes Relative 5 %      Monocytes Relative 13 %      Eosinophils Relative 1 %      Basophils Relative 1 %      Neutrophils Absolute 6.17 Thousands/µL      Absolute Immature Grans 0.10 Thousand/uL      Absolute Lymphocytes 0.42 Thousands/µL      Absolute Monocytes 0.99 Thousand/µL      Eosinophils Absolute 0.09 Thousand/µL      Basophils Absolute 0.06 Thousands/µL                    CTA ED chest PE study   Final Result by Dagmar Morgan MD (03/30 0283)      Subocclusive emboli in the lobar arteries of the right middle and lower lobes which are peripheral in location, compatible with subacute emboli. No infarct or right heart strain.      Multiple lytic lesions throughout the visualized axial and appendicular skeleton compatible with known myeloma. 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      Findings were discussed with Dr. Yen Alcocer from the emergency department at the time of this dictation.                  Workstation performed: [id number]         XR hip/pelv 2-3 vws left   ED Interpretation by Yen Alcocer MD (03/30 0916)   Left femoral neck fracture. Independently interpreted by me.      XR femur 2  views LEFT   ED Interpretation by Yen Alcocer MD (03/30 0932)   Left femoral neck fracture. Independently interpreted by me.                 Procedures  Procedures         ED Course  ED Course as of 03/30/24 1649   Sat Mar 30, 2024   1001 Procedure Note: EKG  Date/Time: 03/30/24 09:39 AM   Interpreted by: Yen Alcocer  Indications / Diagnosis: tachycardia  ECG reviewed by me, the ED Provider: yes   The EKG demonstrates:  Rhythm: rate 182, sinus tachycardia  Intervals: normal intervals  Axis: normal axis  QRS/Blocks: normal QRS  ST Changes: No acute ST Changes, no STD/DONYA.    1001 Procedure Note: EKG  Date/Time: 03/30/24 9:53 AM   Interpreted by: Yen Alcocer  Indications / Diagnosis: tachycardia  ECG reviewed by me, the ED Provider: yes   The EKG demonstrates:  Rhythm: rate 165, sinus tachycardia  Intervals: normal intervals  Axis: normal axis  QRS/Blocks: normal QRS  ST Changes: No acute ST Changes, no STD/DONYA.    1002 Procedure Note: EKG  Date/Time: 03/30/24 9:57 AM   Interpreted by: Yen Alcocer  Indications / Diagnosis: tachycardia  ECG reviewed by me, the ED Provider: yes   The EKG demonstrates:  Rhythm: rate 102, sinus tachycardia  Intervals: normal intervals  Axis: normal axis  QRS/Blocks: normal QRS  ST Changes: No acute ST Changes, no STD/DONYA.    1003 Discussed with Dr. Bang. Recommends transfer to trauma, [place] or [place].   1104 Discussed with Dr Medley from trauma at [facility]. This would not be a trauma patient since there is no history of trauma, should go to [facility]. Will touch base with ortho again to confirm need for transfer in this case.    1107 Patient still requires transfer but can go to [facility] per Dr. Bang.   1129 Accepted by Dr. Mcelroy but requesting PE study be performed first, acceptance barring no PE.    1411 PE, subacute very peripheral, stable MM.   1453 Discussed with Dr. Renner from pulmonology. Ok for transfer to [facility], no need for thrombectomy/IR thrombolysis. Dr. Mcelroy  accepting.                                   Wells' Criteria for PE      Flowsheet Row Most Recent Value   Wells' Criteria for PE    Clinical signs and symptoms of DVT 0 Filed at: 03/30/2024 1151   PE is primary diagnosis or equally likely 0 Filed at: 03/30/2024 1151   HR >100 1.5 Filed at: 03/30/2024 1151   Immobilization at least 3 days or Surgery in the previous 4 weeks 1.5 Filed at: 03/30/2024 1151   Previous, objectively diagnosed PE or DVT 0 Filed at: 03/30/2024 1151   Hemoptysis 0 Filed at: 03/30/2024 1151   Malignancy with treatment within 6 months or palliative 1 Filed at: 03/30/2024 1151   Wells' Criteria Total 4 Filed at: 03/30/2024 1151                  Medical Decision Making  58-year-old male presenting for evaluation of left hip pain.  Differential diagnoses include but not limited to fracture, dislocation, contusion, sprain/strain.  X-ray obtained and significant for a displaced femoral neck fracture.  Initially plan to evaluate for DVT as well, however, given his found fracture likely contributing to his symptoms this was discontinued.  Patient subsequently developed an episode of tachycardia which appeared to be sinus while in the emergency department.  Rates approximately 180.  Otherwise hemodynamically stable.  Treated with 5 mg of Lopressor with improvement of heart rate to the 110s.  Given this as well as patient's history of cancer and current fracture, will evaluate for PE.  Initial troponin of 53.  Elevated D-dimer.  CT PE study was significantly delayed due to difficulty controlling patient's pain to allow him to lay flat.  Ultimately obtained and showing likely subacute PEs.  Fracture was discussed with orthopedics and PEs were discussed with pulmonology.  Heparin infusion initiated.  Stable for transfer to [place].  Transported in stable condition.    Problems Addressed:  Hyponatremia: acute illness or injury  Left displaced femoral neck fracture (HCC): acute illness or injury  Pulmonary  embolism (HCC): acute illness or injury    Amount and/or Complexity of Data Reviewed  Labs: ordered.  Radiology: ordered and independent interpretation performed.  ECG/medicine tests: ordered and independent interpretation performed. Decision-making details documented in ED Course.    Risk  OTC drugs.  Prescription drug management.        PESI  Sex: 10  History of Cancer: 30  History of Heart Failure: 0  History of Chronic Lung Disease: 0  Heart rate greater than or equal to 110: 20  Systolic BP < 100 mmH  Respiratory rate greater than or equal to 30: 0  Temperature <36°C/96.8°F: 0  Altered Mental Status (Disorientation, lethargy, stupor, or coma): 0  O2 saturation <90%: 0  PESI Score Results: 60        Disposition  Final diagnoses:   Left displaced femoral neck fracture (HCC)   Pulmonary embolism (HCC)   Hyponatremia     Time reflects when diagnosis was documented in both MDM as applicable and the Disposition within this note       Time User Action Codes Description Comment    3/30/2024  2:54 PM Yen Alcocer Add [S72.002A] Left displaced femoral neck fracture (HCC)     3/30/2024  2:54 PM Yen Alcocer Add [I26.99] Pulmonary embolism (HCC)     3/30/2024  2:54 PM Yen Alcocer Modify [S72.002A] Left displaced femoral neck fracture (HCC)     3/30/2024  2:54 PM Yen Alcocer Modify [I26.99] Pulmonary embolism (HCC)     3/30/2024  2:54 PM Yen Alcocer Add [E87.1] Hyponatremia           ED Disposition       ED Disposition   Transfer to Another Facility-In Network    Condition   --    Date/Time   Sat Mar 30, 2024 9016    Comment   Fredy Martinez Jr. should be transferred out to [facility] MD Documentation      Flowsheet Row Most Recent Value   Patient Condition The patient has been stabilized such that within reasonable medical probability, no material deterioration of the patient condition or the condition of the unborn child(hunter) is likely to result from the transfer   Reason for Transfer Level  of Care needed not available at this facility   Benefits of Transfer Specialized equipment and/or services available at the receiving facility (Include comment)________________________  [Ortho]   Risks of Transfer Potential for delay in receiving treatment, Potential deterioration of medical condition, Loss of IV, Increased discomfort during transfer, Possible worsening of condition or death during transfer   Accepting Physician Lilibeth   Accepting Facility Name, [facility]   Sending MD Alcocer   Provider Certification General risk, such as traffic hazards, adverse weather conditions, rough terrain or turbulence, possible failure of equipment (including vehicle or aircraft), or consequences of actions of persons outside the control of the transport personnel, Unanticipated needs of medical equipment and personnel during transport, Risk of worsening condition, The possibility of a transport vehicle being unavailable          RN Documentation      Flowsheet Row Most Recent Value   Accepting Facility Name, [facility]          Follow-up Information    None         Discharge Medication List as of 3/30/2024  4:16 PM        CONTINUE these medications which have NOT CHANGED    Details   cyclobenzaprine (FLEXERIL) 5 mg tablet TAKE 1 TABLET BY MOUTH THREE TIMES A DAY AS NEEDED FOR MUSCLE SPASM, Normal      dexamethasone (DECADRON) 1 mg tablet Take 0.5 tablets (0.5 mg total) by mouth daily before breakfast, Starting Thu 1/25/2024, Normal      lenalidomide (REVLIMID) 5 MG CAPS Take 1 capsule (5 mg total) by mouth daily, Starting Mon 3/25/2024, Normal      morphine (MS CONTIN) 30 mg 12 hr tablet Take 1 tablet (30 mg total) by mouth every 12 (twelve) hours Take on a schedule to prevent and reduce cancer pain Max Daily Amount: 60 mg, Starting Tue 2/27/2024, Normal      ondansetron (ZOFRAN) 4 mg tablet Take 1 tablet (4 mg total) by mouth every 8 (eight) hours as needed for nausea or vomiting, Starting Tue  3/5/2024, Normal      oxyCODONE (ROXICODONE) 10 MG TABS Take 1 tablet (10 mg total) by mouth every 4 (four) hours as needed (cancer pain) Max Daily Amount: 60 mg, Starting Tue 2/27/2024, Normal      pantoprazole (PROTONIX) 40 mg tablet Take 1 tablet (40 mg total) by mouth daily - in the morning, Starting Thu 1/25/2024, Normal      acetaminophen (TYLENOL) 500 mg tablet Take 2 tablets (1,000 mg total) by mouth 3 (three) times a day, Starting Wed 3/8/2023, Normal      acyclovir (ZOVIRAX) 400 MG tablet Take 1 tablet (400 mg total) by mouth 2 (two) times a day, Starting Mon 3/27/2023, Until Thu 6/22/2023, Normal      Calcium Carb-Cholecalciferol (calcium carbonate-vitamin D) 500 mg-5 mcg tablet TAKE 1 TABLET BY MOUTH 2 TIMES DAILY (WITH MEALS)  DAYS., Historical Med      naloxone (NARCAN) 4 mg/0.1 mL nasal spray Administer 1 spray into a nostril. If no response after 2-3 minutes, give another dose in the other nostril using a new spray., Normal      potassium chloride (K-DUR,KLOR-CON) 20 mEq tablet Take by mouth, Historical Med             No discharge procedures on file.    PDMP Review         Value Time User    PDMP Reviewed  Yes 2/27/2024  2:34 PM Can Reardon MD            ED Provider  Electronically Signed by             Yen Alcocer MD  03/30/24 8917

## 2024-03-30 NOTE — ED NOTES
Patient received 2nd set of Polio, recombinant zoster, haemophilus influenzae type b, DTaP, and pneumococcal conjugate vaccine on March 11th.      Enrique Stein RN  03/30/24 1582

## 2024-03-30 NOTE — LETTER
[facility] FLO[facility] MED SURG UN[facility]  [address]  [place] [id number]  Dept: [phone]    March 31, 2024     Patient: Fredy Martinez Jr.   YOB: 1965   Date of Visit: 3/30/2024       To Whom it May Concern:    Fredy Martinez is under my professional care. He was seen in the hospital from 3/30/2024 to 03/31/24. He may return to work on April 1st  without limitations.    If you have any questions or concerns, please don't hesitate to call.         Sincerely,          Skye Rubio MD

## 2024-03-30 NOTE — PLAN OF CARE
Problem: PAIN - ADULT  Goal: Verbalizes/displays adequate comfort level or baseline comfort level  Description: Interventions:  - Encourage patient to monitor pain and request assistance  - Assess pain using appropriate pain scale  - Administer analgesics based on type and severity of pain and evaluate response  - Implement non-pharmacological measures as appropriate and evaluate response  - Consider cultural and social influences on pain and pain management  - Notify physician/advanced practitioner if interventions unsuccessful or patient reports new pain  Outcome: Progressing     Problem: INFECTION - ADULT  Goal: Absence or prevention of progression during hospitalization  Description: INTERVENTIONS:  - Assess and monitor for signs and symptoms of infection  - Monitor lab/diagnostic results  - Monitor all insertion sites, i.e. indwelling lines, tubes, and drains  - Monitor endotracheal if appropriate and nasal secretions for changes in amount and color  - [place] appropriate cooling/warming therapies per order  - Administer medications as ordered  - Instruct and encourage patient and family to use good hand hygiene technique  - Identify and instruct in appropriate isolation precautions for identified infection/condition  Outcome: Progressing  Goal: Absence of fever/infection during neutropenic period  Description: INTERVENTIONS:  - Monitor WBC    Outcome: Progressing     Problem: SAFETY ADULT  Goal: Patient will remain free of falls  Description: INTERVENTIONS:  - Educate patient/family on patient safety including physical limitations  - Instruct patient to call for assistance with activity   - Consult OT/PT to assist with strengthening/mobility   - Keep Call bell within reach  - Keep bed low and locked with side rails adjusted as appropriate  - Keep care items and personal belongings within reach  - Initiate and maintain comfort rounds  - Make Fall Risk Sign visible to staff  - Offer Toileting ever Hours, in  advance of need  - Initiate/Maintain alarm  - Obtain necessary fall risk management equipment:   - Apply yellow socks and bracelet for high fall risk patients  - Consider moving patient to room near nurses station  Outcome: Progressing  Goal: Maintain or return to baseline ADL function  Description: INTERVENTIONS:  -  Assess patient's ability to carry out ADLs; assess patient's baseline for ADL function and identify physical deficits which impact ability to perform ADLs (bathing, care of mouth/teeth, toileting, grooming, dressing, etc.)  - Assess/evaluate cause of self-care deficits   - Assess range of motion  - Assess patient's mobility; develop plan if impaired  - Assess patient's need for assistive devices and provide as appropriate  - Encourage maximum independence but intervene and supervise when necessary  - Involve family in performance of ADLs  - Assess for home care needs following discharge   - Consider OT consult to assist with ADL evaluation and planning for discharge  - Provide patient education as appropriate  Outcome: Progressing  Goal: Maintains/Returns to pre admission functional level  Description: INTERVENTIONS:  - Perform AM-PAC 6 Click Basic Mobility/ Daily Activity assessment daily.  - Set and communicate daily mobility goal to care team and patient/family/caregiver.   - Collaborate with rehabilitation services on mobility goals if consulted  - Perform Range of Motion  times a day.  - Reposition patient every  hours.  - Dangle patient  times a day  - Stand patient  times a day  - Ambulate patient  times a day  - Out of bed to chair  times a day   - Out of bed for meals  times a day  - Out of bed for toileting  - Record patient progress and toleration of activity level   Outcome: Progressing     Problem: CARDIOVASCULAR - ADULT  Goal: Maintains optimal cardiac output and hemodynamic stability  Description: INTERVENTIONS:  - Monitor I/O, vital signs and rhythm  - Monitor for S/S and trends of  decreased cardiac output  - Administer and titrate ordered vasoactive medications to optimize hemodynamic stability  - Assess quality of pulses, skin color and temperature  - Assess for signs of decreased coronary artery perfusion  - Instruct patient to report change in severity of symptoms  Outcome: Progressing  Goal: Absence of cardiac dysrhythmias or at baseline rhythm  Description: INTERVENTIONS:  - Continuous cardiac monitoring, vital signs, obtain 12 lead EKG if ordered  - Administer antiarrhythmic and heart rate control medications as ordered  - Monitor electrolytes and administer replacement therapy as ordered  Outcome: Progressing     Problem: SKIN/TISSUE INTEGRITY - ADULT  Goal: Skin Integrity remains intact(Skin Breakdown Prevention)  Description: Assess:  -Perform Enrique assessment every   -Clean and moisturize skin every   -Inspect skin when repositioning, toileting, and assisting with ADLS  -Assess under medical devices such as  every   -Assess extremities for adequate circulation and sensation     Bed Management:  -Have minimal linens on bed & keep smooth, unwrinkled  -Change linens as needed when moist or perspiring  -Avoid sitting or lying in one position for more than  hours while in bed  -Keep HOB at degrees     Toileting:  -Offer bedside commode  -Assess for incontinence every   -Use incontinent care products after each incontinent episode such as     Activity:  -Mobilize patient  times a day  -Encourage activity and walks on unit  -Encourage or provide ROM exercises   -Turn and reposition patient every  Hours  -Use appropriate equipment to lift or move patient in bed  -Instruct/ Assist with weight shifting every  when out of bed in chair  -Consider limitation of chair time  hour intervals    Skin Care:  -Avoid use of baby powder, tape, friction and shearing, hot water or constrictive clothing  -Relieve pressure over bony prominences using   -Do not massage red bony areas    Next Steps:  -Teach  patient strategies to minimize risks such as    -Consider consults to  interdisciplinary teams such as   Outcome: Progressing  Goal: Incision(s), wounds(s) or drain site(s) healing without S/S of infection  Description: INTERVENTIONS  - Assess and document dressing, incision, wound bed, drain sites and surrounding tissue  - Provide patient and family education  - Perform skin care/dressing changes every   Outcome: Progressing  Goal: Pressure injury heals and does not worsen  Description: Interventions:  - Implement low air loss mattress or specialty surface (Criteria met)  - Apply silicone foam dressing  - Instruct/assist with weight shifting every  minutes when in chair   - Limit chair time to  hour intervals  - Use special pressure reducing interventions such as  when in chair   - Apply fecal or urinary incontinence containment device   - Perform passive or active ROM every   - Turn and reposition patient & offload bony prominences every  hours   - Utilize friction reducing device or surface for transfers   - Consider consults to  interdisciplinary teams such as   - Use incontinent care products after each incontinent episode such as   - Consider nutrition services referral as needed  Outcome: Progressing     Problem: MUSCULOSKELETAL - ADULT  Goal: Maintain or return mobility to safest level of function  Description: INTERVENTIONS:  - Assess patient's ability to carry out ADLs; assess patient's baseline for ADL function and identify physical deficits which impact ability to perform ADLs (bathing, care of mouth/teeth, toileting, grooming, dressing, etc.)  - Assess/evaluate cause of self-care deficits   - Assess range of motion  - Assess patient's mobility  - Assess patient's need for assistive devices and provide as appropriate  - Encourage maximum independence but intervene and supervise when necessary  - Involve family in performance of ADLs  - Assess for home care needs following discharge   - Consider OT  consult to assist with ADL evaluation and planning for discharge  - Provide patient education as appropriate  Outcome: Progressing  Goal: Maintain proper alignment of affected body part  Description: INTERVENTIONS:  - Support, maintain and protect limb and body alignment  - Provide patient/ family with appropriate education  Outcome: Progressing

## 2024-03-30 NOTE — H&P
[facility]  H&P  Name: Fredy Martinez Jr. 58 y.o. male I MRN: [id number]  Unit/Bed#: S -01 I Date of Admission: 3/30/2024   Date of Service: 3/30/2024 I Hospital Day: 0      Assessment/Plan   * Closed fracture of neck of left femur (HCC)  Assessment & Plan  Complaining of L leg pain for 10 days  No trauma  Hx of MM not in remission    Plan-   Appreciate Orthopedic Surgery recommendations   Will keep pt NPO  On Heparin gtt for PE  Pain regimen in place as well as home regimen   Appreciate Acute Pain Service recommendations     Acute pulmonary embolism without acute cor pulmonale (HCC)  Assessment & Plan  Incidental findings of PE on CTA PE study at [facility]   D-Dimer 4.25  Troponin 53, 49, 50  EKG demonstrated sinus tach in the 180s however possible artifact in the setting of severe pain  CTA PE study-subocclusive emboli in the lower arteries in the right middle and right lower lobes which are peripheral, no infarct or right heart strain, multiple lytic lesions  [facility] started patient on heparin drip    Plan-  Continue heparin drip  Echo-pending  BNP-pending    Sinus tachycardia  Assessment & Plan  EKG from [facility] demonstrated sinus tachycardia in the 180s  Was given Lopressor 5 Mg IV  Patient is in excruciating pain likely artifact vs acute PE changes     Plan-  Will continue on telemetry  Will add Lopressor 5mg IV PRN    Multiple myeloma (HCC)  Assessment & Plan  On Revlimid and RVD infusion  RVD infusion includes bortezomib, lenalidomide, dexamethasone; Completed Cycle length = 21 days x 3-4 cycles  S/p ASCT  Also follows Palliative Care as OP     Plan-   Appreciate Hematology/Oncology recommendations   Will continue Revlimid as non formulary medication, significant other weill bring in tomorrow           VTE Pharmacologic Prophylaxis: VTE Score: 3 Moderate Risk (Score 3-4) - Pharmacological DVT Prophylaxis Ordered: heparin drip.  Code Status: Level 1 - Full Code   Discussion with family:  Updated  (significant other) at bedside.    Anticipated Length of Stay: Patient will be admitted on an inpatient basis with an anticipated length of stay of greater than 2 midnights secondary to PE and Femur Fx .    Chief Complaint: L Leg Pain     History of Present Illness:  Fredy Martinez Jr. is a 58 y.o. male with a PMH of multiple myeloma on Revlimid and RVD therapy s/p stem cell transplant who presents with progressively worsening leg pain particularly located in the left hip onset 10 days prior to arrival.  Patient reports that he was laying in bed and was pulling the covers over him and thought he had torn his hamstring.  Since then he has been having progressively worsening pain but has been ambulating on the leg.  Patient presented to [facility] for worsening leg pain was found to have sinus tachycardia in the 180s, D-dimer is 4.25, mildly elevated tropes in the 50s, CTA PE study demonstrated subocclusive emboli in the lobar arteries no infarct or right heart strain however did note some multiple lytic lesions.  Hip and femur x-rays demonstrate left femur fracture.  Patient was transferred from [facility] for orthopedic intervention.    RA vitals-afebrile, heart rate in the 110s, 138/93, 99% on room air  CBC unremarkable  BMP remarkable for hyponatremia 131, hypokalemia 3.4, anion gap of 16 likely to electrolyte imbalances  Troponin 53, 49, 50  D-dimer 4.25  EKG sinus tachycardia 180s  CTA PE study subocclusive emboli in the lobar arteries in the right middle and lower lobes, peripheral, no infarct or right heart strain, multiple lytic lesions  Femur and hip x-ray demonstrate left femur fracture      Review of Systems:  Review of Systems   Constitutional:  Negative for chills, fatigue and fever.   HENT:  Negative for congestion, ear pain and sore throat.    Eyes:  Negative for pain and visual disturbance.   Respiratory:  Negative for cough, chest tightness, shortness of  breath and wheezing.    Cardiovascular:  Negative for chest pain and palpitations.   Gastrointestinal:  Negative for abdominal distention, abdominal pain, constipation, diarrhea, nausea and vomiting.   Genitourinary:  Negative for difficulty urinating, dysuria and hematuria.   Musculoskeletal:  Negative for arthralgias and back pain.        Left leg pain   Skin:  Negative for color change and rash.   Neurological:  Negative for seizures, syncope and light-headedness.   Psychiatric/Behavioral:  Negative for agitation and confusion.    All other systems reviewed and are negative.      Past Medical and Surgical History:   Past Medical History:   Diagnosis Date    Cancer (HCC)     bone-    GERD (gastroesophageal reflux disease)     Multiple myeloma (HCC)        Past Surgical History:   Procedure Laterality Date    APPENDECTOMY      IR BIOPSY BONE MARROW  3/15/2023       Meds/Allergies:  Prior to Admission medications    Medication Sig Start Date End Date Taking? Authorizing Provider   acetaminophen (TYLENOL) 500 mg tablet Take 2 tablets (1,000 mg total) by mouth 3 (three) times a day 3/8/23  Yes Can Reardon MD   cyclobenzaprine (FLEXERIL) 5 mg tablet Take 5 mg by mouth 3 (three) times a day as needed for muscle spasms   Yes Historical Provider, MD   dexamethasone (DECADRON) 1 mg tablet Take 0.5 tablets (0.5 mg total) by mouth daily before breakfast 1/25/24  Yes Can Reardon MD   lenalidomide (REVLIMID) 5 MG CAPS Take 1 capsule (5 mg total) by mouth daily 3/25/24  Yes Davis Garcia MD   morphine (MS CONTIN) 30 mg 12 hr tablet Take 1 tablet (30 mg total) by mouth every 12 (twelve) hours Take on a schedule to prevent and reduce cancer pain Max Daily Amount: 60 mg 2/27/24  Yes Can Reardon MD   naloxone (NARCAN) 4 mg/0.1 mL nasal spray Administer 1 spray into a nostril. If no response after 2-3 minutes, give another dose in the other nostril using a new spray. 4/18/23  Yes Can Reardon MD   ondansetron  (ZOFRAN) 4 mg tablet Take 1 tablet (4 mg total) by mouth every 8 (eight) hours as needed for nausea or vomiting 3/5/24  Yes Can Reardon MD   oxyCODONE (ROXICODONE) 10 MG TABS Take 1 tablet (10 mg total) by mouth every 4 (four) hours as needed (cancer pain) Max Daily Amount: 60 mg 2/27/24  Yes Can Reardon MD   pantoprazole (PROTONIX) 40 mg tablet Take 1 tablet (40 mg total) by mouth daily - in the morning 1/25/24  Yes Can Reardon MD   acyclovir (ZOVIRAX) 400 MG tablet Take 1 tablet (400 mg total) by mouth 2 (two) times a day  Patient not taking: Reported on 8/29/2023 3/27/23 3/30/24  Davis Garcia MD   Calcium Carb-Cholecalciferol (calcium carbonate-vitamin D) 500 mg-5 mcg tablet TAKE 1 TABLET BY MOUTH 2 TIMES DAILY (WITH MEALS)  DAYS.  Patient not taking: Reported on 2/22/2024 8/25/23 3/30/24  Historical Provider, MD   cyclobenzaprine (FLEXERIL) 5 mg tablet TAKE 1 TABLET BY MOUTH THREE TIMES A DAY AS NEEDED FOR MUSCLE SPASM 3/15/24 3/30/24  Can Reardon MD   potassium chloride (K-DUR,KLOR-CON) 20 mEq tablet Take by mouth  Patient not taking: Reported on 1/25/2024  3/30/24  Historical Provider, MD     I have reviewed home medications with patient family member.    Allergies: No Known Allergies    Social History:  Marital Status: /Civil Union   Occupation: retired  Patient Pre-hospital Living Situation: Home  Patient Pre-hospital Level of Mobility: walks with cane  Patient Pre-hospital Diet Restrictions: none  Substance Use History:   Social History     Substance and Sexual Activity   Alcohol Use Not Currently     Social History     Tobacco Use   Smoking Status Some Days    Types: Cigarettes   Smokeless Tobacco Not on file   Tobacco Comments    Smoking 1 cigarette daily     Social History     Substance and Sexual Activity   Drug Use Not Currently    Types: Marijuana    Comment: once a month       Family History:  Family History   Problem Relation Age of Onset    Diabetes Mother      Heart disease Father     Diabetes Father        Physical Exam:     Vitals:   Blood Pressure: 138/93 (03/30/24 1644)  Pulse: (!) 107 (03/30/24 1644)  Temperature: (!) 97.4 °F (36.3 °C) (03/30/24 1644)  Respirations: 20 (03/30/24 1641)  SpO2: 98 % (03/30/24 1644)    Physical Exam  Vitals and nursing note reviewed.   Constitutional:       General: He is not in acute distress.     Appearance: He is well-developed. He is not toxic-appearing or diaphoretic.      Comments: Cachectic   HENT:      Head: Normocephalic and atraumatic.      Mouth/Throat:      Mouth: Mucous membranes are moist.      Dentition: Abnormal dentition.      Pharynx: Oropharynx is clear.   Eyes:      Extraocular Movements: Extraocular movements intact.      Conjunctiva/sclera: Conjunctivae normal.      Pupils: Pupils are equal, round, and reactive to light.   Cardiovascular:      Rate and Rhythm: Regular rhythm. Tachycardia present.      Pulses: Normal pulses.      Heart sounds: Normal heart sounds. No murmur heard.  Pulmonary:      Effort: Pulmonary effort is normal. No respiratory distress.      Breath sounds: Normal breath sounds. No rhonchi.   Chest:      Chest wall: No tenderness.   Abdominal:      General: Bowel sounds are normal. There is no distension.      Palpations: Abdomen is soft.      Tenderness: There is no abdominal tenderness. There is no guarding.   Musculoskeletal:         General: Tenderness and deformity present. Normal range of motion.      Cervical back: Normal range of motion and neck supple.      Left lower leg: Edema present.      Comments: Left leg fixed internally rotated neurovascularly intact pulses intact, bilateral lower extremity venous stasis changes   Skin:     General: Skin is warm and dry.      Capillary Refill: Capillary refill takes less than 2 seconds.   Neurological:      General: No focal deficit present.      Mental Status: He is alert and oriented to person, place, and time.      Cranial Nerves: No cranial  nerve deficit.      Sensory: No sensory deficit.      Motor: No weakness.   Psychiatric:         Mood and Affect: Mood normal.         Behavior: Behavior normal.         Thought Content: Thought content normal.          Additional Data:     Lab Results:  Results from last 7 days   Lab Units 03/30/24  0907   WBC Thousand/uL 7.83   HEMOGLOBIN g/dL 16.9   HEMATOCRIT % 48.9   PLATELETS Thousands/uL 187   NEUTROS PCT % 79*   LYMPHS PCT % 5*   MONOS PCT % 13*   EOS PCT % 1     Results from last 7 days   Lab Units 03/30/24  0907   SODIUM mmol/L 131*   POTASSIUM mmol/L 3.4*   CHLORIDE mmol/L 92*   CO2 mmol/L 23   BUN mg/dL 16   CREATININE mg/dL 0.63   ANION GAP mmol/L 16*   CALCIUM mg/dL 9.6   ALBUMIN g/dL 3.6   TOTAL BILIRUBIN mg/dL 0.75   ALK PHOS U/L 114*   ALT U/L 6*   AST U/L 12*   GLUCOSE RANDOM mg/dL 121     Results from last 7 days   Lab Units 03/30/24  0954   INR  1.14                   Lines/Drains:  Invasive Devices       Peripheral Intravenous Line  Duration             Peripheral IV 03/30/24 Distal;Left;Upper;Ventral (anterior) Arm <1 day    Peripheral IV 03/30/24 Right Antecubital <1 day                        Imaging: Reviewed radiology reports from this admission including: chest CT scan and xray(s)  No orders to display       EKG and Other Studies Reviewed on Admission:   EKG: Sinus Tachycardia. .    ** Please Note: This note has been constructed using a voice recognition system. **

## 2024-03-30 NOTE — ASSESSMENT & PLAN NOTE
Incidental findings of PE on CTA PE study at [facility]   D-Dimer 4.25  Troponin 53, 49, 50  EKG demonstrated sinus tach in the 180s however possible artifact in the setting of severe pain  CTA PE study-subocclusive emboli in the lower arteries in the right middle and right lower lobes which are peripheral, no infarct or right heart strain, multiple lytic lesions  [facility] started patient on heparin drip    Plan-  Continue heparin drip  Echo-pending  BNP-pending

## 2024-03-30 NOTE — ASSESSMENT & PLAN NOTE
EKG from [facility] demonstrated sinus tachycardia in the 180s  Was given Lopressor 5 Mg IV  Patient is in excruciating pain likely artifact vs acute PE changes     Plan-  Will continue on telemetry  Will add Lopressor 5mg IV PRN

## 2024-03-30 NOTE — ASSESSMENT & PLAN NOTE
Complaining of L leg pain for 10 days  No trauma  Hx of MM not in remission    Plan-   Appreciate Orthopedic Surgery recommendations   Will keep pt NPO  On Heparin gtt for PE  Pain regimen in place as well as home regimen   Appreciate Acute Pain Service recommendations

## 2024-03-30 NOTE — ASSESSMENT & PLAN NOTE
On Revlimid and RVD infusion  RVD infusion includes bortezomib, lenalidomide, dexamethasone; Completed Cycle length = 21 days x 3-4 cycles  S/p ASCT  Also follows Palliative Care as OP     Plan-   Appreciate Hematology/Oncology recommendations   Will continue Revlimid as non formulary medication, significant other weill bring in tomorrow

## 2024-03-30 NOTE — EMTALA/ACUTE CARE TRANSFER
[facility] EMERGENCY DEPARTMENT  [address] [place] [id number]  Dept: [phone]      EMTALA TRANSFER CONSENT    NAME Fredy Martinez Jr.                                         1965                              MRN [id number]    I have been informed of my rights regarding examination, treatment, and transfer   by Dr. Yen Alcocer MD    Benefits: Specialized equipment and/or services available at the receiving facility (Include comment)________________________ (Ortho)    Risks: Potential for delay in receiving treatment, Potential deterioration of medical condition, Loss of IV, Increased discomfort during transfer, Possible worsening of condition or death during transfer      Consent for Transfer:  I acknowledge that my medical condition has been evaluated and explained to me by the emergency department physician or other qualified medical person and/or my attending physician, who has recommended that I be transferred to the service of  Accepting Physician: Lilibeth at Accepting Facility Name, City & State : [facility]. The above potential benefits of such transfer, the potential risks associated with such transfer, and the probable risks of not being transferred have been explained to me, and I fully understand them.  The doctor has explained that, in my case, the benefits of transfer outweigh the risks.  I agree to be transferred.    I authorize the performance of emergency medical procedures and treatments upon me in both transit and upon arrival at the receiving facility.  Additionally, I authorize the release of any and all medical records to the receiving facility and request they be transported with me, if possible.  I understand that the safest mode of transportation during a medical emergency is an ambulance and that the Hospital advocates the use of this mode of transport. Risks of traveling to the receiving facility by car, including absence of medical control, life  sustaining equipment, such as oxygen, and medical personnel has been explained to me and I fully understand them.    (SHELLEY CORRECT BOX BELOW)  [  ]  I consent to the stated transfer and to be transported by ambulance/helicopter.  [  ]  I consent to the stated transfer, but refuse transportation by ambulance and accept full responsibility for my transportation by car.  I understand the risks of non-ambulance transfers and I exonerate the Hospital and its staff from any deterioration in my condition that results from this refusal.    X___________________________________________    DATE  24  TIME________  Signature of patient or legally responsible individual signing on patient behalf           RELATIONSHIP TO PATIENT_________________________          Provider Certification    NAME Fredy Martinez                                          1965                              MRN [id number]    A medical screening exam was performed on the above named patient.  Based on the examination:    Condition Necessitating Transfer The primary encounter diagnosis was Pulmonary embolism (HCC). Diagnoses of Left displaced femoral neck fracture (HCC) and Hyponatremia were also pertinent to this visit.    Patient Condition: The patient has been stabilized such that within reasonable medical probability, no material deterioration of the patient condition or the condition of the unborn child(hunter) is likely to result from the transfer    Reason for Transfer: Level of Care needed not available at this facility    Transfer Requirements: Facility [facility]   Space available and qualified personnel available for treatment as acknowledged by    Agreed to accept transfer and to provide appropriate medical treatment as acknowledged by       Lilibeth  Appropriate medical records of the examination and treatment of the patient are provided at the time of transfer   STAFF INITIAL WHEN COMPLETED _______  Transfer will be performed by  qualified personnel from    and appropriate transfer equipment as required, including the use of necessary and appropriate life support measures.    Provider Certification: I have examined the patient and explained the following risks and benefits of being transferred/refusing transfer to the patient/family:  General risk, such as traffic hazards, adverse weather conditions, rough terrain or turbulence, possible failure of equipment (including vehicle or aircraft), or consequences of actions of persons outside the control of the transport personnel, Unanticipated needs of medical equipment and personnel during transport, Risk of worsening condition, The possibility of a transport vehicle being unavailable      Based on these reasonable risks and benefits to the patient and/or the unborn child(hunter), and based upon the information available at the time of the patient’s examination, I certify that the medical benefits reasonably to be expected from the provision of appropriate medical treatments at another medical facility outweigh the increasing risks, if any, to the individual’s medical condition, and in the case of labor to the unborn child, from effecting the transfer.    X____________________________________________ DATE 03/30/24        TIME_______      ORIGINAL - SEND TO MEDICAL RECORDS   COPY - SEND WITH PATIENT DURING TRANSFER

## 2024-03-30 NOTE — QUICK NOTE
Orthopedics   Fredy STEPHEN Juan [place] 58 y.o. male MRN: [id number]  Unit/Bed#: S -01      Diagnosis: Left proximal femur fracture    Procedure: Left hip IM nail    Labs:  0   Lab Value Date/Time    HCT 48.9 03/30/2024 0907    HCT 49.3 02/21/2024 1256    HCT 50.8 (H) 12/05/2023 1310    HGB 16.9 03/30/2024 0907    HGB 16.3 02/21/2024 1256    HGB 17.5 (H) 12/05/2023 1310    INR 1.14 03/30/2024 0954    WBC 7.83 03/30/2024 0907    WBC 4.59 02/21/2024 1256    WBC 8.00 12/05/2023 1310       Meds:    Current Facility-Administered Medications:     acetaminophen (TYLENOL) tablet 975 mg, 975 mg, Oral, Q8H RUTH, Shirley Moon MD, 975 mg at 03/30/24 1721    cyclobenzaprine (FLEXERIL) tablet 5 mg, 5 mg, Oral, TID PRN, Shirley Moon MD    [START ON 3/31/2024] dexamethasone (DECADRON) tablet 0.5 mg, 0.5 mg, Oral, Daily Before Breakfast, Shirley Moon MD    heparin (porcine) 25,000 units in 0.45% NaCl 250 mL infusion (premix), 3-30 Units/kg/hr (Order-Specific), Intravenous, Titrated, Shirley Moon MD    heparin (porcine) injection 2,400 Units, 2,400 Units, Intravenous, Q6H PRN, Shirley Moon MD    heparin (porcine) injection 4,800 Units, 4,800 Units, Intravenous, Q6H PRN, Shirley Moon MD    HYDROmorphone HCl (DILAUDID) injection 0.2 mg, 0.2 mg, Intravenous, Q2H PRN, Shirley Moon MD    metoprolol (LOPRESSOR) injection 5 mg, 5 mg, Intravenous, Q6H PRN, Shirley Moon MD    morphine (MS CONTIN) ER tablet 30 mg, 30 mg, Oral, Q12H, Shirley Moon MD    naloxone (NARCAN) 0.04 mg/mL syringe 0.04 mg, 0.04 mg, Intravenous, Q1MIN PRN, Shirley Moon MD    [START ON 3/31/2024] NON FORMULARY, 5 mg, Oral, Daily, Shirley Moon MD    ondansetron (ZOFRAN) injection 4 mg, 4 mg, Intravenous, Q6H PRN, Shirley Moon MD    oxyCODONE (ROXICODONE) immediate release tablet 10 mg, 10 mg, Oral, Q4H PRN, Shirley Moon MD    oxyCODONE (ROXICODONE) split tablet 2.5 mg, 2.5 mg, Oral, Q4H PRN **OR** oxyCODONE (ROXICODONE) IR tablet 5 mg, 5 mg, Oral, Q4H PRN, Shirley  "MD Red    [START ON 3/31/2024] pantoprazole (PROTONIX) EC tablet 40 mg, 40 mg, Oral, Daily, Shirley Moon MD    [START ON 3/31/2024] polyethylene glycol (MIRALAX) packet 17 g, 17 g, Oral, Daily, Shirley Moon MD    potassium chloride (Klor-Con M20) CR tablet 40 mEq, 40 mEq, Oral, Once, Shirley Moon MD    [START ON 3/31/2024] senna (SENOKOT) tablet 8.6 mg, 1 tablet, Oral, Daily, Shirley Moon MD    Blood Culture:   No results found for: \"BLOODCX\"    Wound Culture:   No results found for: \"WOUNDCULT\"    Ins and Outs:  No intake/output data recorded.          CXR: ct chest on chart    EKG: on chart    Blood: 2 units type & screen    Abx: 2 grams Ancef on call for OR    NPO: at midnight    Anticoag: pt on heparin drip due to PE    Consent: needed    Clearance: needed       "

## 2024-03-31 ENCOUNTER — APPOINTMENT (INPATIENT)
Dept: NON INVASIVE DIAGNOSTICS | Facility: HOSPITAL | Age: 59
DRG: 308 | End: 2024-03-31
Payer: COMMERCIAL

## 2024-03-31 LAB
ANION GAP SERPL CALCULATED.3IONS-SCNC: 15 MMOL/L (ref 4–13)
APTT PPP: 63 SECONDS (ref 23–37)
BASE EX.OXY STD BLDV CALC-SCNC: 93.5 % (ref 60–80)
BASE EXCESS BLDV CALC-SCNC: 0.7 MMOL/L
BUN SERPL-MCNC: 13 MG/DL (ref 5–25)
CALCIUM SERPL-MCNC: 8.7 MG/DL (ref 8.4–10.2)
CHLORIDE SERPL-SCNC: 98 MMOL/L (ref 96–108)
CO2 SERPL-SCNC: 18 MMOL/L (ref 21–32)
CREAT SERPL-MCNC: 0.47 MG/DL (ref 0.6–1.3)
ERYTHROCYTE [DISTWIDTH] IN BLOOD BY AUTOMATED COUNT: 14.6 % (ref 11.6–15.1)
GFR SERPL CREATININE-BSD FRML MDRD: 123 ML/MIN/1.73SQ M
GLUCOSE SERPL-MCNC: 91 MG/DL (ref 65–140)
HCO3 BLDV-SCNC: 23.6 MMOL/L (ref 24–30)
HCT VFR BLD AUTO: 45.7 % (ref 36.5–49.3)
HGB BLD-MCNC: 15.5 G/DL (ref 12–17)
MAGNESIUM SERPL-MCNC: 2.2 MG/DL (ref 1.9–2.7)
MCH RBC QN AUTO: 31.3 PG (ref 26.8–34.3)
MCHC RBC AUTO-ENTMCNC: 33.9 G/DL (ref 31.4–37.4)
MCV RBC AUTO: 92 FL (ref 82–98)
O2 CT BLDV-SCNC: 19.8 ML/DL
PCO2 BLDV: 33 MM HG (ref 42–50)
PH BLDV: 7.47 [PH] (ref 7.3–7.4)
PLATELET # BLD AUTO: 159 THOUSANDS/UL (ref 149–390)
PMV BLD AUTO: 8.7 FL (ref 8.9–12.7)
PO2 BLDV: 70.6 MM HG (ref 35–45)
POTASSIUM SERPL-SCNC: 4.1 MMOL/L (ref 3.5–5.3)
RBC # BLD AUTO: 4.96 MILLION/UL (ref 3.88–5.62)
SODIUM SERPL-SCNC: 131 MMOL/L (ref 135–147)
WBC # BLD AUTO: 6.75 THOUSAND/UL (ref 4.31–10.16)

## 2024-03-31 PROCEDURE — 93306 TTE W/DOPPLER COMPLETE: CPT | Performed by: INTERNAL MEDICINE

## 2024-03-31 PROCEDURE — 99223 1ST HOSP IP/OBS HIGH 75: CPT | Performed by: INTERNAL MEDICINE

## 2024-03-31 PROCEDURE — NC001 PR NO CHARGE: Performed by: PHYSICIAN ASSISTANT

## 2024-03-31 PROCEDURE — 85730 THROMBOPLASTIN TIME PARTIAL: CPT

## 2024-03-31 PROCEDURE — 83735 ASSAY OF MAGNESIUM: CPT

## 2024-03-31 PROCEDURE — 99232 SBSQ HOSP IP/OBS MODERATE 35: CPT | Performed by: INTERNAL MEDICINE

## 2024-03-31 PROCEDURE — 85027 COMPLETE CBC AUTOMATED: CPT

## 2024-03-31 PROCEDURE — 93306 TTE W/DOPPLER COMPLETE: CPT

## 2024-03-31 PROCEDURE — 99222 1ST HOSP IP/OBS MODERATE 55: CPT | Performed by: ANESTHESIOLOGY

## 2024-03-31 PROCEDURE — 80048 BASIC METABOLIC PNL TOTAL CA: CPT

## 2024-03-31 PROCEDURE — 82805 BLOOD GASES W/O2 SATURATION: CPT | Performed by: INTERNAL MEDICINE

## 2024-03-31 RX ORDER — CEFAZOLIN SODIUM 2 G/50ML
2000 SOLUTION INTRAVENOUS
Status: ACTIVE | OUTPATIENT
Start: 2024-04-01 | End: 2024-04-03

## 2024-03-31 RX ORDER — CYCLOBENZAPRINE HCL 10 MG
5 TABLET ORAL 3 TIMES DAILY
Status: DISCONTINUED | OUTPATIENT
Start: 2024-03-31 | End: 2024-04-01

## 2024-03-31 RX ADMIN — HYDROMORPHONE HYDROCHLORIDE 0.5 MG: 1 INJECTION, SOLUTION INTRAMUSCULAR; INTRAVENOUS; SUBCUTANEOUS at 12:23

## 2024-03-31 RX ADMIN — CYCLOBENZAPRINE 5 MG: 10 TABLET, FILM COATED ORAL at 17:52

## 2024-03-31 RX ADMIN — POLYETHYLENE GLYCOL 3350 17 G: 17 POWDER, FOR SOLUTION ORAL at 09:07

## 2024-03-31 RX ADMIN — DEXAMETHASONE 0.5 MG: 0.5 TABLET ORAL at 06:08

## 2024-03-31 RX ADMIN — SENNOSIDES 8.6 MG: 8.6 TABLET, FILM COATED ORAL at 09:07

## 2024-03-31 RX ADMIN — HYDROMORPHONE HYDROCHLORIDE 0.5 MG: 1 INJECTION, SOLUTION INTRAMUSCULAR; INTRAVENOUS; SUBCUTANEOUS at 00:00

## 2024-03-31 RX ADMIN — MORPHINE SULFATE 30 MG: 30 TABLET, EXTENDED RELEASE ORAL at 06:08

## 2024-03-31 RX ADMIN — OXYCODONE HYDROCHLORIDE 5 MG: 5 TABLET ORAL at 09:14

## 2024-03-31 RX ADMIN — ACETAMINOPHEN 975 MG: 325 TABLET, FILM COATED ORAL at 22:23

## 2024-03-31 RX ADMIN — HEPARIN SODIUM 18 UNITS/KG/HR: 10000 INJECTION, SOLUTION INTRAVENOUS at 12:23

## 2024-03-31 RX ADMIN — ACETAMINOPHEN 975 MG: 325 TABLET, FILM COATED ORAL at 06:08

## 2024-03-31 RX ADMIN — OXYCODONE HYDROCHLORIDE 10 MG: 10 TABLET ORAL at 04:22

## 2024-03-31 RX ADMIN — PANTOPRAZOLE SODIUM 40 MG: 40 TABLET, DELAYED RELEASE ORAL at 09:07

## 2024-03-31 RX ADMIN — ACETAMINOPHEN 975 MG: 325 TABLET, FILM COATED ORAL at 13:40

## 2024-03-31 RX ADMIN — MORPHINE SULFATE 30 MG: 30 TABLET, EXTENDED RELEASE ORAL at 17:52

## 2024-03-31 NOTE — ASSESSMENT & PLAN NOTE
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  Eventual bridge to DOAC after surgery/when stable and not at risk to bleed.    Ortho: Plan is cephalomedullary nailing of left peritrochanteric path femur fracture   Will keep pt NPO from midnight.   Plan for surgery tomorrow.   On Heparin gtt for PE  Pain regimen in place as well as home regimen   Appreciate Acute Pain Service recommendations

## 2024-03-31 NOTE — PLAN OF CARE
Problem: PAIN - ADULT  Goal: Verbalizes/displays adequate comfort level or baseline comfort level  Description: Interventions:  - Encourage patient to monitor pain and request assistance  - Assess pain using appropriate pain scale  - Administer analgesics based on type and severity of pain and evaluate response  - Implement non-pharmacological measures as appropriate and evaluate response  - Consider cultural and social influences on pain and pain management  - Notify physician/advanced practitioner if interventions unsuccessful or patient reports new pain  Outcome: Progressing     Problem: INFECTION - ADULT  Goal: Absence or prevention of progression during hospitalization  Description: INTERVENTIONS:  - Assess and monitor for signs and symptoms of infection  - Monitor lab/diagnostic results  - Monitor all insertion sites, i.e. indwelling lines, tubes, and drains  - Monitor endotracheal if appropriate and nasal secretions for changes in amount and color  - Schwertner appropriate cooling/warming therapies per order  - Administer medications as ordered  - Instruct and encourage patient and family to use good hand hygiene technique  - Identify and instruct in appropriate isolation precautions for identified infection/condition  Outcome: Progressing  Goal: Absence of fever/infection during neutropenic period  Description: INTERVENTIONS:  - Monitor WBC    Outcome: Progressing     Problem: SAFETY ADULT  Goal: Patient will remain free of falls  Description: INTERVENTIONS:  - Educate patient/family on patient safety including physical limitations  - Instruct patient to call for assistance with activity   - Consult OT/PT to assist with strengthening/mobility   - Keep Call bell within reach  - Keep bed low and locked with side rails adjusted as appropriate  - Keep care items and personal belongings within reach  - Initiate and maintain comfort rounds  - Make Fall Risk Sign visible to staff  - Offer Toileting every  Hours,  in advance of need  - Initiate/Maintain alarm  - Obtain necessary fall risk management equipment:   - Apply yellow socks and bracelet for high fall risk patients  - Consider moving patient to room near nurses station  Outcome: Progressing  Goal: Maintain or return to baseline ADL function  Description: INTERVENTIONS:  -  Assess patient's ability to carry out ADLs; assess patient's baseline for ADL function and identify physical deficits which impact ability to perform ADLs (bathing, care of mouth/teeth, toileting, grooming, dressing, etc.)  - Assess/evaluate cause of self-care deficits   - Assess range of motion  - Assess patient's mobility; develop plan if impaired  - Assess patient's need for assistive devices and provide as appropriate  - Encourage maximum independence but intervene and supervise when necessary  - Involve family in performance of ADLs  - Assess for home care needs following discharge   - Consider OT consult to assist with ADL evaluation and planning for discharge  - Provide patient education as appropriate  Outcome: Progressing  Goal: Maintains/Returns to pre admission functional level  Description: INTERVENTIONS:  - Perform AM-PAC 6 Click Basic Mobility/ Daily Activity assessment daily.  - Set and communicate daily mobility goal to care team and patient/family/caregiver.   - Collaborate with rehabilitation services on mobility goals if consulted  - Perform Range of Motion  times a day.  - Reposition patient every  hours.  - Dangle patient  times a day  - Stand patient  times a day  - Ambulate patient  times a day  - Out of bed to chair  times a day   - Out of bed for meals  times a day  - Out of bed for toileting  - Record patient progress and toleration of activity level   Outcome: Progressing     Problem: CARDIOVASCULAR - ADULT  Goal: Maintains optimal cardiac output and hemodynamic stability  Description: INTERVENTIONS:  - Monitor I/O, vital signs and rhythm  - Monitor for S/S and trends of  decreased cardiac output  - Administer and titrate ordered vasoactive medications to optimize hemodynamic stability  - Assess quality of pulses, skin color and temperature  - Assess for signs of decreased coronary artery perfusion  - Instruct patient to report change in severity of symptoms  Outcome: Progressing  Goal: Absence of cardiac dysrhythmias or at baseline rhythm  Description: INTERVENTIONS:  - Continuous cardiac monitoring, vital signs, obtain 12 lead EKG if ordered  - Administer antiarrhythmic and heart rate control medications as ordered  - Monitor electrolytes and administer replacement therapy as ordered  Outcome: Progressing     Problem: SKIN/TISSUE INTEGRITY - ADULT  Goal: Skin Integrity remains intact(Skin Breakdown Prevention)  Description: Assess:  -Perform Enrique assessment every   -Clean and moisturize skin every   -Inspect skin when repositioning, toileting, and assisting with ADLS  -Assess under medical devices such as  every   -Assess extremities for adequate circulation and sensation     Bed Management:  -Have minimal linens on bed & keep smooth, unwrinkled  -Change linens as needed when moist or perspiring  -Avoid sitting or lying in one position for more than  hours while in bed  -Keep HOB at degrees     Toileting:  -Offer bedside commode  -Assess for incontinence every   -Use incontinent care products after each incontinent episode such as     Activity:  -Mobilize patient  times a day  -Encourage activity and walks on unit  -Encourage or provide ROM exercises   -Turn and reposition patient every  Hours  -Use appropriate equipment to lift or move patient in bed  -Instruct/ Assist with weight shifting every when out of bed in chair  -Consider limitation of chair time  hour intervals    Skin Care:  -Avoid use of baby powder, tape, friction and shearing, hot water or constrictive clothing  -Relieve pressure over bony prominences using   -Do not massage red bony areas    Next Steps:  -Teach  patient strategies to minimize risks such as    -Consider consults to  interdisciplinary teams such as   Outcome: Progressing  Goal: Incision(s), wounds(s) or drain site(s) healing without S/S of infection  Description: INTERVENTIONS  - Assess and document dressing, incision, wound bed, drain sites and surrounding tissue  - Provide patient and family education  - Perform skin care/dressing changes every   Outcome: Progressing  Goal: Pressure injury heals and does not worsen  Description: Interventions:  - Implement low air loss mattress or specialty surface (Criteria met)  - Apply silicone foam dressing  - Instruct/assist with weight shifting every  minutes when in chair   - Limit chair time to  hour intervals  - Use special pressure reducing interventions such as  when in chair   - Apply fecal or urinary incontinence containment device   - Perform passive or active ROM every   - Turn and reposition patient & offload bony prominences every  hours   - Utilize friction reducing device or surface for transfers   - Consider consults to  interdisciplinary teams such as  - Use incontinent care products after each incontinent episode such as   - Consider nutrition services referral as needed  Outcome: Progressing     Problem: MUSCULOSKELETAL - ADULT  Goal: Maintain or return mobility to safest level of function  Description: INTERVENTIONS:  - Assess patient's ability to carry out ADLs; assess patient's baseline for ADL function and identify physical deficits which impact ability to perform ADLs (bathing, care of mouth/teeth, toileting, grooming, dressing, etc.)  - Assess/evaluate cause of self-care deficits   - Assess range of motion  - Assess patient's mobility  - Assess patient's need for assistive devices and provide as appropriate  - Encourage maximum independence but intervene and supervise when necessary  - Involve family in performance of ADLs  - Assess for home care needs following discharge   - Consider OT consult  to assist with ADL evaluation and planning for discharge  - Provide patient education as appropriate  Outcome: Progressing  Goal: Maintain proper alignment of affected body part  Description: INTERVENTIONS:  - Support, maintain and protect limb and body alignment  - Provide patient/ family with appropriate education  Outcome: Progressing     Problem: Potential for Falls  Goal: Patient will remain free of falls  Description: INTERVENTIONS:  - Educate patient/family on patient safety including physical limitations  - Instruct patient to call for assistance with activity   - Consult OT/PT to assist with strengthening/mobility   - Keep Call bell within reach  - Keep bed low and locked with side rails adjusted as appropriate  - Keep care items and personal belongings within reach  - Initiate and maintain comfort rounds  - Make Fall Risk Sign visible to staff  - Offer Toileting ever Hours, in advance of need  - Initiate/Maintain alarm  - Obtain necessary fall risk management equipment:   - Apply yellow socks and bracelet for high fall risk patients  - Consider moving patient to room near nurses station  Outcome: Progressing     Problem: Prexisting or High Potential for Compromised Skin Integrity  Goal: Skin integrity is maintained or improved  Description: INTERVENTIONS:  - Identify patients at risk for skin breakdown  - Assess and monitor skin integrity  - Assess and monitor nutrition and hydration status  - Monitor labs   - Assess for incontinence   - Turn and reposition patient  - Assist with mobility/ambulation  - Relieve pressure over bony prominences  - Avoid friction and shearing  - Provide appropriate hygiene as needed including keeping skin clean and dry  - Evaluate need for skin moisturizer/barrier cream  - Collaborate with interdisciplinary team   - Patient/family teaching  - Consider wound care consult   Outcome: Progressing

## 2024-03-31 NOTE — CONSULTS
Oncology Consult Note  Fredy Martinez Jr. 58 y.o. male MRN: [id number]  Unit/Bed#: S -01 Encounter: [id number]        Assessment and Plan:    58 year old with lambda restricted myeloma  known T6/10 lytic lesions on presentation /PET with multiple lytic lesions s/p induction with VrD, s/p auto BMT at [facility] Aug 2023 now on maintenance revlimid/dexamethasone admitted for left hip pain     In ER: left lateral hip:    X-rays AP/Lateral of the hip and lateral of the femur shows mottled appearance of femur with osteopenia..  Show fracture just at the level of the lesser trochanteric region and transverse in nature and displaced.     CTA with Subocclusive emboli in the lobar arteries of the right middle and lower lobes which are peripheral in location, compatible with subacute emboli. No infarct or right heart strain.     Multiple lytic lesions throughout the visualized axial and appendicular skeleton compatible with known myeloma. 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    PET Scan Oct 2023       1. Significant interval favorable metabolic response to therapy with interval decrease in number of hypermetabolic osseous lesions, previously widespread and much greater than 20, currently multifocal and less than 10.     2. Indeterminant 7 mm and 5 mm mural-based tracheal nodules which could reflect secretions with developing soft tissue nodules, particularly along the nondependent lateral right tracheal wall not excluded. Findings can be further characterized with direct visualization and/or short interval follow-up imaging as clinically indicated.    Plan    PE    Subocclusive emboli were noted; was started on heparin; will hold through surgery and restart per ortho.  Eventual bridge to DOAC after surgery/when stable and not at risk to bleed.  Would be prior to discharge      May have been provoked by acute events with less mobility     Left hip pathologic fracture     Plan is cephalomedullary nailing of left  peritrochanteric path femur fracture    Myeloma     Based on lytic disease on baseline PET with improvement repeat PET Oct 2023 and no disease in the pelvis on PET Oct 2023, unclear if this pathologic fracture is progression of disease    Will repeat myeloma studies in blood post op or as OP; may  need repeat bone marrow as question whether this is progression on maintenance therapy    Blood counts appear stable with WBC 6.8 Hgb 15.5 plts 159 Ca 8.7 Cr 0.47     Patient of Dr Garcia    Hold Revlimid in am prior to surgery but can restart if no change in counts/bleed after surgery.           Oncology History   Multiple myeloma (HCC)   3/2023 Initial Diagnosis    Multiple myeloma not having achieved remission (HCC)     3/15/2023 Biopsy    A -C.  Bone marrow,  right iliac crest,  biopsy, clot, and aspirate:  -  Lambda restricted plasma cell neoplasm, >80% of total cellularity consistent with plasma cell myeloma, see note.  -  Hypercellular bone marrow with markedly reduced trilineage hematopoiesis and no increase in blasts.  -  Reduced iron stores, in a suboptimal sample, correlation with serum iron studies is recommended.  -  Moderate to severe increase in reticulin fibrosis status.     Overall the current bone marrow biopsy is diagnostic for a plasma cell neoplasm best classified as plasma cell myeloma         4/11/2023 -  Chemotherapy    alteplase (CATHFLO), 2 mg, Intracatheter, Every 1 Minute as needed, 5 of 5 cycles  bortezomib (VELCADE), 1.3 mg/m2 = 2.5 mg, Subcutaneous, Once, 5 of 5 cycles  Administration: 2.5 mg (4/11/2023), 2.5 mg (4/14/2023), 2.5 mg (4/18/2023), 2.5 mg (4/21/2023), 2.5 mg (5/2/2023), 2.5 mg (5/5/2023), 2.5 mg (5/9/2023), 2.5 mg (5/12/2023), 2.5 mg (5/23/2023), 2.5 mg (5/26/2023), 2.5 mg (5/30/2023), 2.5 mg (6/2/2023), 2.5 mg (6/13/2023), 2.5 mg (6/16/2023), 2.5 mg (6/20/2023), 2.5 mg (6/23/2023), 2.5 mg (7/3/2023), 2.5 mg (7/6/2023), 2.5 mg (7/10/2023), 2.5 mg (7/13/2023)     Metastasis to bone  (HCC)   3/15/2023 Biopsy    A -C.  Bone marrow,  right iliac crest,  biopsy, clot, and aspirate:  -  Lambda restricted plasma cell neoplasm, >80% of total cellularity consistent with plasma cell myeloma, see note.  -  Hypercellular bone marrow with markedly reduced trilineage hematopoiesis and no increase in blasts.  -  Reduced iron stores, in a suboptimal sample, correlation with serum iron studies is recommended.  -  Moderate to severe increase in reticulin fibrosis status.     Overall the current bone marrow biopsy is diagnostic for a plasma cell neoplasm best classified as plasma cell myeloma         3/2023 Initial Diagnosis    Bone metastases (HCC)     4/3/2023 - 4/14/2023 Radiation    Treatment:  Course: C1    Plan ID Energy Fractions Dose per Fraction (cGy) Dose Correction (cGy) Total Dose Delivered (cGy) Elapsed Days   [id number] Spine 10X 10 / 10 250 0 2,500 11      Treatment dates:  C1: 4/3/2023 - 4/14/2023         History of Presenting Illness:    58-year-old male with history of multiple myeloma, GERD who presents for evaluation of left leg pain. Patient reports ongoing pain for the past 10 days. He states that he pulled his leg up while laying in bed and thought that he pulled his hamstring. He has had continued worsening pain since that time. The pain is primarily in the left hip and thigh. He denies any falls or traumatic injuries. He is otherwise feeling at baseline and denies chest pain, shortness of breath, abdominal pain. No fevers. He has been taking Tylenol and his chronic oxycodone and morphine with minimal relief.       Oncologic History  from 2/20/2024    Multiple myeloma.  Patient was seen in the ER on February 8, 2023.  CTA of the chest did not demonstrate any PE but patient had multiple lytic lesions and compression fractures in T6 and T10.  Additional work-up demonstrated elevated total protein, elevated IgG and an elevated lambda.  Immunofixation demonstrated IgG lambda monoclonal protein.   CBC parameters and renal function were within normal limits.     Bone marrow biopsy demonstrated a lambda restricted plasma cell neoplasm, 80% of the cells were malignant.  PET/CT demonstrated multiple osseous hypermetabolic lytic lesions.  Patient was seen by Dr. Foss in second opinion.  The plan was RVD x 4 cycles and then auto stem cell transplant if no evidence of progression and no complications.       Multiple myeloma international staging system = II, median survival is 44 months (albumin = 2.2, beta-2 microglobulin = 2.8)     NCCN guidelines 3.2023 state that for patients with multiple myeloma in need of treatment, possible transplant candidate, preferred regimen is bortezomib, lenalidomide and dexamethasone.     Regimen  Bortezomib 1.3 mg/m2 subcu on days 1, 4, 8 and 11  Lenalidomide 25 mg orally days 1-7 (dose reduced on the first cycle)  Dexamethasone 40 mg by mouth on days 1, 8 and 15  Cycle length = 21 days x 3-4 cycles     Patient was able to complete the 4 cycles of neoadjuvant chemotherapy without significant side effects or toxicities.  Patient then went on to transplant - also did well.  The below information comes from a telemedicine visit from August 25, 2023 from /[facility]     On maintenance revlimid/dex    Review of Systems - As stated in the HPI otherwise the fourteen point review of systems was negative.    ECOG PS:3    Past Medical History:   Diagnosis Date    Cancer (HCC)     bone-    GERD (gastroesophageal reflux disease)     Multiple myeloma (HCC)        Social History     Socioeconomic History    Marital status: /Civil Union     Spouse name: Not on file    Number of children: Not on file    Years of education: Not on file    Highest education level: Not on file   Occupational History    Not on file   Tobacco Use    Smoking status: Some Days     Types: Cigarettes    Smokeless tobacco: Not on file    Tobacco comments:     Smoking 1 cigarette daily   Vaping Use    Vaping status:  Never Used   Substance and Sexual Activity    Alcohol use: Not Currently    Drug use: Not Currently     Types: Marijuana     Comment: once a month    Sexual activity: Not on file   Other Topics Concern    Not on file   Social History Narrative    From 2/28/23  note:    Emergency Contact: Dee Martinez (NAVI)[phone] (Home Phone)    Marital Status: Single     Interpretation concerns, speaks another language, preferred language: english    Caregiver/Support: Mayur    Housing: two story     Home Setup: one level     Lives With: SO    Daily Living Activities: independent     Ambulation: independent    Employment: yes     [employer] Status/Location: no     Ability to pay bills: yes. No barriers to paying bills.     POA/LW/AD: no.  Karlie is healthcare representative. MSW emailed advance directive form.    Transportation Plan/Concerns: Patient states he transports self.  MSW educated on [employer] transport services.      Social Determinants of Health     Financial Resource Strain: Not on file   Food Insecurity: Not on file   Transportation Needs: Not on file   Physical Activity: Not on file   Stress: Not on file   Social Connections: Not on file   Intimate Partner Violence: Not on file   Housing Stability: Not on file       Family History   Problem Relation Age of Onset    Diabetes Mother     Heart disease Father     Diabetes Father        No Known Allergies      Current Facility-Administered Medications:     acetaminophen (TYLENOL) tablet 975 mg, 975 mg, Oral, Q8H RUTH, Shirley Moon MD, 975 mg at 03/31/24 0608    ceFAZolin (ANCEF) IVPB (premix in dextrose) 2,000 mg 50 mL, 2,000 mg, Intravenous, On Call To OR, Maury Moctezuma PA-C, Held at 03/31/24 0605    cyclobenzaprine (FLEXERIL) tablet 5 mg, 5 mg, Oral, TID PRN, Shirley Moon MD, 5 mg at 03/30/24 2051    dexamethasone (DECADRON) tablet 0.5 mg, 0.5 mg, Oral, Daily Before Breakfast, Shirley Moon MD, 0.5 mg at 03/31/24 0608    heparin (porcine)  25,000 units in 0.45% NaCl 250 mL infusion (premix), 3-30 Units/kg/hr (Order-Specific), Intravenous, Titrated, Shirley Moon MD    heparin (porcine) injection 2,400 Units, 2,400 Units, Intravenous, Q6H PRN, Shirley Moon MD    heparin (porcine) injection 4,800 Units, 4,800 Units, Intravenous, Q6H PRN, Shirley Moon MD    HYDROmorphone (DILAUDID) injection 0.5 mg, 0.5 mg, Intravenous, Q2H PRN, Zahida White MD, 0.5 mg at 03/31/24 0000    metoprolol (LOPRESSOR) injection 5 mg, 5 mg, Intravenous, Q6H PRN, Shirley Moon MD    morphine (MS CONTIN) ER tablet 30 mg, 30 mg, Oral, Q12H, Shirley Moon MD, 30 mg at 03/31/24 0608    naloxone (NARCAN) 0.04 mg/mL syringe 0.04 mg, 0.04 mg, Intravenous, Q1MIN PRN, Shirley Moon MD    NON FORMULARY, 5 mg, Oral, Daily, Shirley Moon MD    ondansetron (ZOFRAN) injection 4 mg, 4 mg, Intravenous, Q6H PRN, Shirley Moon MD    oxyCODONE (ROXICODONE) immediate release tablet 10 mg, 10 mg, Oral, Q4H PRN, Shirley Moon MD, 10 mg at 03/30/24 2050    oxyCODONE (ROXICODONE) IR tablet 5 mg, 5 mg, Oral, Q4H PRN **OR** oxyCODONE (ROXICODONE) immediate release tablet 10 mg, 10 mg, Oral, Q4H PRN, Shirley Moon MD, 10 mg at 03/31/24 0422    pantoprazole (PROTONIX) EC tablet 40 mg, 40 mg, Oral, Daily, Shirley Moon MD    polyethylene glycol (MIRALAX) packet 17 g, 17 g, Oral, Daily, Shirley Moon MD    senna (SENOKOT) tablet 8.6 mg, 1 tablet, Oral, Daily, Shirley Moon MD      /82   Pulse (!) 109   Temp 97.7 °F (36.5 °C)   Resp 18   SpO2 97%     General Appearance:    Alert, oriented        Eyes:    PERRL   Ears:    Normal external ear canals, both ears   Nose:   Nares normal, septum midline   Throat:   Mucosa moist. Pharynx without injection.    Neck:   Supple       Lungs:     Clear to auscultation bilaterally   Chest Wall:    No tenderness or deformity    Heart:    Regular rate and rhythm       Abdomen:     Soft, non-tender, bowel sounds +, no organomegaly           Extremities:    Extremities no cyanosis or edema       Skin:   no rash or icterus.    Lymph nodes:   Cervical, supraclavicular, and axillary nodes normal   Neurologic:   CNII-XII intact, normal strength, sensation and reflexes     Throughout               Recent Results (from the past 48 hour(s))   CBC and differential    Collection Time: 03/30/24  9:07 AM   Result Value Ref Range    WBC 7.83 4.31 - 10.16 Thousand/uL    RBC 5.38 3.88 - 5.62 Million/uL    Hemoglobin 16.9 12.0 - 17.0 g/dL    Hematocrit 48.9 36.5 - 49.3 %    MCV 91 82 - 98 fL    MCH 31.4 26.8 - 34.3 pg    MCHC 34.6 31.4 - 37.4 g/dL    RDW 14.2 11.6 - 15.1 %    MPV 9.0 8.9 - 12.7 fL    Platelets 187 149 - 390 Thousands/uL    nRBC 0 /100 WBCs    Neutrophils Relative 79 (H) 43 - 75 %    Immature Grans % 1 0 - 2 %    Lymphocytes Relative 5 (L) 14 - 44 %    Monocytes Relative 13 (H) 4 - 12 %    Eosinophils Relative 1 0 - 6 %    Basophils Relative 1 0 - 1 %    Neutrophils Absolute 6.17 1.85 - 7.62 Thousands/µL    Absolute Immature Grans 0.10 0.00 - 0.20 Thousand/uL    Absolute Lymphocytes 0.42 (L) 0.60 - 4.47 Thousands/µL    Absolute Monocytes 0.99 0.17 - 1.22 Thousand/µL    Eosinophils Absolute 0.09 0.00 - 0.61 Thousand/µL    Basophils Absolute 0.06 0.00 - 0.10 Thousands/µL   Comprehensive metabolic panel    Collection Time: 03/30/24  9:07 AM   Result Value Ref Range    Sodium 131 (L) 135 - 147 mmol/L    Potassium 3.4 (L) 3.5 - 5.3 mmol/L    Chloride 92 (L) 96 - 108 mmol/L    CO2 23 21 - 32 mmol/L    ANION GAP 16 (H) 4 - 13 mmol/L    BUN 16 5 - 25 mg/dL    Creatinine 0.63 0.60 - 1.30 mg/dL    Glucose 121 65 - 140 mg/dL    Calcium 9.6 8.4 - 10.2 mg/dL    AST 12 (L) 13 - 39 U/L    ALT 6 (L) 7 - 52 U/L    Alkaline Phosphatase 114 (H) 34 - 104 U/L    Total Protein 6.7 6.4 - 8.4 g/dL    Albumin 3.6 3.5 - 5.0 g/dL    Total Bilirubin 0.75 0.20 - 1.00 mg/dL    eGFR 109 ml/min/1.73sq m   Magnesium    Collection Time: 03/30/24  9:07 AM   Result Value Ref Range    Magnesium 1.7 (L)  "1.9 - 2.7 mg/dL   HS Troponin 0hr (reflex protocol)    Collection Time: 03/30/24  9:54 AM   Result Value Ref Range    hs TnI 0hr 53 (H) \"Refer to ACS Flowchart\"- see link ng/L   D-Dimer    Collection Time: 03/30/24  9:54 AM   Result Value Ref Range    D-Dimer, Quant 4.25 (H) <0.50 ug/ml FEU   Protime-INR    Collection Time: 03/30/24  9:54 AM   Result Value Ref Range    Protime 14.5 11.6 - 14.5 seconds    INR 1.14 0.84 - 1.19   APTT    Collection Time: 03/30/24  9:54 AM   Result Value Ref Range    PTT 31 23 - 37 seconds   HS Troponin I 2hr    Collection Time: 03/30/24 11:00 AM   Result Value Ref Range    hs TnI 2hr 49 \"Refer to ACS Flowchart\"- see link ng/L    Delta 2hr hsTnI -4 <20 ng/L   HS Troponin I 4hr    Collection Time: 03/30/24  2:11 PM   Result Value Ref Range    hs TnI 4hr 50 (H) \"Refer to ACS Flowchart\"- see link ng/L    Delta 4hr hsTnI -3 <20 ng/L   B-Type Natriuretic Peptide(BNP)    Collection Time: 03/30/24  8:30 PM   Result Value Ref Range     (H) 0 - 100 pg/mL   Type and screen    Collection Time: 03/30/24  8:30 PM   Result Value Ref Range    ABO Grouping O     Rh Factor Positive     Antibody Screen Negative     Specimen Expiration Date [id number]    Protime-INR    Collection Time: 03/30/24  9:14 PM   Result Value Ref Range    Protime 14.9 (H) 11.6 - 14.5 seconds    INR 1.10 0.84 - 1.19   APTT    Collection Time: 03/30/24  9:14 PM   Result Value Ref Range    PTT 64 (H) 23 - 37 seconds   ABORh Recheck - Contact Blood Bank Prior to Collection    Collection Time: 03/30/24  9:14 PM   Result Value Ref Range    ABO Grouping O     Rh Factor Positive    APTT    Collection Time: 03/31/24  4:28 AM   Result Value Ref Range    PTT 63 (H) 23 - 37 seconds   Basic metabolic panel    Collection Time: 03/31/24  4:28 AM   Result Value Ref Range    Sodium 131 (L) 135 - 147 mmol/L    Potassium 4.1 3.5 - 5.3 mmol/L    Chloride 98 96 - 108 mmol/L    CO2 18 (L) 21 - 32 mmol/L    ANION GAP 15 (H) 4 - 13 mmol/L    BUN 13 " 5 - 25 mg/dL    Creatinine 0.47 (L) 0.60 - 1.30 mg/dL    Glucose 91 65 - 140 mg/dL    Calcium 8.7 8.4 - 10.2 mg/dL    eGFR 123 ml/min/1.73sq m   Magnesium    Collection Time: 03/31/24  4:28 AM   Result Value Ref Range    Magnesium 2.2 1.9 - 2.7 mg/dL   CBC (With Platelets)    Collection Time: 03/31/24  4:28 AM   Result Value Ref Range    WBC 6.75 4.31 - 10.16 Thousand/uL    RBC 4.96 3.88 - 5.62 Million/uL    Hemoglobin 15.5 12.0 - 17.0 g/dL    Hematocrit 45.7 36.5 - 49.3 %    MCV 92 82 - 98 fL    MCH 31.3 26.8 - 34.3 pg    MCHC 33.9 31.4 - 37.4 g/dL    RDW 14.6 11.6 - 15.1 %    Platelets 159 149 - 390 Thousands/uL    MPV 8.7 (L) 8.9 - 12.7 fL      [facility] Reference Range & Units 03/31/24 04:28   Sodium 135 - 147 mmol/L 131 (L)   Potassium 3.5 - 5.3 mmol/L 4.1   Chloride 96 - 108 mmol/L 98   Carbon Dioxide 21 - 32 mmol/L 18 (L)   ANION GAP 4 - 13 mmol/L 15 (H)   BUN 5 - 25 mg/dL 13   Creatinine 0.60 - 1.30 mg/dL 0.47 (L)   GLUCOSE 65 - 140 mg/dL 91   Calcium 8.4 - 10.2 mg/dL 8.7   GFR, Calculated ml/min/1.73sq m 123   MAGNESIUM 1.9 - 2.7 mg/dL 2.2   WBC 4.31 - 10.16 Thousand/uL 6.75   RBC 3.88 - 5.62 Million/uL 4.96   Hemoglobin 12.0 - 17.0 g/dL 15.5   Hematocrit 36.5 - 49.3 % 45.7   MCV 82 - 98 fL 92   MCH 26.8 - 34.3 pg 31.3   MCHC 31.4 - 37.4 g/dL 33.9   RDW 11.6 - 15.1 % 14.6   Platelet Count 149 - 390 Thousands/uL 159   MPV 8.9 - 12.7 fL 8.7 (L)   PTT 23 - 37 seconds 63 (H)   (L): Data is abnormally low  (H): Data is abnormally high    CTA ED chest PE study    Result Date: 3/30/2024  Narrative: CTA - CHEST WITH IV CONTRAST - PULMONARY ANGIOGRAM INDICATION: Fracture, hx cancer, tachycardia, shortness of breath. COMPARISON: Most recent prior study for comparison is a CT scan dated May 11, 2023 and a PET/CT dated October 25, 2023.. TECHNIQUE: CTA examination of the chest was performed using angiographic technique according to a protocol specifically tailored to evaluate for pulmonary embolism. Multiplanar 2D  reformatted images were created from the source data. In addition, coronal  3D MIP postprocessing was performed on the acquisition scanner. Radiation dose length product (DLP) for this visit: 333 mGy-cm . This examination, like all CT scans performed in the [facility], was performed utilizing techniques to minimize radiation dose exposure, including the use of iterative reconstruction and automated exposure control. IV Contrast: 100 mL of iohexol (OMNIPAQUE) FINDINGS: PULMONARY ARTERIAL TREE: Peripheral emboli are noted on the right, at the origin of the right middle and lower lobar arteries. LUNGS: Fluid/debris in the right lower lobe airways. Bibasilar atelectasis. Secretions in the lower trachea and proximal left mainstem bronchus. Scattered groundglass in the periphery of the right middle and lower lobes likely infectious/inflammatory. PLEURA: Unremarkable. HEART/GREAT VESSELS: Unremarkable for patient's age. No thoracic aortic aneurysm. MEDIASTINUM AND PADMINI: Unremarkable. CHEST WALL AND LOWER NECK: Unremarkable. VISUALIZED STRUCTURES IN THE UPPER ABDOMEN: Multiple calcified granuloma in the spleen. OSSEOUS STRUCTURES: Numerous lytic lesions throughout the spine, sternum and ribs due to known multiple myeloma. Multiple previously noted soft tissue lesions associated with ribs on the right, left anterior second and third ribs have resolved.     Impression: Subocclusive emboli in the lobar arteries of the right middle and lower lobes which are peripheral in location, compatible with subacute emboli. No infarct or right heart strain. Multiple lytic lesions throughout the visualized axial and appendicular skeleton compatible with known myeloma. 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 Findings were discussed with Dr. Yen Alcocer from the emergency department at the time of this dictation. Workstation performed: [id number]       PET/CT SCAN 10/25/2023     INDICATION: C90.00: Multiple myeloma  not having achieved remission, restaging     MODIFIER: PS     COMPARISON: PET/CT dated 3/7/2023     CELL TYPE:  lambda restricted plasma cell neoplasm (> 80% malignant) (bx: 3/15/23 R iliac crest +)     TECHNIQUE:   8.3 mCi F-18-FDG administered IV. Multiplanar attenuation corrected and non attenuation corrected PET images are available for interpretation, and contiguous, low dose, axial CT sections were obtained from the skull vertex through the   femurs. Intravenous contrast material was not utilized. This examination, like all CT scans performed in the [facility], was performed utilizing techniques to minimize radiation dose exposure, including the use of iterative   reconstruction and automated exposure control.     Fasting serum glucose: 108 mg/dl     FINDINGS:     VISUALIZED BRAIN:  No acute abnormalities are seen.     HEAD/NECK:  There is a physiologic distribution of FDG. No FDG avid cervical adenopathy is seen.  CT images: Unremarkable.     CHEST:  No FDG avid soft tissue lesions are seen.  CT images: Mild emphysematous changes. Tiny calcified granulomas involving the left lung. On image 76 of series 4 there is a nodular focus involving the dependent intrathoracic tracheal wall at the level of the aortic arch measuring 0.7 cm and on image   74 of series 4 slightly more superiorly along the right lateral tracheal wall towards the thoracic inlet is a 5 mm mural based nodule. Calcified left hilar lymph node consistent with prior granulomatous disease.     ABDOMEN:  No FDG avid soft tissue lesions are seen.  CT images: Cholelithiasis. Calcification involving the spleen consistent with prior granulomatous disease. Atherosclerotic vascular calcifications are noted.     PELVIS:  No FDG avid soft tissue lesions are seen.  CT images: Large left-sided hydrocele.     OSSEOUS STRUCTURES:  Significant interval favorable metabolic response to therapy with significant interval decrease in number of  hypermetabolic osseous lesions, previously widespread and much greater than 20, currently multifocal and less than 10.     For example, on the current scan, hypermetabolic osseous lesions involve the proximal right humerus (PET image 82 with max SUV of 3.7, previously more extensive with max SUV of 4.4), distal sternum on PET image 110 (max SUV of 8.4, previously more   extensive with max SUV of 5.7), bilateral aspects of the pelvis (for example medial left iliac bone on PET image 155 with max SUV of 5.1, previously more extensive with max SUV of 5.8), and proximal left femur on PET image 203 (max SUV of 2.8, previously   more extensive with max SUV of 4.9).     CT images: Diffuse osteolytic lesions involving the axial and appendicular skeletal system is again noted multiple bilateral rib fracture deformities. Multilevel compression deformities involving the spine. Sternal fracture again noted.     IMPRESSION:     1. Significant interval favorable metabolic response to therapy with interval decrease in number of hypermetabolic osseous lesions, previously widespread and much greater than 20, currently multifocal and less than 10.     2. Indeterminant 7 mm and 5 mm mural-based tracheal nodules which could reflect secretions with developing soft tissue nodules, particularly along the nondependent lateral right tracheal wall not excluded. Findings can be further characterized with   direct visualization and/or short interval follow-up imaging as clinically indicated.     Please see above for details and additional findings.     The study was marked in EPIC for significant notification.         I spent 60 minutes on chart review, direct face to face counseling, coordination of care and documentation.      Rupinder Wilkes MD PhD

## 2024-03-31 NOTE — UTILIZATION REVIEW
Initial Clinical Review    Admission: Date/Time/Statement:   Admission Orders (From admission, onward)       Ordered        03/30/24 1734  Inpatient Admission  Once                          Orders Placed This Encounter   Procedures    Inpatient Admission     Standing Status:   Standing     Number of Occurrences:   1     Order Specific Question:   Level of Care     Answer:   Med Surg [16]     Order Specific Question:   Estimated length of stay     Answer:   More than 2 Midnights     Order Specific Question:   Certification     Answer:   I certify that inpatient services are medically necessary for this patient for a duration of greater than two midnights. See H&P and MD Progress Notes for additional information about the patient's course of treatment.     ED Arrival Information       Patient not seen in ED                       No chief complaint on file.      Initial Presentation: 58 y.o. male  with a PMH of multiple myeloma on Revlimid and RVD therapy s/p stem cell transplant who presents with progressively worsening leg pain particularly located in the left hip onset 10 days prior to arrival. Patient reports that he was laying in bed and was pulling the covers over him and thought he had torn his hamstring. Since then he has been having progressively worsening pain but has been ambulating on the leg. Patient presented to [facility] for worsening leg pain was found to have sinus tachycardia in the 180s, D-dimer is 4.25, mildly elevated tropes in the 50s, CTA PE study demonstrated subocclusive emboli in the lobar arteries no infarct or right heart strain however did note some multiple lytic lesions. Hip and femur x-rays demonstrate left femur fracture. Patient was transferred from [facility] for orthopedic intervention. Plan: Inpatient admission for evaluation and treatment of closed fracture of neck of left femur, acute PE, sinus tachycardia, multiple myeloma: Hem/Onc consult, continue  Revlimid, Ortho consult, NPO, heparin drip, pain management, Echo pending, telemetry.      Ortho consult: NPO at midnight, continue heparin drip, plan for OR tomorrow.     Date: 3/31   Day 2:     Ortho: [facility] to [facility], plan for OR for cephalomedullary nailing of left peritrochanteric, pathologic femur fracture. We will make patient n.p.o. past midnight for possible surgical intervention tomorrow.    Internal medicine: Subocclusive emboli were noted; was started on heparin; will hold through surgery and restart per ortho. Eventual bridge to DOAC after surgery/when stable and not at risk to bleed. Plan is cephalomedullary nailing of left peritrochanteric path femur fracture . NPO at midnight. Plan for OR tomorrow. Hold Revlimid in am prior to surgery but can restart if no change in counts/bleed after surgery.     Oncology consult: Subocclusive emboli were noted; was started on heparin; will hold through surgery and restart per ortho.  Eventual bridge to DOAC after surgery/when stable and not at risk to bleed.  Would be prior to discharge. Plan is cephalomedullary nailing of left peritrochanteric path femur fracture. Based on lytic disease on baseline PET with improvement repeat PET Oct 2023 and no disease in the pelvis on PET Oct 2023, unclear if this pathologic fracture is progression of disease. Will repeat myeloma studies in blood post op or as OP; may  need repeat bone marrow as question whether this is progression on maintenance therapy.    ED Triage Vitals   Temperature Pulse Respirations Blood Pressure SpO2   03/30/24 1644 03/30/24 1641 03/30/24 1641 03/30/24 1641 03/30/24 1641   (!) 97.4 °F (36.3 °C) (!) 110 20 138/93 99 %      Temp src Heart Rate Source Patient Position - Orthostatic VS BP Location FiO2 (%)   -- -- -- -- --             Pain Score       03/30/24 1721       10 - Worst Possible Pain          Wt Readings from Last 1 Encounters:   02/22/24 60.8 kg (134 lb)     Additional Vital Signs:     Date/Time  Temp Pulse Resp BP MAP (mmHg) SpO2   03/31/24 06:12:52 97.7 °F (36.5 °C) 109 Abnormal  18 136/82 100 97 %   03/30/24 22:32:33 98.1 °F (36.7 °C) 125 Abnormal  -- 123/81 95 97 %   03/30/24 16:44:29 97.4 °F (36.3 °C) Abnormal  107 Abnormal  -- 138/93 108 98 %     Pertinent Labs/Diagnostic Test Results:   No orders to display         Results from last 7 days   Lab Units 03/31/24  0428 03/30/24  0907   WBC Thousand/uL 6.75 7.83   HEMOGLOBIN g/dL 15.5 16.9   HEMATOCRIT % 45.7 48.9   PLATELETS Thousands/uL 159 187   NEUTROS ABS Thousands/µL  --  6.17         Results from last 7 days   Lab Units 03/31/24  0428 03/30/24  0907   SODIUM mmol/L 131* 131*   POTASSIUM mmol/L 4.1 3.4*   CHLORIDE mmol/L 98 92*   CO2 mmol/L 18* 23   ANION GAP mmol/L 15* 16*   BUN mg/dL 13 16   CREATININE mg/dL 0.47* 0.63   EGFR ml/min/1.73sq m 123 109   CALCIUM mg/dL 8.7 9.6   MAGNESIUM mg/dL 2.2 1.7*     Results from last 7 days   Lab Units 03/30/24  0907   AST U/L 12*   ALT U/L 6*   ALK PHOS U/L 114*   TOTAL PROTEIN g/dL 6.7   ALBUMIN g/dL 3.6   TOTAL BILIRUBIN mg/dL 0.75         Results from last 7 days   Lab Units 03/31/24  0428 03/30/24  0907   GLUCOSE RANDOM mg/dL 91 121           Results from last 7 days   Lab Units 03/30/24  1411 03/30/24  1100 03/30/24  0954   HS TNI 0HR ng/L  --   --  53*   HS TNI 2HR ng/L  --  49  --    HSTNI D2 ng/L  --  -4  --    HS TNI 4HR ng/L 50*  --   --    HSTNI D4 ng/L -3  --   --      Results from last 7 days   Lab Units 03/30/24  0954   D-DIMER QUANTITATIVE ug/ml FEU 4.25*     Results from last 7 days   Lab Units 03/31/24  0428 03/30/24  2114 03/30/24  0954   PROTIME seconds  --  14.9* 14.5   INR   --  1.10 1.14   PTT seconds 63* 64* 31           Results from last 7 days   Lab Units 03/30/24  2030   BNP pg/mL 148*         ED Treatment:   Medication Administration - No Administrations Displayed (No Start Event Found)       None          Past Medical History:   Diagnosis Date    Cancer (HCC)     bone-    GERD  (gastroesophageal reflux disease)     Multiple myeloma (HCC)      Present on Admission:   Multiple myeloma (HCC)      Admitting Diagnosis: Femur fracture (HCC) [S72.90XA]  Age/Sex: 58 y.o. male  Admission Orders:  Scheduled Medications:  acetaminophen, 975 mg, Oral, Q8H RUTH  cefazolin, 2,000 mg, Intravenous, On Call To OR  dexamethasone, 0.5 mg, Oral, Daily Before Breakfast  [START ON 4/1/2024] lenalidomide, 5 mg, Oral, Daily  morphine, 30 mg, Oral, Q12H  pantoprazole, 40 mg, Oral, Daily  polyethylene glycol, 17 g, Oral, Daily  senna, 1 tablet, Oral, Daily      Continuous IV Infusions:  heparin (porcine), 3-30 Units/kg/hr (Order-Specific), Intravenous, Titrated      PRN Meds:  cyclobenzaprine, 5 mg, Oral, TID PRN  heparin (porcine), 2,400 Units, Intravenous, Q6H PRN  heparin (porcine), 4,800 Units, Intravenous, Q6H PRN  HYDROmorphone, 0.5 mg, Intravenous, Q2H PRN  metoprolol, 5 mg, Intravenous, Q6H PRN  naloxone, 0.04 mg, Intravenous, Q1MIN PRN  ondansetron, 4 mg, Intravenous, Q6H PRN  oxyCODONE, 10 mg, Oral, Q4H PRN  oxyCODONE, 5 mg, Oral, Q4H PRN   Or  oxyCODONE, 10 mg, Oral, Q4H PRN        IP CONSULT TO ACUTE PAIN SERVICE  IP CONSULT TO ORTHOPEDIC SURGERY  IP CONSULT TO ONCOLOGY  IP CONSULT TO PULMONOLOGY    Network Utilization Review Department  ATTENTION: Please call with any questions or concerns to [phone] and carefully listen to the prompts so that you are directed to the right person. All voicemails are confidential.   For Discharge needs, contact Care Management DC Support Team at [phone] opt. [phone]  Send all requests for admission clinical reviews, approved or denied determinations and any other requests to dedicated fax number below belonging to the campus where the patient is receiving treatment. List of dedicated fax numbers for the Facilities:  FACILITY NAME UR FAX NUMBER   ADMISSION DENIALS (Administrative/Medical Necessity) [phone]   DISCHARGE SUPPORT TEAM (NETWORK) [phone]   PARENT  CHILD HEALTH (Maternity/NICU/Pediatrics) [phone]   [facility] [phone]   [facility] [phone]   [facility] [phone]   [facility] [phone]   [facility] [phone]   [facility] [phone]   [facility] [phone]   [facility] [phone]   [facility] [phone]   [facility] [phone]   [facility] [phone]   [facility] [phone]

## 2024-03-31 NOTE — CONSULTS
Consultation - Pulmonary Medicine   Fredy Martinez . 58 y.o. male MRN: [id number]  Unit/Bed#: S -01 Encounter: [id number]      Assessment/Plan:    1.  Acute pulmonary insufficiency likely multifaceted as listed below        -Currently on room air 94%, does not wear home O2        -Continue saturations greater than 88%        -Pulmonary toileting: Deep breathing cough out of bed as tolerated incentive spirometry        -Recommend ambulatory pulse ox prior to discharge    2.  Acute RML & RLL subocclusive likely provoked w/ unlikley RV strain        -Likely secondary to history of multiple myeloma        -No indication for thrombectomy or lysis        -Currently maintained on heparin gtt.        -Will likely need lifelong anticoagulation        -Patient is cleared to stop heparin 6 hours prior to procedure, cleared to resume heparin postoperatively        -Ortho will determine when he can be transition to NOAC    3.  Multiple myeloma        -Oncology following        -Maintained on chemo    4.  Preoperative assessment:    Patient scheduled for ORIF tomorrow. Patient reports that he is at his respiratory baseline denying any wheezing, chest tightness, or significant shortness of breath.  Patient does report to be an active every day smoker which I do feel this places patient at mild to possible mild risk for any postoperative pulmonary complications.  PFT testing from July 2023 showed some moderately diffuse DLCO of 58% otherwise no noted obstruction or restriction.  I did discuss with patient that he is at risk for some postoperative pulmonary complications which include but are not limited to respiratory failure including intubation, respiratory infection, pleural effusion, atelectasis, pneumothorax, bronchospasm, aspiration pneumonitis, and hemoptysis.  Recommended using a nebulizer treatment prior to procedure.    JOHANNY postoperative pulmonary risk assessment-patient is at 0.1% risk of mechanical ventilation  "greater than 48 hours after surgery or unplanned intubation less than 30 days of surgery    ARISCAT score for postoperative pulmonary complications-patient is at 1.6% risk of in-hospital postoperative pulmonary complications      5.  Active tobacco abuse with possible underlying COPD        -Devious PFTs showed decreased DLCO no noted obstruction        -Currently smokes 10 cigarettes a day        -Encourage and educated on tobacco cessation        -NRT per primary team        -Inpatient:m\albuterol nebulizer as needed        -Indication for steroids        -Any further needs please do not hesitate to contact pulmonary  -Pulmonary will sign off        History of Present Illness   Physician Requesting Consult: Marleny Grider MD  Reason for Consult / Principal Problem: Preoperative clearance  Hx and PE limited by: Nothing  Chief Complaint: \" Breathing feels fine\"  HPI: Fredy Martinez Jr. is a 58 y.o.  male who [facility] to [facility] with complaints of leg pain.  Patient has past medical history of bone cancer multiple myeloma, and GERD.  Patient reports that approximately 10 days ago he pulled his leg while laying in bed and thought it was just a hamstring irritation.  Upon ED admission patient was noted to have closed fracture they are recommending for ORIF.     Pulmonary was consulted for preoperative clearance.  Today upon examination patient appeared to be at his respiratory baseline.  Reported having significant hip pain.  No significant overnight events reported.  Currently denying any fevers, chills, night sweats, pleuritic chest pain, or palpitations.     From pulmonary standpoint, patient does not follow with a pulmonologist.  Patient reports being active every day half a pack per day smoker.  Patient reports he started smoking around the age of 17.  Patient is never been formally diagnosed with COPD or asthma.  PFTs from a year ago show some moderately decreased DLCO of 52%.  Is currently " not maintained on any inhaled or oxygen therapies.  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  Patient reports history of GERD in which she is maintained on Protonix.  Patient denies any symptoms of ERICA, seasonal allergies, or postnasal drip.  Patient denies any recent acute exposures to dust, mold, spices, or silica.        Inpatient consult to Pulmonology  Consult performed by: PABLO Larson  Consult ordered by: Skye Rubio MD          Review of Systems   Constitutional:  Negative for chills and fever.   HENT:  Negative for ear pain and sore throat.    Eyes:  Negative for pain and visual disturbance.   Respiratory:  Negative for apnea, cough, choking, chest tightness, shortness of breath, wheezing and stridor.    Cardiovascular:  Negative for chest pain and palpitations.   Gastrointestinal:  Negative for abdominal pain and vomiting.   Genitourinary:  Negative for dysuria and hematuria.   Musculoskeletal:  Negative for arthralgias and back pain.   Skin:  Negative for color change and rash.   Neurological:  Negative for seizures and syncope.   Psychiatric/Behavioral:  Negative for agitation and behavioral problems.    All other systems reviewed and are negative.      Historical Information   Past Medical History:   Diagnosis Date    Cancer (HCC)     bone-    GERD (gastroesophageal reflux disease)     Multiple myeloma (HCC)      Past Surgical History:   Procedure Laterality Date    APPENDECTOMY      IR BIOPSY BONE MARROW  3/15/2023     Social History   Social History     Substance and Sexual Activity   Alcohol Use Not Currently     Social History     Substance and Sexual Activity   Drug Use Not Currently    Types: Marijuana    Comment: once a month     Social History     Tobacco Use   Smoking Status Some Days    Types: Cigarettes   Smokeless Tobacco Not on file   Tobacco Comments    Smoking 1 cigarette daily     E-Cigarette/Vaping    E-Cigarette Use Never User       E-Cigarette/Vaping Substances     Occupational History: na    Family History:   Family History   Problem Relation Age of Onset    Diabetes Mother     Heart disease Father     Diabetes Father        Meds/Allergies   pertinent pulmonary meds have been reviewed    No Known Allergies    Objective   Vitals: Blood pressure 136/82, pulse (!) 109, temperature 97.7 °F (36.5 °C), resp. rate 18, SpO2 97%.,There is no height or weight on file to calculate BMI.  No intake or output data in the 24 hours ending 03/31/24 1313  Invasive Devices       Peripheral Intravenous Line  Duration             Peripheral IV 03/30/24 Distal;Left;Upper;Ventral (anterior) Arm 1 day    Peripheral IV 03/30/24 Right Antecubital 1 day    Peripheral IV 03/30/24 Distal;Left;Ventral (anterior) Forearm <1 day                    Physical Exam  Constitutional:       General: He is not in acute distress.     Appearance: Normal appearance. He is normal weight. He is not ill-appearing.   HENT:      Head: Normocephalic and atraumatic.      Nose: Nose normal. No congestion or rhinorrhea.   Cardiovascular:      Rate and Rhythm: Normal rate and regular rhythm.      Pulses: Normal pulses.      Heart sounds: Normal heart sounds. No murmur heard.     No friction rub. No gallop.   Pulmonary:      Effort: Pulmonary effort is normal. No respiratory distress.      Breath sounds: No stridor. No wheezing, rhonchi or rales.   Chest:      Chest wall: No tenderness.   Abdominal:      General: Abdomen is flat. Bowel sounds are normal. There is no distension.      Palpations: Abdomen is soft. There is no mass.   Musculoskeletal:         General: No swelling or tenderness. Normal range of motion.      Cervical back: Normal range of motion. No rigidity or tenderness.   Skin:     General: Skin is warm and dry.      Coloration: Skin is not jaundiced or pale.   Neurological:      General: No focal deficit present.      Mental Status: He is alert and oriented to person, place,  "and time. Mental status is at baseline.   Psychiatric:         Mood and Affect: Mood normal.         Behavior: Behavior normal.         Lab Results: I have personally reviewed pertinent lab results., ABG: No results found for: \"PHART\", \"OCZ7HBR\", \"PO2ART\", \"PGD0KKW\", \"O2OWNELF\", \"BEART\", \"SOURCE\", BNP:   Lab Results   Component Value Date     (H) 03/30/2024   , CBC:   Lab Results   Component Value Date    WBC 6.75 03/31/2024    HGB 15.5 03/31/2024    HCT 45.7 03/31/2024    MCV 92 03/31/2024     03/31/2024    RBC 4.96 03/31/2024    MCH 31.3 03/31/2024    MCHC 33.9 03/31/2024    RDW 14.6 03/31/2024    MPV 8.7 (L) 03/31/2024   , CMP:   Lab Results   Component Value Date    SODIUM 131 (L) 03/31/2024    K 4.1 03/31/2024    CL 98 03/31/2024    CO2 18 (L) 03/31/2024    BUN 13 03/31/2024    CREATININE 0.47 (L) 03/31/2024    CALCIUM 8.7 03/31/2024    EGFR 123 03/31/2024   , PT/INR:   Lab Results   Component Value Date    INR 1.10 03/30/2024         Imaging Studies: I have personally reviewed pertinent films in PACS     CTA-both PE and lobar arteries    EKG, Pathology, and Other Studies: I have personally reviewed pertinent films in PACS     Echo pending    Pulmonary Results (PFTs, PSG): I have personally reviewed pertinent films in PACS     Results: 7/3/2023  FEV1/FVC Ratio: 79 %  Forced Vital Capacity: 3.58 L    73 % predicted  FEV1: 2.83 L     75 % predicted     Lung volumes by body plethysmography:   Total Lung Capacity 82 % predicted   Residual volume 99 % predicted     DLCO corrected for patients hemoglobin level: 58 %     Interpretation:     Spirometry suggests abnormal lung volumes.      No significant response to the administration to bronchodilator per ATS standards     Normal Lung volumes     Moderately reduced diffusion capacity     Flow volume loop is normal.    Code Status: Level 1 - Full Code    Portions of the record may have been created with voice recognition software.  Occasional wrong word or " "\"sound a like\" substitutions may have occurred due to the inherent limitations of voice recognition software.  Read the chart carefully and recognize, using context, where substitutions have occurred.  "

## 2024-03-31 NOTE — PROGRESS NOTES
[facility]  Progress Note  Name: Fredy Houston I  MRN: [id number]  Unit/Bed#: S -01 I Date of Admission: 3/30/2024   Date of Service: 3/31/2024 I Hospital Day: 1    Assessment/Plan   * Closed fracture of neck of left femur (HCC)  Assessment & Plan  Complaining of L leg pain for 10 days  No trauma  Hx of MM not in remission    Plan-   Oncology: Subocclusive emboli were noted; was started on heparin; will hold through surgery and restart per ortho.  Eventual bridge to DOAC after surgery/when stable and not at risk to bleed.    Ortho: Plan is cephalomedullary nailing of left peritrochanteric path femur fracture   Will keep pt NPO from midnight.   Plan for surgery tomorrow.   On Heparin gtt for PE  Pain regimen in place as well as home regimen   Appreciate Acute Pain Service recommendations     Multiple myeloma (HCC)  Assessment & Plan  On Revlimid and RVD infusion  RVD infusion includes bortezomib, lenalidomide, dexamethasone; Completed Cycle length = 21 days x 3-4 cycles  S/p ASCT  Also follows Palliative Care as OP     Per oncology:  Based on lytic disease on baseline PET with improvement repeat PET Oct 2023 and no disease in the pelvis on PET Oct 2023, unclear if this pathologic fracture is progression of disease   Will repeat myeloma studies in blood post op or as OP; may  need repeat bone marrow as question whether this is progression on maintenance therapy   Blood counts appear stable with WBC 6.8 Hgb 15.5 plts 159 Ca 8.7 Cr 0.47    Patient of Dr Garcia   Hold Revlimid in am prior to surgery but can restart if no change in counts/bleed after surgery.        Acute pulmonary embolism without acute cor pulmonale (HCC)  Assessment & Plan  [place] of [place] [facility] [place]   D-Dimer 4.25  Troponin 53, 49, 50  EKG demonstrated sinus tach in the 180s however possible artifact in the setting of severe pain  CTA PE study-subocclusive emboli in the lower arteries in the  right middle and right lower lobes which are peripheral, no infarct or right heart strain, multiple lytic lesions  [facility] started patient on heparin drip  Lab Results   Component Value Date     (H) 2024    LVEF 55 2023      Possible in the setting of immobility   Plan-  Continue heparin drip  Echo-pending  STAT ECHO if pt is unstable.   Pulmonary consulted for pre op clearance.     Sinus tachycardia  Assessment & Plan  EKG from [facility] demonstrated sinus tachycardia in the 180s  Was given Lopressor 5 Mg IV  Patient is in excruciating pain likely artifact vs acute PE changes   Pulse: (!) 109    Plan-  Will continue on telemetry   Lopressor 5mg IV PRN               VTE Pharmacologic Prophylaxis: VTE Score: 3 High Risk (Score >/= 5) - Pharmacological DVT Prophylaxis Ordered: heparin. Sequential Compression Devices Ordered.    Mobility:   Basic Mobility Inpatient Raw Score: 7  JH-HLM Goal: 2: Bed activities/Dependent transfer  JH-HLM Achieved: 2: Bed activities/Dependent transfer  JH-HLM Goal achieved. Continue to encourage appropriate mobility.    Patient Centered Rounds: I performed bedside rounds with nursing staff today.  Discussions with Specialists or Other Care Team Provider: Pulm     Education and Discussions with Family / Patient: Updated  (friend) at bedside.    Current Length of Stay: 1 day(s)  Current Patient Status: Inpatient   Discharge Plan: Anticipate discharge in 48-72 hrs to home.    Code Status: Level 1 - Full Code    Subjective:       Objective:     Vitals:   Temp (24hrs), Av.7 °F (36.5 °C), Min:97.4 °F (36.3 °C), Max:98.1 °F (36.7 °C)    Temp:  [97.4 °F (36.3 °C)-98.1 °F (36.7 °C)] 97.7 °F (36.5 °C)  HR:  [107-125] 109  Resp:  [18-23] 18  BP: (123-144)/(81-93) 136/82  SpO2:  [94 %-99 %] 97 %  There is no height or weight on file to calculate BMI.     Input and Output Summary (last 24 hours):   No intake or output data in the 24 hours ending 24 1241    Physical  Exam:   Physical Exam  Vitals and nursing note reviewed.   Constitutional:       General: He is not in acute distress.     Appearance: He is well-developed. He is not toxic-appearing or diaphoretic.      Comments: Cachectic   HENT:      Head: Normocephalic and atraumatic.      Mouth/Throat:      Mouth: Mucous membranes are moist.      Dentition: Abnormal dentition.      Pharynx: Oropharynx is clear.   Eyes:      Extraocular Movements: Extraocular movements intact.      Conjunctiva/sclera: Conjunctivae normal.      Pupils: Pupils are equal, round, and reactive to light.   Cardiovascular:      Rate and Rhythm: Regular rhythm. Tachycardia present.      Pulses: Normal pulses.      Heart sounds: Normal heart sounds. No murmur heard.  Pulmonary:      Effort: Pulmonary effort is normal. No respiratory distress.      Breath sounds: Normal breath sounds. No rhonchi.   Chest:      Chest wall: No tenderness.   Abdominal:      General: Bowel sounds are normal. There is no distension.      Palpations: Abdomen is soft.      Tenderness: There is no abdominal tenderness. There is no guarding.   Musculoskeletal:         General: Tenderness and deformity present. Normal range of motion.      Cervical back: Normal range of motion and neck supple.      Left lower leg: Edema present.      Comments: Left leg fixed internally rotated neurovascularly intact pulses intact, bilateral lower extremity venous stasis changes   Skin:     General: Skin is warm and dry.      Capillary Refill: Capillary refill takes less than 2 seconds.   Neurological:      General: No focal deficit present.      Mental Status: He is alert and oriented to person, place, and time.      Cranial Nerves: No cranial nerve deficit.      Sensory: No sensory deficit.      Motor: No weakness.   Psychiatric:         Mood and Affect: Mood normal.         Behavior: Behavior normal.         Thought Content: Thought content normal.          Additional Data:     Labs:  Results  from last 7 days   Lab Units 03/31/24  0428 03/30/24  0907   WBC Thousand/uL 6.75 7.83   HEMOGLOBIN g/dL 15.5 16.9   HEMATOCRIT % 45.7 48.9   PLATELETS Thousands/uL 159 187   NEUTROS PCT %  --  79*   LYMPHS PCT %  --  5*   MONOS PCT %  --  13*   EOS PCT %  --  1     Results from last 7 days   Lab Units 03/31/24  0428 03/30/24  0907   SODIUM mmol/L 131* 131*   POTASSIUM mmol/L 4.1 3.4*   CHLORIDE mmol/L 98 92*   CO2 mmol/L 18* 23   BUN mg/dL 13 16   CREATININE mg/dL 0.47* 0.63   ANION GAP mmol/L 15* 16*   CALCIUM mg/dL 8.7 9.6   ALBUMIN g/dL  --  3.6   TOTAL BILIRUBIN mg/dL  --  0.75   ALK PHOS U/L  --  114*   ALT U/L  --  6*   AST U/L  --  12*   GLUCOSE RANDOM mg/dL 91 121     Results from last 7 days   Lab Units 03/30/24  2114   INR  1.10                   Lines/Drains:  Invasive Devices       Peripheral Intravenous Line  Duration             Peripheral IV 03/30/24 Distal;Left;Upper;Ventral (anterior) Arm 1 day    Peripheral IV 03/30/24 Right Antecubital 1 day    Peripheral IV 03/30/24 Distal;Left;Ventral (anterior) Forearm <1 day                          Imaging: Reviewed radiology reports from this admission including: chest CT scan    Recent Cultures (last 7 days):         Last 24 Hours Medication List:   Current Facility-Administered Medications   Medication Dose Route Frequency Provider Last Rate    acetaminophen  975 mg Oral Q8H [facility] Shirley Moon MD      cefazolin  2,000 mg Intravenous On Call To OR Maury Moctezuma PA-C Stopped (03/31/24 0605)    cyclobenzaprine  5 mg Oral TID PRN Shirley Moon MD      dexamethasone  0.5 mg Oral Daily Before Breakfast Shirley Moon MD      heparin (porcine)  3-30 Units/kg/hr (Order-Specific) Intravenous Titrated Shirley Moon MD 18 Units/kg/hr (03/31/24 1223)    heparin (porcine)  2,400 Units Intravenous Q6H PRN Shirley Moon MD      heparin (porcine)  4,800 Units Intravenous Q6H PRN Shirley Moon MD      HYDROmorphone  0.5 mg Intravenous Q2H PRN Zahida White MD       [START ON 4/1/2024] lenalidomide  5 mg Oral Daily Shirley Moon MD      metoprolol  5 mg Intravenous Q6H PRN Shirley Moon MD      morphine  30 mg Oral Q12H Shirley Moon MD      naloxone  0.04 mg Intravenous Q1MIN PRN Shirley Moon MD      ondansetron  4 mg Intravenous Q6H PRN Shirley Moon MD      oxyCODONE  10 mg Oral Q4H PRN Shirley Moon MD      oxyCODONE  5 mg Oral Q4H PRN Shirley Moon MD      Or    oxyCODONE  10 mg Oral Q4H PRN Shirley Moon MD      pantoprazole  40 mg Oral Daily Shirley Moon MD      polyethylene glycol  17 g Oral Daily Shirley Moon MD      senna  1 tablet Oral Daily Shirley Moon MD          Today, Patient Was Seen By: Skye Rubio MD    **Please Note: This note may have been constructed using a voice recognition system.**

## 2024-03-31 NOTE — OP NOTE
Orthopedics   Fredy STEPHEN Martinez . 58 y.o. male MRN: [id number]  Unit/Bed#: S -01      Chief Complaint:   left hip pain    HPI:   58 y.o.male with history of multiple myeloma and currently on lenalidomide and dexamethasone, who is being seen secondary to left hip pain.  Patient states he had left hip pain for about 1 month.  He initially thought that he may have hurt his hamstring.  He states that about a couple of weeks ago, he flexed his hip and he heard a pop.  He had difficulty with ambulation.  He stated due to the continued pain, he decided that it was time to go to the hospital for further evaluation.  Patient states he has difficulty with moving that left lower extremity.  Denies any numbness tingling fevers or chills.  Patient denies any chest pain at this time or any difficulty with breathing    Review Of Systems:   Skin: Normal  Neuro: See HPI  Musculoskeletal: See HPI  14 point review of systems negative except as stated above     Past Medical History:   Past Medical History:   Diagnosis Date    Cancer (HCC)     bone-    GERD (gastroesophageal reflux disease)     Multiple myeloma (HCC)        Past Surgical History:   Past Surgical History:   Procedure Laterality Date    APPENDECTOMY      IR BIOPSY BONE MARROW  3/15/2023       Family History:  Family history reviewed and non-contributory  Family History   Problem Relation Age of Onset    Diabetes Mother     Heart disease Father     Diabetes Father        Social History:  Social History     Socioeconomic History    Marital status: /Civil Union     Spouse name: Not on file    Number of children: Not on file    Years of education: Not on file    Highest education level: Not on file   Occupational History    Not on file   Tobacco Use    Smoking status: Some Days     Types: Cigarettes    Smokeless tobacco: Not on file    Tobacco comments:     Smoking 1 cigarette daily   Vaping Use    Vaping status: Never Used   Substance and Sexual Activity    Alcohol  use: Not Currently    Drug use: Not Currently     Types: Marijuana     Comment: once a month    Sexual activity: Not on file   Other Topics Concern    Not on file   Social History Narrative    From 2/28/23  note:    Emergency Contact: Dee Martinez (NAVI)[phone] (Home Phone)    Marital Status: Single     Interpretation concerns, speaks another language, preferred language: english    Caregiver/Support: Mayur    Housing: two story     Home Setup: one level     Lives With: SO    Daily Living Activities: independent     Ambulation: independent    Employment: yes      Status/Location: no     Ability to pay bills: yes. No barriers to paying bills.     POA/LW/AD: no.  Karlie is healthcare representative. MSW emailed advance directive form.    Transportation Plan/Concerns: Patient states he transports self.  MSW educated on [employer] transport services.      Social Determinants of Health     Financial Resource Strain: Not on file   Food Insecurity: Not on file   Transportation Needs: Not on file   Physical Activity: Not on file   Stress: Not on file   Social Connections: Not on file   Intimate Partner Violence: Not on file   Housing Stability: Not on file       Allergies:   No Known Allergies        Labs:  0   Lab Value Date/Time    HCT 45.7 03/31/2024 0428    HCT 48.9 03/30/2024 0907    HCT 49.3 02/21/2024 1256    HGB 15.5 03/31/2024 0428    HGB 16.9 03/30/2024 0907    HGB 16.3 02/21/2024 1256    INR 1.10 03/30/2024 2114    WBC 6.75 03/31/2024 0428    WBC 7.83 03/30/2024 0907    WBC 4.59 02/21/2024 1256       Meds:    Current Facility-Administered Medications:     acetaminophen (TYLENOL) tablet 975 mg, 975 mg, Oral, Q8H RUTH, Shirley Moon MD, 975 mg at 03/31/24 0608    ceFAZolin (ANCEF) IVPB (premix in dextrose) 2,000 mg 50 mL, 2,000 mg, Intravenous, On Call To OR, Maury Moctezuma PA-C, Held at 03/31/24 0605    cyclobenzaprine (FLEXERIL) tablet 5 mg, 5 mg, Oral, TID PRN, Shirley Moon MD,  "5 mg at 03/30/24 2051    dexamethasone (DECADRON) tablet 0.5 mg, 0.5 mg, Oral, Daily Before Breakfast, Shirley Moon MD, 0.5 mg at 03/31/24 0608    heparin (porcine) 25,000 units in 0.45% NaCl 250 mL infusion (premix), 3-30 Units/kg/hr (Order-Specific), Intravenous, Titrated, Shirley Moon MD    heparin (porcine) injection 2,400 Units, 2,400 Units, Intravenous, Q6H PRN, Shirley Moon MD    heparin (porcine) injection 4,800 Units, 4,800 Units, Intravenous, Q6H PRN, Shirley Moon MD    HYDROmorphone (DILAUDID) injection 0.5 mg, 0.5 mg, Intravenous, Q2H PRN, Zahida White MD, 0.5 mg at 03/31/24 0000    metoprolol (LOPRESSOR) injection 5 mg, 5 mg, Intravenous, Q6H PRN, Shirley Moon MD    morphine (MS CONTIN) ER tablet 30 mg, 30 mg, Oral, Q12H, Shirley Moon MD, 30 mg at 03/31/24 0608    naloxone (NARCAN) 0.04 mg/mL syringe 0.04 mg, 0.04 mg, Intravenous, Q1MIN PRN, Shirley Moon MD    NON FORMULARY, 5 mg, Oral, Daily, Shirley Moon MD    ondansetron (ZOFRAN) injection 4 mg, 4 mg, Intravenous, Q6H PRN, Shirley Moon MD    oxyCODONE (ROXICODONE) immediate release tablet 10 mg, 10 mg, Oral, Q4H PRN, Shirley Moon MD, 10 mg at 03/30/24 2050    oxyCODONE (ROXICODONE) IR tablet 5 mg, 5 mg, Oral, Q4H PRN **OR** oxyCODONE (ROXICODONE) immediate release tablet 10 mg, 10 mg, Oral, Q4H PRN, Shirley Moon MD, 10 mg at 03/31/24 0422    pantoprazole (PROTONIX) EC tablet 40 mg, 40 mg, Oral, Daily, Shirley Moon MD    polyethylene glycol (MIRALAX) packet 17 g, 17 g, Oral, Daily, Shirley Moon MD    senna (SENOKOT) tablet 8.6 mg, 1 tablet, Oral, Daily, Shirley Moon MD    Blood Culture:   No results found for: \"BLOODCX\"    Wound Culture:   No results found for: \"WOUNDCULT\"    Ins and Outs:  No intake/output data recorded.          Physical Exam:   /82   Pulse (!) 109   Temp 97.7 °F (36.5 °C)   Resp 18   SpO2 97%   Gen: No acute distress, resting comfortably in bed  HEENT: Eyes clear, moist mucus membranes, hearing " intact  Respiratory: No audible wheezing or stridor  Cardiovascular: Well Perfused peripherally, 2+ distal pulse  Abdomen: nondistended, no peritoneal signs  Musculoskeletal: left lower extremity  No abrasions or open wounds  Patient's left lower extremity is in a slightly flexed and internally rotated position  There is pitting edema involving lower extremity going to the proximal third of the leg  Pain with any attempted logrolling  Patient able to dorsiflex and plantarflex ankle  Sensation intact to superficial deep peroneal nerve  Brisk capillary refill    Radiology:   I personally reviewed the films.  X-rays AP/Lateral of the hip and lateral of the femur shows mottled appearance of femur with osteopenia..  Show fracture just at the level of the lesser trochanteric region and transverse in nature and displaced.    _*_*_*_*_*_*_*_*_*_*_*_*_*_*_*_*_*_*_*_*_*_*_*_*_*_*_*_*_*_*_*_*_*_*_*_*_*_*_*_*_*    Assessment:  58 y.o.male with history of multiple myeloma with atraumatic because of a pathologic fracture involving the left peritrochanteric femoral region.    Plan:   Nonweightbearing left lower extremity  Patient currently being treated acutely for his bilateral pulmonary emboli with a heparin drip  Patient will require multidisciplinary restratification prior to surgical intervention  Plan is for cephalomedullary nailing of left peritrochanteric, pathologic femur fracture  Pros and cons of operative and nonoperative intervention were discussed with patient.  Similar complications include but not limited to infection, bleeding, scarring, nerve injury, vascular injury, malunion, nonunion, continued pain, decreased range of motion, DVT, PE, death, loss of limb, and obtain results patient was.  Orders were understood and patient was consented for operative intervention for his pathology  Patient may eat at this time  We will make patient n.p.o. past midnight for possible surgical intervention tomorrow  Orders were  discussed with patient and patient is in agreement with treatment plan  Pain medication and PE treatment as per primary team  Dispo: Ortho will follow      Pat Bang MD

## 2024-03-31 NOTE — ASSESSMENT & PLAN NOTE
On Revlimid and RVD infusion  RVD infusion includes bortezomib, lenalidomide, dexamethasone; Completed Cycle length = 21 days x 3-4 cycles  S/p ASCT  Also follows Palliative Care as OP     Per oncology:  Based on lytic disease on baseline PET with improvement repeat PET Oct 2023 and no disease in the pelvis on PET Oct 2023, unclear if this pathologic fracture is progression of disease   Will repeat myeloma studies in blood post op or as OP; may  need repeat bone marrow as question whether this is progression on maintenance therapy   Blood counts appear stable with WBC 6.8 Hgb 15.5 plts 159 Ca 8.7 Cr 0.47    Patient of Dr Garcia   Hold Revlimid in am prior to surgery but can restart if no change in counts/bleed after surgery.

## 2024-03-31 NOTE — ASSESSMENT & PLAN NOTE
Incidental findings of PE on CTA PE study at [facility]   D-Dimer 4.25  Troponin 53, 49, 50  EKG demonstrated sinus tach in the 180s however possible artifact in the setting of severe pain  CTA PE study-subocclusive emboli in the lower arteries in the right middle and right lower lobes which are peripheral, no infarct or right heart strain, multiple lytic lesions  [facility] started patient on heparin drip  Lab Results   Component Value Date     (H) 03/30/2024    LVEF 55 07/03/2023      Possible in the setting of immobility   Plan-  Continue heparin drip  Echo-pending  STAT ECHO if pt is unstable.   Pulmonary consulted for pre op clearance.

## 2024-03-31 NOTE — ASSESSMENT & PLAN NOTE
EKG from [facility] demonstrated sinus tachycardia in the 180s  Was given Lopressor 5 Mg IV  Patient is in excruciating pain likely artifact vs acute PE changes   Pulse: (!) 109    Plan-  Will continue on telemetry   Lopressor 5mg IV PRN

## 2024-04-01 ENCOUNTER — ANESTHESIA (INPATIENT)
Dept: PERIOP | Facility: HOSPITAL | Age: 59
DRG: 308 | End: 2024-04-01
Payer: COMMERCIAL

## 2024-04-01 ENCOUNTER — ANESTHESIA EVENT (INPATIENT)
Dept: PERIOP | Facility: HOSPITAL | Age: 59
DRG: 308 | End: 2024-04-01
Payer: COMMERCIAL

## 2024-04-01 ENCOUNTER — APPOINTMENT (INPATIENT)
Dept: RADIOLOGY | Facility: HOSPITAL | Age: 59
DRG: 308 | End: 2024-04-01
Payer: COMMERCIAL

## 2024-04-01 PROBLEM — R00.0 SINUS TACHYCARDIA: Status: RESOLVED | Noted: 2024-03-30 | Resolved: 2024-04-01

## 2024-04-01 PROBLEM — I47.10 SVT (SUPRAVENTRICULAR TACHYCARDIA): Status: ACTIVE | Noted: 2024-04-01

## 2024-04-01 LAB
ANION GAP SERPL CALCULATED.3IONS-SCNC: 12 MMOL/L (ref 4–13)
ANION GAP SERPL CALCULATED.3IONS-SCNC: 6 MMOL/L (ref 4–13)
AORTIC ROOT: 3 CM
APICAL FOUR CHAMBER EJECTION FRACTION: 64 %
APTT PPP: 30 SECONDS (ref 23–37)
ASCENDING AORTA: 2.8 CM
ATRIAL RATE: 102 BPM
ATRIAL RATE: 182 BPM
BSA FOR ECHO PROCEDURE: 1.75 M2
BUN SERPL-MCNC: 6 MG/DL (ref 5–25)
BUN SERPL-MCNC: 6 MG/DL (ref 5–25)
CALCIUM SERPL-MCNC: 8.4 MG/DL (ref 8.4–10.2)
CALCIUM SERPL-MCNC: 8.6 MG/DL (ref 8.4–10.2)
CHLORIDE SERPL-SCNC: 100 MMOL/L (ref 96–108)
CHLORIDE SERPL-SCNC: 100 MMOL/L (ref 96–108)
CO2 SERPL-SCNC: 25 MMOL/L (ref 21–32)
CO2 SERPL-SCNC: 27 MMOL/L (ref 21–32)
CREAT SERPL-MCNC: 0.36 MG/DL (ref 0.6–1.3)
CREAT SERPL-MCNC: 0.39 MG/DL (ref 0.6–1.3)
E WAVE DECELERATION TIME: 200 MS
E/A RATIO: 0.81
ERYTHROCYTE [DISTWIDTH] IN BLOOD BY AUTOMATED COUNT: 14.6 % (ref 11.6–15.1)
FRACTIONAL SHORTENING: 27 (ref 28–44)
GFR SERPL CREATININE-BSD FRML MDRD: 133 ML/MIN/1.73SQ M
GFR SERPL CREATININE-BSD FRML MDRD: 137 ML/MIN/1.73SQ M
GLUCOSE SERPL-MCNC: 107 MG/DL (ref 65–140)
GLUCOSE SERPL-MCNC: 127 MG/DL (ref 65–140)
GLUCOSE SERPL-MCNC: 134 MG/DL (ref 65–140)
HCT VFR BLD AUTO: 42.2 % (ref 36.5–49.3)
HGB BLD-MCNC: 14.2 G/DL (ref 12–17)
INTERVENTRICULAR SEPTUM IN DIASTOLE (PARASTERNAL SHORT AXIS VIEW): 0.8 CM
INTERVENTRICULAR SEPTUM: 0.8 CM (ref 0.6–1.1)
LAAS-AP2: 9.5 CM2
LAAS-AP4: 15.6 CM2
LEFT ATRIUM AREA SYSTOLE SINGLE PLANE A4C: 15.2 CM2
LEFT ATRIUM SIZE: 2.4 CM
LEFT ATRIUM VOLUME (MOD BIPLANE): 34 ML
LEFT ATRIUM VOLUME INDEX (MOD BIPLANE): 19.3 ML/M2
LEFT INTERNAL DIMENSION IN SYSTOLE: 2.7 CM (ref 2.1–4)
LEFT VENTRICULAR INTERNAL DIMENSION IN DIASTOLE: 3.7 CM (ref 3.5–6)
LEFT VENTRICULAR POSTERIOR WALL IN END DIASTOLE: 0.8 CM
LEFT VENTRICULAR STROKE VOLUME: 29 ML
LVSV (TEICH): 29 ML
MAGNESIUM SERPL-MCNC: 1.7 MG/DL (ref 1.9–2.7)
MAGNESIUM SERPL-MCNC: 1.9 MG/DL (ref 1.9–2.7)
MCH RBC QN AUTO: 30.9 PG (ref 26.8–34.3)
MCHC RBC AUTO-ENTMCNC: 33.6 G/DL (ref 31.4–37.4)
MCV RBC AUTO: 92 FL (ref 82–98)
MV E'TISSUE VEL-SEP: 7 CM/S
MV PEAK A VEL: 0.62 M/S
MV PEAK E VEL: 50 CM/S
MV STENOSIS PRESSURE HALF TIME: 58 MS
MV VALVE AREA P 1/2 METHOD: 3.79
P AXIS: 63 DEGREES
PLATELET # BLD AUTO: 131 THOUSANDS/UL (ref 149–390)
PMV BLD AUTO: 8.4 FL (ref 8.9–12.7)
POTASSIUM SERPL-SCNC: 3.4 MMOL/L (ref 3.5–5.3)
POTASSIUM SERPL-SCNC: 3.5 MMOL/L (ref 3.5–5.3)
PR INTERVAL: 134 MS
PR INTERVAL: 94 MS
QRS AXIS: -28 DEGREES
QRS AXIS: 244 DEGREES
QRSD INTERVAL: 118 MS
QRSD INTERVAL: 78 MS
QT INTERVAL: 242 MS
QT INTERVAL: 346 MS
QTC INTERVAL: 421 MS
QTC INTERVAL: 450 MS
RBC # BLD AUTO: 4.59 MILLION/UL (ref 3.88–5.62)
RIGHT ATRIUM AREA SYSTOLE A4C: 11.9 CM2
RIGHT VENTRICLE ID DIMENSION: 3 CM
SL CV LEFT ATRIUM LENGTH A2C: 3.1 CM
SL CV LV EF: 60
SL CV PED ECHO LEFT VENTRICLE DIASTOLIC VOLUME (MOD BIPLANE) 2D: 58 ML
SL CV PED ECHO LEFT VENTRICLE SYSTOLIC VOLUME (MOD BIPLANE) 2D: 28 ML
SODIUM SERPL-SCNC: 133 MMOL/L (ref 135–147)
SODIUM SERPL-SCNC: 137 MMOL/L (ref 135–147)
T WAVE AXIS: -6 DEGREES
T WAVE AXIS: 37 DEGREES
TR MAX PG: 18 MMHG
TR PEAK VELOCITY: 2.1 M/S
TRICUSPID ANNULAR PLANE SYSTOLIC EXCURSION: 2.6 CM
TRICUSPID VALVE PEAK REGURGITATION VELOCITY: 2.11 M/S
VENTRICULAR RATE: 102 BPM
VENTRICULAR RATE: 182 BPM
WBC # BLD AUTO: 3.95 THOUSAND/UL (ref 4.31–10.16)

## 2024-04-01 PROCEDURE — C1713 ANCHOR/SCREW BN/BN,TIS/BN: HCPCS | Performed by: ORTHOPAEDIC SURGERY

## 2024-04-01 PROCEDURE — 83735 ASSAY OF MAGNESIUM: CPT

## 2024-04-01 PROCEDURE — 0QS736Z REPOSITION LEFT UPPER FEMUR WITH INTRAMEDULLARY INTERNAL FIXATION DEVICE, PERCUTANEOUS APPROACH: ICD-10-PCS | Performed by: ORTHOPAEDIC SURGERY

## 2024-04-01 PROCEDURE — 80048 BASIC METABOLIC PNL TOTAL CA: CPT

## 2024-04-01 PROCEDURE — 73552 X-RAY EXAM OF FEMUR 2/>: CPT

## 2024-04-01 PROCEDURE — 93005 ELECTROCARDIOGRAM TRACING: CPT

## 2024-04-01 PROCEDURE — 93010 ELECTROCARDIOGRAM REPORT: CPT | Performed by: INTERNAL MEDICINE

## 2024-04-01 PROCEDURE — 99232 SBSQ HOSP IP/OBS MODERATE 35: CPT

## 2024-04-01 PROCEDURE — 82948 REAGENT STRIP/BLOOD GLUCOSE: CPT

## 2024-04-01 PROCEDURE — 99232 SBSQ HOSP IP/OBS MODERATE 35: CPT | Performed by: INTERNAL MEDICINE

## 2024-04-01 PROCEDURE — 85730 THROMBOPLASTIN TIME PARTIAL: CPT | Performed by: PHYSICIAN ASSISTANT

## 2024-04-01 PROCEDURE — 85730 THROMBOPLASTIN TIME PARTIAL: CPT | Performed by: INTERNAL MEDICINE

## 2024-04-01 PROCEDURE — 85027 COMPLETE CBC AUTOMATED: CPT

## 2024-04-01 DEVICE — LOCKING SCREW FOR IM NAIL Ø 5MM/ 50MM/ XL25/ STERILE: Type: IMPLANTABLE DEVICE | Site: LEG | Status: FUNCTIONAL

## 2024-04-01 DEVICE — LOCKING SCREW FOR IM NAIL Ø 5MM/ 46MM/ XL25/ STERILE: Type: IMPLANTABLE DEVICE | Site: LEG | Status: FUNCTIONAL

## 2024-04-01 DEVICE — 12MM/130 DEG TI CANN TFNA 440MM/LEFT-STERILE
Type: IMPLANTABLE DEVICE | Site: LEG | Status: FUNCTIONAL
Brand: TFN-ADVANCE

## 2024-04-01 DEVICE — TFNA FENESTRATED HELICAL BLADE 95MM - STERILE
Type: IMPLANTABLE DEVICE | Site: LEG | Status: FUNCTIONAL
Brand: TFN-ADVANCE

## 2024-04-01 RX ORDER — MIDAZOLAM HYDROCHLORIDE 2 MG/2ML
INJECTION, SOLUTION INTRAMUSCULAR; INTRAVENOUS AS NEEDED
Status: DISCONTINUED | OUTPATIENT
Start: 2024-04-01 | End: 2024-04-01

## 2024-04-01 RX ORDER — PROPOFOL 10 MG/ML
INJECTION, EMULSION INTRAVENOUS AS NEEDED
Status: DISCONTINUED | OUTPATIENT
Start: 2024-04-01 | End: 2024-04-01

## 2024-04-01 RX ORDER — HYDROMORPHONE HCL/PF 1 MG/ML
SYRINGE (ML) INJECTION AS NEEDED
Status: DISCONTINUED | OUTPATIENT
Start: 2024-04-01 | End: 2024-04-01

## 2024-04-01 RX ORDER — MAGNESIUM SULFATE HEPTAHYDRATE 40 MG/ML
2 INJECTION, SOLUTION INTRAVENOUS ONCE
Status: COMPLETED | OUTPATIENT
Start: 2024-04-01 | End: 2024-04-01

## 2024-04-01 RX ORDER — ROCURONIUM BROMIDE 10 MG/ML
INJECTION, SOLUTION INTRAVENOUS AS NEEDED
Status: DISCONTINUED | OUTPATIENT
Start: 2024-04-01 | End: 2024-04-01

## 2024-04-01 RX ORDER — PROPOFOL 10 MG/ML
INJECTION, EMULSION INTRAVENOUS CONTINUOUS PRN
Status: DISCONTINUED | OUTPATIENT
Start: 2024-04-01 | End: 2024-04-01

## 2024-04-01 RX ORDER — METOPROLOL TARTRATE 1 MG/ML
5 INJECTION, SOLUTION INTRAVENOUS ONCE
Status: COMPLETED | OUTPATIENT
Start: 2024-04-02 | End: 2024-04-01

## 2024-04-01 RX ORDER — ONDANSETRON 2 MG/ML
INJECTION INTRAMUSCULAR; INTRAVENOUS AS NEEDED
Status: DISCONTINUED | OUTPATIENT
Start: 2024-04-01 | End: 2024-04-01

## 2024-04-01 RX ORDER — KETAMINE HCL IN NACL, ISO-OSM 100MG/10ML
SYRINGE (ML) INJECTION AS NEEDED
Status: DISCONTINUED | OUTPATIENT
Start: 2024-04-01 | End: 2024-04-01

## 2024-04-01 RX ORDER — MAGNESIUM HYDROXIDE 1200 MG/15ML
LIQUID ORAL AS NEEDED
Status: DISCONTINUED | OUTPATIENT
Start: 2024-04-01 | End: 2024-04-01 | Stop reason: HOSPADM

## 2024-04-01 RX ORDER — ADENOSINE 3 MG/ML
INJECTION INTRAVENOUS CODE/TRAUMA/SEDATION MEDICATION
Status: COMPLETED | OUTPATIENT
Start: 2024-04-01 | End: 2024-04-01

## 2024-04-01 RX ORDER — FENTANYL CITRATE/PF 50 MCG/ML
50 SYRINGE (ML) INJECTION
Status: DISCONTINUED | OUTPATIENT
Start: 2024-04-01 | End: 2024-04-01 | Stop reason: HOSPADM

## 2024-04-01 RX ORDER — SODIUM CHLORIDE, SODIUM LACTATE, POTASSIUM CHLORIDE, CALCIUM CHLORIDE 600; 310; 30; 20 MG/100ML; MG/100ML; MG/100ML; MG/100ML
INJECTION, SOLUTION INTRAVENOUS CONTINUOUS PRN
Status: DISCONTINUED | OUTPATIENT
Start: 2024-04-01 | End: 2024-04-01

## 2024-04-01 RX ORDER — DEXAMETHASONE SODIUM PHOSPHATE 10 MG/ML
INJECTION, SOLUTION INTRAMUSCULAR; INTRAVENOUS AS NEEDED
Status: DISCONTINUED | OUTPATIENT
Start: 2024-04-01 | End: 2024-04-01

## 2024-04-01 RX ORDER — HEPARIN SODIUM 10000 [USP'U]/100ML
3-30 INJECTION, SOLUTION INTRAVENOUS
Status: DISCONTINUED | OUTPATIENT
Start: 2024-04-01 | End: 2024-04-02

## 2024-04-01 RX ORDER — CEFAZOLIN SODIUM 2 G/50ML
2000 SOLUTION INTRAVENOUS EVERY 8 HOURS
Qty: 100 ML | Refills: 0 | Status: COMPLETED | OUTPATIENT
Start: 2024-04-01 | End: 2024-04-02

## 2024-04-01 RX ORDER — POTASSIUM CHLORIDE 20 MEQ/1
40 TABLET, EXTENDED RELEASE ORAL ONCE
Status: DISCONTINUED | OUTPATIENT
Start: 2024-04-01 | End: 2024-04-01

## 2024-04-01 RX ORDER — OXYCODONE HYDROCHLORIDE 10 MG/1
10 TABLET ORAL EVERY 4 HOURS PRN
Status: DISCONTINUED | OUTPATIENT
Start: 2024-04-01 | End: 2024-04-03

## 2024-04-01 RX ORDER — ADENOSINE 3 MG/ML
6 INJECTION INTRAVENOUS ONCE
Status: COMPLETED | OUTPATIENT
Start: 2024-04-01 | End: 2024-04-01

## 2024-04-01 RX ORDER — METOPROLOL TARTRATE 1 MG/ML
2.5 INJECTION, SOLUTION INTRAVENOUS ONCE
Status: COMPLETED | OUTPATIENT
Start: 2024-04-01 | End: 2024-04-01

## 2024-04-01 RX ORDER — ALBUMIN, HUMAN INJ 5% 5 %
SOLUTION INTRAVENOUS CONTINUOUS PRN
Status: DISCONTINUED | OUTPATIENT
Start: 2024-04-01 | End: 2024-04-01

## 2024-04-01 RX ORDER — TRANEXAMIC ACID 100 MG/ML
INJECTION, SOLUTION INTRAVENOUS AS NEEDED
Status: DISCONTINUED | OUTPATIENT
Start: 2024-04-01 | End: 2024-04-01

## 2024-04-01 RX ORDER — ONDANSETRON 2 MG/ML
4 INJECTION INTRAMUSCULAR; INTRAVENOUS ONCE AS NEEDED
Status: DISCONTINUED | OUTPATIENT
Start: 2024-04-01 | End: 2024-04-01 | Stop reason: HOSPADM

## 2024-04-01 RX ORDER — SODIUM CHLORIDE, SODIUM LACTATE, POTASSIUM CHLORIDE, CALCIUM CHLORIDE 600; 310; 30; 20 MG/100ML; MG/100ML; MG/100ML; MG/100ML
50 INJECTION, SOLUTION INTRAVENOUS CONTINUOUS
Status: DISCONTINUED | OUTPATIENT
Start: 2024-04-01 | End: 2024-04-04

## 2024-04-01 RX ORDER — METHOCARBAMOL 500 MG/1
500 TABLET, FILM COATED ORAL ONCE
Status: COMPLETED | OUTPATIENT
Start: 2024-04-01 | End: 2024-04-01

## 2024-04-01 RX ORDER — ESMOLOL HYDROCHLORIDE 10 MG/ML
INJECTION INTRAVENOUS AS NEEDED
Status: DISCONTINUED | OUTPATIENT
Start: 2024-04-01 | End: 2024-04-01

## 2024-04-01 RX ORDER — POTASSIUM CHLORIDE 20 MEQ/1
40 TABLET, EXTENDED RELEASE ORAL ONCE
Status: COMPLETED | OUTPATIENT
Start: 2024-04-01 | End: 2024-04-01

## 2024-04-01 RX ORDER — FENTANYL CITRATE 50 UG/ML
INJECTION, SOLUTION INTRAMUSCULAR; INTRAVENOUS AS NEEDED
Status: DISCONTINUED | OUTPATIENT
Start: 2024-04-01 | End: 2024-04-01

## 2024-04-01 RX ORDER — METOPROLOL TARTRATE 1 MG/ML
5 INJECTION, SOLUTION INTRAVENOUS ONCE
Status: COMPLETED | OUTPATIENT
Start: 2024-04-01 | End: 2024-04-01

## 2024-04-01 RX ADMIN — FENTANYL CITRATE 50 MCG: 50 INJECTION INTRAMUSCULAR; INTRAVENOUS at 13:08

## 2024-04-01 RX ADMIN — ROCURONIUM BROMIDE 50 MG: 10 INJECTION, SOLUTION INTRAVENOUS at 13:12

## 2024-04-01 RX ADMIN — CEFAZOLIN SODIUM 2000 MG: 2 SOLUTION INTRAVENOUS at 13:24

## 2024-04-01 RX ADMIN — ADENOSINE 6 MG: 3 INJECTION, SOLUTION INTRAVENOUS at 17:35

## 2024-04-01 RX ADMIN — HYDROMORPHONE HYDROCHLORIDE 1 MG: 1 INJECTION, SOLUTION INTRAMUSCULAR; INTRAVENOUS; SUBCUTANEOUS at 13:30

## 2024-04-01 RX ADMIN — HYDROMORPHONE HYDROCHLORIDE 0.5 MG: 1 INJECTION, SOLUTION INTRAMUSCULAR; INTRAVENOUS; SUBCUTANEOUS at 07:29

## 2024-04-01 RX ADMIN — MAGNESIUM SULFATE HEPTAHYDRATE 2 G: 40 INJECTION, SOLUTION INTRAVENOUS at 09:00

## 2024-04-01 RX ADMIN — SODIUM CHLORIDE, SODIUM LACTATE, POTASSIUM CHLORIDE, AND CALCIUM CHLORIDE 50 ML/HR: .6; .31; .03; .02 INJECTION, SOLUTION INTRAVENOUS at 22:20

## 2024-04-01 RX ADMIN — Medication 20 MG: at 13:12

## 2024-04-01 RX ADMIN — PROPOFOL 150 MG: 10 INJECTION, EMULSION INTRAVENOUS at 13:12

## 2024-04-01 RX ADMIN — SODIUM CHLORIDE, SODIUM LACTATE, POTASSIUM CHLORIDE, AND CALCIUM CHLORIDE: .6; .31; .03; .02 INJECTION, SOLUTION INTRAVENOUS at 13:08

## 2024-04-01 RX ADMIN — ALBUMIN (HUMAN): 12.5 INJECTION, SOLUTION INTRAVENOUS at 14:19

## 2024-04-01 RX ADMIN — METOROPROLOL TARTRATE 5 MG: 5 INJECTION, SOLUTION INTRAVENOUS at 23:55

## 2024-04-01 RX ADMIN — SUGAMMADEX 200 MG: 100 INJECTION, SOLUTION INTRAVENOUS at 14:56

## 2024-04-01 RX ADMIN — CEFAZOLIN SODIUM 2000 MG: 2 SOLUTION INTRAVENOUS at 21:31

## 2024-04-01 RX ADMIN — TRANEXAMIC ACID 1 G: 100 INJECTION, SOLUTION INTRAVENOUS at 13:30

## 2024-04-01 RX ADMIN — ESMOLOL HYDROCHLORIDE 20 MG: 10 INJECTION, SOLUTION INTRAVENOUS at 13:18

## 2024-04-01 RX ADMIN — MIDAZOLAM 2 MG: 1 INJECTION INTRAMUSCULAR; INTRAVENOUS at 13:08

## 2024-04-01 RX ADMIN — MAGNESIUM SULFATE HEPTAHYDRATE 2 G: 40 INJECTION, SOLUTION INTRAVENOUS at 18:43

## 2024-04-01 RX ADMIN — HYDROMORPHONE HYDROCHLORIDE 0.5 MG: 1 INJECTION, SOLUTION INTRAMUSCULAR; INTRAVENOUS; SUBCUTANEOUS at 09:36

## 2024-04-01 RX ADMIN — FENTANYL CITRATE 50 MCG: 50 INJECTION INTRAMUSCULAR; INTRAVENOUS at 13:13

## 2024-04-01 RX ADMIN — HYDROMORPHONE HYDROCHLORIDE 0.5 MG: 1 INJECTION, SOLUTION INTRAMUSCULAR; INTRAVENOUS; SUBCUTANEOUS at 05:14

## 2024-04-01 RX ADMIN — PROPOFOL 80 MCG/KG/MIN: 10 INJECTION, EMULSION INTRAVENOUS at 13:15

## 2024-04-01 RX ADMIN — METHOCARBAMOL TABLETS 500 MG: 500 TABLET, COATED ORAL at 12:48

## 2024-04-01 RX ADMIN — METOPROLOL TARTRATE 5 MG: 1 INJECTION, SOLUTION INTRAVENOUS at 15:48

## 2024-04-01 RX ADMIN — HEPARIN SODIUM 18 UNITS/KG/HR: 10000 INJECTION, SOLUTION INTRAVENOUS at 22:19

## 2024-04-01 RX ADMIN — ACETAMINOPHEN 975 MG: 325 TABLET, FILM COATED ORAL at 21:31

## 2024-04-01 RX ADMIN — ONDANSETRON 4 MG: 2 INJECTION INTRAMUSCULAR; INTRAVENOUS at 13:36

## 2024-04-01 RX ADMIN — DEXAMETHASONE SODIUM PHOSPHATE 10 MG: 10 INJECTION, SOLUTION INTRAMUSCULAR; INTRAVENOUS at 13:12

## 2024-04-01 RX ADMIN — SODIUM CHLORIDE, SODIUM LACTATE, POTASSIUM CHLORIDE, AND CALCIUM CHLORIDE 50 ML/HR: .6; .31; .03; .02 INJECTION, SOLUTION INTRAVENOUS at 16:56

## 2024-04-01 RX ADMIN — ALBUMIN (HUMAN): 12.5 INJECTION, SOLUTION INTRAVENOUS at 14:43

## 2024-04-01 RX ADMIN — POTASSIUM CHLORIDE 40 MEQ: 1500 TABLET, EXTENDED RELEASE ORAL at 18:43

## 2024-04-01 RX ADMIN — METOROPROLOL TARTRATE 2.5 MG: 5 INJECTION, SOLUTION INTRAVENOUS at 18:51

## 2024-04-01 RX ADMIN — MORPHINE SULFATE 30 MG: 30 TABLET, EXTENDED RELEASE ORAL at 16:55

## 2024-04-01 RX ADMIN — ADENOSINE 6 MG: 3 INJECTION INTRAVENOUS at 18:35

## 2024-04-01 NOTE — ASSESSMENT & PLAN NOTE
Incidental findings of PE on CTA PE study at [facility]: subocclusive emboli in the lower arteries in the right middle and lower lobes which are peripheral, no infarct or right heart strain, multiple lytic lesions   D-dimer 4.25  Troponin 53, 49, 50  EKG showed sinus tach in the 180s  Echo: EF 60%, mild abnormal of diastolic function    Plan:  Resume heparin drip in AM

## 2024-04-01 NOTE — PROGRESS NOTES
Progress Note - Acute Pain Service    Fredy Martinez Jr. 58 y.o. male MRN: [id number]  Unit/Bed#: S -01 Encounter: [id number]      Fredy Martinez Jr. is a 58 y.o. male with a history of multiple myeloma with cancer-related pain on chronic opioids who presented to the emergency department with hip pain found to have left hip pathologic fracture.  Scheduled for left hip IM nail with orthopedics 4/1/2024    Seen at bedside this morning, continues to report left-sided hip pain with occasional muscle spasms.  Current dose of oxycodone has only been mildly effective in alleviating pain.  We discussed up titration of current oxycodone regimen he is in agreement.  Plan for OR intervention today.    Multiple myeloma (HCC)  Assessment & Plan  Presenting with Lt. Pathologic prox femur fracture scheduled for Lt. Hip IM nail     -Continue patient's home pain regimen along with breakthrough pain medication, will consider PCA and potentially ketamine gtt if needed for post op pain. Will not be a candidate for neuraxial block due to patient needing to be on therapeutic anticoagulation for PE   - Continue MS contin 30mg BID  - Continue Dilaudid 0.5mg Q2hr prn breakthrough pain  - Increase oxycodone to 10/15mg for mod/severe pain Q4h prn  - Continue home flexeril 5mg TID for spasms  - Can consider adding gabapentin for neuropathic pain  - Continue tylenol 975mg TID         APS will continue to follow. Please contact Acute Pain Service - via [email or web address] from [phone] with additional questions or concerns. See [email or web address] or She for additional contacts and after hours information.     Pain History  Current pain location(s):  Pain Score: 9  Pain Location/Orientation: Orientation: Left  Pain Scale: Pain Assessment Tool: 0-10  24 hour history: No acute events, remains hemodynamically stable.  Tolerating current MMA and denies any opioid-induced side effects    Opioid requirement previous 24 hours:   IV Dilaudid 4.2 mg  90 mg MS ER  8 mg  IV morphine  25 mg oxycodone    Meds/Allergies   all current active meds have been reviewed, current meds:   Current Facility-Administered Medications   Medication Dose Route Frequency    acetaminophen (TYLENOL) tablet 975 mg  975 mg Oral Q8H RUTH    ceFAZolin (ANCEF) IVPB (premix in dextrose) 2,000 mg 50 mL  2,000 mg Intravenous On Call To OR    cyclobenzaprine (FLEXERIL) tablet 5 mg  5 mg Oral TID    dexamethasone (DECADRON) tablet 0.5 mg  0.5 mg Oral Daily Before Breakfast    heparin (porcine) injection 2,400 Units  2,400 Units Intravenous Q6H PRN    heparin (porcine) injection 4,800 Units  4,800 Units Intravenous Q6H PRN    HYDROmorphone (DILAUDID) injection 0.5 mg  0.5 mg Intravenous Q2H PRN    lenalidomide (REVLIMID) capsule 5 mg  5 mg Oral Daily    magnesium sulfate 2 g/50 mL IVPB (premix) 2 g  2 g Intravenous Once    metoprolol (LOPRESSOR) injection 5 mg  5 mg Intravenous Q6H PRN    morphine (MS CONTIN) ER tablet 30 mg  30 mg Oral Q12H    naloxone (NARCAN) 0.04 mg/mL syringe 0.04 mg  0.04 mg Intravenous Q1MIN PRN    ondansetron (ZOFRAN) injection 4 mg  4 mg Intravenous Q6H PRN    oxyCODONE (ROXICODONE) immediate release tablet 10 mg  10 mg Oral Q4H PRN    Or    oxyCODONE (ROXICODONE) IR tablet 15 mg  15 mg Oral Q4H PRN    pantoprazole (PROTONIX) EC tablet 40 mg  40 mg Oral Daily    polyethylene glycol (MIRALAX) packet 17 g  17 g Oral Daily    senna (SENOKOT) tablet 8.6 mg  1 tablet Oral Daily   , and PTA meds:   Prior to Admission Medications   Prescriptions Last Dose Informant Patient Reported? Taking?   acetaminophen (TYLENOL) 500 mg tablet 3/30/2024 Self, Spouse/Significant Other No Yes   Sig: Take 2 tablets (1,000 mg total) by mouth 3 (three) times a day   cyclobenzaprine (FLEXERIL) 5 mg tablet 3/30/2024  Yes Yes   Sig: Take 5 mg by mouth 3 (three) times a day as needed for muscle spasms   dexamethasone (DECADRON) 1 mg tablet 3/30/2024  No Yes   Sig: Take 0.5 tablets (0.5 mg total) by mouth daily before  "breakfast   lenalidomide (REVLIMID) 5 MG CAPS 3/30/2024  No Yes   Sig: Take 1 capsule (5 mg total) by mouth daily   morphine (MS CONTIN) 30 mg 12 hr tablet 3/30/2024  No Yes   Sig: Take 1 tablet (30 mg total) by mouth every 12 (twelve) hours Take on a schedule to prevent and reduce cancer pain Max Daily Amount: 60 mg   naloxone (NARCAN) 4 mg/0.1 mL nasal spray Past Month Self, Spouse/Significant Other No Yes   Sig: Administer 1 spray into a nostril. If no response after 2-3 minutes, give another dose in the other nostril using a new spray.   ondansetron (ZOFRAN) 4 mg tablet Past Week  No Yes   Sig: Take 1 tablet (4 mg total) by mouth every 8 (eight) hours as needed for nausea or vomiting   oxyCODONE (ROXICODONE) 10 MG TABS 3/30/2024  No Yes   Sig: Take 1 tablet (10 mg total) by mouth every 4 (four) hours as needed (cancer pain) Max Daily Amount: 60 mg   pantoprazole (PROTONIX) 40 mg tablet 3/30/2024  No Yes   Sig: Take 1 tablet (40 mg total) by mouth daily - in the morning      Facility-Administered Medications: None       No Known Allergies    Objective        Vitals:    03/31/24 1300 03/31/24 1529 03/31/24 2332 04/01/24 0715   BP: 136/82 124/82 112/76 108/77   Pulse: (!) 109 (!) 118 (!) 121 99   Resp:  14 18 20   Temp:  98.5 °F (36.9 °C) 98.5 °F (36.9 °C) 97.8 °F (36.6 °C)   SpO2:  99% 98% 97%   Weight: 60.3 kg (132 lb 15 oz)      Height: 5' 10\" (1.778 m)            Physical Exam  Vitals reviewed.   HENT:      Head: Normocephalic and atraumatic.   Cardiovascular:      Rate and Rhythm: Normal rate.   Pulmonary:      Effort: Pulmonary effort is normal.   Chest:      Chest wall: No tenderness.   Abdominal:      Palpations: Abdomen is soft.   Musculoskeletal:         General: Tenderness (L hip) present.      Cervical back: Normal range of motion.   Skin:     General: Skin is warm and dry.      Coloration: Skin is not pale.      Findings: No rash.   Neurological:      Mental Status: He is alert and oriented to person, " place, and time. Mental status is at baseline.           Lab Results:   Estimated Creatinine Clearance: 193.1 mL/min (A) (by C-G formula based on SCr of 0.36 mg/dL (L)).  Lab Results   Component Value Date    WBC 3.95 (L) 04/01/2024    HGB 14.2 04/01/2024    HCT 42.2 04/01/2024     (L) 04/01/2024         Component Value Date/Time    K 3.5 04/01/2024 0717     04/01/2024 0717    CO2 27 04/01/2024 0717    BUN 6 04/01/2024 0717    CREATININE 0.36 (L) 04/01/2024 0717         Component Value Date/Time    CALCIUM 8.6 04/01/2024 0717    ALKPHOS 114 (H) 03/30/2024 0907    AST 12 (L) 03/30/2024 0907    ALT 6 (L) 03/30/2024 0907    TP 6.7 03/30/2024 0907    ALB 3.6 03/30/2024 0907       Imaging Studies/EKG: I have personally reviewed pertinent reports.       Please note that the APS provides consultative services regarding pain management only.  With the exception of ketamine and epidural infusions and except when indicated, final decisions regarding starting or changing doses of analgesic medications are at the discretion of the consulting service.    PABLO Llanes   Acute Pain Service

## 2024-04-01 NOTE — UTILIZATION REVIEW
NOTIFICATION OF INPATIENT ADMISSION   AUTHORIZATION REQUEST   SERVICING FACILITY:   [place] [address]  Tax ID: [id number]  NPI: [id number]   ATTENDING PROVIDER:  Attending Name and NPI#: Marleny Grider Md [[id number]]  Address: [address] [place] [address]  Phone: [phone]     ADMISSION INFORMATION:  Place of Service: Inpatient [facility]  Place of Service Code: 21  Inpatient Admission Date/Time: 3/30/24  4:35 PM  Discharge Date/Time: No discharge date for patient encounter.  Admitting Diagnosis Code/Description:  Femur fracture (HCC) [S72.90XA]     UTILIZATION REVIEW CONTACT:  Enoch Lebron Utilization   Network Utilization Review Department  Phone: [phone]  Fax: [phone]  Email: [email or web address]  Contact for approvals/pending authorizations, clinical reviews, and discharge.     PHYSICIAN ADVISORY SERVICES:  Medical Necessity Denial & Exwb-[id number] Review  Phone: [phone]  Fax: [phone]  Email: [email or web address]     DISCHARGE SUPPORT TEAM:  For Patients Discharge Needs & Updates  Phone: [phone] opt. 2 Fax: [phone]  Email: [email or web address]

## 2024-04-01 NOTE — CONSULTS
Formerly [facility], [facility]  Consult  Name: Fredy Martinez Jr. 58 y.o. male I MRN: [id number]  Unit/Bed#: ICU 16 I Date of Admission: 3/30/2024   Date of Service: 4/1/2024 I Hospital Day: 2    Consults    Assessment/Plan   SVT (supraventricular tachycardia)  Assessment & Plan  SVT on the monitor. Was a rapid response earlier, was given adenosine with improvement of HR and SBP.  Patient went back into SVT and received another dose of 6 mg Adenosine with improvement of HR  Improvement on EKG, sinus tachycardia 115, nonspecific ST changes    Plan:  Continue to monitor on Tele  2.5 Lopressor per Cardio  Monitor and replete electrolytes             History of Present Illness     HPI: Fredy Martinez Jr. is a 58 y.o. with PMHx of multiple myeloma on Revlimid and RVD therapy, s/p Stem cell transplant presented on 3/30 for LLE pain found to have L femur fx and was transferred to [place] from [facility] for orthopedic intervention. Was found to be in sinus tachycardia on admission.    Earlier today, patient was a rapid response for SVT, received Adenosine with improvement of HR and SBP, transferred to SD2. While in SD2, patient went back into SVT, received another 6 mg Adenosine with improvement of HR, sinus tachy on EKG, and was transferred to critical care for further management.    History obtained from the patient and chart review  Review of Systems   Constitutional:  Negative for activity change, fever and unexpected weight change.   Respiratory:  Negative for cough, chest tightness and shortness of breath.    Cardiovascular:  Negative for chest pain and palpitations.   Gastrointestinal:  Negative for abdominal pain, diarrhea, nausea and vomiting.   Genitourinary:  Negative for dysuria and hematuria.   Skin:  Negative for wound.   Allergic/Immunologic: Negative for immunocompromised state.   Neurological:  Negative for syncope.   All other systems reviewed and are negative.    Disposition: Stepdown Level 1    Historical Information   Past Medical History:  No date: Cancer (HCC)      Comment:  bone-  No date: GERD (gastroesophageal reflux disease)  No date: Multiple myeloma (HCC) Past Surgical History:  No date: APPENDECTOMY  3/15/2023: IR BIOPSY BONE MARROW   Current Outpatient Medications   Medication Instructions    acetaminophen (TYLENOL) 1,000 mg, Oral, 3 times daily    apixaban (Eliquis) 5 mg Take 2 tablets (10 mg total) by mouth 2 (two) times a day for 7 days, THEN 1 tablet (5 mg total) 2 (two) times a day for 23 days.    dexamethasone (DECADRON) 0.5 mg, Oral, Daily before breakfast    lenalidomide (REVLIMID) 5 mg, Oral, Daily    morphine (MS CONTIN) 30 mg, Oral, Every 12 hours, Take on a schedule to prevent and reduce cancer pain    naloxone (NARCAN) 4 mg/0.1 mL nasal spray Administer 1 spray into a nostril. If no response after 2-3 minutes, give another dose in the other nostril using a new spray.    ondansetron (ZOFRAN) 4 mg, Oral, Every 8 hours PRN    oxyCODONE (ROXICODONE) 10 mg, Oral, Every 4 hours PRN    pantoprazole (PROTONIX) 40 mg, Oral, Daily, - in the morning    No Known Allergies   Social History     Tobacco Use    Smoking status: Some Days     Types: Cigarettes    Tobacco comments:     Smoking 1 cigarette daily   Vaping Use    Vaping status: Never Used   Substance Use Topics    Alcohol use: Not Currently    Drug use: Not Currently     Types: Marijuana     Comment: once a month    Family History   Problem Relation Age of Onset    Diabetes Mother     Heart disease Father     Diabetes Father         Objective                            Vitals I/O      Most Recent Min/Max in 24hrs   Temp 98.1 °F (36.7 °C) Temp  Min: 97.7 °F (36.5 °C)  Max: 99.6 °F (37.6 °C)   Pulse (!) 116 Pulse  Min: 80  Max: 194   Resp (!) 24 Resp  Min: 14  Max: 24   /65 BP  Min: 93/63  Max: 141/65   O2 Sat 98 % SpO2  Min: 97 %  Max: 100 %      Intake/Output Summary (Last 24 hours) at 4/1/2024 1940  Last data filed at 4/1/2024  1512  Gross per 24 hour   Intake 1398.88 ml   Output 1100 ml   Net 298.88 ml       Diet Regular; Regular House    Invasive Monitoring   PA Catheter   Most Recent  Min/Max in 24hrs    PAP   No data recorded   CVP   No data recorded   CI    No data recorded   SVR   No data recorded             Physical Exam   Physical Exam  Vitals reviewed.   Eyes:      General:         Right eye: No discharge.         Left eye: No discharge.      Extraocular Movements: Extraocular movements intact.      Pupils: Pupils are equal, round, and reactive to light.   Skin:     General: Skin is warm and dry.      Findings: No lesion.   HENT:      Head: Normocephalic and atraumatic.      Right Ear: Hearing normal. No drainage.      Left Ear: Hearing normal. No drainage.      Nose: No congestion or rhinorrhea.      Mouth/Throat:      Mouth: Mucous membranes are moist.   Cardiovascular:      Rate and Rhythm: Regular rhythm. Tachycardia present.      Pulses: Normal pulses.      Heart sounds: Normal heart sounds.   Musculoskeletal:         General: No swelling. Normal range of motion.      Right lower leg: No edema.      Left lower leg: No edema.   Abdominal: General: Bowel sounds are normal. There is no distension.      Palpations: Abdomen is soft.      Tenderness: There is no abdominal tenderness.   Constitutional:       General: He is not in acute distress.     Appearance: He is well-developed. He is not toxic-appearing.   Pulmonary:      Effort: Pulmonary effort is normal. No respiratory distress.      Breath sounds: Normal breath sounds.   Neurological:      General: No focal deficit present.      Mental Status: He is alert and oriented to person, place and time. Mental status is at baseline.            Diagnostic Studies      EKG: Sinus tachycardia, rate 115, normal axis, normal QRS interval, nonspecific ST and T-wave changes.  Imaging: reviewed I have personally reviewed pertinent reports.       Medications:  Scheduled PRN   acetaminophen,  975 mg, Q8H RUTH  cefazolin, 2,000 mg, On Call To OR  cefazolin, 2,000 mg, Q8H  dexamethasone, 0.5 mg, Daily Before Breakfast  lenalidomide, 5 mg, Daily  magnesium sulfate, 2 g, Once  morphine, 30 mg, Q12H  pantoprazole, 40 mg, Daily  polyethylene glycol, 17 g, Daily  senna, 1 tablet, Daily      heparin (porcine), 2,400 Units, Q6H PRN  heparin (porcine), 4,800 Units, Q6H PRN  HYDROmorphone, 0.5 mg, Q2H PRN  metoprolol, 5 mg, Q6H PRN  naloxone, 0.04 mg, Q1MIN PRN  ondansetron, 4 mg, Q6H PRN  oxyCODONE, 10 mg, Q4H PRN   Or  oxyCODONE, 15 mg, Q4H PRN       Continuous    heparin (porcine), 3-30 Units/kg/hr (Order-Specific)  lactated ringers, 50 mL/hr, Last Rate: 50 mL/hr (04/01/24 1656)         Labs:    CBC    Recent Labs     03/31/24 0428 04/01/24  0717   WBC 6.75 3.95*   HGB 15.5 14.2   HCT 45.7 42.2    131*     BMP    Recent Labs     04/01/24  0717 04/01/24  1758   SODIUM 133* 137   K 3.5 3.4*    100   CO2 27 25   AGAP 6 12   BUN 6 6   CREATININE 0.36* 0.39*   CALCIUM 8.6 8.4       Coags    Recent Labs     03/30/24  2114 03/31/24  0428 04/01/24  0958 04/01/24  1758   INR 1.10  --   --   --    PTT 64*   < > 30 30    < > = values in this interval not displayed.        Additional Electrolytes  Recent Labs     04/01/24  0717 04/01/24  1758   MG 1.7* 1.9          Blood Gas    No recent results  Recent Labs     03/31/24  1606   PHVEN 7.472*   [id number] 33.0*   PO2VEN 70.6*   NCW1BTT 23.6*   BEVEN 0.7   [id number] 93.5*    LFTs  No recent results    Infectious  No recent results  Glucose  Recent Labs     03/31/24  0428 04/01/24  0717 04/01/24  1758   GLUC 91 107 134               Donna Infante MD

## 2024-04-01 NOTE — ASSESSMENT & PLAN NOTE
Complaining of L leg pain for 10 days. No trauma.  Hx of MM not in remission  Ortho: s/p cephalomedullary nailing of left peritrochanteric path femur fracture 4/1     Plan-   Oncology: Subocclusive emboli were noted; was started on heparin; will hold through surgery and restart per ortho.  Eventual bridge to DOAC after surgery/when stable and not at risk to bleed.      Pain regimen in place as well as home regimen    Acute Pain Service recommendations:   Continue patient's home pain regimen along with breakthrough pain medication, will consider PCA and potentially ketamine gtt if needed for post op pain. Will not be a candidate for neuraxial block due to patient needing to be on therapeutic anticoagulation for PE   - Continue MS contin 30mg BID  - Continue Dilaudid 0.5mg Q2hr prn breakthrough pain  - Increase oxycodone to 10/15mg for mod/severe pain Q4h prn  - Continue home flexeril 5mg TID for spasms  - Can consider adding gabapentin for neuropathic pain  - Continue tylenol 975mg TID    Resume Heparin in AM

## 2024-04-01 NOTE — DISCHARGE INSTR - AVS FIRST PAGE
Discharge Instructions - Orthopedics  Fredy Martinez JrVijay 58 y.o. male MRN: [id number]  Unit/Bed#: AN OR MAIN    Weight Bearing Status:                                           Weight bearing as tolerated left lower extremity    DVT prophylaxis:  Per medicine team.    Pain:  Continue analgesics as directed    Dressing Instructions:   Please keep incisions clean, dry and intact until follow up     Appt Instructions:   If you do not have your appointment, please call the clinic at [phone] to schedule with Dr. Bang.   Otherwise follow up as scheduled.    Contact the office sooner if you experience any increased numbness/tingling in the extremities.

## 2024-04-01 NOTE — CONSULTS
"  Consult note previously done by Dr Bang, mislabeled as \"op note\" on 3/31        Orthopedics   Fredy Martinez Jr. 58 y.o. male MRN: [id number]  Unit/Bed#: S -01        Chief Complaint:   left hip pain     HPI:   58 y.o.male with history of multiple myeloma and currently on lenalidomide and dexamethasone, who is being seen secondary to left hip pain.  Patient states he had left hip pain for about 1 month.  He initially thought that he may have hurt his hamstring.  He states that about a couple of weeks ago, he flexed his hip and he heard a pop.  He had difficulty with ambulation.  He stated due to the continued pain, he decided that it was time to go to the hospital for further evaluation.  Patient states he has difficulty with moving that left lower extremity.  Denies any numbness tingling fevers or chills.  Patient denies any chest pain at this time or any difficulty with breathing     Review Of Systems:   Skin: Normal  Neuro: See HPI  Musculoskeletal: See HPI  14 point review of systems negative except as stated above      Past Medical History:   Medical History        Past Medical History:   Diagnosis Date    Cancer (HCC)       bone-    GERD (gastroesophageal reflux disease)      Multiple myeloma (HCC)              Past Surgical History:   Surgical History         Past Surgical History:   Procedure Laterality Date    APPENDECTOMY        IR BIOPSY BONE MARROW   3/15/2023            Family History:  Family history reviewed and non-contributory  Family History         Family History   Problem Relation Age of Onset    Diabetes Mother      Heart disease Father      Diabetes Father              Social History:  Social History   Social History            Socioeconomic History    Marital status: /Civil Union       Spouse name: Not on file    Number of children: Not on file    Years of education: Not on file    Highest education level: Not on file   Occupational History    Not on file   Tobacco Use    " Smoking status: Some Days       Types: Cigarettes    Smokeless tobacco: Not on file    Tobacco comments:       Smoking 1 cigarette daily   Vaping Use    Vaping status: Never Used   Substance and Sexual Activity    Alcohol use: Not Currently    Drug use: Not Currently       Types: Marijuana       Comment: once a month    Sexual activity: Not on file   Other Topics Concern    Not on file   Social History Narrative     From 2/28/23  note:     Emergency Contact: Dee Martinez (NAVI)[phone] (Home Phone)     Marital Status: Single      Interpretation concerns, speaks another language, preferred language: english     Caregiver/Support: Mayur     Housing: two story      Home Setup: one level      Lives With: NAVI     Daily Living Activities: independent      Ambulation: independent     Employment: yes      [place] Status/Location: no      Ability to pay bills: yes. No barriers to paying bills.      POA/LW/AD: no.  Karlie is healthcare representative. MSW emailed advance directive form.     Transportation Plan/Concerns: Patient states he transports self.  MSW educated on [employer] transport services.       Social Determinants of Health      Financial Resource Strain: Not on file   Food Insecurity: Not on file   Transportation Needs: Not on file   Physical Activity: Not on file   Stress: Not on file   Social Connections: Not on file   Intimate Partner Violence: Not on file   Housing Stability: Not on file            Allergies:   No Known Allergies           Labs:        0   Lab Value Date/Time     HCT 45.7 03/31/2024 0428     HCT 48.9 03/30/2024 0907     HCT 49.3 02/21/2024 1256     HGB 15.5 03/31/2024 0428     HGB 16.9 03/30/2024 0907     HGB 16.3 02/21/2024 1256     INR 1.10 03/30/2024 2114     WBC 6.75 03/31/2024 0428     WBC 7.83 03/30/2024 0907     WBC 4.59 02/21/2024 1256         Meds:     Current Facility-Administered Medications:     acetaminophen (TYLENOL) tablet 975 mg, 975 mg, Oral, Q8H Shirley MIRANDA  "MD Red, 975 mg at 03/31/24 0608    ceFAZolin (ANCEF) IVPB (premix in dextrose) 2,000 mg 50 mL, 2,000 mg, Intravenous, On Call To OR, Maury Moctezuma PA-C, Held at 03/31/24 0605    cyclobenzaprine (FLEXERIL) tablet 5 mg, 5 mg, Oral, TID PRN, Shirley Moon MD, 5 mg at 03/30/24 2051    dexamethasone (DECADRON) tablet 0.5 mg, 0.5 mg, Oral, Daily Before Breakfast, Shirley Moon MD, 0.5 mg at 03/31/24 0608    heparin (porcine) 25,000 units in 0.45% NaCl 250 mL infusion (premix), 3-30 Units/kg/hr (Order-Specific), Intravenous, Titrated, Shirley Moon MD    heparin (porcine) injection 2,400 Units, 2,400 Units, Intravenous, Q6H PRN, Shirley Moon MD    heparin (porcine) injection 4,800 Units, 4,800 Units, Intravenous, Q6H PRN, Shirley Moon MD    HYDROmorphone (DILAUDID) injection 0.5 mg, 0.5 mg, Intravenous, Q2H PRN, Zahida White MD, 0.5 mg at 03/31/24 0000    metoprolol (LOPRESSOR) injection 5 mg, 5 mg, Intravenous, Q6H PRN, Shirley Moon MD    morphine (MS CONTIN) ER tablet 30 mg, 30 mg, Oral, Q12H, Shirley Moon MD, 30 mg at 03/31/24 0608    naloxone (NARCAN) 0.04 mg/mL syringe 0.04 mg, 0.04 mg, Intravenous, Q1MIN PRN, Shirley Moon MD    NON FORMULARY, 5 mg, Oral, Daily, Shirley Moon MD    ondansetron (ZOFRAN) injection 4 mg, 4 mg, Intravenous, Q6H PRN, Shirley Moon MD    oxyCODONE (ROXICODONE) immediate release tablet 10 mg, 10 mg, Oral, Q4H PRN, Shirley Moon MD, 10 mg at 03/30/24 2050    oxyCODONE (ROXICODONE) IR tablet 5 mg, 5 mg, Oral, Q4H PRN **OR** oxyCODONE (ROXICODONE) immediate release tablet 10 mg, 10 mg, Oral, Q4H PRN, Shirley Moon MD, 10 mg at 03/31/24 0422    pantoprazole (PROTONIX) EC tablet 40 mg, 40 mg, Oral, Daily, Shirley Moon MD    polyethylene glycol (MIRALAX) packet 17 g, 17 g, Oral, Daily, Shirley Moon MD    senna (SENOKOT) tablet 8.6 mg, 1 tablet, Oral, Daily, Shirley Moon MD     Blood Culture:   No results found for: \"BLOODCX\"     Wound Culture:   No results found for: " "\"WOUNDCULT\"     Ins and Outs:  No intake/output data recorded.              Physical Exam:   /82   Pulse (!) 109   Temp 97.7 °F (36.5 °C)   Resp 18   SpO2 97%   Gen: No acute distress, resting comfortably in bed  HEENT: Eyes clear, moist mucus membranes, hearing intact  Respiratory: No audible wheezing or stridor  Cardiovascular: Well Perfused peripherally, 2+ distal pulse  Abdomen: nondistended, no peritoneal signs  Musculoskeletal: left lower extremity  No abrasions or open wounds  Patient's left lower extremity is in a slightly flexed and internally rotated position  There is pitting edema involving lower extremity going to the proximal third of the leg  Pain with any attempted logrolling  Patient able to dorsiflex and plantarflex ankle  Sensation intact to superficial deep peroneal nerve  Brisk capillary refill     Radiology:   I personally reviewed the films.  X-rays AP/Lateral of the hip and lateral of the femur shows mottled appearance of femur with osteopenia..  Show fracture just at the level of the lesser trochanteric region and transverse in nature and displaced.     _*_*_*_*_*_*_*_*_*_*_*_*_*_*_*_*_*_*_*_*_*_*_*_*_*_*_*_*_*_*_*_*_*_*_*_*_*_*_*_*_*     Assessment:  58 y.o.male with history of multiple myeloma with atraumatic because of a pathologic fracture involving the left peritrochanteric femoral region.     Plan:   Nonweightbearing left lower extremity  Patient currently being treated acutely for his bilateral pulmonary emboli with a heparin drip  Patient will require multidisciplinary restratification prior to surgical intervention  Plan is for cephalomedullary nailing of left peritrochanteric, pathologic femur fracture  Pros and cons of operative and nonoperative intervention were discussed with patient.  Similar complications include but not limited to infection, bleeding, scarring, nerve injury, vascular injury, malunion, nonunion, continued pain, decreased range of motion, DVT, PE, " death, loss of limb, and obtain results patient was.  Orders were understood and patient was consented for operative intervention for his pathology  Patient may eat at this time  We will make patient n.p.o. past midnight for possible surgical intervention tomorrow  Orders were discussed with patient and patient is in agreement with treatment plan  Pain medication and PE treatment as per primary team  Dispo: Ortho will follow        Pat Bang MD

## 2024-04-01 NOTE — ASSESSMENT & PLAN NOTE
On Revlimid and RVD infusion  RVD infusion includes bortezomib, lenalidomide, dexamethasone; Completed Cycle length = 21 days x 3-4 cycles  S/p ASCT  Also follows Palliative Care as OP     Per oncology:  Based on lytic disease on baseline PET with improvement repeat PET Oct 2023 and no disease in the pelvis on PET Oct 2023, unclear if this pathologic fracture is progression of disease  WBC 6.8 Hgb 15.5 plts 159 Ca 8.7 Cr 0.47    Patient of Dr Garcia    Plan:  BM biopsy today 4/1/24   can restart Revlimid  if no change in counts/bleed after surgery.Plan to resume from 4/2/24

## 2024-04-01 NOTE — PROGRESS NOTES
[facility]  Progress Note  Name: Fredy Houston I  MRN: [id number]  Unit/Bed#: OR POOL I Date of Admission: 3/30/2024   Date of Service: 4/1/2024 I Hospital Day: 2    Assessment/Plan   * Closed fracture of neck of left femur (HCC)  Assessment & Plan  Complaining of L leg pain for 10 days  No trauma  Hx of MM not in remission    Plan-   Oncology: Subocclusive emboli were noted; was started on heparin; will hold through surgery and restart per ortho.  Eventual bridge to DOAC after surgery/when stable and not at risk to bleed.    Ortho: Today Plan is cephalomedullary nailing of left peritrochanteric path femur fracture   Resume Heparin gtt for PE upon Ortho recs  Pain regimen in place as well as home regimen    Acute Pain Service recommendations:   Continue patient's home pain regimen along with breakthrough pain medication, will consider PCA and potentially ketamine gtt if needed for post op pain. Will not be a candidate for neuraxial block due to patient needing to be on therapeutic anticoagulation for PE   - Continue MS contin 30mg BID  - Continue Dilaudid 0.5mg Q2hr prn breakthrough pain  - Increase oxycodone to 10/15mg for mod/severe pain Q4h prn  - Continue home flexeril 5mg TID for spasms  - Can consider adding gabapentin for neuropathic pain  - Continue tylenol 975mg TID     Multiple myeloma (HCC)  Assessment & Plan  On Revlimid and RVD infusion  RVD infusion includes bortezomib, lenalidomide, dexamethasone; Completed Cycle length = 21 days x 3-4 cycles  S/p ASCT  Also follows Palliative Care as OP     Per oncology:  Based on lytic disease on baseline PET with improvement repeat PET Oct 2023 and no disease in the pelvis on PET Oct 2023, unclear if this pathologic fracture is progression of disease  WBC 6.8 Hgb 15.5 plts 159 Ca 8.7 Cr 0.47    Patient of Dr Garcia    Plan:  BM biopsy today 4/1/24   can restart Revlimid  if no change in counts/bleed after surgery.Plan to resume from  4/2/24        Acute pulmonary embolism without acute cor pulmonale (HCC)  Assessment & Plan  Incidental findings of PE on [place] [facility]   D-Dimer 4.25  Troponin 53, 49, 50  EKG demonstrated sinus tach in the 180s however possible artifact in the setting of severe pain  CTA PE study-subocclusive emboli in the lower arteries in the right middle and right lower lobes which are peripheral, no infarct or right heart strain, multiple lytic lesions  SLE started patient on heparin drip  Lab Results   Component Value Date     (H) 03/30/2024    LVEF 60 03/31/2024      Possible in the setting of immobility   Plan-  Resume heparin drip upon ortho recs.   Echo- 60 percent. Diastolic function is mildly abnormal, consistent with grade I (abnormal) relaxation.   STAT ECHO if pt is unstable.     Sinus tachycardia  Assessment & Plan  [place] from [facility] demonstrated sinus tachycardia in the 180s  Was given Lopressor 5 Mg IV  Patient is in excruciating pain likely artifact vs acute PE changes   Pulse: (!) 109    Plan-  Will continue on telemetry   Lopressor 5mg IV PRN               VTE Pharmacologic Prophylaxis: VTE Score: 3 High Risk (Score >/= 5) - Pharmacological DVT Prophylaxis Contraindicated. Sequential Compression Devices Ordered.    Mobility:   Basic Mobility Inpatient Raw Score: 7  JH-HLM Goal: 2: Bed activities/Dependent transfer  JH-HLM Achieved: 2: Bed activities/Dependent transfer  JH-HLM Goal NOT achieved. Continue with multidisciplinary rounding and encourage appropriate mobility to improve upon JH-HLM goals.    Patient Centered Rounds: I performed bedside rounds with nursing staff today.  Discussions with Specialists or Other Care Team Provider: Ortho, Heme onc    Education and Discussions with Family / Patient: Attempted to update  (wife) via phone. Unable to contact.    Current Length of Stay: 2 day(s)  Current Patient Status: Inpatient   Discharge Plan: Anticipate discharge in >72 hrs to  home.    Code Status: Level 1 - Full Code    Subjective:    C/o pain in left femur  Objective:     Vitals:   Temp (24hrs), Av.6 °F (37 °C), Min:97.8 °F (36.6 °C), Max:99.6 °F (37.6 °C)    Temp:  [97.8 °F (36.6 °C)-99.6 °F (37.6 °C)] 99.6 °F (37.6 °C)  HR:  [] 109  Resp:  [14-20] 18  BP: (104-124)/(71-82) 104/71  SpO2:  [97 %-99 %] 97 %  Body mass index is 18.94 kg/m².     Input and Output Summary (last 24 hours):     Intake/Output Summary (Last 24 hours) at 2024 1423  Last data filed at 2024 1421  Gross per 24 hour   Intake 500 ml   Output 1100 ml   Net -600 ml       Physical Exam:   Physical Exam  Vitals reviewed.   HENT:      Head: Normocephalic and atraumatic.   Cardiovascular:      Rate and Rhythm: Normal rate.   Pulmonary:      Effort: Pulmonary effort is normal.   Chest:      Chest wall: No tenderness.   Abdominal:      Palpations: Abdomen is soft.   Musculoskeletal:         General: Tenderness (L hip) present.      Cervical back: Normal range of motion.   Skin:     General: Skin is warm and dry.      Coloration: Skin is not pale.      Findings: No rash.   Neurological:      Mental Status: He is alert and oriented to person, place, and time. Mental status is at baseline.          Additional Data:     Labs:  Results from last 7 days   Lab Units 24  07248 24  0907   WBC Thousand/uL 3.95*   < > 7.83   HEMOGLOBIN g/dL 14.2   < > 16.9   HEMATOCRIT % 42.2   < > 48.9   PLATELETS Thousands/uL 131*   < > 187   NEUTROS PCT %  --   --  79*   LYMPHS PCT %  --   --  5*   MONOS PCT %  --   --  13*   EOS PCT %  --   --  1    < > = values in this interval not displayed.     Results from last 7 days   Lab Units 24  0724  0907   SODIUM mmol/L 133*   < > 131*   POTASSIUM mmol/L 3.5   < > 3.4*   CHLORIDE mmol/L 100   < > 92*   CO2 mmol/L 27   < > 23   BUN mg/dL 6   < > 16   CREATININE mg/dL 0.36*   < > 0.63   ANION GAP mmol/L 6   < > 16*   CALCIUM mg/dL 8.6    < > 9.6   ALBUMIN g/dL  --   --  3.6   TOTAL BILIRUBIN mg/dL  --   --  0.75   ALK PHOS U/L  --   --  114*   ALT U/L  --   --  6*   AST U/L  --   --  12*   GLUCOSE RANDOM mg/dL 107   < > 121    < > = values in this interval not displayed.     Results from last 7 days   Lab Units 03/30/24  2114   INR  1.10                   Lines/Drains:  Invasive Devices       Peripheral Intravenous Line  Duration             Peripheral IV 03/30/24 Distal;Left;Upper;Ventral (anterior) Arm 2 days    Peripheral IV 03/30/24 Distal;Left;Ventral (anterior) Forearm 1 day              Airway  Duration             ETT  Cuffed;Oral 8 mm <1 day                          Imaging: Reviewed radiology reports from this admission including: chest CT scan    Recent Cultures (last 7 days):         Last 24 Hours Medication List:   Current Facility-Administered Medications   Medication Dose Route Frequency Provider Last Rate    [Transfer Hold] acetaminophen  975 mg Oral Q8H RUTH Shirley Moon MD      [Transfer Hold] cefazolin  2,000 mg Intravenous On Call To OR Maury Moctezuma PA-C      [Transfer Hold] cyclobenzaprine  5 mg Oral TID Max Burnette MD      [Transfer Hold] dexamethasone  0.5 mg Oral Daily Before Breakfast Shirley Moon MD      [Transfer Hold] heparin (porcine)  2,400 Units Intravenous Q6H PRN Shirley Moon MD      [Transfer Hold] heparin (porcine)  4,800 Units Intravenous Q6H PRN Shirley Moon MD      [Transfer Hold] HYDROmorphone  0.5 mg Intravenous Q2H PRN Zahida White MD      [Transfer Hold] lenalidomide  5 mg Oral Daily Shirley Moon MD      [Transfer Hold] metoprolol  5 mg Intravenous Q6H PRN Shirley Moon MD      [Transfer Hold] morphine  30 mg Oral Q12H Shirley Moon MD      [Transfer Hold] naloxone  0.04 mg Intravenous Q1MIN PRN Shirley Moon MD      [Transfer Hold] ondansetron  4 mg Intravenous Q6H PRN Shirley Moon MD      [Transfer Hold] oxyCODONE  10 mg Oral Q4H PRN PABLO Llanes      Or     [Transfer Hold] oxyCODONE  15 mg Oral Q4H PRN PABLO Llanes      [Transfer Hold] pantoprazole  40 mg Oral Daily Shirley Moon MD      [Transfer Hold] polyethylene glycol  17 g Oral Daily Shirley Moon MD      [Transfer Hold] senna  1 tablet Oral Daily Shirley Moon MD      sodium chloride    PRN Pat Bang MD      sterile water    PRN Pat Bang MD       Facility-Administered Medications Ordered in Other Encounters   Medication Dose Route Frequency Provider Last Rate    albumin human   Intravenous Continuous PRN Jennifer Dolores, CRNA      dexamethasone (PF)   Intravenous PRN Jennifer Dolores, CRNA      esmolol   Intravenous PRN Jennifer Dolores, CRNA      fentaNYL   Intravenous PRN Jennifer Dolores, CRNA      HYDROmorphone   Intravenous PRN Jennifer Dolores, CRNA      Ketamine HCl   Intravenous PRN Jennifer Dolores, CRNA      lactated ringers   Intravenous Continuous PRN Jennifer Dolores, CRNA      midazolam   Intravenous PRN Jennifer Dolores, CRNA      ondansetron   Intravenous PRN Jennifer Dolores, CRNA      phenylephrine   Intravenous PRN Jennifer Dolores, CRNA      propofol   Intravenous Continuous PRN Jennifer Dolores, CRNA 80 mcg/kg/min (04/01/24 1315)    propofol   Intravenous PRN Jennifer Dolores, CRNA      ROCuronium   Intravenous PRN Jennifer Dolores, CRNA      tranexamic Acid   Intravenous PRN Jennifer Dolores, CRNA          Today, Patient Was Seen By: Skye Rubio MD    **Please Note: This note may have been constructed using a voice recognition system.**

## 2024-04-01 NOTE — CODE DOCUMENTATION
Critical care at bedside. Patient is going to be made stepdown 2 and transfer off of the unit. Will continue to monitor the patient.

## 2024-04-01 NOTE — ASSESSMENT & PLAN NOTE
Presenting with Lt. Pathologic prox femur fracture scheduled for Lt. Hip IM nail     - Continue patient's home pain regimen along with breakthrough pain medication.   - Will not be a candidate for neuraxial block due to patient needing to be on therapeutic anticoagulation for PE   - Continue MS contin 30mg BID, home medication  - Continue Dilaudid 0.5mg Q2hr prn breakthrough pain   - Wean as tolerated  - Decrease Oxycodone to 5/10mg for mod/severe pain Q4h prn (Pt's home regimen is oxycodone 10 mg q4 hrs PRN)  - Continue home flexeril 5mg TID for spasms  - Can consider adding gabapentin for neuropathic pain  - Continue tylenol 975mg TID       At discharge recommend the following:  Continue Tylenol 975 mg every 8 hours as needed, for mild pain  Continue home MS Contin 30 mg BID  Continue home oxycodone 10 mg every 4 hours as needed, for moderate/severe pain  Continue home Flexeril 5 mg 3 times daily for spasms

## 2024-04-01 NOTE — CONSULTS
Consultation - Acute Pain Service  Fredy Martinez Jr. 58 y.o. male MRN: [id number]  Unit/Bed#: S -01 Encounter: [id number]               Fredy Martinez Jr. is a 58 y.o. male H/o multiple myeloma with cancer related pain on chronic opioids presented to ED with 10 day h/o atraumatic lt. Hip pain, found to have Lt. Hip pathologic fracture. Scheduled for Lt. Hip IM nail with Ortho on 4/1/24    Patient was resting comfortably in bed with girlfriend at bedside. Primarily complains of Lt hip pain with occasional spasms, says that the IV dilaudid helps. Informed the patient that we will likely have to temporarily escalate his opioids post op so would recommend minimizing opioid use before surgery as much as possible, patient in agreement. All questions and concerns were addressed.     Multiple myeloma (HCC)  Assessment & Plan  Presenting with Lt. Pathologic prox femur fracture scheduled for Lt. Hip IM nail     -Continue patient's home pain regimen along with breakthrough pain medication, will consider PCA and potentially ketamine gtt if needed for post op pain. Will not be a candidate for neuraxial block due to patient needing to be on therapeutic anticoagulation for PE   - Continue MS contin 30mg BID  - Continue Dilaudid 0.5mg Q2hr prn breakthrough pain  - Continue oxy 5/10 for mod/severe pain Q4h prn  - Schedule home flexeril 5mg TID for spasms  - Can consider adding gabapentin for neuropathic pain  - Continue tylenol 975mg TID         [facility] will continue to follow. Please contact Acute Pain Service - via [employer] from [phone] with additional questions or concerns. See Checkdemetri or She for additional contacts and after hours information.     History of Present Illness    Admit Date:  3/30/2024  Hospital Day:  1 day  Primary Service:  Hospitalist  Attending Provider:  Marleny Grider MD  Physician Requesting Consult: Marleny Grider MD  Reason for Consult / Principal Problem: Lt. Hip pain and cancer pain  HPI: See  "above     Current pain location(s): Pain Score: 5  Pain Location/Orientation: Orientation: Left, Location: Hip  Pain Scale: Pain Assessment Tool: 0-10  Current Analgesic regimen:  IV dilaudid, oxycodone 5/10mg, tylenol, flexeril, MS contin     Pain History: MS contin 30mg BID + Oxy 10mg Q4H   Pain Management Physician:  Dio Ramos have reviewed the patient's controlled substance dispensing history in the Prescription Drug Monitoring Program in compliance with the [facility] regulations before prescribing any controlled substances.     Inpatient consult to Acute Pain Service  Consult performed by: Max Burnette MD  Consult ordered by: Shirley Moon MD          Review of Systems    Historical Information   Past Medical History:   Diagnosis Date    Cancer (HCC)     bone-    GERD (gastroesophageal reflux disease)     Multiple myeloma (HCC)      Past Surgical History:   Procedure Laterality Date    APPENDECTOMY      IR BIOPSY BONE MARROW  3/15/2023     Social History   Social History     Substance and Sexual Activity   Alcohol Use Not Currently     Social History     Substance and Sexual Activity   Drug Use Not Currently    Types: Marijuana    Comment: once a month     Social History     Tobacco Use   Smoking Status Some Days    Types: Cigarettes   Smokeless Tobacco Not on file   Tobacco Comments    Smoking 1 cigarette daily     Family History: non-contributory    Meds/Allergies   all current active meds have been reviewed    No Known Allergies    Objective   Vitals:    03/30/24 2232 03/31/24 0612 03/31/24 1300 03/31/24 1529   BP: 123/81 136/82 136/82 124/82   Pulse: (!) 125 (!) 109 (!) 109 (!) 118   Resp:  18  14   Temp: 98.1 °F (36.7 °C) 97.7 °F (36.5 °C)  98.5 °F (36.9 °C)   SpO2: 97% 97%  99%   Weight:   60.3 kg (132 lb 15 oz)    Height:   5' 10\" (1.778 m)        No intake or output data in the 24 hours ending 03/31/24 2130    Physical Exam    Lab Results:  Estimated Creatinine Clearance: 147.9 mL/min " (A) (by C-G formula based on SCr of 0.47 mg/dL (L)).  Lab Results   Component Value Date    WBC 6.75 03/31/2024    HGB 15.5 03/31/2024    HCT 45.7 03/31/2024     03/31/2024         Component Value Date/Time    K 4.1 03/31/2024 0428    CL 98 03/31/2024 0428    CO2 18 (L) 03/31/2024 0428    BUN 13 03/31/2024 0428    CREATININE 0.47 (L) 03/31/2024 0428         Component Value Date/Time    CALCIUM 8.7 03/31/2024 0428    ALKPHOS 114 (H) 03/30/2024 0907    AST 12 (L) 03/30/2024 0907    ALT 6 (L) 03/30/2024 0907    TP 6.7 03/30/2024 0907    ALB 3.6 03/30/2024 0907       Imaging Studies/EKG: I have personally reviewed pertinent reports.      Counseling / Coordination of Care  Total floor / unit time spent today 60 minutes. Greater than 50% of total time was spent with the patient and / or family counseling and / or coordination of care. A description of the counseling / coordination of care.     Please note that the APS provides consultative services regarding pain management only.  With the exception of ketamine and epidural infusions and except when indicated, final decisions regarding starting or changing doses of analgesic medications are at the discretion of the consulting service.  Max Burnette MD  Acute Pain Service

## 2024-04-01 NOTE — RAPID RESPONSE
Rapid Response Note  Fredy Martinez Jr. 58 y.o. male MRN: [id number]  Unit/Bed#: S -01 Encounter: [id number]    Rapid Response Notification(s):   Response called date/time:  4/1/2024 5:25 PM  Response team arrival date/time:  4/1/2024 5:32 PM  Response end date/time:  4/1/2024 5:45 PM  [facility] of care[facility]  [facility]  Rapid response location:  [facility] unit  Primary reason for rapid response call:  Acute change in heart rhythm    Rapid Response Intervention(s):   Medications administered:  Adenosine       Assessment:   SVT  Hypotension (SBP 90s)    Plan:   Adenosine 6 mg IV x1 given at 5:35 pm with improvement of HR to 100s and BP improved to systolic in the 120s.  Transfer to SD2. May need upgrading to ICU if recurrent SVT.  BMP, CBC, Mg pending.  Recommend cardiology consult.  Recommend TTE      Rapid Response Outcome:   Transfer:  Transfer to stepdown 2  Primary service notified of transfer: Yes    Code Status: Level 1 (Full Code)      Family notified of transfer: yes  Family member contacted: Wife at bedside     Background/Situation:   Fredy Martinez Jr. is a 58 y.o. male who underwent orthopedic surgery for femur fracture today and was noted to have new onset of tachycardia with HR in the 190s. Patient asymptomatic at the time.    Review of Systems   Constitutional:  Negative for chills and fever.   Respiratory:  Negative for cough and shortness of breath.    Cardiovascular:  Negative for chest pain and palpitations.   Neurological:  Negative for dizziness and light-headedness.       Objective:   Vitals:    04/01/24 1725 04/01/24 1735 04/01/24 1736 04/01/24 1738   BP: 112/80 134/96 126/84 103/73   BP Location:       Pulse: (!) 191 100 (!) 111 (!) 107   Resp:       Temp: 97.7 °F (36.5 °C)      TempSrc:       SpO2: 98% 98% 97% 98%   Weight:       Height:         Physical Exam  Vitals and nursing note reviewed.   Constitutional:       General: He is not in acute distress.     Appearance: He is not ill-appearing.   HENT:  "     Head: Normocephalic.   Eyes:      Pupils: Pupils are equal, round, and reactive to light.   Cardiovascular:      Rate and Rhythm: Tachycardia present.      Comments:   Pulmonary:      Effort: Pulmonary effort is normal.      Breath sounds: Normal breath sounds.   Abdominal:      General: Abdomen is flat.   Musculoskeletal:      Right lower leg: No edema.      Left lower leg: No edema.   Skin:     General: Skin is warm.      Capillary Refill: Capillary refill takes less than 2 seconds.   Neurological:      Mental Status: He is alert and oriented to person, place, and time.   Psychiatric:         Mood and Affect: Mood normal.         Portions of the record may have been created with voice recognition software.  Occasional wrong word or \"sound a like\" substitutions may have occurred due to the inherent limitations of voice recognition software.  Read the chart carefully and recognize, using context, where substitutions have occurred.    Liseth West MD      "

## 2024-04-01 NOTE — ANESTHESIA POSTPROCEDURE EVALUATION
Post-Op Assessment Note    CV Status:  Stable    Pain management: adequate       Mental Status:  Sleepy   Hydration Status:  Stable   PONV Controlled:  None   Airway Patency:  Patent and adequate     Post Op Vitals Reviewed: Yes    No anethesia notable event occurred.    Staff: CRNA               BP   112/53   Temp 98.1 F   Pulse 99   Resp 16   SpO2 98%

## 2024-04-01 NOTE — ASSESSMENT & PLAN NOTE
SVT on the monitor. Was a rapid response earlier, was given adenosine with improvement of HR and SBP.  Patient went back into SVT and received another dose of 6 mg Adenosine with improvement of HR    Plan:  Continue to monitor on Tele  2.5 Lopressor per Cardio  Monitor and replete electrolytes

## 2024-04-01 NOTE — ASSESSMENT & PLAN NOTE
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  Eventual bridge to DOAC after surgery/when stable and not at risk to bleed.    Ortho: Today Plan is cephalomedullary nailing of left peritrochanteric path femur fracture   Resume Heparin gtt for PE upon Ortho recs  Pain regimen in place as well as home regimen    Acute Pain Service recommendations:   Continue patient's home pain regimen along with breakthrough pain medication, will consider PCA and potentially ketamine gtt if needed for post op pain. Will not be a candidate for neuraxial block due to patient needing to be on therapeutic anticoagulation for PE   - Continue MS contin 30mg BID  - Continue Dilaudid 0.5mg Q2hr prn breakthrough pain  - Increase oxycodone to 10/15mg for mod/severe pain Q4h prn  - Continue home flexeril 5mg TID for spasms  - Can consider adding gabapentin for neuropathic pain  - Continue tylenol 975mg TID

## 2024-04-01 NOTE — PROGRESS NOTES
Progress Note - Orthopedics   Fredy Martinez Jr. 58 y.o. male MRN: [id number]  Unit/Bed#: S -01      Subjective:    58 y.o.male seen and evaluated. Reports he is breathing well but the leg is more painful today than it was yesterday. He denies paresthesias or other complaints today. Overall in good spirits    Labs:  0   Lab Value Date/Time    HCT 42.2 04/01/2024 0717    HCT 45.7 03/31/2024 0428    HCT 48.9 03/30/2024 0907    HGB 14.2 04/01/2024 0717    HGB 15.5 03/31/2024 0428    HGB 16.9 03/30/2024 0907    INR 1.10 03/30/2024 2114    WBC 3.95 (L) 04/01/2024 0717    WBC 6.75 03/31/2024 0428    WBC 7.83 03/30/2024 0907       Meds:    Current Facility-Administered Medications:     acetaminophen (TYLENOL) tablet 975 mg, 975 mg, Oral, Q8H RUTH, Shirley Moon MD, 975 mg at 03/31/24 2223    ceFAZolin (ANCEF) IVPB (premix in dextrose) 2,000 mg 50 mL, 2,000 mg, Intravenous, On Call To OR, Maury Moctezuma PA-C    cyclobenzaprine (FLEXERIL) tablet 5 mg, 5 mg, Oral, TID, Max Burnette MD, 5 mg at 03/31/24 1752    dexamethasone (DECADRON) tablet 0.5 mg, 0.5 mg, Oral, Daily Before Breakfast, Shirley Moon MD, 0.5 mg at 03/31/24 0608    heparin (porcine) injection 2,400 Units, 2,400 Units, Intravenous, Q6H PRN, Shirley Moon MD    heparin (porcine) injection 4,800 Units, 4,800 Units, Intravenous, Q6H PRN, Shirley Moon MD    HYDROmorphone (DILAUDID) injection 0.5 mg, 0.5 mg, Intravenous, Q2H PRN, Zahida White MD, 0.5 mg at 04/01/24 0936    lenalidomide (REVLIMID) capsule 5 mg, 5 mg, Oral, Daily, Shirley Moon MD    magnesium sulfate 2 g/50 mL IVPB (premix) 2 g, 2 g, Intravenous, Once, Skye Rubio MD, Last Rate: 25 mL/hr at 04/01/24 0900, 2 g at 04/01/24 0900    metoprolol (LOPRESSOR) injection 5 mg, 5 mg, Intravenous, Q6H PRN, Shirley Moon MD    morphine (MS CONTIN) ER tablet 30 mg, 30 mg, Oral, Q12H, Shirley Moon MD, 30 mg at 03/31/24 1752    naloxone (NARCAN) 0.04 mg/mL syringe  "0.04 mg, 0.04 mg, Intravenous, Q1MIN PRN, Shirley Moon MD    ondansetron (ZOFRAN) injection 4 mg, 4 mg, Intravenous, Q6H PRN, Shirley Moon MD    oxyCODONE (ROXICODONE) immediate release tablet 10 mg, 10 mg, Oral, Q4H PRN **OR** oxyCODONE (ROXICODONE) IR tablet 15 mg, 15 mg, Oral, Q4H PRN, PABLO Llanes    pantoprazole (PROTONIX) EC tablet 40 mg, 40 mg, Oral, Daily, Shirley Moon MD, 40 mg at 03/31/24 0907    polyethylene glycol (MIRALAX) packet 17 g, 17 g, Oral, Daily, Shirley Moon MD, 17 g at 03/31/24 0907    senna (SENOKOT) tablet 8.6 mg, 1 tablet, Oral, Daily, Shirley Moon MD, 8.6 mg at 03/31/24 0907    Blood Culture:   No results found for: \"BLOODCX\"    Wound Culture:   No results found for: \"WOUNDCULT\"    Ins and Outs:  I/O last 24 hours:  In: -   Out: 600 [Urine:600]          Physical:  Vitals:    04/01/24 0715   BP: 108/77   Pulse: 99   Resp: 20   Temp: 97.8 °F (36.6 °C)   SpO2: 97%     Musculoskeletal: left Lower Extremity  Skin intact . No erythema or ecchymosis.  Leg with visible deformity and shortening at fracture site  TTP at fracture site  Sensation intact to saphenous, sural, tibial, superficial peroneal nerve, and deep peroneal  Motor grossly intact  2+ DP pulse  Digits warm and well perfused  Capillary refill < 2 seconds    Assessment:    58 y.o.male with history of multiple myeloma with atraumatic because of a pathologic fracture involving the left peritrochanteric femoral region  .     Plan:  NWB LLE  Plan for OR today for femoral nail. Will send reamings to pathology per request   PT/OT postop  Pain control per primary team   DVT ppx - will require indefinite anticoagulation, ok to resume choice agent about 12 hrs postop   Medical co-morbidities being managed per primary team  Dispo: Ortho will follow    Shandra Valverde PA-C      "

## 2024-04-01 NOTE — OP NOTE
Brief Op Note  Fredy Martinez Jr.  MRN: [id number]      Unit/Bed#: PACU 3    PreOp Dx: left pathologic, peritrochanteric femur fracture      Postop Dx: left pathologic, peritrochanteric femur fracture      Procedure:  Cephalo-medullary Nailing left pathologic peritrochanteric femur fracture      Surgeon: Pat Bang MD      Assistants:Maury Moctezuma PA-C     No Qualified Resident Available for this Case.  The physician assistant was needed for all portions of this case including prepping and draping the patient as well as aiding in surgical intervention throughout the case for cephalomedullary nailing including closure of the operative sites    Fluids:       EBL:       Drains: none      Specimens: Specimen from the reamings were sent to pathology      Complications: No       Condition: stable transferred to postanesthesia care unit      Implants: Synthes TFN  Femoral nail: 12x 440  Helical Blade: 95  2 distal locking screws        58 y.o.male, with history of multiple myeloma, and recently diagnosed pulmonary embolism, who presents with pathologic peritrochanteric femur fracture.  This injury is about 12 days old given that patient had a traumatic injury and was unsure of the cause of his injury.  Patient is presenting for definitive treatment of the pathology.    The patient was told of all the pros and cons of operative and nonoperative intervention. Some of the complications of operative intervention include infection, bleeding, scarring, nerve injury, vascular injury, malunion, nonunion, continued pain, decreased range of motion, DVT, PE death, loss of limb, need for further surgery, not obtain an results. The patient wished. Patient did consent for operative intervention for this pathology.    Patient was brought to the operating room. Patient was anesthetized as anesthesia team. Patient was prepped and draped in normal sterile fashion. After this was done, we did conduct a time out to make sure  correct. Patient was in the room, prepped marked and draped. Implants were in the room, Rep. For the implants were present, DVT prophylaxis and antibiotics were dressed.    Patient was placed in the fracture table. Reduction was obtained and this was confirmed with C-arm imaging. This was done prior to patient being prepped and draped in normal sterile fashion. Incision was made 2 cm proximal to the greater trochanteric region and this was extended proximally about 4-5 cm. After going through skin and fatty tissue, incision was made through tensor fascia. Guidewire was placed at the greater trochanteric region and advanced past the lesser trochanteric region. This was confirmed on both AP and lateral films. Opening reamer was then used. A guidewire was then passed to the level of the physeal scar of the distal femur.    We confirmed appropriate positioning at the distal femur to make sure we did not broach the anterior cortex. At this time, we did wean, with the fracture reduced. Once this was done, placed our true femoral nail. We did make an incision at this time for helical blade. After going through skin, fatty tissue, and fascia flores, were able to place our jig in the appropriate position. Guidewire was then placed at the appropriate position in the femoral head and this was confirmed on both AP and lateral films. We did use a lateral wall reamer and then a step drill. After measuring the appropriate size of our helical blade, a helical blade was then placed and it was confirmed to be in the appropriate position in the femoral head on both AP and lateral films.    Perfect circles were obtained at the distal femur for distal locking screws. Appropriate drill was used and then the appropriate screws were placed at the distal locking screw regions. Final x-rays were then obtained and it was noted that our reduction was obtained and maintained with our construct. All wounds were copiously irrigated with normal  saline. #1 Vicryl sutures for the fascial layer, 2-0 Vicryl sutures for the subcutaneous layer, and Monocryl was utilized for subcuticular closure.  Wounds were cleaned and dried.  Exofin sealant was then utilized.  After this had dried, 4 x 4's and Tegaderms were placed.  Ace bandage was placed from the foot to the mid thigh region for compression.

## 2024-04-01 NOTE — ANESTHESIA PREPROCEDURE EVALUATION
"Procedure:  INSERTION NAIL IM FEMUR ANTEGRADE (TROCHANTERIC) (Left: Leg Upper)  S/p autologus wtem cell transplant for  Myltiple myeloma on dexamethasone and revlimid  Subsegmental PE on heparin drip   Relevant Problems   GI/HEPATIC   (+) Acid reflux      HEMATOLOGY   (+) Multiple myeloma (HCC)      MUSCULOSKELETAL   (+) Back pain             Anesthesia Plan  ASA Score- 3     Anesthesia Type- general with ASA Monitors.         Additional Monitors:     Airway Plan: ETT.           Plan Factors-    Chart reviewed. EKG reviewed.  Existing labs reviewed. Patient summary reviewed.                  Induction- intravenous.    Postoperative Plan- Plan for postoperative opioid use.     Informed Consent- Anesthetic plan and risks discussed with patient.  I personally reviewed this patient with the CRNA. Discussed and agreed on the Anesthesia Plan with the CRNA..              No results found for: \"HGBA1C\"    Lab Results   Component Value Date    K 3.5 04/01/2024     04/01/2024    CO2 27 04/01/2024    BUN 6 04/01/2024    CREATININE 0.36 (L) 04/01/2024    GLUF 97 12/05/2023    CALCIUM 8.6 04/01/2024    CORRECTEDCA 9.3 09/22/2023    AST 12 (L) 03/30/2024    ALT 6 (L) 03/30/2024    ALKPHOS 114 (H) 03/30/2024    EGFR 137 04/01/2024       Lab Results   Component Value Date    WBC 3.95 (L) 04/01/2024    HGB 14.2 04/01/2024    HCT 42.2 04/01/2024    MCV 92 04/01/2024     (L) 04/01/2024 July 2023 echo     Left Ventricle: Left ventricular cavity size is normal. Wall thickness is normal. The left ventricular ejection fraction is 55%. Systolic function is normal. Wall motion is normal. Diastolic function is normal. Global longitudinal strain was normal at -19.2%. There was no basal to apical gradient.     Strain was performed to quantify interventricular dyssynchrony and evaluate components of myocardial function due to Chemotherapy. Results from the utilization of Strain Analysis are listed in the report below.   "   Clearance pending ??

## 2024-04-01 NOTE — QUICK NOTE
Orthopedics   Fredy STEPHEN Martinez  58 y.o. male MRN: [id number]  Unit/Bed#: S -01      Diagnosis: Left pathologic proximal femur fracture    Procedure: Left hip IM nail    Labs:  0   Lab Value Date/Time    HCT 45.7 03/31/2024 0428    HCT 48.9 03/30/2024 0907    HCT 49.3 02/21/2024 1256    HGB 15.5 03/31/2024 0428    HGB 16.9 03/30/2024 0907    HGB 16.3 02/21/2024 1256    INR 1.10 03/30/2024 2114    WBC 6.75 03/31/2024 0428    WBC 7.83 03/30/2024 0907    WBC 4.59 02/21/2024 1256       Meds:    Current Facility-Administered Medications:     acetaminophen (TYLENOL) tablet 975 mg, 975 mg, Oral, Q8H RUTH, Shirley Moon MD, 975 mg at 03/31/24 1340    ceFAZolin (ANCEF) IVPB (premix in dextrose) 2,000 mg 50 mL, 2,000 mg, Intravenous, On Call To OR, Maury Moctezuma PA-C, Held at 03/31/24 0605    cyclobenzaprine (FLEXERIL) tablet 5 mg, 5 mg, Oral, TID, Max Burnette MD, 5 mg at 03/31/24 1752    dexamethasone (DECADRON) tablet 0.5 mg, 0.5 mg, Oral, Daily Before Breakfast, Shirley Moon MD, 0.5 mg at 03/31/24 0608    heparin (porcine) 25,000 units in 0.45% NaCl 250 mL infusion (premix), 3-30 Units/kg/hr (Order-Specific), Intravenous, Titrated, Skye Rubio MD, Last Rate: 10.8 mL/hr at 03/31/24 1223, 18 Units/kg/hr at 03/31/24 1223    heparin (porcine) injection 2,400 Units, 2,400 Units, Intravenous, Q6H PRN, Shirley Moon MD    heparin (porcine) injection 4,800 Units, 4,800 Units, Intravenous, Q6H PRN, Shirley Moon MD    HYDROmorphone (DILAUDID) injection 0.5 mg, 0.5 mg, Intravenous, Q2H PRN, Zahida White MD, 0.5 mg at 03/31/24 1223    [START ON 4/1/2024] lenalidomide (REVLIMID) capsule 5 mg, 5 mg, Oral, Daily, Shirley Moon MD    metoprolol (LOPRESSOR) injection 5 mg, 5 mg, Intravenous, Q6H PRN, Shirley Moon MD    morphine (MS CONTIN) ER tablet 30 mg, 30 mg, Oral, Q12H, Shirley Moon MD, 30 mg at 03/31/24 1752    naloxone (NARCAN) 0.04 mg/mL syringe 0.04 mg, 0.04 mg, Intravenous,  "Q1MIN PRN, Shirley Moon MD    ondansetron (ZOFRAN) injection 4 mg, 4 mg, Intravenous, Q6H PRN, Shirley Moon MD    oxyCODONE (ROXICODONE) immediate release tablet 10 mg, 10 mg, Oral, Q4H PRN, Shirley Moon MD, 10 mg at 03/30/24 2050    oxyCODONE (ROXICODONE) IR tablet 5 mg, 5 mg, Oral, Q4H PRN, 5 mg at 03/31/24 0914 **OR** oxyCODONE (ROXICODONE) immediate release tablet 10 mg, 10 mg, Oral, Q4H PRN, Shirley Moon MD, 10 mg at 03/31/24 0422    pantoprazole (PROTONIX) EC tablet 40 mg, 40 mg, Oral, Daily, Shirley Moon MD, 40 mg at 03/31/24 0907    polyethylene glycol (MIRALAX) packet 17 g, 17 g, Oral, Daily, Shirley Moon MD, 17 g at 03/31/24 0907    senna (SENOKOT) tablet 8.6 mg, 1 tablet, Oral, Daily, Shirley Moon MD, 8.6 mg at 03/31/24 0907    Blood Culture:   No results found for: \"BLOODCX\"    Wound Culture:   No results found for: \"WOUNDCULT\"    Ins and Outs:  No intake/output data recorded.          CXR: ct chest    EKG: on chart    Blood: 2 units type & screen    Abx: 2 grams Ancef on call for OR    NPO: at midnight    Anticoag: on heparin drip    Consent: on chart    Clearance: needed       "

## 2024-04-01 NOTE — CASE MANAGEMENT
Case Management Assessment & Discharge Planning Note    Patient name Fredy Martinez Jr.  Location OR POOL/OR POOL MRN [id number]  : 1965 Date 2024       Current Admission Date: 3/30/2024  Current Admission Diagnosis:Closed fracture of neck of left femur (HCC)   Patient Active Problem List    Diagnosis Date Noted    Acute pulmonary embolism without acute cor pulmonale (HCC) 2024    Closed fracture of neck of left femur (HCC) 2024    Sinus tachycardia 2024    Dental disease 2023    Insomnia 2023    Cancer related pain 2023    Multiple myeloma (HCC) 2023    Metastasis to bone (HCC) 2023    Palliative care patient 2023    Unintentional weight loss 2023    Nicotine dependence, cigarettes, uncomplicated 2023    History of alcohol abuse 2023    Acid reflux 2023    Nausea & vomiting 2023    Loss of appetite 2023    Therapeutic opioid-induced constipation (OIC) 2023    Back pain 2023    Monoclonal gammopathy 2023      LOS (days): 2  Geometric Mean LOS (GMLOS) (days):   Days to GMLOS:     OBJECTIVE:    Risk of Unplanned Readmission Score: 12.92         Current admission status: Inpatient       Preferred Pharmacy:   [facility]/pharmacy #[id number] - [place] - [address]  [address]  [facility] [address]  Phone: [phone] Fax: [phone]    [facility] [place] - [address]  [address] [place] [address]  Phone: [phone] Fax: [phone]    [id number] - [place] - [address]  [address] [address]  Phone: [phone] Fax: [phone]    [facility] ([facility] #[id number] [place] - [address] [address]  Phone: [phone] Fax: [phone]    [facility] [place] - [address]  [address]  [address]  Phone: [phone] Fax: [phone]    [facility] [place] - [address]  [address]  [address]  [facility] [address]  Phone: [phone] Fax: [phone]    Primary Care Provider: No primary care provider on file.    Primary Insurance: HELENE DAHL  Secondary Insurance:     ASSESSMENT:  Active Health Care Proxies    There are no active Health Care Proxies on file.                 Readmission Root Cause  30 Day Readmission: No    Patient Information  Admitted from:: Home  Mental Status: Other (Comment) (PT in OR)  During Assessment patient was accompanied by: Spouse  Assessment information provided by:: Spouse  Primary Caregiver: Self  Support Systems: Spouse/significant other  County of Residence: [place]  What city do you live in?: [place]  Home entry access options. Select all that apply.: Stairs  Number of steps to enter home.: 6  Do the steps have railings?: Yes  Type of Current Residence: [address] home  Upon entering residence, is there a bedroom on the main floor (no further steps)?: No (Patient sleeps in his chair on the first level of the home)  A bedroom is located on the following floor levels of residence (select all that apply):: 2nd Floor  Upon entering residence, is there a bathroom on the main floor (no further steps)?: Yes  Number of steps to 2nd floor from main floor: One Flight  Living Arrangements: Lives w/ Spouse/significant other  Is patient a ?: No    Activities of Daily Living Prior to Admission  Functional Status: Independent  Completes ADLs independently?: Yes  Ambulates independently?: Yes  Does patient use assisted devices?: Yes  Assisted Devices (DME) used: Straight Cane, Shower Chair  Does patient currently own DME?: Yes  What DME does the patient currently own?: Straight Cane, Shower Chair  Does patient have a history of Outpatient Therapy (PT/OT)?: No  Does the patient have a history of Short-Term Rehab?: No  Does patient  have a history of HHC?: Yes ([facility])  Does patient currently have HHC?: No         Patient Information Continued  Does patient have prescription coverage?: Yes  Does patient receive dialysis treatments?: No  Does patient have a history of substance abuse?: No  Does patient have a history of Mental Health Diagnosis?: No         Means of Transportation  Means of Transport to \[facility]\"":: None ([facility] transport for appointments)      Social Determinants of Health (SDOH)      Flowsheet Row Most Recent Value   Housing Stability    In the last 12 months, was there a time when you were not able to pay the mortgage or rent on time? N   In the last 12 months, how many places have you lived? 1   In the last 12 months, was there a time when you did not have a steady place to sleep or slept in a shelter (including now)? N   Transportation Needs    In the past 12 months, has lack of transportation kept you from medical appointments or from getting medications? no   In the past 12 months, has lack of transportation kept you from meetings, work, or from getting things needed for daily living? No   Food Insecurity    Within the past 12 months, you worried that your food would run out before you got the money to buy more. Never true   Within the past 12 months, the food you bought just didn't last and you didn't have money to get more. Never true   Utilities    In the past 12 months has the electric, gas, oil, or water company threatened to shut off services in your home? No            DISCHARGE DETAILS:    Discharge planning discussed with:: Patient's Wife  Freedom of Choice: Yes     CM contacted family/caregiver?: Yes  Were Treatment Team discharge recommendations reviewed with patient/caregiver?: Yes  Did patient/caregiver verbalize understanding of patient care needs?: Yes  Were patient/caregiver advised of the risks associated with not following Treatment Team discharge recommendations?: Yes    Contacts  Patient Contacts: Wife,  Maria Eugenia  Relationship to Patient:: Family  Contact Method: Phone  Phone Number: [phone]  Reason/Outcome: Continuity of Care, Emergency Contact, Discharge Planning         CM spoke with patient's wife Maria Eugenia via PC as patient was in the OR.  CM introduced self and role with discharge planning.  Patient lives with his wife in a two story home that has 6 steps to enter.  Patient currently sleeps in a chair on the first level of the home and has a full bathroom on that level.  Patient was independent with ADL's prior to admission and utilizes a SPC and shower chair at home.  Patient does not drive but has [facility] transportation to and from appointments as well as family members that assist.  Patient has past history of [facility].  CM will continue to follow to assist with DC planning and coordination.

## 2024-04-01 NOTE — ASSESSMENT & PLAN NOTE
On Revlimid and RVD infusion  RVD infusion includes bortezomib, lenalidomide, dexamethasone; Completed Cycle length = 21 days x 3-4 cycles  S/p ASCT  Also follows Palliative Care as OP      Per oncology:  Based on lytic disease on baseline PET with improvement repeat PET Oct 2023 and no disease in the pelvis on PET Oct 2023, unclear if this pathologic fracture is progression of disease  WBC 6.8 Hgb 15.5 plts 159 Ca 8.7 Cr 0.47    Patient of Dr Garcia     Plan:  Hold off on restarting Revlimid when his clinical status improves   36.8

## 2024-04-01 NOTE — ASSESSMENT & PLAN NOTE
Echo: 3/31/2024: EF 60%, G1 DD  He was found to be in sinus tachycardia 180s on admission, thus related to PE and pain. He was given Lopressor 5 mg.  After postop on 4/1, patient noted to have new onset tachycardia with HR in the 190s, asymptomatic.   Patient was found to be in SVT with hypotension, SBP 90.  Was given adenosine 6 mg x 1 with improvement of HR in the 100s and BP improved to systolic in the 120s.   He went back into SVT, received a dose of 6 mg adenosine with improvement of HR.    He went back into SVT with HR in the 180s. Given Lopressor 5 mg and then Cardizem 10 mg.    Plan:  Monitor on telemetry  Monitor and replete electrolytes  Will consider cardiology consult

## 2024-04-02 ENCOUNTER — APPOINTMENT (INPATIENT)
Dept: RADIOLOGY | Facility: HOSPITAL | Age: 59
DRG: 308 | End: 2024-04-02
Payer: COMMERCIAL

## 2024-04-02 LAB
ANION GAP SERPL CALCULATED.3IONS-SCNC: 8 MMOL/L (ref 4–13)
APTT PPP: 41 SECONDS (ref 23–37)
APTT PPP: 51 SECONDS (ref 23–37)
APTT PPP: 71 SECONDS (ref 23–37)
BUN SERPL-MCNC: 7 MG/DL (ref 5–25)
CA-I BLD-SCNC: 1.07 MMOL/L (ref 1.12–1.32)
CALCIUM SERPL-MCNC: 8.1 MG/DL (ref 8.4–10.2)
CHLORIDE SERPL-SCNC: 103 MMOL/L (ref 96–108)
CO2 SERPL-SCNC: 23 MMOL/L (ref 21–32)
CREAT SERPL-MCNC: 0.35 MG/DL (ref 0.6–1.3)
ERYTHROCYTE [DISTWIDTH] IN BLOOD BY AUTOMATED COUNT: 14.8 % (ref 11.6–15.1)
GFR SERPL CREATININE-BSD FRML MDRD: 139 ML/MIN/1.73SQ M
GLUCOSE SERPL-MCNC: 125 MG/DL (ref 65–140)
HCT VFR BLD AUTO: 36 % (ref 36.5–49.3)
HGB BLD-MCNC: 12 G/DL (ref 12–17)
MAGNESIUM SERPL-MCNC: 2 MG/DL (ref 1.9–2.7)
MCH RBC QN AUTO: 31.3 PG (ref 26.8–34.3)
MCHC RBC AUTO-ENTMCNC: 33.3 G/DL (ref 31.4–37.4)
MCV RBC AUTO: 94 FL (ref 82–98)
PHOSPHATE SERPL-MCNC: 3 MG/DL (ref 2.7–4.5)
PLATELET # BLD AUTO: 154 THOUSANDS/UL (ref 149–390)
PMV BLD AUTO: 9 FL (ref 8.9–12.7)
POTASSIUM SERPL-SCNC: 4.3 MMOL/L (ref 3.5–5.3)
RBC # BLD AUTO: 3.83 MILLION/UL (ref 3.88–5.62)
SODIUM SERPL-SCNC: 134 MMOL/L (ref 135–147)
WBC # BLD AUTO: 6.68 THOUSAND/UL (ref 4.31–10.16)

## 2024-04-02 PROCEDURE — 82330 ASSAY OF CALCIUM: CPT

## 2024-04-02 PROCEDURE — 97163 PT EVAL HIGH COMPLEX 45 MIN: CPT

## 2024-04-02 PROCEDURE — 85730 THROMBOPLASTIN TIME PARTIAL: CPT

## 2024-04-02 PROCEDURE — 99232 SBSQ HOSP IP/OBS MODERATE 35: CPT | Performed by: INTERNAL MEDICINE

## 2024-04-02 PROCEDURE — 71045 X-RAY EXAM CHEST 1 VIEW: CPT

## 2024-04-02 PROCEDURE — 99024 POSTOP FOLLOW-UP VISIT: CPT | Performed by: PHYSICIAN ASSISTANT

## 2024-04-02 PROCEDURE — 85027 COMPLETE CBC AUTOMATED: CPT | Performed by: PHYSICIAN ASSISTANT

## 2024-04-02 PROCEDURE — 85730 THROMBOPLASTIN TIME PARTIAL: CPT | Performed by: INTERNAL MEDICINE

## 2024-04-02 PROCEDURE — 99232 SBSQ HOSP IP/OBS MODERATE 35: CPT

## 2024-04-02 PROCEDURE — 85730 THROMBOPLASTIN TIME PARTIAL: CPT | Performed by: PHYSICIAN ASSISTANT

## 2024-04-02 PROCEDURE — 93005 ELECTROCARDIOGRAM TRACING: CPT

## 2024-04-02 PROCEDURE — 97116 GAIT TRAINING THERAPY: CPT

## 2024-04-02 PROCEDURE — 80048 BASIC METABOLIC PNL TOTAL CA: CPT | Performed by: PHYSICIAN ASSISTANT

## 2024-04-02 PROCEDURE — NC001 PR NO CHARGE: Performed by: NURSE PRACTITIONER

## 2024-04-02 PROCEDURE — 83735 ASSAY OF MAGNESIUM: CPT | Performed by: PHYSICIAN ASSISTANT

## 2024-04-02 PROCEDURE — 84100 ASSAY OF PHOSPHORUS: CPT

## 2024-04-02 PROCEDURE — 97167 OT EVAL HIGH COMPLEX 60 MIN: CPT

## 2024-04-02 PROCEDURE — 99223 1ST HOSP IP/OBS HIGH 75: CPT | Performed by: INTERNAL MEDICINE

## 2024-04-02 RX ORDER — DILTIAZEM HYDROCHLORIDE 5 MG/ML
10 INJECTION INTRAVENOUS ONCE
Status: COMPLETED | OUTPATIENT
Start: 2024-04-02 | End: 2024-04-02

## 2024-04-02 RX ORDER — HEPARIN SODIUM 1000 [USP'U]/ML
4800 INJECTION, SOLUTION INTRAVENOUS; SUBCUTANEOUS EVERY 6 HOURS PRN
Status: DISCONTINUED | OUTPATIENT
Start: 2024-04-02 | End: 2024-04-04

## 2024-04-02 RX ORDER — HEPARIN SODIUM 1000 [USP'U]/ML
2400 INJECTION, SOLUTION INTRAVENOUS; SUBCUTANEOUS EVERY 6 HOURS PRN
Status: DISCONTINUED | OUTPATIENT
Start: 2024-04-02 | End: 2024-04-04

## 2024-04-02 RX ORDER — METOPROLOL SUCCINATE 25 MG/1
25 TABLET, EXTENDED RELEASE ORAL DAILY
Status: CANCELLED | OUTPATIENT
Start: 2024-04-02

## 2024-04-02 RX ORDER — HEPARIN SODIUM 10000 [USP'U]/100ML
3-30 INJECTION, SOLUTION INTRAVENOUS
Status: DISCONTINUED | OUTPATIENT
Start: 2024-04-02 | End: 2024-04-04

## 2024-04-02 RX ORDER — AMOXICILLIN 250 MG
1 CAPSULE ORAL
Status: DISCONTINUED | OUTPATIENT
Start: 2024-04-02 | End: 2024-04-05 | Stop reason: HOSPADM

## 2024-04-02 RX ORDER — CALCIUM GLUCONATE 20 MG/ML
1 INJECTION, SOLUTION INTRAVENOUS ONCE
Status: COMPLETED | OUTPATIENT
Start: 2024-04-02 | End: 2024-04-02

## 2024-04-02 RX ORDER — HEPARIN SODIUM 1000 [USP'U]/ML
4800 INJECTION, SOLUTION INTRAVENOUS; SUBCUTANEOUS ONCE
Status: DISCONTINUED | OUTPATIENT
Start: 2024-04-02 | End: 2024-04-04

## 2024-04-02 RX ADMIN — SODIUM CHLORIDE, SODIUM LACTATE, POTASSIUM CHLORIDE, AND CALCIUM CHLORIDE 50 ML/HR: .6; .31; .03; .02 INJECTION, SOLUTION INTRAVENOUS at 16:36

## 2024-04-02 RX ADMIN — ACETAMINOPHEN 975 MG: 325 TABLET, FILM COATED ORAL at 05:33

## 2024-04-02 RX ADMIN — SENNOSIDES, DOCUSATE SODIUM 1 TABLET: 8.6; 5 TABLET ORAL at 08:12

## 2024-04-02 RX ADMIN — Medication 12.5 MG: at 21:11

## 2024-04-02 RX ADMIN — ACETAMINOPHEN 975 MG: 325 TABLET, FILM COATED ORAL at 21:11

## 2024-04-02 RX ADMIN — LENALIDOMIDE 5 MG: 5 CAPSULE ORAL at 08:13

## 2024-04-02 RX ADMIN — HEPARIN SODIUM 22 UNITS/KG/HR: 10000 INJECTION, SOLUTION INTRAVENOUS at 16:35

## 2024-04-02 RX ADMIN — MORPHINE SULFATE 30 MG: 30 TABLET, EXTENDED RELEASE ORAL at 05:33

## 2024-04-02 RX ADMIN — HEPARIN SODIUM 2400 UNITS: 1000 INJECTION INTRAVENOUS; SUBCUTANEOUS at 20:18

## 2024-04-02 RX ADMIN — POLYETHYLENE GLYCOL 3350 17 G: 17 POWDER, FOR SOLUTION ORAL at 08:13

## 2024-04-02 RX ADMIN — Medication 12.5 MG: at 12:20

## 2024-04-02 RX ADMIN — SODIUM CHLORIDE 500 ML: 0.9 INJECTION, SOLUTION INTRAVENOUS at 23:32

## 2024-04-02 RX ADMIN — PANTOPRAZOLE SODIUM 40 MG: 40 TABLET, DELAYED RELEASE ORAL at 08:12

## 2024-04-02 RX ADMIN — HEPARIN SODIUM 4800 UNITS: 1000 INJECTION INTRAVENOUS; SUBCUTANEOUS at 05:33

## 2024-04-02 RX ADMIN — ACETAMINOPHEN 975 MG: 325 TABLET, FILM COATED ORAL at 13:24

## 2024-04-02 RX ADMIN — MORPHINE SULFATE 30 MG: 30 TABLET, EXTENDED RELEASE ORAL at 16:47

## 2024-04-02 RX ADMIN — SENNOSIDES, DOCUSATE SODIUM 1 TABLET: 8.6; 5 TABLET ORAL at 21:11

## 2024-04-02 RX ADMIN — DEXAMETHASONE 0.5 MG: 0.5 TABLET ORAL at 06:13

## 2024-04-02 RX ADMIN — CEFAZOLIN SODIUM 2000 MG: 2 SOLUTION INTRAVENOUS at 05:33

## 2024-04-02 RX ADMIN — OXYCODONE HYDROCHLORIDE 10 MG: 10 TABLET ORAL at 18:30

## 2024-04-02 RX ADMIN — HEPARIN SODIUM 22 UNITS/KG/HR: 10000 INJECTION, SOLUTION INTRAVENOUS at 08:18

## 2024-04-02 RX ADMIN — CALCIUM GLUCONATE 1 G: 20 INJECTION, SOLUTION INTRAVENOUS at 11:40

## 2024-04-02 RX ADMIN — DILTIAZEM HYDROCHLORIDE 10 MG: 5 INJECTION INTRAVENOUS at 00:09

## 2024-04-02 RX ADMIN — OXYCODONE HYDROCHLORIDE 10 MG: 10 TABLET ORAL at 11:40

## 2024-04-02 NOTE — PROGRESS NOTES
Progress Note - Orthopedics   Fredy Martinez Jr. 58 y.o. male MRN: [id number]  Unit/Bed#: ICU 16      Subjective:    58 y.o.male seen and examined at bedside. States that he is having some lateral left leg pain, otherwise is feeling well. Has not yet worked with physical therapy this morning.     Labs:  0   Lab Value Date/Time    HCT 36.0 (L) 04/02/2024 0428    HCT 42.2 04/01/2024 0717    HCT 45.7 03/31/2024 0428    HGB 12.0 04/02/2024 0428    HGB 14.2 04/01/2024 0717    HGB 15.5 03/31/2024 0428    INR 1.10 03/30/2024 2114    WBC 6.68 04/02/2024 0428    WBC 3.95 (L) 04/01/2024 0717    WBC 6.75 03/31/2024 0428       Meds:    Current Facility-Administered Medications:     acetaminophen (TYLENOL) tablet 975 mg, 975 mg, Oral, Q8H RUTH, Maury Moctezuma PA-C, 975 mg at 04/02/24 0533    calcium gluconate 1 g in sodium chloride 0.9% 50 mL (premix), 1 g, Intravenous, Once, George Goodrich MD    ceFAZolin (ANCEF) IVPB (premix in dextrose) 2,000 mg 50 mL, 2,000 mg, Intravenous, On Call To OR, Delmis Gonzalez MD    dexamethasone (DECADRON) tablet 0.5 mg, 0.5 mg, Oral, Daily Before Breakfast, Maury Moctezuma PA-C, 0.5 mg at 04/02/24 0613    heparin (porcine) 25,000 units in 0.45% NaCl 250 mL infusion (premix), 3-30 Units/kg/hr (Order-Specific), Intravenous, Titrated, Yoko Redd DO, Last Rate: 13.2 mL/hr at 04/02/24 0818, 22 Units/kg/hr at 04/02/24 0818    heparin (porcine) injection 2,400 Units, 2,400 Units, Intravenous, Q6H PRN, Yoko Redd DO    heparin (porcine) injection 4,800 Units, 4,800 Units, Intravenous, Once, Yoko Redd DO    heparin (porcine) injection 4,800 Units, 4,800 Units, Intravenous, Q6H PRN, Yoko Redd DO    HYDROmorphone (DILAUDID) injection 0.5 mg, 0.5 mg, Intravenous, Q2H PRN, Maury Moctezuma PA-C, 0.5 mg at 04/01/24 0936    lactated ringers infusion, 50 mL/hr, Intravenous, Continuous, Jin Esteban MD, Last Rate: 50 mL/hr at 04/01/24 2220, 50 mL/hr at 04/01/24  "2220    lenalidomide (REVLIMID) capsule 5 mg, 5 mg, Oral, Daily, Maury Moctezuma PA-C, 5 mg at 04/02/24 0813    metoprolol (LOPRESSOR) injection 5 mg, 5 mg, Intravenous, Q6H PRN, Maury Moctezuma PA-C    morphine (MS CONTIN) ER tablet 30 mg, 30 mg, Oral, Q12H, Maury Moctezuma PA-C, 30 mg at 04/02/24 0533    naloxone (NARCAN) 0.04 mg/mL syringe 0.04 mg, 0.04 mg, Intravenous, Q1MIN PRN, Maury Moctezuma PA-C    ondansetron (ZOFRAN) injection 4 mg, 4 mg, Intravenous, Q6H PRN, Maury Moctezuma PA-C    oxyCODONE (ROXICODONE) immediate release tablet 10 mg, 10 mg, Oral, Q4H PRN **OR** oxyCODONE (ROXICODONE) IR tablet 15 mg, 15 mg, Oral, Q4H PRN, Maury Moctezuma PA-C    pantoprazole (PROTONIX) EC tablet 40 mg, 40 mg, Oral, Daily, Maury Moctezuma PA-C, 40 mg at 04/02/24 0812    polyethylene glycol (MIRALAX) packet 17 g, 17 g, Oral, Daily, Maury Moctezuma PA-C, 17 g at 04/02/24 0813    senna-docusate sodium (SENOKOT S) 8.6-50 mg per tablet 1 tablet, 1 tablet, Oral, HS, Cale Reid, DO, 1 tablet at 04/02/24 0812    Blood Culture:   No results found for: \"BLOODCX\"    Wound Culture:   No results found for: \"WOUNDCULT\"    Ins and Outs:  I/O last 24 hours:  In: 2940.9 [P.O.:480; I.V.:1910.9; IV Piggyback:550]  Out: 2000 [Urine:1500; Blood:500]          Physical:  Vitals:    04/02/24 0740   BP: 100/63   Pulse: 87   Resp:    Temp:    SpO2: 97%     Musculoskeletal: left Lower Extremity  Surgical dressings c/d/I without strike through  Thigh and calf soft and compressible.   Sensation intact to saphenous, sural, tibial, superficial peroneal nerve, and deep peroneal  Motor intact to +FHL/EHL, +ankle dorsi/plantar flexion  2+ DP pulse  Digits warm and well perfused  Capillary refill < 2 seconds    Assessment:    58 y.o.male s/p cephalo-medullary nailing left pathologic peritrochanteric femur fracture with Dr. Bang 4/1/2024 (POD 1) .     Plan:  WBAT to the left lower " extremity.   Will monitor for ABLA and administer IVF/prbc as indicated for Greater than 2 gram drop or Hgb < 7, per primary team.   PT/OT  Pain control  DVT ppx per primary team.   Medical management per primary team.   Dispo: Ortho will follow  Patient to follow up with Dr. Bang upon discharge.     Aracelis Aguillon PA-C

## 2024-04-02 NOTE — QUICK NOTE
58 y.o. male who was in the OR for fixation of femur fracture.  At around 5:30 PM rapid response was called as patient was found to have high HR of 188s. Patient was trying to urinate and defecate and then his HR jumped up to 180s.   Denies any chest pain, shortness of breath, palpitation. Patient was alert oriented and mentioning he is ok.  His BP was 90s/60s, for this reason Lopressor could not be given and EKG was showing sinus tachycardia.   6 mg of adenosine given then his HR came down to 100s-110s  Cardiology consulted and reached out to Dr. Buckner who was on call, as per his recommendation 2.5 mg of lopressor ordered, with repleting potassium which was 3.5.  In between that patient had another episodes of tachycardia with HR 180s ad another dose of adenosine was planned to give.  Critical care initially upgraded him to stepdown 2 with telemetry but then took him to their service.

## 2024-04-02 NOTE — PLAN OF CARE
Problem: PAIN - ADULT  Goal: Verbalizes/displays adequate comfort level or baseline comfort level  Description: Interventions:  - Encourage patient to monitor pain and request assistance  - Assess pain using appropriate pain scale  - Administer analgesics based on type and severity of pain and evaluate response  - Implement non-pharmacological measures as appropriate and evaluate response  - Consider cultural and social influences on pain and pain management  - Notify physician/advanced practitioner if interventions unsuccessful or patient reports new pain  Outcome: Progressing     Problem: INFECTION - ADULT  Goal: Absence or prevention of progression during hospitalization  Description: INTERVENTIONS:  - Assess and monitor for signs and symptoms of infection  - Monitor lab/diagnostic results  - Monitor all insertion sites, i.e. indwelling lines, tubes, and drains  - Monitor endotracheal if appropriate and nasal secretions for changes in amount and color  - [place] appropriate cooling/warming therapies per order  - Administer medications as ordered  - Instruct and encourage patient and family to use good hand hygiene technique  - Identify and instruct in appropriate isolation precautions for identified infection/condition  Outcome: Progressing  Goal: Absence of fever/infection during neutropenic period  Description: INTERVENTIONS:  - Monitor WBC    Outcome: Progressing     Problem: SAFETY ADULT  Goal: Patient will remain free of falls  Description: INTERVENTIONS:  - Educate patient/family on patient safety including physical limitations  - Instruct patient to call for assistance with activity   - Consult OT/PT to assist with strengthening/mobility   - Keep Call bell within reach  - Keep bed low and locked with side rails adjusted as appropriate  - Keep care items and personal belongings within reach  - Initiate and maintain comfort rounds  - Make Fall Risk Sign visible to staff  - Offer Toileting every 2 Hours,  in advance of need  - Initiate/Maintain bed alarm  - Apply yellow socks and bracelet for high fall risk patients  - Consider moving patient to room near nurses station  Outcome: Progressing  Goal: Maintain or return to baseline ADL function  Description: INTERVENTIONS:  -  Assess patient's ability to carry out ADLs; assess patient's baseline for ADL function and identify physical deficits which impact ability to perform ADLs (bathing, care of mouth/teeth, toileting, grooming, dressing, etc.)  - Assess/evaluate cause of self-care deficits   - Assess range of motion  - Assess patient's mobility; develop plan if impaired  - Assess patient's need for assistive devices and provide as appropriate  - Encourage maximum independence but intervene and supervise when necessary  - Involve family in performance of ADLs  - Assess for home care needs following discharge   - Consider OT consult to assist with ADL evaluation and planning for discharge  - Provide patient education as appropriate  Outcome: Progressing  Goal: Maintains/Returns to pre admission functional level  Description: INTERVENTIONS:  - Perform AM-PAC 6 Click Basic Mobility/ Daily Activity assessment daily.  - Set and communicate daily mobility goal to care team and patient/family/caregiver.   - Collaborate with rehabilitation services on mobility goals if consulted  - Perform Range of Motion 3 times a day.  - Reposition patient every 2 hours.  - Record patient progress and toleration of activity level   Outcome: Progressing     Problem: CARDIOVASCULAR - ADULT  Goal: Maintains optimal cardiac output and hemodynamic stability  Description: INTERVENTIONS:  - Monitor I/O, vital signs and rhythm  - Monitor for S/S and trends of decreased cardiac output  - Administer and titrate ordered vasoactive medications to optimize hemodynamic stability  - Assess quality of pulses, skin color and temperature  - Assess for signs of decreased coronary artery perfusion  - Instruct  patient to report change in severity of symptoms  Outcome: Progressing  Goal: Absence of cardiac dysrhythmias or at baseline rhythm  Description: INTERVENTIONS:  - Continuous cardiac monitoring, vital signs, obtain 12 lead EKG if ordered  - Administer antiarrhythmic and heart rate control medications as ordered  - Monitor electrolytes and administer replacement therapy as ordered  Outcome: Progressing     Problem: SKIN/TISSUE INTEGRITY - ADULT  Goal: Skin Integrity remains intact(Skin Breakdown Prevention)  Description: Assess:  -Perform Enrique assessment   -Inspect skin when repositioning, toileting, and assisting with ADLS  -Assess extremities for adequate circulation and sensation     Bed Management:  -Have minimal linens on bed & keep smooth, unwrinkled  -Change linens as needed when moist or perspiring  -Avoid sitting or lying in one position for more than 2 hours while in bed  -Keep HOB at 30 degrees     Toileting:  -Offer bedside commode  -Assess for incontinence  -Use incontinent care products after each incontinent episode    Activity:  -Encourage or provide ROM exercises   -Turn and reposition patient every 2 Hours  -Use appropriate equipment to lift or move patient in bed    Skin Care:  -Avoid use of baby powder, tape, friction and shearing, hot water or constrictive clothing  -Relieve pressure over bony prominences   -Do not massage red bony areas  Outcome: Progressing  Goal: Incision(s), wounds(s) or drain site(s) healing without S/S of infection  Description: INTERVENTIONS  - Assess and document dressing, incision, wound bed, drain sites and surrounding tissue  - Provide patient and family education  - Perform skin care/dressing changes   Outcome: Progressing  Goal: Pressure injury heals and does not worsen  Description: Interventions:  - Implement low air loss mattress or specialty surface (Criteria met)  - Apply silicone foam dressing  - Use special pressure reducing interventions  - Apply fecal or  urinary incontinence containment device   - Perform passive or active ROM   - Turn and reposition patient & offload bony prominences every 2 hours   - Utilize friction reducing device or surface for transfers   - Consider nutrition services referral as needed  Outcome: Progressing

## 2024-04-02 NOTE — PLAN OF CARE
Problem: PAIN - ADULT  Goal: Verbalizes/displays adequate comfort level or baseline comfort level  Description: Interventions:  - Encourage patient to monitor pain and request assistance  - Assess pain using appropriate pain scale  - Administer analgesics based on type and severity of pain and evaluate response  - Implement non-pharmacological measures as appropriate and evaluate response  - Consider cultural and social influences on pain and pain management  - Notify physician/advanced practitioner if interventions unsuccessful or patient reports new pain  Outcome: Progressing     Problem: INFECTION - ADULT  Goal: Absence or prevention of progression during hospitalization  Description: INTERVENTIONS:  - Assess and monitor for signs and symptoms of infection  - Monitor lab/diagnostic results  - Monitor all insertion sites, i.e. indwelling lines, tubes, and drains  - Monitor endotracheal if appropriate and nasal secretions for changes in amount and color  - [place] appropriate cooling/warming therapies per order  - Administer medications as ordered  - Instruct and encourage patient and family to use good hand hygiene technique  - Identify and instruct in appropriate isolation precautions for identified infection/condition  Outcome: Progressing  Goal: Absence of fever/infection during neutropenic period  Description: INTERVENTIONS:  - Monitor WBC    Outcome: Progressing     Problem: SAFETY ADULT  Goal: Patient will remain free of falls  Description: INTERVENTIONS:  - Educate patient/family on patient safety including physical limitations  - Instruct patient to call for assistance with activity   - Consult OT/PT to assist with strengthening/mobility   - Keep Call bell within reach  - Keep bed low and locked with side rails adjusted as appropriate  - Keep care items and personal belongings within reach  - Initiate and maintain comfort rounds  - Make Fall Risk Sign visible to staff  - Apply yellow socks and bracelet  for high fall risk patients  - Consider moving patient to room near nurses station  Outcome: Progressing  Goal: Maintain or return to baseline ADL function  Description: INTERVENTIONS:  -  Assess patient's ability to carry out ADLs; assess patient's baseline for ADL function and identify physical deficits which impact ability to perform ADLs (bathing, care of mouth/teeth, toileting, grooming, dressing, etc.)  - Assess/evaluate cause of self-care deficits   - Assess range of motion  - Assess patient's mobility; develop plan if impaired  - Assess patient's need for assistive devices and provide as appropriate  - Encourage maximum independence but intervene and supervise when necessary  - Involve family in performance of ADLs  - Assess for home care needs following discharge   - Consider OT consult to assist with ADL evaluation and planning for discharge  - Provide patient education as appropriate  Outcome: Progressing  Goal: Maintains/Returns to pre admission functional level  Description: INTERVENTIONS:  - Perform AM-PAC 6 Click Basic Mobility/ Daily Activity assessment daily.  - Set and communicate daily mobility goal to care team and patient/family/caregiver.   - Collaborate with rehabilitation services on mobility goals if consulted  - Out of bed for toileting  - Record patient progress and toleration of activity level   Outcome: Progressing     Problem: Prexisting or High Potential for Compromised Skin Integrity  Goal: Skin integrity is maintained or improved  Description: INTERVENTIONS:  - Identify patients at risk for skin breakdown  - Assess and monitor skin integrity  - Assess and monitor nutrition and hydration status  - Monitor labs   - Assess for incontinence   - Turn and reposition patient  - Assist with mobility/ambulation  - Relieve pressure over bony prominences  - Avoid friction and shearing  - Provide appropriate hygiene as needed including keeping skin clean and dry  - Evaluate need for skin  moisturizer/barrier cream  - Collaborate with interdisciplinary team   - Patient/family teaching  - Consider wound care consult   Outcome: Progressing     Problem: Potential for Falls  Goal: Patient will remain free of falls  Description: INTERVENTIONS:  - Educate patient/family on patient safety including physical limitations  - Instruct patient to call for assistance with activity   - Consult OT/PT to assist with strengthening/mobility   - Keep Call bell within reach  - Keep bed low and locked with side rails adjusted as appropriate  - Keep care items and personal belongings within reach  - Initiate and maintain comfort rounds  - Make Fall Risk Sign visible to staff  - Apply yellow socks and bracelet for high fall risk patients  - Consider moving patient to room near nurses station  Outcome: Progressing

## 2024-04-02 NOTE — PROGRESS NOTES
Progress Note - Acute Pain Service    Fredy Martinez Jr. 58 y.o. male MRN: [id number]  Unit/Bed#: ICU 16 Encounter: [id number]      Fredy Martinez Jr. is a 58 y.o. male with history of multiple myeloma and cancer related pain on chronic opioids who presented with left hip pain found to have a pathologic fracture.  He underwent an IM nail with orthopedics 4/1/2024.    Seen at bedside this morning, reports mild left-sided hip pain while at rest.  Currently denies muscle spasms.  Patient is motivated to get out of bed and ambulate today with physical therapy.  Otherwise tolerating current multimodal analgesic regimen and denies any opioid-induced side effects.    Multiple myeloma (HCC)  Assessment & Plan  Presenting with Lt. Pathologic prox femur fracture scheduled for Lt. Hip IM nail     -Continue patient's home pain regimen along with breakthrough pain medication, will consider PCA and potentially ketamine gtt if needed for post op pain. Will not be a candidate for neuraxial block due to patient needing to be on therapeutic anticoagulation for PE   - Continue MS contin 30mg BID  - Continue Dilaudid 0.5mg Q2hr prn breakthrough pain  - Increase oxycodone to 10/15mg for mod/severe pain Q4h prn  - Continue home flexeril 5mg TID for spasms  - Can consider adding gabapentin for neuropathic pain  - Continue tylenol 975mg TID         [facility] will continue to follow. Please contact Acute Pain Service - via [facility] from [phone] with additional questions or concerns. See [facility] or She for additional contacts and after hours information.     Pain History  Current pain location(s):  Pain Score: 0  Pain Location/Orientation: Orientation: Left, Location: Leg  Pain Scale: Pain Assessment Tool: 0-10  24 hour history:  Left hip IM nail yesterday.  Patient was a rapid response postoperatively for new onset tachycardia and SVT.  He was transferred to the intensive care unit.  No acute complaints this morning pain well-controlled at  rest.    Opioid requirement previous 24 hours:   60 mg MS Contin  1.5 mg IV Dilaudid  100 mcg IV fentanyl    Meds/Allergies   all current active meds have been reviewed, current meds:   Current Facility-Administered Medications   Medication Dose Route Frequency    acetaminophen (TYLENOL) tablet 975 mg  975 mg Oral Q8H RUTH    ceFAZolin (ANCEF) IVPB (premix in dextrose) 2,000 mg 50 mL  2,000 mg Intravenous On Call To OR    dexamethasone (DECADRON) tablet 0.5 mg  0.5 mg Oral Daily Before Breakfast    heparin (porcine) 25,000 units in 0.45% NaCl 250 mL infusion (premix)  3-30 Units/kg/hr (Order-Specific) Intravenous Titrated    heparin (porcine) injection 2,400 Units  2,400 Units Intravenous Q6H PRN    heparin (porcine) injection 4,800 Units  4,800 Units Intravenous Once    heparin (porcine) injection 4,800 Units  4,800 Units Intravenous Q6H PRN    HYDROmorphone (DILAUDID) injection 0.5 mg  0.5 mg Intravenous Q2H PRN    lactated ringers infusion  50 mL/hr Intravenous Continuous    lenalidomide (REVLIMID) capsule 5 mg  5 mg Oral Daily    metoprolol (LOPRESSOR) injection 5 mg  5 mg Intravenous Q6H PRN    morphine (MS CONTIN) ER tablet 30 mg  30 mg Oral Q12H    naloxone (NARCAN) 0.04 mg/mL syringe 0.04 mg  0.04 mg Intravenous Q1MIN PRN    ondansetron (ZOFRAN) injection 4 mg  4 mg Intravenous Q6H PRN    oxyCODONE (ROXICODONE) immediate release tablet 10 mg  10 mg Oral Q4H PRN    Or    oxyCODONE (ROXICODONE) IR tablet 15 mg  15 mg Oral Q4H PRN    pantoprazole (PROTONIX) EC tablet 40 mg  40 mg Oral Daily    polyethylene glycol (MIRALAX) packet 17 g  17 g Oral Daily    senna-docusate sodium (SENOKOT S) 8.6-50 mg per tablet 1 tablet  1 tablet Oral HS   , and PTA meds:   Prior to Admission Medications   Prescriptions Last Dose Informant Patient Reported? Taking?   acetaminophen (TYLENOL) 500 mg tablet 3/30/2024 Self, Spouse/Significant Other No Yes   Sig: Take 2 tablets (1,000 mg total) by mouth 3 (three) times a day    dexamethasone (DECADRON) 1 mg tablet 3/30/2024  No Yes   Sig: Take 0.5 tablets (0.5 mg total) by mouth daily before breakfast   lenalidomide (REVLIMID) 5 MG CAPS 3/30/2024  No Yes   Sig: Take 1 capsule (5 mg total) by mouth daily   morphine (MS CONTIN) 30 mg 12 hr tablet 3/30/2024  No Yes   Sig: Take 1 tablet (30 mg total) by mouth every 12 (twelve) hours Take on a schedule to prevent and reduce cancer pain Max Daily Amount: 60 mg   naloxone (NARCAN) 4 mg/0.1 mL nasal spray Past Month Self, Spouse/Significant Other No Yes   Sig: Administer 1 spray into a nostril. If no response after 2-3 minutes, give another dose in the other nostril using a new spray.   ondansetron (ZOFRAN) 4 mg tablet Past Week  No Yes   Sig: Take 1 tablet (4 mg total) by mouth every 8 (eight) hours as needed for nausea or vomiting   oxyCODONE (ROXICODONE) 10 MG TABS 3/30/2024  No Yes   Sig: Take 1 tablet (10 mg total) by mouth every 4 (four) hours as needed (cancer pain) Max Daily Amount: 60 mg   pantoprazole (PROTONIX) 40 mg tablet 3/30/2024  No Yes   Sig: Take 1 tablet (40 mg total) by mouth daily - in the morning      Facility-Administered Medications: None       No Known Allergies    Objective        Vitals:    04/01/24 2200 04/01/24 2300 04/02/24 0221 04/02/24 0737   BP:   97/66    BP Location:   Right arm    Pulse: 100 (!) 108 93    Resp: 20 20 18    Temp:   98 °F (36.7 °C) 98.4 °F (36.9 °C)   TempSrc:   Oral    SpO2: 96% 96% 96%    Weight:   63.8 kg (140 lb 10.5 oz)    Height:             Physical Exam  Vitals reviewed.   HENT:      Head: Normocephalic and atraumatic.   Cardiovascular:      Rate and Rhythm: Normal rate.   Pulmonary:      Effort: Pulmonary effort is normal.   Chest:      Chest wall: No tenderness.   Abdominal:      Palpations: Abdomen is soft.   Musculoskeletal:         General: Tenderness (L hip) present.      Cervical back: Normal range of motion.   Skin:     General: Skin is warm and dry.      Coloration: Skin is not  pale.      Findings: No rash.   Neurological:      Mental Status: He is alert and oriented to person, place, and time. Mental status is at baseline.       Lab Results:   Estimated Creatinine Clearance: 210.1 mL/min (A) (by C-G formula based on SCr of 0.35 mg/dL (L)).  Lab Results   Component Value Date    WBC 6.68 04/02/2024    HGB 12.0 04/02/2024    HCT 36.0 (L) 04/02/2024     04/02/2024         Component Value Date/Time    K 4.3 04/02/2024 0428     04/02/2024 0428    CO2 23 04/02/2024 0428    BUN 7 04/02/2024 0428    CREATININE 0.35 (L) 04/02/2024 0428         Component Value Date/Time    CALCIUM 8.1 (L) 04/02/2024 0428    ALKPHOS 114 (H) 03/30/2024 0907    AST 12 (L) 03/30/2024 0907    ALT 6 (L) 03/30/2024 0907    TP 6.7 03/30/2024 0907    ALB 3.6 03/30/2024 0907       Imaging Studies/EKG: I have personally reviewed pertinent reports.       Counseling / Coordination of Care  Total floor / unit time spent today 15 minutes minutes. Greater than 50% of total time was spent with the patient and / or family counseling and / or coordination of care. A description of the counseling / coordination of care: Patient interview, physical examination, review of PDMP, review of medical record, review of imaging and laboratory data, development of pain management plan, discussion of pain management plan with patient and primary service.    Please note that the APS provides consultative services regarding pain management only.  With the exception of ketamine and epidural infusions and except when indicated, final decisions regarding starting or changing doses of analgesic medications are at the discretion of the consulting service.    PABLO Llanes   Acute Pain Service

## 2024-04-02 NOTE — PROGRESS NOTES
[facility]  Progress Note  Name: Fredy Houston I  MRN: [id number]  Unit/Bed#: ICU 16 I Date of Admission: 3/30/2024   Date of Service: 4/2/2024 I Hospital Day: 3    Assessment/Plan   * Closed fracture of neck of left femur (HCC)  Assessment & Plan  Hx of Multiple myeloma (diagnosed on 2/2023) - completed Bortezomib, lenalidomide, dexamethasone; Completed 4 cycles currently on Revlimid 5mg, s/p stem cell transplant presented on 8/4/23  Left hip pain for about a month, found to have pathological fracture involving the left peritrochanteric femoral region.  S/p cephalomedullary nailing of left peritrochanteric path femur fracture 4/1    Plan:   Pain regimen per acute pain service as below.  Orthopedics on board.    SVT (supraventricular tachycardia)  Assessment & Plan  Echo: 3/31/2024: EF 60%, G1 DD  He was found to be in sinus tachycardia 180s on admission, thus related to PE and pain. He was given Lopressor 5 mg.  After postop on 4/1, patient noted to have new onset tachycardia with HR in the 190s, asymptomatic.   Patient was found to be in SVT with hypotension, SBP 90.  Was given adenosine 6 mg x 1 with improvement of HR in the 100s and BP improved to systolic in the 120s.   He went back into SVT, received a dose of 6 mg adenosine with improvement of HR.    He went back into SVT with HR in the 180s. Given Lopressor 5 mg and then Cardizem 10 mg.    Plan:  Monitor on telemetry  Monitor and replete electrolytes  Will consider cardiology consult    Multiple myeloma (HCC)  Assessment & Plan  Completed 4 cycles of Bortezomib, lenalidomide, dexamethasone on 8/4/23, as well as stem cell transplant.  Currently on Revlimid, dexamethasone 0.5 mg.  Outpatient oncologist: Dr. Garcia.     Plan:  Will resume home medication.    Acute pulmonary embolism without acute cor pulmonale (HCC)  Assessment & Plan  Incidental findings of PE on CTA PE study at [facility] [place]: subocclusive emboli in the lower arteries in the  right middle and lower lobes which are peripheral, no infarct or right heart strain, multiple lytic lesions ]  Troponin 53, 49, 50  Echo: 3/31/2024 with EF 60%, G1 DD.    Plan:  Will resume heparin drip s/p cephalomedullary nailing of left peritrochanteric for pathologic femur fracture     Cancer related pain  Assessment & Plan  Pain service on board.  Pain management as below.  Continue patient's home pain regimen along with breakthrough pain medication, will consider PCA and potentially ketamine gtt if needed for post op pain. Will not be a candidate for neuraxial block due to patient needing to be on therapeutic anticoagulation for PE   Continue MS contin 30mg BID  Continue Dilaudid 0.5mg Q2hr prn breakthrough pain  Increase oxycodone to 10/15mg for mod/severe pain Q4h prn  Continue home flexeril 5mg TID for spasms  Can consider adding gabapentin for neuropathic pain  Continue tylenol 975mg TID     Therapeutic opioid-induced constipation (OIC)  Assessment & Plan  Reports having a bowel movement overnight on 4/1.  Current bowel regimen MiraLAX and Senokot-S.             Disposition: Stepdown Level 1    ICU Core Measures     A: Assess, Prevent, and Manage Pain Has pain been assessed? Yes  Need for changes to pain regimen? No   B: Both SAT/SAT  N/A   C: Choice of Sedation RASS Goal: 0 Alert and Calm  Need for changes to sedation or analgesia regimen? No   D: Delirium CAM-ICU: Negative   E: Early Mobility  Plan for early mobility? Yes   F: Family Engagement Plan for family engagement today? Yes       Antibiotic Review: Post op requirements       Prophylaxis:  VTE VTE covered by:  heparin (porcine), Intravenous, 22 Units/kg/hr at 04/02/24 0537  heparin (porcine), Intravenous  heparin (porcine), Intravenous, 4,800 Units at 04/02/24 0533       Stress Ulcer  covered bypantoprazole (PROTONIX) 40 mg tablet [[id number]] (Long-Term Med), pantoprazole (PROTONIX) EC tablet 40 mg [[id number]]         Significant 24hr Events      24hr events: After postop on 4/1, patient noted to have new onset tachycardia with HR in the 190s, asymptomatic. Patient was found to be in SVT with hypotension, SBP 90.  Was given adenosine 6 mg x 1 with improvement of HR in the 100s and BP improved to systolic in the 120s. He went back into SVT, received a dose of 6 mg adenosine with improvement of HR. He went back into SVT with HR in the 180s. Given Lopressor 5 mg and then Cardizem 10 mg.  Reports having a small bowel movement yesterday.  He reports pain currently 3/4 out of 10 when not moving and 6/7 out of 10 when moving.     Subjective   Review of Systems   Constitutional:  Negative for chills and fever.   HENT:  Negative for congestion and sore throat.    Respiratory:  Negative for cough and shortness of breath.    Cardiovascular:  Negative for chest pain and palpitations.   Gastrointestinal:  Negative for abdominal pain, nausea and vomiting.   Genitourinary:  Negative for dysuria.   Musculoskeletal:  Negative for arthralgias.   Neurological:  Negative for dizziness and headaches.      Objective                            Vitals I/O      Most Recent Min/Max in 24hrs   Temp 98 °F (36.7 °C) Temp  Min: 97.7 °F (36.5 °C)  Max: 99.6 °F (37.6 °C)   Pulse 93 Pulse  Min: 80  Max: 194   Resp 18 Resp  Min: 14  Max: 24   BP 97/66 BP  Min: 93/63  Max: 141/65   O2 Sat 96 % SpO2  Min: 96 %  Max: 100 %      Intake/Output Summary (Last 24 hours) at 4/2/2024 0739  Last data filed at 4/2/2024 0614  Gross per 24 hour   Intake 2032.21 ml   Output 2000 ml   Net 32.21 ml       Diet Regular; Regular House    Invasive Monitoring           Physical Exam   Physical Exam  Eyes:      Extraocular Movements: Extraocular movements intact.      Conjunctiva/sclera: Conjunctivae normal.   HENT:      Head: Normocephalic and atraumatic.      Mouth/Throat:      Mouth: Mucous membranes are moist.      Dentition: Abnormal dentition.   Cardiovascular:      Rate and Rhythm: Regular rhythm.  Tachycardia present.   Musculoskeletal:      Comments: Tegaderm placed on the left hip.  Ace bandages wrapped around the left lower extremity for compression.   Abdominal:      Palpations: Abdomen is soft.      Tenderness: There is no abdominal tenderness.   Constitutional:       General: He is not in acute distress.     Appearance: He is well-developed.   Pulmonary:      Effort: Pulmonary effort is normal.      Breath sounds: Normal breath sounds. No wheezing, rhonchi or rales.            Diagnostic Studies      EKG: Reviewed  Imaging: Reviewed      Medications:  Scheduled PRN   acetaminophen, 975 mg, Q8H RUTH  cefazolin, 2,000 mg, On Call To OR  dexamethasone, 0.5 mg, Daily Before Breakfast  lenalidomide, 5 mg, Daily  morphine, 30 mg, Q12H  pantoprazole, 40 mg, Daily  polyethylene glycol, 17 g, Daily  senna-docusate sodium, 1 tablet, HS      heparin (porcine), 2,400 Units, Q6H PRN  heparin (porcine), 4,800 Units, Q6H PRN  HYDROmorphone, 0.5 mg, Q2H PRN  metoprolol, 5 mg, Q6H PRN  naloxone, 0.04 mg, Q1MIN PRN  ondansetron, 4 mg, Q6H PRN  oxyCODONE, 10 mg, Q4H PRN   Or  oxyCODONE, 15 mg, Q4H PRN       Continuous    heparin (porcine), 3-30 Units/kg/hr (Order-Specific), Last Rate: 22 Units/kg/hr (04/02/24 0537)  lactated ringers, 50 mL/hr, Last Rate: 50 mL/hr (04/01/24 2220)         Labs:    CBC    Recent Labs     04/01/24  0717 04/02/24 0428   WBC 3.95* 6.68   HGB 14.2 12.0   HCT 42.2 36.0*   * 154     BMP    Recent Labs     04/01/24 1758 04/02/24 0428   SODIUM 137 134*   K 3.4* 4.3    103   CO2 25 23   AGAP 12 8   BUN 6 7   CREATININE 0.39* 0.35*   CALCIUM 8.4 8.1*       Coags    Recent Labs     04/01/24 1758 04/02/24 0428   PTT 30 41*        Additional Electrolytes  Recent Labs     04/01/24 1758 04/02/24 0428   MG 1.9 2.0   PHOS  --  3.0   CAIONIZED  --  1.07*          Blood Gas    No recent results  Recent Labs     03/31/24  1606   PHVEN 7.472*   BOR5ZOH 33.0*   PO2VEN 70.6*   TPN1OMK 23.6*    BEVEN 0.7   N7SYDVX 93.5*    LFTs  No recent results    Infectious  No recent results  Glucose  Recent Labs     04/01/24  0717 04/01/24  1758 04/02/24  0428   GLUC 107 134 125               Cale Reid DO

## 2024-04-02 NOTE — CASE MANAGEMENT
Case Management Discharge Planning Note    Patient name Fredy Martinez Jr.  Location ICU 16/ICU 16 MRN [id number]  : 1965 Date 2024       Current Admission Date: 3/30/2024  Current Admission Diagnosis:Closed fracture of neck of left femur (HCC)   Patient Active Problem List    Diagnosis Date Noted    SVT (supraventricular tachycardia) 2024    Acute pulmonary embolism without acute cor pulmonale (HCC) 2024    Closed fracture of neck of left femur (HCC) 2024    Dental disease 2023    Insomnia 2023    Cancer related pain 2023    Multiple myeloma (HCC) 2023    Metastasis to bone (HCC) 2023    Palliative care patient 2023    Unintentional weight loss 2023    Nicotine dependence, cigarettes, uncomplicated 2023    History of alcohol abuse 2023    Acid reflux 2023    Nausea & vomiting 2023    Loss of appetite 2023    Therapeutic opioid-induced constipation (OIC) 2023    Back pain 2023    Monoclonal gammopathy 2023      LOS (days): 3  Geometric Mean LOS (GMLOS) (days):   Days to GMLOS:     OBJECTIVE:  Risk of Unplanned Readmission Score: 18.95         Current admission status: Inpatient   Preferred Pharmacy:   [facility][id number] - [place] - [address]  [address]  [facility] [address]  Phone: [phone] Fax: [phone]    [facility] [place] - [address]  [address] [place] [address]  Phone: [phone] Fax: [phone]    [id number] - [place] - [address] [address]  Phone: [phone] Fax: [phone]    [facility] ([facility] #[id number] [facility] [place] - [address] [place] [address]  Phone: [phone] Fax: [phone]    [facility] [place] - [address]  [address] [address]  Phone:  [phone] Viry Lewis Neurology Progress Note    Flako Mendez Patient Status:  Inpatient    1928 MRN [id number]   [facility] 2NE-A Attending Beverly Melton MD   [address] Day # 7 PCP Kuldeep Cee MD         Subjective:  Flako Mendez is [phone] Fax: [phone]    [facility] [place] [place] - [address]  [address]  [address]  [facility] [address]  Phone: [phone] Fax: [phone]    Primary Care Provider: No primary care provider on file.    Primary Insurance: HELENE DAHL  Secondary Insurance:     DISCHARGE DETAILS:    Discharge planning discussed with:: Pt and his wifeMary  Freedom of Choice: Yes  Comments - Freedom of Choice: ARC, STR, HHC    Contacts  Patient Contacts: WifeMaria Eugenia  Relationship to Patient:: Family  Contact Method: Phone  Reason/Outcome: Continuity of Care, Emergency Contact, Discharge Planning    Other Referral/Resources/Interventions Provided:  Interventions: Short Term Rehab  Referral Comments: CM met with pt at bedside and Mary was by phone. CM reviewed PT/OT recs for STR. Reviewed ARC vs Sub Acute. They are agreeable to referrals and aware CM will f/u. If pt improves well enough to return home aware CM can set up HHC and order DME. Referrals for ARC and sub acute in [place]. Mary confirmed she is home to help pt.       Blocker   • Insulin Aspart Pen  1-5 Units Subcutaneous TID CC and HS       Patient Active Problem List:     Traumatic hemorrhage of cerebrum with loss of consciousness (HCC)     Traumatic hemorrhage of cerebrum with loss of consciousness, unspecified later have had mild clumsiness of the L hand this am. Pt states he thinks it was R but also maybe L hand. MRI was done and there is no new stroke or other acute lesion.  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

## 2024-04-02 NOTE — ASSESSMENT & PLAN NOTE
Hx of Multiple myeloma (diagnosed on 2/2023) - completed Bortezomib, lenalidomide, dexamethasone; Completed 4 cycles currently on Revlimid 5mg, s/p stem cell transplant presented on 8/4/23  Left hip pain for about a month, found to have pathological fracture involving the left peritrochanteric femoral region.  S/p cephalomedullary nailing of left peritrochanteric path femur fracture 4/1    Plan:   Pain regimen per acute pain service as below.  Orthopedics on board.

## 2024-04-02 NOTE — PLAN OF CARE
Problem: OCCUPATIONAL THERAPY ADULT  Goal: Performs self-care activities at highest level of function for planned discharge setting.  See evaluation for individualized goals.  Description: Treatment Interventions: ADL retraining, Functional transfer training, UE strengthening/ROM, Endurance training, Patient/family training, Equipment evaluation/education, Compensatory technique education, Continued evaluation, Energy conservation (cog assessment)          See flowsheet documentation for full assessment, interventions and recommendations.   Note: Limitation: Decreased ADL status, Decreased UE strength, Decreased Safe judgement during ADL, Decreased endurance, Decreased self-care trans, Decreased high-level ADLs (trunk control, + pain, activity tolerance, functional reach, safety awareness)  Prognosis: Good  Assessment: Patient is a 58 y.o. male seen for OT evaluation at [facility] following admission on 3/30/2024  s/p Closed fracture of neck of left femur (HCC). Please see above for comprehensive list of comorbidities and significant PMHx impacting functional performance.  Upon initial evaluation, pt appears to be performing below baseline functional status.   Occupational performance is affected by the following deficits: endurance ,  decreased muscular strength , acute change in mobility status , decreased balance , decreased standing tolerance for self care tasks , decreased trunk control , decreased activity tolerance , impaired judgement and problem solving , impaired safety awareness , and (+) pain . Personal/Environmental factors impacting D/C include: Assistance needed for ADLs and functional mobility and High fall risk . Supporting factors include: able to maintain FFSU and attitude towards recovery Patient would benefit from OT services within the acute care setting to maximize level of functional independence in the following areas self-care transfers, functional mobility, and ADLs.   From OT standpoint, recommendation at time of D/C would be Level 1: maximum resource intensity .     Rehab Resource Intensity Level, OT: I (Maximum Resource Intensity)     Sheryl Matthews, OT

## 2024-04-02 NOTE — ASSESSMENT & PLAN NOTE
Incidental findings of PE on CTA PE study at [facility]: subocclusive emboli in the lower arteries in the right middle and lower lobes which are peripheral, no infarct or right heart strain, multiple lytic lesions ]  Troponin 53, 49, 50  Echo: 3/31/2024 with EF 60%, G1 DD.    Plan:  Will resume heparin drip s/p cephalomedullary nailing of left peritrochanteric for pathologic femur fracture

## 2024-04-02 NOTE — ASSESSMENT & PLAN NOTE
Pain service on board.  Pain management as below.  Continue patient's home pain regimen along with breakthrough pain medication, will consider PCA and potentially ketamine gtt if needed for post op pain. Will not be a candidate for neuraxial block due to patient needing to be on therapeutic anticoagulation for PE   Continue MS contin 30mg BID  Continue Dilaudid 0.5mg Q2hr prn breakthrough pain  Increase oxycodone to 10/15mg for mod/severe pain Q4h prn  Continue home flexeril 5mg TID for spasms  Can consider adding gabapentin for neuropathic pain  Continue tylenol 975mg TID

## 2024-04-02 NOTE — CONSULTS
Consultation - Cardiology   Fredy Martinez Jr. 58 y.o. male MRN: [id number]  Unit/Bed#: ICU 16 Encounter: [id number]    Consults    History of Present Illness   Physician Requesting Consult: Yoko Redd DO  Reason for Consult / Principal Problem: Supraventricular tachycardia      Assessment:  Principal Problem:    Closed fracture of neck of left femur (HCC)  Active Problems:    Therapeutic opioid-induced constipation (OIC)    Multiple myeloma (HCC)    Cancer related pain    Acute pulmonary embolism without acute cor pulmonale (HCC)    SVT (supraventricular tachycardia)      Assessment/ Plan:  Supraventricular tachycardia  He was found to be in sinus tachycardia 180s on admission, likely related to PE and pain. He was given Lopressor 5 mg.  After postop on 4/1, patient noted to have new onset tachycardia with HR in the 190s, asymptomatic. Patient was found to be in SVT with hypotension, SBP 90.  Was given adenosine 6 mg x 1 with improvement of HR in the 100s and BP improved to systolic in the 120s.   Echo 3/31 EF 60% grade 1 diastolic dysfunction  Plan:  Started patient on Lopressor 12.5 mg twice daily with hold parameters of SBP less than 90  Lopressor 5 mg IV every 6 hours as needed  Continue to monitor on telemetry  Monitor and replete electrolytes as needed    2.  Acute pulmonary embolism  Incidental findings of PE on CTA PE study at [facility]: subocclusive emboli in the lower arteries in the right middle and lower lobes which are peripheral, no infarct or right heart strain   Patient currently on heparin    3.  Closed fracture of the L. femoral neck  Left hip pain for about a month, found to have pathological fracture involving the left peritrochanteric femoral region.  S/p cephalomedullary nailing of left peritrochanteric path femur fracture 4/1  Continue pain management as per acute pain services and orthopedics on board    4.  Multiple myeloma  Diagnosed 2/2023 (completed 4 cycles of Bortezomib, lenalidomide,  dexamethasone s/p stem cell transplant presented on 8/4/23, cancer-related pain on opioid  Follows Dr. Garcia outpatient    5.  Opioid-induced constipation continue bowel regimen per primary team      HPI: Fredy Martinez Jr. is a 58 y.o. year old male with pmhx of Multiple myeloma ( diagnosed 2/2023) (completed 4 cycles of Bortezomib, lenalidomide, dexamethasone s/p stem cell transplant presented on 8/4/23, cancer-related pain on opioid, hyponatremia presented with left hip pain for about a month, found to have pathological fracture involving the left peritrochanteric femoral region. He was found to be in sinus tachycardia 180s on admission and was given Lopressor 5 mg. He was also found to have incidental finding of PE on CTA study. He was transferred to [place] from [facility] for orthopedic intervention. On 4/1, he had cephalo-medullary nailing for left pathologic peritrochanteric femur fracture. He was found to be in sinus tachycardia 180s on admission and was given Lopressor 5 mg. After postop, patient noted to have new onset tachycardia with HR in the 190s, asymptomatic. Patient was found to be in SVT with hypotension, SBP 90.  He was given adenosine 6 mg x 1 with improvement of HR in the 100s and BP improved to systolic in the 120s. He went back into SVT, received a dose of 6 mg adenosine with improvement of HR.  He went back into SVT with HR in the 180s for the third time and was given Lopressor 5 mg and then Cardizem 10 mg. He has been sinus tachy on 4/2/2024.       ROS  Historical Information   Past Medical History:   Diagnosis Date    Cancer (HCC)     bone-    GERD (gastroesophageal reflux disease)     Multiple myeloma (HCC)      Past Surgical History:   Procedure Laterality Date    APPENDECTOMY      IR BIOPSY BONE MARROW  3/15/2023    ND OPTX FEM SHFT FX W/INSJ IMED IMPLT W/WO SCREW Left 4/1/2024    Procedure: INSERTION NAIL IM FEMUR ANTEGRADE (TROCHANTERIC);  Surgeon: Pat Bang MD;  Location:  "[facility];  Service: Orthopedics     Social History     Substance and Sexual Activity   Alcohol Use Not Currently     Social History     Substance and Sexual Activity   Drug Use Not Currently    Types: Marijuana    Comment: once a month     Social History     Tobacco Use   Smoking Status Some Days    Types: Cigarettes   Smokeless Tobacco Not on file   Tobacco Comments    Smoking 1 cigarette daily     Family History: non-contributory    Meds/Allergies   all current active meds have been reviewed  heparin (porcine), 3-30 Units/kg/hr (Order-Specific), Last Rate: 22 Units/kg/hr (24 0818)  lactated ringers, 50 mL/hr, Last Rate: 50 mL/hr (24)        No Known Allergies    Objective   Vitals: Blood pressure 100/63, pulse 87, temperature 98.4 °F (36.9 °C), resp. rate 19, height 5' 10\" (1.778 m), weight 63.8 kg (140 lb 10.5 oz), SpO2 97%., Body mass index is 20.18 kg/m².,   Orthostatic Blood Pressures      Flowsheet Row Most Recent Value   Blood Pressure 100/63 filed at 2024 0740   Patient Position - Orthostatic VS Lying filed at 2024 0221          Systolic (24hrs), Av , Min:88 , Max:141     Diastolic (24hrs), Av, Min:56, Max:96      Intake/Output Summary (Last 24 hours) at 2024 1206  Last data filed at 2024 1143  Gross per 24 hour   Intake 3156.86 ml   Output 2550 ml   Net 606.86 ml       Weight (last 2 days)       Date/Time Weight    24 0221 63.8 (140.65)    24 1104 59.9 (132)    24 1300 60.3 (132.94)            Invasive Devices       Peripheral Intravenous Line  Duration             Peripheral IV 24 Distal;Left;Upper;Ventral (anterior) Arm 3 days    Peripheral IV 24 Distal;Left;Ventral (anterior) Forearm 2 days                        Physical Exam: /63   Pulse 87   Temp 98.4 °F (36.9 °C)   Resp 19   Ht 5' 10\" (1.778 m)   Wt 63.8 kg (140 lb 10.5 oz)   SpO2 97%   BMI 20.18 kg/m²     General Appearance:    Alert, cooperative, no " distress, appears stated age   Head:    Normocephalic, without obvious abnormality, atraumatic   Eyes:    PERRL, conjunctiva/corneas clear, EOM's intact, fundi     benign, both eyes        Ears:    Normal TM's and external ear canals, both ears   Nose:   Nares normal, septum midline, mucosa normal, no drainage    or sinus tenderness   Throat:   Lips, mucosa, and tongue normal; teeth and gums normal   Neck:   Supple, symmetrical, trachea midline, no adenopathy;        thyroid:  No enlargement/tenderness/nodules; no carotid    bruit or JVD   Back:     Symmetric, no curvature, ROM normal, no CVA tenderness   Lungs:     Clear to auscultation bilaterally, respirations unlabored   Chest wall:    No tenderness or deformity   Heart:  Tachycardia, S1 and S2 normal, no murmur, rub   or gallop   Abdomen:     Soft, non-tender, bowel sounds active all four quadrants,     no masses, no organomegaly   Genitalia:    Normal male without lesion, discharge or tenderness   Rectal:    Normal tone, normal prostate, no masses or tenderness;    guaiac negative stool   Extremities:   Extremities normal, atraumatic, no cyanosis or edema   Pulses:   2+ and symmetric all extremities   Skin:   Skin color, texture, turgor normal, no rashes or lesions   Lymph nodes:   Cervical, supraclavicular, and axillary nodes normal   Neurologic:   CNII-XII intact. Normal strength, sensation and reflexes       throughout           Laboratory Results:        CBC with diff:   Results from last 7 days   Lab Units 04/02/24  0428 04/01/24  0717 03/31/24  0428 03/30/24  0907   WBC Thousand/uL 6.68 3.95* 6.75 7.83   HEMOGLOBIN g/dL 12.0 14.2 15.5 16.9   HEMATOCRIT % 36.0* 42.2 45.7 48.9   MCV fL 94 92 92 91   PLATELETS Thousands/uL 154 131* 159 187   RBC Million/uL 3.83* 4.59 4.96 5.38   MCH pg 31.3 30.9 31.3 31.4   MCHC g/dL 33.3 33.6 33.9 34.6   RDW % 14.8 14.6 14.6 14.2   MPV fL 9.0 8.4* 8.7* 9.0   NRBC AUTO /100 WBCs  --   --   --  0         CMP:  Results from  last 7 days   Lab Units 04/02/24  0428 04/01/24 1758 04/01/24  0717 03/31/24  0428 03/30/24  0907   POTASSIUM mmol/L 4.3 3.4* 3.5   < > 3.4*   CHLORIDE mmol/L 103 100 100   < > 92*   CO2 mmol/L 23 25 27   < > 23   BUN mg/dL 7 6 6   < > 16   CREATININE mg/dL 0.35* 0.39* 0.36*   < > 0.63   CALCIUM mg/dL 8.1* 8.4 8.6   < > 9.6   AST U/L  --   --   --   --  12*   ALT U/L  --   --   --   --  6*   ALK PHOS U/L  --   --   --   --  114*   EGFR ml/min/1.73sq m 139 133 137   < > 109    < > = values in this interval not displayed.         BMP:  Results from last 7 days   Lab Units 04/02/24 0428 04/01/24 1758 04/01/24  0717   POTASSIUM mmol/L 4.3 3.4* 3.5   CHLORIDE mmol/L 103 100 100   CO2 mmol/L 23 25 27   BUN mg/dL 7 6 6   CREATININE mg/dL 0.35* 0.39* 0.36*   CALCIUM mg/dL 8.1* 8.4 8.6       BNP:    Recent Labs     03/30/24  2030   *       Magnesium:   Results from last 7 days   Lab Units 04/02/24 0428 04/01/24 1758 04/01/24  0717   MAGNESIUM mg/dL 2.0 1.9 1.7*       Coags:   Results from last 7 days   Lab Units 04/02/24 0428 04/01/24 1758 04/01/24  0958 03/31/24  0428 03/30/24  2114 03/30/24  0954   PTT seconds 41* 30 30   < > 64* 31   INR   --   --   --   --  1.10 1.14    < > = values in this interval not displayed.       TSH:       Hemoglobin A1C       Lipid Profile:         Cardiac testing:   No results found for this or any previous visit.    No results found for this or any previous visit.    No results found for this or any previous visit.    No results found for this or any previous visit.        Imaging: I have personally reviewed pertinent reports.    XR femur 2 vw left    Result Date: 4/2/2024  Narrative: C-ARM - XR FEMUR 2 VW LEFT INDICATION: Femur fracture, left (HCC) [S72.92XA]. Procedure guidance. TECHNIQUE: Fluoroscopic guidance provided. COMPARISON: None FLUOROSCOPY TIME: 290.7 seconds 8 FLUOROSCOPIC IMAGES FINDINGS: Fluoroscopy provided for procedure guidance. Osseous and soft tissue detail  limited by technique.     Impression: Fluoroscopy provided for procedure guidance. Please refer to the separate procedure note for additional details. Workstation performed: [id number]     Echo complete w/ contrast if indicated    Result Date: 4/1/2024  Narrative:   Left Ventricle: Left ventricular cavity size is normal. Wall thickness is normal. The left ventricular ejection fraction is 60%. Systolic function is normal. Wall motion is normal. Diastolic function is mildly abnormal, consistent with grade I (abnormal) relaxation.   Right Ventricle: Right ventricular cavity size is normal. Systolic function is normal.     XR hip/pelv 2-3 vws left    Result Date: 3/31/2024  Narrative: XR HIP/PELV 2-3 VWS LEFT  W PELVIS IF PERFORMED INDICATION: Left hip pain following fall. COMPARISON: None FINDINGS: Proximal number subtrochanteric fracture of the proximal left femur with medial displacement of the distal fracture fragment. No significant hip degenerative changes. Multiple myeloma osseous lesions seen on prior studies are not definitively seen on this exam. Phleboliths in the pelvis. Otherwise unremarkable soft tissues. Unremarkable visualized lumbar spine.     Impression: Displaced proximal left femoral diaphysis fracture. Resident: MICHELLE PATRICK I, the attending radiologist, have reviewed the images and agree with the final report above. Workstation performed: [id number]     XR femur 2 views LEFT    Result Date: 3/31/2024  Narrative: XR FEMUR 2 VW LEFT INDICATION: pain. COMPARISON: None FINDINGS: Proximal left femoral diaphysis fracture. No significant degenerative changes. No lytic or blastic osseous lesion. Unremarkable soft tissues.     Impression: Proximal left femoral diaphysis fracture. Workstation performed: [id number]     CTA ED chest PE study    Result Date: 3/30/2024  Narrative: CTA - CHEST WITH IV CONTRAST - PULMONARY ANGIOGRAM INDICATION: Fracture, hx cancer, tachycardia, shortness of breath. COMPARISON:  Most recent prior study for comparison is a CT scan dated May 11, 2023 and a PET/CT dated October 25, 2023.. TECHNIQUE: CTA examination of the chest was performed using angiographic technique according to a protocol specifically tailored to evaluate for pulmonary embolism. Multiplanar 2D reformatted images were created from the source data. In addition, coronal  3D MIP postprocessing was performed on the acquisition scanner. Radiation dose length product (DLP) for this visit: 333 mGy-cm . This examination, like all CT scans performed in the [facility], was performed utilizing techniques to minimize radiation dose exposure, including the use of iterative reconstruction and automated exposure control. IV Contrast: 100 mL of iohexol (OMNIPAQUE) FINDINGS: PULMONARY ARTERIAL TREE: Peripheral emboli are noted on the right, at the origin of the right middle and lower lobar arteries. LUNGS: Fluid/debris in the right lower lobe airways. Bibasilar atelectasis. Secretions in the lower trachea and proximal left mainstem bronchus. Scattered groundglass in the periphery of the right middle and lower lobes likely infectious/inflammatory. PLEURA: Unremarkable. HEART/GREAT VESSELS: Unremarkable for patient's age. No thoracic aortic aneurysm. MEDIASTINUM AND PADMINI: Unremarkable. CHEST WALL AND LOWER NECK: Unremarkable. VISUALIZED STRUCTURES IN THE UPPER ABDOMEN: Multiple calcified granuloma in the spleen. OSSEOUS STRUCTURES: Numerous lytic lesions throughout the spine, sternum and ribs due to known multiple myeloma. Multiple previously noted soft tissue lesions associated with ribs on the right, left anterior second and third ribs have resolved.     Impression: Subocclusive emboli in the lobar arteries of the right middle and lower lobes which are peripheral in location, compatible with subacute emboli. No infarct or right heart strain. Multiple lytic lesions throughout the visualized axial and appendicular skeleton  compatible with known myeloma. 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 Findings were discussed with Dr. Yen Alcocer from the emergency department at the time of this dictation. Workstation performed: [id number]       EKG reviewed personally: 4/2 EKG normal sinus rhythm heart rate 84  Telemetry reviewed personally:       Counseling / Coordination of Care  Total floor / unit time spent today 30 minutes.  Greater than 50% of total time was spent with the patient and / or family counseling and / or coordination of care.  A description of the counseling / coordination of care: 30.        Code Status: Level 1 - Full Code

## 2024-04-02 NOTE — OCCUPATIONAL THERAPY NOTE
"    Occupational Therapy Evaluation     Patient Name: Fredy Martinez Jr.  Today's Date: 4/2/2024  Problem List  Principal Problem:    Closed fracture of neck of left femur (HCC)  Active Problems:    Therapeutic opioid-induced constipation (OIC)    Multiple myeloma (HCC)    Cancer related pain    Acute pulmonary embolism without acute cor pulmonale (HCC)    SVT (supraventricular tachycardia)    Past Medical History  Past Medical History:   Diagnosis Date    Cancer (HCC)     bone-    GERD (gastroesophageal reflux disease)     Multiple myeloma (HCC)      Past Surgical History  Past Surgical History:   Procedure Laterality Date    APPENDECTOMY      IR BIOPSY BONE MARROW  3/15/2023    MA OPTX FEM SHFT FX W/INSJ IMED IMPLT W/WO SCREW Left 4/1/2024    Procedure: INSERTION NAIL IM FEMUR ANTEGRADE (TROCHANTERIC);  Surgeon: Pat Bang MD;  [facility]: [facility];  Service: Orthopedics         04/02/24 1410   OT Last Visit   OT Visit Date 04/02/24   Note Type   Note type Evaluation   Pain Assessment   Pain Assessment Tool 0-10   Pain Score 10 - Worst Possible Pain  (\"15\")   Pain Location/Orientation Orientation: Left;Location: Leg   Pain Onset/Description Frequency: Constant/Continuous;Onset: Ongoing   Effect of Pain on Daily Activities comfort, LB dressing,a ctivity tolerance   Hospital Pain Intervention(s) Cold applied;Repositioned;Ambulation/increased activity  (waffle cushion, premedicated)   Restrictions/Precautions   Weight Bearing Precautions Per Order Yes   LLE Weight Bearing Per Order WBAT  (s/p CM nail POD1 c Dr. Bang)   Other Precautions Cognitive;Chair Alarm;Bed Alarm;Impulsive;Multiple lines;Telemetry;Fall Risk;Pain;WBS   Home Living   Type of Home House   Home Layout Multi-level;Performs ADLs on one level;Able to live on main level with bedroom/bathroom;Stairs to enter with rails  (6 DONYA c HR, maintains FFSU)   Bathroom Shower/Tub Walk-in shower   Bathroom Toilet Raised   Bathroom Equipment " "Commode;Built-in shower seat   Bathroom Accessibility Accessible   Home Equipment Walker;Cane  (SPC used at baseline, RW available \"somewhere\")   Additional Comments sleeps in recliner chair. Has kept BSC next to recliner x 1 week d/t increased pain.   Prior Function   Level of [place] Independent with ADLs;Independent with functional mobility;Independent with IADLS  (shares IADL roles with spouse)   Lives With Spouse   Receives Help From Family   IADLs Independent with medication management;Independent with meal prep  (uses [employer] transportation or friends drive pt. Pt reports sharing home mgmt tasks with spouse)   Falls in the last 6 months 0  (denies)   Comments pt ambulates household and short community distances with SPC at baseline. increased pain x 1-2 weeks d/t hip fx limiting mobility status. Reports has not gone upstairs to 2nd floor of home in ~1 year.   Lifestyle   Autonomy At baseline, pt is (I) with ADL/IADLs, mod (I) c SPC. Lives c spouse. (-) driving (-) falls   Reciprocal Relationships supportive spouse, friends   Intrinsic Gratification enjoys being independent   General   Additional Pertinent History Pt admitted d/t ongoing L hip pain. Findings of closed fracture of L femoral neck- pathological fx 2/2 bony mets. S/p CM nail c Dr Bang POD 1. acute B PEs during hospitalization, on heparin drip.  Hx of multiple myeloma, alcohol abuse, cancer related pain. Follows c palliative.   Family/Caregiver Present No   Subjective   Subjective \"I am independent and stubborn\"   ADL   Where Assessed Chair  (commode.)   Eating Assistance 6  Modified independent   Grooming Assistance 5  Supervision/Setup   UB Bathing Assistance 5  Supervision/Setup   LB Bathing Assistance 3  Moderate Assistance   UB Dressing Assistance 4  Minimal Assistance   LB Dressing Assistance 3  Moderate Assistance   Toileting Assistance  3  Moderate Assistance   Toileting Deficit Bedside commode;Increased time to " complete;Supervison/safety;Verbal cueing;Setup;Perineal hygiene  (Min A steadying c RW for posterior care)   Functional Assistance 4  Minimal Assistance   Additional Comments Did not observe eating, UB bathing, LB bathing, UB dressing, and LB dressing at time of evaluation, with use of clinical reasoning, pt's performance throughout evaluation indicates that pt may be able to perform these tasks at the levels listed above   Bed Mobility   Additional Comments seated OOB on BSC upon, OOB in recliner upon arrival.   Transfers   Sit to Stand 3  Moderate assistance   Additional items Assist x 1;Increased time required;Verbal cues   Stand to Sit 3  Moderate assistance   Additional items Assist x 1;Increased time required;Verbal cues   Toilet transfer 3  Moderate assistance   Additional items Assist x 1;Increased time required;Commode;Verbal cues;Armrests   Additional Comments sit<>stand x2 trials from recliner vs BSC. attempts to use compensatory strategies for LLE hip extension during sitting, however limited application 2/2 pain levels.   Functional Mobility   Functional Mobility 3  Moderate assistance  (Mod A progressing to Min A)   Additional Comments short distance x2 trials , progresses from Mod A to Min A c use of RW. Cues for RW management   Additional items Rolling walker   Balance   Static Sitting Fair   Dynamic Sitting Fair -   Static Standing Fair -   Dynamic Standing Poor +   Activity Tolerance   Activity Tolerance Patient limited by pain;Patient limited by fatigue   Medical Staff Made Aware Care coordination c PT Dianne, SPT Jon. CM Carmencita   Nurse Made Aware TEMI Peters pre/post session  (pt noted to have open wound on buttocks- noted during posterior care. TEMI Peters made aware following session)   RUE Assessment   RUE Assessment WFL  (grossly ~4/5 MMT throughout based on functional assessment.  Formal assessment deferred 2/2 known bony mets)   LUE Assessment   LUE Assessment WFL  (grossly ~4/5 MMT  throughout based on functional assessment. Formal assessment deferred 2/2 known bony mets)   Hand Function   Gross Motor Coordination Functional   Fine Motor Coordination Functional   Sensation   Light Touch No apparent deficits  (denies)   Cognition   Overall Cognitive Status WFL  (limited problem solving, safety awareness. Functional cog grossly intact, will continue to assess)   Arousal/Participation Alert;Cooperative   Attention Within functional limits   Orientation Level Oriented X4   Memory Decreased recall of precautions;Decreased recall of recent events   Following Commands Follows one step commands with increased time or repetition   Comments Pt agreeable to OT session. Impulsive at times, appears to be highly anxious during session. cueing for calming/relaxation techniques. Pt tengential, unorganized thoughts at times. Appropriately attends to tasks, memory intact. cog WFL, would benefit from formal assessments for higher level cog evaluation   Assessment   Limitation Decreased ADL status;Decreased UE strength;Decreased Safe judgement during ADL;Decreased endurance;Decreased self-care trans;Decreased high-level ADLs  (trunk control, + pain, activity tolerance, functional reach, safety awareness)   Prognosis Good   Assessment Patient is a 58 y.o. male seen for OT evaluation at [facility] following admission on 3/30/2024  s/p Closed fracture of neck of left femur (HCC). Please see above for comprehensive list of comorbidities and significant PMHx impacting functional performance.  Upon initial evaluation, pt appears to be performing below baseline functional status.   Occupational performance is affected by the following deficits: endurance ,  decreased muscular strength , acute change in mobility status , decreased balance , decreased standing tolerance for self care tasks , decreased trunk control , decreased activity tolerance , impaired judgement and problem solving , impaired safety  awareness , and (+) pain . Personal/Environmental factors impacting D/C include: Assistance needed for ADLs and functional mobility and High fall risk . Supporting factors include: able to maintain FFSU and attitude towards recovery Patient would benefit from OT services within the acute care setting to maximize level of functional independence in the following areas self-care transfers, functional mobility, and ADLs.  From OT standpoint, recommendation at time of D/C would be Level 1: maximum resource intensity .   Goals   Patient Goals to improve mobility, to be able to get to 2nd floor of his house   Plan   Treatment Interventions ADL retraining;Functional transfer training;UE strengthening/ROM;Endurance training;Patient/family training;Equipment evaluation/education;Compensatory technique education;Continued evaluation;Energy conservation  (cog assessment)   Goal Expiration Date 04/12/24   OT Treatment Day 0   OT Frequency 3-5x/wk   Discharge Recommendation   Rehab Resource Intensity Level, OT I (Maximum Resource Intensity)   Additional Comments  The patient's raw score on the AM-PAC Daily Activity Inpatient Short Form is 16. A raw score of less than 19 suggests the patient may benefit from discharge to post-acute rehabilitation services. Please refer to the recommendation of the Occupational Therapist for safe discharge planning.   AM-PAC Daily Activity Inpatient   Lower Body Dressing 2   Bathing 2   Toileting 2   Upper Body Dressing 3   Grooming 3   Eating 4   Daily Activity Raw Score 16   Daily Activity Standardized Score (Calc for Raw Score >=11) 35.96   AM-PAC Applied Cognition Inpatient   Following a Speech/Presentation 3   Understanding Ordinary Conversation 4   Taking Medications 3   Remembering Where Things Are Placed or Put Away 4   Remembering List of 4-5 Errands 3   Taking Care of Complicated Tasks 2   Applied Cognition Raw Score 19   Applied Cognition Standardized Score 39.77   End of Consult    Education Provided Yes   Patient Position at End of Consult Bedside chair;Bed/Chair alarm activated;All needs within reach   Nurse Communication Nurse aware of consult   Goals established on initial evaluation in order to achieve pt's goal of walking more, getting to 2nd floor of home.      Pt will complete UB ADLs Mod independent   for increased ADL independence within 10 days.     Pt will complete LB ADLs Min A  for increased ADL independence within 10 days.     Pt will complete toileting Min A  with use of DME for increased ADL independence within 10 days.     Pt will demonstrate proper body mechanics to complete self-care transfers and functional mobility with supervision and use of LRAD for increased safety and functional independence within 10 days.     Pt will demonstrate standing tolerance of 5 min and use of LRAD for increased activity tolerance during ADL/IADL tasks within 10 days.     Pt will demonstrate proper body mechanics and fall prevention strategies during 100% of tx sessions for increased safety awareness during ADL/IADLs    Pt will demonstrate activity tolerance of 30 min in therapeutic tasks for increased participation in meaningful activities upon D/C.      Pt will participate in ongoing cognitive assessments to assist with safe D/C planning and supervision/assistance recommendations.     Pt will demonstrate OOB sitting tolerance of 2-4 hr/day for increased activity tolerance and engagement in leisure activities within 10 days.       Pt benefited from co-session of skilled OT and PT therapists in order to most appropriately address functional deficits d/t acute medical complexity  and extensive physical assistance required for safe mobility .  OT/PT objectives were addressed separately; please see PT note for specific goal areas targeted.  Sheryl Matthews, OT

## 2024-04-02 NOTE — ASSESSMENT & PLAN NOTE
Completed 4 cycles of Bortezomib, lenalidomide, dexamethasone on 8/4/23, as well as stem cell transplant.  Currently on Revlimid, dexamethasone 0.5 mg.  Outpatient oncologist: Dr. Garcia.     Plan:  Will resume home medication.

## 2024-04-02 NOTE — PHYSICAL THERAPY NOTE
"                                                      PHYSICAL THERAPY EVALUATION NOTE          Patient Name: Fredy Martinez Jr.  Today's Date: 2024        AGE:   58 y.o.  Mrn:   [id number]  ADMIT DX:  Femur fracture (HCC) [S72.90XA]    Past Medical History:  Past Medical History:   Diagnosis Date    Cancer (HCC)     bone-    GERD (gastroesophageal reflux disease)     Multiple myeloma (HCC)        Past Surgical History:  Past Surgical History:   Procedure Laterality Date    APPENDECTOMY      IR BIOPSY BONE MARROW  3/15/2023    PA OPTX FEM SHFT FX W/INSJ IMED IMPLT W/WO SCREW Left 2024    Procedure: INSERTION NAIL IM FEMUR ANTEGRADE (TROCHANTERIC);  Surgeon: Pat Bang MD;  [facility];  Service: Orthopedics     Length Of Stay: 3        PHYSICAL THERAPY EVALUATION:    Patient's identity confirmed via 2 patient identifiers (full name and ) at start of session       24 [id number]   Note Type   Note type Evaluation   Pain Assessment   Pain Assessment Tool 0-10   Pain Score 10 - Worst Possible Pain  (\"15\")   Pain Location/Orientation Orientation: Left;Location: Hip;Location: Leg   Pain Onset/Description Frequency: Intermittent  (worse w/ transitional movement)   Effect of Pain on Daily Activities limits ease of mobility   Hospital Pain Intervention(s) Cold applied;Repositioned;Ambulation/increased activity  (waffle cushion in chair)   Restrictions/Precautions   Weight Bearing Precautions Per Order Yes   LLE Weight Bearing Per Order WBAT  (per Ortho order)   Other Precautions Cognitive;Bed Alarm;Chair Alarm;Fall Risk;Pain;WBS;Multiple lines;Telemetry   Home Living   Type of Home House   Home Layout Multi-level;Performs ADLs on one level;Able to live on main level with bedroom/bathroom;Stairs to enter with rails  (6 DONYA, maintains 1st floor set-up (for at least 1 year))   Bathroom Shower/Tub Walk-in shower   Bathroom Toilet Raised   Bathroom Equipment Commode;Built-in shower seat   Bathroom " "Accessibility Accessible  (full bath on 1st floor)   Home Equipment Walker;Cane  (SPC)   Additional Comments has been sleeping in a recliner chair on the 1st floor since being dx w/ cancer. has been recently keeping commode next to chair for approx 1 week PTA due to L hip/leg pain   Prior Function   Level of [place] Independent with functional mobility;Independent with ADLs;Needs assistance with IADLS  (shares IADLs w/ spouse)   Lives With Spouse   Receives Help From Family   IADLs Independent with meal prep;Independent with medication management;Family/Friend/Other provides transportation  (uses [employer] vs friend for transportation)   Falls in the last 6 months 0  (none per pt)   Comments At baseline pt amb mod I w/ SPC (household and community distances)   General   Additional Pertinent History POD 1: cephalo-medullary nail for L pathological femur fx, WBAT LLE. RR 4/1 due to acute change in heart rhythm post post-op (SVT), pt transferred to ICU stepdown 2 w/ telemetry. Additionally, pt w/ acute PE(s) on a heparin drip   Family/Caregiver Present No   Cognition   Overall Cognitive Status WFL   Arousal/Participation Cooperative   Orientation Level Oriented X4   Memory Decreased recall of precautions   Following Commands Follows one step commands without difficulty   Comments Pt ID via name and ; pt agreeable and very  motivated to participate. Pt presents w/ questionable safety awareness and problem solving   Subjective   Subjective \"can you find me a new hip?\"   RLE Assessment   RLE Assessment X   Strength RLE   RLE Overall Strength 3+/5  (grossly assessed w/ functional mobility, no over pressure provided)   LLE Assessment   LLE Assessment X   Strength LLE   LLE Overall Strength 3/5  (grossly assessed w/ functional mobility, no over pressure provided)   Light Touch   RLE Light Touch Grossly intact   LLE Light Touch Grossly intact   Bed Mobility   Additional Comments pt OOB on commode upon arrival, sitting OOB " in recliner chair at end of session   Transfers   Sit to Stand 3  Moderate assistance   Additional items Assist x 1;Armrests;Increased time required;Verbal cues   Stand to Sit 3  Moderate assistance   Additional items Assist x 1;Armrests;Increased time required;Verbal cues   Toilet transfer 3  Moderate assistance   Additional items Assist x 1;Armrests;Increased time required;Commode   Additional Comments VC for hand placement and alignment w/ target surface prior to descent. able to maintain static standing balance w/ min Ax1 and BUE support while recieving assistance w/ girma-care   Ambulation/Elevation   Gait pattern Improper Weight shift;Decreased foot clearance;Short stride;Decreased hip extension;Decreased heel strike;Decreased toe off   Gait Assistance 3  Moderate assist   Additional items Assist x 1;Verbal cues   Assistive Device Rolling walker   Distance 3'  (commode>recliner chair)   Balance   Static Sitting Fair +   Dynamic Sitting Fair   Static Standing Poor +  (w/ RW)   Dynamic Standing Poor +   Ambulatory Poor  (w/ RW)   Activity Tolerance   Activity Tolerance Patient limited by pain   Medical Staff Made Aware Pt benefited from PT/OT care coordination w/ OT Sheryl due to signficant level of assistance required w/ mobility and to allow for challenge of pt's activity tolerance, PT and OT goals were addressed individually during session; SPT Jon, CM Carmencita   Nurse Made Aware RN Lorraine   Assessment   Prognosis Good   Problem List Decreased strength;Decreased endurance;Impaired balance;Decreased mobility;Decreased safety awareness;Pain;Orthopedic restrictions;Decreased skin integrity   Assessment Fredy Martinez JrVijay is a 58 y.o. Male who originally presented to [facility] on 3/30/24 due to leg pain (for 10 days). Pt was transferred to [facility] same day. Pt is currently POD 1: L femur nailing, WBAT LLE. Orders for PT eval and treat received, w/ activity orders of up and OOB as tolerated. Comorbidities affecting pt's  functional mobility at time of evaluation include: multiple myeloma, h/o mets to bone, CA related pain, acute PE, . Personal factors affecting DC include: lives in 2 story house, ambulating w/ assistive device, stairs to enter home, inability to ambulate household distances, and inability to navigate level surfaces w/o external assistance. At baseline, pt mobilizes mod I w/ SPC, and w/ 0 fall(s) in the previous 6 months. Upon evaluation, pt presents w/ the following deficits: impaired strength, impaired skin integrity, impaired balance, decreased safety awareness, decreased endurance/activity tolerance, pain affecting mobility, and gait deviations. Pt currently requires  mod Ax1 for transfers, mod Ax1 w/ RW for ambulation. Pt's clinical presentation is unstable/unpredictable due to abnormal lab values, need for increased assistance w/ functional mobility compared to baseline, pain affecting mobility tolerance, need for input for mobility technique, recent drastic decline in mobility status, ongoing medical management. From a PT/mobility standpoint given the above findings, DC recommendation is level: I (Maximum Rehab Resource Intensity). During current admission, pt will benefit from continued skilled inpatient PT in the acute care setting in order to address the above deficits and to maximize function and mobility prior to DC from acute care.   Barriers to Discharge Inaccessible home environment  (pt has 6 DONYA)   Goals   Patient Goals to be able to walk to the bathroom, to get to the 2nd floor of his house   STG Expiration Date 04/12/24   Short Term Goal #1 Pt will: perform bed mobility w/ supervision to decrease pt's burden of care and increase pt's independence w/ repositioning in bed; perform transfers w/ supervision to promote OOB mobility; ambulate at least 75' w/ LRAD and supervision to increase pt's ambulatory endurance/tolerance; negotiate 6 stair(s) w/ UE support and min Ax1 to facilitate pt returning to  previous living environment; increase all balance ratings by at least 1 grade to decrease pt's risk of falls   PT Treatment Day 1  (PT tx note below)   Plan   Treatment/Interventions Functional transfer training;Elevations;LE strengthening/ROM;Endurance training;Therapeutic exercise;Patient/family training;Equipment eval/education;Bed mobility;Gait training;Compensatory technique education   PT Frequency 4-6x/wk   Discharge Recommendation   Rehab Resource Intensity Level, PT I (Maximum Resource Intensity)   Equipment Recommended Walker   Walker Package Recommended Wheeled walker   Change/add to Walker Package? No   AM-PAC Basic Mobility Inpatient   Turning in Flat Bed Without Bedrails 2   Lying on Back to Sitting on Edge of Flat Bed Without Bedrails 2   Moving Bed to Chair 2   Standing Up From Chair Using Arms 2   Walk in Room 2   Climb 3-5 Stairs With Railing 1   Basic Mobility Inpatient Raw Score 11   Basic Mobility Standardized Score 30.25   [facility] Highest Level Of Mobility   -[facility] Goal 4: Move to chair/commode   -HLM Achieved 6: Walk 10 steps or more   Additional Treatment Session   Start Time 1415   End Time 1425   Treatment Assessment Post eval, pt sitting OOB in recliner chair. Pt agreeable to additional functional mobility including transfer, balance, gait, and endurance training in order to challenge pt's activity tolerance and progress pt towards his baseline level of function. Using RW, pt ambulated an additional 8' w/ RW and chair follow. VC provided for gait sequencing w/ RW w/ pt demonstrating good carryover and application of cues. Pt demonstrated improvement in functional mobility w/ decreased assistance required for ambulation compared to during evaluation. Pt continues to be functioning below baseline level, and remains limited in functional mobility due to pain, gait deviations, and decreased activity tolerance. Pt will continue benefit from PT to promote independence w/ functional  mobility and progress towards set goals. Recommend DC w/ level: I (Maximum Rehab Resource Intensity) when medically cleared.   Equipment Use RW   Additional Treatment Day 1   End of Consult   Patient Position at End of Consult Bedside chair;Bed/Chair alarm activated;All needs within reach       The patient's AM-PAC Basic Mobility Inpatient Short Form Raw Score is 11. A Raw score of less than or equal to 16 suggests the patient may benefit from discharge to post-acute rehabilitation services. Please also refer to the recommendation of the Physical Therapist for safe discharge planning.    Pt will benefit from skilled inpatient PT during this admission in order to facilitate progress towards goals and to maximize functional independence prior to DC      DC rec: level I (Maximum Rehab Resource Intensity)        Dianne Phillips, PT, DPT  04/02/24

## 2024-04-02 NOTE — QUICK NOTE
Patient's HR was noted to be 180 on Tele, frequent PVCs,  and MAP 71. EKG at the time showed SVT again 170s.      Lopressor 5 mg IV was given once without improvement of his symptoms and HR, continued to show SVT on monitor after 5 minutes.    Cardizem 10 mg IV was given once with return to sinus rhythm. EKG shows NSR rate 84, nml axis, nonspecific T-wave changes.

## 2024-04-02 NOTE — PLAN OF CARE
Problem: PHYSICAL THERAPY ADULT  Goal: Performs mobility at highest level of function for planned discharge setting.  See evaluation for individualized goals.  Description: Treatment/Interventions: Functional transfer training, Elevations, LE strengthening/ROM, Endurance training, Therapeutic exercise, Patient/family training, Equipment eval/education, Bed mobility, Gait training, Compensatory technique education  Equipment Recommended: Walker       See flowsheet documentation for full assessment, interventions and recommendations.  4/2/2024 1609 by Dianne Phillips PT  Note: Prognosis: Good  Problem List: Decreased strength, Decreased endurance, Impaired balance, Decreased mobility, Decreased safety awareness, Pain, Orthopedic restrictions, Decreased skin integrity  Assessment: Fredy Martinez Jr. is a 58 y.o. Male who [place] [facility] [place] due to leg pain (for 10 days). Pt was transferred to [facility] same day. Pt is currently POD 1: L femur nailing, WBAT LLE. Orders for PT eval and treat received, w/ activity orders of up and OOB as tolerated. Comorbidities affecting pt's functional mobility at time of evaluation include: multiple myeloma, h/o mets to bone, CA related pain, acute PE, . Personal factors affecting DC include: lives in 2 story house, ambulating w/ assistive device, stairs to enter home, inability to ambulate household distances, and inability to navigate level surfaces w/o external assistance. At baseline, pt mobilizes mod I w/ SPC, and w/ 0 fall(s) in the previous 6 months. Upon evaluation, pt presents w/ the following deficits: impaired strength, impaired skin integrity, impaired balance, decreased safety awareness, decreased endurance/activity tolerance, pain affecting mobility, and gait deviations. Pt currently requires  mod Ax1 for transfers, mod Ax1 w/ RW for ambulation. Pt's clinical presentation is unstable/unpredictable due to abnormal lab values, need for increased assistance w/  functional mobility compared to baseline, pain affecting mobility tolerance, need for input for mobility technique, recent drastic decline in mobility status, ongoing medical management. From a PT/mobility standpoint given the above findings, DC recommendation is level: I (Maximum Rehab Resource Intensity). During current admission, pt will benefit from continued skilled inpatient PT in the acute care setting in order to address the above deficits and to maximize function and mobility prior to DC from acute care.  Barriers to Discharge: Inaccessible home environment (pt has 6 DONYA)     Rehab Resource Intensity Level, PT: I (Maximum Resource Intensity)    See flowsheet documentation for full assessment.

## 2024-04-03 LAB
ANION GAP SERPL CALCULATED.3IONS-SCNC: 4 MMOL/L (ref 4–13)
APTT PPP: 105 SECONDS (ref 23–37)
APTT PPP: 56 SECONDS (ref 23–37)
APTT PPP: 84 SECONDS (ref 23–37)
APTT PPP: 85 SECONDS (ref 23–37)
ATRIAL RATE: 115 BPM
ATRIAL RATE: 187 BPM
ATRIAL RATE: 197 BPM
ATRIAL RATE: 34 BPM
ATRIAL RATE: 84 BPM
BUN SERPL-MCNC: 9 MG/DL (ref 5–25)
CALCIUM SERPL-MCNC: 7.8 MG/DL (ref 8.4–10.2)
CHLORIDE SERPL-SCNC: 104 MMOL/L (ref 96–108)
CO2 SERPL-SCNC: 27 MMOL/L (ref 21–32)
CREAT SERPL-MCNC: 0.36 MG/DL (ref 0.6–1.3)
ERYTHROCYTE [DISTWIDTH] IN BLOOD BY AUTOMATED COUNT: 14.9 % (ref 11.6–15.1)
GFR SERPL CREATININE-BSD FRML MDRD: 137 ML/MIN/1.73SQ M
GLUCOSE SERPL-MCNC: 106 MG/DL (ref 65–140)
HCT VFR BLD AUTO: 32.3 % (ref 36.5–49.3)
HGB BLD-MCNC: 10.5 G/DL (ref 12–17)
MAGNESIUM SERPL-MCNC: 1.6 MG/DL (ref 1.9–2.7)
MCH RBC QN AUTO: 31.2 PG (ref 26.8–34.3)
MCHC RBC AUTO-ENTMCNC: 32.5 G/DL (ref 31.4–37.4)
MCV RBC AUTO: 96 FL (ref 82–98)
P AXIS: 35 DEGREES
P AXIS: 40 DEGREES
PLATELET # BLD AUTO: 119 THOUSANDS/UL (ref 149–390)
PMV BLD AUTO: 8.9 FL (ref 8.9–12.7)
POTASSIUM SERPL-SCNC: 3.9 MMOL/L (ref 3.5–5.3)
PR INTERVAL: 130 MS
PR INTERVAL: 132 MS
QRS AXIS: 10 DEGREES
QRS AXIS: 29 DEGREES
QRS AXIS: 36 DEGREES
QRS AXIS: 39 DEGREES
QRS AXIS: 42 DEGREES
QRSD INTERVAL: 78 MS
QRSD INTERVAL: 80 MS
QRSD INTERVAL: 82 MS
QRSD INTERVAL: 86 MS
QRSD INTERVAL: 86 MS
QT INTERVAL: 232 MS
QT INTERVAL: 238 MS
QT INTERVAL: 278 MS
QT INTERVAL: 324 MS
QT INTERVAL: 350 MS
QTC INTERVAL: 413 MS
QTC INTERVAL: 417 MS
QTC INTERVAL: 425 MS
QTC INTERVAL: 448 MS
QTC INTERVAL: 467 MS
RBC # BLD AUTO: 3.37 MILLION/UL (ref 3.88–5.62)
SODIUM SERPL-SCNC: 135 MMOL/L (ref 135–147)
T WAVE AXIS: 1 DEGREES
T WAVE AXIS: 14 DEGREES
T WAVE AXIS: 25 DEGREES
T WAVE AXIS: 47 DEGREES
T WAVE AXIS: 59 DEGREES
VENTRICULAR RATE: 115 BPM
VENTRICULAR RATE: 170 BPM
VENTRICULAR RATE: 192 BPM
VENTRICULAR RATE: 195 BPM
VENTRICULAR RATE: 84 BPM
WBC # BLD AUTO: 3.01 THOUSAND/UL (ref 4.31–10.16)

## 2024-04-03 PROCEDURE — 85730 THROMBOPLASTIN TIME PARTIAL: CPT | Performed by: INTERNAL MEDICINE

## 2024-04-03 PROCEDURE — 97530 THERAPEUTIC ACTIVITIES: CPT

## 2024-04-03 PROCEDURE — 97116 GAIT TRAINING THERAPY: CPT

## 2024-04-03 PROCEDURE — 80048 BASIC METABOLIC PNL TOTAL CA: CPT

## 2024-04-03 PROCEDURE — 85730 THROMBOPLASTIN TIME PARTIAL: CPT | Performed by: NURSE PRACTITIONER

## 2024-04-03 PROCEDURE — 99232 SBSQ HOSP IP/OBS MODERATE 35: CPT

## 2024-04-03 PROCEDURE — 99024 POSTOP FOLLOW-UP VISIT: CPT | Performed by: PHYSICIAN ASSISTANT

## 2024-04-03 PROCEDURE — 93010 ELECTROCARDIOGRAM REPORT: CPT | Performed by: INTERNAL MEDICINE

## 2024-04-03 PROCEDURE — 99232 SBSQ HOSP IP/OBS MODERATE 35: CPT | Performed by: INTERNAL MEDICINE

## 2024-04-03 PROCEDURE — 85027 COMPLETE CBC AUTOMATED: CPT

## 2024-04-03 PROCEDURE — 99232 SBSQ HOSP IP/OBS MODERATE 35: CPT | Performed by: PHYSICIAN ASSISTANT

## 2024-04-03 PROCEDURE — 83735 ASSAY OF MAGNESIUM: CPT

## 2024-04-03 RX ORDER — OXYCODONE HYDROCHLORIDE 10 MG/1
10 TABLET ORAL EVERY 4 HOURS PRN
Status: DISCONTINUED | OUTPATIENT
Start: 2024-04-03 | End: 2024-04-05 | Stop reason: HOSPADM

## 2024-04-03 RX ORDER — OXYCODONE HYDROCHLORIDE 5 MG/1
5 TABLET ORAL EVERY 4 HOURS PRN
Status: DISCONTINUED | OUTPATIENT
Start: 2024-04-03 | End: 2024-04-05 | Stop reason: HOSPADM

## 2024-04-03 RX ORDER — MAGNESIUM SULFATE HEPTAHYDRATE 40 MG/ML
2 INJECTION, SOLUTION INTRAVENOUS ONCE
Status: COMPLETED | OUTPATIENT
Start: 2024-04-03 | End: 2024-04-03

## 2024-04-03 RX ADMIN — LENALIDOMIDE 5 MG: 5 CAPSULE ORAL at 09:45

## 2024-04-03 RX ADMIN — DEXAMETHASONE 0.5 MG: 0.5 TABLET ORAL at 08:15

## 2024-04-03 RX ADMIN — OXYCODONE HYDROCHLORIDE 5 MG: 5 TABLET ORAL at 14:31

## 2024-04-03 RX ADMIN — OXYCODONE HYDROCHLORIDE 10 MG: 10 TABLET ORAL at 00:13

## 2024-04-03 RX ADMIN — ACETAMINOPHEN 975 MG: 325 TABLET, FILM COATED ORAL at 05:31

## 2024-04-03 RX ADMIN — HEPARIN SODIUM 2400 UNITS: 1000 INJECTION INTRAVENOUS; SUBCUTANEOUS at 12:09

## 2024-04-03 RX ADMIN — HYDROMORPHONE HYDROCHLORIDE 0.5 MG: 1 INJECTION, SOLUTION INTRAMUSCULAR; INTRAVENOUS; SUBCUTANEOUS at 15:33

## 2024-04-03 RX ADMIN — PANTOPRAZOLE SODIUM 40 MG: 40 TABLET, DELAYED RELEASE ORAL at 09:41

## 2024-04-03 RX ADMIN — MORPHINE SULFATE 30 MG: 30 TABLET, EXTENDED RELEASE ORAL at 17:28

## 2024-04-03 RX ADMIN — OXYCODONE HYDROCHLORIDE 10 MG: 10 TABLET ORAL at 21:27

## 2024-04-03 RX ADMIN — SENNOSIDES, DOCUSATE SODIUM 1 TABLET: 8.6; 5 TABLET ORAL at 21:02

## 2024-04-03 RX ADMIN — MAGNESIUM SULFATE HEPTAHYDRATE 2 G: 40 INJECTION, SOLUTION INTRAVENOUS at 08:16

## 2024-04-03 RX ADMIN — MORPHINE SULFATE 30 MG: 30 TABLET, EXTENDED RELEASE ORAL at 05:31

## 2024-04-03 RX ADMIN — HEPARIN SODIUM 22 UNITS/KG/HR: 10000 INJECTION, SOLUTION INTRAVENOUS at 09:43

## 2024-04-03 RX ADMIN — ACETAMINOPHEN 975 MG: 325 TABLET, FILM COATED ORAL at 21:02

## 2024-04-03 NOTE — ASSESSMENT & PLAN NOTE
Incidental findings of PE on CTA PE study at [facility]   CTA PE study-subocclusive emboli in the lower arteries in the right middle and right lower lobes which are peripheral, no infarct or right heart strain, multiple lytic lesions  Possible in the setting of immobility   Cont heparin drip today - likely transition to Eliquis tomorrow if Hgb stable   Echo- 60 percent. Diastolic function is mildly abnormal, consistent with grade I (abnormal) relaxation.

## 2024-04-03 NOTE — PROGRESS NOTES
[facility]  Progress Note  Name: Fredy Houston I  MRN: [id number]  Unit/Bed#: ICU 16 I Date of Admission: 3/30/2024   Date of Service: 4/3/2024 I Hospital Day: 4    Assessment/Plan   * Closed fracture of neck of left femur (HCC)  Assessment & Plan  POD #2 s/p IM nailing from pathologic left femur fx  Ortho following, will see Dr. Bang on dc  PT/OT recs for level 1  Will go to rehab on dc  Monitor for ABLA    Acute pulmonary embolism without acute cor pulmonale (HCC)  Assessment & Plan  Incidental findings of PE on CTA PE study at [facility] [place]   CTA PE study-subocclusive emboli in the lower arteries in the right middle and right lower lobes which are peripheral, no infarct or right heart strain, multiple lytic lesions  Possible in the setting of immobility   Cont heparin drip today - likely transition to Eliquis tomorrow if Hgb stable   Echo- 60 percent. Diastolic function is mildly abnormal, consistent with grade I (abnormal) relaxation.     SVT (supraventricular tachycardia)  Assessment & Plan  After postop on 4/1, patient noted to have new onset tachycardia with HR in the 190s, asymptomatic.   Patient was found to be in SVT with hypotension, SBP 90.  Was given adenosine 6 mg x 1 with improvement of HR in the 100s and BP improved to systolic in the 120s.   Cardiology is following and due to pauses morning of 4/3 beta-blocker is now discontinued.  Will monitor electrolytes  Monitor on telemetry    Cancer related pain  Assessment & Plan  Pain service on board.  Pain management as below.  Will not be a candidate for neuraxial block due to patient needing to be on therapeutic anticoagulation for PE   Discharge recs:  Continue Tylenol 975 mg every 8 hours as needed, for mild pain  Continue home MS Contin 30 mg BID  Continue home oxycodone 10 mg every 4 hours as needed, for moderate/severe pain  Continue home Flexeril 5 mg 3 times daily for spasms    Multiple myeloma (HCC)  Assessment & Plan  On  Revlimid and RVD infusion  RVD infusion includes bortezomib, lenalidomide, dexamethasone; Completed Cycle length = 21 days x 3-4 cycles  S/p ASCT  Also follows Palliative Care as OP  Patient of Dr Garcia  Restarted Revlimid   S/p BM biopsy            VTE Pharmacologic Prophylaxis: VTE Score: 3 Moderate Risk (Score 3-4) - Pharmacological DVT Prophylaxis Ordered: heparin drip.    Patient Centered Rounds: I performed bedside rounds with nursing staff today.  Discussions with Specialists or Other Care Team Provider: adarsh, rn, ortho    Education and Discussions with Family / Patient: Patient declined call to .     Time Spent for Care: 45 minutes. More than 50% of total time spent on counseling and coordination of care as described above.    Current Length of Stay: 4 day(s)  Current Patient Status: Inpatient   Certification Statement: The patient will continue to require additional inpatient hospital stay due to left femur fracture status post surgery with pulmonary embolism awaiting stabilization of hemoglobin prior to transitioning to oral anticoagulation and rehab placement  Discharge Plan: Anticipate discharge in 48 hrs to rehab facility.    Code Status: Level 1 - Full Code    Subjective:   Patient had pauses on telemetry overnight, he was asymptomatic at that time.  Denying chest pain or palpitations presently.  His leg pain is controlled this morning.    Objective:     Vitals:   Temp (24hrs), Av.3 °F (36.8 °C), Min:97.2 °F (36.2 °C), Max:98.8 °F (37.1 °C)    Temp:  [97.2 °F (36.2 °C)-98.8 °F (37.1 °C)] 97.2 °F (36.2 °C)  HR:  [] 66  Resp:  [12-21] 13  BP: ()/(45-70) 113/66  SpO2:  [96 %-98 %] 97 %  Body mass index is 20.18 kg/m².     Input and Output Summary (last 24 hours):     Intake/Output Summary (Last 24 hours) at 4/3/2024 1125  Last data filed at 4/3/2024 0600  Gross per 24 hour   Intake 2356.88 ml   Output 550 ml   Net 1806.88 ml       Physical Exam:   Physical Exam  Vitals and  nursing note reviewed.   Constitutional:       General: He is not in acute distress.     Appearance: Normal appearance.   HENT:      Head: Normocephalic.      Mouth/Throat:      Mouth: Mucous membranes are moist.   Eyes:      Pupils: Pupils are equal, round, and reactive to light.   Cardiovascular:      Rate and Rhythm: Normal rate and regular rhythm.      Heart sounds: No murmur heard.  Pulmonary:      Effort: Pulmonary effort is normal. No respiratory distress.      Breath sounds: Normal breath sounds. No wheezing, rhonchi or rales.   Abdominal:      General: Bowel sounds are normal. There is no distension.      Palpations: Abdomen is soft.      Tenderness: There is no abdominal tenderness. There is no guarding.   Musculoskeletal:         General: No deformity.      Cervical back: Normal range of motion.      Right lower leg: No edema.      Left lower leg: No edema.   Skin:     Capillary Refill: Capillary refill takes less than 2 seconds.   Neurological:      General: No focal deficit present.      Mental Status: He is alert and oriented to person, place, and time. Mental status is at baseline.          Additional Data:     Labs:  Results from last 7 days   Lab Units 04/03/24 0528 03/31/24 0428 03/30/24  0907   WBC Thousand/uL 3.01*   < > 7.83   HEMOGLOBIN g/dL 10.5*   < > 16.9   HEMATOCRIT % 32.3*   < > 48.9   PLATELETS Thousands/uL 119*   < > 187   NEUTROS PCT %  --   --  79*   LYMPHS PCT %  --   --  5*   MONOS PCT %  --   --  13*   EOS PCT %  --   --  1    < > = values in this interval not displayed.     Results from last 7 days   Lab Units 04/03/24 0528 03/31/24 0428 03/30/24  0907   SODIUM mmol/L 135   < > 131*   POTASSIUM mmol/L 3.9   < > 3.4*   CHLORIDE mmol/L 104   < > 92*   CO2 mmol/L 27   < > 23   BUN mg/dL 9   < > 16   CREATININE mg/dL 0.36*   < > 0.63   ANION GAP mmol/L 4   < > 16*   CALCIUM mg/dL 7.8*   < > 9.6   ALBUMIN g/dL  --   --  3.6   TOTAL BILIRUBIN mg/dL  --   --  0.75   ALK PHOS U/L  --    --  114*   ALT U/L  --   --  6*   AST U/L  --   --  12*   GLUCOSE RANDOM mg/dL 106   < > 121    < > = values in this interval not displayed.     Results from last 7 days   Lab Units 03/30/24  2114   INR  1.10     Results from last 7 days   Lab Units 04/01/24  1729   POC GLUCOSE mg/dl 127               Lines/Drains:  Invasive Devices       Peripheral Intravenous Line  Duration             Peripheral IV 04/03/24 Distal;Left;Ventral (anterior) Forearm <1 day    Peripheral IV 04/03/24 Distal;Upper;Ventral (anterior);Left Arm <1 day                      Telemetry:  Telemetry Orders (From admission, onward)               24 Hour Telemetry Monitoring  Continuous x 24 Hours (Telem)        Question:  Reason for 24 Hour Telemetry  Answer:  Arrhythmias requiring acute medical intervention / PPM or ICD malfunction                     Telemetry Reviewed: Pause(s)  Indication for Continued Telemetry Use: Arrthymias requiring medical therapy             Imaging: No pertinent imaging reviewed.    Recent Cultures (last 7 days):         Last 24 Hours Medication List:   Current Facility-Administered Medications   Medication Dose Route Frequency Provider Last Rate    acetaminophen  975 mg Oral Q8H RUTH Basanta Jeanette, DO      dexamethasone  0.5 mg Oral Daily Before Breakfast Basanta Jeanette, DO      heparin (porcine)  3-30 Units/kg/hr (Order-Specific) Intravenous Titrated Basanta Jeanette, DO 22 Units/kg/hr (04/03/24 0943)    heparin (porcine)  2,400 Units Intravenous Q6H PRN Basanta Jeanette, DO      heparin (porcine)  4,800 Units Intravenous Once Basanta Jeanette, DO      heparin (porcine)  4,800 Units Intravenous Q6H PRN Basanta Jeanette, DO      HYDROmorphone  0.5 mg Intravenous Q2H PRN Basanta Jeanette, DO      lactated ringers  50 mL/hr Intravenous Continuous Basanta Jeanette, DO 50 mL/hr (04/02/24 1636)    lenalidomide  5 mg Oral Daily Basanta Jeanette, DO      metoprolol  5 mg Intravenous Q6H PRN Basanta Jeanette, DO      morphine  30 mg Oral Q12H  Basanta Jeanette, DO      naloxone  0.04 mg Intravenous Q1MIN PRN Basanta Jeanette, DO      ondansetron  4 mg Intravenous Q6H PRN Basanta Jeanette, DO      oxyCODONE  5 mg Oral Q4H PRN PABLO Llanes      Or    oxyCODONE  10 mg Oral Q4H PRN PABLO Llanes      pantoprazole  40 mg Oral Daily Basanta Jeanette, DO      polyethylene glycol  17 g Oral Daily Basanta Jeanette, DO      senna-docusate sodium  1 tablet Oral HS Basanta Jeanette, DO          Today, Patient Was Seen By: Sergo Vincent PA-C    **Please Note: This note may have been constructed using a voice recognition system.**

## 2024-04-03 NOTE — ASSESSMENT & PLAN NOTE
On Revlimid and RVD infusion  RVD infusion includes bortezomib, lenalidomide, dexamethasone; Completed Cycle length = 21 days x 3-4 cycles  S/p ASCT  Also follows Palliative Care as OP  Patient of Dr Garcia  Restarted Revlimid   S/p BM biopsy 4/1

## 2024-04-03 NOTE — QUICK NOTE
Notified by nursing that patient was noted to have pauses (up to 4 seconds long) on telemetry this morning  Patient was reportedly asymptomatic during the event and has not had any prior events. Currently sinus rhythm with HR in the 70s on telemetry and EKG obtained after the episode shows sinus rhythm as well. Patient was started on PO metoprolol tartrate 12.5 mg BID yesterday with last dose at 2111 on 4/2. Remainder of patient's vitals noted; pulse ox stable on RA, persistently low BPs for the past 24 hours.     Notified on-call cardiology of event, recommend holding further metoprolol, correcting electrolytes.     Awaiting AM labs. Continue to monitor on telemetry and notify of any changes.

## 2024-04-03 NOTE — PLAN OF CARE
Problem: PAIN - ADULT  Goal: Verbalizes/displays adequate comfort level or baseline comfort level  Description: Interventions:  - Encourage patient to monitor pain and request assistance  - Assess pain using appropriate pain scale  - Administer analgesics based on type and severity of pain and evaluate response  - Implement non-pharmacological measures as appropriate and evaluate response  - Consider cultural and social influences on pain and pain management  - Notify physician/advanced practitioner if interventions unsuccessful or patient reports new pain  Outcome: Progressing     Problem: INFECTION - ADULT  Goal: Absence or prevention of progression during hospitalization  Description: INTERVENTIONS:  - Assess and monitor for signs and symptoms of infection  - Monitor lab/diagnostic results  - Monitor all insertion sites, i.e. indwelling lines, tubes, and drains  - Monitor endotracheal if appropriate and nasal secretions for changes in amount and color  - Mason appropriate cooling/warming therapies per order  - Administer medications as ordered  - Instruct and encourage patient and family to use good hand hygiene technique  - Identify and instruct in appropriate isolation precautions for identified infection/condition  Outcome: Progressing  Goal: Absence of fever/infection during neutropenic period  Description: INTERVENTIONS:  - Monitor WBC    Outcome: Progressing     Problem: SAFETY ADULT  Goal: Patient will remain free of falls  Description: INTERVENTIONS:  - Educate patient/family on patient safety including physical limitations  - Instruct patient to call for assistance with activity   - Consult OT/PT to assist with strengthening/mobility   - Keep Call bell within reach  - Keep bed low and locked with side rails adjusted as appropriate  - Keep care items and personal belongings within reach  - Initiate and maintain comfort rounds  - Make Fall Risk Sign visible to staff  - Obtain necessary fall risk  management equipment  - Apply yellow socks and bracelet for high fall risk patients  - Consider moving patient to room near nurses station  Outcome: Progressing  Goal: Maintain or return to baseline ADL function  Description: INTERVENTIONS:  -  Assess patient's ability to carry out ADLs; assess patient's baseline for ADL function and identify physical deficits which impact ability to perform ADLs (bathing, care of mouth/teeth, toileting, grooming, dressing, etc.)  - Assess/evaluate cause of self-care deficits   - Assess range of motion  - Assess patient's mobility; develop plan if impaired  - Assess patient's need for assistive devices and provide as appropriate  - Encourage maximum independence but intervene and supervise when necessary  - Involve family in performance of ADLs  - Assess for home care needs following discharge   - Consider OT consult to assist with ADL evaluation and planning for discharge  - Provide patient education as appropriate  Outcome: Progressing  Goal: Maintains/Returns to pre admission functional level  Description: INTERVENTIONS:  - Perform AM-PAC 6 Click Basic Mobility/ Daily Activity assessment daily.  - Set and communicate daily mobility goal to care team and patient/family/caregiver.   - Collaborate with rehabilitation services on mobility goals if consulted  - Reposition patient every 2 hours.  - Record patient progress and toleration of activity level   Outcome: Progressing     Problem: CARDIOVASCULAR - ADULT  Goal: Maintains optimal cardiac output and hemodynamic stability  Description: INTERVENTIONS:  - Monitor I/O, vital signs and rhythm  - Monitor for S/S and trends of decreased cardiac output  - Administer and titrate ordered vasoactive medications to optimize hemodynamic stability  - Assess quality of pulses, skin color and temperature  - Assess for signs of decreased coronary artery perfusion  - Instruct patient to report change in severity of symptoms  Outcome:  Progressing  Goal: Absence of cardiac dysrhythmias or at baseline rhythm  Description: INTERVENTIONS:  - Continuous cardiac monitoring, vital signs, obtain 12 lead EKG if ordered  - Administer antiarrhythmic and heart rate control medications as ordered  - Monitor electrolytes and administer replacement therapy as ordered  Outcome: Progressing     Problem: SKIN/TISSUE INTEGRITY - ADULT  Goal: Skin Integrity remains intact(Skin Breakdown Prevention)  Description: Assess:  -Perform Enrique assessment   -Inspect skin when repositioning, toileting, and assisting with ADLS  -Assess extremities for adequate circulation and sensation     Bed Management:  -Have minimal linens on bed & keep smooth, unwrinkled  -Change linens as needed when moist or perspiring  -Avoid sitting or lying in one position for more than 2 hours while in bed  -Keep HOB at 30 degrees     Toileting:  -Offer bedside commode  -Assess for incontinence   -Use incontinent care products after each incontinent episode     Activity:  -Encourage activity and walks on unit  -Encourage or provide ROM exercises   -Turn and reposition patient every 2 Hours  -Use appropriate equipment to lift or move patient in bed    Skin Care:  -Avoid use of baby powder, tape, friction and shearing, hot water or constrictive clothing  -Relieve pressure over bony prominences   -Do not massage red bony areas  Outcome: Progressing  Goal: Incision(s), wounds(s) or drain site(s) healing without S/S of infection  Description: INTERVENTIONS  - Assess and document dressing, incision, wound bed, drain sites and surrounding tissue  - Provide patient and family education  - Perform skin care/dressing changes   Outcome: Progressing  Goal: Pressure injury heals and does not worsen  Description: Interventions:  - Implement low air loss mattress or specialty surface (Criteria met)  - Apply silicone foam dressing  - Apply fecal or urinary incontinence containment device   - Utilize friction  reducing device or surface for transfers   - Consider nutrition services referral as needed  Outcome: Progressing     Problem: MUSCULOSKELETAL - ADULT  Goal: Maintain or return mobility to safest level of function  Description: INTERVENTIONS:  - Assess patient's ability to carry out ADLs; assess patient's baseline for ADL function and identify physical deficits which impact ability to perform ADLs (bathing, care of mouth/teeth, toileting, grooming, dressing, etc.)  - Assess/evaluate cause of self-care deficits   - Assess range of motion  - Assess patient's mobility  - Assess patient's need for assistive devices and provide as appropriate  - Encourage maximum independence but intervene and supervise when necessary  - Involve family in performance of ADLs  - Assess for home care needs following discharge   - Consider OT consult to assist with ADL evaluation and planning for discharge  - Provide patient education as appropriate  Outcome: Progressing  Goal: Maintain proper alignment of affected body part  Description: INTERVENTIONS:  - Support, maintain and protect limb and body alignment  - Provide patient/ family with appropriate education  Outcome: Progressing     Problem: Potential for Falls  Goal: Patient will remain free of falls  Description: INTERVENTIONS:  - Educate patient/family on patient safety including physical limitations  - Instruct patient to call for assistance with activity   - Consult OT/PT to assist with strengthening/mobility   - Keep Call bell within reach  - Keep bed low and locked with side rails adjusted as appropriate  - Keep care items and personal belongings within reach  - Initiate and maintain comfort rounds  - Make Fall Risk Sign visible to staff  - Apply yellow socks and bracelet for high fall risk patients  - Consider moving patient to room near nurses station  Outcome: Progressing     Problem: Prexisting or High Potential for Compromised Skin Integrity  Goal: Skin integrity is  maintained or improved  Description: INTERVENTIONS:  - Identify patients at risk for skin breakdown  - Assess and monitor skin integrity  - Assess and monitor nutrition and hydration status  - Monitor labs   - Assess for incontinence   - Turn and reposition patient  - Assist with mobility/ambulation  - Relieve pressure over bony prominences  - Avoid friction and shearing  - Provide appropriate hygiene as needed including keeping skin clean and dry  - Evaluate need for skin moisturizer/barrier cream  - Collaborate with interdisciplinary team   - Patient/family teaching  - Consider wound care consult   Outcome: Progressing

## 2024-04-03 NOTE — ASSESSMENT & PLAN NOTE
After postop on 4/1, patient noted to have new onset tachycardia with HR in the 190s, asymptomatic.   Patient was found to be in SVT with hypotension, SBP 90.  Was given adenosine 6 mg x 1 with improvement of HR in the 100s and BP improved to systolic in the 120s.   Cardiology is following and due to pauses morning of 4/3 beta-blocker is now discontinued.  Will monitor electrolytes  Monitor on telemetry

## 2024-04-03 NOTE — PLAN OF CARE
Problem: PAIN - ADULT  Goal: Verbalizes/displays adequate comfort level or baseline comfort level  Description: Interventions:  - Encourage patient to monitor pain and request assistance  - Assess pain using appropriate pain scale  - Administer analgesics based on type and severity of pain and evaluate response  - Implement non-pharmacological measures as appropriate and evaluate response  - Consider cultural and social influences on pain and pain management  - Notify physician/advanced practitioner if interventions unsuccessful or patient reports new pain  Outcome: Progressing     Problem: INFECTION - ADULT  Goal: Absence or prevention of progression during hospitalization  Description: INTERVENTIONS:  - Assess and monitor for signs and symptoms of infection  - Monitor lab/diagnostic results  - Monitor all insertion sites, i.e. indwelling lines, tubes, and drains  - Monitor endotracheal if appropriate and nasal secretions for changes in amount and color  - Leigh appropriate cooling/warming therapies per order  - Administer medications as ordered  - Instruct and encourage patient and family to use good hand hygiene technique  - Identify and instruct in appropriate isolation precautions for identified infection/condition  Outcome: Progressing  Goal: Absence of fever/infection during neutropenic period  Description: INTERVENTIONS:  - Monitor WBC    Outcome: Progressing     Problem: SAFETY ADULT  Goal: Patient will remain free of falls  Description: INTERVENTIONS:  - Educate patient/family on patient safety including physical limitations  - Instruct patient to call for assistance with activity   - Consult OT/PT to assist with strengthening/mobility   - Keep Call bell within reach  - Keep bed low and locked with side rails adjusted as appropriate  - Keep care items and personal belongings within reach  - Initiate and maintain comfort rounds  - Make Fall Risk Sign visible to staff  - Offer Toileting every 2 Hours,  in advance of need  - Initiate/Maintain 2 alarm  - Obtain necessary fall risk management equipment: 2  - Apply yellow socks and bracelet for high fall risk patients  - Consider moving patient to room near nurses station  Outcome: Progressing  Goal: Maintain or return to baseline ADL function  Description: INTERVENTIONS:  -  Assess patient's ability to carry out ADLs; assess patient's baseline for ADL function and identify physical deficits which impact ability to perform ADLs (bathing, care of mouth/teeth, toileting, grooming, dressing, etc.)  - Assess/evaluate cause of self-care deficits   - Assess range of motion  - Assess patient's mobility; develop plan if impaired  - Assess patient's need for assistive devices and provide as appropriate  - Encourage maximum independence but intervene and supervise when necessary  - Involve family in performance of ADLs  - Assess for home care needs following discharge   - Consider OT consult to assist with ADL evaluation and planning for discharge  - Provide patient education as appropriate  Outcome: Progressing  Goal: Maintains/Returns to pre admission functional level  Description: INTERVENTIONS:  - Perform AM-PAC 6 Click Basic Mobility/ Daily Activity assessment daily.  - Set and communicate daily mobility goal to care team and patient/family/caregiver.   - Collaborate with rehabilitation services on mobility goals if consulted  - Perform Range of Motion 2 times a day.  - Reposition patient every 2 hours.  - Dangle patient 2 times a day  - Stand patient 2 times a day  - Ambulate patient 2 times a day  - Out of bed to chair 2 times a day   - Out of bed for meals 2 times a day  - Out of bed for toileting  - Record patient progress and toleration of activity level   Outcome: Progressing     Problem: CARDIOVASCULAR - ADULT  Goal: Maintains optimal cardiac output and hemodynamic stability  Description: INTERVENTIONS:  - Monitor I/O, vital signs and rhythm  - Monitor for S/S and  trends of decreased cardiac output  - Administer and titrate ordered vasoactive medications to optimize hemodynamic stability  - Assess quality of pulses, skin color and temperature  - Assess for signs of decreased coronary artery perfusion  - Instruct patient to report change in severity of symptoms  Outcome: Progressing  Goal: Absence of cardiac dysrhythmias or at baseline rhythm  Description: INTERVENTIONS:  - Continuous cardiac monitoring, vital signs, obtain 12 lead EKG if ordered  - Administer antiarrhythmic and heart rate control medications as ordered  - Monitor electrolytes and administer replacement therapy as ordered  Outcome: Progressing     Problem: SKIN/TISSUE INTEGRITY - ADULT  Goal: Skin Integrity remains intact(Skin Breakdown Prevention)  Description: Assess:  -Perform Enrique assessment every 2  -Clean and moisturize skin every 2  -Inspect skin when repositioning, toileting, and assisting with ADLS  -Assess under medical devices such as 2 every 2  -Assess extremities for adequate circulation and sensation     Bed Management:  -Have minimal linens on bed & keep smooth, unwrinkled  -Change linens as needed when moist or perspiring  -Avoid sitting or lying in one position for more than 2 hours while in bed  -Keep HOB at 2degrees     Toileting:  -Offer bedside commode  -Assess for incontinence every 2  -Use incontinent care products after each incontinent episode such as 2    Activity:  -Mobilize patient 2 times a day  -Encourage activity and walks on unit  -Encourage or provide ROM exercises   -Turn and reposition patient every 2 Hours  -Use appropriate equipment to lift or move patient in bed  -Instruct/ Assist with weight shifting every 2 when out of bed in chair  -Consider limitation of chair time 2 hour intervals    Skin Care:  -Avoid use of baby powder, tape, friction and shearing, hot water or constrictive clothing  -Relieve pressure over bony prominences using 2  -Do not massage red bony  areas    Next Steps:  -Teach patient strategies to minimize risks such as 2   -Consider consults to  interdisciplinary teams such as 2  Outcome: Progressing  Goal: Incision(s), wounds(s) or drain site(s) healing without S/S of infection  Description: INTERVENTIONS  - Assess and document dressing, incision, wound bed, drain sites and surrounding tissue  - Provide patient and family education  - Perform skin care/dressing changes every 2  Outcome: Progressing  Goal: Pressure injury heals and does not worsen  Description: Interventions:  - Implement low air loss mattress or specialty surface (Criteria met)  - Apply silicone foam dressing  - Instruct/assist with weight shifting every 2 minutes when in chair   - Limit chair time to 2 hour intervals  - Use special pressure reducing interventions such as 2 when in chair   - Apply fecal or urinary incontinence containment device   - Perform passive or active ROM every 2  - Turn and reposition patient & offload bony prominences every 2 hours   - Utilize friction reducing device or surface for transfers   - Consider consults to  interdisciplinary teams such as 2  - Use incontinent care products after each incontinent episode such as 2  - Consider nutrition services referral as needed  Outcome: Progressing     Problem: MUSCULOSKELETAL - ADULT  Goal: Maintain or return mobility to safest level of function  Description: INTERVENTIONS:  - Assess patient's ability to carry out ADLs; assess patient's baseline for ADL function and identify physical deficits which impact ability to perform ADLs (bathing, care of mouth/teeth, toileting, grooming, dressing, etc.)  - Assess/evaluate cause of self-care deficits   - Assess range of motion  - Assess patient's mobility  - Assess patient's need for assistive devices and provide as appropriate  - Encourage maximum independence but intervene and supervise when necessary  - Involve family in performance of ADLs  - Assess for home care needs  following discharge   - Consider OT consult to assist with ADL evaluation and planning for discharge  - Provide patient education as appropriate  Outcome: Progressing  Goal: Maintain proper alignment of affected body part  Description: INTERVENTIONS:  - Support, maintain and protect limb and body alignment  - Provide patient/ family with appropriate education  Outcome: Progressing     Problem: Potential for Falls  Goal: Patient will remain free of falls  Description: INTERVENTIONS:  - Educate patient/family on patient safety including physical limitations  - Instruct patient to call for assistance with activity   - Consult OT/PT to assist with strengthening/mobility   - Keep Call bell within reach  - Keep bed low and locked with side rails adjusted as appropriate  - Keep care items and personal belongings within reach  - Initiate and maintain comfort rounds  - Make Fall Risk Sign visible to staff  - Offer Toileting every 2 Hours, in advance of need  - Initiate/Maintain 2alarm  - Obtain necessary fall risk management equipment: 2  - Apply yellow socks and bracelet for high fall risk patients  - Consider moving patient to room near nurses station  Outcome: Progressing     Problem: Prexisting or High Potential for Compromised Skin Integrity  Goal: Skin integrity is maintained or improved  Description: INTERVENTIONS:  - Identify patients at risk for skin breakdown  - Assess and monitor skin integrity  - Assess and monitor nutrition and hydration status  - Monitor labs   - Assess for incontinence   - Turn and reposition patient  - Assist with mobility/ambulation  - Relieve pressure over bony prominences  - Avoid friction and shearing  - Provide appropriate hygiene as needed including keeping skin clean and dry  - Evaluate need for skin moisturizer/barrier cream  - Collaborate with interdisciplinary team   - Patient/family teaching  - Consider wound care consult   Outcome: Progressing

## 2024-04-03 NOTE — PHYSICAL THERAPY NOTE
PHYSICAL THERAPY TREATMENT NOTE          Patient Name: Fredy Martinez Jr.  Today's Date: 4/3/2024      AGE:   58 y.o.  Mrn:   [id number]  ADMIT DX:  Femur fracture (HCC) [S72.90XA]    Past Medical History:  Past Medical History:   Diagnosis Date    Cancer (HCC)     bone-    GERD (gastroesophageal reflux disease)     Multiple myeloma (HCC)         24 1054   PT Last Visit   PT Visit Date 24   Note Type   Note Type Treatment   Pain Assessment   Pain Assessment Tool 0-10   Pain Score 7   Pain Location/Orientation Orientation: Left;Location: Leg   Pain Onset/Description Onset: Ongoing;Frequency: Intermittent  (Aggravated by movement)   Effect of Pain on Daily Activities Limits comfort and activity tolerance   Hospital Pain Intervention(s) Cold applied;Ambulation/increased activity;Repositioned;Emotional support   Restrictions/Precautions   Weight Bearing Precautions Per Order Yes   LLE Weight Bearing Per Order WBAT   Other Precautions Impulsive;Cognitive;Chair Alarm;Bed Alarm;Multiple lines;Telemetry;WBS;Fall Risk;Pain   General   Chart Reviewed Yes   Additional Pertinent History POD 2: cephalo-medullary nail for L pathological femur fx. Supine in bed prior to mobility training /66.   Response to Previous Treatment Patient with no complaints from previous session.   Family/Caregiver Present Yes  (Significant other)   Cognition   Overall Cognitive Status WFL   Arousal/Participation Cooperative   Attention Within functional limits   Orientation Level Oriented X4   Memory Decreased recall of precautions   Following Commands Follows one step commands without difficulty   Comments Pt ID via name and . Pt was alert and agreeable to PT treatment session. Pt eager to participate. Benefits from cues for self pacing and safety awareness.   Subjective   Subjective This is me running   Bed Mobility   Supine to Sit 5  Supervision   Additional items Assist x 1;HOB  elevated;Bedrails;Increased time required   Additional Comments Pt able to maintain static sitting balance without UE support at EOB.   Transfers   Sit to Stand 4  Minimal assistance   Additional items Assist x 1;Increased time required;Verbal cues;Armrests   Stand to Sit 4  Minimal assistance   Additional items Assist x 1;Increased time required;Verbal cues;Armrests   Additional Comments Pt performed 3 sit<>stand transfers with min Ax1 with verbal cues for hand placement and body mechanics. Pt reports pain with transitional movements, but demonstrates improved balance and pain once standing.   Ambulation/Elevation   Gait pattern Improper Weight shift;Antalgic;Decreased foot clearance;Short stride;Decreased toe off   Gait Assistance 4  Minimal assist  (Contact guard  )   Additional items Assist x 1;Verbal cues;Tactile cues   Assistive Device Rolling walker   Distance 3'+15'+50'   Stair Management Assistance Not tested   Ambulation/Elevation Additional Comments No losses of balance with changes in direction. Able to problem solve with assistance with line management. Pt reports decreased pain 6/10 in L leg post mobility training.   Balance   Static Sitting Fair +   Dynamic Sitting Fair   Static Standing Poor +   Dynamic Standing Poor +   Ambulatory Poor +  (with RW)   Endurance Deficit   Endurance Deficit Yes   Endurance Deficit Description Pt presents with decreased activity tolerance   Activity Tolerance   Activity Tolerance Patient limited by fatigue;Patient limited by pain   Medical Staff Made Aware PT CHETAN Dean   Nurse Made Aware TEMI Kingsley   Assessment   Prognosis Good   Problem List Decreased strength;Decreased endurance;Impaired balance;Decreased mobility;Decreased safety awareness;Decreased skin integrity;Orthopedic restrictions;Pain   Assessment Pt was seen for a follow up PT visit today to improve functional mobility and progress towards goals. Pt was alert and agreeable to PT treatment session and  was eager to participate. Educated pt on benefit of progressing mobility activity to improve activity tolerance. Compared to previous session, pt demonstrated improvement in functional mobility as evidenced by increased ambulation distance with decreased level of assistance needed. Pt also presents with improved pain levels after mobility training. Pt continues to present with decreased endurance and gait deviations. Pt would benefit from continued skilled PT to maximize functional mobility and activity tolerance. Recommend level I (maximum resource intensity) once medically cleared for discharge.   Barriers to Discharge Inaccessible home environment  (6 DONYA)   Goals   Patient Goals To walk more   STG Expiration Date 04/12/24   Short Term Goal #1 Pt will: perform bed mobility at mod I to decrease pt's burden of care and increase pt's independence w/ repositioning in bed; perform transfers at mod I with LRAD to promote OOB mobility; ambulate at least 150' w/ LRAD and mod I to increase pt's ambulatory endurance/tolerance; negotiate 6 stair(s) w/ UE support and supervision to facilitate pt returning to previous living environment; increase all balance ratings by at least 1 grade to decrease pt's risk of falls   PT Treatment Day 2   Plan   Treatment/Interventions Functional transfer training;LE strengthening/ROM;Elevations;Therapeutic exercise;Endurance training;Cognitive reorientation;Patient/family training;Equipment eval/education;Bed mobility;Gait training;Compensatory technique education   Progress Progressing toward goals   PT Frequency 4-6x/wk   Discharge Recommendation   Rehab Resource Intensity Level, PT I (Maximum Resource Intensity)   Equipment Recommended Walker   Walker Package Recommended Wheeled walker   Change/add to Walker Package? No   AM-PAC Basic Mobility Inpatient   Turning in Flat Bed Without Bedrails 3   Lying on Back to Sitting on Edge of Flat Bed Without Bedrails 3   Moving Bed to Chair 3    Standing Up From Chair Using Arms 3   Walk in Room 3   Climb 3-5 Stairs With Railing 1   Basic Mobility Inpatient Raw Score 16   Basic Mobility Standardized Score 38.32   [facility] Highest Level Of Mobility   -HL Goal 5: Stand one or more mins   -[facility] Achieved 7: Walk 25 feet or more   Education   Education Provided Mobility training;Assistive device   Patient Demonstrates acceptance/verbal understanding;Explanation/teachback used;Reinforcement needed   End of Consult   Patient Position at End of Consult Bedside chair;Bed/Chair alarm activated;All needs within reach         The patient's AM-PAC Basic Mobility Inpatient Short Form Raw Score is 16. A Raw score of less than or equal to 16 suggests the patient may benefit from discharge to post-acute rehabilitation services. Please also refer to the recommendation of the Physical Therapist for safe discharge planning.    Pt will continue to benefit from skilled inpatient PT during this admission in order to facilitate progress towards goals and to maximize pt's independence w/ functional mobility.    DC rec: level I (Maximum Rehab Resource Intensity)      Jon Arnold, SPT  04/03/24

## 2024-04-03 NOTE — PROGRESS NOTES
Progress Note - Acute Pain Service    Fredy Martinez Jr. 58 y.o. male MRN: [id number]  Unit/Bed#: ICU 16 Encounter: [id number]      Fredy Martinez Jr. is a 58 y.o. male with history of multiple myeloma and cancer related pain on chronic opioids who presented with left hip pain found to have pathologic fracture.  He underwent IM nail with orthopedics 4/1/2024.    Pain seen at bedside this morning, reports overall pain has been relatively well-controlled of the left hip.  States he was able to ambulate out of bed and pain was manageable. He has not utilized any intravenous opioids in greater than 24 hours and denies any opioid-induced side effects.  Resume his home opioid regimen.    Multiple myeloma (HCC)  Assessment & Plan  Presenting with Lt. Pathologic prox femur fracture scheduled for Lt. Hip IM nail     - Continue patient's home pain regimen along with breakthrough pain medication.   - Will not be a candidate for neuraxial block due to patient needing to be on therapeutic anticoagulation for PE   - Continue MS contin 30mg BID, home medication  - Continue Dilaudid 0.5mg Q2hr prn breakthrough pain   - Wean as tolerated  - Decrease Oxycodone to 5/10mg for mod/severe pain Q4h prn (Pt's home regimen is oxycodone 10 mg q4 hrs PRN)  - Continue home flexeril 5mg TID for spasms  - Can consider adding gabapentin for neuropathic pain  - Continue tylenol 975mg TID       At discharge recommend the following:  Continue Tylenol 975 mg every 8 hours as needed, for mild pain  Continue home MS Contin 30 mg BID  Continue home oxycodone 10 mg every 4 hours as needed, for moderate/severe pain  Continue home Flexeril 5 mg 3 times daily for spasms        APS will sign off at this time. Thank you for the consult. All opioids and other analgesics to be written at discretion of primary team. Please contact Acute Pain Service - via [employer] from [phone] with additional questions or concerns. See Danny or She for additional contacts  and after hours information.    Pain History  Current pain location(s):  Pain Score: Med Not Given for Pain - for MAR use only  Pain Location/Orientation: Orientation: Left, Location: Hip  Pain Scale: Pain Assessment Tool: 0-10  24 hour history: Tolerating current MMA, hemodynamically stable currently.  No other acute complaints.    Opioid requirement previous 24 hours:   60 mg MS Contin  30 mg oxycodone    Meds/Allergies   all current active meds have been reviewed, current meds:   Current Facility-Administered Medications   Medication Dose Route Frequency    acetaminophen (TYLENOL) tablet 975 mg  975 mg Oral Q8H RUTH    dexamethasone (DECADRON) tablet 0.5 mg  0.5 mg Oral Daily Before Breakfast    heparin (porcine) 25,000 units in 0.45% NaCl 250 mL infusion (premix)  3-30 Units/kg/hr (Order-Specific) Intravenous Titrated    heparin (porcine) injection 2,400 Units  2,400 Units Intravenous Q6H PRN    heparin (porcine) injection 4,800 Units  4,800 Units Intravenous Once    heparin (porcine) injection 4,800 Units  4,800 Units Intravenous Q6H PRN    HYDROmorphone (DILAUDID) injection 0.5 mg  0.5 mg Intravenous Q2H PRN    lactated ringers infusion  50 mL/hr Intravenous Continuous    lenalidomide (REVLIMID) capsule 5 mg  5 mg Oral Daily    magnesium sulfate 2 g/50 mL IVPB (premix) 2 g  2 g Intravenous Once    metoprolol (LOPRESSOR) injection 5 mg  5 mg Intravenous Q6H PRN    morphine (MS CONTIN) ER tablet 30 mg  30 mg Oral Q12H    naloxone (NARCAN) 0.04 mg/mL syringe 0.04 mg  0.04 mg Intravenous Q1MIN PRN    ondansetron (ZOFRAN) injection 4 mg  4 mg Intravenous Q6H PRN    oxyCODONE (ROXICODONE) IR tablet 5 mg  5 mg Oral Q4H PRN    Or    oxyCODONE (ROXICODONE) immediate release tablet 10 mg  10 mg Oral Q4H PRN    pantoprazole (PROTONIX) EC tablet 40 mg  40 mg Oral Daily    polyethylene glycol (MIRALAX) packet 17 g  17 g Oral Daily    senna-docusate sodium (SENOKOT S) 8.6-50 mg per tablet 1 tablet  1 tablet Oral HS   , and  PTA meds:   Prior to Admission Medications   Prescriptions Last Dose Informant Patient Reported? Taking?   acetaminophen (TYLENOL) 500 mg tablet 3/30/2024 Self, Spouse/Significant Other No Yes   Sig: Take 2 tablets (1,000 mg total) by mouth 3 (three) times a day   dexamethasone (DECADRON) 1 mg tablet 3/30/2024  No Yes   Sig: Take 0.5 tablets (0.5 mg total) by mouth daily before breakfast   lenalidomide (REVLIMID) 5 MG CAPS 3/30/2024  No Yes   Sig: Take 1 capsule (5 mg total) by mouth daily   morphine (MS CONTIN) 30 mg 12 hr tablet 3/30/2024  No Yes   Sig: Take 1 tablet (30 mg total) by mouth every 12 (twelve) hours Take on a schedule to prevent and reduce cancer pain Max Daily Amount: 60 mg   naloxone (NARCAN) 4 mg/0.1 mL nasal spray Past Month Self, Spouse/Significant Other No Yes   Sig: Administer 1 spray into a nostril. If no response after 2-3 minutes, give another dose in the other nostril using a new spray.   ondansetron (ZOFRAN) 4 mg tablet Past Week  No Yes   Sig: Take 1 tablet (4 mg total) by mouth every 8 (eight) hours as needed for nausea or vomiting   oxyCODONE (ROXICODONE) 10 MG TABS 3/30/2024  No Yes   Sig: Take 1 tablet (10 mg total) by mouth every 4 (four) hours as needed (cancer pain) Max Daily Amount: 60 mg   pantoprazole (PROTONIX) 40 mg tablet 3/30/2024  No Yes   Sig: Take 1 tablet (40 mg total) by mouth daily - in the morning      Facility-Administered Medications: None       No Known Allergies    Objective        Vitals:    04/03/24 0400 04/03/24 0500 04/03/24 0600 04/03/24 0737   BP: 96/61 (!) 87/58 (!) 88/58    Pulse: 68 80 66    Resp: 13 15 13    Temp:    (!) 97.2 °F (36.2 °C)   TempSrc:    Oral   SpO2: 97% 98% 97%    Weight:       Height:             Physical Exam  Vitals reviewed.   HENT:      Head: Normocephalic and atraumatic.   Cardiovascular:      Rate and Rhythm: Normal rate.   Pulmonary:      Effort: Pulmonary effort is normal.   Chest:      Chest wall: No tenderness.   Abdominal:       Palpations: Abdomen is soft.   Musculoskeletal:         General: Tenderness (L hip) present.      Cervical back: Normal range of motion.   Skin:     General: Skin is warm and dry.      Coloration: Skin is not pale.      Findings: No rash.   Neurological:      Mental Status: He is alert and oriented to person, place, and time. Mental status is at baseline.         Lab Results:   Estimated Creatinine Clearance: 204.3 mL/min (A) (by C-G formula based on SCr of 0.36 mg/dL (L)).  Lab Results   Component Value Date    WBC 3.01 (L) 04/03/2024    HGB 10.5 (L) 04/03/2024    HCT 32.3 (L) 04/03/2024     (L) 04/03/2024         Component Value Date/Time    K 3.9 04/03/2024 0528     04/03/2024 0528    CO2 27 04/03/2024 0528    BUN 9 04/03/2024 0528    CREATININE 0.36 (L) 04/03/2024 0528         Component Value Date/Time    CALCIUM 7.8 (L) 04/03/2024 0528    ALKPHOS 114 (H) 03/30/2024 0907    AST 12 (L) 03/30/2024 0907    ALT 6 (L) 03/30/2024 0907    TP 6.7 03/30/2024 0907    ALB 3.6 03/30/2024 0907       Imaging Studies/EKG: I have personally reviewed pertinent reports.       Counseling / Coordination of Care  Total floor / unit time spent today 15 minutes minutes. Greater than 50% of total time was spent with the patient and / or family counseling and / or coordination of care. A description of the counseling / coordination of care: Patient interview, physical examination, review of PDMP, review of medical record, review of imaging and laboratory data, development of pain management plan, discussion of pain management plan with patient and primary service.      Please note that the APS provides consultative services regarding pain management only.  With the exception of ketamine and epidural infusions and except when indicated, final decisions regarding starting or changing doses of analgesic medications are at the discretion of the consulting service.    PABLO Llanes   Acute Pain  Service

## 2024-04-03 NOTE — PROGRESS NOTES
Cardiology Progress Note - Fredy Martinez Jr. 58 y.o. male MRN: [id number]    Unit/Bed#: ICU 16 Encounter: [id number]      Assessment:  Principal Problem:    Closed fracture of neck of left femur (HCC)  Active Problems:    Therapeutic opioid-induced constipation (OIC)    Multiple myeloma (HCC)    Cancer related pain    Acute pulmonary embolism without acute cor pulmonale (HCC)    SVT (supraventricular tachycardia)      Plan:  Supraventricular tachycardia  He was found to be in sinus tachycardia 180s on admission, likely related to PE and pain. He was given Lopressor 5 mg.  After postop on 4/1, patient noted to have new onset tachycardia with HR in the 190s, asymptomatic. Patient was found to be in SVT with hypotension, SBP 90.  Was given adenosine 6 mg x 1 with improvement of HR in the 100s and BP improved to systolic in the 120s.   Echo 3/31 EF 60% grade 1 diastolic dysfunction  Plan:  Lopressor was held overnight due to pauses on telemetry.  Pauses likely occurred during sleep and patient was asymptomatic.  Patient is currently in normal sinus rhythm, will continue to monitor off of it.  Zio patch outpatient  Lopressor 5 mg IV every 6 hours as needed  Continue to monitor on telemetry  Monitor and replete electrolytes as needed     2.  Acute pulmonary embolism  Incidental findings of PE on CTA PE study at [facility]: subocclusive emboli in the lower arteries in the right middle and lower lobes which are peripheral, no infarct or right heart strain   Patient currently on heparin.  Needs to be switched to oral anticoagulation as appropriate     3.  Closed fracture of the L. femoral neck  Left hip pain for about a month, found to have pathological fracture involving the left peritrochanteric femoral region.  S/p cephalomedullary nailing of left peritrochanteric path femur fracture 4/1  Continue pain management as per acute pain services and orthopedics on board     4.  Multiple myeloma  Diagnosed 2/2023 (completed 4 cycles of  "Bortezomib, lenalidomide, dexamethasone s/p stem cell transplant presented on 8/4/23, cancer-related pain on opioid  Follows Dr. Garcia outpatient     5.  Opioid-induced constipation continue bowel regimen per primary team    Subjective:   Patient seen and examined.  No significant events overnight.  .  Denies having any chest pain, palpitations or shortness of breath.     Objective:     Vitals: Blood pressure (!) 88/58, pulse 66, temperature (!) 97.2 °F (36.2 °C), temperature source Oral, resp. rate 13, height 5' 10\" (1.778 m), weight 63.8 kg (140 lb 10.5 oz), SpO2 97%., Body mass index is 20.18 kg/m².,   Orthostatic Blood Pressures      Flowsheet Row Most Recent Value   Blood Pressure 88/58 filed at 04/03/2024 0600   Patient Position - Orthostatic VS Lying filed at 04/02/2024 0221              Intake/Output Summary (Last 24 hours) at 4/3/2024 1000  Last data filed at 4/3/2024 0600  Gross per 24 hour   Intake 2356.88 ml   Output 850 ml   Net 1506.88 ml       No significant arrhythmias seen on telemetry review.       Physical Exam:    GEN: Fredy Martinez Jr. appears well, alert and oriented x 3, pleasant and cooperative   HEENT: pupils equal, round, and reactive to light; extraocular muscles intact  NECK: supple, no carotid bruits   HEART: regular rhythm, normal S1 and S2, no murmurs, clicks, gallops or rubs   LUNGS: clear to auscultation bilaterally; no wheezes, rales, or rhonchi   ABDOMEN: normal bowel sounds, soft, no tenderness, no distention  EXTREMITIES: peripheral pulses normal; no clubbing, cyanosis, or edema  NEURO: no focal findings   SKIN: normal without suspicious lesions on exposed skin    Medications:      Current Facility-Administered Medications:     acetaminophen (TYLENOL) tablet 975 mg, 975 mg, Oral, Q8H RUTH, Basanta Jeanette, DO, 975 mg at 04/03/24 0531    dexamethasone (DECADRON) tablet 0.5 mg, 0.5 mg, Oral, Daily Before Breakfast, Basanta Jeanette, DO, 0.5 mg at 04/03/24 0815    heparin (porcine) 25,000 " units in 0.45% NaCl 250 mL infusion (premix), 3-30 Units/kg/hr (Order-Specific), Intravenous, Titrated, Basanta Jeanette, DO, Last Rate: 13.2 mL/hr at 04/03/24 0943, 22 Units/kg/hr at 04/03/24 0943    heparin (porcine) injection 2,400 Units, 2,400 Units, Intravenous, Q6H PRN, Basanta Jeanette, DO, 2,400 Units at 04/02/24 2018    heparin (porcine) injection 4,800 Units, 4,800 Units, Intravenous, Once, Basanta Jeanette, DO    heparin (porcine) injection 4,800 Units, 4,800 Units, Intravenous, Q6H PRN, Basanta Jeanette, DO    HYDROmorphone (DILAUDID) injection 0.5 mg, 0.5 mg, Intravenous, Q2H PRN, Basanta Jeanette, DO, 0.5 mg at 04/01/24 0936    lactated ringers infusion, 50 mL/hr, Intravenous, Continuous, Basanta Jeanette, DO, Last Rate: 50 mL/hr at 04/02/24 1636, 50 mL/hr at 04/02/24 1636    lenalidomide (REVLIMID) capsule 5 mg, 5 mg, Oral, Daily, Basanta Jeanette, DO, 5 mg at 04/03/24 0945    magnesium sulfate 2 g/50 mL IVPB (premix) 2 g, 2 g, Intravenous, Once, Sheryl Garcia PA-C, Last Rate: 25 mL/hr at 04/03/24 0816, 2 g at 04/03/24 0816    metoprolol (LOPRESSOR) injection 5 mg, 5 mg, Intravenous, Q6H PRN, Basanta Jeanette, DO    morphine (MS CONTIN) ER tablet 30 mg, 30 mg, Oral, Q12H, Basanta Jeanette, DO, 30 mg at 04/03/24 0531    naloxone (NARCAN) 0.04 mg/mL syringe 0.04 mg, 0.04 mg, Intravenous, Q1MIN PRN, Basanta Jeanette, DO    ondansetron (ZOFRAN) injection 4 mg, 4 mg, Intravenous, Q6H PRN, Basanta Jeanette, DO    oxyCODONE (ROXICODONE) IR tablet 5 mg, 5 mg, Oral, Q4H PRN **OR** oxyCODONE (ROXICODONE) immediate release tablet 10 mg, 10 mg, Oral, Q4H PRN, PABLO Llanes    pantoprazole (PROTONIX) EC tablet 40 mg, 40 mg, Oral, Daily, Basanta Jeanette, DO, 40 mg at 04/03/24 0941    polyethylene glycol (MIRALAX) packet 17 g, 17 g, Oral, Daily, Basanta Jeanette, DO, 17 g at 04/02/24 0813    senna-docusate sodium (SENOKOT S) 8.6-50 mg per tablet 1 tablet, 1 tablet, Oral, HS, Basanta Jeanette, DO, 1 tablet at 04/02/24 2111     Labs &  Results:        Results from last 7 days   Lab Units 04/03/24 0528 04/02/24 0428 04/01/24  0717   WBC Thousand/uL 3.01* 6.68 3.95*   HEMOGLOBIN g/dL 10.5* 12.0 14.2   HEMATOCRIT % 32.3* 36.0* 42.2   PLATELETS Thousands/uL 119* 154 131*         Results from last 7 days   Lab Units 04/03/24 0528 04/02/24 0428 04/01/24 1758 03/31/24 0428 03/30/24  0907   POTASSIUM mmol/L 3.9 4.3 3.4*   < > 3.4*   CHLORIDE mmol/L 104 103 100   < > 92*   CO2 mmol/L 27 23 25   < > 23   BUN mg/dL 9 7 6   < > 16   CREATININE mg/dL 0.36* 0.35* 0.39*   < > 0.63   CALCIUM mg/dL 7.8* 8.1* 8.4   < > 9.6   ALK PHOS U/L  --   --   --   --  114*   ALT U/L  --   --   --   --  6*   AST U/L  --   --   --   --  12*    < > = values in this interval not displayed.     Results from last 7 days   Lab Units 04/03/24 0528 04/03/24  0010 04/02/24 1758 03/31/24 0428 03/30/24  2114 03/30/24  0954   INR   --   --   --   --  1.10 1.14   PTT seconds 85* 105* 51*   < > 64* 31    < > = values in this interval not displayed.     Results from last 7 days   Lab Units 04/03/24 0528 04/02/24 0428 04/01/24 1758   MAGNESIUM mg/dL 1.6* 2.0 1.9           EKG personally reviewed by Orion Odom MD.     Counseling / Coordination of Care  Total floor / unit time spent today  minutes.  Greater than 50% of total time was spent with the patient and / or family counseling and / or coordination of care.  A description of the counseling / coordination of care: .

## 2024-04-03 NOTE — ASSESSMENT & PLAN NOTE
POD #2 s/p IM nailing from pathologic left femur fx  Ortho following, will see Dr. Bang on dc  PT/OT recs for level 1  Will go to rehab on dc  Monitor for ABLA

## 2024-04-03 NOTE — ASSESSMENT & PLAN NOTE
Pain service on board.  Pain management as below.  Will not be a candidate for neuraxial block due to patient needing to be on therapeutic anticoagulation for PE   Discharge recs:  Continue Tylenol 975 mg every 8 hours as needed, for mild pain  Continue home MS Contin 30 mg BID  Continue home oxycodone 10 mg every 4 hours as needed, for moderate/severe pain  Continue home Flexeril 5 mg 3 times daily for spasms

## 2024-04-03 NOTE — ARC ADMISSION
ARC  contacted the patient and or family: introduced self, explained role, reviewed ARC program, services offered, acute rehab criteria, approval process, insurance authorization process, ARC locations and preferences. Patient / family preferred ARC location is [place] and second option is [place]. Patient / family made aware ARC Reviewer will keep their Care Manager updated regarding referral status.      Thank you,  Komal Craig  ARC Admissions

## 2024-04-03 NOTE — CASE MANAGEMENT
Case Management Discharge Planning Note    Patient name Fredy Martinez Jr.  Location ICU 16/ICU 16 MRN [id number]  : 1965 Date 4/3/2024       Current Admission Date: 3/30/2024  Current Admission Diagnosis:Closed fracture of neck of left femur (HCC)   Patient Active Problem List    Diagnosis Date Noted    SVT (supraventricular tachycardia) 2024    Acute pulmonary embolism without acute cor pulmonale (HCC) 2024    Closed fracture of neck of left femur (HCC) 2024    Dental disease 2023    Insomnia 2023    Cancer related pain 2023    Multiple myeloma (HCC) 2023    Metastasis to bone (HCC) 2023    Palliative care patient 2023    Unintentional weight loss 2023    Nicotine dependence, cigarettes, uncomplicated 2023    History of alcohol abuse 2023    Acid reflux 2023    Nausea & vomiting 2023    Loss of appetite 2023    Therapeutic opioid-induced constipation (OIC) 2023    Back pain 2023    Monoclonal gammopathy 2023      LOS (days): 4  Geometric Mean LOS (GMLOS) (days):   Days to GMLOS:     OBJECTIVE:  Risk of Unplanned Readmission Score: 18.73         Current admission status: Inpatient   Preferred Pharmacy:   [id number] - [place] - [address]  [address]  [facility] [address]  Phone: [phone] Fax: [phone]    [facility] [place] - [address]  [address] [place] [address]  Phone: [phone] Fax: [phone]    [id number] - [place] - [address] [address] [facility] [address]  Phone: [phone] Fax: [phone]    [id number] [place] - [address] [place] [address]  Phone: [phone] Fax: [phone]    [facility] [place] - [address]  [address] [address]  Phone:  [phone] Fax: [phone]    [facility] [place] [place] - [address]  [address]  [address]  [facility] [address]  Phone: [phone] Fax: [phone]    Primary Care Provider: No primary care provider on file.    Primary Insurance: HELENE DAHL  Secondary Insurance:     DISCHARGE DETAILS:    Discharge planning discussed with:: Pt and wifeMary  Freedom of Choice: Yes  Comments - Freedom of Choice: [facility]    Contacts  Patient Contacts: WifeMaria Eugenia  Relationship to Patient:: Family  Contact Method: Phone  Phone Number: [phone]  Reason/Outcome: Continuity of Care, Emergency Contact, Discharge Planning    Other Referral/Resources/Interventions Provided:  Interventions: Short Term Rehab  Referral Comments: Pt pre-approved for  ARC. [facility] pending decision. [facility] and their [place] building able to offer a bed pending pt's wife able to supply 30 days of Revlimid. CM met with pt and his wife, Mary. CM reviewed [facility] vs [facility]. CM did review difference between acute vs sub acute. They have a good understanding, but at this time pt wants to remain closer to home. Mary would like this as well. They have both decided on [facility] for rehab. Mary confirmed that she can supply 30 days of pt's Revlimid. Mary confirmed pt is no longer getting any infusions OP. CM reserved [facility] and notified [facility] facilities.

## 2024-04-03 NOTE — PROGRESS NOTES
Progress Note - Orthopedics   Fredy Martinez Jr. 58 y.o. male MRN: [id number]  Unit/Bed#: ICU 16      Subjective:    58 y.o.male seen and examined at bedside. Notes improvement in left leg pain this morning. Worked with PT yesterday. No new or different complaints.    Labs:  0   Lab Value Date/Time    HCT 32.3 (L) 04/03/2024 0528    HCT 36.0 (L) 04/02/2024 0428    HCT 42.2 04/01/2024 0717    HGB 10.5 (L) 04/03/2024 0528    HGB 12.0 04/02/2024 0428    HGB 14.2 04/01/2024 0717    INR 1.10 03/30/2024 2114    WBC 3.01 (L) 04/03/2024 0528    WBC 6.68 04/02/2024 0428    WBC 3.95 (L) 04/01/2024 0717       Meds:    Current Facility-Administered Medications:     acetaminophen (TYLENOL) tablet 975 mg, 975 mg, Oral, Q8H RUTH, Basanta Jeanette, DO, 975 mg at 04/03/24 0531    dexamethasone (DECADRON) tablet 0.5 mg, 0.5 mg, Oral, Daily Before Breakfast, Basanta Jeanette, DO, 0.5 mg at 04/03/24 0815    heparin (porcine) 25,000 units in 0.45% NaCl 250 mL infusion (premix), 3-30 Units/kg/hr (Order-Specific), Intravenous, Titrated, Basanta Jeanette, DO, Last Rate: 13.2 mL/hr at 04/03/24 0051, 22 Units/kg/hr at 04/03/24 0051    heparin (porcine) injection 2,400 Units, 2,400 Units, Intravenous, Q6H PRN, Basanta Jeanette, DO, 2,400 Units at 04/02/24 2018    heparin (porcine) injection 4,800 Units, 4,800 Units, Intravenous, Once, Basanta Jeanette, DO    heparin (porcine) injection 4,800 Units, 4,800 Units, Intravenous, Q6H PRN, Basanta Jeanette, DO    HYDROmorphone (DILAUDID) injection 0.5 mg, 0.5 mg, Intravenous, Q2H PRN, Basanta Jeanette, DO, 0.5 mg at 04/01/24 0936    lactated ringers infusion, 50 mL/hr, Intravenous, Continuous, Basanta Jeanette, DO, Last Rate: 50 mL/hr at 04/02/24 1636, 50 mL/hr at 04/02/24 1636    lenalidomide (REVLIMID) capsule 5 mg, 5 mg, Oral, Daily, Basanta Jeanette, DO, 5 mg at 04/02/24 0813    magnesium sulfate 2 g/50 mL IVPB (premix) 2 g, 2 g, Intravenous, Once, Sheryl Garcia PA-C, Last Rate: 25 mL/hr at 04/03/24 0816, 2 g at  "04/03/24 0816    metoprolol (LOPRESSOR) injection 5 mg, 5 mg, Intravenous, Q6H PRN, Basanta Jeanette, DO    morphine (MS CONTIN) ER tablet 30 mg, 30 mg, Oral, Q12H, Basanta Jeanette, DO, 30 mg at 04/03/24 0531    naloxone (NARCAN) 0.04 mg/mL syringe 0.04 mg, 0.04 mg, Intravenous, Q1MIN PRN, Basanta Jeanette, DO    ondansetron (ZOFRAN) injection 4 mg, 4 mg, Intravenous, Q6H PRN, Basanta Jeanette, DO    oxyCODONE (ROXICODONE) IR tablet 5 mg, 5 mg, Oral, Q4H PRN **OR** oxyCODONE (ROXICODONE) immediate release tablet 10 mg, 10 mg, Oral, Q4H PRN, PABLO Llanes    pantoprazole (PROTONIX) EC tablet 40 mg, 40 mg, Oral, Daily, Basanta Jeanette, DO, 40 mg at 04/02/24 0812    polyethylene glycol (MIRALAX) packet 17 g, 17 g, Oral, Daily, Basanta Jeanette, DO, 17 g at 04/02/24 0813    senna-docusate sodium (SENOKOT S) 8.6-50 mg per tablet 1 tablet, 1 tablet, Oral, HS, Basanta Jeanette, DO, 1 tablet at 04/02/24 2111    Blood Culture:   No results found for: \"BLOODCX\"    Wound Culture:   No results found for: \"WOUNDCULT\"    Ins and Outs:  I/O last 24 hours:  In: 3265.6 [P.O.:480; I.V.:2285.6; IV Piggyback:500]  Out: 850 [Urine:850]          Physical:  Vitals:    04/03/24 0737   BP:    Pulse:    Resp:    Temp: (!) 97.2 °F (36.2 °C)   SpO2:      Musculoskeletal: left Lower Extremity  Surgical dressings c/d/I without strike through  Thigh and calf soft and compressible.   Sensation intact to saphenous, sural, tibial, superficial peroneal nerve, and deep peroneal  Motor intact to +FHL/EHL, +ankle dorsi/plantar flexion  2+ DP pulse  Digits warm and well perfused  Capillary refill < 2 seconds    Assessment:    58 y.o.male s/p cephalo-medullary nailing left pathologic peritrochanteric femur fracture with Dr. Bang 4/1/2024 (POD 2) .     Plan:  WBAT to the left lower extremity.   Will monitor for ABLA and administer IVF/prbc as indicated for Greater than 2 gram drop or Hgb < 7, per primary team.   PT/OT  Pain control  DVT ppx per primary team. "   Medical management per primary team.   Dispo: Ortho will follow  Patient to follow up with Dr. Bang upon discharge.     Aracelis Aguillon PA-C

## 2024-04-04 PROBLEM — I47.10 SVT (SUPRAVENTRICULAR TACHYCARDIA): Status: RESOLVED | Noted: 2024-04-01 | Resolved: 2024-04-04

## 2024-04-04 PROBLEM — D62 ACUTE BLOOD LOSS ANEMIA: Status: ACTIVE | Noted: 2024-04-04

## 2024-04-04 PROBLEM — E83.42 HYPOMAGNESEMIA: Status: ACTIVE | Noted: 2024-04-04

## 2024-04-04 LAB
ANION GAP SERPL CALCULATED.3IONS-SCNC: 3 MMOL/L (ref 4–13)
APTT PPP: 32 SECONDS (ref 23–37)
APTT PPP: 36 SECONDS (ref 23–37)
BASOPHILS # BLD AUTO: 0.01 THOUSANDS/ÂΜL (ref 0–0.1)
BASOPHILS NFR BLD AUTO: 0 % (ref 0–1)
BUN SERPL-MCNC: 6 MG/DL (ref 5–25)
CALCIUM SERPL-MCNC: 7.8 MG/DL (ref 8.4–10.2)
CHLORIDE SERPL-SCNC: 106 MMOL/L (ref 96–108)
CO2 SERPL-SCNC: 27 MMOL/L (ref 21–32)
CREAT SERPL-MCNC: 0.35 MG/DL (ref 0.6–1.3)
EOSINOPHIL # BLD AUTO: 0.13 THOUSAND/ÂΜL (ref 0–0.61)
EOSINOPHIL NFR BLD AUTO: 4 % (ref 0–6)
ERYTHROCYTE [DISTWIDTH] IN BLOOD BY AUTOMATED COUNT: 15 % (ref 11.6–15.1)
GFR SERPL CREATININE-BSD FRML MDRD: 139 ML/MIN/1.73SQ M
GLUCOSE SERPL-MCNC: 94 MG/DL (ref 65–140)
HCT VFR BLD AUTO: 32.2 % (ref 36.5–49.3)
HGB BLD-MCNC: 10.6 G/DL (ref 12–17)
IMM GRANULOCYTES # BLD AUTO: 0.05 THOUSAND/UL (ref 0–0.2)
IMM GRANULOCYTES NFR BLD AUTO: 2 % (ref 0–2)
LYMPHOCYTES # BLD AUTO: 0.24 THOUSANDS/ÂΜL (ref 0.6–4.47)
LYMPHOCYTES NFR BLD AUTO: 8 % (ref 14–44)
MAGNESIUM SERPL-MCNC: 1.6 MG/DL (ref 1.9–2.7)
MCH RBC QN AUTO: 31.2 PG (ref 26.8–34.3)
MCHC RBC AUTO-ENTMCNC: 32.9 G/DL (ref 31.4–37.4)
MCV RBC AUTO: 95 FL (ref 82–98)
MONOCYTES # BLD AUTO: 0.52 THOUSAND/ÂΜL (ref 0.17–1.22)
MONOCYTES NFR BLD AUTO: 17 % (ref 4–12)
NEUTROPHILS # BLD AUTO: 2.21 THOUSANDS/ÂΜL (ref 1.85–7.62)
NEUTS SEG NFR BLD AUTO: 69 % (ref 43–75)
NRBC BLD AUTO-RTO: 0 /100 WBCS
PLATELET # BLD AUTO: 132 THOUSANDS/UL (ref 149–390)
PMV BLD AUTO: 8.8 FL (ref 8.9–12.7)
POTASSIUM SERPL-SCNC: 3.8 MMOL/L (ref 3.5–5.3)
RBC # BLD AUTO: 3.4 MILLION/UL (ref 3.88–5.62)
SODIUM SERPL-SCNC: 136 MMOL/L (ref 135–147)
WBC # BLD AUTO: 3.16 THOUSAND/UL (ref 4.31–10.16)

## 2024-04-04 PROCEDURE — 99024 POSTOP FOLLOW-UP VISIT: CPT | Performed by: PHYSICIAN ASSISTANT

## 2024-04-04 PROCEDURE — 80048 BASIC METABOLIC PNL TOTAL CA: CPT | Performed by: INTERNAL MEDICINE

## 2024-04-04 PROCEDURE — 83735 ASSAY OF MAGNESIUM: CPT | Performed by: INTERNAL MEDICINE

## 2024-04-04 PROCEDURE — 99232 SBSQ HOSP IP/OBS MODERATE 35: CPT | Performed by: PHYSICIAN ASSISTANT

## 2024-04-04 PROCEDURE — 85025 COMPLETE CBC W/AUTO DIFF WBC: CPT | Performed by: INTERNAL MEDICINE

## 2024-04-04 PROCEDURE — 85730 THROMBOPLASTIN TIME PARTIAL: CPT | Performed by: INTERNAL MEDICINE

## 2024-04-04 PROCEDURE — 99232 SBSQ HOSP IP/OBS MODERATE 35: CPT | Performed by: INTERNAL MEDICINE

## 2024-04-04 RX ORDER — CYCLOBENZAPRINE HCL 10 MG
5 TABLET ORAL 3 TIMES DAILY
Status: DISCONTINUED | OUTPATIENT
Start: 2024-04-04 | End: 2024-04-04

## 2024-04-04 RX ORDER — METHOCARBAMOL 750 MG/1
750 TABLET, FILM COATED ORAL EVERY 8 HOURS SCHEDULED
Status: DISCONTINUED | OUTPATIENT
Start: 2024-04-04 | End: 2024-04-05 | Stop reason: HOSPADM

## 2024-04-04 RX ORDER — MAGNESIUM SULFATE HEPTAHYDRATE 40 MG/ML
4 INJECTION, SOLUTION INTRAVENOUS ONCE
Status: COMPLETED | OUTPATIENT
Start: 2024-04-04 | End: 2024-04-04

## 2024-04-04 RX ADMIN — METHOCARBAMOL TABLETS 750 MG: 750 TABLET, COATED ORAL at 15:36

## 2024-04-04 RX ADMIN — APIXABAN 5 MG: 5 TABLET, FILM COATED ORAL at 21:12

## 2024-04-04 RX ADMIN — OXYCODONE HYDROCHLORIDE 10 MG: 10 TABLET ORAL at 12:26

## 2024-04-04 RX ADMIN — LENALIDOMIDE 5 MG: 5 CAPSULE ORAL at 08:28

## 2024-04-04 RX ADMIN — MORPHINE SULFATE 30 MG: 30 TABLET, EXTENDED RELEASE ORAL at 17:46

## 2024-04-04 RX ADMIN — ACETAMINOPHEN 975 MG: 325 TABLET, FILM COATED ORAL at 21:12

## 2024-04-04 RX ADMIN — DEXAMETHASONE 0.5 MG: 0.5 TABLET ORAL at 06:39

## 2024-04-04 RX ADMIN — MORPHINE SULFATE 30 MG: 30 TABLET, EXTENDED RELEASE ORAL at 05:50

## 2024-04-04 RX ADMIN — HEPARIN SODIUM 4800 UNITS: 1000 INJECTION INTRAVENOUS; SUBCUTANEOUS at 02:44

## 2024-04-04 RX ADMIN — ACETAMINOPHEN 975 MG: 325 TABLET, FILM COATED ORAL at 05:50

## 2024-04-04 RX ADMIN — SENNOSIDES, DOCUSATE SODIUM 1 TABLET: 8.6; 5 TABLET ORAL at 21:12

## 2024-04-04 RX ADMIN — ACETAMINOPHEN 975 MG: 325 TABLET, FILM COATED ORAL at 15:12

## 2024-04-04 RX ADMIN — PANTOPRAZOLE SODIUM 40 MG: 40 TABLET, DELAYED RELEASE ORAL at 08:28

## 2024-04-04 RX ADMIN — METHOCARBAMOL TABLETS 750 MG: 750 TABLET, COATED ORAL at 21:13

## 2024-04-04 RX ADMIN — HEPARIN SODIUM 28 UNITS/KG/HR: 10000 INJECTION, SOLUTION INTRAVENOUS at 04:22

## 2024-04-04 RX ADMIN — HEPARIN SODIUM 4800 UNITS: 1000 INJECTION INTRAVENOUS; SUBCUTANEOUS at 10:25

## 2024-04-04 RX ADMIN — MAGNESIUM SULFATE HEPTAHYDRATE 4 G: 40 INJECTION, SOLUTION INTRAVENOUS at 15:37

## 2024-04-04 NOTE — PLAN OF CARE
Problem: PAIN - ADULT  Goal: Verbalizes/displays adequate comfort level or baseline comfort level  Description: Interventions:  - Encourage patient to monitor pain and request assistance  - Assess pain using appropriate pain scale  - Administer analgesics based on type and severity of pain and evaluate response  - Implement non-pharmacological measures as appropriate and evaluate response  - Consider cultural and social influences on pain and pain management  - Notify physician/advanced practitioner if interventions unsuccessful or patient reports new pain  Outcome: Progressing     Problem: INFECTION - ADULT  Goal: Absence or prevention of progression during hospitalization  Description: INTERVENTIONS:  - Assess and monitor for signs and symptoms of infection  - Monitor lab/diagnostic results  - Monitor all insertion sites, i.e. indwelling lines, tubes, and drains  - Monitor endotracheal if appropriate and nasal secretions for changes in amount and color  - [place] appropriate cooling/warming therapies per order  - Administer medications as ordered  - Instruct and encourage patient and family to use good hand hygiene technique  - Identify and instruct in appropriate isolation precautions for identified infection/condition  Outcome: Progressing  Goal: Absence of fever/infection during neutropenic period  Description: INTERVENTIONS:  - Monitor WBC    Outcome: Progressing     Problem: SAFETY ADULT  Goal: Patient will remain free of falls  Description: INTERVENTIONS:  - Educate patient/family on patient safety including physical limitations  - Instruct patient to call for assistance with activity   - Consult OT/PT to assist with strengthening/mobility   - Keep Call bell within reach  - Keep bed low and locked with side rails adjusted as appropriate  - Keep care items and personal belongings within reach  - Initiate and maintain comfort rounds  - Make Fall Risk Sign visible to staff  - Apply yellow socks and bracelet  for high fall risk patients  - Consider moving patient to room near nurses station  Outcome: Progressing  Goal: Maintain or return to baseline ADL function  Description: INTERVENTIONS:  -  Assess patient's ability to carry out ADLs; assess patient's baseline for ADL function and identify physical deficits which impact ability to perform ADLs (bathing, care of mouth/teeth, toileting, grooming, dressing, etc.)  - Assess/evaluate cause of self-care deficits   - Assess range of motion  - Assess patient's mobility; develop plan if impaired  - Assess patient's need for assistive devices and provide as appropriate  - Encourage maximum independence but intervene and supervise when necessary  - Involve family in performance of ADLs  - Assess for home care needs following discharge   - Consider OT consult to assist with ADL evaluation and planning for discharge  - Provide patient education as appropriate  Outcome: Progressing  Goal: Maintains/Returns to pre admission functional level  Description: INTERVENTIONS:  - Perform AM-PAC 6 Click Basic Mobility/ Daily Activity assessment daily.  - Set and communicate daily mobility goal to care team and patient/family/caregiver.   - Collaborate with rehabilitation services on mobility goals if consulted  - Out of bed for toileting  - Record patient progress and toleration of activity level   Outcome: Progressing     Problem: CARDIOVASCULAR - ADULT  Goal: Maintains optimal cardiac output and hemodynamic stability  Description: INTERVENTIONS:  - Monitor I/O, vital signs and rhythm  - Monitor for S/S and trends of decreased cardiac output  - Administer and titrate ordered vasoactive medications to optimize hemodynamic stability  - Assess quality of pulses, skin color and temperature  - Assess for signs of decreased coronary artery perfusion  - Instruct patient to report change in severity of symptoms  Outcome: Progressing  Goal: Absence of cardiac dysrhythmias or at baseline  rhythm  Description: INTERVENTIONS:  - Continuous cardiac monitoring, vital signs, obtain 12 lead EKG if ordered  - Administer antiarrhythmic and heart rate control medications as ordered  - Monitor electrolytes and administer replacement therapy as ordered  Outcome: Progressing     Problem: MUSCULOSKELETAL - ADULT  Goal: Maintain or return mobility to safest level of function  Description: INTERVENTIONS:  - Assess patient's ability to carry out ADLs; assess patient's baseline for ADL function and identify physical deficits which impact ability to perform ADLs (bathing, care of mouth/teeth, toileting, grooming, dressing, etc.)  - Assess/evaluate cause of self-care deficits   - Assess range of motion  - Assess patient's mobility  - Assess patient's need for assistive devices and provide as appropriate  - Encourage maximum independence but intervene and supervise when necessary  - Involve family in performance of ADLs  - Assess for home care needs following discharge   - Consider OT consult to assist with ADL evaluation and planning for discharge  - Provide patient education as appropriate  Outcome: Progressing  Goal: Maintain proper alignment of affected body part  Description: INTERVENTIONS:  - Support, maintain and protect limb and body alignment  - Provide patient/ family with appropriate education  Outcome: Progressing     Problem: Potential for Falls  Goal: Patient will remain free of falls  Description: INTERVENTIONS:  - Educate patient/family on patient safety including physical limitations  - Instruct patient to call for assistance with activity   - Consult OT/PT to assist with strengthening/mobility   - Keep Call bell within reach  - Keep bed low and locked with side rails adjusted as appropriate  - Keep care items and personal belongings within reach  - Initiate and maintain comfort rounds  - Make Fall Risk Sign visible to staff  - Apply yellow socks and bracelet for high fall risk patients  - Consider  moving patient to room near nurses station  Outcome: Progressing     Problem: Prexisting or High Potential for Compromised Skin Integrity  Goal: Skin integrity is maintained or improved  Description: INTERVENTIONS:  - Identify patients at risk for skin breakdown  - Assess and monitor skin integrity  - Assess and monitor nutrition and hydration status  - Monitor labs   - Assess for incontinence   - Turn and reposition patient  - Assist with mobility/ambulation  - Relieve pressure over bony prominences  - Avoid friction and shearing  - Provide appropriate hygiene as needed including keeping skin clean and dry  - Evaluate need for skin moisturizer/barrier cream  - Collaborate with interdisciplinary team   - Patient/family teaching  - Consider wound care consult   Outcome: Progressing

## 2024-04-04 NOTE — ASSESSMENT & PLAN NOTE
Patient with acute drop in hemoglobin post-operatively.  Hemoglobin remains stable with no evidence of excessive bleeding.    Continue to monitor.

## 2024-04-04 NOTE — PROGRESS NOTES
[facility]  Progress Note  Name: Fredy Houston I  MRN: [id number]  Unit/Bed#: S -01 I Date of Admission: 3/30/2024   Date of Service: 4/4/2024 I Hospital Day: 5    Assessment/Plan   * Acute pulmonary embolism without acute cor pulmonale (HCC)  Assessment & Plan  Incidental findings of PE on CTA PE study at [facility] [place]   CTA PE study-subocclusive emboli in the lower arteries in the right middle and right lower lobes which are peripheral, no infarct or right heart strain, multiple lytic lesions  Possible in the setting of immobility   Discontinue heparin drip and start Eliquis 5 mg BID  Echo- 60 percent. Diastolic function is mildly abnormal, consistent with grade I (abnormal) relaxation.     Acute blood loss anemia  Assessment & Plan  Patient with acute drop in hemoglobin post-operatively.  Hemoglobin remains stable with no evidence of excessive bleeding.    Continue to monitor.    SVT (supraventricular tachycardia)  Assessment & Plan  After postop on 4/1, patient noted to have new onset tachycardia with HR in the 190s, asymptomatic.   Patient was found to be in SVT with hypotension, SBP 90.  Was given adenosine 6 mg x 1 with improvement of HR in the 100s and BP improved to systolic in the 120s.   Cardiology is following and due to pauses morning of 4/3 beta-blocker is now discontinued.  Will monitor electrolytes.  Rates on telemetry are still in the 120s, pain be pain-related.  Appreciate their recommendations regarding further continuation of telemetry.    Closed fracture of neck of left femur (HCC)  Assessment & Plan  POD #3 (4/1/24) s/p IM nailing from pathologic left femur fx  Follow up with ortho as an out-patient  Patient will be discharged to [facility] 4/5/24    Cancer related pain  Assessment & Plan  Pain service on board.  Pain management as below.  Discharge recs:  Continue Tylenol 975 mg every 8 hours as needed, for mild pain  Continue home MS Contin 30 mg BID  Continue  home oxycodone 10 mg every 4 hours as needed, for moderate/severe pain  Continue home Flexeril 5 mg 3 times daily for spasms (last dose received was on 4/1/24 so will restart today)    Multiple myeloma (HCC)  Assessment & Plan  On Revlimid and RVD infusion  RVD infusion includes bortezomib, lenalidomide, dexamethasone; Completed Cycle length = 21 days x 3-4 cycles  S/p ASCT  Also follows Palliative Care as OP  Patient of Dr Garcia  Restarted Revlimid   S/p BM biopsy 4/1    Hypomagnesemia  Assessment & Plan  Magnesium level today: 1.6  Replete and monitor    VTE Pharmacologic Prophylaxis: VTE Score: 3 Moderate Risk (Score 3-4) - Pharmacological DVT Prophylaxis Ordered: heparin drip.    Mobility:   Basic Mobility Inpatient Raw Score: 16  -HLM Goal: 5: Stand one or more mins  JH-HLM Achieved: 4: Move to chair/commode  JH-HLM Goal NOT achieved. Continue with multidisciplinary rounding and encourage appropriate mobility to improve upon -HLM goals.    Patient Centered Rounds:  I provided a nurse update after seeing the patient.    Discussions with Specialists or Other Care Team Provider: Cardiology    Education and Discussions with Family / Patient: Updated  (wife) at bedside.    Total Time Spent on Date of Encounter in care of patient:  mins. This time was spent on one or more of the following: performing physical exam; counseling and coordination of care; obtaining or reviewing history; documenting in the medical record; reviewing/ordering tests, medications or procedures; communicating with other healthcare professionals and discussing with patient's family/caregivers.    Current Length of Stay: 5 day(s)  Current Patient Status: Inpatient   Certification Statement: The patient will continue to require additional inpatient hospital stay due to pain control  Discharge Plan: Anticipate discharge tomorrow to rehab facility.    Code Status: Level 1 - Full Code    Subjective:   Patient continues to complain  of intermittent spasms in the left hip radiating into his posterior thigh.  Otherwise, he denies any chest pain or SOB.    Objective:     Vitals:   Temp (24hrs), Av °F (36.7 °C), Min:97.9 °F (36.6 °C), Max:98.1 °F (36.7 °C)    Temp:  [97.9 °F (36.6 °C)-98.1 °F (36.7 °C)] 97.9 °F (36.6 °C)  HR:  [103-114] 103  Resp:  [16] 16  BP: ()/(65-73) 103/69  SpO2:  [92 %-97 %] 97 %  Body mass index is 20.18 kg/m².     Input and Output Summary (last 24 hours):     Intake/Output Summary (Last 24 hours) at 2024 1455  Last data filed at 2024 0732  Gross per 24 hour   Intake 480 ml   Output 2300 ml   Net -1820 ml       Physical Exam:   Physical Exam  Vitals and nursing note reviewed.   Constitutional:       General: He is not in acute distress.     Appearance: He is well-developed. He is cachectic.   HENT:      Head: Normocephalic and atraumatic.   Eyes:      Conjunctiva/sclera: Conjunctivae normal.   Cardiovascular:      Rate and Rhythm: Regular rhythm. Tachycardia present.      Heart sounds: No murmur heard.  Pulmonary:      Effort: Pulmonary effort is normal. No respiratory distress.      Breath sounds: Normal breath sounds.   Abdominal:      Palpations: Abdomen is soft.      Tenderness: There is no abdominal tenderness.   Musculoskeletal:         General: No swelling.      Cervical back: Neck supple.   Skin:     General: Skin is warm and dry.   Neurological:      Mental Status: He is alert.   Psychiatric:         Mood and Affect: Mood normal.        Additional Data:     Labs:  Results from last 7 days   Lab Units 24  0148   WBC Thousand/uL 3.16*   HEMOGLOBIN g/dL 10.6*   HEMATOCRIT % 32.2*   PLATELETS Thousands/uL 132*   NEUTROS PCT % 69   LYMPHS PCT % 8*   MONOS PCT % 17*   EOS PCT % 4     Results from last 7 days   Lab Units 24  0148 24  0428 24  0907   SODIUM mmol/L 136   < > 131*   POTASSIUM mmol/L 3.8   < > 3.4*   CHLORIDE mmol/L 106   < > 92*   CO2 mmol/L 27   < > 23   BUN mg/dL 6    < > 16   CREATININE mg/dL 0.35*   < > 0.63   ANION GAP mmol/L 3*   < > 16*   CALCIUM mg/dL 7.8*   < > 9.6   ALBUMIN g/dL  --   --  3.6   TOTAL BILIRUBIN mg/dL  --   --  0.75   ALK PHOS U/L  --   --  114*   ALT U/L  --   --  6*   AST U/L  --   --  12*   GLUCOSE RANDOM mg/dL 94   < > 121    < > = values in this interval not displayed.     Results from last 7 days   Lab Units 24  2114   INR  1.10     Results from last 7 days   Lab Units 24  1729   POC GLUCOSE mg/dl 127     Lines/Drains:  Invasive Devices       Peripheral Intravenous Line  Duration             Peripheral IV 24 Distal;Upper;Ventral (anterior);Left Arm 1 day                  Telemetry:  Telemetry Orders (From admission, onward)               24 Hour Telemetry Monitoring  Continuous x 24 Hours (Telem)           Question:  Reason for 24 Hour Telemetry  Answer:  Arrhythmias requiring acute medical intervention / PPM or ICD malfunction                     Telemetry Reviewed: Sinus Tachycardia  Indication for Continued Telemetry Use: Arrthymias requiring medical therapy           Imaging: No pertinent imaging reviewed.    Recent Cultures (last 7 days):         Last 24 Hours Medication List:   Current Facility-Administered Medications   Medication Dose Route Frequency Provider Last Rate    acetaminophen  975 mg Oral Q8H RUTH Basanta Jeanette, DO      dexamethasone  0.5 mg Oral Daily Before Breakfast Basanta Jeanette, DO      heparin (porcine)  3-30 Units/kg/hr (Order-Specific) Intravenous Titrated Basanta Jeanette, DO 32 Units/kg/hr (24 1028)    heparin (porcine)  2,400 Units Intravenous Q6H PRN Basanta Jeanette, DO      heparin (porcine)  4,800 Units Intravenous Once Basanta Jeanette, DO      heparin (porcine)  4,800 Units Intravenous Q6H PRN Basanta Jeanette, DO      HYDROmorphone  0.5 mg Intravenous Q2H PRN Basanta Jeanette, DO      lenalidomide  5 mg Oral Daily Basanta Jaenette, DO      metoprolol  5 mg Intravenous Q6H PRN Basanta Jeanette, DO       morphine  30 mg Oral Q12H Basanta Jeanette, DO      naloxone  0.04 mg Intravenous Q1MIN PRN Basanta Jeanette, DO      ondansetron  4 mg Intravenous Q6H PRN Basanta Jeanette, DO      oxyCODONE  5 mg Oral Q4H PRN Bridget Reynoso PA-C      Or    oxyCODONE  10 mg Oral Q4H PRN Bridget Reynoso PA-C      pantoprazole  40 mg Oral Daily Basanta Jeanette, DO      polyethylene glycol  17 g Oral Daily Basanta Jeanette, DO      senna-docusate sodium  1 tablet Oral HS Basanta Jeanette, DO          Today, Patient Was Seen By: Taylor De La Rosa PA-C    **Please Note: This note may have been constructed using a voice recognition system.**

## 2024-04-04 NOTE — PROGRESS NOTES
Cardiology Progress Note - Fredy MITCHELL Juan Houston 58 y.o. male MRN: [id number]    Unit/Bed#: S -01 Encounter: [id number]      Assessment:  Principal Problem:    Acute pulmonary embolism without acute cor pulmonale (HCC)  Active Problems:    Multiple myeloma (HCC)    Cancer related pain    Closed fracture of neck of left femur (HCC)    SVT (supraventricular tachycardia)      Plan:  Supraventricular tachycardia  He was found to be in sinus tachycardia 180s on admission, likely related to PE and pain. He was given Lopressor 5 mg.  After postop on 4/1, patient noted to have new onset tachycardia with HR in the 190s, asymptomatic. Patient was found to be in SVT with hypotension, SBP 90.  Was given adenosine 6 mg x 1 with improvement of HR in the 100s and BP improved to systolic in the 120s.   Echo 3/31 EF 60% grade 1 diastolic dysfunction  Plan:  Patient currently in sinus tachycardia in 110's, asymptomatic, denies having chest pain or shortness of breath.  Will continue to monitor off BB, given soft BPs  Zio patch   Will follow-up outpatient cardiology  Lopressor 5 mg IV every 6 hours as needed  Continue to monitor on telemetry  Monitor and replete electrolytes as needed     2.  Acute pulmonary embolism  Incidental findings of PE on CTA PE study at [facility] [place]: subocclusive emboli in the lower arteries in the right middle and lower lobes which are peripheral, no infarct or right heart strain   Patient currently on heparin.  Needs to be switched to oral anticoagulation as appropriate     3.  Closed fracture of the L. femoral neck  Left hip pain for about a month, found to have pathological fracture involving the left peritrochanteric femoral region.  S/p cephalomedullary nailing of left peritrochanteric path femur fracture 4/1  Continue pain management as per acute pain services and orthopedics on board     4.  Multiple myeloma  Diagnosed 2/2023 (completed 4 cycles of Bortezomib, lenalidomide, dexamethasone s/p stem cell  "transplant presented on 8/4/23, cancer-related pain on opioid  Follows Dr. Garcia outpatient     5.  Opioid-induced constipation continue bowel regimen per primary team    Subjective:   Patient seen and examined.  No significant events overnight.  .    Objective:     Vitals: Blood pressure 94/66, pulse (!) 107, temperature 97.9 °F (36.6 °C), temperature source Oral, resp. rate 16, height 5' 10\" (1.778 m), weight 63.8 kg (140 lb 10.5 oz), SpO2 94%., Body mass index is 20.18 kg/m².,   Orthostatic Blood Pressures      Flowsheet Row Most Recent Value   Blood Pressure 94/66 filed at 04/04/2024 0732   Patient Position - Orthostatic VS Sitting filed at 04/04/2024 0732              Intake/Output Summary (Last 24 hours) at 4/4/2024 1002  Last data filed at 4/4/2024 0732  Gross per 24 hour   Intake 480 ml   Output 2600 ml   Net -2120 ml       No significant arrhythmias seen on telemetry review.       Physical Exam:    GEN: Fredy STEPHEN Martinez Jr. appears well, alert and oriented x 3, pleasant and cooperative   HEENT: pupils equal, round, and reactive to light; extraocular muscles intact  NECK: supple, no carotid bruits   HEART: regular rhythm, normal S1 and S2, no murmurs, clicks, gallops or rubs   LUNGS: clear to auscultation bilaterally; no wheezes, rales, or rhonchi   ABDOMEN: normal bowel sounds, soft, no tenderness, no distention  EXTREMITIES: peripheral pulses normal; no clubbing, cyanosis, or edema  NEURO: no focal findings   SKIN: normal without suspicious lesions on exposed skin    Medications:      Current Facility-Administered Medications:     acetaminophen (TYLENOL) tablet 975 mg, 975 mg, Oral, Q8H RUTH, Basanta Jeanette, DO, 975 mg at 04/04/24 0550    dexamethasone (DECADRON) tablet 0.5 mg, 0.5 mg, Oral, Daily Before Breakfast, Basanta Jeanette, DO, 0.5 mg at 04/04/24 0639    heparin (porcine) 25,000 units in 0.45% NaCl 250 mL infusion (premix), 3-30 Units/kg/hr (Order-Specific), Intravenous, Titrated, Basanta Jeanette, DO, Last " Rate: 16.8 mL/hr at 04/04/24 0422, 28 Units/kg/hr at 04/04/24 0422    heparin (porcine) injection 2,400 Units, 2,400 Units, Intravenous, Q6H PRN, Basanta Jeanette, DO, 2,400 Units at 04/03/24 1209    heparin (porcine) injection 4,800 Units, 4,800 Units, Intravenous, Once, Basanta Jeanette, DO    heparin (porcine) injection 4,800 Units, 4,800 Units, Intravenous, Q6H PRN, Basanta Jeanette, DO, 4,800 Units at 04/04/24 0244    HYDROmorphone (DILAUDID) injection 0.5 mg, 0.5 mg, Intravenous, Q2H PRN, Basanta Jeanette, DO, 0.5 mg at 04/03/24 1533    lenalidomide (REVLIMID) capsule 5 mg, 5 mg, Oral, Daily, Basanta Jeanette, DO, 5 mg at 04/04/24 0828    metoprolol (LOPRESSOR) injection 5 mg, 5 mg, Intravenous, Q6H PRN, Basanta Jeanette, DO    morphine (MS CONTIN) ER tablet 30 mg, 30 mg, Oral, Q12H, Basanta Jeanette, DO, 30 mg at 04/04/24 0550    naloxone (NARCAN) 0.04 mg/mL syringe 0.04 mg, 0.04 mg, Intravenous, Q1MIN PRN, Basanta Jeanette, DO    ondansetron (ZOFRAN) injection 4 mg, 4 mg, Intravenous, Q6H PRN, Basanta Jeanette, DO    oxyCODONE (ROXICODONE) IR tablet 5 mg, 5 mg, Oral, Q4H PRN, 5 mg at 04/03/24 1431 **OR** oxyCODONE (ROXICODONE) immediate release tablet 10 mg, 10 mg, Oral, Q4H PRN, Bridget Reynoso PA-C, 10 mg at 04/03/24 2127    pantoprazole (PROTONIX) EC tablet 40 mg, 40 mg, Oral, Daily, Basanta Jeanette, DO, 40 mg at 04/04/24 0828    polyethylene glycol (MIRALAX) packet 17 g, 17 g, Oral, Daily, Basanta Jeanette, DO, 17 g at 04/02/24 0813    senna-docusate sodium (SENOKOT S) 8.6-50 mg per tablet 1 tablet, 1 tablet, Oral, HS, Cale Reid, DO, 1 tablet at 04/03/24 2102     Labs & Results:        Results from last 7 days   Lab Units 04/04/24  0148 04/03/24 0528 04/02/24  0428   WBC Thousand/uL 3.16* 3.01* 6.68   HEMOGLOBIN g/dL 10.6* 10.5* 12.0   HEMATOCRIT % 32.2* 32.3* 36.0*   PLATELETS Thousands/uL 132* 119* 154         Results from last 7 days   Lab Units 04/04/24  0148 04/03/24  0528 04/02/24 0428 03/31/24  0428 03/30/24  0907    POTASSIUM mmol/L 3.8 3.9 4.3   < > 3.4*   CHLORIDE mmol/L 106 104 103   < > 92*   CO2 mmol/L 27 27 23   < > 23   BUN mg/dL 6 9 7   < > 16   CREATININE mg/dL 0.35* 0.36* 0.35*   < > 0.63   CALCIUM mg/dL 7.8* 7.8* 8.1*   < > 9.6   ALK PHOS U/L  --   --   --   --  114*   ALT U/L  --   --   --   --  6*   AST U/L  --   --   --   --  12*    < > = values in this interval not displayed.     Results from last 7 days   Lab Units 04/04/24  0904 04/04/24  0148 04/03/24  1813 03/31/24  0428 03/30/24  2114 03/30/24  0954   INR   --   --   --   --  1.10 1.14   PTT seconds 36 32 84*   < > 64* 31    < > = values in this interval not displayed.     Results from last 7 days   Lab Units 04/04/24  0148 04/03/24  0528 04/02/24  0428   MAGNESIUM mg/dL 1.6* 1.6* 2.0           EKG personally reviewed by Orion Odom MD.     Counseling / Coordination of Care  Total floor / unit time spent today 30 minutes.  Greater than 50% of total time was spent with the patient and / or family counseling and / or coordination of care.  A description of the counseling / coordination of care: .

## 2024-04-04 NOTE — ASSESSMENT & PLAN NOTE
Incidental findings of PE on CTA PE study at [facility] [place]   CTA PE study-subocclusive emboli in the lower arteries in the right middle and right lower lobes which are peripheral, no infarct or right heart strain, multiple lytic lesions  Possible in the setting of immobility   Discontinue heparin drip and start Eliquis 5 mg BID  Echo- 60 percent. Diastolic function is mildly abnormal, consistent with grade I (abnormal) relaxation.

## 2024-04-04 NOTE — ASSESSMENT & PLAN NOTE
After postop on 4/1, patient noted to have new onset tachycardia with HR in the 190s, asymptomatic.   Patient was found to be in SVT with hypotension, SBP 90.  Was given adenosine 6 mg x 1 with improvement of HR in the 100s and BP improved to systolic in the 120s.   Cardiology is following and due to pauses morning of 4/3 beta-blocker is now discontinued.  Will monitor electrolytes.  Rates on telemetry are still in the 120s, pain be pain-related.  Appreciate their recommendations regarding further continuation of telemetry.

## 2024-04-04 NOTE — ASSESSMENT & PLAN NOTE
POD #3 (4/1/24) s/p IM nailing from pathologic left femur fx  Follow up with ortho as an out-patient  Patient will be discharged to [facility] likely 4/5/24

## 2024-04-04 NOTE — PROGRESS NOTES
Progress Note - Orthopedics   Fredy Martinez . 58 y.o. male MRN: [id number]  Unit/Bed#: S -01      Subjective:    58 y.o.male seen and examined at bedside. Notes some continued left leg pain. Improved. Transferred out of ICU overnight.     Labs:  0   Lab Value Date/Time    HCT 32.2 (L) 04/04/2024 0148    HCT 32.3 (L) 04/03/2024 0528    HCT 36.0 (L) 04/02/2024 0428    HGB 10.6 (L) 04/04/2024 0148    HGB 10.5 (L) 04/03/2024 0528    HGB 12.0 04/02/2024 0428    INR 1.10 03/30/2024 2114    WBC 3.16 (L) 04/04/2024 0148    WBC 3.01 (L) 04/03/2024 0528    WBC 6.68 04/02/2024 0428       Meds:    Current Facility-Administered Medications:     acetaminophen (TYLENOL) tablet 975 mg, 975 mg, Oral, Q8H RUTH, Basanta Jeanette, DO, 975 mg at 04/04/24 0550    dexamethasone (DECADRON) tablet 0.5 mg, 0.5 mg, Oral, Daily Before Breakfast, Basanta Jeanette, DO, 0.5 mg at 04/04/24 0639    heparin (porcine) 25,000 units in 0.45% NaCl 250 mL infusion (premix), 3-30 Units/kg/hr (Order-Specific), Intravenous, Titrated, Basanta Jeanette, DO, Last Rate: 19.2 mL/hr at 04/04/24 1028, 32 Units/kg/hr at 04/04/24 1028    heparin (porcine) injection 2,400 Units, 2,400 Units, Intravenous, Q6H PRN, Basanta Jeanette, DO, 2,400 Units at 04/03/24 1209    heparin (porcine) injection 4,800 Units, 4,800 Units, Intravenous, Once, Basanta Ejanette, DO    heparin (porcine) injection 4,800 Units, 4,800 Units, Intravenous, Q6H PRN, Basanta Jeanette, DO, 4,800 Units at 04/04/24 1025    HYDROmorphone (DILAUDID) injection 0.5 mg, 0.5 mg, Intravenous, Q2H PRN, Basanta Jeanette, DO, 0.5 mg at 04/03/24 1533    lenalidomide (REVLIMID) capsule 5 mg, 5 mg, Oral, Daily, Basanta Jeanette, DO, 5 mg at 04/04/24 0828    metoprolol (LOPRESSOR) injection 5 mg, 5 mg, Intravenous, Q6H PRN, Basanta Jeanette, DO    morphine (MS CONTIN) ER tablet 30 mg, 30 mg, Oral, Q12H, Basanta Jeanette, DO, 30 mg at 04/04/24 0550    naloxone (NARCAN) 0.04 mg/mL syringe 0.04 mg, 0.04 mg, Intravenous, Q1MIN PRN, Basanta  "Jeanette, DO    ondansetron (ZOFRAN) injection 4 mg, 4 mg, Intravenous, Q6H PRN, Basanta Jeanette, DO    oxyCODONE (ROXICODONE) IR tablet 5 mg, 5 mg, Oral, Q4H PRN, 5 mg at 04/03/24 1431 **OR** oxyCODONE (ROXICODONE) immediate release tablet 10 mg, 10 mg, Oral, Q4H PRN, Bridget Reynoso PA-C, 10 mg at 04/03/24 2127    pantoprazole (PROTONIX) EC tablet 40 mg, 40 mg, Oral, Daily, Basanta Jeanette, DO, 40 mg at 04/04/24 0828    polyethylene glycol (MIRALAX) packet 17 g, 17 g, Oral, Daily, Basanta Jeanette, DO, 17 g at 04/02/24 0813    senna-docusate sodium (SENOKOT S) 8.6-50 mg per tablet 1 tablet, 1 tablet, Oral, HS, Basanta Jeanette, DO, 1 tablet at 04/03/24 2102    Blood Culture:   No results found for: \"BLOODCX\"    Wound Culture:   No results found for: \"WOUNDCULT\"    Ins and Outs:  I/O last 24 hours:  In: 480 [P.O.:480]  Out: 2600 [Urine:2600]          Physical:  Vitals:    04/04/24 0732   BP: 94/66   Pulse: (!) 107   Resp: 16   Temp: 97.9 °F (36.6 °C)   SpO2: 94%     Musculoskeletal: left Lower Extremity  Surgical dressings c/d/I without strike through  Thigh and calf soft and compressible.   Sensation intact to saphenous, sural, tibial, superficial peroneal nerve, and deep peroneal  Motor intact to +FHL/EHL, +ankle dorsi/plantar flexion  2+ DP pulse  Digits warm and well perfused  Capillary refill < 2 seconds    Assessment:    58 y.o.male s/p cephalo-medullary nailing left pathologic peritrochanteric femur fracture with Dr. Bang 4/1/2024 (POD 3) .     Plan:  WBAT to the left lower extremity.   Will monitor for ABLA and administer IVF/prbc as indicated for Greater than 2 gram drop or Hgb < 7, per primary team.   PT/OT  Pain control  DVT ppx per primary team.   Medical management per primary team.   Dispo: Ortho stable.   Patient to follow up with Dr. Bang upon discharge.     Aracelis Aguillon PA-C      "

## 2024-04-04 NOTE — ASSESSMENT & PLAN NOTE
Pain service on board.  Pain management as below.  Discharge recs:  Continue Tylenol 975 mg every 8 hours as needed, for mild pain  Continue home MS Contin 30 mg BID  Continue home oxycodone 10 mg every 4 hours as needed, for moderate/severe pain  Continue home Flexeril 5 mg 3 times daily for spasms (last dose received was on 4/1/24 so will restart today)

## 2024-04-05 VITALS
WEIGHT: 140.65 LBS | HEIGHT: 70 IN | RESPIRATION RATE: 16 BRPM | SYSTOLIC BLOOD PRESSURE: 108 MMHG | BODY MASS INDEX: 20.14 KG/M2 | DIASTOLIC BLOOD PRESSURE: 71 MMHG | OXYGEN SATURATION: 95 % | TEMPERATURE: 98.1 F | HEART RATE: 126 BPM

## 2024-04-05 PROBLEM — E83.42 HYPOMAGNESEMIA: Status: RESOLVED | Noted: 2024-04-04 | Resolved: 2024-04-05

## 2024-04-05 LAB
ANION GAP SERPL CALCULATED.3IONS-SCNC: 6 MMOL/L (ref 4–13)
BASOPHILS # BLD AUTO: 0.03 THOUSANDS/ÂΜL (ref 0–0.1)
BASOPHILS NFR BLD AUTO: 1 % (ref 0–1)
BUN SERPL-MCNC: 6 MG/DL (ref 5–25)
CALCIUM SERPL-MCNC: 7.7 MG/DL (ref 8.4–10.2)
CHLORIDE SERPL-SCNC: 105 MMOL/L (ref 96–108)
CO2 SERPL-SCNC: 25 MMOL/L (ref 21–32)
CREAT SERPL-MCNC: 0.34 MG/DL (ref 0.6–1.3)
EOSINOPHIL # BLD AUTO: 0.16 THOUSAND/ÂΜL (ref 0–0.61)
EOSINOPHIL NFR BLD AUTO: 5 % (ref 0–6)
ERYTHROCYTE [DISTWIDTH] IN BLOOD BY AUTOMATED COUNT: 15.1 % (ref 11.6–15.1)
GFR SERPL CREATININE-BSD FRML MDRD: 140 ML/MIN/1.73SQ M
GLUCOSE SERPL-MCNC: 94 MG/DL (ref 65–140)
HCT VFR BLD AUTO: 35.5 % (ref 36.5–49.3)
HGB BLD-MCNC: 11.7 G/DL (ref 12–17)
IMM GRANULOCYTES # BLD AUTO: 0.05 THOUSAND/UL (ref 0–0.2)
IMM GRANULOCYTES NFR BLD AUTO: 2 % (ref 0–2)
LYMPHOCYTES # BLD AUTO: 0.25 THOUSANDS/ÂΜL (ref 0.6–4.47)
LYMPHOCYTES NFR BLD AUTO: 8 % (ref 14–44)
MAGNESIUM SERPL-MCNC: 2.1 MG/DL (ref 1.9–2.7)
MCH RBC QN AUTO: 31 PG (ref 26.8–34.3)
MCHC RBC AUTO-ENTMCNC: 33 G/DL (ref 31.4–37.4)
MCV RBC AUTO: 94 FL (ref 82–98)
MONOCYTES # BLD AUTO: 0.5 THOUSAND/ÂΜL (ref 0.17–1.22)
MONOCYTES NFR BLD AUTO: 15 % (ref 4–12)
NEUTROPHILS # BLD AUTO: 2.32 THOUSANDS/ÂΜL (ref 1.85–7.62)
NEUTS SEG NFR BLD AUTO: 69 % (ref 43–75)
NRBC BLD AUTO-RTO: 0 /100 WBCS
PLATELET # BLD AUTO: 114 THOUSANDS/UL (ref 149–390)
PMV BLD AUTO: 8.8 FL (ref 8.9–12.7)
POTASSIUM SERPL-SCNC: 3.7 MMOL/L (ref 3.5–5.3)
RBC # BLD AUTO: 3.77 MILLION/UL (ref 3.88–5.62)
SODIUM SERPL-SCNC: 136 MMOL/L (ref 135–147)
WBC # BLD AUTO: 3.31 THOUSAND/UL (ref 4.31–10.16)

## 2024-04-05 PROCEDURE — 99239 HOSP IP/OBS DSCHRG MGMT >30: CPT | Performed by: PHYSICIAN ASSISTANT

## 2024-04-05 PROCEDURE — 83735 ASSAY OF MAGNESIUM: CPT | Performed by: PHYSICIAN ASSISTANT

## 2024-04-05 PROCEDURE — 99024 POSTOP FOLLOW-UP VISIT: CPT | Performed by: PHYSICIAN ASSISTANT

## 2024-04-05 PROCEDURE — 80048 BASIC METABOLIC PNL TOTAL CA: CPT | Performed by: PHYSICIAN ASSISTANT

## 2024-04-05 PROCEDURE — 85025 COMPLETE CBC W/AUTO DIFF WBC: CPT | Performed by: PHYSICIAN ASSISTANT

## 2024-04-05 RX ORDER — MORPHINE SULFATE 30 MG/1
30 TABLET, FILM COATED, EXTENDED RELEASE ORAL EVERY 12 HOURS
Qty: 20 TABLET | Refills: 0 | Status: SHIPPED | OUTPATIENT
Start: 2024-04-05 | End: 2024-04-15

## 2024-04-05 RX ORDER — AMOXICILLIN 250 MG
1 CAPSULE ORAL
Start: 2024-04-05

## 2024-04-05 RX ORDER — OXYCODONE HYDROCHLORIDE 10 MG/1
10 TABLET ORAL EVERY 4 HOURS PRN
Qty: 20 TABLET | Refills: 0 | Status: SHIPPED | OUTPATIENT
Start: 2024-04-05

## 2024-04-05 RX ORDER — POLYETHYLENE GLYCOL 3350 17 G/17G
17 POWDER, FOR SOLUTION ORAL DAILY
Start: 2024-04-06

## 2024-04-05 RX ADMIN — MORPHINE SULFATE 30 MG: 30 TABLET, EXTENDED RELEASE ORAL at 05:28

## 2024-04-05 RX ADMIN — ACETAMINOPHEN 975 MG: 325 TABLET, FILM COATED ORAL at 05:28

## 2024-04-05 RX ADMIN — APIXABAN 5 MG: 5 TABLET, FILM COATED ORAL at 08:18

## 2024-04-05 RX ADMIN — PANTOPRAZOLE SODIUM 40 MG: 40 TABLET, DELAYED RELEASE ORAL at 08:18

## 2024-04-05 RX ADMIN — OXYCODONE HYDROCHLORIDE 10 MG: 10 TABLET ORAL at 12:26

## 2024-04-05 RX ADMIN — METHOCARBAMOL TABLETS 750 MG: 750 TABLET, COATED ORAL at 05:28

## 2024-04-05 RX ADMIN — DEXAMETHASONE 0.5 MG: 0.5 TABLET ORAL at 06:04

## 2024-04-05 RX ADMIN — LENALIDOMIDE 5 MG: 5 CAPSULE ORAL at 08:18

## 2024-04-05 NOTE — CASE MANAGEMENT
Case Management Discharge Planning Note    Patient name Fredy Martinez Jr.  Location S /S -01 MRN [id number]  : 1965 Date 2024       Current Admission Date: 3/30/2024  Current Admission Diagnosis:Acute pulmonary embolism without acute cor pulmonale (HCC)   Patient Active Problem List    Diagnosis Date Noted    Acute blood loss anemia 2024    SVT (supraventricular tachycardia) 2024    Acute pulmonary embolism without acute cor pulmonale (HCC) 2024    Closed fracture of neck of left femur (HCC) 2024    Dental disease 2023    Insomnia 2023    Cancer related pain 2023    Multiple myeloma (HCC) 2023    Metastasis to bone (HCC) 2023    Palliative care patient 2023    Unintentional weight loss 2023    Nicotine dependence, cigarettes, uncomplicated 2023    History of alcohol abuse 2023    Acid reflux 2023    Nausea & vomiting 2023    Loss of appetite 2023    Therapeutic opioid-induced constipation (OIC) 2023    Back pain 2023    Monoclonal gammopathy 2023      LOS (days): 6  Geometric Mean LOS (GMLOS) (days): 6.5  Days to GMLOS:0.8     OBJECTIVE:  Risk of Unplanned Readmission Score: 16.57         Current admission status: Inpatient   Preferred Pharmacy:   [facility]/pharmacy #[id number] - [place] - [address]  [address]  [facility] [address]  Phone: [phone] Fax: [phone]    [facility] [place] - [address]  [address] [place] [address]  Phone: [phone] Fax: [phone]    [facility]/pharmacy #[id number] - [place] - [address] [address]  Phone: [phone] Fax: [phone]    [facility] ([facility] #1942 [facility] [place] - [address] [place] [address]  Phone: [phone] Fax: [phone]    [facility] - [place] - [address]  [address] [facility] [address] [place] [address]  Phone: [phone] Fax: [phone]    [facility] - [place] - [address]  [address]  [address]  [facility] [address]  Phone: [phone] Fax: [phone]    Primary Care Provider: No primary care provider on file.    Primary Insurance: HELENE DAHL  Secondary Insurance:     DISCHARGE DETAILS:         Other Referral/Resources/Interventions Provided:  Interventions: Short Term Rehab, Transportation         Treatment Team Recommendation: Short Term Rehab  Discharge Destination Plan:: Short Term Rehab  Transport at Discharge : Wheelchair van  Dispatcher Contacted: Yes  Number/Name of Dispatcher: Requested via Round trip  Transported by (Company and Unit #): [facility]  ETA of Transport (Date): 04/05/24  ETA of Transport (Time): 1230          Accepting Facility Name, City & State : [facility]  Receiving Facility/Agency Phone Number: [phone]  Facility/Agency Fax Number: [phone]       CM arranged WC van transportation via Roundtrip for patient.  [facility] claimed the ride for 12:30pm transport.  CM notified [facility] via Aidin of discharge and transport time.  CM notified RN of transport time for patient via TT.    CM met with patient at bedside to inform him of WC van transportation at 12:30pm to [facility].  Discussed with Patient  there may be an out of pocket expense for transport.

## 2024-04-05 NOTE — ASSESSMENT & PLAN NOTE
POD #4 (4/1/24) s/p IM nailing from pathologic left femur fx  Follow up with ortho as an out-patient  Patient will be discharged to [facility] rehab 4/5/24

## 2024-04-05 NOTE — PROGRESS NOTES
Progress Note - Orthopedics   Fredy Martinez . 58 y.o. male MRN: [id number]  Unit/Bed#: S -01      Subjective:    58 y.o.male seen and examined at bedside. Mild pain in the left thigh today. No new complaints.     Labs:  0   Lab Value Date/Time    HCT 35.5 (L) 04/05/2024 0521    HCT 32.2 (L) 04/04/2024 0148    HCT 32.3 (L) 04/03/2024 0528    HGB 11.7 (L) 04/05/2024 0521    HGB 10.6 (L) 04/04/2024 0148    HGB 10.5 (L) 04/03/2024 0528    INR 1.10 03/30/2024 2114    WBC 3.31 (L) 04/05/2024 0521    WBC 3.16 (L) 04/04/2024 0148    WBC 3.01 (L) 04/03/2024 0528       Meds:    Current Facility-Administered Medications:     acetaminophen (TYLENOL) tablet 975 mg, 975 mg, Oral, Q8H RUTH, Basanta Jeanette, DO, 975 mg at 04/05/24 0528    apixaban (ELIQUIS) tablet 5 mg, 5 mg, Oral, BID, Taylor De La Rosa PA-C, 5 mg at 04/05/24 0818    dexamethasone (DECADRON) tablet 0.5 mg, 0.5 mg, Oral, Daily Before Breakfast, Basanta Jeanette, DO, 0.5 mg at 04/05/24 0604    HYDROmorphone (DILAUDID) injection 0.5 mg, 0.5 mg, Intravenous, Q2H PRN, Basanta Jeanette, DO, 0.5 mg at 04/03/24 1533    lenalidomide (REVLIMID) capsule 5 mg, 5 mg, Oral, Daily, Basanta Jeanette, DO, 5 mg at 04/05/24 0818    methocarbamol (ROBAXIN) tablet 750 mg, 750 mg, Oral, Q8H RUTH, Taylor De La Rosa PA-C, 750 mg at 04/05/24 0528    metoprolol (LOPRESSOR) injection 5 mg, 5 mg, Intravenous, Q6H PRN, Basanta Jeanette, DO    morphine (MS CONTIN) ER tablet 30 mg, 30 mg, Oral, Q12H, Cale Reid, DO, 30 mg at 04/05/24 0528    naloxone (NARCAN) 0.04 mg/mL syringe 0.04 mg, 0.04 mg, Intravenous, Q1MIN PRN, Cale Jonespa, DO    ondansetron (ZOFRAN) injection 4 mg, 4 mg, Intravenous, Q6H PRN, Cale Jonespa, DO    oxyCODONE (ROXICODONE) IR tablet 5 mg, 5 mg, Oral, Q4H PRN, 5 mg at 04/03/24 1431 **OR** oxyCODONE (ROXICODONE) immediate release tablet 10 mg, 10 mg, Oral, Q4H PRN, Bridget Reynoso PA-C, 10 mg at 04/04/24 1226    pantoprazole (PROTONIX) EC tablet 40 mg, 40 mg, Oral, Daily, Cale  "Jeanette, DO, 40 mg at 04/05/24 0818    polyethylene glycol (MIRALAX) packet 17 g, 17 g, Oral, Daily, Basanta Jeanette, DO, 17 g at 04/02/24 0813    senna-docusate sodium (SENOKOT S) 8.6-50 mg per tablet 1 tablet, 1 tablet, Oral, HS, Basanta Jeanette, DO, 1 tablet at 04/04/24 2112    Blood Culture:   No results found for: \"BLOODCX\"    Wound Culture:   No results found for: \"WOUNDCULT\"    Ins and Outs:  I/O last 24 hours:  In: 480 [P.O.:480]  Out: 1350 [Urine:1350]          Physical:  Vitals:    04/05/24 0757   BP: 108/71   Pulse: (!) 126   Resp: 16   Temp: 98.1 °F (36.7 °C)   SpO2: 95%     Musculoskeletal: left Lower Extremity  Surgical dressings c/d/I without strike through  Thigh and calf soft and compressible.   Sensation intact to saphenous, sural, tibial, superficial peroneal nerve, and deep peroneal  Motor intact to +FHL/EHL, +ankle dorsi/plantar flexion  2+ DP pulse  Digits warm and well perfused  Capillary refill < 2 seconds    Assessment:    58 y.o.male s/p cephalo-medullary nailing left pathologic peritrochanteric femur fracture with Dr. Bang 4/1/2024 (POD 4) .     Plan:  WBAT to the left lower extremity.   Will monitor for ABLA and administer IVF/prbc as indicated for Greater than 2 gram drop or Hgb < 7, per primary team.   PT/OT  Pain control  DVT ppx per primary team.   Medical management per primary team.   Dispo: Ortho stable.   Patient to follow up with Dr. Bang upon discharge.     Aracelis Aguillon PA-C      "

## 2024-04-05 NOTE — PLAN OF CARE
Problem: PAIN - ADULT  Goal: Verbalizes/displays adequate comfort level or baseline comfort level  Description: Interventions:  - Encourage patient to monitor pain and request assistance  - Assess pain using appropriate pain scale  - Administer analgesics based on type and severity of pain and evaluate response  - Implement non-pharmacological measures as appropriate and evaluate response  - Consider cultural and social influences on pain and pain management  - Notify physician/advanced practitioner if interventions unsuccessful or patient reports new pain  Outcome: Progressing     Problem: INFECTION - ADULT  Goal: Absence or prevention of progression during hospitalization  Description: INTERVENTIONS:  - Assess and monitor for signs and symptoms of infection  - Monitor lab/diagnostic results  - Monitor all insertion sites, i.e. indwelling lines, tubes, and drains  - Monitor endotracheal if appropriate and nasal secretions for changes in amount and color  - [place] appropriate cooling/warming therapies per order  - Administer medications as ordered  - Instruct and encourage patient and family to use good hand hygiene technique  - Identify and instruct in appropriate isolation precautions for identified infection/condition  Outcome: Progressing  Goal: Absence of fever/infection during neutropenic period  Description: INTERVENTIONS:  - Monitor WBC    Outcome: Progressing     Problem: SAFETY ADULT  Goal: Patient will remain free of falls  Description: INTERVENTIONS:  - Educate patient/family on patient safety including physical limitations  - Instruct patient to call for assistance with activity   - Consult OT/PT to assist with strengthening/mobility   - Keep Call bell within reach  - Keep bed low and locked with side rails adjusted as appropriate  - Keep care items and personal belongings within reach  - Initiate and maintain comfort rounds  - Make Fall Risk Sign visible to staff  - Apply yellow socks and bracelet  for high fall risk patients  - Consider moving patient to room near nurses station  Outcome: Progressing  Goal: Maintain or return to baseline ADL function  Description: INTERVENTIONS:  -  Assess patient's ability to carry out ADLs; assess patient's baseline for ADL function and identify physical deficits which impact ability to perform ADLs (bathing, care of mouth/teeth, toileting, grooming, dressing, etc.)  - Assess/evaluate cause of self-care deficits   - Assess range of motion  - Assess patient's mobility; develop plan if impaired  - Assess patient's need for assistive devices and provide as appropriate  - Encourage maximum independence but intervene and supervise when necessary  - Involve family in performance of ADLs  - Assess for home care needs following discharge   - Consider OT consult to assist with ADL evaluation and planning for discharge  - Provide patient education as appropriate  Outcome: Progressing  Goal: Maintains/Returns to pre admission functional level  Description: INTERVENTIONS:  - Perform AM-PAC 6 Click Basic Mobility/ Daily Activity assessment daily.  - Set and communicate daily mobility goal to care team and patient/family/caregiver.   - Collaborate with rehabilitation services on mobility goals if consulted  - Out of bed for toileting  - Record patient progress and toleration of activity level   Outcome: Progressing     Problem: CARDIOVASCULAR - ADULT  Goal: Maintains optimal cardiac output and hemodynamic stability  Description: INTERVENTIONS:  - Monitor I/O, vital signs and rhythm  - Monitor for S/S and trends of decreased cardiac output  - Administer and titrate ordered vasoactive medications to optimize hemodynamic stability  - Assess quality of pulses, skin color and temperature  - Assess for signs of decreased coronary artery perfusion  - Instruct patient to report change in severity of symptoms  Outcome: Progressing  Goal: Absence of cardiac dysrhythmias or at baseline  rhythm  Description: INTERVENTIONS:  - Continuous cardiac monitoring, vital signs, obtain 12 lead EKG if ordered  - Administer antiarrhythmic and heart rate control medications as ordered  - Monitor electrolytes and administer replacement therapy as ordered  Outcome: Progressing     Problem: MUSCULOSKELETAL - ADULT  Goal: Maintain or return mobility to safest level of function  Description: INTERVENTIONS:  - Assess patient's ability to carry out ADLs; assess patient's baseline for ADL function and identify physical deficits which impact ability to perform ADLs (bathing, care of mouth/teeth, toileting, grooming, dressing, etc.)  - Assess/evaluate cause of self-care deficits   - Assess range of motion  - Assess patient's mobility  - Assess patient's need for assistive devices and provide as appropriate  - Encourage maximum independence but intervene and supervise when necessary  - Involve family in performance of ADLs  - Assess for home care needs following discharge   - Consider OT consult to assist with ADL evaluation and planning for discharge  - Provide patient education as appropriate  Outcome: Progressing  Goal: Maintain proper alignment of affected body part  Description: INTERVENTIONS:  - Support, maintain and protect limb and body alignment  - Provide patient/ family with appropriate education  Outcome: Progressing

## 2024-04-05 NOTE — DISCHARGE SUMMARY
[facility]  Discharge- Fredy Martinez Jr. 1965, 58 y.o. male MRN: [id number]  Unit/Bed#: S -01 Encounter: [id number]  Primary Care Provider: No primary care provider on file.   Date and time admitted to hospital: 3/30/2024  4:35 PM    * Acute pulmonary embolism without acute cor pulmonale (HCC)  Assessment & Plan  Incidental findings of PE on CTA PE study at [facility]   CTA PE study-subocclusive emboli in the lower arteries in the right middle and right lower lobes which are peripheral, no infarct or right heart strain, multiple lytic lesions  Possible in the setting of immobility   Discontinued heparin drip and started Eliquis 5 mg BID  Echo- 60 percent. Diastolic function is mildly abnormal, consistent with grade I (abnormal) relaxation.     Closed fracture of neck of left femur (HCC)  Assessment & Plan  POD #4 (4/1/24) s/p IM nailing from pathologic left femur fx  Follow up with ortho as an out-patient  Patient will be discharged to [facility] rehab 4/5/24    Acute blood loss anemia  Assessment & Plan  Patient with acute drop in hemoglobin post-operatively.  Hemoglobin remains stable with no evidence of excessive bleeding.    Continue to monitor.    SVT (supraventricular tachycardia)  Assessment & Plan  After postop on 4/1, patient noted to have new onset tachycardia with HR in the 190s, asymptomatic.   Patient was found to be in SVT with hypotension, SBP 90.  Was given adenosine 6 mg x 1 with improvement of HR in the 100s and BP improved to systolic in the 120s.   Cardiology is following and due to pauses morning of 4/3 beta-blocker is now discontinued.    His heart rate this morning is improved, 80-90.  Cardiology recommending against starting beta-blocker.    Cancer related pain  Assessment & Plan  Pain service on board.  Pain management as below.  Discharge recs:  Continue Tylenol 975 mg every 8 hours as needed, for mild pain  Continue home MS Contin 30 mg BID  Continue home  "oxycodone 10 mg every 4 hours as needed, for moderate/severe pain  Continue home Flexeril 5 mg 3 times daily for spasms  Scripts were printed for rehab    Multiple myeloma (HCC)  Assessment & Plan  On Revlimid and RVD infusion  RVD infusion includes bortezomib, lenalidomide, dexamethasone; Completed Cycle length = 21 days x 3-4 cycles  S/p ASCT  Also follows Palliative Care as OP  Patient of Dr Garcia  Restarted Revlimid   S/p BM biopsy 4/1, will need to follow-up results as an outpatient    Hypomagnesemia-resolved as of 4/5/2024  Assessment & Plan  Magnesium level today: 1.6  Repleted, potassium today within normal limits at 2.1      Medical Problems       Resolved Problems  Date Reviewed: 4/4/2024            Resolved    Hypomagnesemia 4/5/2024     Resolved by  Sergo Vincent PA-C        Discharging Physician / Practitioner: Sergo Vincent PA-C  PCP: No primary care provider on file.  Admission Date:   Admission Orders (From admission, onward)       Ordered        03/30/24 1734  Inpatient Admission  Once                          Discharge Date: 04/05/24    Consultations During Hospital Stay:  Ortho  Cardiology     Procedures Performed:   4/1/2024 left IM nailing for a pathologic femur fracture    Significant Findings / Test Results:   XR femur: \"Proximal left femoral diaphysis fracture. \"  Magnesium at 1.6, repleted and 2.1  Stable hemoglobin, discharge hemoglobin 11.7    Incidental Findings:   none     Test Results Pending at Discharge (will require follow up):   none     Outpatient Tests Requested:  none    Complications:  none    Reason for Admission: L femur fracture    Hospital Course:   Fredy Martinez JrVijay is a 58 y.o. male patient who originally presented to the hospital on 3/30/2024 due to left femur fracture.  Felt to be pathologic in the setting of multiple myeloma.  Also incidentally found to have pulmonary embolism on CTA chest was started on IV heparin infusion.  On 4/1 the patient went to the OR " "for IM nailing of left femur.  Postoperatively went into tachycardia with SVT, received adenosine and was transferred to stepdown unit.  Was seen in consultation by cardiology and due to pauses overnight beta-blocker was discontinued.  Heart rate now stabilized and patient was transition to p.o. Eliquis from IV heparin.  Given recent femur fracture patient was recommended for rehab and will be discharged to [facility] today.    Please see above list of diagnoses and related plan for additional information.     Condition at Discharge: stable    Discharge Day Visit / Exam:   Subjective:  no overnight daniel  Vitals: Blood Pressure: 108/71 (04/05/24 0757)  Pulse: Taken manually at bedside by myself at 0950, HR is 90 bpm  Temperature: 98.1 °F (36.7 °C) (04/05/24 0757)  Temp Source: Oral (04/04/24 1500)  Respirations: 16 (04/05/24 0757)  Height: 5' 10\" (177.8 cm) (04/01/24 1104)  Weight - Scale: 63.8 kg (140 lb 10.5 oz) (04/02/24 0221)  SpO2: 95 % (04/05/24 0757)  Exam:   Physical Exam  Vitals and nursing note reviewed.   Constitutional:       General: He is not in acute distress.     Appearance: Normal appearance.   HENT:      Head: Normocephalic.      Mouth/Throat:      Mouth: Mucous membranes are moist.   Eyes:      Pupils: Pupils are equal, round, and reactive to light.   Cardiovascular:      Rate and Rhythm: Normal rate. Rhythm irregularly irregular.      Heart sounds: No murmur heard.  Pulmonary:      Effort: Pulmonary effort is normal. No respiratory distress.      Breath sounds: Normal breath sounds. No wheezing, rhonchi or rales.   Abdominal:      General: Bowel sounds are normal. There is no distension.      Palpations: Abdomen is soft.      Tenderness: There is no abdominal tenderness. There is no guarding.   Musculoskeletal:         General: No deformity.      Cervical back: Normal range of motion.      Right lower leg: No edema.      Left lower leg: No edema.   Skin:     Capillary Refill: Capillary refill " takes less than 2 seconds.   Neurological:      General: No focal deficit present.      Mental Status: He is alert and oriented to person, place, and time. Mental status is at baseline.          Discussion with Family: Patient declined call to .     Discharge instructions/Information to patient and family:   See after visit summary for information provided to patient and family.      Provisions for Follow-Up Care:  See after visit summary for information related to follow-up care and any pertinent home health orders.       Disposition:   Acute Rehab at [facility]    Planned Readmission: no     Discharge Statement:  I spent 45 minutes discharging the patient. This time was spent on the day of discharge. I had direct contact with the patient on the day of discharge. Greater than 50% of the total time was spent examining patient, answering all patient questions, arranging and discussing plan of care with patient as well as directly providing post-discharge instructions.  Additional time then spent on discharge activities.    Discharge Medications:  See after visit summary for reconciled discharge medications provided to patient and/or family.      **Please Note: This note may have been constructed using a voice recognition system**

## 2024-04-05 NOTE — ASSESSMENT & PLAN NOTE
Incidental findings of PE on CTA PE study at [facility] [place]   CTA PE study-subocclusive emboli in the lower arteries in the right middle and right lower lobes which are peripheral, no infarct or right heart strain, multiple lytic lesions  Possible in the setting of immobility   Discontinued heparin drip and started Eliquis 5 mg BID  Echo- 60 percent. Diastolic function is mildly abnormal, consistent with grade I (abnormal) relaxation.

## 2024-04-05 NOTE — ASSESSMENT & PLAN NOTE
After postop on 4/1, patient noted to have new onset tachycardia with HR in the 190s, asymptomatic.   Patient was found to be in SVT with hypotension, SBP 90.  Was given adenosine 6 mg x 1 with improvement of HR in the 100s and BP improved to systolic in the 120s.   Cardiology is following and due to pauses morning of 4/3 beta-blocker is now discontinued.    His heart rate this morning is improved, 80-90.  Cardiology recommending against starting beta-blocker.

## 2024-04-05 NOTE — ASSESSMENT & PLAN NOTE
Pain service on board.  Pain management as below.  Discharge recs:  Continue Tylenol 975 mg every 8 hours as needed, for mild pain  Continue home MS Contin 30 mg BID  Continue home oxycodone 10 mg every 4 hours as needed, for moderate/severe pain  Continue home Flexeril 5 mg 3 times daily for spasms  Scripts were printed for rehab

## 2024-04-05 NOTE — ASSESSMENT & PLAN NOTE
On Revlimid and RVD infusion  RVD infusion includes bortezomib, lenalidomide, dexamethasone; Completed Cycle length = 21 days x 3-4 cycles  S/p ASCT  Also follows Palliative Care as OP  Patient of Dr Garcia  Restarted Revlimid   S/p BM biopsy 4/1, will need to follow-up results as an outpatient

## 2024-04-08 ENCOUNTER — TELEPHONE (OUTPATIENT)
Dept: HEMATOLOGY ONCOLOGY | Facility: CLINIC | Age: 59
End: 2024-04-08

## 2024-04-08 ENCOUNTER — TELEPHONE (OUTPATIENT)
Dept: HEMATOLOGY ONCOLOGY | Facility: MEDICAL CENTER | Age: 59
End: 2024-04-08

## 2024-04-08 DIAGNOSIS — R63.0 LOSS OF APPETITE: ICD-10-CM

## 2024-04-08 DIAGNOSIS — C79.51 METASTASIS TO BONE (HCC): ICD-10-CM

## 2024-04-08 DIAGNOSIS — I26.99 ACUTE PULMONARY EMBOLISM WITHOUT ACUTE COR PULMONALE, UNSPECIFIED PULMONARY EMBOLISM TYPE (HCC): Primary | ICD-10-CM

## 2024-04-08 DIAGNOSIS — G89.3 CANCER RELATED PAIN: ICD-10-CM

## 2024-04-08 DIAGNOSIS — C90.00 MULTIPLE MYELOMA (HCC): ICD-10-CM

## 2024-04-08 DIAGNOSIS — Z51.5 PALLIATIVE CARE PATIENT: ICD-10-CM

## 2024-04-08 DIAGNOSIS — K21.9 ACID REFLUX: ICD-10-CM

## 2024-04-08 DIAGNOSIS — R11.2 NAUSEA & VOMITING: ICD-10-CM

## 2024-04-08 DIAGNOSIS — M54.9 BACK PAIN: ICD-10-CM

## 2024-04-08 RX ORDER — DEXAMETHASONE 1 MG
0.5 TABLET ORAL
Qty: 15 TABLET | Refills: 2 | Status: CANCELLED | OUTPATIENT
Start: 2024-04-08

## 2024-04-08 RX ORDER — PANTOPRAZOLE SODIUM 40 MG/1
40 TABLET, DELAYED RELEASE ORAL DAILY
Qty: 90 TABLET | Refills: 0 | Status: CANCELLED | OUTPATIENT
Start: 2024-04-08

## 2024-04-08 RX ORDER — OXYCODONE HYDROCHLORIDE 10 MG/1
10 TABLET ORAL EVERY 4 HOURS PRN
Qty: 20 TABLET | Refills: 0 | Status: CANCELLED | OUTPATIENT
Start: 2024-04-08

## 2024-04-08 RX ORDER — MORPHINE SULFATE 30 MG/1
30 TABLET, FILM COATED, EXTENDED RELEASE ORAL EVERY 12 HOURS
Qty: 20 TABLET | Refills: 0 | Status: CANCELLED | OUTPATIENT
Start: 2024-04-08 | End: 2024-04-18

## 2024-04-08 RX ORDER — ONDANSETRON 4 MG/1
4 TABLET, FILM COATED ORAL EVERY 8 HOURS PRN
Qty: 40 TABLET | Refills: 2 | Status: CANCELLED | OUTPATIENT
Start: 2024-04-08

## 2024-04-08 NOTE — TELEPHONE ENCOUNTER
Patient Call    Who are you speaking with? [facility]     If it is not the patient, are they listed on an active communication consent form? N/A   What is the reason for this call? [facility] is calling in to see if the office would be able to call and speak to him about Fredy's   Eliquis 5 mg prescription.   Does this require a call back? Yes   If a call back is required, please list best call back number Eliquis 5 mg   If a call back is required, advise that a message will be forwarded to their care team and someone will return their call as soon as possible.   Did you relay this information to the patient? Yes

## 2024-04-08 NOTE — TELEPHONE ENCOUNTER
Last appointment:2/22/24    Next scheduled appointment: 5/22/24    Pharmacy: [facility]      Patients wife would like you to call her in regards to his eliquis and muscle relaxer. Please Advise

## 2024-04-08 NOTE — TELEPHONE ENCOUNTER
[facility] would be his PCP or Oncology provider. Recently started in the hospital.    Please call patient's wife. If patient is still in rehab ([facility]) they should be prescribing his opioids while inpatient as his pain may change s/t physical therapy. I would resume prescribing at discharge.    If he is in rehab still, [facility] would likely manage all of his medications (including muscle relaxer and Eliquis).

## 2024-04-08 NOTE — TELEPHONE ENCOUNTER
Patient has not received revlimid  Spoke to [facility]  Revlimid requires a PA  Will send to the PA team

## 2024-04-09 ENCOUNTER — TELEPHONE (OUTPATIENT)
Dept: HEMATOLOGY ONCOLOGY | Facility: CLINIC | Age: 59
End: 2024-04-09

## 2024-04-09 NOTE — TELEPHONE ENCOUNTER
I phoned [facility] and was able to speak with Fredy's nurse, Desi. We reviewed that Fredy has a follow up appointment with his [facility] Oncologist, Dr. Garcia, on 4/18 at 0900 in the [place] office. I provided the office location and the [facility] number as the office contact. Desi indicated that their facility does provide transport for their patients.

## 2024-04-10 ENCOUNTER — TELEPHONE (OUTPATIENT)
Dept: HEMATOLOGY ONCOLOGY | Facility: MEDICAL CENTER | Age: 59
End: 2024-04-10

## 2024-04-10 NOTE — TELEPHONE ENCOUNTER
Call from patient's wife checking status of PA for revlimid  Will send to PA team to advise  Wife aware

## 2024-04-11 NOTE — TELEPHONE ENCOUNTER
Called and spoke w/ Mary. Discussed recent biopsy (analysis pending), Yogesh's health and current rehab stay and symptoms, Mary's health issues. Asked Mary to call us when she has a discharge date for Bill so we can help make sure he has the right medicines to take home.

## 2024-04-16 ENCOUNTER — HOSPITAL ENCOUNTER (OUTPATIENT)
Dept: RADIOLOGY | Facility: HOSPITAL | Age: 59
Discharge: HOME | End: 2024-04-16
Attending: ORTHOPAEDIC SURGERY
Payer: COMMERCIAL

## 2024-04-16 ENCOUNTER — OFFICE VISIT (OUTPATIENT)
Dept: OBGYN CLINIC | Facility: CLINIC | Age: 59
End: 2024-04-16

## 2024-04-16 VITALS — WEIGHT: 140 LBS | HEIGHT: 70 IN | BODY MASS INDEX: 20.04 KG/M2

## 2024-04-16 DIAGNOSIS — Z98.890 STATUS POST SURGERY: Primary | ICD-10-CM

## 2024-04-16 DIAGNOSIS — Z98.890 STATUS POST SURGERY: ICD-10-CM

## 2024-04-16 PROCEDURE — 99024 POSTOP FOLLOW-UP VISIT: CPT | Performed by: ORTHOPAEDIC SURGERY

## 2024-04-16 PROCEDURE — 73552 X-RAY EXAM OF FEMUR 2/>: CPT

## 2024-04-16 RX ORDER — LIDOCAINE 50 MG/G
1 PATCH TOPICAL DAILY
COMMUNITY

## 2024-04-16 RX ORDER — METHOCARBAMOL 500 MG/1
500 TABLET, FILM COATED ORAL 4 TIMES DAILY
COMMUNITY

## 2024-04-16 RX ORDER — MORPHINE SULFATE 30 MG/1
30 CAPSULE, EXTENDED RELEASE ORAL DAILY
COMMUNITY

## 2024-04-16 NOTE — PROGRESS NOTES
58 y.o.male presents for 2 weeks postoperative visit status post left pathologic IT femur fracture fixation.  Patient states pain is well under control he is taking Eliquis which serves as DVT prophylaxis patient is treated for PE with his Eliquis at this time.  He is doing well physical therapy ambulate with assistance of walker denies any calf pain chest pain shortness of breath numbness or tingling.  States his pain is well under control at this time.  He denies any pain in his right hip or thigh at this time    Review of Systems  Review of systems negative unless otherwise specified in HPI    Past Medical History  Past Medical History:   Diagnosis Date    Cancer (HCC)     bone-    GERD (gastroesophageal reflux disease)     Multiple myeloma (HCC)        Past Surgical History  Past Surgical History:   Procedure Laterality Date    APPENDECTOMY      IR BIOPSY BONE MARROW  3/15/2023    NC OPTX FEM SHFT FX W/INSJ IMED IMPLT W/WO SCREW Left 4/1/2024    Procedure: INSERTION NAIL IM FEMUR ANTEGRADE (TROCHANTERIC);  Surgeon: Pat Bang MD;  Location: [facility];  Service: Orthopedics       Current Medications  Current Outpatient Medications on File Prior to Visit   Medication Sig Dispense Refill    acetaminophen (TYLENOL) 500 mg tablet Take 2 tablets (1,000 mg total) by mouth 3 (three) times a day 180 tablet 2    apixaban (Eliquis) 5 mg Take 2 tablets (10 mg total) by mouth 2 (two) times a day for 7 days, THEN 1 tablet (5 mg total) 2 (two) times a day for 23 days. 74 tablet 0    apixaban (Eliquis) 5 mg Take 1 tablet (5 mg total) by mouth 2 (two) times a day 60 tablet 5    dexamethasone (DECADRON) 1 mg tablet Take 0.5 tablets (0.5 mg total) by mouth daily before breakfast 15 tablet 2    lenalidomide (REVLIMID) 5 MG CAPS Take 1 capsule (5 mg total) by mouth daily 28 capsule 0    lidocaine (LIDODERM) 5 % Apply 1 patch topically daily Remove & Discard patch within 12 hours or as directed by MD      methocarbamol  (ROBAXIN) 500 mg tablet Take 500 mg by mouth 4 (four) times a day      morphine (AVINza) 30 MG 24 hr capsule Take 30 mg by mouth daily      naloxone (NARCAN) 4 mg/0.1 mL nasal spray Administer 1 spray into a nostril. If no response after 2-3 minutes, give another dose in the other nostril using a new spray. 1 each 1    ondansetron (ZOFRAN) 4 mg tablet Take 1 tablet (4 mg total) by mouth every 8 (eight) hours as needed for nausea or vomiting 40 tablet 2    oxyCODONE (ROXICODONE) 10 MG TABS Take 1 tablet (10 mg total) by mouth every 4 (four) hours as needed (cancer pain) Max Daily Amount: 60 mg 20 tablet 0    pantoprazole (PROTONIX) 40 mg tablet Take 1 tablet (40 mg total) by mouth daily - in the morning 90 tablet 0    polyethylene glycol (MIRALAX) 17 g packet Take 17 g by mouth daily      senna-docusate sodium (SENOKOT S) 8.6-50 mg per tablet Take 1 tablet by mouth daily at bedtime      [] morphine (MS CONTIN) 30 mg 12 hr tablet Take 1 tablet (30 mg total) by mouth every 12 (twelve) hours for 10 days Take on a schedule to prevent and reduce cancer pain Max Daily Amount: 60 mg 20 tablet 0     No current facility-administered medications on file prior to visit.       Recent Labs (HCT,HGB,PT,INR,ESR,CRP,GLU,HgA1C)  0   Lab Value Date/Time    HCT 35.5 (L) 2024    HGB 11.7 (L) 2024    WBC 3.31 (L) 2024    INR 1.10 2024         Physical exam  General: Awake, Alert, Oriented  Eyes: Pupils equal, round and reactive to light  Heart: regular rate and rhythm  Lungs: No audible wheezing    left Lower extremity  Examination patient's left lower extremity active hip flexion to greater than 90 degrees no pain with internal/external rotation active ankle dorsiflexion incisions well-approximated no erythema no tenderness to palpation no drainage    Imaging  X-rays of the left femur reviewed x-rays reveal well reduced proximal femur fracture no to hardware failure or cut x-rays of the  right hip reviewed x-rays do reveal some evidence of lesions of the proximal femur without cortical destruction.    Assessment:  Status post 2 weeks left pathologic hip intertrochanteric fracture with intramedullary fixation doing well  Plan:  Weightbearing as tolerated left lower extremity  No soaking of incisions x 6 weeks from date of surgery  Patient may shower at this time  Physical therapy range of motion stretching strengthening home therapy.  Follow-up in 2 months time repeat x-rays left hip  Advised patient should he develop any pain in his right hip or thigh region he is to contact our office or present to the emergency department for further evaluation

## 2024-04-16 NOTE — PATIENT INSTRUCTIONS
Status post left hip IM nail  WBAT LLE with walker  May shower over incisions  No soaking incisions  Follow up in 2 months for repeat x rays

## 2024-04-18 ENCOUNTER — OFFICE VISIT (OUTPATIENT)
Dept: HEMATOLOGY ONCOLOGY | Facility: CLINIC | Age: 59
End: 2024-04-18
Payer: COMMERCIAL

## 2024-04-18 VITALS
BODY MASS INDEX: 18.04 KG/M2 | HEIGHT: 70 IN | DIASTOLIC BLOOD PRESSURE: 72 MMHG | OXYGEN SATURATION: 96 % | TEMPERATURE: 97.4 F | SYSTOLIC BLOOD PRESSURE: 106 MMHG | HEART RATE: 111 BPM | RESPIRATION RATE: 19 BRPM | WEIGHT: 126 LBS

## 2024-04-18 DIAGNOSIS — K59.03 THERAPEUTIC OPIOID-INDUCED CONSTIPATION (OIC): Primary | ICD-10-CM

## 2024-04-18 DIAGNOSIS — C90.01 MULTIPLE MYELOMA IN REMISSION (HCC): ICD-10-CM

## 2024-04-18 DIAGNOSIS — C79.51 METASTASIS TO BONE (HCC): Primary | ICD-10-CM

## 2024-04-18 DIAGNOSIS — T40.2X5A THERAPEUTIC OPIOID-INDUCED CONSTIPATION (OIC): Primary | ICD-10-CM

## 2024-04-18 PROCEDURE — 99215 OFFICE O/P EST HI 40 MIN: CPT | Performed by: INTERNAL MEDICINE

## 2024-04-18 NOTE — PROGRESS NOTES
Fredy Martinez Jr.  1965  [address] [facility] [place]  [address]  [place] [id number]     DISCUSSION/SUMMARY:    58-year-old male diagnosed with multiple myeloma in March 2023.  Issues:    Recent left femur fracture.  Patient will follow-up with orthopedics as directed.  Patient will also eventually get back to radiation oncology (see below).    Multiple myeloma disease progression.  Patient presented to the ER on February 8, 2023.  CTA of the chest did not demonstrate any PE but patient had multiple lytic lesions and compression fractures in T6 and T10.  Additional work-up demonstrated elevated total protein, elevated IgG and an elevated lambda. Immunofixation demonstrated IgG lambda monoclonal protein.  CBC parameters and renal function were within normal limits.    Bone marrow biopsy demonstrated a lambda restricted plasma cell neoplasm, 80% of the cells were malignant.  PET/CT demonstrated multiple osseous hypermetabolic lytic lesions.  Patient was seen by Dr. Foss in second opinion. The plan was RVD x 4 cycles and then auto stem cell transplant if no evidence of progression and no complications.      Multiple myeloma international staging system = II, median survival is 44 months (albumin = 2.2, beta-2 microglobulin = 2.8)    NCCN guidelines 3.2023 state that for patients with multiple myeloma in need of treatment, possible transplant candidate, preferred regimen is bortezomib, lenalidomide and dexamethasone.    Regimen (completed)  Bortezomib 1.3 mg/m2 subcu on days 1, 4, 8 and 11  Lenalidomide 25 mg orally days 1-7 (dose reduced on the first cycle)  Dexamethasone 40 mg by mouth on days 1, 8 and 15  Cycle length = 21 days x 3-4 cycles    Patient was able to complete the 4 cycles of neoadjuvant chemotherapy without significant   side effects or toxicities.  Patient then went on to transplant - also did well.  Afterwards patient went on to  surveillance plus Revlimid.  The plan was to watch tumor markers, continue with acyclovir, begin the vaccination plan, pain control.    Mr. Martinez had been doing okay, +/-  and was tolerating the Revlimid 5 mg a day.  Unfortunately the recent fracture was pathologic.  Patient is to go for a PET/CT; repeat laboratory test results have also been requested.  Patient's performance status is +/-, will need to go back on systemic treatments.  I will speak to Kirk Andres about this.    Back pain, recent pathologic fracture.  Dr. Toledo previously was kind enough to review the patient's thoracic MRI (unofficially) previously and agreed that radiation oncology evaluation was indicated.  Patient received RT to the spine.  Patient still with back pain.  As above, repeat PET/CT has been scheduled - patient can be seen by Dr. Toledo afterwards.  Patient is being followed by palliative care.  Pain medication manipulation may be necessary.    Recent PE.  Patient is now on Eliquis.  No bruising or bleeding issues.  No respiratory problems.  Patient will continue.  We discussed what to watch for as far as lower extremity swelling, cords, pain, acute change atorvastatin.    Bone metastases, poor dentition.  This has been a significant issue since before the diagnosis.  Patient states not having any money to go see a dentist.  Because of this, Mr. Martinez was never able to receive Zometa or Xgeva.  Options are limited, patient's caries are extensive.    Mr. Martinez was given a 6-week follow-up but this will change.  Patient knows to call the hematology/oncology office if there are any other questions or concerns.  Patient states having all required medications at home.    Carefully review your medication list and verify that the list is accurate and up-to-date. Please call the hematology/oncology office if there are medications missing from the list, medications on the list that you are not currently taking or if there is a dosage or instruction  that is different from how you're taking that medication.    Patient goals and areas of care: PET/CT, pain control, Eliquis  Barriers to care: None  Patient is able to self-care  ______________________________________________________________________________________    Chief Complaint   Patient presents with    Follow-up    Multiple myeloma status post transplant     History of Present Illness: 58 year old male with relatively good general health recently developing mid back pain.  Mr. Martinez states that the pain began in early January 2023.  Patient had lost approximately 20+ pounds over the past few months.    Work-up included bone marrow biopsy and PET/CT.  Patient has widespread osteolytic lesions.  Bone marrow biopsy demonstrated greater than 80% plasma cells.    Patient received RT to the spine.  Mr. Martinez was then treated with RVD, completed 4 cycles.  Patient subsequently went on to transplant down at [place].  Recent follow-up bone marrow biopsy demonstrated less than 10% plasma cells.  Patient was placed on Revlimid.  The plan was to continue with Revlimid/surveillance.    Unfortunately Mr. Martinez was recently admitted to the hospital; found to have fracture of left femur, anemia, SVT also acute PE.  Patient is now on Eliquis.  Mr. Martinez states that he was lying in bed when he fractured the femur.  No falling, no tripping, no loss of consciousness.  Patient was seen/evaluated by orthopedics and underwent ORIF.    Patient presents with his wife, using a walker.  No shortness of breath or dyspnea on exertion, no chest pain or pressure.  No problems with excessive bruising or bleeding.  Patient states having pain, more so in his back as opposed to the left femur.  Appetites okay, patient has lost some weight.  No GI or  issues, no bleeding.  Activities are extremely limited.  No fevers or signs of infection.    Review of Systems   Constitutional:  Negative for chills, fatigue and fever.   HENT:  Positive for dental  problem. Negative for rhinorrhea and sinus pressure.    Respiratory:  Negative for cough, chest tightness and shortness of breath.    Cardiovascular:  Negative for chest pain.   Gastrointestinal:  Negative for abdominal pain, constipation, nausea and vomiting.   Genitourinary:  Negative for dysuria and frequency.   Musculoskeletal:  Positive for back pain. Negative for arthralgias.   Neurological:  Negative for light-headedness, numbness and headaches.   Psychiatric/Behavioral:  Negative for sleep disturbance.    All other systems reviewed and are negative.    Past Surgical History:   Procedure Laterality Date    APPENDECTOMY      IR BIOPSY BONE MARROW  3/15/2023    HI OPTX FEM SHFT FX W/INSJ IMED IMPLT W/WO SCREW Left 4/1/2024    Procedure: INSERTION NAIL IM FEMUR ANTEGRADE (TROCHANTERIC);  Surgeon: Pat Bang MD;  Location: [facility];  Service: Orthopedics   Past surgical history: No prior blood transfusions    Family History   Problem Relation Age of Onset    Diabetes Mother     Heart disease Father     Diabetes Father    Family history: No known familial or genetic diseases, no children    Social History     Socioeconomic History    Marital status: /Civil Union     Spouse name: Not on file    Number of children: Not on file    Years of education: Not on file    Highest education level: Not on file   Occupational History    Not on file   Tobacco Use    Smoking status: Some Days     Types: Cigarettes    Smokeless tobacco: Not on file    Tobacco comments:     Smoking 1 cigarette daily   Vaping Use    Vaping status: Never Used   Substance and Sexual Activity    Alcohol use: Not Currently    Drug use: Not Currently     Types: Marijuana     Comment: once a month    Sexual activity: Not on file   Other Topics Concern    Not on file   Social History Narrative    From 2/28/23  note:    Emergency Contact: Dee Martinez ()[phone] (Home Phone)    Marital Status: Single     Interpretation concerns,  speaks another language, preferred language: english    Caregiver/Support: Mayur    Housing: two story     Home Setup: one level     Lives With: SO    Daily Living Activities: independent     Ambulation: independent    Employment: yes      Status/Location: no     Ability to pay bills: yes. No barriers to paying bills.     POA/LW/AD: no.  Karlie is healthcare representative. MSW emailed advance directive form.    Transportation Plan/Concerns: Patient states he transports self.  MSW educated on [employer] transport services.      Social Determinants of Health     Financial Resource Strain: Not on file   Food Insecurity: No Food Insecurity (4/1/2024)    Hunger Vital Sign     Worried About Running Out of Food in the Last Year: Never true     Ran Out of Food in the Last Year: Never true   Transportation Needs: No Transportation Needs (4/1/2024)    PRAPARE - Transportation     Lack of Transportation (Medical): No     Lack of Transportation (Non-Medical): No   Physical Activity: Not on file   Stress: Not on file   Social Connections: Not on file   Intimate Partner Violence: Not on file   Housing Stability: Low Risk  (4/1/2024)    Housing Stability Vital Sign     Unable to Pay for Housing in the Last Year: No     Number of Places Lived in the Last Year: 1     Unstable Housing in the Last Year: No   Social history: Patient smokes 3 to 4 cigarettes a day, recently discontinued, approximately 5 beers a day, also recently discontinued, no drug abuse, no toxic exposure    No Known Allergies    Vitals:    04/18/24 0854   Resp: 19     Physical Exam  Constitutional:       Appearance: He is underweight. He is not ill-appearing.      Comments: Thin, somewhat frail appearing male, no respiratory distress, no signs of pain   HENT:      Head: Normocephalic and atraumatic.      Nose: Nose normal.      Mouth/Throat:      Mouth: Mucous membranes are moist.      Comments: Dentition issue with multiple lost teeth   Eyes:       Extraocular Movements: Extraocular movements intact.      Pupils: Pupils are equal, round, and reactive to light.   Cardiovascular:      Rate and Rhythm: Normal rate and regular rhythm.      Pulses: Normal pulses.      Heart sounds: Normal heart sounds.   Pulmonary:      Effort: Pulmonary effort is normal.      Breath sounds: Normal breath sounds.      Comments: Scattered bilateral rhonchi, fair to good entry bilaterally  Abdominal:      General: Abdomen is flat.      Tenderness: There is no abdominal tenderness.   Musculoskeletal:         General: Normal range of motion.      Cervical back: Normal range of motion.      Comments: Patient walks with a walker   Skin:     General: Skin is warm.      Comments: Slightly pale, warm, moist, no petechiae or ecchymoses, well-healed left thigh suture lines   Neurological:      Mental Status: He is alert and oriented to person, place, and time.     Extremities: No lower extremity edema bilaterally, no cords, pulses are 1+ bilaterally  Lymphatic: No adenopathy in the neck, supraclavicular region, axilla bilaterally    Labs    4/5/2024 WBC = 3.31 hemoglobin = 11.7 hematocrit = 35.5 platelet = 114 neutrophil = 69% BUN = 6 creatinine = 0.34 calcium = 7.7    2/21/2024 WBC = 4.59 hemoglobin = 16.3 hematocrit = 49 platelet = 152 neutrophil = 71% BUN = 9 creatinine = 0.55 calcium = 8.9 total protein = 6.0 AST = 11 ALT = 7 alkaline phosphatase = 144 total bilirubin = 0.63 uric acid = 3.2 LDH = 244  12/05/2023 WBC= 8 hemoglobin= 17.5 hematocrit= 50.8 platelet= 160 neutrophil = 89% lymphocyte= 03% monocytes= 7% eosinophil= 0% BUN= 12 creatinine= 0.42 calcium= 9.2 LFT WNL total protein= 6.2 albumin=4.1  10/6/2023 WBC = 10.15 hemoglobin = 15.5 hematocrit = 45.2 platelet = 154 neutrophil = 89% bands = 3% lymphocyte = 2% monocyte = 5% eosinophil = 1% BUN = 4 creatinine = 0.44 calcium = 9.0 LFTs WNL total protein = 5.7 albumin = 3.  10/3/2023 ([place]) Free kappa = 9.1 Free lambda = 7.5  Kappa/lambda ratio = 1.21 beta-2 microglobulin = 1.9 LDH = 112 IgM <30 IgG = 425 IgA = 70 calcium = 8.9 total protein = 6.1 albumin = 3.4 SPEP demonstrated a faint band in the gamma region  8/28/2023 ([place])  WBC = 7.2 hemoglobin = 15.3 hematocrit = 45.5 platelet = 212 neutrophil = 91% BUN = 5 creatinine = 0.47 calcium = 8.4 total protein = 5.7 albumin = 3.2 LFTs WNL  6/5/2023 WBC = 5.15 hemoglobin = 10.2 hematocrit = 31.3 platelet = 58 BUN = 6 creatinine = 0.42 calcium = 7.8 AST = 14 ALT = 12 alkaline phosphatase = 814 total protein = 5.3 total bilirubin = 0.57  4/10/2023 WBC = 3.14 hemoglobin = 10.3 hematocrit = 32.5 platelet = 194 neutrophil = 81% calcium = 10.8 BUN = 10 creatinine = 0.89 total protein = 9.8 albumin = 2.2 LDH = 347 beta-2 microglobulin = 2.8  3/15/2023 WBC = 6.86 hemoglobin = 12.8 hematocrit = 36.6 platelet = 188 neutrophil = 69%  2/8/2023 WBC = 6.6 hemoglobin = 12.6 hematocrit = 36 MCV = 96 platelet = 194 neutrophil = 67% bands = 1% lymphocyte = 23% monocyte = 8% basophil = 1% BUN = 21 creatinine = 1.17 calcium = 10.7 AST = 23 ALT = 14 alkaline phosphatase = 166 total protein = 9.7 albumin = 3.3 total bilirubin = 0.36 TSH = 0.680    Imaging    3/30/2024 left hip and pelvis, 2/3 views.  Impression stated displaced proximal left femoral diaphysis fracture.    3/30/2024 CTA chest PE study    IMPRESSION:     Subocclusive emboli in the lobar arteries of the right middle and lower lobes which are peripheral in location, compatible with subacute emboli. No infarct or right heart strain.     Multiple lytic lesions throughout the visualized axial and appendicular skeleton compatible with known myeloma. 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    10/25/2023 PET/CT     IMPRESSION:     1. Significant interval favorable metabolic response to therapy with interval decrease in number of hypermetabolic osseous lesions, previously widespread and much greater than 20, currently multifocal and less than  10.     2. Indeterminant 7 mm and 5 mm mural-based tracheal nodules which could reflect secretions with developing soft tissue nodules, particularly along the nondependent lateral right tracheal wall not excluded. Findings can be further characterized with   direct visualization and/or short interval follow-up imaging as clinically indicated.     3/20/2023 MRI thoracic spine    IMPRESSION:     1.  Widespread infiltrative and discrete osseous lesions involving anterior and posterior elements of the thoracic and visualized cervical and lumbar spine in keeping with history of multiple myeloma.  No extraosseous lesion.  2.  Compared to CTA chest 2/8/2023, progression of T6 wedge compression fracture with severe anterior height loss.  There is small posterior osseous buckling without significant canal stenosis.  3.  Mild T8 compression fracture, progressed from CTA chest 2/8/2023.  Small posterior osseous buckling without significant canal stenosis.  4.  Stable T8 wedge compression deformity with severe anterior height loss.  Posterosuperior osseous buckling indents ventral thecal sac without significant canal stenosis.  5.  Discrete and marrow replacing lesions involving partially imaged ribs and sternum.  Displaced sternal fracture, similar to PET CT 3/7/2023, new from CTA chest 2/8/2023.    3/7/2023 PET/CT    IMPRESSION:  1.  Widespread hypermetabolic lytic lesions throughout the axial and appendicular skeleton.  Findings are most suggestive of multiple myeloma.  Clinical correlation recommended.  2.  Several hypermetabolic thoracic compression deformities.  T8 compression deformity is new from the prior chest CT.  There is also a new displaced sternal fracture.  3.  No hypermetabolic soft tissue lesions.     2/8/2023 CTA chest PE study    OSSEOUS STRUCTURES:  Diffuse lytic lesions throughout the skeleton with mild compression deformity of T6 and moderate compression deformity of T10.    IMPRESSION:     No pulmonary  embolus.  No acute pulmonary disease.  Diffuse lytic lesions throughout the skeleton with compression deformities in the spine.  This is most likely due to multiple myeloma, less likely due to metastatic disease from an unknown primary.    2/8/2023 chest x-ray.  Impression stated no acute cardiopulmonary disease.    Pathology    Case Report   Surgical Pathology Report                         Case: [id number]                                   Authorizing Provider:  Pat Bang MD      Collected:           04/01/2024 1345               Ordering Location:     [facility]        Received:            04/01/2024 [address] [place] [address] Operating Room                                                       Pathologist:           Julius Martines MD                                                                           Specimen:    Bone, Left Femoral Reamings                                                                Final Diagnosis   BONE, LEFT FEMORAL REAMINGS:     - Lambda-restricted plasma cell neoplasm; see impression and comment.     IMPRESSION:  The findings are of involvement by the patient's known plasma cell neoplasm. Ancillary testing reportedly detects an IgH gene rearrangement, partial MYC deletion (cannot rule out a rearrangement) and trisomy 11 by FISH, none of which were detected previously ([id number]); gain of chromosome 1q was previously detected, which is associated with disease progression. The overall findings are consistent with involvement by the patient's plasma cell neoplasm, with evidence of cytogenetic progression / evolution, and aggressive features. There are some morphologic features of plasmablastic differentiation in the current sample, along with positive expression of MYC, which suggest aggressive nature / higher-grade in plasma cell neoplasms. Additional  ancillary testing is pending to attempt to identify the IgH rearrangement partner and to ascertain whether MYC is rearranged, for further characterization and prognostication, the results of which, will be reported in the final report, when available.     Reviewed with agreement in intradepartmental consensus. See comment for microscopic description and ancillary testing.      COMMENT:  H&E stained sections show sheets of pleomorphic plasmacytoid cells.  shows that the neoplastic cells are plasma cells, which co-express BCL1, MYC and lambda in situ hybridization (HAILEE), and are negative for CD3, CD20, CD30, kappa HAILEE and GUANACO HAILEE. The Ki-67 proliferation index is elevated (70-90%). CD3 highlights background small T-cells and CD20 highlights background small B-cells. A pan-cytokeratin AE1/3 stain is negative for metastatic carcinoma. All stains are performed with appropriate controls.     CYTOGENETIC FISH STUDIES:   Cytogenetic FISH studies are performed and reportedly show the following abnormalities; ([employer] #[id number]; see separate report):      Preliminary result electronically signed by Julius Martines MD on 4/17/2024 at  9:55 AM           Case Report   Surgical Pathology Report                         Case: [id number]                                    Authorizing Provider:  Davis Garcia MD           Collected:           03/15/2023 09               Ordering Location:     [facility]        Received:            03/15/2023 0932                                      [facility] CAT Scan                                                             Pathologist:           Ashok Allen MD                                                           Specimens:   A) - Iliac Crest, Right, core                                                                        B) - Iliac Crest, Right, clot                                                                        C) - Iliac Crest, Right, slide                                                              Final Diagnosis   A -C.  Bone marrow,  right iliac crest,  biopsy, clot, and aspirate:  -  Lambda restricted plasma cell neoplasm, >80% of total cellularity consistent with plasma cell myeloma, see note.  -  Hypercellular bone marrow with markedly reduced trilineage hematopoiesis and no increase in blasts.  -  Reduced iron stores, in a suboptimal sample, correlation with serum iron studies is recommended.  -  Moderate to severe increase in reticulin fibrosis status.     Note: The patient's presentation of multiple lytic lesions and compression fractures is noted. The current biopsy shows a marked increase in plasma cells comprising >80% of total cellularity. Immunostains show a lambda light chain restriction relative to kappa light chain by -HAILEE studies.  Flow cytometry confirms the presence of a lambda restricted plasma cell population. Further supported by an IgG lambda monoclonal protein detected in the serum by immunofixation studies. FISH studies identify a gain in 1q21/CKS1B in 15.33% of evaluated nuclei. Cytogenetic studies reveal a normal male karyotype.               Overall the current bone marrow biopsy is diagnostic for a plasma cell neoplasm best classified as plasma cell myeloma in the correct clinical context. Clinical correlation is advised.   Electronically signed by Ashok Allen MD on 3/23/2023 at  8:24 PM   Preliminary result electronically signed by Ashok Allen MD on 3/22/2023 at  3:13 PM   Microscopic Description    Bone marrow aspirate smears:  The aspirate smears are aspicular, cellular, and suboptimal for evaluation. The aspirate smear sample is hemodilute with an increase in plasma cells. Mature neutrophils and lymphocytes are present. However, maturing myeloid elements and erythroid elements are not well represented in the aspirate smear slides. There is no definitive evidence of myelodysplasia or increase in blasts.       Bone marrow core biopsy and aspirate clot:  Histologic sections of the aspirate clot and decalcified core biopsy are adequate for evaluation.  Marrow space cellularity is hypercellular for patient age (>90%).  Myelopoiesis:  Markedly reduced (myeloblasts= <5%).  Erythropoiesis:  Markedly reduced  Megakaryocytes:  Markedly reduced  Lymphocytes:  Increased in small aggregates and interstitially distributed  Plasma cells:  Markedly increased  including a subset of immature forms.     Special studies:   * Iron stain performed on the aspirate smear, clot, and core biopsy highlights reduced iron stores with no evidence of ring sideroblasts in a limited sample. Siderotic iron granules are present. Due to the aspiculate nature of the specimen these findings ma  * Reticulin stain performed on the core biopsy shows moderate to severe increase in reticulin fibers.  2-3 of 3 by the  Consensus grading scheme.  * Trichrome stain performed on the core biopsy show no increase in collagen fibrosis.   * Immunohistochemical stains were performed with appropriate controls and show the following results:  -   highlights marked increase in plasma cells (>80%) with a lambda restriction relative to kappa light chain expression by kappa and lambda in situ hybridization studies. The plasma cells are negative for CD30 and GUANACO-HAILEE. Ki67 proliferation index is overall low (<10%).      CD20 highlights an increase in B-cells in aggregates and interstitially distributed (10-20% of total cellularity) and CD3 highlights T-cells in aggregates and interstitially scattered T-cells (5-10% of total cellularity).     CD34 shows no increase in blasts (<5% of total cellularity) and  shows no increase in immature cells (<5% of total cellularity). Additionally,  highlights scattered mast cells.     CD61 highlights a reduction in megakaryocytes.      [place] red stain is negative for amyloid.   Note    Component Ref Range & Units  3/15/23 0804 2/8/23 1016   WBC 4.31 - 10.16 Thousand/uL 6.86  6.62    RBC 3.88 - 5.62 Million/uL 3.86 Low   3.74 Low     Hemoglobin 12.0 - 17.0 g/dL 12.8  12.6    Hematocrit 36.5 - 49.3 % 36.6  36.0 Low     MCV 82 - 98 fL 95  96    MCH 26.8 - 34.3 pg 33.2  33.7    MCHC 31.4 - 37.4 g/dL 35.0  35.0    RDW 11.6 - 15.1 % 13.9  14.5    MPV 8.9 - 12.7 fL 9.4  8.9    Platelets 149 - 390 Thousands/uL 188  194    nRBC /100 WBCs 0  0       Component Ref Range & Units 2/8/23 1401    IGA 70.0 - 400.0 mg/dL 28.0 Low     .0 - 1,600.0 mg/dL 2,900.0 High     IGM 40.0 - 230.0 mg/dL 11.0 Low        SPEP Interpretation   See Comment      Comment: The SPEP shows a monoclonal peak in the gamma region.Immunofixation to be performed.  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         10-COLOR FLOW CYTOMETRY ANALYSIS FOR ACUTE LEUKEMIA AND MYELOID/LYMPHOID NEOPLASMS ([facility] # [id number] ; please see separate report for further details):  BONE MARROW ASPIRATE:   1. Clonal plasma cells are detected (4.4% by flow cytometry), consistent with plasma cell neoplasm.   - Phenotype: lambda+; CD38+, +, CD20-, CD19-, CD45-, CD56- and -.   2. No evidence of acute leukemia or increased blasts.   3. No evidence of an abnormal myeloid population. Abnormalities associated with myelodysplasia and myeloproliferative neoplasms are absent.   4. No evidence of B-cell lymphoma or atypical T-cell population     Viability 7AAD: 95.57%   Monoclonal CD45(-)/CD38(bright) plasma cells with lambda-restriction are present (4.4%).  There is a mixed population of granulocytes/maturing myeloid precursors, blasts, monocytes and lymphocytes. +/HLA-DR+ myeloblasts comprise (0.18%) of total events. Myeloid-commited CD34+ population comprise (0.16%) of total events. Granulocytes/maturing myeloid precursors (70.32%) do not display an aberrant phenotype. The orthogonal side scatter is normal. No significant  number of CD56+ or CD10-/CD16- granulocytes is noted. Monocytes (8.4%) appear mature (CD14+/CD64+) and do not display overt phenotypic atypia. B-cells (1.17%) are polytypic. T-cells (14.55%) show no deletion or abnormal dim expression of pan-T-cell antigens on significant subset of cells     FLUORESCENCE IN-SITU HYBRIDIZATION (FISH)  ([facility] # [id number] ; please see separate report for further details):  INTERPRETATION   1. Positive for 1q21/CKS1B gain in 15.33% nuclei.   Comment: Gain of the CKS1B locus (cyclin kinase subunit 1B) in patients with plasma cell myeloma is a poor prognostic indicator associated with significantly shorter progression-free survival and shorter overall survival.   2. No evidence of IGH-MAF translocation t(14;16) gene rearrangement   3. No evidence of RB1 monosomy (13q14 deletion).   4. No evidence of CCND1-IGH translocation t(11;14) gene rearrangement, and no evidence for trisomy 11 or gain of 11q.   5. No evidence of p53 (17p13) deletion or amplification.   6. No evidence of FGFR3-IGH translocation t(4;14) gene rearrangement.      CYTOGENETICS Karyotype Analysis ([facility] # [id number] ; please see separate report for further details):  Karyotype: 46,XY 20  Interpretation:   A normal male karyotype was observed in twenty metaphase cells analyzed.          Tobacco and Toxic Substance Assessment and Intervention:     Alcohol cessation counseling provided

## 2024-04-24 ENCOUNTER — TELEPHONE (OUTPATIENT)
Dept: PALLIATIVE MEDICINE | Facility: CLINIC | Age: 59
End: 2024-04-24

## 2024-04-24 ENCOUNTER — TELEPHONE (OUTPATIENT)
Dept: LAB | Facility: HOSPITAL | Age: 59
End: 2024-04-24

## 2024-04-24 NOTE — TELEPHONE ENCOUNTER
Maria Eugenia Called office c/o constipation for  Fredy . Pt was asked the following questions to better understand the severity of pt's condition.    HOW long since last BM? 3 days     Are you passing gas? Yes     WHAT have you tried to relieve constipation? [employer] STOOL SOFTENER 100MG     Pt has been advised of the following:    We encourage you to consume prune juice, prunes, and increase your fiber intake as much as tolerable. Be sure to drink adequate fluids

## 2024-04-24 NOTE — TELEPHONE ENCOUNTER
"Thank you. I usually also share the following with the patient's w/ constipation:    When using opioids for pain control, constipation is common and patients should act to prevent it:  Drink PLENTY of water. This is important to keep the gut moving.  Some people have success w/ using prunes, prune juice, certain fruits or vegetables (apples, bananas, prunes, pears, raspberries, and vegetables like string beans, broccoli, spinach, kale, squash, lentils, peas, and beans), or fiber gummies.  Try a probiotic. This could be yogurt or kefir, or fermented beverages such as kombucha, but probiotics are also available in capsule form. Aim for 10-15 billion colony-forming-units, w/ bacteria such as Lactobacillus / Saccharomyces / Actinomyces.  Osmotic laxatives (Miralax, magnesium citrate, Milk of Magnesia) can be very useful for opioid-induced constipation (OIC); take daily to prevent OIC.  Bulk laxatives (Citrucel, Metamucil, Fibercon, Benefiber, wheat germ) are useful for constipation in patients who are not taking opioids, but are not recommended if you are taking opioids.  Colace is good for softening hard stools, or preventing constipation when opioids are being used - but does not stimulate the bowel to move things along once constipation has occurred.  You can use senna, 1 to 2 tabs, once or twice daily as needed for constipation. Use as directed on the box/bottle. Senna is also available in a tea (\"Smooth Move\"). Should that not be enough for your constipation, you can try Dulcolax.  Should that not be enough, consider an enema.  All of these medications are available over-the-counter.  "

## 2024-04-29 DIAGNOSIS — C90.00 MULTIPLE MYELOMA NOT HAVING ACHIEVED REMISSION (HCC): ICD-10-CM

## 2024-04-30 ENCOUNTER — TELEPHONE (OUTPATIENT)
Dept: HEMATOLOGY ONCOLOGY | Facility: CLINIC | Age: 59
End: 2024-04-30

## 2024-04-30 DIAGNOSIS — C79.51 METASTASIS TO BONE (HCC): ICD-10-CM

## 2024-04-30 DIAGNOSIS — M54.9 BACK PAIN: ICD-10-CM

## 2024-04-30 DIAGNOSIS — C90.00 MULTIPLE MYELOMA (HCC): ICD-10-CM

## 2024-04-30 DIAGNOSIS — K21.9 ACID REFLUX: ICD-10-CM

## 2024-04-30 DIAGNOSIS — R11.2 NAUSEA & VOMITING: ICD-10-CM

## 2024-04-30 DIAGNOSIS — G89.3 CANCER RELATED PAIN: ICD-10-CM

## 2024-04-30 DIAGNOSIS — Z51.5 PALLIATIVE CARE PATIENT: ICD-10-CM

## 2024-04-30 NOTE — TELEPHONE ENCOUNTER
Medication Refill Request   Who are you speaking with? Pharmacy   If it is not the patient, are they listed on an active communication consent form?     N/A   Which medication is being requested for refill?  Please list medication name and dosage  lenalidomide (REVLIMID) 5 MG CAPS     How many pills does the patient have left?  8   Preferred Pharmacy / Address  [facility] - [address]  [address] [address]  Phone: [phone]  Fax: [phone]     Who is the prescribing provider? Dr. Garcia   Call back number  [phone]   Relevant Information  Please send to pharmacy for pt, he will be out on 5/7/24

## 2024-04-30 NOTE — TELEPHONE ENCOUNTER
Last appointment: 02/22/2024    Next scheduled appointment: 05/22/2024    Pharmacy: [facility]          PDMP review:

## 2024-05-01 ENCOUNTER — TELEPHONE (OUTPATIENT)
Dept: PALLIATIVE MEDICINE | Facility: CLINIC | Age: 59
End: 2024-05-01

## 2024-05-01 RX ORDER — METHOCARBAMOL 500 MG/1
500 TABLET, FILM COATED ORAL 4 TIMES DAILY
Qty: 120 TABLET | Refills: 2 | Status: SHIPPED | OUTPATIENT
Start: 2024-05-01

## 2024-05-01 RX ORDER — MORPHINE SULFATE 30 MG/1
30 TABLET, FILM COATED, EXTENDED RELEASE ORAL EVERY 12 HOURS SCHEDULED
Qty: 60 TABLET | Refills: 0 | Status: SHIPPED | OUTPATIENT
Start: 2024-05-01

## 2024-05-01 RX ORDER — PANTOPRAZOLE SODIUM 40 MG/1
40 TABLET, DELAYED RELEASE ORAL DAILY
Qty: 90 TABLET | Refills: 0 | Status: SHIPPED | OUTPATIENT
Start: 2024-05-01

## 2024-05-01 RX ORDER — ONDANSETRON 4 MG/1
4 TABLET, FILM COATED ORAL EVERY 8 HOURS PRN
Qty: 40 TABLET | Refills: 2 | Status: SHIPPED | OUTPATIENT
Start: 2024-05-01

## 2024-05-01 RX ORDER — LENALIDOMIDE 5 MG/1
CAPSULE ORAL
Qty: 28 CAPSULE | Refills: 0 | Status: SHIPPED | OUTPATIENT
Start: 2024-05-01

## 2024-05-01 RX ORDER — OXYCODONE HYDROCHLORIDE 10 MG/1
10 TABLET ORAL EVERY 4 HOURS PRN
Qty: 150 TABLET | Refills: 0 | Status: SHIPPED | OUTPATIENT
Start: 2024-05-01

## 2024-05-01 NOTE — TELEPHONE ENCOUNTER
Prior Authorization [NEEDED] for [oxyCODONE HCl 10MG tablets ]    KEY# [id number]     [facility]   Phone: [phone]  Fax: [phone]

## 2024-05-01 NOTE — TELEPHONE ENCOUNTER
Reviewed most recent [facility] note, reviewed PDMP. Refilling medications. Added [facility] Rx. Sending to requested pharmacy ([facility])

## 2024-05-01 NOTE — TELEPHONE ENCOUNTER
Prior Authorization [completed for [oxyCODONE HCl 10MG tablets ]     KEY# [id number]      Pharmacy: [facility]   Phone: [phone]  Fax: [phone]       Prior Authorization completed for [Morphine Sulfate ER 30MG er tablets ]     KEY# [id number]      Pharmacy: [facility]  Phone: [phone]   Fax: [phone]

## 2024-05-01 NOTE — TELEPHONE ENCOUNTER
Prior Authorization [NEEDED] for [Morphine Sulfate ER 30MG er tablets ]    KEY# [id number]     Pharmacy: [facility]  Phone: [phone]   Fax: [phone]

## 2024-05-02 ENCOUNTER — DOCUMENTATION (OUTPATIENT)
Dept: CARDIOLOGY CLINIC | Facility: CLINIC | Age: 59
End: 2024-05-02

## 2024-05-02 ENCOUNTER — APPOINTMENT (OUTPATIENT)
Dept: LAB | Facility: HOSPITAL | Age: 59
End: 2024-05-02
Attending: INTERNAL MEDICINE
Payer: COMMERCIAL

## 2024-05-02 DIAGNOSIS — C79.51 METASTASIS TO BONE (HCC): ICD-10-CM

## 2024-05-02 DIAGNOSIS — C90.01 MULTIPLE MYELOMA IN REMISSION (HCC): ICD-10-CM

## 2024-05-02 LAB
ALBUMIN SERPL BCP-MCNC: 3.6 G/DL (ref 3.5–5)
ALP SERPL-CCNC: 214 U/L (ref 34–104)
ALT SERPL W P-5'-P-CCNC: 8 U/L (ref 7–52)
ANION GAP SERPL CALCULATED.3IONS-SCNC: 8 MMOL/L (ref 4–13)
AST SERPL W P-5'-P-CCNC: 16 U/L (ref 13–39)
BASOPHILS # BLD AUTO: 0.03 THOUSANDS/ÂΜL (ref 0–0.1)
BASOPHILS NFR BLD AUTO: 1 % (ref 0–1)
BILIRUB SERPL-MCNC: 0.62 MG/DL (ref 0.2–1)
BUN SERPL-MCNC: 10 MG/DL (ref 5–25)
CALCIUM SERPL-MCNC: 10 MG/DL (ref 8.4–10.2)
CHLORIDE SERPL-SCNC: 102 MMOL/L (ref 96–108)
CO2 SERPL-SCNC: 26 MMOL/L (ref 21–32)
CREAT SERPL-MCNC: 0.47 MG/DL (ref 0.6–1.3)
EOSINOPHIL # BLD AUTO: 0.05 THOUSAND/ÂΜL (ref 0–0.61)
EOSINOPHIL NFR BLD AUTO: 1 % (ref 0–6)
ERYTHROCYTE [DISTWIDTH] IN BLOOD BY AUTOMATED COUNT: 18.6 % (ref 11.6–15.1)
GFR SERPL CREATININE-BSD FRML MDRD: 123 ML/MIN/1.73SQ M
GLUCOSE P FAST SERPL-MCNC: 135 MG/DL (ref 65–99)
HCT VFR BLD AUTO: 50.9 % (ref 36.5–49.3)
HGB BLD-MCNC: 16.7 G/DL (ref 12–17)
IGA SERPL-MCNC: 72 MG/DL (ref 66–433)
IGG SERPL-MCNC: 863 MG/DL (ref 635–1741)
IGM SERPL-MCNC: <20 MG/DL (ref 45–281)
IMM GRANULOCYTES # BLD AUTO: 0.04 THOUSAND/UL (ref 0–0.2)
IMM GRANULOCYTES NFR BLD AUTO: 1 % (ref 0–2)
LDH SERPL-CCNC: 501 U/L (ref 140–271)
LYMPHOCYTES # BLD AUTO: 0.33 THOUSANDS/ÂΜL (ref 0.6–4.47)
LYMPHOCYTES NFR BLD AUTO: 8 % (ref 14–44)
MAGNESIUM SERPL-MCNC: 2 MG/DL (ref 1.9–2.7)
MCH RBC QN AUTO: 32.1 PG (ref 26.8–34.3)
MCHC RBC AUTO-ENTMCNC: 32.8 G/DL (ref 31.4–37.4)
MCV RBC AUTO: 98 FL (ref 82–98)
MONOCYTES # BLD AUTO: 0.58 THOUSAND/ÂΜL (ref 0.17–1.22)
MONOCYTES NFR BLD AUTO: 14 % (ref 4–12)
NEUTROPHILS # BLD AUTO: 3.19 THOUSANDS/ÂΜL (ref 1.85–7.62)
NEUTS SEG NFR BLD AUTO: 75 % (ref 43–75)
NRBC BLD AUTO-RTO: 0 /100 WBCS
PLATELET # BLD AUTO: 125 THOUSANDS/UL (ref 149–390)
PMV BLD AUTO: 8.9 FL (ref 8.9–12.7)
POTASSIUM SERPL-SCNC: 3.9 MMOL/L (ref 3.5–5.3)
PROT SERPL-MCNC: 6.6 G/DL (ref 6.4–8.4)
RBC # BLD AUTO: 5.21 MILLION/UL (ref 3.88–5.62)
SODIUM SERPL-SCNC: 136 MMOL/L (ref 135–147)
URATE SERPL-MCNC: 3.9 MG/DL (ref 3.5–8.5)
WBC # BLD AUTO: 4.22 THOUSAND/UL (ref 4.31–10.16)

## 2024-05-02 PROCEDURE — 83735 ASSAY OF MAGNESIUM: CPT

## 2024-05-02 PROCEDURE — 83521 IG LIGHT CHAINS FREE EACH: CPT

## 2024-05-02 PROCEDURE — 84165 PROTEIN E-PHORESIS SERUM: CPT

## 2024-05-02 PROCEDURE — 82784 ASSAY IGA/IGD/IGG/IGM EACH: CPT

## 2024-05-02 PROCEDURE — 36415 COLL VENOUS BLD VENIPUNCTURE: CPT

## 2024-05-02 PROCEDURE — 85025 COMPLETE CBC W/AUTO DIFF WBC: CPT

## 2024-05-02 PROCEDURE — 84550 ASSAY OF BLOOD/URIC ACID: CPT

## 2024-05-02 PROCEDURE — 82232 ASSAY OF BETA-2 PROTEIN: CPT

## 2024-05-02 PROCEDURE — 86334 IMMUNOFIX E-PHORESIS SERUM: CPT

## 2024-05-02 PROCEDURE — 83615 LACTATE (LD) (LDH) ENZYME: CPT

## 2024-05-02 PROCEDURE — 80053 COMPREHEN METABOLIC PANEL: CPT

## 2024-05-02 NOTE — TELEPHONE ENCOUNTER
Received prior authorization [APPROVAL] for [Oxycodone HCL (IR) 10MG Tab] through [5/1/24 through 11/2/24].    Faxed results to Pharmacy. Pharmacy has been asked to inform pt of outcome.

## 2024-05-02 NOTE — TELEPHONE ENCOUNTER
Received prior authorization [APPROVAL] for [Morphine Sulf ER 30MG Tablet] through [5/1/24 through 11/2/24].    Faxed results to Pharmacy. Pharmacy has been asked to inform pt of outcome.

## 2024-05-03 DIAGNOSIS — I26.99 ACUTE PULMONARY EMBOLISM WITHOUT ACUTE COR PULMONALE, UNSPECIFIED PULMONARY EMBOLISM TYPE (HCC): ICD-10-CM

## 2024-05-03 LAB
KAPPA LC FREE SER-MCNC: 14 MG/L (ref 3.3–19.4)
KAPPA LC FREE/LAMBDA FREE SER: 0.09 {RATIO} (ref 0.26–1.65)
LAMBDA LC FREE SERPL-MCNC: 150.1 MG/L (ref 5.7–26.3)

## 2024-05-03 NOTE — TELEPHONE ENCOUNTER
Last appointment:05/01/2024    Next scheduled appointment: 05/22/2024    Pharmacy: [facility] will be taking over Eliquis medication

## 2024-05-04 LAB
ALBUMIN SERPL ELPH-MCNC: 3.05 G/DL (ref 3.2–5.1)
ALBUMIN SERPL ELPH-MCNC: 51.7 % (ref 48–70)
ALPHA1 GLOB SERPL ELPH-MCNC: 0.48 G/DL (ref 0.15–0.47)
ALPHA1 GLOB SERPL ELPH-MCNC: 8.2 % (ref 1.8–7)
ALPHA2 GLOB SERPL ELPH-MCNC: 0.96 G/DL (ref 0.42–1.04)
ALPHA2 GLOB SERPL ELPH-MCNC: 16.3 % (ref 5.9–14.9)
B2 MICROGLOB SERPL-MCNC: 2.3 MG/L (ref 0.6–2.4)
BETA GLOB ABNORMAL SERPL ELPH-MCNC: 0.3 G/DL (ref 0.31–0.57)
BETA1 GLOB SERPL ELPH-MCNC: 5.1 % (ref 4.7–7.7)
BETA2 GLOB SERPL ELPH-MCNC: 5.9 % (ref 3.1–7.9)
BETA2+GAMMA GLOB SERPL ELPH-MCNC: 0.35 G/DL (ref 0.2–0.58)
GAMMA GLOB ABNORMAL SERPL ELPH-MCNC: 0.76 G/DL (ref 0.4–1.66)
GAMMA GLOB SERPL ELPH-MCNC: 12.8 % (ref 6.9–22.3)
IGG/ALB SER: 1.07 {RATIO} (ref 1.1–1.8)
INTERPRETATION UR IFE-IMP: NORMAL
PROT PATTERN SERPL ELPH-IMP: ABNORMAL
PROT SERPL-MCNC: 5.9 G/DL (ref 6.4–8.2)

## 2024-05-04 PROCEDURE — 84165 PROTEIN E-PHORESIS SERUM: CPT | Performed by: STUDENT IN AN ORGANIZED HEALTH CARE EDUCATION/TRAINING PROGRAM

## 2024-05-04 PROCEDURE — 86334 IMMUNOFIX E-PHORESIS SERUM: CPT | Performed by: STUDENT IN AN ORGANIZED HEALTH CARE EDUCATION/TRAINING PROGRAM

## 2024-05-15 ENCOUNTER — HOSPITAL ENCOUNTER (OUTPATIENT)
Dept: NUCLEAR MEDICINE | Facility: HOSPITAL | Age: 59
Discharge: HOME | End: 2024-05-15
Attending: INTERNAL MEDICINE
Payer: COMMERCIAL

## 2024-05-15 DIAGNOSIS — C90.01 MULTIPLE MYELOMA IN REMISSION (HCC): ICD-10-CM

## 2024-05-15 DIAGNOSIS — C79.51 METASTASIS TO BONE (HCC): ICD-10-CM

## 2024-05-15 LAB — GLUCOSE SERPL-MCNC: 136 MG/DL (ref 65–140)

## 2024-05-15 PROCEDURE — 82948 REAGENT STRIP/BLOOD GLUCOSE: CPT

## 2024-05-15 PROCEDURE — 78815 PET IMAGE W/CT SKULL-THIGH: CPT

## 2024-05-15 PROCEDURE — A9552 F18 FDG: HCPCS

## 2024-05-15 NOTE — PROGRESS NOTES
"Cardiology   Hospital Follow Up   Office Visit Note  Fredy Martinez Jr.   58 y.o.   male   MRN: [id number]  [facility]  [address] [facility] [address]  [address]  [place] [address] [phone]    PCP: No primary care provider on file.  Cardiologist: will be Dr Knox          Summary of Plan:  I recommend he be evaluated in the emergency room.  He is agreeable.  We will call medical emergency.  He will be taken by staff             Assessment/Plan  Right sided chest pain with ZAMORA.  He also has bilateral leg pain, back pain.  Unclear what is acute and what is chronic.  EKG: , RVH  I recommend he be evaluate in the ED. Wife reports his sx are much worse than his \"usual\"  SVT, recurrent;  asymptomatic. New dx, discovered post op after hip repair, recent adm 3/30-4/5/24   likely AVNRT  conservative treatment strategy given comorbidities.  metoprolol tartrate 12.5 mg twice daily  EF 60.  Zio patch as an outpatient to look for asymptomatic recurrence  Multiple myeloma  Pathological Hip fx, S/P surgical repair  Acute PE  3/30/24   :subocclusive emboli in the lobar arteries of the right middle and lower lobes[facility]  Patient [facility] palliative  Cardiac testing  TTE 3/31/24: EF 60. Systolic function is normal. Wall motion is normal. Grade 1 DD. RV normal size and fxn  Zio patch: pending            HPI  Fredy Martinez Jr. is a 58 y.o.year old male with no prior cardiac history.  He does have multiple myeloma for which he received a stem cell transplant and chemotherapy.        Adm 3/30-4/5/24  CC: hip pain  found to have a pathologic fracture i  He underwent surgical repair with a cephalomedullary nailing.  Post op he developed SVT --narrow complex tachycardia with heart rates in the 180s to 190s necessitating adenosine with conversion to normal sinus rhythm.    He had recurrence and received IV metoprolol and diltiazem again with reversion to normal sinus rhythm.   With the tachycardia he " was asymptomatic.  He was evaluated by Cardiology  He also was found to have an elevated D-dimer prompting a CTA of his chest revealing subocclusive emboli in the lobar arteries of the right middle and lower lobes.    He was started on IV heparin.    An echocardiogram was unremarkable except for grade 1 diastolic dysfunction.  He was initiated on Eliquis      5/16/24  Hospital follow-up.  Accompanied by his wife.  He walked from the parking lot to our office.  He has a walker.  By the time he got here, he was very uncomfortable.  He complained of right-sided chest pain.  He is short of breath.  He has increasing pains in his legs.  He has difficulty walking.  He has back pain.  His wife reports his symptoms are worse than his usual.  He reports he was told that any change/increase in bone pain could represent a new fracture.  EKG: Sinus tachycardia 150 bpm RVH repolarization abnormality.  Diffuse nonspecific T wave changes.    Looks very uncomfortable/ chronically ill              Review of Systems   Constitutional: Negative for chills.   Cardiovascular:  Positive for chest pain and dyspnea on exertion. Negative for claudication, cyanosis, irregular heartbeat, leg swelling, near-syncope, orthopnea, palpitations, paroxysmal nocturnal dyspnea and syncope.   Respiratory:  Positive for shortness of breath. Negative for cough.    Musculoskeletal:  Positive for back pain and muscle weakness.   Gastrointestinal:  Negative for heartburn and nausea.   Neurological:  Negative for dizziness, focal weakness, headaches, light-headedness and weakness.   All other systems reviewed and are negative.        Assessment  Diagnoses and all orders for this visit:    Chest pain, unspecified type  -     POCT ECG    Palliative care patient    Tachycardia    SVT (supraventricular tachycardia)    Acute pulmonary embolism without acute cor pulmonale, unspecified pulmonary embolism type (HCC)        Past Medical History:   Diagnosis  Date    Cancer (HCC)     bone-    GERD (gastroesophageal reflux disease)     Multiple myeloma (HCC)        Past Surgical History:   Procedure Laterality Date    APPENDECTOMY      IR BIOPSY BONE MARROW  3/15/2023    AR OPTX FEM SHFT FX W/INSJ IMED IMPLT W/WO SCREW Left 4/1/2024    Procedure: INSERTION NAIL IM FEMUR ANTEGRADE (TROCHANTERIC);  Surgeon: Pat Bang MD;  Location: [facility];  Service: Orthopedics           Allergies  No Known Allergies      Medications    Current Outpatient Medications:     acetaminophen (TYLENOL) 500 mg tablet, Take 2 tablets (1,000 mg total) by mouth 3 (three) times a day, Disp: 180 tablet, Rfl: 2    apixaban (Eliquis) 5 mg, Take 1 tablet (5 mg total) by mouth 2 (two) times a day, Disp: 60 tablet, Rfl: 5    dexamethasone (DECADRON) 1 mg tablet, Take 0.5 tablets (0.5 mg total) by mouth daily before breakfast, Disp: 15 tablet, Rfl: 2    lenalidomide (Revlimid) 5 MG CAPS, TAKE 1 CAPSULE BY MOUTH 1 TIME DAILY, Disp: 28 capsule, Rfl: 0    methocarbamol (ROBAXIN) 500 mg tablet, Take 1 tablet (500 mg total) by mouth 4 (four) times a day, Disp: 120 tablet, Rfl: 2    morphine (MS CONTIN) 30 mg 12 hr tablet, Take 1 tablet (30 mg total) by mouth every 12 (twelve) hours - scheduled, to reduce and prevent cancer pain Max Daily Amount: 60 mg, Disp: 60 tablet, Rfl: 0    naloxone (NARCAN) 4 mg/0.1 mL nasal spray, Administer 1 spray into a nostril. If no response after 2-3 minutes, give another dose in the other nostril using a new spray., Disp: 1 each, Rfl: 1    ondansetron (ZOFRAN) 4 mg tablet, Take 1 tablet (4 mg total) by mouth every 8 (eight) hours as needed for nausea or vomiting, Disp: 40 tablet, Rfl: 2    oxyCODONE (ROXICODONE) 10 MG TABS, Take 1 tablet (10 mg total) by mouth every 4 (four) hours as needed (breakthrough cancer pain) Max Daily Amount: 60 mg, Disp: 150 tablet, Rfl: 0    pantoprazole (PROTONIX) 40 mg tablet, Take 1 tablet (40 mg total) by mouth daily - in the morning,  Disp: 90 tablet, Rfl: 0    polyethylene glycol (MIRALAX) 17 g packet, Take 17 g by mouth daily, Disp: , Rfl:     senna-docusate sodium (SENOKOT S) 8.6-50 mg per tablet, Take 1 tablet by mouth daily at bedtime, Disp: , Rfl:     lidocaine (LIDODERM) 5 %, Apply 1 patch topically daily Remove & Discard patch within 12 hours or as directed by MD (Patient not taking: Reported on 5/16/2024), Disp: , Rfl:       Social History     Socioeconomic History    Marital status: /Civil Union     Spouse name: Not on file    Number of children: Not on file    Years of education: Not on file    Highest education level: Not on file   Occupational History    Not on file   Tobacco Use    Smoking status: Some Days     Types: Cigarettes    Smokeless tobacco: Not on file    Tobacco comments:     Smoking 1 cigarette daily   Vaping Use    Vaping status: Never Used   Substance and Sexual Activity    Alcohol use: Not Currently    Drug use: Not Currently     Types: Marijuana     Comment: once a month    Sexual activity: Not on file   Other Topics Concern    Not on file   Social History Narrative    From 2/28/23  note:    Emergency Contact: Dee Martinez (NAVI)[phone] (Home Phone)    Marital Status: Single     Interpretation concerns, speaks another language, preferred language: english    Caregiver/Support: Mayur    Housing: two story     Home Setup: one level     Lives With: NAVI    Daily Living Activities: independent     Ambulation: independent    Employment: yes     [place] Status/Location: no     Ability to pay bills: yes. No barriers to paying bills.     POA/LW/AD: no.  Karlie is healthcare representative. MSW emailed advance directive form.    Transportation Plan/Concerns: Patient states he transports self.  MSW educated on [employer] transport services.      Social Determinants of Health     Financial Resource Strain: Not on file   Food Insecurity: No Food Insecurity (4/1/2024)    Hunger Vital Sign     Worried About  "Running Out of Food in the Last Year: Never true     Ran Out of Food in the Last Year: Never true   Transportation Needs: No Transportation Needs (4/1/2024)    PRAPARE - Transportation     Lack of Transportation (Medical): No     Lack of Transportation (Non-Medical): No   Physical Activity: Not on file   Stress: Not on file   Social Connections: Not on file   Intimate Partner Violence: Not on file   Housing Stability: Low Risk  (4/1/2024)    Housing Stability Vital Sign     Unable to Pay for Housing in the Last Year: No     Number of Places Lived in the Last Year: 1     Unstable Housing in the Last Year: No       Family History   Problem Relation Age of Onset    Diabetes Mother     Heart disease Father     Diabetes Father        Physical Exam  Vitals and nursing note reviewed.   Constitutional:       General: He is not in acute distress.     Appearance: He is ill-appearing.   HENT:      Head: Normocephalic and atraumatic.   Eyes:      Extraocular Movements: Extraocular movements intact.      Conjunctiva/sclera: Conjunctivae normal.   Cardiovascular:      Rate and Rhythm: Regular rhythm. Tachycardia present.      Pulses: Intact distal pulses.      Heart sounds: Heart sounds are distant.   Pulmonary:      Effort: Pulmonary effort is normal.      Breath sounds: Normal breath sounds. Decreased air movement present.   Abdominal:      General: Bowel sounds are normal.      Palpations: Abdomen is soft.   Musculoskeletal:         General: Normal range of motion.      Cervical back: Normal range of motion and neck supple.   Skin:     General: Skin is warm and dry.   Neurological:      Mental Status: He is alert and oriented to person, place, and time.   Psychiatric:         Mood and Affect: Mood normal.         Vitals: Blood pressure (!) 104/40, pulse (!) 146, height 5' 10\" (1.778 m), weight 54.1 kg (119 lb 3.2 oz), SpO2 97%.   Wt Readings from Last 3 Encounters:   05/16/24 54.1 kg (119 lb 3.2 oz)   04/18/24 57.2 kg (126 lb) " "  04/16/24 63.5 kg (140 lb)           Labs & Results:  Lab Results   Component Value Date    WBC 4.22 (L) 05/02/2024    HGB 16.7 05/02/2024    HCT 50.9 (H) 05/02/2024    MCV 98 05/02/2024     (L) 05/02/2024     BNP   Date Value Ref Range Status   03/30/2024 148 (H) 0 - 100 pg/mL Final     No components found for: \"CHEM\"  No results found for: \"CKTOTAL\", \"TROPONINI\", \"TROPONINT\", \"CKMBINDEX\"  No results found for this or any previous visit.    No results found for this or any previous visit.    No valid procedures specified.  No results found for this or any previous visit.                    This note was completed in part utilizing [employer] direct voice recognition software.   Grammatical errors, random word insertion, spelling mistakes, and incomplete sentences may be an occasional consequence of the system secondary to software limitations, ambient noise and hardware issues. At the time of dictation, efforts were made to edit, clarify and /or correct errors.  Please read the chart carefully and recognize, using context, where substitutions have occurred.  If you have any questions or concerns about the context, text or information contained within the body of this dictation, please contact myself, the provider, for further clarification      "

## 2024-05-16 ENCOUNTER — OFFICE VISIT (OUTPATIENT)
Dept: CARDIOLOGY CLINIC | Facility: CLINIC | Age: 59
End: 2024-05-16
Payer: COMMERCIAL

## 2024-05-16 ENCOUNTER — HOSPITAL ENCOUNTER (EMERGENCY)
Facility: HOSPITAL | Age: 59
Discharge: HOME | End: 2024-05-16
Attending: EMERGENCY MEDICINE
Payer: COMMERCIAL

## 2024-05-16 ENCOUNTER — APPOINTMENT (EMERGENCY)
Dept: RADIOLOGY | Facility: HOSPITAL | Age: 59
End: 2024-05-16
Payer: COMMERCIAL

## 2024-05-16 VITALS
HEIGHT: 70 IN | WEIGHT: 119.2 LBS | DIASTOLIC BLOOD PRESSURE: 40 MMHG | BODY MASS INDEX: 17.07 KG/M2 | SYSTOLIC BLOOD PRESSURE: 104 MMHG | HEART RATE: 146 BPM | OXYGEN SATURATION: 97 %

## 2024-05-16 VITALS
HEART RATE: 97 BPM | BODY MASS INDEX: 16.83 KG/M2 | DIASTOLIC BLOOD PRESSURE: 75 MMHG | OXYGEN SATURATION: 96 % | TEMPERATURE: 97.8 F | WEIGHT: 117.28 LBS | RESPIRATION RATE: 22 BRPM | SYSTOLIC BLOOD PRESSURE: 107 MMHG

## 2024-05-16 DIAGNOSIS — R00.2 PALPITATIONS: ICD-10-CM

## 2024-05-16 DIAGNOSIS — R00.0 TACHYCARDIA: ICD-10-CM

## 2024-05-16 DIAGNOSIS — Z51.5 PALLIATIVE CARE PATIENT: ICD-10-CM

## 2024-05-16 DIAGNOSIS — I26.99 ACUTE PULMONARY EMBOLISM WITHOUT ACUTE COR PULMONALE, UNSPECIFIED PULMONARY EMBOLISM TYPE (HCC): ICD-10-CM

## 2024-05-16 DIAGNOSIS — R00.0 TACHYCARDIA: Primary | ICD-10-CM

## 2024-05-16 DIAGNOSIS — I47.10 SVT (SUPRAVENTRICULAR TACHYCARDIA): ICD-10-CM

## 2024-05-16 DIAGNOSIS — R07.9 CHEST PAIN, UNSPECIFIED TYPE: Primary | ICD-10-CM

## 2024-05-16 DIAGNOSIS — R07.9 CHEST PAIN: ICD-10-CM

## 2024-05-16 LAB
2HR DELTA HS TROPONIN: -1 NG/L
ALBUMIN SERPL BCP-MCNC: 3.7 G/DL (ref 3.5–5)
ALP SERPL-CCNC: 204 U/L (ref 34–104)
ALT SERPL W P-5'-P-CCNC: 9 U/L (ref 7–52)
ANION GAP SERPL CALCULATED.3IONS-SCNC: 14 MMOL/L (ref 4–13)
AST SERPL W P-5'-P-CCNC: 23 U/L (ref 13–39)
BASOPHILS # BLD AUTO: 0.04 THOUSANDS/ÂΜL (ref 0–0.1)
BASOPHILS NFR BLD AUTO: 1 % (ref 0–1)
BILIRUB SERPL-MCNC: 0.72 MG/DL (ref 0.2–1)
BUN SERPL-MCNC: 11 MG/DL (ref 5–25)
CALCIUM SERPL-MCNC: 10.2 MG/DL (ref 8.4–10.2)
CARDIAC TROPONIN I PNL SERPL HS: 7 NG/L
CARDIAC TROPONIN I PNL SERPL HS: 8 NG/L
CHLORIDE SERPL-SCNC: 101 MMOL/L (ref 96–108)
CO2 SERPL-SCNC: 20 MMOL/L (ref 21–32)
CREAT SERPL-MCNC: 0.62 MG/DL (ref 0.6–1.3)
D DIMER PPP FEU-MCNC: 0.48 UG/ML FEU
EOSINOPHIL # BLD AUTO: 0.04 THOUSAND/ÂΜL (ref 0–0.61)
EOSINOPHIL NFR BLD AUTO: 1 % (ref 0–6)
ERYTHROCYTE [DISTWIDTH] IN BLOOD BY AUTOMATED COUNT: 17.4 % (ref 11.6–15.1)
GFR SERPL CREATININE-BSD FRML MDRD: 110 ML/MIN/1.73SQ M
GLUCOSE SERPL-MCNC: 122 MG/DL (ref 65–140)
HCT VFR BLD AUTO: 54.1 % (ref 36.5–49.3)
HGB BLD-MCNC: 18.4 G/DL (ref 12–17)
IMM GRANULOCYTES # BLD AUTO: 0.05 THOUSAND/UL (ref 0–0.2)
IMM GRANULOCYTES NFR BLD AUTO: 1 % (ref 0–2)
LYMPHOCYTES # BLD AUTO: 0.7 THOUSANDS/ÂΜL (ref 0.6–4.47)
LYMPHOCYTES NFR BLD AUTO: 9 % (ref 14–44)
MCH RBC QN AUTO: 32.7 PG (ref 26.8–34.3)
MCHC RBC AUTO-ENTMCNC: 34 G/DL (ref 31.4–37.4)
MCV RBC AUTO: 96 FL (ref 82–98)
MONOCYTES # BLD AUTO: 0.91 THOUSAND/ÂΜL (ref 0.17–1.22)
MONOCYTES NFR BLD AUTO: 12 % (ref 4–12)
NEUTROPHILS # BLD AUTO: 5.98 THOUSANDS/ÂΜL (ref 1.85–7.62)
NEUTS SEG NFR BLD AUTO: 76 % (ref 43–75)
NRBC BLD AUTO-RTO: 0 /100 WBCS
PLATELET # BLD AUTO: 158 THOUSANDS/UL (ref 149–390)
PMV BLD AUTO: 8.7 FL (ref 8.9–12.7)
POTASSIUM SERPL-SCNC: 3.7 MMOL/L (ref 3.5–5.3)
PROT SERPL-MCNC: 6.9 G/DL (ref 6.4–8.4)
RBC # BLD AUTO: 5.63 MILLION/UL (ref 3.88–5.62)
SODIUM SERPL-SCNC: 135 MMOL/L (ref 135–147)
WBC # BLD AUTO: 7.72 THOUSAND/UL (ref 4.31–10.16)

## 2024-05-16 PROCEDURE — 99285 EMERGENCY DEPT VISIT HI MDM: CPT | Performed by: EMERGENCY MEDICINE

## 2024-05-16 PROCEDURE — 99285 EMERGENCY DEPT VISIT HI MDM: CPT

## 2024-05-16 PROCEDURE — 36415 COLL VENOUS BLD VENIPUNCTURE: CPT

## 2024-05-16 PROCEDURE — 85379 FIBRIN DEGRADATION QUANT: CPT

## 2024-05-16 PROCEDURE — 84484 ASSAY OF TROPONIN QUANT: CPT

## 2024-05-16 PROCEDURE — 93005 ELECTROCARDIOGRAM TRACING: CPT

## 2024-05-16 PROCEDURE — 80053 COMPREHEN METABOLIC PANEL: CPT

## 2024-05-16 PROCEDURE — 99214 OFFICE O/P EST MOD 30 MIN: CPT | Performed by: NURSE PRACTITIONER

## 2024-05-16 PROCEDURE — 93000 ELECTROCARDIOGRAM COMPLETE: CPT | Performed by: NURSE PRACTITIONER

## 2024-05-16 PROCEDURE — 96365 THER/PROPH/DIAG IV INF INIT: CPT

## 2024-05-16 PROCEDURE — 85025 COMPLETE CBC W/AUTO DIFF WBC: CPT

## 2024-05-16 PROCEDURE — 71045 X-RAY EXAM CHEST 1 VIEW: CPT

## 2024-05-16 RX ORDER — LIDOCAINE 50 MG/G
1 PATCH TOPICAL ONCE
Status: DISCONTINUED | OUTPATIENT
Start: 2024-05-16 | End: 2024-05-16 | Stop reason: HOSPADM

## 2024-05-16 RX ADMIN — SODIUM CHLORIDE, SODIUM LACTATE, POTASSIUM CHLORIDE, AND CALCIUM CHLORIDE 500 ML: .6; .31; .03; .02 INJECTION, SOLUTION INTRAVENOUS at 14:29

## 2024-05-16 RX ADMIN — LIDOCAINE 1 PATCH: 50 PATCH CUTANEOUS at 15:30

## 2024-05-16 NOTE — LETTER
"May 16, 2024     Yen Cramer MD  [address] [place]  [address]  [facility] [address]    Patient: Fredy Martinez Jr.   YOB: 1965   Date of Visit: 5/16/2024       Dear Dr. Cramer:    Thank you for referring Fredy Martinez to me for evaluation. Below are my notes for this consultation.    If you have questions, please do not hesitate to call me. I look forward to following your patient along with you.         Sincerely,        PABLO Amador        CC: No Recipients    PABLO Amador  5/16/2024  1:43 PM  Incomplete  Cardiology   Hospital Follow Up   Office Visit Note  Fredy Martinez Jr.   58 y.o.   male   MRN: [id number]  [facility] [place]  [address]  [facility] [id number] [phone]    PCP: No primary care provider on file.  Cardiologist: will be Dr Knox          Summary of Plan:  I recommend he be evaluated in the emergency room.  He is agreeable.  We will call medical emergency.  He will be taken by staff             Assessment/Plan  Right sided chest pain with ZAMORA.  He also has bilateral leg pain, back pain.  Unclear what is acute and what is chronic.  EKG: , RVH  I recommend he be evaluate in the ED. Wife reports his sx are much worse than his \"usual\"  SVT, recurrent;  asymptomatic. New dx, discovered post op after hip repair, recent adm 3/30-4/5/24   likely AVNRT  conservative treatment strategy given comorbidities.  metoprolol tartrate 12.5 mg twice daily  EF 60.  Zio patch as an outpatient to look for asymptomatic recurrence  Multiple myeloma  Pathological Hip fx, S/P surgical repair  Acute PE  3/30/24   :subocclusive emboli in the lobar arteries of the right middle and lower lobes, on Eliquis  Patient care palliative  Cardiac testing  TTE 3/31/24: EF 60. Systolic function is normal. Wall motion is normal. Grade 1 DD. RV normal size and fxn  Zio patch: pending            HPI  Fredy Martinez Jr. is a 58 y.o.year old male with no " prior cardiac history.  He does have multiple myeloma for which he received a stem cell transplant and chemotherapy.        Adm 3/30-4/5/24  CC: hip pain  found to have a pathologic fracture i  He underwent surgical repair with a cephalomedullary nailing.  Post op he developed SVT --narrow complex tachycardia with heart rates in the 180s to 190s necessitating adenosine with conversion to normal sinus rhythm.    He had recurrence and received IV metoprolol and diltiazem again with reversion to normal sinus rhythm.   With the tachycardia he was asymptomatic.  He was evaluated by Cardiology  He also was found to have an elevated D-dimer prompting a CTA of his chest revealing subocclusive emboli in the lobar arteries of the right middle and lower lobes.    He was started on IV heparin.    An echocardiogram was unremarkable except for grade 1 diastolic dysfunction.  He was initiated on Eliquis      5/16/24  Hospital follow-up.  Accompanied by his wife.  He walked from the parking lot to our office.  He has a walker.  By the time he got here, he was very uncomfortable.  He complained of right-sided chest pain.  He is short of breath.  He has increasing pains in his legs.  He has difficulty walking.  He has back pain.  His wife reports his symptoms are worse than his usual.  He reports he was told that any change/increase in bone pain could represent a new fracture.  EKG: Sinus tachycardia 150 bpm RVH repolarization abnormality.  Diffuse nonspecific T wave changes.    Looks very uncomfortable/ chronically ill              Review of Systems   Constitutional: Negative for chills.   Cardiovascular:  Positive for chest pain and dyspnea on exertion. Negative for claudication, cyanosis, irregular heartbeat, leg swelling, near-syncope, orthopnea, palpitations, paroxysmal nocturnal dyspnea and syncope.   Respiratory:  Positive for shortness of breath. Negative for cough.    Musculoskeletal:  Positive for back pain and  muscle weakness.   Gastrointestinal:  Negative for heartburn and nausea.   Neurological:  Negative for dizziness, focal weakness, headaches, light-headedness and weakness.   All other systems reviewed and are negative.        Assessment  Diagnoses and all orders for this visit:    Chest pain, unspecified type  -     POCT ECG    Palliative care patient    Tachycardia    SVT (supraventricular tachycardia)    Acute pulmonary embolism without acute cor pulmonale, unspecified pulmonary embolism type (HCC)        Past Medical History:   Diagnosis Date   • Cancer (HCC)     bone-   • GERD (gastroesophageal reflux disease)    • Multiple myeloma (HCC)        Past Surgical History:   Procedure Laterality Date   • APPENDECTOMY     • IR BIOPSY BONE MARROW  3/15/2023   • IL OPTX FEM SHFT FX W/INSJ IMED IMPLT W/WO SCREW Left 4/1/2024    Procedure: INSERTION NAIL IM FEMUR ANTEGRADE (TROCHANTERIC);  Surgeon: Pat Bang MD;  Location: [facility];  Service: Orthopedics           Allergies  No Known Allergies      Medications    Current Outpatient Medications:   •  acetaminophen (TYLENOL) 500 mg tablet, Take 2 tablets (1,000 mg total) by mouth 3 (three) times a day, Disp: 180 tablet, Rfl: 2  •  apixaban (Eliquis) 5 mg, Take 1 tablet (5 mg total) by mouth 2 (two) times a day, Disp: 60 tablet, Rfl: 5  •  dexamethasone (DECADRON) 1 mg tablet, Take 0.5 tablets (0.5 mg total) by mouth daily before breakfast, Disp: 15 tablet, Rfl: 2  •  lenalidomide (Revlimid) 5 MG CAPS, TAKE 1 CAPSULE BY MOUTH 1 TIME DAILY, Disp: 28 capsule, Rfl: 0  •  methocarbamol (ROBAXIN) 500 mg tablet, Take 1 tablet (500 mg total) by mouth 4 (four) times a day, Disp: 120 tablet, Rfl: 2  •  morphine (MS CONTIN) 30 mg 12 hr tablet, Take 1 tablet (30 mg total) by mouth every 12 (twelve) hours - scheduled, to reduce and prevent cancer pain Max Daily Amount: 60 mg, Disp: 60 tablet, Rfl: 0  •  naloxone (NARCAN) 4 mg/0.1 mL nasal spray, Administer 1 spray into a  nostril. If no response after 2-3 minutes, give another dose in the other nostril using a new spray., Disp: 1 each, Rfl: 1  •  ondansetron (ZOFRAN) 4 mg tablet, Take 1 tablet (4 mg total) by mouth every 8 (eight) hours as needed for nausea or vomiting, Disp: 40 tablet, Rfl: 2  •  oxyCODONE (ROXICODONE) 10 MG TABS, Take 1 tablet (10 mg total) by mouth every 4 (four) hours as needed (breakthrough cancer pain) Max Daily Amount: 60 mg, Disp: 150 tablet, Rfl: 0  •  pantoprazole (PROTONIX) 40 mg tablet, Take 1 tablet (40 mg total) by mouth daily - in the morning, Disp: 90 tablet, Rfl: 0  •  polyethylene glycol (MIRALAX) 17 g packet, Take 17 g by mouth daily, Disp: , Rfl:   •  senna-docusate sodium (SENOKOT S) 8.6-50 mg per tablet, Take 1 tablet by mouth daily at bedtime, Disp: , Rfl:   •  lidocaine (LIDODERM) 5 %, Apply 1 patch topically daily Remove & Discard patch within 12 hours or as directed by MD (Patient not taking: Reported on 5/16/2024), Disp: , Rfl:       Social History     Socioeconomic History   • Marital status: /Civil Union     Spouse name: Not on file   • Number of children: Not on file   • Years of education: Not on file   • Highest education level: Not on file   Occupational History   • Not on file   Tobacco Use   • Smoking status: Some Days     Types: Cigarettes   • Smokeless tobacco: Not on file   • Tobacco comments:     Smoking 1 cigarette daily   Vaping Use   • Vaping status: Never Used   Substance and Sexual Activity   • Alcohol use: Not Currently   • Drug use: Not Currently     Types: Marijuana     Comment: once a month   • Sexual activity: Not on file   Other Topics Concern   • Not on file   Social History Narrative    From 2/28/23  note:    Emergency Contact: Dee Martinez (NAVI)[phone] (Home Phone)    Marital Status: Single     Interpretation concerns, speaks another language, preferred language: english    Caregiver/Support: Mayur    Housing: two story     Home Setup: one  level     Lives With: SO    Daily Living Activities: independent     Ambulation: independent    Employment: yes      Status/Location: no     Ability to pay bills: yes. No barriers to paying bills.     POA/LW/AD: no.  Karlie is healthcare representative. MSW emailed advance directive form.    Transportation Plan/Concerns: Patient states he transports self.  MSW educated on "[employer] transport services.      Social Determinants of Health     Financial Resource Strain: Not on file   Food Insecurity: No Food Insecurity (4/1/2024)    Hunger Vital Sign    • Worried About Running Out of Food in the Last Year: Never true    • Ran Out of Food in the Last Year: Never true   Transportation Needs: No Transportation Needs (4/1/2024)    PRAPARE - Transportation    • Lack of Transportation (Medical): No    • Lack of Transportation (Non-Medical): No   Physical Activity: Not on file   Stress: Not on file   Social Connections: Not on file   Intimate Partner Violence: Not on file   Housing Stability: Low Risk  (4/1/2024)    Housing Stability Vital Sign    • Unable to Pay for Housing in the Last Year: No    • Number of Places Lived in the Last Year: 1    • Unstable Housing in the Last Year: No       Family History   Problem Relation Age of Onset   • Diabetes Mother    • Heart disease Father    • Diabetes Father        Physical Exam  Vitals and nursing note reviewed.   Constitutional:       General: He is not in acute distress.     Appearance: He is ill-appearing.   HENT:      Head: Normocephalic and atraumatic.   Eyes:      Extraocular Movements: Extraocular movements intact.      Conjunctiva/sclera: Conjunctivae normal.   Cardiovascular:      Rate and Rhythm: Regular rhythm. Tachycardia present.      Pulses: Intact distal pulses.      Heart sounds: Heart sounds are distant.   Pulmonary:      Effort: Pulmonary effort is normal.      Breath sounds: Normal breath sounds. Decreased air movement present.   Abdominal:       "General: Bowel sounds are normal.      Palpations: Abdomen is soft.   Musculoskeletal:         General: Normal range of motion.      Cervical back: Normal range of motion and neck supple.   Skin:     General: Skin is warm and dry.   Neurological:      Mental Status: He is alert and oriented to person, place, and time.   Psychiatric:         Mood and Affect: Mood normal.         Vitals: Blood pressure (!) 104/40, pulse (!) 146, height 5' 10\" (1.778 m), weight 54.1 kg (119 lb 3.2 oz), SpO2 97%.   Wt Readings from Last 3 Encounters:   05/16/24 54.1 kg (119 lb 3.2 oz)   04/18/24 57.2 kg (126 lb)   04/16/24 63.5 kg (140 lb)           Labs & Results:  Lab Results   Component Value Date    WBC 4.22 (L) 05/02/2024    HGB 16.7 05/02/2024    HCT 50.9 (H) 05/02/2024    MCV 98 05/02/2024     (L) 05/02/2024     BNP   Date Value Ref Range Status   03/30/2024 148 (H) 0 - 100 pg/mL Final     No components found for: \"CHEM\"  No results found for: \"CKTOTAL\", \"TROPONINI\", \"TROPONINT\", \"CKMBINDEX\"  No results found for this or any previous visit.    No results found for this or any previous visit.    No valid procedures specified.  No results found for this or any previous visit.                    This note was completed in part utilizing [employer] direct voice recognition software.   Grammatical errors, random word insertion, spelling mistakes, and incomplete sentences may be an occasional consequence of the system secondary to software limitations, ambient noise and hardware issues. At the time of dictation, efforts were made to edit, clarify and /or correct errors.  Please read the chart carefully and recognize, using context, where substitutions have occurred.  If you have any questions or concerns about the context, text or information contained within the body of this dictation, please contact myself, the provider, for further clarification        PABLO Amador  5/16/2024  1:37 PM  Sign when Signing " Visit  Cardiology   Hospital Follow Up   Office Visit Note  Fredy Martinez Jr.   58 y.o.   male   MRN: [id number]  [facility] [place]  [address]  [address]  [facility] [address] [phone]    PCP: No primary care provider on file.  Cardiologist: will be Dr Knox          Summary of Plan:  I recommend he be evaluated in the emergency room.  He is agreeable.  We will call medical emergency.  He will be taken by staff             Assessment/Plan  Right sided chest pain with ZAMORA.    He also has bilateral leg pain, back pain.  Unclear what is acute and what is chronic.  EKG: , RVH  I recommend he be evaluate in the ED  SVT, recurrent;  asymptomatic. New dx, discovered post op after hip repair   likely AVNRT  conservative treatment strategy given comorbidities.  metoprolol tartrate 12.5 mg twice daily  EF 60.  Zio patch as an outpatient to look for asymptomatic recurrence  Multiple myeloma  Pathological Hip fx, S/P surgical repair  Acute PE:subocclusive emboli in the lobar arteries of the right middle and lower lobes, on [facility]  Patient care palliative  Cardiac testing  TTE 3/31/24: EF 60. Systolic function is normal. Wall motion is normal. Grade 1 DD. RV normal size and fxn  Zio patch: pending            HPI  Fredy Martinez Jr. is a 58 y.o.year old male with no prior cardiac history.  He does have multiple myeloma for which he received a stem cell transplant and chemotherapy.        Adm 3/30-4/5/24  CC: hip pain  found to have a pathologic fracture i  He underwent surgical repair with a cephalomedullary nailing.  Post op he developed SVT --narrow complex tachycardia with heart rates in the 180s to 190s necessitating adenosine with conversion to normal sinus rhythm.    He had recurrence and received IV metoprolol and diltiazem again with reversion to normal sinus rhythm.   With the tachycardia he was asymptomatic.  He was evaluated by Cardiology  He also was found to have an  elevated D-dimer prompting a CTA of his chest revealing subocclusive emboli in the lobar arteries of the right middle and lower lobes.    He was started on IV heparin.    An echocardiogram was unremarkable except for grade 1 diastolic dysfunction.  He was initiated on Eliquis      5/16/24  Hospital follow-up.  Accompanied by his wife.  He walked from the parking lot to our office.  He has a walker.  By the time he got here, he was very uncomfortable.  He complained of right-sided chest pain.  He is short of breath.  He has increasing pains in his legs.  He has difficulty walking.  He has back pain.  His wife reports his symptoms are worse than his usual.  He reports he was told that any change/increase in bone pain could represent a new fracture.  EKG: Sinus tachycardia 150 bpm RVH repolarization abnormality.  Diffuse nonspecific T wave changes.           Review of Systems   Constitutional: Negative for chills.   Cardiovascular:  Negative for chest pain, claudication, cyanosis, dyspnea on exertion, irregular heartbeat, leg swelling, near-syncope, orthopnea, palpitations, paroxysmal nocturnal dyspnea and syncope.   Respiratory:  Negative for cough and shortness of breath.    Gastrointestinal:  Negative for heartburn and nausea.   Neurological:  Negative for dizziness, focal weakness, headaches, light-headedness and weakness.   All other systems reviewed and are negative.        Assessment  There are no diagnoses linked to this encounter.    Past Medical History:   Diagnosis Date   • Cancer (HCC)     bone-   • GERD (gastroesophageal reflux disease)    • Multiple myeloma (HCC)        Past Surgical History:   Procedure Laterality Date   • APPENDECTOMY     • IR BIOPSY BONE MARROW  3/15/2023   • ID OPTX FEM SHFT FX W/INSJ IMED IMPLT W/WO SCREW Left 4/1/2024    Procedure: INSERTION NAIL IM FEMUR ANTEGRADE (TROCHANTERIC);  Surgeon: Pat Bang MD;  [facility];  Service: Orthopedics           Allergies  No  Known Allergies      Medications    Current Outpatient Medications:   •  acetaminophen (TYLENOL) 500 mg tablet, Take 2 tablets (1,000 mg total) by mouth 3 (three) times a day, Disp: 180 tablet, Rfl: 2  •  apixaban (Eliquis) 5 mg, Take 1 tablet (5 mg total) by mouth 2 (two) times a day, Disp: 60 tablet, Rfl: 5  •  dexamethasone (DECADRON) 1 mg tablet, Take 0.5 tablets (0.5 mg total) by mouth daily before breakfast, Disp: 15 tablet, Rfl: 2  •  lenalidomide (Revlimid) 5 MG CAPS, TAKE 1 CAPSULE BY MOUTH 1 TIME DAILY, Disp: 28 capsule, Rfl: 0  •  methocarbamol (ROBAXIN) 500 mg tablet, Take 1 tablet (500 mg total) by mouth 4 (four) times a day, Disp: 120 tablet, Rfl: 2  •  morphine (MS CONTIN) 30 mg 12 hr tablet, Take 1 tablet (30 mg total) by mouth every 12 (twelve) hours - scheduled, to reduce and prevent cancer pain Max Daily Amount: 60 mg, Disp: 60 tablet, Rfl: 0  •  naloxone (NARCAN) 4 mg/0.1 mL nasal spray, Administer 1 spray into a nostril. If no response after 2-3 minutes, give another dose in the other nostril using a new spray., Disp: 1 each, Rfl: 1  •  ondansetron (ZOFRAN) 4 mg tablet, Take 1 tablet (4 mg total) by mouth every 8 (eight) hours as needed for nausea or vomiting, Disp: 40 tablet, Rfl: 2  •  oxyCODONE (ROXICODONE) 10 MG TABS, Take 1 tablet (10 mg total) by mouth every 4 (four) hours as needed (breakthrough cancer pain) Max Daily Amount: 60 mg, Disp: 150 tablet, Rfl: 0  •  pantoprazole (PROTONIX) 40 mg tablet, Take 1 tablet (40 mg total) by mouth daily - in the morning, Disp: 90 tablet, Rfl: 0  •  polyethylene glycol (MIRALAX) 17 g packet, Take 17 g by mouth daily, Disp: , Rfl:   •  senna-docusate sodium (SENOKOT S) 8.6-50 mg per tablet, Take 1 tablet by mouth daily at bedtime, Disp: , Rfl:   •  apixaban (Eliquis) 5 mg, Take 2 tablets (10 mg total) by mouth 2 (two) times a day for 7 days, THEN 1 tablet (5 mg total) 2 (two) times a day for 23 days. (Patient not taking: Reported on 5/16/2024), Disp: 74  tablet, Rfl: 0  •  lidocaine (LIDODERM) 5 %, Apply 1 patch topically daily Remove & Discard patch within 12 hours or as directed by MD (Patient not taking: Reported on 5/16/2024), Disp: , Rfl:       Social History     Socioeconomic History   • Marital status: /Civil Union     Spouse name: Not on file   • Number of children: Not on file   • Years of education: Not on file   • Highest education level: Not on file   Occupational History   • Not on file   Tobacco Use   • Smoking status: Some Days     Types: Cigarettes   • Smokeless tobacco: Not on file   • Tobacco comments:     Smoking 1 cigarette daily   Vaping Use   • Vaping status: Never Used   Substance and Sexual Activity   • Alcohol use: Not Currently   • Drug use: Not Currently     Types: Marijuana     Comment: once a month   • Sexual activity: Not on file   Other Topics Concern   • Not on file   Social History Narrative    From 2/28/23  note:    Emergency Contact: Dee Martinez (NAVI)[phone] (Home Phone)    Marital Status: Single     Interpretation concerns, speaks another language, preferred language: english    Caregiver/Support: Mayur    Housing: two story     Home Setup: one level     Lives With: NAVI    Daily Living Activities: independent     Ambulation: independent    Employment: yes      Status/Location: no     Ability to pay bills: yes. No barriers to paying bills.     POA/LW/AD: no.  Karlie is healthcare representative. MSW emailed advance directive form.    Transportation Plan/Concerns: Patient states he transports self.  MSW educated on [employer] transport services.      Social Determinants of Health     Financial Resource Strain: Not on file   Food Insecurity: No Food Insecurity (4/1/2024)    Hunger Vital Sign    • Worried About Running Out of Food in the Last Year: Never true    • Ran Out of Food in the Last Year: Never true   Transportation Needs: No Transportation Needs (4/1/2024)    PRAPARE - Transportation    •  "Lack of Transportation (Medical): No    • Lack of Transportation (Non-Medical): No   Physical Activity: Not on file   Stress: Not on file   Social Connections: Not on file   Intimate Partner Violence: Not on file   Housing Stability: Low Risk  (4/1/2024)    Housing Stability Vital Sign    • Unable to Pay for Housing in the Last Year: No    • Number of Places Lived in the Last Year: 1    • Unstable Housing in the Last Year: No       Family History   Problem Relation Age of Onset   • Diabetes Mother    • Heart disease Father    • Diabetes Father        Physical Exam  Vitals and nursing note reviewed.   Constitutional:       General: He is not in acute distress.  HENT:      Head: Normocephalic and atraumatic.   Eyes:      Extraocular Movements: Extraocular movements intact.      Conjunctiva/sclera: Conjunctivae normal.   Cardiovascular:      Rate and Rhythm: Normal rate and regular rhythm.      Pulses: Intact distal pulses.      Heart sounds: Normal heart sounds.   Pulmonary:      Effort: Pulmonary effort is normal.      Breath sounds: Normal breath sounds.   Abdominal:      General: Bowel sounds are normal.      Palpations: Abdomen is soft.   Musculoskeletal:         General: Normal range of motion.      Cervical back: Normal range of motion and neck supple.   Skin:     General: Skin is warm and dry.   Neurological:      Mental Status: He is alert and oriented to person, place, and time.   Psychiatric:         Mood and Affect: Mood normal.         Vitals: Blood pressure (!) 104/40, pulse (!) 146, height 5' 10\" (1.778 m), weight 54.1 kg (119 lb 3.2 oz), SpO2 97%.   Wt Readings from Last 3 Encounters:   05/16/24 54.1 kg (119 lb 3.2 oz)   04/18/24 57.2 kg (126 lb)   04/16/24 63.5 kg (140 lb)           Labs & Results:  Lab Results   Component Value Date    WBC 4.22 (L) 05/02/2024    HGB 16.7 05/02/2024    HCT 50.9 (H) 05/02/2024    MCV 98 05/02/2024     (L) 05/02/2024     BNP   Date Value Ref Range Status " "  03/30/2024 148 (H) 0 - 100 pg/mL Final     No components found for: \"CHEM\"  No results found for: \"CKTOTAL\", \"TROPONINI\", \"TROPONINT\", \"CKMBINDEX\"  No results found for this or any previous visit.    No results found for this or any previous visit.    No valid procedures specified.  No results found for this or any previous visit.                    This note was completed in part utilizing [employer] direct voice recognition software.   Grammatical errors, random word insertion, spelling mistakes, and incomplete sentences may be an occasional consequence of the system secondary to software limitations, ambient noise and hardware issues. At the time of dictation, efforts were made to edit, clarify and /or correct errors.  Please read the chart carefully and recognize, using context, where substitutions have occurred.  If you have any questions or concerns about the context, text or information contained within the body of this dictation, please contact myself, the provider, for further clarification      "

## 2024-05-16 NOTE — ED PROVIDER NOTES
History  Chief Complaint   Patient presents with    Rapid Heart Rate     Medical emergency from cardiology, , +CP/SOB. Hx of svt     HPI  (Fredy MITCHELL Juan Houston) Fredy Martinez Jr. is a 58 y.o. male     They presented to the emergency department on May 16, 2024.   Chief Complaint   Patient presents with    Rapid Heart Rate     Medical emergency from cardiology, , +CP/SOB. Hx of svt   .    The patient states that he walked into the cardiology office with his walker.  Patient states that he normally does not walk that far, from the parking lot all the way up to the cardiologist office.  Patient states after walking that far he became short of breath, develop chest pain.  Chest pain is right-sided.  Patient states that they put him on the monitor at the cardiologist office and found that his heart rate was in the 150s.  They sent him to the emergency department for further evaluation.  Patient was recently admitted in March for SVT.  History of multiple myeloma as well.  No current chemotherapy or radiation.  No history of DVT/PE.  Is on Eliquis and has not missed any doses.  Patient denies fever, chills, abdominal pain, nausea, vomiting, diaphoresis, palpitations, lightheadedness, bowel/bladder changes, Or any other complaint at this time.        Prior to Admission Medications   Prescriptions Last Dose Informant Patient Reported? Taking?   acetaminophen (TYLENOL) 500 mg tablet  Self, Spouse/Significant Other No No   Sig: Take 2 tablets (1,000 mg total) by mouth 3 (three) times a day   apixaban (Eliquis) 5 mg  Self, Spouse/Significant Other No No   Sig: Take 1 tablet (5 mg total) by mouth 2 (two) times a day   dexamethasone (DECADRON) 1 mg tablet  Self, Spouse/Significant Other No No   Sig: Take 0.5 tablets (0.5 mg total) by mouth daily before breakfast   lenalidomide (Revlimid) 5 MG CAPS  Self, Spouse/Significant Other No No   Sig: TAKE 1 CAPSULE BY MOUTH 1 TIME DAILY   lidocaine (LIDODERM) 5 %  Self,  Spouse/Significant Other Yes No   Sig: Apply 1 patch topically daily Remove & Discard patch within 12 hours or as directed by MD   Patient not taking: Reported on 5/16/2024   methocarbamol (ROBAXIN) 500 mg tablet  Self, Spouse/Significant Other No No   Sig: Take 1 tablet (500 mg total) by mouth 4 (four) times a day   morphine (MS CONTIN) 30 mg 12 hr tablet  Self, Spouse/Significant Other No No   Sig: Take 1 tablet (30 mg total) by mouth every 12 (twelve) hours - scheduled, to reduce and prevent cancer pain Max Daily Amount: 60 mg   naloxone (NARCAN) 4 mg/0.1 mL nasal spray  Self, Spouse/Significant Other No No   Sig: Administer 1 spray into a nostril. If no response after 2-3 minutes, give another dose in the other nostril using a new spray.   ondansetron (ZOFRAN) 4 mg tablet  Self, Spouse/Significant Other No No   Sig: Take 1 tablet (4 mg total) by mouth every 8 (eight) hours as needed for nausea or vomiting   oxyCODONE (ROXICODONE) 10 MG TABS  Self, Spouse/Significant Other No No   Sig: Take 1 tablet (10 mg total) by mouth every 4 (four) hours as needed (breakthrough cancer pain) Max Daily Amount: 60 mg   pantoprazole (PROTONIX) 40 mg tablet  Self, Spouse/Significant Other No No   Sig: Take 1 tablet (40 mg total) by mouth daily - in the morning   polyethylene glycol (MIRALAX) 17 g packet  Self, Spouse/Significant Other No No   Sig: Take 17 g by mouth daily   senna-docusate sodium (SENOKOT S) 8.6-50 mg per tablet  Self, Spouse/Significant Other No No   Sig: Take 1 tablet by mouth daily at bedtime      Facility-Administered Medications: None       Past Medical History:   Diagnosis Date    Cancer (HCC)     bone-    GERD (gastroesophageal reflux disease)     Multiple myeloma (HCC)        Past Surgical History:   Procedure Laterality Date    APPENDECTOMY      IR BIOPSY BONE MARROW  3/15/2023    SC OPTX FEM SHFT FX W/INSJ IMED IMPLT W/WO SCREW Left 4/1/2024    Procedure: INSERTION NAIL IM FEMUR ANTEGRADE (TROCHANTERIC);   Surgeon: Pat Bang MD;  Location: [facility];  Service: Orthopedics       Family History   Problem Relation Age of Onset    Diabetes Mother     Heart disease Father     Diabetes Father      I have reviewed and agree with the history as documented.    E-Cigarette/Vaping    E-Cigarette Use Never User      E-Cigarette/Vaping Substances     Social History     Tobacco Use    Smoking status: Some Days     Types: Cigarettes    Tobacco comments:     Smoking 1 cigarette daily   Vaping Use    Vaping status: Never Used   Substance Use Topics    Alcohol use: Not Currently    Drug use: Not Currently     Types: Marijuana     Comment: once a month        Review of Systems   Constitutional:  Negative for chills and fever.        +elevated HR at cardiology office   HENT:  Negative for ear pain and sore throat.    Eyes:  Negative for pain and visual disturbance.   Respiratory:  Positive for shortness of breath. Negative for cough.    Cardiovascular:  Positive for chest pain. Negative for palpitations and leg swelling.   Gastrointestinal:  Negative for abdominal pain, constipation, diarrhea, nausea and vomiting.   Genitourinary:  Negative for dysuria and hematuria.   Musculoskeletal:  Negative for arthralgias and back pain.   Skin:  Negative for color change and rash.   Neurological:  Negative for seizures, syncope and light-headedness.   All other systems reviewed and are negative.      Physical Exam  ED Triage Vitals   Temperature Pulse Respirations Blood Pressure SpO2   05/16/24 1420 05/16/24 1348 05/16/24 1347 05/16/24 1347 05/16/24 1347   97.8 °F (36.6 °C) (!) 130 (!) 24 107/87 97 %      Temp Source Heart Rate Source Patient Position - Orthostatic VS BP Location FiO2 (%)   05/16/24 1420 05/16/24 1348 05/16/24 1347 05/16/24 1347 --   Oral Monitor Sitting Right arm       Pain Score       --                    Orthostatic Vital Signs  Vitals:    05/16/24 1415 05/16/24 1445 05/16/24 1515 05/16/24 1712   BP: 101/75  106/76  107/75   Pulse: (!) 113 94 104 97   Patient Position - Orthostatic VS: Sitting          Physical Exam  Vitals and nursing note reviewed.   Constitutional:       General: He is in acute distress.      Appearance: He is cachectic. He is ill-appearing (chronically ill appearing).   HENT:      Head: Normocephalic and atraumatic.   Eyes:      Conjunctiva/sclera: Conjunctivae normal.   Cardiovascular:      Rate and Rhythm: Regular rhythm. Tachycardia present.      Pulses: Normal pulses.      Heart sounds: No murmur heard.  Pulmonary:      Effort: Pulmonary effort is normal. Tachypnea present. No respiratory distress.      Breath sounds: Normal breath sounds. No wheezing, rhonchi or rales.   Abdominal:      Palpations: Abdomen is soft.      Tenderness: There is no abdominal tenderness.   Musculoskeletal:         General: No swelling.      Cervical back: Neck supple.      Right lower leg: No edema.      Left lower leg: No edema.   Skin:     General: Skin is warm and dry.      Capillary Refill: Capillary refill takes less than 2 seconds.   Neurological:      Mental Status: He is alert.   Psychiatric:         Mood and Affect: Mood is anxious.         ED Medications  Medications   lidocaine (LIDODERM) 5 % patch 1 patch (1 patch Topical Medication Applied 5/16/24 1530)   lactated ringers bolus 500 mL (0 mL Intravenous Stopped 5/16/24 1544)       Diagnostic Studies  Results Reviewed       Procedure Component Value Units Date/Time    HS Troponin I 2hr [[id number]]  (Normal) Collected: 05/16/24 1541    Lab Status: Final result Specimen: Blood from Arm, Right Updated: 05/16/24 1618     hs TnI 2hr 7 ng/L      Delta 2hr hsTnI -1 ng/L     D-dimer, quantitative [[id number]]  (Normal) Collected: 05/16/24 1426    Lab Status: Final result Specimen: Blood from Arm, Right Updated: 05/16/24 1450     D-Dimer, Quant 0.48 ug/ml FEU     Narrative:      In the evaluation for possible pulmonary embolism, in the appropriate (Well's Score of 4 or  less) patient, the age adjusted d-dimer cutoff for this patient can be calculated as:    Age x 0.01 (in ug/mL) for Age-adjusted D-dimer exclusion threshold for a patient over 50 years.    HS Troponin 0hr (reflex protocol) [[id number]]  (Normal) Collected: 05/16/24 1356    Lab Status: Final result Specimen: Blood from Arm, Right Updated: 05/16/24 1434     hs TnI 0hr 8 ng/L     Comprehensive metabolic panel [[id number]]  (Abnormal) Collected: 05/16/24 1356    Lab Status: Final result Specimen: Blood from Arm, Right Updated: 05/16/24 1428     Sodium 135 mmol/L      Potassium 3.7 mmol/L      Chloride 101 mmol/L      CO2 20 mmol/L      ANION GAP 14 mmol/L      BUN 11 mg/dL      Creatinine 0.62 mg/dL      Glucose 122 mg/dL      Calcium 10.2 mg/dL      AST 23 U/L      ALT 9 U/L      Alkaline Phosphatase 204 U/L      Total Protein 6.9 g/dL      Albumin 3.7 g/dL      Total Bilirubin 0.72 mg/dL      eGFR 110 ml/min/1.73sq m     Narrative:      National Kid[employer] Disease Foundation guidelines for Chronic Kidney Disease (CKD):     Stage 1 with normal or high GFR (GFR > 90 mL/min/1.73 square meters)    Stage 2 Mild CKD (GFR = 60-89 mL/min/1.73 square meters)    Stage 3A Moderate CKD (GFR = 45-59 mL/min/1.73 square meters)    Stage 3B Moderate CKD (GFR = 30-44 mL/min/1.73 square meters)    Stage 4 Severe CKD (GFR = 15-29 mL/min/1.73 square meters)    Stage 5 End Stage CKD (GFR <15 mL/min/1.73 square meters)  Note: GFR calculation is accurate only with a steady state creatinine    CBC and differential [[id number]]  (Abnormal) Collected: 05/16/24 1356    Lab Status: Final result Specimen: Blood from Arm, Right Updated: 05/16/24 1404     WBC 7.72 Thousand/uL      RBC 5.63 Million/uL      Hemoglobin 18.4 g/dL      Hematocrit 54.1 %      MCV 96 fL      MCH 32.7 pg      MCHC 34.0 g/dL      RDW 17.4 %      MPV 8.7 fL      Platelets 158 Thousands/uL      nRBC 0 /100 WBCs      Segmented % 76 %      Immature Grans % 1 %      Lymphocytes % 9 %       Monocytes % 12 %      Eosinophils Relative 1 %      Basophils Relative 1 %      Absolute Neutrophils 5.98 Thousands/µL      Absolute Immature Grans 0.05 Thousand/uL      Absolute Lymphocytes 0.70 Thousands/µL      Absolute Monocytes 0.91 Thousand/µL      Eosinophils Absolute 0.04 Thousand/µL      Basophils Absolute 0.04 Thousands/µL                    XR chest 1 view portable   ED Interpretation by Steph Gillis DO (05/16 1430)   No acute cardiopulmonary process visualized.            Procedures  ECG 12 Lead Documentation Only    Date/Time: 5/16/2024 3:21 PM    Performed by: Steph Gillis DO  Authorized by: Steph Gillis DO    Patient location:  ED  Previous ECG:     Previous ECG:  Compared to current  Interpretation:     Interpretation: non-specific    Rate:     ECG rate:  135    ECG rate assessment: tachycardic    Rhythm:     Rhythm: sinus tachycardia    Ectopy:     Ectopy: PAC    QRS:     QRS axis:  Right    QRS intervals:  Normal  Conduction:     Conduction: normal    ST segments:     ST segments:  Non-specific    Depression:  II, III and aVF (likely 2/2 demand from tachycardia)  ECG 12 Lead Documentation Only    Date/Time: 5/16/2024 5:32 PM    Performed by: Steph Gillis DO  Authorized by: Steph Gillis DO    Patient location:  ED  Previous ECG:     Previous ECG:  Compared to current    Similarity:  Changes noted (decrease in rate)  Interpretation:     Interpretation: non-specific    Rate:     ECG rate:  81    ECG rate assessment: normal    Rhythm:     Rhythm: sinus rhythm    Ectopy:     Ectopy: none    QRS:     QRS axis:  Normal    QRS intervals:  Normal  Conduction:     Conduction: normal    ST segments:     ST segments:  Normal  T waves:     T waves: normal          ED Course  ED Course as of 05/16/24 1749   Thu May 16, 2024   1407 CBC and differential(!)  No leukocytosis or leukopenia.  Elevated red blood cells, elevated hemoglobin, elevated hematocrit.  Could be  hemoconcentrated.  Platelets within normal limits.  No bandemia.   1429 Comprehensive metabolic panel(!)  Elevated anion gap to 14.  Decreased bicarbonate, slight.  Elevated alk phos that is similar to prior elevations.  Patient has a history of multiple myeloma, currently not undergoing treatment.  Otherwise electrolytes, kidney function and liver function testing within normal limits.   1431 XR chest 1 view portable  No acute cardiopulmonary process visualized.   1438 hs TnI 0hr: 8  EKG shows sinus tachycardia   1452 D-Dimer, Quant: 0.48  Unlikely PE.  Patient has been being compliant on anticoagulants.   1452 Pulse: 94  Heart rate improved after instructing patient to take slow deep breaths into his nose and out through his mouth.  Patient states he walked from the parking lot to the cardiology office which is more walking than he is used to.  Patient states he felt fine this morning prior to walking into the cardiology office.   1648 hs TnI 2hr: 7  Delta -1   1732 Repeat EKG sinus rhythm no acute ischemic changes.                                       Medical Decision Making  59 yo M presented to ED for tachycardia at cardiology office (150s). Associated symptoms: CP, SOB. No fevers, chills, abd pain, lightheadedness, diaphoresis, NV. Exam findings: Acute distress due to symptoms, anxiety, tachypnea, tachycardia but regular.  Lungs are clear to auscultation bilaterally.  Abdomen soft nontender.  Patient has cachectic appearance.  Differentials diagnoses considered: ACS versus electrolyte disturbance versus dehydration versus anxiety versus exertional. Patient was given 500 mL IV fluids for tachycardia. On re-examination, patient had improvement.  Patient was also given a Lidoderm patch for right-sided chest pain, had resolution of chest pain afterwards.  Patient was also instructed to take slow big deep breaths through his nose and to breathe out slowly through his mouth.  See ED course for more details on lab  and imaging interpretation.  Based on these results and H&P, anxiety versus exertional. Results and clinical impressions were discussed with patient and family. They expressed understanding. Plan: Discharged home with instructions to follow-up with primary care doctor, instructed to return to the emergency department for any new or worsening symptoms, instructed to follow-up with cardiologist. This plan was also discussed with patient, who was agreeable with this plan. Patient was given the opportunity to ask questions in ED. All questions and concerns were addressed in ED.     Amount and/or Complexity of Data Reviewed  Labs: ordered. Decision-making details documented in ED Course.  Radiology: independent interpretation performed. Decision-making details documented in ED Course.    Risk  Prescription drug management.          Disposition  Final diagnoses:   Tachycardia   Palpitations   Chest pain     Time reflects when diagnosis was documented in both MDM as applicable and the Disposition within this note       Time User Action Codes Description Comment    5/16/2024  5:32 PM Steph Gillis Add [R00.0] Tachycardia     5/16/2024  5:32 PM Steph Gillis Add [R00.2] Palpitations     5/16/2024  5:32 PM Steph Gillis Add [R07.9] Chest pain           ED Disposition       ED Disposition   Discharge    Condition   Stable    Date/Time   Thu May 16, 2024  5:14 PM    Comment   Fredy Martinez Jr. discharge to home/self care.                   Follow-up Information       Follow up With Specialties Details Why Contact Info Additional Information    [facility] Emergency Medicine Go to  As needed, If symptoms worsen [address] [facility] [id number] [phone] [facility], [facility] [place], [place], [address]    Your Primary Care Doctor  Call today let them know you were evaluated in the ER and would like to  schedule a follow-up appointment      Your cardiology  Call in 1 day to schedule follow-up              Patient's Medications   Discharge Prescriptions    No medications on file     No discharge procedures on file.    PDMP Review         Value Time User    PDMP Reviewed  Yes 5/1/2024  8:12 AM Can Reardon MD             ED Provider  Attending physically available and evaluated Fredy Martinez Jr.. I managed the patient along with the ED Attending.    Electronically Signed by           Steph Gillis DO  05/16/24 1748       Steph Gillis DO  05/16/24 1749

## 2024-05-17 ENCOUNTER — TELEPHONE (OUTPATIENT)
Dept: HEMATOLOGY ONCOLOGY | Facility: CLINIC | Age: 59
End: 2024-05-17

## 2024-05-17 LAB
ATRIAL RATE: 135 BPM
ATRIAL RATE: 81 BPM
P AXIS: 32 DEGREES
P AXIS: 48 DEGREES
PR INTERVAL: 122 MS
PR INTERVAL: 124 MS
QRS AXIS: 0 DEGREES
QRS AXIS: 248 DEGREES
QRSD INTERVAL: 82 MS
QRSD INTERVAL: 86 MS
QT INTERVAL: 300 MS
QT INTERVAL: 362 MS
QTC INTERVAL: 420 MS
QTC INTERVAL: 450 MS
T WAVE AXIS: 26 DEGREES
T WAVE AXIS: 36 DEGREES
VENTRICULAR RATE: 135 BPM
VENTRICULAR RATE: 81 BPM

## 2024-05-17 PROCEDURE — 93010 ELECTROCARDIOGRAM REPORT: CPT | Performed by: INTERNAL MEDICINE

## 2024-05-18 NOTE — ED ATTENDING ATTESTATION
5/16/2024  IJin MD, saw and evaluated the patient. I have discussed the patient with the resident/non-physician practitioner and agree with the resident's/non-physician practitioner's findings, Plan of Care, and MDM as documented in the resident's/non-physician practitioner's note, except where noted. All available labs and Radiology studies were reviewed.  I was present for key portions of any procedure(s) performed by the resident/non-physician practitioner and I was immediately available to provide assistance.       At this point I agree with the current assessment done in the Emergency Department.  I have conducted an independent evaluation of this patient a history and physical is as follows:see h and p above     ED Course  ED Course as of 05/18/24 0609   u May 16, 2024   1715 Er md note - 12 lead ecg reviewed by er md - sinus tach rate of 135-- incomplete rbbb - no signs of ischemia/ injury    1716 Cxr portable- reviewed and compared to previous- no sign changes- no change in mediastinum/cardiac silhouette- no free/sq air- no infiltrate- ptx- pulm edema- pleural effusions    1717 Er md note- cardiology ecg reviewed and compared- no sign changes   1738 Er md note-=   1738 Er md note- repeat er ecg reviewed- nsr  no signs of ischemia/ injury -    1739 Er md note-  on repeat exam- pt prior to er d/c- very much improved- is currently asymptomatic with no acute complaints          Critical Care Time  Procedures

## 2024-05-21 ENCOUNTER — OFFICE VISIT (OUTPATIENT)
Dept: HEMATOLOGY ONCOLOGY | Facility: CLINIC | Age: 59
End: 2024-05-21
Payer: COMMERCIAL

## 2024-05-21 ENCOUNTER — APPOINTMENT (OUTPATIENT)
Dept: RADIATION ONCOLOGY | Facility: HOSPITAL | Age: 59
End: 2024-05-21
Attending: RADIOLOGY
Payer: COMMERCIAL

## 2024-05-21 ENCOUNTER — DOCUMENTATION (OUTPATIENT)
Dept: HEMATOLOGY ONCOLOGY | Facility: CLINIC | Age: 59
End: 2024-05-21

## 2024-05-21 VITALS
SYSTOLIC BLOOD PRESSURE: 104 MMHG | DIASTOLIC BLOOD PRESSURE: 72 MMHG | OXYGEN SATURATION: 98 % | RESPIRATION RATE: 20 BRPM | TEMPERATURE: 97.9 F | HEIGHT: 70 IN | WEIGHT: 119.5 LBS | HEART RATE: 120 BPM | BODY MASS INDEX: 17.11 KG/M2

## 2024-05-21 VITALS
HEART RATE: 100 BPM | TEMPERATURE: 98.3 F | RESPIRATION RATE: 18 BRPM | SYSTOLIC BLOOD PRESSURE: 128 MMHG | OXYGEN SATURATION: 98 % | DIASTOLIC BLOOD PRESSURE: 84 MMHG

## 2024-05-21 DIAGNOSIS — I26.99 ACUTE PULMONARY EMBOLISM WITHOUT ACUTE COR PULMONALE, UNSPECIFIED PULMONARY EMBOLISM TYPE (HCC): ICD-10-CM

## 2024-05-21 DIAGNOSIS — C90.01 MULTIPLE MYELOMA IN REMISSION (HCC): Primary | ICD-10-CM

## 2024-05-21 DIAGNOSIS — C90.00 MULTIPLE MYELOMA NOT HAVING ACHIEVED REMISSION (HCC): Primary | ICD-10-CM

## 2024-05-21 PROCEDURE — 77470 SPECIAL RADIATION TREATMENT: CPT | Performed by: RADIOLOGY

## 2024-05-21 PROCEDURE — 77263 THER RADIOLOGY TX PLNG CPLX: CPT | Performed by: RADIOLOGY

## 2024-05-21 PROCEDURE — 99215 OFFICE O/P EST HI 40 MIN: CPT | Performed by: RADIOLOGY

## 2024-05-21 PROCEDURE — 99211 OFF/OP EST MAY X REQ PHY/QHP: CPT | Performed by: RADIOLOGY

## 2024-05-21 PROCEDURE — 99214 OFFICE O/P EST MOD 30 MIN: CPT | Performed by: INTERNAL MEDICINE

## 2024-05-21 NOTE — PROGRESS NOTES
Fredy Martinez Jr. 1965 is a 58 y.o. male with history of multiple myeloma. Completed palliative radiation to thoracic spine on 4/14/23. Last seen by Dr. Savage for follow-up 5/17/23. Mr. Martinez was then treated with RVD, completed 4 cycles.  Patient subsequently went on to transplant down at [place] on 8/4/23.  Recent follow-up bone marrow biopsy demonstrated less than 10% plasma cells.  Patient was placed on Revlimid.     Patient with recent left femur fracture (pathological). He was seen/evaluated by orthopedics and underwent ORIF on 4/1/24. He is referred by Dr. Garcia and presents for initial consult with Dr. Toledo.      10/30/23 [facility], bone marrow biopsy:  Bone marrow, Left iliac crest core biopsy    - Core biopsy sections with hypocellular marrow (<10%) for age with serous   atrophy and scant cellular marrow; suboptimal for evaluation of residual   plasma cells.   - The clot sections are insufficient for evaluation due to the absence of   bone marrow particles.   - No immunophenotypic evidence of monoclonal plasma cell population by   flow cytometry and immunohistochemistry.     11/20/23 [facility], Dr. Foss  received an autologous stem cell transplant on 8/4/23.   He agreed to start maintenance. 10 mg revlimid daily dosing of each 28-day cycle.       4/1/24 Surgery - Dr. Bang  Cephalo-medullary Nailing left pathologic peritrochanteric femur fracture       4/18/24 Hematology Oncology - Dr. Garcia  plan was to watch tumor markers, continue with acyclovir, begin the vaccination plan, pain control.  recent fracture was pathologic  PET/CT; repeat laboratory test results have also been requested  Patient  will need to go back on systemic treatments -  will speak to [facility] about this.   Will see Dr. Toledo following PET CT        5/15/24 NM PET CT skull base to mid thigh  IMPRESSION:  1. Findings compatible with disease progression from PET/CT of 10/25/2023.  2. New FDG avid soft tissue nodules along the  left psoas margin concerning for metastasis.  3. Progression of extensive FDG avid osseous metastasis involving the axial and appendicular skeleton. New or enlarging lytic lesions of the left proximal humerus, bony pelvis bilaterally along with extensive intramedullary disease along the right proximal femur. Orthopedic follow-up is advised as these may be at risk for pathologic fracture in the future.       24 Med Onc, Dr. Garcia          Upcomin24 Palliative Care  24 Ortho  24 Hematology Oncology    Oncology History   Multiple myeloma (HCC)   2022 -  Cancer Staged    Staging form: Plasma Cell Myeloma and Disorders, AJCC 8th Edition  - Clinical stage from 2022: Beta-2-microglobulin (mg/L): 2, LDH: Normal - Signed by Davis Garcia MD on 2024  Stage prefix: Initial diagnosis  Beta 2 microglobulin range (mg/L): Less than 3.5       3/2023 Initial Diagnosis    Multiple myeloma not having achieved remission (HCC)     3/15/2023 Biopsy    A -C.  Bone marrow,  right iliac crest,  biopsy, clot, and aspirate:  -  Lambda restricted plasma cell neoplasm, >80% of total cellularity consistent with plasma cell myeloma, see note.  -  Hypercellular bone marrow with markedly reduced trilineage hematopoiesis and no increase in blasts.  -  Reduced iron stores, in a suboptimal sample, correlation with serum iron studies is recommended.  -  Moderate to severe increase in reticulin fibrosis status.     Overall the current bone marrow biopsy is diagnostic for a plasma cell neoplasm best classified as plasma cell myeloma         2023 -  Chemotherapy    alteplase (CATHFLO), 2 mg, Intracatheter, Every 1 Minute as needed, 5 of 5 cycles  bortezomib (VELCADE), 1.3 mg/m2 = 2.5 mg, Subcutaneous, Once, 5 of 5 cycles  Administration: 2.5 mg (2023), 2.5 mg (2023), 2.5 mg (2023), 2.5 mg (2023), 2.5 mg (2023), 2.5 mg (2023), 2.5 mg (2023), 2.5 mg (2023), 2.5 mg (2023),  2.5 mg (5/26/2023), 2.5 mg (5/30/2023), 2.5 mg (6/2/2023), 2.5 mg (6/13/2023), 2.5 mg (6/16/2023), 2.5 mg (6/20/2023), 2.5 mg (6/23/2023), 2.5 mg (7/3/2023), 2.5 mg (7/6/2023), 2.5 mg (7/10/2023), 2.5 mg (7/13/2023)     8/4/2023 Transplant    Transplant: Autologous stem cell transplant   Transplant Dates: 8/4/23  [facility]     Metastasis to bone (HCC)   3/2023 Initial Diagnosis    Bone metastases (HCC)     3/15/2023 Biopsy    A -C.  Bone marrow,  right iliac crest,  biopsy, clot, and aspirate:  -  Lambda restricted plasma cell neoplasm, >80% of total cellularity consistent with plasma cell myeloma, see note.  -  Hypercellular bone marrow with markedly reduced trilineage hematopoiesis and no increase in blasts.  -  Reduced iron stores, in a suboptimal sample, correlation with serum iron studies is recommended.  -  Moderate to severe increase in reticulin fibrosis status.     Overall the current bone marrow biopsy is diagnostic for a plasma cell neoplasm best classified as plasma cell myeloma         4/3/2023 - 4/14/2023 Radiation    Treatment:  Course: C1    Plan ID Energy Fractions Dose per Fraction (cGy) Dose Correction (cGy) Total Dose Delivered (cGy) Elapsed Days   [id number] Spine 10X 10 / 10 250 0 2,500 11      Treatment dates:  C1: 4/3/2023 - 4/14/2023 8/4/2023 Transplant    Transplant: Autologous stem cell transplant   Transplant Dates: 8/4/23  [facility]     4/1/2024 Biopsy    Final Diagnosis   BONE, LEFT FEMORAL REAMINGS: LAMBDA-RESTRICTED PLASMA CELL NEOPLASM; SEE IMPRESSION AND COMMENT     IMPRESSION:  The findings are of involvement by the patient's known lambda-restricted plasma cell neoplasm. 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 Molecular NGS testing reportedly detects pathogenic KRAS (VAF 40.6%) and CCND1 (VAF 27.4%) mutations, as well as multiple variants of unknown clinical significance (VUS). The overall findings are consistent with involvement by the patient's plasma cell neoplasm, with evidence of cytogenetic progression / evolution, and aggressive features; gain of chromosome 1q is associated with disease progression. There are some morphologic features of plasmablastic differentiation in the current sample, along with positive expression of MYC by IHC, which also suggests an aggressive nature / higher-grade plasma cell neoplasm.     Reviewed with agreement in intradepartmental consensus. See comment for microscopic description and ancillary testing.                Review of Systems:  Review of Systems   Constitutional: Negative.    HENT: Negative.     Eyes: Negative.    Respiratory:  Positive for shortness of breath (On exertion).    Cardiovascular: Negative.    Gastrointestinal: Negative.    Endocrine: Negative.    Genitourinary: Negative.    Musculoskeletal:  Positive for back pain.        Bilateral hips   Skin: Negative.    Allergic/Immunologic: Negative.    Neurological: Negative.    Hematological: Negative.    Psychiatric/Behavioral: Negative.         Clinical Trial: no        Health Maintenance   Topic Date Due    Hepatitis C Screening  Never done    COVID-19 Vaccine (1) Never done    HIV Screening  Never done    BMI: Followup Plan  Never done    Annual Physical  Never done    Hepatitis A Vaccine (1 of 2 - Risk 2-dose series) Never done    Colorectal Cancer Screening  Never done    Depression Screening  05/17/2024    Influenza Vaccine (Season Ended) 09/01/2024    IPV Vaccine (3 of 3 - Adult catch-up series) 09/11/2024    BMI: Adult  05/16/2025    RSV Vaccine Age 60+ Years (1 - 1-dose 60+ series) 09/16/2026    DTaP,Tdap,and Td Vaccines (3 - Tdap) 03/11/2034    Zoster Vaccine  Completed    Pneumococcal  Vaccine: Pediatrics (0 to 5 Years) and At-Risk Patients (6 to 64 Years)  Completed    RSV Vaccine age 0-20 Months  Aged Out    HIB Vaccine  Aged Out    Meningococcal ACWY Vaccine  Aged Out    HPV Vaccine  Aged Out     Patient Active Problem List   Diagnosis    Monoclonal gammopathy    Back pain    Palliative care patient    Unintentional weight loss    Nicotine dependence, cigarettes, uncomplicated    History of alcohol abuse    Acid reflux    Nausea & vomiting    Loss of appetite    Therapeutic opioid-induced constipation (OIC)    Multiple myeloma (HCC)    Metastasis to bone (HCC)    Cancer related pain    Insomnia    Dental disease    Acute pulmonary embolism without acute cor pulmonale (HCC)    Closed fracture of neck of left femur (HCC)    Tachycardia    SVT (supraventricular tachycardia)    Acute blood loss anemia     Past Medical History:   Diagnosis Date    Cancer (HCC)     bone-    GERD (gastroesophageal reflux disease)     Multiple myeloma (HCC)      Past Surgical History:   Procedure Laterality Date    APPENDECTOMY      IR BIOPSY BONE MARROW  3/15/2023    MI OPTX FEM SHFT FX W/INSJ IMED IMPLT W/WO SCREW Left 4/1/2024    Procedure: INSERTION NAIL IM FEMUR ANTEGRADE (TROCHANTERIC);  Surgeon: Pat Bang MD;  Location: [facility];  Service: Orthopedics     Family History   Problem Relation Age of Onset    Diabetes Mother     Heart disease Father     Diabetes Father      Social History     Socioeconomic History    Marital status: /Civil Union     Spouse name: Not on file    Number of children: Not on file    Years of education: Not on file    Highest education level: Not on file   Occupational History    Not on file   Tobacco Use    Smoking status: Some Days     Types: Cigarettes    Smokeless tobacco: Not on file    Tobacco comments:     Smoking 1 cigarette daily   Vaping Use    Vaping status: Never Used   Substance and Sexual Activity    Alcohol use: Not Currently    Drug use: Not Currently      Types: Marijuana     Comment: once a month    Sexual activity: Not on file   Other Topics Concern    Not on file   Social History Narrative    From 2/28/23  note:    Emergency Contact: Dee Martinez (NAVI)[phone] (Home Phone)    Marital Status: Single     Interpretation concerns, speaks another language, preferred language: english    Caregiver/Support: Mayur    Housing: two story     Home Setup: one level     Lives With: SO    Daily Living Activities: independent     Ambulation: independent    Employment: yes     [place] Status/Location: no     Ability to pay bills: yes. No barriers to paying bills.     POA/LW/AD: no.  Karlie is healthcare representative. MSW emailed advance directive form.    Transportation Plan/Concerns: Patient states he transports self.  MSW educated on [employer] transport services.      Social Determinants of Health     Financial Resource Strain: Not on file   Food Insecurity: No Food Insecurity (4/1/2024)    Hunger Vital Sign     Worried About Running Out of Food in the Last Year: Never true     Ran Out of Food in the Last Year: Never true   Transportation Needs: No Transportation Needs (4/1/2024)    PRAPARE - Transportation     Lack of Transportation (Medical): No     Lack of Transportation (Non-Medical): No   Physical Activity: Not on file   Stress: Not on file   Social Connections: Not on file   Intimate Partner Violence: Not on file   Housing Stability: Low Risk  (4/1/2024)    Housing Stability Vital Sign     Unable to Pay for Housing in the Last Year: No     Number of Places Lived in the Last Year: 1     Unstable Housing in the Last Year: No       Current Outpatient Medications:     acetaminophen (TYLENOL) 500 mg tablet, Take 2 tablets (1,000 mg total) by mouth 3 (three) times a day, Disp: 180 tablet, Rfl: 2    apixaban (Eliquis) 5 mg, Take 1 tablet (5 mg total) by mouth 2 (two) times a day, Disp: 60 tablet, Rfl: 5    dexamethasone (DECADRON) 1 mg tablet, Take 0.5  tablets (0.5 mg total) by mouth daily before breakfast, Disp: 15 tablet, Rfl: 2    lenalidomide (Revlimid) 5 MG CAPS, TAKE 1 CAPSULE BY MOUTH 1 TIME DAILY, Disp: 28 capsule, Rfl: 0    methocarbamol (ROBAXIN) 500 mg tablet, Take 1 tablet (500 mg total) by mouth 4 (four) times a day, Disp: 120 tablet, Rfl: 2    morphine (MS CONTIN) 30 mg 12 hr tablet, Take 1 tablet (30 mg total) by mouth every 12 (twelve) hours - scheduled, to reduce and prevent cancer pain Max Daily Amount: 60 mg, Disp: 60 tablet, Rfl: 0    naloxone (NARCAN) 4 mg/0.1 mL nasal spray, Administer 1 spray into a nostril. If no response after 2-3 minutes, give another dose in the other nostril using a new spray., Disp: 1 each, Rfl: 1    ondansetron (ZOFRAN) 4 mg tablet, Take 1 tablet (4 mg total) by mouth every 8 (eight) hours as needed for nausea or vomiting, Disp: 40 tablet, Rfl: 2    oxyCODONE (ROXICODONE) 10 MG TABS, Take 1 tablet (10 mg total) by mouth every 4 (four) hours as needed (breakthrough cancer pain) Max Daily Amount: 60 mg, Disp: 150 tablet, Rfl: 0    pantoprazole (PROTONIX) 40 mg tablet, Take 1 tablet (40 mg total) by mouth daily - in the morning, Disp: 90 tablet, Rfl: 0    polyethylene glycol (MIRALAX) 17 g packet, Take 17 g by mouth daily, Disp: , Rfl:     senna-docusate sodium (SENOKOT S) 8.6-50 mg per tablet, Take 1 tablet by mouth daily at bedtime, Disp: , Rfl:     lidocaine (LIDODERM) 5 %, Apply 1 patch topically daily Remove & Discard patch within 12 hours or as directed by MD (Patient not taking: Reported on 5/16/2024), Disp: , Rfl:   No Known Allergies  Vitals:    05/21/24 1438   BP: 128/84   BP Location: Left arm   Patient Position: Sitting   Cuff Size: Standard   Pulse: 100   Resp: 18   Temp: 98.3 °F (36.8 °C)   TempSrc: Temporal   SpO2: 98%      Pain Score:   8

## 2024-05-21 NOTE — PROGRESS NOTES
Follow-up - Radiation Oncology   Fredy Martinez Jr. 1965 58 y.o. male [id number]      History of Present Illness   Cancer Staging   Multiple myeloma (HCC)  Staging form: Plasma Cell Myeloma and Disorders, AJCC 8th Edition  - Clinical stage from 11/9/2022: Beta-2-microglobulin (mg/L): 2, LDH: Normal - Signed by Davis Garcia MD on 4/18/2024  Stage prefix: Initial diagnosis  Beta 2 microglobulin range (mg/L): Less than 3.5      Fredy Martinez Jr. is a 58 y.o.  male with multiple myeloma status post RVD and autologous bone marrow transplant on maintenance lenalidomide now with diffuse progression involving multiple osseous sites (left humerus, pelvis, left femur status post ORIF and mid/lower thoracic spine).  Prior sites of radiotherapy include T10-L5.  He has recently undergone ORIF of a left femur lesion.    To review:  He presented on 2/8/23 for evaluation of chest and back pain.  CTA ED PE study on 2/8/23 demonstrated diffuse lytic lesions throughout the skeleton with compression deformities in the spine.  There was mild compression of T6 and moderate compression deformity of T10.      PET/CT on 3/7/23 demonstrated widespread osseous lytic lesions throughout the axial and appendicular skeleton.  There was a T10 compression deformity with SUV 4.2, new compression deformity of T8 with SUV 6.3, T6 compression deformity with SUV 4.7 and displaced sternal fracture with focal FDG activity SUV 5.7.  There were multiple additional example lesions including proximal left humerus SUV 5.7, right lateral 8th rib SUV 5.6, Right L5 lesion SUV 6.4, Right sacral lesion SUV 5.7, left anterior sacral lesion SUB 6.4, Left posterior iliac lesion SUV 5.6.      Bone marrow biopsy on 3/15/23 demonstrated lambda restricted plasma cell neoplasm, >80% of total cellularity, consistent with plasma cell myeloma.  There was hypercellular bone marrow with markedly reduced trilineage hematopoiesis and no increase in blasts.  There were reduced  iron stores.    MRI thoracic spine on 3/20/23 demonstrated diffuse infiltrative and discrete marrow replacing lesions involving the anterior and posterior elements of the thoracic and visualized cervical and lumbar spine.  There was progression of T6 compression fracture with severe anterior height loss.  There was small posterior buckling without significant canal stenosis.  There was grossly stable T10 wedge compression deformity with severe anterior height loss.  There was posterosuperior osseous buckling indenting the ventral thecal sac without significant canal stenosis..  There was mild T8 compression fracture deformity, slightly progressed and small posterior osseous buckling without significant canal stenosis.  There was normal signal within the thoracic cord.      He was evaluated by Dr. Savage and received 2500 cGy in 10 fractions to the T10-L5 spine from 4/3/23 - 4/14/23.      He began RVD on 3/27/23 and has completed 4 cycles.  He underwent autologous stem cell transplant on 8/4/23.    PET/CT on 10/25/23 demonstrated significant favorable metabolic response to therapy with decrease in the number of hypermetabolic lesions previously widespread, but now numbered less than 10.  Hypermetabolic lesions including the proximal right humerus, distal sternum, bilateral aspects of the pelvis, and proximal left femur.      He more recently presented after a fall on 3/30/24.  Plain film imaging demonstrated displaced proximal left femoral diaphysis fracture.    CTA PE study on 3/30/24 demonstrated numerous lytic lesions throughout the spine, sternum and ribs.  There were sub occlusive emboli in the right middle and lower lobes, peripheral in location.      On 4/1/24, he underwent IM nailing for a pathologic left femoral fracture.  Pathology from the left femur on 4/1/24 were consistent with lambda-restricted plasma cell neoplasm.      He has been taking maintenance lenalidomide.    Restaging PET/CT on 5/15/24  demonstrated slightly more extensive uptake at the right proximal humeral shaft, SUV 5.  There was a new prominent lesion in the left proximal humerus, SUV 7.2 with associated 3.7 cm lytic lesion on the CT.  There was variable cortical thinning and cortical breakthrough.  There was a T9 vertebral body lesion with SUV 9, associated with a large lytic lesion on CT.  There was right T9-T10 lesion extending from the level of the pedicles into the adjacent soft tissue with SUV 5.2.  There was a new lytic lesion at the left upper sacrum with SUV 7 and more prominent left posterior iliac lytic lesion with SUV 7.6.  There was a larger lytic lesion at the left posterior acetabulum, SUV 17, previously 4.4 with cortical destruction medially and posteriorly.  There was new left anterior acetabulum,/superior pubic ramus lytic lesion  with SUV 12.  There was more prominent lytic lesion at the right anterior iliac bone/superior acetabulum with SUV 8.1, previously 3.2 with cortical loss mostly along the medial margin.  There was a new FDG avid lesion at eth right proximal femur, a small focus near the trochanters demonstrated SUV 9.5.  There was fairly extensive uptake in the right proximal femur extending to the from the lesser trochanter to the proximal femoral shaft, SUV 6.9.   There were also new FDG avid soft tissue nodules along the left psoas margin, measuring up to 2.1 cm in length.  There was patchy heterogeneous radiotracer uptake at the left proximal femur, SUV 9.2.      Overall this was indicative of progressive disease.  He has been referred to radiation to discuss palliative treatment options.    Upon interview, he endorses the above history.  He has had persistent mid back pain for 1 year.  He denies progressive numbness, weakness, tingling or incontinence.  He continues his current pain regimen managed by palliative care, although this has been less effective.  He has new left shoulder pain for 1-2 weeks, worsened  with movement.  He has diffuse pelvic / hip pain.  He is in a wheelchair.  He has fatigue.  He is without additional acute concerns.    Historical Information   Oncology History   Multiple myeloma (HCC)   11/9/2022 -  Cancer Staged    Staging form: Plasma Cell Myeloma and Disorders, AJCC 8th Edition  - Clinical stage from 11/9/2022: Beta-2-microglobulin (mg/L): 2, LDH: Normal - Signed by Davis Garcia MD on 4/18/2024  Stage prefix: Initial diagnosis  Beta 2 microglobulin range (mg/L): Less than 3.5       3/2023 Initial Diagnosis    Multiple myeloma not having achieved remission (HCC)     3/15/2023 Biopsy    A -C.  Bone marrow,  right iliac crest,  biopsy, clot, and aspirate:  -  Lambda restricted plasma cell neoplasm, >80% of total cellularity consistent with plasma cell myeloma, see note.  -  Hypercellular bone marrow with markedly reduced trilineage hematopoiesis and no increase in blasts.  -  Reduced iron stores, in a suboptimal sample, correlation with serum iron studies is recommended.  -  Moderate to severe increase in reticulin fibrosis status.     Overall the current bone marrow biopsy is diagnostic for a plasma cell neoplasm best classified as plasma cell myeloma         4/11/2023 -  Chemotherapy    alteplase (CATHFLO), 2 mg, Intracatheter, Every 1 Minute as needed, 5 of 5 cycles  bortezomib (VELCADE), 1.3 mg/m2 = 2.5 mg, Subcutaneous, Once, 5 of 5 cycles  Administration: 2.5 mg (4/11/2023), 2.5 mg (4/14/2023), 2.5 mg (4/18/2023), 2.5 mg (4/21/2023), 2.5 mg (5/2/2023), 2.5 mg (5/5/2023), 2.5 mg (5/9/2023), 2.5 mg (5/12/2023), 2.5 mg (5/23/2023), 2.5 mg (5/26/2023), 2.5 mg (5/30/2023), 2.5 mg (6/2/2023), 2.5 mg (6/13/2023), 2.5 mg (6/16/2023), 2.5 mg (6/20/2023), 2.5 mg (6/23/2023), 2.5 mg (7/3/2023), 2.5 mg (7/6/2023), 2.5 mg (7/10/2023), 2.5 mg (7/13/2023)     8/4/2023 Transplant    Transplant: Autologous stem cell transplant   Transplant Dates: 8/4/23  Transplant Notes:  [facility], Dr. Rigo Foss      Metastasis to bone (HCC)   3/2023 Initial Diagnosis    Bone metastases (HCC)     3/15/2023 Biopsy    A -C.  Bone marrow,  right iliac crest,  biopsy, clot, and aspirate:  -  Lambda restricted plasma cell neoplasm, >80% of total cellularity consistent with plasma cell myeloma, see note.  -  Hypercellular bone marrow with markedly reduced trilineage hematopoiesis and no increase in blasts.  -  Reduced iron stores, in a suboptimal sample, correlation with serum iron studies is recommended.  -  Moderate to severe increase in reticulin fibrosis status.     Overall the current bone marrow biopsy is diagnostic for a plasma cell neoplasm best classified as plasma cell myeloma         4/3/2023 - 4/14/2023 Radiation    Treatment:  Course: C1    Plan ID Energy Fractions Dose per Fraction (cGy) Dose Correction (cGy) Total Dose Delivered (cGy) Elapsed Days   T10_L5 Spine 10X 10 / 10 250 0 2,500 11      Treatment dates:  C1: 4/3/2023 - 4/14/2023 8/4/2023 Transplant    Transplant: Autologous stem cell transplant   Transplant Dates: 8/4/23  Transplant Notes:  [facility], Dr. Rigo Foss     4/1/2024 Biopsy    Final Diagnosis   BONE, LEFT FEMORAL REAMINGS: LAMBDA-RESTRICTED PLASMA CELL NEOPLASM; SEE IMPRESSION AND COMMENT     IMPRESSION:  The findings are of involvement by the patient's known lambda-restricted plasma cell neoplasm. 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 Molecular NGS testing reportedly detects pathogenic KRAS (VAF 40.6%) and CCND1 (VAF 27.4%) mutations, as well as multiple variants of unknown clinical significance (VUS). The overall findings are consistent with involvement by the patient's plasma cell neoplasm, with evidence of cytogenetic progression / evolution, and aggressive features; gain of  chromosome 1q is associated with disease progression. There are some morphologic features of plasmablastic differentiation in the current sample, along with positive expression of MYC by IHC, which also suggests an aggressive nature / higher-grade plasma cell neoplasm.     Reviewed with agreement in intradepartmental consensus. See comment for microscopic description and ancillary testing.                Past Medical History:   Diagnosis Date    Cancer (HCC)     bone-    GERD (gastroesophageal reflux disease)     Multiple myeloma (HCC)      Past Surgical History:   Procedure Laterality Date    APPENDECTOMY      IR BIOPSY BONE MARROW  3/15/2023    NH OPTX FEM SHFT FX W/INSJ IMED IMPLT W/WO SCREW Left 4/1/2024    Procedure: INSERTION NAIL IM FEMUR ANTEGRADE (TROCHANTERIC);  Surgeon: Pat Bang MD;  Location: [facility];  Service: Orthopedics       Social History   Social History     Substance and Sexual Activity   Alcohol Use Not Currently     Social History     Substance and Sexual Activity   Drug Use Not Currently    Types: Marijuana    Comment: once a month     Social History     Tobacco Use   Smoking Status Some Days    Types: Cigarettes   Smokeless Tobacco Not on file   Tobacco Comments    Smoking 1 cigarette daily         Meds/Allergies     Current Outpatient Medications:     acetaminophen (TYLENOL) 500 mg tablet, Take 2 tablets (1,000 mg total) by mouth 3 (three) times a day, Disp: 180 tablet, Rfl: 2    apixaban (Eliquis) 5 mg, Take 1 tablet (5 mg total) by mouth 2 (two) times a day, Disp: 60 tablet, Rfl: 5    dexamethasone (DECADRON) 1 mg tablet, Take 0.5 tablets (0.5 mg total) by mouth daily before breakfast, Disp: 15 tablet, Rfl: 2    lenalidomide (Revlimid) 5 MG CAPS, TAKE 1 CAPSULE BY MOUTH 1 TIME DAILY, Disp: 28 capsule, Rfl: 0    methocarbamol (ROBAXIN) 500 mg tablet, Take 1 tablet (500 mg total) by mouth 4 (four) times a day, Disp: 120 tablet, Rfl: 2    morphine (MS CONTIN) 30 mg 12 hr tablet,  Take 1 tablet (30 mg total) by mouth every 12 (twelve) hours - scheduled, to reduce and prevent cancer pain Max Daily Amount: 60 mg, Disp: 60 tablet, Rfl: 0    naloxone (NARCAN) 4 mg/0.1 mL nasal spray, Administer 1 spray into a nostril. If no response after 2-3 minutes, give another dose in the other nostril using a new spray., Disp: 1 each, Rfl: 1    ondansetron (ZOFRAN) 4 mg tablet, Take 1 tablet (4 mg total) by mouth every 8 (eight) hours as needed for nausea or vomiting, Disp: 40 tablet, Rfl: 2    oxyCODONE (ROXICODONE) 10 MG TABS, Take 1 tablet (10 mg total) by mouth every 4 (four) hours as needed (breakthrough cancer pain) Max Daily Amount: 60 mg, Disp: 150 tablet, Rfl: 0    pantoprazole (PROTONIX) 40 mg tablet, Take 1 tablet (40 mg total) by mouth daily - in the morning, Disp: 90 tablet, Rfl: 0    polyethylene glycol (MIRALAX) 17 g packet, Take 17 g by mouth daily, Disp: , Rfl:     senna-docusate sodium (SENOKOT S) 8.6-50 mg per tablet, Take 1 tablet by mouth daily at bedtime, Disp: , Rfl:     lidocaine (LIDODERM) 5 %, Apply 1 patch topically daily Remove & Discard patch within 12 hours or as directed by MD (Patient not taking: Reported on 5/16/2024), Disp: , Rfl:   No Known Allergies      Review of Systems  Constitutional: Negative.    HENT: Negative.     Eyes: Negative.    Respiratory:  Positive for shortness of breath (On exertion).    Cardiovascular: Negative.    Gastrointestinal: Negative.    Endocrine: Negative.    Genitourinary: Negative.    Musculoskeletal:  Positive for back pain.        Bilateral hips   Skin: Negative.    Allergic/Immunologic: Negative.    Neurological: Negative.    Hematological: Negative.    Psychiatric/Behavioral: Negative.            OBJECTIVE:   /84 (BP Location: Left arm, Patient Position: Sitting, Cuff Size: Standard)   Pulse 100   Temp 98.3 °F (36.8 °C) (Temporal)   Resp 18   SpO2 98%   Pain Assessment:  8  Karnofsky: 50 - Requires considerable assistance and  frequent medical care    Physical Exam  HENT:      Head: Normocephalic and atraumatic.   Eyes:      General: No scleral icterus.     Extraocular Movements: Extraocular movements intact.   Cardiovascular:      Rate and Rhythm: Normal rate.   Pulmonary:      Effort: Pulmonary effort is normal.   Abdominal:      General: Abdomen is flat. There is no distension.   Musculoskeletal:      Cervical back: Normal range of motion.      Comments: Pain in the mid back, left shoulder/arm and left hip   Skin:     General: Skin is warm and dry.   Neurological:      General: No focal deficit present.      Mental Status: He is alert.   Psychiatric:         Mood and Affect: Mood normal.        RESULTS    Lab Results:   Recent Results (from the past 672 hour(s))   CBC and differential    Collection Time: 05/02/24 10:08 AM   Result Value Ref Range    WBC 4.22 (L) 4.31 - 10.16 Thousand/uL    RBC 5.21 3.88 - 5.62 Million/uL    Hemoglobin 16.7 12.0 - 17.0 g/dL    Hematocrit 50.9 (H) 36.5 - 49.3 %    MCV 98 82 - 98 fL    MCH 32.1 26.8 - 34.3 pg    MCHC 32.8 31.4 - 37.4 g/dL    RDW 18.6 (H) 11.6 - 15.1 %    MPV 8.9 8.9 - 12.7 fL    Platelets 125 (L) 149 - 390 Thousands/uL    nRBC 0 /100 WBCs    Segmented % 75 43 - 75 %    Immature Grans % 1 0 - 2 %    Lymphocytes % 8 (L) 14 - 44 %    Monocytes % 14 (H) 4 - 12 %    Eosinophils Relative 1 0 - 6 %    Basophils Relative 1 0 - 1 %    Absolute Neutrophils 3.19 1.85 - 7.62 Thousands/µL    Absolute Immature Grans 0.04 0.00 - 0.20 Thousand/uL    Absolute Lymphocytes 0.33 (L) 0.60 - 4.47 Thousands/µL    Absolute Monocytes 0.58 0.17 - 1.22 Thousand/µL    Eosinophils Absolute 0.05 0.00 - 0.61 Thousand/µL    Basophils Absolute 0.03 0.00 - 0.10 Thousands/µL   Comprehensive metabolic panel    Collection Time: 05/02/24 10:08 AM   Result Value Ref Range    Sodium 136 135 - 147 mmol/L    Potassium 3.9 3.5 - 5.3 mmol/L    Chloride 102 96 - 108 mmol/L    CO2 26 21 - 32 mmol/L    ANION GAP 8 4 - 13 mmol/L    BUN  10 5 - 25 mg/dL    Creatinine 0.47 (L) 0.60 - 1.30 mg/dL    Glucose, Fasting 135 (H) 65 - 99 mg/dL    Calcium 10.0 8.4 - 10.2 mg/dL    AST 16 13 - 39 U/L    ALT 8 7 - 52 U/L    Alkaline Phosphatase 214 (H) 34 - 104 U/L    Total Protein 6.6 6.4 - 8.4 g/dL    Albumin 3.6 3.5 - 5.0 g/dL    Total Bilirubin 0.62 0.20 - 1.00 mg/dL    eGFR 123 ml/min/1.73sq m   IgG, IgA, IgM    Collection Time: 05/02/24 10:08 AM   Result Value Ref Range    IGA 72 66 - 433 mg/dL     635 - 1,741 mg/dL    IGM <20 (L) 45 - 281 mg/dL   Immunoglobulin free LT chains blood    Collection Time: 05/02/24 10:08 AM   Result Value Ref Range    Ig Kappa Free Light Chain 14.0 3.3 - 19.4 mg/L    Ig Lambda Free Light Chain 150.1 (H) 5.7 - 26.3 mg/L    Kappa/Lambda FluidC Ratio 0.09 (L) 0.26 - 1.65   LD,Blood    Collection Time: 05/02/24 10:08 AM   Result Value Ref Range     (H) 140 - 271 U/L   Beta 2 microglobulin, serum    Collection Time: 05/02/24 10:08 AM   Result Value Ref Range    Beta-2 Microglobulin 2.3 0.6 - 2.4 mg/L   Uric acid    Collection Time: 05/02/24 10:08 AM   Result Value Ref Range    Uric Acid 3.9 3.5 - 8.5 mg/dL   Magnesium    Collection Time: 05/02/24 10:08 AM   Result Value Ref Range    Magnesium 2.0 1.9 - 2.7 mg/dL   Protein electrophoresis, serum    Collection Time: 05/02/24 10:08 AM   Result Value Ref Range    A/G Ratio 1.07 (L) 1.10 - 1.80    Albumin Electrophoresis 51.7 48.0 - 70.0 %    Albumin CONC 3.05 (L) 3.20 - 5.10 g/dl    Alpha 1 8.2 (H) 1.8 - 7.0 %    ALPHA 1 CONC 0.48 (H) 0.15 - 0.47 g/dL    Alpha 2 16.3 (H) 5.9 - 14.9 %    ALPHA 2 CONC 0.96 0.42 - 1.04 g/dL    Beta-1 5.1 4.7 - 7.7 %    BETA 1 CONC 0.30 (L) 0.31 - 0.57 g/dL    Beta-2 5.9 3.1 - 7.9 %    BETA 2 CONC 0.35 0.20 - 0.58 g/dL    Gamma Globulin 12.8 6.9 - 22.3 %    GAMMA CONC 0.76 0.40 - 1.66 g/dL    Total Protein 5.9 (L) 6.4 - 8.2 g/dL    SPEP Interpretation See Comment    Immunofixation, Serum(Reflex Only-Do Not Order)    Collection Time: 05/02/24  10:08 AM   Result Value Ref Range    Immunofixation Interpretation See Comment    Fingerstick Glucose (POCT)    Collection Time: 05/15/24  1:48 PM   Result Value Ref Range    POC Glucose 136 65 - 140 mg/dl   ECG 12 lead    Collection Time: 05/16/24  1:50 PM   Result Value Ref Range    Ventricular Rate 135 BPM    Atrial Rate 135 BPM    AL Interval 122 ms    QRSD Interval 82 ms    QT Interval 300 ms    QTC Interval 450 ms    P Axis 48 degrees    QRS Axis 248 degrees    T Wave Axis 36 degrees   CBC and differential    Collection Time: 05/16/24  1:56 PM   Result Value Ref Range    WBC 7.72 4.31 - 10.16 Thousand/uL    RBC 5.63 (H) 3.88 - 5.62 Million/uL    Hemoglobin 18.4 (H) 12.0 - 17.0 g/dL    Hematocrit 54.1 (H) 36.5 - 49.3 %    MCV 96 82 - 98 fL    MCH 32.7 26.8 - 34.3 pg    MCHC 34.0 31.4 - 37.4 g/dL    RDW 17.4 (H) 11.6 - 15.1 %    MPV 8.7 (L) 8.9 - 12.7 fL    Platelets 158 149 - 390 Thousands/uL    nRBC 0 /100 WBCs    Segmented % 76 (H) 43 - 75 %    Immature Grans % 1 0 - 2 %    Lymphocytes % 9 (L) 14 - 44 %    Monocytes % 12 4 - 12 %    Eosinophils Relative 1 0 - 6 %    Basophils Relative 1 0 - 1 %    Absolute Neutrophils 5.98 1.85 - 7.62 Thousands/µL    Absolute Immature Grans 0.05 0.00 - 0.20 Thousand/uL    Absolute Lymphocytes 0.70 0.60 - 4.47 Thousands/µL    Absolute Monocytes 0.91 0.17 - 1.22 Thousand/µL    Eosinophils Absolute 0.04 0.00 - 0.61 Thousand/µL    Basophils Absolute 0.04 0.00 - 0.10 Thousands/µL   Comprehensive metabolic panel    Collection Time: 05/16/24  1:56 PM   Result Value Ref Range    Sodium 135 135 - 147 mmol/L    Potassium 3.7 3.5 - 5.3 mmol/L    Chloride 101 96 - 108 mmol/L    CO2 20 (L) 21 - 32 mmol/L    ANION GAP 14 (H) 4 - 13 mmol/L    BUN 11 5 - 25 mg/dL    Creatinine 0.62 0.60 - 1.30 mg/dL    Glucose 122 65 - 140 mg/dL    Calcium 10.2 8.4 - 10.2 mg/dL    AST 23 13 - 39 U/L    ALT 9 7 - 52 U/L    Alkaline Phosphatase 204 (H) 34 - 104 U/L    Total Protein 6.9 6.4 - 8.4 g/dL     "Albumin 3.7 3.5 - 5.0 g/dL    Total Bilirubin 0.72 0.20 - 1.00 mg/dL    eGFR 110 ml/min/1.73sq m   HS Troponin 0hr (reflex protocol)    Collection Time: 05/16/24  1:56 PM   Result Value Ref Range    hs TnI 0hr 8 \"Refer to ACS Flowchart\"- see link ng/L   D-dimer, quantitative    Collection Time: 05/16/24  2:26 PM   Result Value Ref Range    D-Dimer, Quant 0.48 <0.50 ug/ml FEU   HS Troponin I 2hr    Collection Time: 05/16/24  3:41 PM   Result Value Ref Range    hs TnI 2hr 7 \"Refer to ACS Flowchart\"- see link ng/L    Delta 2hr hsTnI -1 <20 ng/L   ECG 12 lead    Collection Time: 05/16/24  5:23 PM   Result Value Ref Range    Ventricular Rate 81 BPM    Atrial Rate 81 BPM    VA Interval 124 ms    QRSD Interval 86 ms    QT Interval 362 ms    QTC Interval 420 ms    P [place] 32 degrees    QRS Axis 0 degrees    T Wave [place] 26 degrees       Imaging Studies:XR chest 1 view portable    Result Date: 5/17/2024  Narrative: XR CHEST PORTABLE INDICATION: CP. COMPARISON: 4/2/2024 FINDINGS: Clear lungs. No pneumothorax or pleural effusion. Normal cardiomediastinal silhouette. Bones are unremarkable for age. Normal upper abdomen.     Impression: No acute cardiopulmonary disease. Workstation performed: [id number]     NM PET CT skull base to mid thigh    Result Date: 5/16/2024  Narrative: PET/CT SCAN INDICATION: History of multiple myeloma. Previously treated. Restaging exam. C79.51: Secondary malignant neoplasm of bone C90.01: Multiple myeloma in remission MODIFIER: PS COMPARISON: PET/CT 10/25/2023, CT chest 3/30/2024, and additional priors CELL TYPE: Plasma cell neoplasm TECHNIQUE: 8.6 mCi F-18-FDG administered IV. Multiplanar attenuation corrected and non attenuation corrected PET images are available for interpretation, and contiguous, low dose, axial CT sections were obtained from the skull vertex through the femurs. Intravenous contrast material was not utilized. This examination, like all CT scans performed in the [facility] " [facility], was performed utilizing techniques to minimize radiation dose exposure, including the use of iterative reconstruction and automated exposure control. Fasting serum glucose: 136 mg/dl FINDINGS: VISUALIZED BRAIN: No acute abnormalities are seen. HEAD/NECK: There is a physiologic distribution of FDG. No FDG avid cervical adenopathy is seen. CT images: Unremarkable. CHEST: No FDG avid soft tissue lesions are seen. CT images: Previously noted nodular intraluminal foci along the upper trachea have cleared likely debris. Calcified granuloma in the left lung with calcified left perihilar lymph nodes. ABDOMEN/PELVIS: New FDG avid soft tissue nodules along the left psoas margin. A soft tissue nodule here demonstrates SUV max of 6.2. This measures up to 2.1 cm in length image 137 series 4. Variable physiologic bowel activity. Focal uptake at the right hemipelvis laterally likely physiologic ureteral activity. CT images: Calcified splenic granuloma. Cholelithiasis. Large left scrotal hydrocele. OSSEOUS STRUCTURES: Multiple FDG avid osseous lesions compatible with metastasis. This has progressed from the prior PET/CT. These correspond to lytic lesions on CT. Reference lesions as noted below: *  Slightly more extensive uptake at the right proximal humeral shaft demonstrates SUV max of 5.0, previously 3.7. *  New prominent lesion at the left proximal humerus, SUV max of 7.2 with associated 3.7 cm lytic lesion on CT which demonstrates variable cortical thinning and cortical breakthrough. *  More prominent inferior sternal lesion now with SUV max of 9.0, previously 8.4. *  T9 vertebral body lesion demonstrates a SUV max of 9.0. Associated large lytic lesion on CT. *  Right T9-T10 lesion extending from the level of the pedicles into the adjacent soft tissues demonstrates SUV max of 5.2. *  New lytic lesion at the left upper sacrum demonstrates SUV max of 7.0. *  More prominent left posterior iliac lytic lesion  demonstrates SUV max 7.6, previously 5.1. *  Larger lytic lesion at the left posterior acetabulum now SUV max of 17, previously 4.4. There is cortical destruction medially and posteriorly. This measures up to 4.6 cm in size on CT. *  New left anterior acetabulum/superior pubic ramus lytic lesion demonstrates SUV max of 12. *  More prominent lytic lesion at the right anterior iliac bone/superior acetabulum, SUV max of 8.1, previously 3.2. There is cortical loss mostly along the medial margin. This measures up to 5.0 cm in size on CT. *  New FDG-avid lesions at the right proximal femur. A small focus near the trochanters demonstrates SUV max of 9.5. *  Fairly extensive FDG uptake at the right proximal femur extending from the lesser trochanter into the proximal femoral shaft, SUV max of 6.9. Associated bony scalloping and cortical thinning on CT. *  Patchy heterogeneous radiotracer uptake at the left proximal femur, SUV max of 9.2. This may be a combination of pathologic fracture and healing from interval ORIF. CT images: Interval left hip ORIF. Kyphosis of the thoracic spine with chronic compression deformities in the midthoracic spine.     Impression: 1. Findings compatible with disease progression from PET/CT of 10/25/2023. 2. New FDG avid soft tissue nodules along the left psoas margin concerning for metastasis. 3. Progression of extensive FDG avid osseous metastasis involving the axial and appendicular skeleton. New or enlarging lytic lesions of the left proximal humerus, bony pelvis bilaterally along with extensive intramedullary disease along the right proximal femur. Orthopedic follow-up is advised as these may be at risk for pathologic fracture in the future. The study was marked in EPIC for significant notification. Workstation performed: [id number]           Assessment/Plan:  Orders Placed This Encounter   Procedures    MRI thoracic spine w wo contrast        Fredy Martinez Jr. is a 58 y.o. year old male  with multiple myeloma status post RVD and autologous bone marrow transplant on maintenance lenalidomide now with diffuse progression involving multiple osseous sites (left humerus, pelvis, left femur status post ORIF and mid/lower thoracic spine).  I reviewed the prior radiotherapy treatment field and area of progression involving T9/T10.      I discussed the diagnosis of multiple myeloma with diffuse osseous disease including involvement of multiple vertebral levels. Areas of prior treatment include T10-L5 spine. Fortunately, he remains neurologically intact. I reviewed his most recent PET/CT imaging. He has demonstrated significant disease progression at the edge/involving the prior treatment field (T9/T10).  MRI of the thoracic spine will ordered today to assess for epidural disease.  As progression of osseous disease in this portion of the thoracic spine could become potentially devastating/morbid (pain, paralysis, incontinence, ambulatory dysfunction, numbness, etc), I have recommended palliative treatment to this region. This would require CT simulation with immobilization and tattoo marking. Treatment would be 10 fractions, delivered M-F. IMRT would be employed to minimize overlap with the prior treatment field. The cumulative dose to adjacent OARs, particularly the spinal cord, would be calculated and kept within reason. Radiotherapy to the thoracic spine is palliative, not curative.      Risks of radiation to the thoracic spine were reviewed. These include but are not limited to fatigue, skin irritation/hair loss, esophagitis, fracture, spinal cord/nerve injury potentially leading to numbness, weakness, tingling, paralysis or incontinence, liver/kidney injury, cytopenias, diarrhea/nausea and secondary malignancy. In the setting of re-irradiation, the frequency and severity of side effects may be enhanced. Even with palliative radiation, disease may progress locally or recur leading to worsening  "symptoms/morbidity. The goal of radiation is to diminish the likelihood of local disease progression for some period of time.     Radiation is also offered after surgical stabilization of a pathologic fracture (left femur) in order to decrease the risk of future skeletal events, improve functional status (compared to no postoperative radiation) and decrease the need for additional orthopedic procedures (Lisa et al IJROBP 2015).  Radiotherapy would cover the lesion in the left femur as well as surgical hardware.  Palliative treatment is to 30 Gy, delivered in 10 fractions M-F.  Due to the overall disease burden in the pelvis and possible need for prophylactic fixation, this treatment will be delayed until referral to orthopedic oncology (Dr. Oconnell) is completed.  Additional sites of disease that may require prophylactic fixation include the left humerus.  If surgical intervention is not offered, palliative treatment to this site could also be considered at a later date.     I reviewed the risks of radiotherapy to the extremity which include but are not limited to postoperative wound complications, skin irritation, hair loss in the treatment, limited joint mobility, lower extremity swelling, and secondary malignancy.    Going forward, CT simulation will be performed today to begin treatment planning to the T9/T10 site.  Informed consent for spine radiation was obtained today.  All questions were answered to his satisfaction.  MRI of the thoracic spine will also be obtained.  He will continue follow up with medical oncology for management of disease progression following bone marrow transplant.     Ten Toledo MD  5/21/2024,3:44 PM    Portions of the record may have been created with voice recognition software.  Occasional wrong word or \"sound a like\" substitutions may have occurred due to the inherent limitations of voice recognition software.  Read the chart carefully and recognize, using context, where " substitutions have occurred.

## 2024-05-21 NOTE — PROGRESS NOTES
Fredy Martinez Jr.  1965  [address] [facility]  [address]  [place] [address]     DISCUSSION/SUMMARY:    58-year-old male diagnosed with multiple myeloma in March 2023.  Issues:    Recent left femur fracture.  Patient will follow-up with orthopedics as directed.  Patient will also eventually get back to radiation oncology (see below).    Multiple myeloma disease progression.  Patient presented to the ER on February 8, 2023.  CTA of the chest did not demonstrate any PE but patient had multiple lytic lesions and compression fractures in T6 and T10.  Additional work-up demonstrated elevated total protein, elevated IgG and an elevated lambda. Immunofixation demonstrated IgG lambda monoclonal protein.  CBC parameters and renal function were within normal limits.    Bone marrow biopsy demonstrated a lambda restricted plasma cell neoplasm, 80% of the cells were malignant.  PET/CT demonstrated multiple osseous hypermetabolic lytic lesions.  Patient was seen by Dr. Foss in second opinion. The plan was RVD x 4 cycles and then auto stem cell transplant if no evidence of progression and no complications.      Multiple myeloma international staging system = II, median survival is 44 months (albumin = 2.2, beta-2 microglobulin = 2.8)    NCCN guidelines 3.2023 state that for patients with multiple myeloma in need of treatment, possible transplant candidate, preferred regimen is bortezomib, lenalidomide and dexamethasone.    Regimen (completed)  Bortezomib 1.3 mg/m2 subcu on days 1, 4, 8 and 11  Lenalidomide 25 mg orally days 1-7 (dose reduced on the first cycle)  Dexamethasone 40 mg by mouth on days 1, 8 and 15  Cycle length = 21 days x 3-4 cycles    Patient was able to complete the 4 cycles of neoadjuvant chemotherapy without significant   side effects or toxicities.  Patient then went on to transplant - also did well.  Afterwards patient went on to  surveillance plus Revlimid.  The plan was to watch tumor markers, continue with acyclovir, begin the vaccination plan, pain control.    Mr. Martinez had been doing okay, +/-  and was tolerating the Revlimid 5 mg a day.  Unfortunately the recent fracture was pathologic.      Back pain, recent pathologic fracture.  Dr. Toledo previously was kind enough to review the patient's thoracic MRI (unofficially) previously and agreed that radiation oncology evaluation was indicated.  Patient received RT to the spine.  Patient still with back pain.  As above, had a repeat PET/CT which showed progression of disease - patient can be seen by Dr. Toledo afterwards.  Patient is being followed by palliative care.  Pain medication manipulation may be necessary.    Recent PE.  Patient is now on Eliquis.  No bruising or bleeding issues.  No respiratory problems.  Patient will continue.  We discussed what to watch for as far as lower extremity swelling, cords, pain, acute change atorvastatin.    Bone metastases, poor dentition.  This has been a significant issue since before the diagnosis.  Patient states not having any money to go see a dentist.  Because of this, Mr. Martinez was never able to receive Zometa or Xgeva.  Options are limited, patient's caries are extensive.    Mr. Martinez was given a 6-week follow-up but this will change.  Patient knows to call the hematology/oncology office if there are any other questions or concerns.  Patient states having all required medications at home.    Carefully review your medication list and verify that the list is accurate and up-to-date. Please call the hematology/oncology office if there are medications missing from the list, medications on the list that you are not currently taking or if there is a dosage or instruction that is different from how you're taking that medication.    Patient goals and areas of care: pain control, Eliquis  Barriers to care: None  Patient is able to  self-care  ______________________________________________________________________________________    Chief Complaint   Patient presents with    Follow-up     History of Present Illness: 58 year old male with relatively good general health recently developing mid back pain.  Mr. Martinez states that the pain began in early January 2023.  Patient had lost approximately 20+ pounds over the past few months.    Work-up included bone marrow biopsy and PET/CT.  Patient has widespread osteolytic lesions.  Bone marrow biopsy demonstrated greater than 80% plasma cells.    Patient received RT to the spine.  Mr. Martinez was then treated with RVD, completed 4 cycles.  Patient subsequently went on to transplant down at [facility].  Recent follow-up bone marrow biopsy demonstrated less than 10% plasma cells.  Patient was placed on Revlimid.  The plan was to continue with Revlimid/surveillance.    Unfortunately Mr. Martinez was recently admitted to the hospital; found to have fracture of left femur, anemia, SVT also acute PE.  Patient is now on Eliquis.  Mr. Martinez states that he was lying in bed when he fractured the femur.  No falling, no tripping, no loss of consciousness.  Patient was seen/evaluated by orthopedics and underwent ORIF.    Patient presents with his wife, Pt has severe bone pain 5-8/10 Worst on back and left hip and left shoulder. Pt using wheel chair when going out.  Uses a walker at home but it is too far from wiating room to the exam room.  SOB around 20 steps which gets better with rest. Pt also gets elevated HR and needs to breath out of mouth.   Pt lost 6 pounds in the last mounth.   Pt also started increased belching.    Pt also noted trouble swallowing, constipation and diarrhea.  Pt also has intollerance to sweets will feel life vomiting a lot.   Pt notes increased frequency poly dypsia poly uria   Pt is still smoking 1 pack a day     Review of Systems   Constitutional:  Positive for activity change, fatigue, fever and  unexpected weight change. Negative for chills.   HENT:  Positive for dental problem and trouble swallowing. Negative for rhinorrhea and sinus pressure.    Eyes: Negative.    Respiratory:  Positive for shortness of breath and wheezing. Negative for cough, choking and chest tightness.    Cardiovascular:  Positive for palpitations. Negative for chest pain.   Gastrointestinal:  Positive for constipation, diarrhea and nausea. Negative for abdominal pain, blood in stool and vomiting.   Endocrine: Positive for polydipsia and polyuria.   Genitourinary:  Positive for frequency. Negative for dysuria.   Musculoskeletal:  Positive for back pain and myalgias. Negative for arthralgias.   Skin: Negative.    Neurological:  Positive for numbness. Negative for syncope, light-headedness and headaches.   Psychiatric/Behavioral:  Negative for sleep disturbance.    All other systems reviewed and are negative.    Past Surgical History:   Procedure Laterality Date    APPENDECTOMY      IR BIOPSY BONE MARROW  3/15/2023    NH OPTX FEM SHFT FX W/INSJ IMED IMPLT W/WO SCREW Left 4/1/2024    Procedure: INSERTION NAIL IM FEMUR ANTEGRADE (TROCHANTERIC);  Surgeon: Pat Bang MD;  Location: [facility];  Service: Orthopedics   Past surgical history: No prior blood transfusions    Family History   Problem Relation Age of Onset    Diabetes Mother     Heart disease Father     Diabetes Father    Family history: No known familial or genetic diseases, no children    Social History     Socioeconomic History    Marital status: /Civil Union     Spouse name: Not on file    Number of children: Not on file    Years of education: Not on file    Highest education level: Not on file   Occupational History    Not on file   Tobacco Use    Smoking status: Some Days     Types: Cigarettes    Smokeless tobacco: Not on file    Tobacco comments:     Smoking 1 cigarette daily   Vaping Use    Vaping status: Never Used   Substance and Sexual Activity    Alcohol  use: Not Currently    Drug use: Not Currently     Types: Marijuana     Comment: once a month    Sexual activity: Not on file   Other Topics Concern    Not on file   Social History Narrative    From 2/28/23  note:    Emergency Contact: Dee Martinez (NAVI)[phone] (Home Phone)    Marital Status: Single     Interpretation concerns, speaks another language, preferred language: english    Caregiver/Support: Mayur    Housing: two story     Home Setup: one level     Lives With: SO    Daily Living Activities: independent     Ambulation: independent    Employment: yes      Status/Location: no     Ability to pay bills: yes. No barriers to paying bills.     POA/LW/AD: no.  Karlie is healthcare representative. MSW emailed advance directive form.    Transportation Plan/Concerns: Patient states he transports self.  MSW educated on [employer] transport services.      Social Determinants of Health     Financial Resource Strain: Not on file   Food Insecurity: No Food Insecurity (4/1/2024)    Hunger Vital Sign     Worried About Running Out of Food in the Last Year: Never true     Ran Out of Food in the Last Year: Never true   Transportation Needs: No Transportation Needs (4/1/2024)    PRAPARE - Transportation     Lack of Transportation (Medical): No     Lack of Transportation (Non-Medical): No   Physical Activity: Not on file   Stress: Not on file   Social Connections: Not on file   Intimate Partner Violence: Not on file   Housing Stability: Low Risk  (4/1/2024)    Housing Stability Vital Sign     Unable to Pay for Housing in the Last Year: No     Number of Places Lived in the Last Year: 1     Unstable Housing in the Last Year: No   Social history: Patient smokes 3 to 4 cigarettes a day, recently discontinued, approximately 5 beers a day, also recently discontinued, no drug abuse, no toxic exposure    No Known Allergies    Vitals:    05/21/24 1344   BP: 104/72   Pulse: (!) 120   Resp: 20   Temp: 97.9 °F (36.6  °C)   SpO2: 98%     Physical Exam  Constitutional:       General: He is not in acute distress.     Appearance: He is underweight. He is not ill-appearing.      Comments: Thin, somewhat frail appearing male  SOB   HENT:      Head: Normocephalic and atraumatic.      Nose: Nose normal.      Mouth/Throat:      Mouth: Mucous membranes are moist.      Comments: Dentition issue with multiple lost teeth   Eyes:      Extraocular Movements: Extraocular movements intact.      Pupils: Pupils are equal, round, and reactive to light.   Cardiovascular:      Rate and Rhythm: Tachycardia present. Rhythm irregular.   Pulmonary:      Effort: Pulmonary effort is normal.      Breath sounds: Normal breath sounds. No wheezing, rhonchi or rales.      Comments: Scattered bilateral rhonchi, fair to good entry bilaterally  Abdominal:      General: Abdomen is flat. There is no distension.      Tenderness: There is no abdominal tenderness.   Musculoskeletal:         General: Normal range of motion.      Cervical back: Normal range of motion. No rigidity.      Comments: Patient walks with a walker   Lymphadenopathy:      Cervical: No cervical adenopathy.   Skin:     General: Skin is warm and dry.      Capillary Refill: Capillary refill takes less than 2 seconds.      Comments: Slightly pale, warm, moist, no petechiae or ecchymoses, well-healed left thigh suture lines   Neurological:      Mental Status: He is alert and oriented to person, place, and time.   Extremities: No lower extremity edema bilaterally, no cords, pulses are 1+ bilaterally  Lymphatic: No adenopathy in the neck, supraclavicular region, axilla bilaterally    Labs    4/5/2024 WBC = 3.31 hemoglobin = 11.7 hematocrit = 35.5 platelet = 114 neutrophil = 69% BUN = 6 creatinine = 0.34 calcium = 7.7    2/21/2024 WBC = 4.59 hemoglobin = 16.3 hematocrit = 49 platelet = 152 neutrophil = 71% BUN = 9 creatinine = 0.55 calcium = 8.9 total protein = 6.0 AST = 11 ALT = 7 alkaline phosphatase =  144 total bilirubin = 0.63 uric acid = 3.2 LDH = 244  12/05/2023 WBC= 8 hemoglobin= 17.5 hematocrit= 50.8 platelet= 160 neutrophil = 89% lymphocyte= 03% monocytes= 7% eosinophil= 0% BUN= 12 creatinine= 0.42 calcium= 9.2 LFT WNL total protein= 6.2 albumin=4.1  10/6/2023 WBC = 10.15 hemoglobin = 15.5 hematocrit = 45.2 platelet = 154 neutrophil = 89% bands = 3% lymphocyte = 2% monocyte = 5% eosinophil = 1% BUN = 4 creatinine = 0.44 calcium = 9.0 LFTs WNL total protein = 5.7 albumin = 3.  10/3/2023 ([facility]) Free kappa = 9.1 Free lambda = 7.5 Kappa/lambda ratio = 1.21 beta-2 microglobulin = 1.9 LDH = 112 IgM <30 IgG = 425 IgA = 70 calcium = 8.9 total protein = 6.1 albumin = 3.4 SPEP demonstrated a faint band in the gamma region  8/28/2023 ([facility])  WBC = 7.2 hemoglobin = 15.3 hematocrit = 45.5 platelet = 212 neutrophil = 91% BUN = 5 creatinine = 0.47 calcium = 8.4 total protein = 5.7 albumin = 3.2 LFTs WNL  6/5/2023 WBC = 5.15 hemoglobin = 10.2 hematocrit = 31.3 platelet = 58 BUN = 6 creatinine = 0.42 calcium = 7.8 AST = 14 ALT = 12 alkaline phosphatase = 814 total protein = 5.3 total bilirubin = 0.57  4/10/2023 WBC = 3.14 hemoglobin = 10.3 hematocrit = 32.5 platelet = 194 neutrophil = 81% calcium = 10.8 BUN = 10 creatinine = 0.89 total protein = 9.8 albumin = 2.2 LDH = 347 beta-2 microglobulin = 2.8  3/15/2023 WBC = 6.86 hemoglobin = 12.8 hematocrit = 36.6 platelet = 188 neutrophil = 69%  2/8/2023 WBC = 6.6 hemoglobin = 12.6 hematocrit = 36 MCV = 96 platelet = 194 neutrophil = 67% bands = 1% lymphocyte = 23% monocyte = 8% basophil = 1% BUN = 21 creatinine = 1.17 calcium = 10.7 AST = 23 ALT = 14 alkaline phosphatase = 166 total protein = 9.7 albumin = 3.3 total bilirubin = 0.36 TSH = 0.680    Results from last 7 days   Lab Units 05/16/24  1356   WBC Thousand/uL 7.72   HEMOGLOBIN g/dL 18.4*   HEMATOCRIT % 54.1*   PLATELETS Thousands/uL 158   SEGS PCT % 76*   LYMPHO PCT % 9*   MONO PCT % 12   EOS PCT % 1     Results from  last 7 days   Lab Units 05/16/24  1356   SODIUM mmol/L 135   POTASSIUM mmol/L 3.7   CHLORIDE mmol/L 101   CO2 mmol/L 20*   ANION GAP mmol/L 14*   BUN mg/dL 11   CREATININE mg/dL 0.62   CALCIUM mg/dL 10.2   ALK PHOS U/L 204*   ALT U/L 9   AST U/L 23     Component      Latest Ref Rn 5/2/2024   Albumin/Globulin Ratio      1.10 - 1.80  1.07 (L)    ALBUMIN ELP      48.0 - 70.0 % 51.7    ALBUMIN CONC ELP      3.20 - 5.10 g/dl 3.05 (L)    ALPHA 1      1.8 - 7.0 % 8.2 (H)    ALPHA 1 CONC ELP      0.15 - 0.47 g/dL 0.48 (H)    ALPHA 2      5.9 - 14.9 % 16.3 (H)    ALPHA 2 CONC ELP      0.42 - 1.04 g/dL 0.96    BETA-1      4.7 - 7.7 % 5.1    BETA 1 CONC ELP      0.31 - 0.57 g/dL 0.30 (L)    BETA-2      3.1 - 7.9 % 5.9    BETA 2 CONC ELP      0.20 - 0.58 g/dL 0.35    GAMMA GLOBULIN      6.9 - 22.3 % 12.8    GAMMA CONC ELP      0.40 - 1.66 g/dL 0.76    Total Protein      6.4 - 8.2 g/dL 5.9 (L)    SPEP INTERPRETATION See Comment       Legend:  (L) Low  (H) High  Component      Latest Ref Rn 5/2/2024   LD (LDH)      140 - 271 U/L 501 (H)       Legend:  (H) High  Imaging  Component      Latest Ref Rng 5/2/2024   IMMUNOGLOBULIN KAPPA FREE LIGHT CHAIN      3.3 - 19.4 mg/L 14.0    IMMUNOGLOBULIN LAMBDA FREE LIGHT CHAIN      5.7 - 26.3 mg/L 150.1 (H)    KAPPA/LAMBDA FREE LIGHT CHAIN RATIO      0.26 - 1.65  0.09 (L)       Legend:  (H) High  (L) Low  Component      Latest Ref Rn 5/2/2024   IGA      66 - 433 mg/dL 72    IGG      635 - 1,741 mg/dL 863    IGM      45 - 281 mg/dL <20 (L)       Legend:  (L) Low  3/30/2024 left hip and pelvis, 2/3 views.  Impression stated displaced proximal left femoral diaphysis fracture.    3/30/2024 CTA chest PE study    IMPRESSION:     Subocclusive emboli in the lobar arteries of the right middle and lower lobes which are peripheral in location, compatible with subacute emboli. No infarct or right heart strain.     Multiple lytic lesions throughout the visualized axial and appendicular skeleton  compatible with known myeloma. 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    10/25/2023 PET/CT     IMPRESSION:     1. Significant interval favorable metabolic response to therapy with interval decrease in number of hypermetabolic osseous lesions, previously widespread and much greater than 20, currently multifocal and less than 10.     2. Indeterminant 7 mm and 5 mm mural-based tracheal nodules which could reflect secretions with developing soft tissue nodules, particularly along the nondependent lateral right tracheal wall not excluded. Findings can be further characterized with   direct visualization and/or short interval follow-up imaging as clinically indicated.     3/20/2023 MRI thoracic spine    IMPRESSION:     1.  Widespread infiltrative and discrete osseous lesions involving anterior and posterior elements of the thoracic and visualized cervical and lumbar spine in keeping with history of multiple myeloma.  No extraosseous lesion.  2.  Compared to CTA chest 2/8/2023, progression of T6 wedge compression fracture with severe anterior height loss.  There is small posterior osseous buckling without significant canal stenosis.  3.  Mild T8 compression fracture, progressed from CTA chest 2/8/2023.  Small posterior osseous buckling without significant canal stenosis.  4.  Stable T8 wedge compression deformity with severe anterior height loss.  Posterosuperior osseous buckling indents ventral thecal sac without significant canal stenosis.  5.  Discrete and marrow replacing lesions involving partially imaged ribs and sternum.  Displaced sternal fracture, similar to PET CT 3/7/2023, new from CTA chest 2/8/2023.    3/7/2023 PET/CT    IMPRESSION:  1.  Widespread hypermetabolic lytic lesions throughout the axial and appendicular skeleton.  Findings are most suggestive of multiple myeloma.  Clinical correlation recommended.  2.  Several hypermetabolic thoracic compression deformities.  T8 compression deformity is new  from the prior chest CT.  There is also a new displaced sternal fracture.  3.  No hypermetabolic soft tissue lesions.     2/8/2023 CTA chest PE study    OSSEOUS STRUCTURES:  Diffuse lytic lesions throughout the skeleton with mild compression deformity of T6 and moderate compression deformity of T10.    IMPRESSION:     No pulmonary embolus.  No acute pulmonary disease.  Diffuse lytic lesions throughout the skeleton with compression deformities in the spine.  This is most likely due to multiple myeloma, less likely due to metastatic disease from an unknown primary.    2/8/2023 chest x-ray.  Impression stated no acute cardiopulmonary disease.    5/15/24 NM PET CT Skull base to mid thigh:  IMPRESSION:     1. Findings compatible with disease progression from PET/CT of 10/25/2023.  2. New FDG avid soft tissue nodules along the left psoas margin concerning for metastasis.  3. Progression of extensive FDG avid osseous metastasis involving the axial and appendicular skeleton. New or enlarging lytic lesions of the left proximal humerus, bony pelvis bilaterally along with extensive intramedullary disease along the right   proximal femur. Orthopedic follow-up is advised as these may be at risk for pathologic fracture in the future.       Pathology    Case Report   Surgical Pathology Report                         Case: [id number]                                   Authorizing Provider:  Pat Bang MD      Collected:           04/01/2024 1345               Ordering Location:     [facility]        Received:            04/01/2024 [address] [place] [address] Operating Room                                                       Pathologist:           Julius Martines MD                                                                           Specimen:    Bone, Left Femoral Reamings                                                                 Final Diagnosis   BONE, LEFT FEMORAL REAMINGS:     - Lambda-restricted plasma cell neoplasm; see impression and comment.     IMPRESSION:  The findings are of involvement by the patient's known plasma cell neoplasm. Ancillary testing reportedly detects an IgH gene rearrangement, partial MYC deletion (cannot rule out a rearrangement) and trisomy 11 by FISH, none of which were detected previously ([id number]); gain of chromosome 1q was previously detected, which is associated with disease progression. The overall findings are consistent with involvement by the patient's plasma cell neoplasm, with evidence of cytogenetic progression / evolution, and aggressive features. There are some morphologic features of plasmablastic differentiation in the current sample, along with positive expression of MYC, which suggest aggressive nature / higher-grade in plasma cell neoplasms. Additional ancillary testing is pending to attempt to identify the IgH rearrangement partner and to ascertain whether MYC is rearranged, for further characterization and prognostication, the results of which, will be reported in the final report, when available.     Reviewed with agreement in intradepartmental consensus. See comment for microscopic description and ancillary testing.      COMMENT:  H&E stained sections show sheets of pleomorphic plasmacytoid cells.  shows that the neoplastic cells are plasma cells, which co-express BCL1, MYC and lambda in situ hybridization (HAILEE), and are negative for CD3, CD20, CD30, kappa HAILEE and GUANACO HAILEE. The Ki-67 proliferation index is elevated (70-90%). CD3 highlights background small T-cells and CD20 highlights background small B-cells. A pan-cytokeratin AE1/3 stain is negative for metastatic carcinoma. All stains are performed with appropriate controls.     CYTOGENETIC FISH STUDIES:   Cytogenetic FISH studies are performed and reportedly show the following abnormalities;  ([facility] #[id number]; see separate report):      Preliminary result electronically signed by Julius Martines MD on 4/17/2024 at  9:55 AM           Case Report   Surgical Pathology Report                         Case: [id number]                                    Authorizing Provider:  Davis Garcia MD           Collected:           03/15/2023 0932               Ordering Location:     [facility]        Received:            03/15/2023 09                                      [facility] CAT Scan                                                             Pathologist:           Ashok Allen MD                                                           Specimens:   A) - Iliac Crest, Right, core                                                                        B) - Iliac Crest, Right, clot                                                                        C) - Iliac Crest, Right, slide                                                             Final Diagnosis   A -C.  Bone marrow,  right iliac crest,  biopsy, clot, and aspirate:  -  Lambda restricted plasma cell neoplasm, >80% of total cellularity consistent with plasma cell myeloma, see note.  -  Hypercellular bone marrow with markedly reduced trilineage hematopoiesis and no increase in blasts.  -  Reduced iron stores, in a suboptimal sample, correlation with serum iron studies is recommended.  -  Moderate to severe increase in reticulin fibrosis status.     Note: The patient's presentation of multiple lytic lesions and compression fractures is noted. The current biopsy shows a marked increase in plasma cells comprising >80% of total cellularity. Immunostains show a lambda light chain restriction relative to kappa light chain by -HAILEE studies.  Flow cytometry confirms the presence of a lambda restricted plasma cell population. Further supported by an IgG lambda monoclonal protein detected in the serum by immunofixation studies. FISH  studies identify a gain in 1q21/CKS1B in 15.33% of evaluated nuclei. Cytogenetic studies reveal a normal male karyotype.               Overall the current bone marrow biopsy is diagnostic for a plasma cell neoplasm best classified as plasma cell myeloma in the correct clinical context. Clinical correlation is advised.   Electronically signed by Ashok Allen MD on 3/23/2023 at  8:24 PM   Preliminary result electronically signed by Ashok Allen MD on 3/22/2023 at  3:13 PM   Microscopic Description    Bone marrow aspirate smears:  The aspirate smears are aspicular, cellular, and suboptimal for evaluation. The aspirate smear sample is hemodilute with an increase in plasma cells. Mature neutrophils and lymphocytes are present. However, maturing myeloid elements and erythroid elements are not well represented in the aspirate smear slides. There is no definitive evidence of myelodysplasia or increase in blasts.      Bone marrow core biopsy and aspirate clot:  Histologic sections of the aspirate clot and decalcified core biopsy are adequate for evaluation.  Marrow space cellularity is hypercellular for patient age (>90%).  Myelopoiesis:  Markedly reduced (myeloblasts= <5%).  Erythropoiesis:  Markedly reduced  Megakaryocytes:  Markedly reduced  Lymphocytes:  Increased in small aggregates and interstitially distributed  Plasma cells:  Markedly increased  including a subset of immature forms.     Special studies:   * Iron stain performed on the aspirate smear, clot, and core biopsy highlights reduced iron stores with no evidence of ring sideroblasts in a limited sample. Siderotic iron granules are present. Due to the aspiculate nature of the specimen these findings ma  * Reticulin stain performed on the core biopsy shows moderate to severe increase in reticulin fibers.  2-3 of 3 by the  Consensus grading scheme.  * Trichrome stain performed on the core biopsy show no increase in collagen fibrosis.   *  Immunohistochemical stains were performed with appropriate controls and show the following results:  -   highlights marked increase in plasma cells (>80%) with a lambda restriction relative to kappa light chain expression by kappa and lambda in situ hybridization studies. The plasma cells are negative for CD30 and GUANACO-HAILEE. Ki67 proliferation index is overall low (<10%).      CD20 highlights an increase in B-cells in aggregates and interstitially distributed (10-20% of total cellularity) and CD3 highlights T-cells in aggregates and interstitially scattered T-cells (5-10% of total cellularity).     CD34 shows no increase in blasts (<5% of total cellularity) and  shows no increase in immature cells (<5% of total cellularity). Additionally,  highlights scattered mast cells.     CD61 highlights a reduction in megakaryocytes.      [place]ongo red stain is negative for amyloid.   Note    Component Ref Range & Units 3/15/23 0804 2/8/23 1016   WBC 4.31 - 10.16 Thousand/uL 6.86  6.62    RBC 3.88 - 5.62 Million/uL 3.86 Low   3.74 Low     Hemoglobin 12.0 - 17.0 g/dL 12.8  12.6    Hematocrit 36.5 - 49.3 % 36.6  36.0 Low     MCV 82 - 98 fL 95  96    MCH 26.8 - 34.3 pg 33.2  33.7    MCHC 31.4 - 37.4 g/dL 35.0  35.0    RDW 11.6 - 15.1 % 13.9  14.5    MPV 8.9 - 12.7 fL 9.4  8.9    Platelets 149 - 390 Thousands/uL 188  194    nRBC /100 WBCs 0  0       Component Ref Range & Units 2/8/23 1401    IGA 70.0 - 400.0 mg/dL 28.0 Low     .0 - 1,600.0 mg/dL 2,900.0 High     IGM 40.0 - 230.0 mg/dL 11.0 Low        SPEP Interpretation   See Comment      Comment: The SPEP shows a monoclonal peak in the gamma region.Immunofixation to be performed.  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         10-COLOR FLOW CYTOMETRY ANALYSIS FOR ACUTE LEUKEMIA AND MYELOID/LYMPHOID NEOPLASMS ([facility] # [id number] ; please see separate report for further details):  BONE MARROW ASPIRATE:   1. Clonal  plasma cells are detected (4.4% by flow cytometry), consistent with plasma cell neoplasm.   - Phenotype: lambda+; CD38+, +, CD20-, CD19-, CD45-, CD56- and -.   2. No evidence of acute leukemia or increased blasts.   3. No evidence of an abnormal myeloid population. Abnormalities associated with myelodysplasia and myeloproliferative neoplasms are absent.   4. No evidence of B-cell lymphoma or atypical T-cell population     Viability 7AAD: 95.57%   Monoclonal CD45(-)/CD38(bright) plasma cells with lambda-restriction are present (4.4%).  There is a mixed population of granulocytes/maturing myeloid precursors, blasts, monocytes and lymphocytes. +/HLA-DR+ myeloblasts comprise (0.18%) of total events. Myeloid-commited CD34+ population comprise (0.16%) of total events. Granulocytes/maturing myeloid precursors (70.32%) do not display an aberrant phenotype. The orthogonal side scatter is normal. No significant number of CD56+ or CD10-/CD16- granulocytes is noted. Monocytes (8.4%) appear mature (CD14+/CD64+) and do not display overt phenotypic atypia. B-cells (1.17%) are polytypic. T-cells (14.55%) show no deletion or abnormal dim expression of pan-T-cell antigens on significant subset of cells     FLUORESCENCE IN-SITU HYBRIDIZATION (FISH)  ([facility] # [id number] ; please see separate report for further details):  INTERPRETATION   1. Positive for 1q21/CKS1B gain in 15.33% nuclei.   Comment: Gain of the CKS1B locus (cyclin kinase subunit 1B) in patients with plasma cell myeloma is a poor prognostic indicator associated with significantly shorter progression-free survival and shorter overall survival.   2. No evidence of IGH-MAF translocation t(14;16) gene rearrangement   3. No evidence of RB1 monosomy (13q14 deletion).   4. No evidence of CCND1-IGH translocation t(11;14) gene rearrangement, and no evidence for trisomy 11 or gain of 11q.   5. No evidence of p53 (17p13) deletion or amplification.   6. No evidence of  FGFR3-IGH translocation t(4;14) gene rearrangement.      CYTOGENETICS Karyotype Analysis ([facility] # [id number] ; please see separate report for further details):  Karyotype: 46,XY 20  Interpretation:   A normal male karyotype was observed in twenty metaphase cells analyzed.          Tobacco and Toxic Substance Assessment and Intervention:     Alcohol cessation counseling provided

## 2024-05-22 ENCOUNTER — HOSPITAL ENCOUNTER (OUTPATIENT)
Dept: RADIOLOGY | Facility: HOSPITAL | Age: 59
Discharge: HOME | End: 2024-05-22
Attending: RADIOLOGY
Payer: COMMERCIAL

## 2024-05-22 ENCOUNTER — OFFICE VISIT (OUTPATIENT)
Dept: PALLIATIVE MEDICINE | Facility: CLINIC | Age: 59
End: 2024-05-22

## 2024-05-22 ENCOUNTER — TELEPHONE (OUTPATIENT)
Dept: HEMATOLOGY ONCOLOGY | Facility: CLINIC | Age: 59
End: 2024-05-22

## 2024-05-22 VITALS
SYSTOLIC BLOOD PRESSURE: 160 MMHG | WEIGHT: 119.2 LBS | HEIGHT: 70 IN | TEMPERATURE: 97.7 F | OXYGEN SATURATION: 96 % | HEART RATE: 118 BPM | DIASTOLIC BLOOD PRESSURE: 62 MMHG | BODY MASS INDEX: 17.07 KG/M2

## 2024-05-22 DIAGNOSIS — R19.7 DIARRHEA: ICD-10-CM

## 2024-05-22 DIAGNOSIS — Z51.5 PALLIATIVE CARE PATIENT: ICD-10-CM

## 2024-05-22 DIAGNOSIS — R64 CACHEXIA (HCC): ICD-10-CM

## 2024-05-22 DIAGNOSIS — C90.00 MULTIPLE MYELOMA NOT HAVING ACHIEVED REMISSION (HCC): Primary | ICD-10-CM

## 2024-05-22 DIAGNOSIS — M54.9 BACK PAIN: ICD-10-CM

## 2024-05-22 DIAGNOSIS — T40.2X5A THERAPEUTIC OPIOID-INDUCED CONSTIPATION (OIC): ICD-10-CM

## 2024-05-22 DIAGNOSIS — G89.3 CANCER RELATED PAIN: ICD-10-CM

## 2024-05-22 DIAGNOSIS — G47.00 INSOMNIA: ICD-10-CM

## 2024-05-22 DIAGNOSIS — Z71.89 GOALS OF CARE, COUNSELING/DISCUSSION: ICD-10-CM

## 2024-05-22 DIAGNOSIS — R26.2 AMBULATORY DYSFUNCTION: ICD-10-CM

## 2024-05-22 DIAGNOSIS — C79.51 METASTASIS TO BONE (HCC): ICD-10-CM

## 2024-05-22 DIAGNOSIS — C79.51 METASTASIS TO BONE (HCC): Primary | ICD-10-CM

## 2024-05-22 DIAGNOSIS — K59.03 THERAPEUTIC OPIOID-INDUCED CONSTIPATION (OIC): ICD-10-CM

## 2024-05-22 DIAGNOSIS — C90.01 MULTIPLE MYELOMA IN REMISSION (HCC): ICD-10-CM

## 2024-05-22 PROCEDURE — 72157 MRI CHEST SPINE W/O & W/DYE: CPT

## 2024-05-22 PROCEDURE — A9585 GADOBUTROL INJECTION: HCPCS | Performed by: RADIOLOGY

## 2024-05-22 RX ORDER — GADOBUTROL 604.72 MG/ML
5 INJECTION INTRAVENOUS
Status: DISCONTINUED | OUTPATIENT
Start: 2024-05-22 | End: 2024-05-23 | Stop reason: HOSPADM

## 2024-05-22 RX ORDER — DEXAMETHASONE 4 MG/1
4 TABLET ORAL 2 TIMES DAILY WITH MEALS
Qty: 60 TABLET | Refills: 1 | Status: SHIPPED | OUTPATIENT
Start: 2024-05-22

## 2024-05-22 RX ORDER — OXYCODONE HYDROCHLORIDE 20 MG/1
10-20 TABLET ORAL EVERY 4 HOURS PRN
Qty: 180 TABLET | Refills: 0 | Status: SHIPPED | OUTPATIENT
Start: 2024-05-22

## 2024-05-22 RX ORDER — LIDOCAINE 50 MG/G
1 PATCH TOPICAL DAILY
Qty: 10 PATCH | Refills: 2 | Status: SHIPPED | OUTPATIENT
Start: 2024-05-22

## 2024-05-22 RX ORDER — MORPHINE SULFATE 30 MG/1
30 TABLET, FILM COATED, EXTENDED RELEASE ORAL EVERY 12 HOURS SCHEDULED
Qty: 90 TABLET | Refills: 0 | Status: SHIPPED | OUTPATIENT
Start: 2024-05-25 | End: 2024-05-29 | Stop reason: SDUPTHER

## 2024-05-22 RX ORDER — GADOBUTROL 604.72 MG/ML
5 INJECTION INTRAVENOUS
Status: COMPLETED | OUTPATIENT
Start: 2024-05-22 | End: 2024-05-22

## 2024-05-22 RX ADMIN — GADOBUTROL 5 ML: 604.72 INJECTION INTRAVENOUS at 16:48

## 2024-05-22 NOTE — TELEPHONE ENCOUNTER
Caller: Spouse    Doctor: casey    Reason for call: Returned call. Provided # for [facility] [phone]. Will cb as patient has another appt    Call back#: [phone]

## 2024-05-22 NOTE — ASSESSMENT & PLAN NOTE
Multiple myeloma, s/p ASCT on 8/4/23, on lenalidomide + dexamethasone. Recent workup indicates significant progression of disease.  Case discussed via EMR messaging with Dr. Toledo of Radiation Oncology; past message to patient that new Rx from that provider was sent to pharmacy, for increase in dexamethasone.

## 2024-05-22 NOTE — TELEPHONE ENCOUNTER
Patient Call    Who are you speaking with? Andie Hunter      If it is not the patient, are they listed on an active communication consent form? N/A   What is the reason for this call? Andie was calling for our fax number.   Does this require a call back? No   If a call back is required, please list best call back number na   If a call back is required, advise that a message will be forwarded to their care team and someone will return their call as soon as possible.   Did you relay this information to the patient? N/A

## 2024-05-22 NOTE — ASSESSMENT & PLAN NOTE
"Patient's cancer-related pain is severe and progressive, and patient only gets relief of his pain with MSER plus Oxy IR 10 mg for \"an hour\".  Increase morphine ER 30 mg frequency to q.8 hours around-the-clock for progressive and debilitating cancer-related pain in the setting of progressive multiple myeloma.  Increase oxycodone IR to 10-20 mg q.4 hours PRN moderate-severe breakthrough cancer-related pain.  Recommend topical OTC products, local application of heat or cold, Tylenol up to 1000mg TID for chronic pain. Do not use heat on top of topical agents. Do not mix topical agents.  Ordering 5% lidocaine patch, use OTC 4% version if not covered by insurance. Placed on lower back just above painful area.  Continue methocarbamol.  Increase in dexamethasone from Radiation Oncology and may help with pain.  Patient has been provided with a naloxone prescription.  Patient has been tolerating long-term opioid therapy without adverse effects.  Etiology of patient's chronic pain includes cancer-related back pain, cancer-related polyarthralgia, chest wall pain which may be related to malignancy, musculoskeletal pain.  Anticipate patient will need a prior authorization for escalation/change to an existing opioid therapy.  Is the patient currently taking Opioids? yes  [facility] will perform a UDS if patient is suspected of abuse.  This patient is a patient of [facility] with a goal of symptom management and improved quality of life.  Patient will benefit from the use of this particular medication over other medications because he has progressive life-limiting multiple myeloma w/ severe progressive intractable cancer-related pain.  "

## 2024-05-22 NOTE — ASSESSMENT & PLAN NOTE
Emotional / psychosocial support provided today.  ACP: Patient has completed advanced directives (the [facility] Advanced Directive) naming his wife Mary as default surrogate healthcare decision-maker(s). In EMR.  Reviewed notes (ED, Cardiology, [facility], Orthopedics, DC Summary), labs (5/16/24 CO2 20, Cr 0.62, , alb 3.7, trop wnl, Hb 18.4, D-dimer wnl), imaging + procedures (5/15/24 PET CT). See below for more data.  Return in about 1 month (around 6/22/2024).  Medication safety issues addressed - no driving under the influence of narcotics (including opioids), watch for adverse effects including AMS or respiratory depression (slowed breathing), keep medications stored in a safe/locked environment, do not use alcohol while opioids or other narcotics are in one's system, do not travel with more than the minimum number of tablets or capsules required for the trip.  I have personally queried the patient's controlled substance dispensing history in the Prescription Drug Monitoring Program in compliance with regulations before I have prescribed any controlled substances. The prescription history is consistent with prescribed therapy and our practice policies.

## 2024-05-22 NOTE — PATIENT INSTRUCTIONS
"It was good to see you today. Thank you for coming in.    Per Dr Toledo of Radiation Oncology:  Dr Toledo is working on a plan for changes noted on your CT simulation yesterday. He has prescribed dexamethasone 4 mg twice daily with meals to reduce pain / inflammation, Rx sent to your pharmacy. Start today, this replaces your prior dexamethasone Rx from Palliative.   You need to continue pantoprazole given the increase in steroids.  Call Radiation Oncology ASAP if you notice worsening numbness, weakness, tingling, difficulty walking / ambulating or incontinence.\"  For back pain:  Tylenol, 1000mg three times per day every day, can be a safe and effective way to reduce chronic pain. This is in addition to your other pain medicine.  You may consider topical products for pain as well (over-the-counter lidocaine, CBD, capsaicin +/- menthol, diclofenac). Brand names include Salonpas, Biofreeze, Aspercreme, Icy Hot, Voltaren, etc. These can be patches, creams, lotions, or roll-on gels.  Try a heating pad or ice pack (you can alternate these about 30 minutes apart) for neck and back pain. Do not use a heating pad for more than 20 minutes out of any single hour.  Do not use topical product under a heating pad; ensure skin is clean and dry.  If this a 5% lidocaine patch is not covered by insurance you may need to use the 4% lidocaine patch (available over-the-counter; brand names include Salonpas, Biofreeze, Aspercreme, Icy Hot, etc).  Increase oxycodone to 10-20mt every 4 hours as needed for moderate-severe pain.  Increase morphine ED to 30mg three times per day (about 8 hours apart).  When using opioids for pain control, constipation is common and patients should act to prevent it:  Drink PLENTY of water. This is important to keep the gut moving.  Some people have success w/ using prunes, prune juice, certain fruits or vegetables (apples, bananas, prunes, pears, raspberries, and vegetables like string beans, broccoli, " "spinach, kale, squash, lentils, peas, and beans), or fiber gummies.  Try a probiotic. This could be yogurt or kefir, or fermented beverages such as kombucha, but probiotics are also available in capsule form. Aim for 10-15 billion colony-forming-units, w/ bacteria such as Lactobacillus / Saccharomyces / Actinomyces.  Osmotic laxatives (Miralax, magnesium citrate, Milk of Magnesia) can be very useful for opioid-induced constipation (OIC); take daily to prevent OIC.  Bulk laxatives (Citrucel, Metamucil, Fibercon, Benefiber, wheat germ) are useful for constipation in patients who are not taking opioids, but are not recommended if you are taking opioids.  Colace is good for softening hard stools, or preventing constipation when opioids are being used - but does not stimulate the bowel to move things along once constipation has occurred.  You can use senna, 1 to 2 tabs, once or twice daily as needed for constipation. Use as directed on the box/bottle. Senna is also available in a tea (\"Smooth Move\"). Should that not be enough for your constipation, you can try Dulcolax.  Should that not be enough, consider an enema.  All of these medications are available over-the-counter.  If diarrhea is an issue:  Try Banatrol (banana flakes). Use as directed / as-needed for diarrhea. This is something you may safely take every day. If you become constipated you may wish to stop using it, although it can treat both constipation and diarrhea in some patients. This available over-the-counter at some pharmacies, but you may have more success looking online ([employer].com, amazon.com).  Try a probiotic. This could be yogurt or kefir, or fermented beverages such as kombucha, but probiotics are also available in capsule form. Aim for 10-15 billion colony-forming-units, w/ bacteria such as Lactobacillus / Saccharomyces / Actinomyces.  Stay hydrated!  If needed, it is okay to take Imodium for diarrhea. Use as directed, available " over-the-counter.  I have put in an order for hospital bed so you can stay on the 1 floor. Hopefully insurance will cover this. The [employer] company should reach out to you soon.  If at anytime you wish to discuss hospice cares in the home, please call.  Return in about 1 month (around 6/22/2024).  Call us for refills on medications that we supply, as needed.   If something changes and you need to come in sooner, please call our office.    PRESCRIPTION REFILL REMINDER:  All medication refills should be requested prior to Noon on Friday. Any refill requests after noon on Friday would be addressed the following Monday.    MEDICATION SAFETY ISSUES:   Do not drive under the influence of narcotics (including opioids), watch for adverse effects including confusion / altered mental status / respiratory depression (slowed breathing), keep medications stored in a safe/locked environment, do not use alcohol while opioids or other narcotics are in your system. Do not travel with more than the minimum number of tablets or capsules required for the trip.

## 2024-05-22 NOTE — ASSESSMENT & PLAN NOTE
Continue full disease-directed cares. Briefly discussed hospice again, patient feels that he is not ready to consider comfort cares. He states that if he were to enroll in hospice at some point in the future, he would want home-based hospice services.

## 2024-05-22 NOTE — ASSESSMENT & PLAN NOTE
With weight loss and frailty, generalized weakness.  Put in DME order for hospital bed so patient can remain on 1 floor, considering his ambulatory dysfunction, weakness, cachexia. Ordered gel overlay as part of the package.

## 2024-05-22 NOTE — ASSESSMENT & PLAN NOTE
Body mass index is 17.1 kg/m².   Patient frail, cachectic, debilitated, fatigued. Would not likely be able to tolerate physical therapy at this point. He has declined Oncology PT in the past. Hopefully increasing dexamethasone from other providers and will help patient to improve his appetite.

## 2024-05-22 NOTE — PROGRESS NOTES
"Follow-up with Palliative and Supportive Care  Fredy Martinez Jr. 58 y.o. male [id number]    ASSESSMENT & PLAN:    1. Multiple myeloma not having achieved remission (HCC)  Assessment & Plan:  Multiple myeloma, s/p ASCT on 8/4/23, on lenalidomide + dexamethasone. Recent workup indicates significant progression of disease.  Case discussed via EMR messaging with Dr. Toledo of Radiation Oncology; past message to patient that new Rx from that provider was sent to pharmacy, for increase in dexamethasone.  Orders:  -     lidocaine (LIDODERM) 5 %; Apply 1 patch topically over 12 hours daily - apply to middle back - Remove & Discard patch within 12 hours  -     morphine (MS CONTIN) 30 mg 12 hr tablet; Take 1 tablet (30 mg total) by mouth every 12 (twelve) hours - scheduled, to reduce and prevent cancer pain Max Daily Amount: 60 mg Do not start before May 25, 2024.  -     oxyCODONE (ROXICODONE) 20 MG TABS; Take 0.5-1 tablets (10-20 mg total) by mouth every 4 (four) hours as needed (moderate/severe cancer pain) Max Daily Amount: 120 mg  2. Metastasis to bone (HCC)  -     oxyCODONE (ROXICODONE) 20 MG TABS; Take 0.5-1 tablets (10-20 mg total) by mouth every 4 (four) hours as needed (moderate/severe cancer pain) Max Daily Amount: 120 mg  3. Cancer related pain  Assessment & Plan:  Patient's cancer-related pain is severe and progressive, and patient only gets relief of his pain with MSER plus Oxy IR 10 mg for \"an hour\".  Increase morphine ER 30 mg frequency to q.8 hours around-the-clock for progressive and debilitating cancer-related pain in the setting of progressive multiple myeloma.  Increase oxycodone IR to 10-20 mg q.4 hours PRN moderate-severe breakthrough cancer-related pain.  Recommend topical OTC products, local application of heat or cold, Tylenol up to 1000mg TID for chronic pain. Do not use heat on top of topical agents. Do not mix topical agents.  Ordering 5% lidocaine patch, use OTC 4% version if not covered by " insurance. Placed on lower back just above painful area.  Continue methocarbamol.  Increase in dexamethasone from Radiation Oncology and may help with pain.  Patient has been provided with a naloxone prescription.  Patient has been tolerating long-term opioid therapy without adverse effects.  Etiology of patient's chronic pain includes cancer-related back pain, cancer-related polyarthralgia, chest wall pain which may be related to malignancy, musculoskeletal pain.  Anticipate patient will need a prior authorization for escalation/change to an existing opioid therapy.  Is the patient currently taking Opioids? yes  [facility] will perform a UDS if patient is suspected of abuse.  This patient is a patient of [facility] with a goal of symptom management and improved quality of life.  Patient will benefit from the use of this particular medication over other medications because he has progressive life-limiting multiple myeloma w/ severe progressive intractable cancer-related pain.  Orders:  -     lidocaine (LIDODERM) 5 %; Apply 1 patch topically over 12 hours daily - apply to middle back - Remove & Discard patch within 12 hours  -     morphine (MS CONTIN) 30 mg 12 hr tablet; Take 1 tablet (30 mg total) by mouth every 12 (twelve) hours - scheduled, to reduce and prevent cancer pain Max Daily Amount: 60 mg Do not start before May 25, 2024.  -     oxyCODONE (ROXICODONE) 20 MG TABS; Take 0.5-1 tablets (10-20 mg total) by mouth every 4 (four) hours as needed (moderate/severe cancer pain) Max Daily Amount: 120 mg  4. Back pain  -     lidocaine (LIDODERM) 5 %; Apply 1 patch topically over 12 hours daily - apply to middle back - Remove & Discard patch within 12 hours  -     morphine (MS CONTIN) 30 mg 12 hr tablet; Take 1 tablet (30 mg total) by mouth every 12 (twelve) hours - scheduled, to reduce and prevent cancer pain Max Daily Amount: 60 mg Do not start before May  25, 2024.  -     oxyCODONE (ROXICODONE) 20 MG TABS; Take 0.5-1 tablets (10-20 mg total) by mouth every 4 (four) hours as needed (moderate/severe cancer pain) Max Daily Amount: 120 mg  5. Therapeutic opioid-induced constipation (OIC)  Assessment & Plan:  Counseled today on bowel regimen for opioid-induced constipation.  6. Insomnia  Assessment & Plan:  Primarily s/t pain at this point; hopefully will improve w/ better pain control.  7. Diarrhea  Assessment & Plan:  Counseled today on bowel regimen for diarrhea.    8. Ambulatory dysfunction  Assessment & Plan:  With weight loss and frailty, generalized weakness.  Put in DME order for hospital bed so patient can remain on 1 floor, considering his ambulatory dysfunction, weakness, cachexia. Ordered gel overlay as part of the package.  9. Cachexia (HCC)  Assessment & Plan:  Body mass index is 17.1 kg/m².   Patient frail, cachectic, debilitated, fatigued. Would not likely be able to tolerate physical therapy at this point. He has declined Oncology PT in the past. Hopefully increasing dexamethasone from other providers and will help patient to improve his appetite.  10. Palliative care patient  Assessment & Plan:  Emotional / psychosocial support provided today.  ACP: Patient has completed advanced directives (the [facility] Advanced Directive) naming his wife Mary as default surrogate healthcare decision-maker(s). In EMR.  Reviewed notes (ED, Cardiology, [facility] Medical Oncology, Orthopedics, DC Summary), labs (5/16/24 CO2 20, Cr 0.62, , alb 3.7, trop wnl, Hb 18.4, D-dimer wnl), imaging + procedures (5/15/24 PET CT). See below for more data.  Return in about 1 month (around 6/22/2024).  Medication safety issues addressed - no driving under the influence of narcotics (including opioids), watch for adverse effects including AMS or respiratory depression (slowed breathing), keep medications stored in a safe/locked environment, do not use alcohol while opioids or other  narcotics are in one's system, do not travel with more than the minimum number of tablets or capsules required for the trip.  I have personally queried the patient's controlled substance dispensing history in the Prescription Drug Monitoring Program in compliance with regulations before I have prescribed any controlled substances. The prescription history is consistent with prescribed therapy and our practice policies.    Orders:  -     lidocaine (LIDODERM) 5 %; Apply 1 patch topically over 12 hours daily - apply to middle back - Remove & Discard patch within 12 hours  -     morphine (MS CONTIN) 30 mg 12 hr tablet; Take 1 tablet (30 mg total) by mouth every 12 (twelve) hours - scheduled, to reduce and prevent cancer pain Max Daily Amount: 60 mg Do not start before May 25, 2024.  -     oxyCODONE (ROXICODONE) 20 MG TABS; Take 0.5-1 tablets (10-20 mg total) by mouth every 4 (four) hours as needed (moderate/severe cancer pain) Max Daily Amount: 120 mg  11. Goals of care, counseling/discussion  Assessment & Plan:  Continue full disease-directed cares. Briefly discussed hospice again, patient feels that he is not ready to consider comfort cares. He states that if he were to enroll in hospice at some point in the future, he would want home-based hospice services.      45 minutes were spent in this ambulatory visit with greater than 50% of the time spent face to face with patient and his wife Mary in counseling or coordination of care including symptom assessment and management, medication review, medication adjustment, psychosocial support, chart review, imaging review, lab review, goals of care, opioid titration, supportive listening, anticipatory guidance, and DME. All of the patient's questions were answered during this discussion.    SUBJECTIVE:  Chief Complaint   Patient presents with    Follow-up    Cancer    Pain    Counseling    Gait Problem    Weakness - Generalized    Weight Loss        HPI    Fredy Martinez Jr.  "is a 58 y.o. male w/ multiple myeloma, s/p ASCT on 8/4/23, on lenalidomide + dexamethasone; severe intractable pain (s/t malignancy, s/p localized RT, s/t MSK causes), cachexia / unintentional weight loss. He follows w/ Dr JACK Garcia (Medical Oncology), Dr Foss ([facility]), Dr Toledo (Radiation Oncology).    Patient complains of severe intractable pain, focused in his lower back today, but also with severe migrating polyarthralgia and generalized pain. He feels pain is significantly increased compared to several months ago, and is limiting his quality of life. He is debilitated and weak, and it is pain for him to sit in the transport chair today as he has very little tissue in his posterior. He is cachectic and has a BMI of 17. Patient's current pain regimen does provide some relief of this progressive pain, but it \"only lasts an hour\".    Patient is not able to manage ambulation up to his bedroom and he would like to have a hospital bed placed on the ground floor or bathroom another amenities are.    Message received from Dr. Toledo of Radiation Oncology today stating he is prescribing dexamethasone 4 mg BID ATC considering the CT SIM done indicates there may be an epidural lesion; recent PET scan shows definite progression of disease.    Patient has some anxiousness due to his pain. Pain absolutely inhibits his sleep. Thankfully he has not had any nausea or vomiting recently. He has had episodes of diarrhea (as recently as earlier today) and issues with constipation in the past.    Patient states he has not had any alcohol in approximately 2 years.    The following portions of the medical history were reviewed: past medical history, surgical history, problem list, medication list, family history, and social history.      Current Outpatient Medications:     acetaminophen (TYLENOL) 500 mg tablet, Take 2 tablets (1,000 mg total) by mouth 3 (three) times a day, Disp: 180 tablet, Rfl: 2    apixaban (Eliquis) 5 mg, Take 1 " "tablet (5 mg total) by mouth 2 (two) times a day, Disp: 60 tablet, Rfl: 5    dexamethasone (DECADRON) 4 mg tablet, Take 1 tablet (4 mg total) by mouth 2 (two) times a day with meals Continue pantoprazole while receiving this medication., Disp: 60 tablet, Rfl: 1    lenalidomide (Revlimid) 5 MG CAPS, TAKE 1 CAPSULE BY MOUTH 1 TIME DAILY, Disp: 28 capsule, Rfl: 0    lidocaine (LIDODERM) 5 %, Apply 1 patch topically over 12 hours daily - apply to middle back - Remove & Discard patch within 12 hours, Disp: 10 patch, Rfl: 2    methocarbamol (ROBAXIN) 500 mg tablet, Take 1 tablet (500 mg total) by mouth 4 (four) times a day, Disp: 120 tablet, Rfl: 2    [START ON 5/25/2024] morphine (MS CONTIN) 30 mg 12 hr tablet, Take 1 tablet (30 mg total) by mouth every 12 (twelve) hours - scheduled, to reduce and prevent cancer pain Max Daily Amount: 60 mg Do not start before May 25, 2024., Disp: 90 tablet, Rfl: 0    naloxone (NARCAN) 4 mg/0.1 mL nasal spray, Administer 1 spray into a nostril. If no response after 2-3 minutes, give another dose in the other nostril using a new spray., Disp: 1 each, Rfl: 1    ondansetron (ZOFRAN) 4 mg tablet, Take 1 tablet (4 mg total) by mouth every 8 (eight) hours as needed for nausea or vomiting, Disp: 40 tablet, Rfl: 2    oxyCODONE (ROXICODONE) 20 MG TABS, Take 0.5-1 tablets (10-20 mg total) by mouth every 4 (four) hours as needed (moderate/severe cancer pain) Max Daily Amount: 120 mg, Disp: 180 tablet, Rfl: 0    pantoprazole (PROTONIX) 40 mg tablet, Take 1 tablet (40 mg total) by mouth daily - in the morning, Disp: 90 tablet, Rfl: 0    polyethylene glycol (MIRALAX) 17 g packet, Take 17 g by mouth daily, Disp: , Rfl:     senna-docusate sodium (SENOKOT S) 8.6-50 mg per tablet, Take 1 tablet by mouth daily at bedtime, Disp: , Rfl:     OBJECTIVE:  /62 (BP Location: Right arm, Patient Position: Sitting, Cuff Size: Standard)   Pulse (!) 118   Temp 97.7 °F (36.5 °C) (Temporal)   Ht 5' 10\" (1.778 " m)   Wt 54.1 kg (119 lb 3.2 oz)   SpO2 96%   BMI 17.10 kg/m²   Physical Exam  Vitals reviewed.   Constitutional:       General: He is awake. He is not in acute distress.     Appearance: He is well-groomed. He is cachectic. He is ill-appearing.      Comments: Frail and debilitated. In severe discomfort, tremulous.   HENT:      Head: Normocephalic and atraumatic.      Right Ear: External ear normal.      Left Ear: External ear normal.      Mouth/Throat:      Dentition: Abnormal dentition. Dental caries present.   Eyes:      General: No scleral icterus.        Right eye: No discharge.         Left eye: No discharge.      Extraocular Movements: Extraocular movements intact.      Conjunctiva/sclera: Conjunctivae normal.      Pupils: Pupils are equal, round, and reactive to light.   Cardiovascular:      Rate and Rhythm: Tachycardia present.   Pulmonary:      Effort: Pulmonary effort is normal. No tachypnea, bradypnea, accessory muscle usage or respiratory distress.      Comments: Able to speak comfortably in complete sentences on room air at rest.  Abdominal:      General: There is no distension.      Tenderness: There is no guarding.   Musculoskeletal:      Cervical back: Normal range of motion.      Right lower leg: No edema.      Left lower leg: No edema.   Skin:     General: Skin is dry.      Coloration: Skin is not pale.   Neurological:      Mental Status: He is alert and oriented to person, place, and time.      Cranial Nerves: No dysarthria or facial asymmetry.      Motor: Weakness present.      Gait: Gait abnormal (seen in transport chair).   Psychiatric:         Attention and Perception: Attention normal.         Mood and Affect: Mood is anxious.         Speech: Speech is rapid and pressured (which is his baseline).         Behavior: Behavior is not slowed or withdrawn. Behavior is cooperative.         Thought Content: Thought content is not paranoid or delusional.          Recent labs:  Lab Results    Component Value Date/Time    SODIUM 135 05/16/2024 01:56 PM    K 3.7 05/16/2024 01:56 PM    BUN 11 05/16/2024 01:56 PM    CREATININE 0.62 05/16/2024 01:56 PM    GLUC 122 05/16/2024 01:56 PM    CALCIUM 10.2 05/16/2024 01:56 PM    AST 23 05/16/2024 01:56 PM    ALT 9 05/16/2024 01:56 PM    ALB 3.7 05/16/2024 01:56 PM    TP 6.9 05/16/2024 01:56 PM    EGFR 110 05/16/2024 01:56 PM     Lab Results   Component Value Date/Time    HGB 18.4 (H) 05/16/2024 01:56 PM    WBC 7.72 05/16/2024 01:56 PM     05/16/2024 01:56 PM    INR 1.10 03/30/2024 09:14 PM    PTT 36 04/04/2024 09:04 AM     Lab Results   Component Value Date/Time    QME3MBLNPXWN 0.680 02/08/2023 10:16 AM       Recent Imaging:  Procedure: XR chest 1 view portable    Result Date: 5/17/2024  Narrative: XR CHEST PORTABLE INDICATION: CP. COMPARISON: 4/2/2024 FINDINGS: Clear lungs. No pneumothorax or pleural effusion. Normal cardiomediastinal silhouette. Bones are unremarkable for age. Normal upper abdomen.     Impression: No acute cardiopulmonary disease. Workstation performed: [id number]     Procedure: NM PET CT skull base to mid thigh    Result Date: 5/16/2024  Narrative: PET/CT SCAN INDICATION: History of multiple myeloma. Previously treated. Restaging exam. C79.51: Secondary malignant neoplasm of bone C90.01: Multiple myeloma in remission MODIFIER: PS COMPARISON: PET/CT 10/25/2023, CT chest 3/30/2024, and additional priors CELL TYPE: Plasma cell neoplasm TECHNIQUE: 8.6 mCi F-18-FDG administered IV. Multiplanar attenuation corrected and non attenuation corrected PET images are available for interpretation, and contiguous, low dose, axial CT sections were obtained from the skull vertex through the femurs. Intravenous contrast material was not utilized. This examination, like all CT scans performed in the [facility], was performed utilizing techniques to minimize radiation dose exposure, including the use of iterative reconstruction and  automated exposure control. Fasting serum glucose: 136 mg/dl FINDINGS: VISUALIZED BRAIN: No acute abnormalities are seen. HEAD/NECK: There is a physiologic distribution of FDG. No FDG avid cervical adenopathy is seen. CT images: Unremarkable. CHEST: No FDG avid soft tissue lesions are seen. CT images: Previously noted nodular intraluminal foci along the upper trachea have cleared likely debris. Calcified granuloma in the left lung with calcified left perihilar lymph nodes. ABDOMEN/PELVIS: New FDG avid soft tissue nodules along the left psoas margin. A soft tissue nodule here demonstrates SUV max of 6.2. This measures up to 2.1 cm in length image 137 series 4. Variable physiologic bowel activity. Focal uptake at the right hemipelvis laterally likely physiologic ureteral activity. CT images: Calcified splenic granuloma. Cholelithiasis. Large left scrotal hydrocele. OSSEOUS STRUCTURES: Multiple FDG avid osseous lesions compatible with metastasis. This has progressed from the prior PET/CT. These correspond to lytic lesions on CT. Reference lesions as noted below: *  Slightly more extensive uptake at the right proximal humeral shaft demonstrates SUV max of 5.0, previously 3.7. *  New prominent lesion at the left proximal humerus, SUV max of 7.2 with associated 3.7 cm lytic lesion on CT which demonstrates variable cortical thinning and cortical breakthrough. *  More prominent inferior sternal lesion now with SUV max of 9.0, previously 8.4. *  T9 vertebral body lesion demonstrates a SUV max of 9.0. Associated large lytic lesion on CT. *  Right T9-T10 lesion extending from the level of the pedicles into the adjacent soft tissues demonstrates SUV max of 5.2. *  New lytic lesion at the left upper sacrum demonstrates SUV max of 7.0. *  More prominent left posterior iliac lytic lesion demonstrates SUV max 7.6, previously 5.1. *  Larger lytic lesion at the left posterior acetabulum now SUV max of 17, previously 4.4. There is  cortical destruction medially and posteriorly. This measures up to 4.6 cm in size on CT. *  New left anterior acetabulum/superior pubic ramus lytic lesion demonstrates SUV max of 12. *  More prominent lytic lesion at the right anterior iliac bone/superior acetabulum, SUV max of 8.1, previously 3.2. There is cortical loss mostly along the medial margin. This measures up to 5.0 cm in size on CT. *  New FDG-avid lesions at the right proximal femur. A small focus near the trochanters demonstrates SUV max of 9.5. *  Fairly extensive FDG uptake at the right proximal femur extending from the lesser trochanter into the proximal femoral shaft, SUV max of 6.9. Associated bony scalloping and cortical thinning on CT. *  Patchy heterogeneous radiotracer uptake at the left proximal femur, SUV max of 9.2. This may be a combination of pathologic fracture and healing from interval ORIF. CT images: Interval left hip ORIF. Kyphosis of the thoracic spine with chronic compression deformities in the midthoracic spine.     Impression: 1. Findings compatible with disease progression from PET/CT of 10/25/2023. 2. New FDG avid soft tissue nodules along the left psoas margin concerning for metastasis. 3. Progression of extensive FDG avid osseous metastasis involving the axial and appendicular skeleton. New or enlarging lytic lesions of the left proximal humerus, bony pelvis bilaterally along with extensive intramedullary disease along the right proximal femur. Orthopedic follow-up is advised as these may be at risk for pathologic fracture in the future. The study was marked in EPIC for significant notification. Workstation performed: [id number]     Procedure: XR femur 2 vw left    Result Date: 4/19/2024  Narrative: LEFT FEMUR INDICATION:   Other specified postprocedural states. COMPARISON: 3/30/2024 VIEWS:  XR FEMUR 2 VW LEFT FINDINGS: Near-anatomic alignment of proximal femoral fracture status post ORIF Degenerative changes of the knee  noted. No lytic or blastic osseous lesion. Soft tissues are unremarkable.     Impression: Near-anatomic alignment of proximal femoral fracture status post ORIF Electronically signed: 04/19/2024 04:10 PM Ronald Lizama MD    Procedure: XR femur 2 vw right    Result Date: 4/16/2024  Narrative: RIGHT FEMUR INDICATION:   Other specified postprocedural states. COMPARISON: 3/30/2024 VIEWS:  XR FEMUR 2 VW RIGHT FINDINGS: There is no acute fracture or dislocation. Mild osteoarthritis and mild osteopenia No lytic or blastic osseous lesion. Soft tissues are unremarkable.     Impression: No acute osseous abnormality. Electronically signed: 04/16/2024 09:30 PM Ronald Lizama MD    Procedure: XR chest portable ICU    Result Date: 4/2/2024  Narrative: XR CHEST PORTABLE ICU INDICATION: re-occuring SVT, post op. COMPARISON: Chest radiograph 7/3/2023 FINDINGS: Clear lungs. No pneumothorax or pleural effusion. Normal cardiomediastinal silhouette. Chronic left-sided rib deformities. Lucent bone lesions better assessed on CT. Normal upper abdomen.     Impression: No acute cardiopulmonary disease. Workstation performed: [id number]     Procedure: XR femur 2 vw left    Result Date: 4/2/2024  Narrative: C-ARM - XR FEMUR 2 VW LEFT INDICATION: Femur fracture, left (HCC) [S72.92XA]. Procedure guidance. TECHNIQUE: Fluoroscopic guidance provided. COMPARISON: None FLUOROSCOPY TIME: 290.7 seconds 8 FLUOROSCOPIC IMAGES FINDINGS: Fluoroscopy provided for procedure guidance. Osseous and soft tissue detail limited by technique.     Impression: Fluoroscopy provided for procedure guidance. Please refer to the separate procedure note for additional details. Workstation performed: [id number]     Procedure: Echo complete w/ contrast if indicated    Result Date: 4/1/2024  Narrative:   Left Ventricle: Left ventricular cavity size is normal. Wall thickness is normal. The left ventricular ejection fraction is 60%. Systolic function is normal. Wall motion  is normal. Diastolic function is mildly abnormal, consistent with grade I (abnormal) relaxation.   Right Ventricle: Right ventricular cavity size is normal. Systolic function is normal.     Procedure: XR hip/pelv 2-3 vws left    Result Date: 3/31/2024  Narrative: XR HIP/PELV 2-3 VWS LEFT  W PELVIS IF PERFORMED INDICATION: Left hip pain following fall. COMPARISON: None FINDINGS: Proximal number subtrochanteric fracture of the proximal left femur with medial displacement of the distal fracture fragment. No significant hip degenerative changes. Multiple myeloma osseous lesions seen on prior studies are not definitively seen on this exam. Phleboliths in the pelvis. Otherwise unremarkable soft tissues. Unremarkable visualized lumbar spine.     Impression: Displaced proximal left femoral diaphysis fracture. Resident: MICHELLE PATRICK I, the attending radiologist, have reviewed the images and agree with the final report above. Workstation performed: [id number]     Procedure: XR femur 2 views LEFT    Result Date: 3/31/2024  Narrative: XR FEMUR 2 VW LEFT INDICATION: pain. COMPARISON: None FINDINGS: Proximal left femoral diaphysis fracture. No significant degenerative changes. No lytic or blastic osseous lesion. Unremarkable soft tissues.     Impression: Proximal left femoral diaphysis fracture. Workstation performed: [id number]     Procedure: CTA ED chest PE study    Result Date: 3/30/2024  Narrative: CTA - CHEST WITH IV CONTRAST - PULMONARY ANGIOGRAM INDICATION: Fracture, hx cancer, tachycardia, shortness of breath. COMPARISON: Most recent prior study for comparison is a CT scan dated May 11, 2023 and a PET/CT dated October 25, 2023.. TECHNIQUE: CTA examination of the chest was performed using angiographic technique according to a protocol specifically tailored to evaluate for pulmonary embolism. Multiplanar 2D reformatted images were created from the source data. In addition, coronal  3D MIP postprocessing was performed on  "the acquisition scanner. Radiation dose length product (DLP) for this visit: 333 mGy-cm . This examination, like all CT scans performed in the [facility], was performed utilizing techniques to minimize radiation dose exposure, including the use of iterative reconstruction and automated exposure control. IV Contrast: 100 mL of iohexol (OMNIPAQUE) FINDINGS: PULMONARY ARTERIAL TREE: Peripheral emboli are noted on the right, at the origin of the right middle and lower lobar arteries. LUNGS: Fluid/debris in the right lower lobe airways. Bibasilar atelectasis. Secretions in the lower trachea and proximal left mainstem bronchus. Scattered groundglass in the periphery of the right middle and lower lobes likely infectious/inflammatory. PLEURA: Unremarkable. HEART/GREAT VESSELS: Unremarkable for patient's age. No thoracic aortic aneurysm. MEDIASTINUM AND PADMINI: Unremarkable. CHEST WALL AND LOWER NECK: Unremarkable. VISUALIZED STRUCTURES IN THE UPPER ABDOMEN: Multiple calcified granuloma in the spleen. OSSEOUS STRUCTURES: Numerous lytic lesions throughout the spine, sternum and ribs due to known multiple myeloma. Multiple previously noted soft tissue lesions associated with ribs on the right, left anterior second and third ribs have resolved.     Impression: Subocclusive emboli in the lobar arteries of the right middle and lower lobes which are peripheral in location, compatible with subacute emboli. No infarct or right heart strain. Multiple lytic lesions throughout the visualized axial and appendicular skeleton compatible with known myeloma. 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 Findings were discussed with Dr. Yen Alcocer from the emergency department at the time of this dictation. Workstation performed: [id number]      Can Reardon MD  [facility]  [phone]    Portions of this document may have been created using dictation software and as such some \"sound " "alike\" terms may have been generated by the system. Do not hesitate to contact me with any questions or clarifications.   "

## 2024-05-23 ENCOUNTER — TELEPHONE (OUTPATIENT)
Dept: PALLIATIVE MEDICINE | Facility: CLINIC | Age: 59
End: 2024-05-23

## 2024-05-23 ENCOUNTER — APPOINTMENT (OUTPATIENT)
Dept: RADIATION ONCOLOGY | Facility: HOSPITAL | Age: 59
End: 2024-05-23
Attending: RADIOLOGY
Payer: COMMERCIAL

## 2024-05-23 NOTE — TELEPHONE ENCOUNTER
Prior Authorization completed for Morphine 30 mg BID via [facility] formulary exception form. From has been faxed to [phone] and has been marked urgent for expedited review.       Gayatri, please follow up with the pharmacy with in 72 hours.     Pharmacy: [facility]  Fax: [phone]

## 2024-05-24 ENCOUNTER — APPOINTMENT (OUTPATIENT)
Dept: RADIATION ONCOLOGY | Facility: HOSPITAL | Age: 59
End: 2024-05-24
Attending: RADIOLOGY
Payer: COMMERCIAL

## 2024-05-24 LAB
DME PARACHUTE DELIVERY DATE EXPECTED: NORMAL
DME PARACHUTE DELIVERY DATE REQUESTED: NORMAL
DME PARACHUTE ITEM DESCRIPTION: NORMAL
DME PARACHUTE ORDER STATUS: NORMAL
DME PARACHUTE SUPPLIER NAME: NORMAL
DME PARACHUTE SUPPLIER PHONE: NORMAL

## 2024-05-24 PROCEDURE — 77300 RADIATION THERAPY DOSE PLAN: CPT | Performed by: RADIOLOGY

## 2024-05-24 PROCEDURE — 77295 3-D RADIOTHERAPY PLAN: CPT | Performed by: RADIOLOGY

## 2024-05-24 PROCEDURE — 77387 GUIDANCE FOR RADJ TX DLVR: CPT | Performed by: RADIOLOGY

## 2024-05-24 PROCEDURE — 77290 THER RAD SIMULAJ FIELD CPLX: CPT | Performed by: STUDENT IN AN ORGANIZED HEALTH CARE EDUCATION/TRAINING PROGRAM

## 2024-05-24 PROCEDURE — 77334 RADIATION TREATMENT AID(S): CPT | Performed by: RADIOLOGY

## 2024-05-24 PROCEDURE — 77412 RADIATION TX DELIVERY LVL 3: CPT | Performed by: RADIOLOGY

## 2024-05-28 ENCOUNTER — APPOINTMENT (OUTPATIENT)
Dept: RADIATION ONCOLOGY | Facility: HOSPITAL | Age: 59
End: 2024-05-28
Attending: RADIOLOGY
Payer: COMMERCIAL

## 2024-05-28 PROBLEM — R19.00 RETROPERITONEAL MASS: Status: ACTIVE | Noted: 2024-01-01

## 2024-05-28 PROBLEM — S72.301A CLOSED FRACTURE OF SHAFT OF RIGHT FEMUR (HCC): Status: ACTIVE | Noted: 2024-05-28

## 2024-05-28 PROBLEM — G89.11 ACUTE PAIN DUE TO TRAUMA: Status: ACTIVE | Noted: 2024-05-28
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
## 2024-05-28 PROBLEM — I26.99 PULMONARY EMBOLISM (HCC): Chronic | Status: ACTIVE | Noted: 2024-01-01

## 2024-05-28 PROBLEM — K21.9 GERD (GASTROESOPHAGEAL REFLUX DISEASE): Status: ACTIVE | Noted: 2024-05-28

## 2024-05-28 PROBLEM — R00.0 TACHYCARDIA: Status: ACTIVE | Noted: 2024-05-28

## 2024-05-28 PROBLEM — M50.923: Status: ACTIVE | Noted: 2024-05-28

## 2024-05-28 PROBLEM — E46 PROTEIN CALORIE MALNUTRITION (HCC): Chronic | Status: ACTIVE | Noted: 2024-01-01

## 2024-05-28 PROBLEM — C90.00 MULTIPLE MYELOMA (HCC): Chronic | Status: ACTIVE | Noted: 2024-01-01

## 2024-05-28 PROBLEM — S42.202A CLOSED FRACTURE OF PROXIMAL END OF LEFT HUMERUS: Status: ACTIVE | Noted: 2024-01-01

## 2024-05-28 PROBLEM — G89.29 CHRONIC PAIN: Status: ACTIVE | Noted: 2024-01-01

## 2024-05-28 PROBLEM — E87.3 RESPIRATORY ALKALOSIS: Status: ACTIVE | Noted: 2024-01-01

## 2024-05-29 ENCOUNTER — APPOINTMENT (OUTPATIENT)
Dept: RADIATION ONCOLOGY | Facility: HOSPITAL | Age: 59
End: 2024-05-29
Attending: RADIOLOGY
Payer: COMMERCIAL

## 2024-05-29 ENCOUNTER — TELEPHONE (OUTPATIENT)
Dept: HEMATOLOGY ONCOLOGY | Facility: CLINIC | Age: 59
End: 2024-05-29

## 2024-05-29 DIAGNOSIS — C90.00 MULTIPLE MYELOMA NOT HAVING ACHIEVED REMISSION (HCC): ICD-10-CM

## 2024-05-29 DIAGNOSIS — Z51.5 PALLIATIVE CARE PATIENT: ICD-10-CM

## 2024-05-29 DIAGNOSIS — G89.3 CANCER RELATED PAIN: ICD-10-CM

## 2024-05-29 DIAGNOSIS — M54.9 BACK PAIN: ICD-10-CM

## 2024-05-29 PROBLEM — K59.03 THERAPEUTIC OPIOID INDUCED CONSTIPATION: Status: ACTIVE | Noted: 2024-01-01

## 2024-05-29 PROBLEM — F12.90 MARIJUANA USE, CONTINUOUS: Status: ACTIVE | Noted: 2024-01-01

## 2024-05-29 PROBLEM — T40.2X5A THERAPEUTIC OPIOID INDUCED CONSTIPATION: Status: ACTIVE | Noted: 2024-05-29

## 2024-05-29 RX ORDER — MORPHINE SULFATE 30 MG/1
30 TABLET, FILM COATED, EXTENDED RELEASE ORAL EVERY 8 HOURS
Qty: 90 TABLET | Refills: 0 | Status: ON HOLD | OUTPATIENT
Start: 2024-05-29

## 2024-05-29 NOTE — TELEPHONE ENCOUNTER
Medication Refill Request   Who are you speaking with? Pharmacy   If it is not the patient, are they listed on an active communication consent form?     N/A   Which medication is being requested for refill?  Please list medication name and dosage    lenalidomide (Revlimid) 5 MG CAPS    How many pills does the patient have left?  0   Preferred Pharmacy / Address    [facility] - [place], [place] - [address]  [address], [facility] [address]  Phone: [phone]  Fax: [phone]      Who is the prescribing provider? Dr. Garcia   Call back number [phone]   Relevant Information  Pharmacy would like this sent today please.

## 2024-05-29 NOTE — TELEPHONE ENCOUNTER
Reviewed with Pamela Resendiz on hold for now. Patient to see Dr. Foss at [place] next week. [facility] notified and made aware.

## 2024-05-30 ENCOUNTER — APPOINTMENT (OUTPATIENT)
Dept: RADIATION ONCOLOGY | Facility: HOSPITAL | Age: 59
End: 2024-05-30
Attending: RADIOLOGY
Payer: COMMERCIAL

## 2024-05-30 PROBLEM — E43 SEVERE PROTEIN-CALORIE MALNUTRITION (HCC): Status: ACTIVE | Noted: 2024-01-01

## 2024-05-30 LAB
DME PARACHUTE DELIVERY DATE ACTUAL: NORMAL
DME PARACHUTE DELIVERY DATE EXPECTED: NORMAL
DME PARACHUTE DELIVERY DATE REQUESTED: NORMAL
DME PARACHUTE ITEM DESCRIPTION: NORMAL
DME PARACHUTE ORDER STATUS: NORMAL
DME PARACHUTE SUPPLIER NAME: NORMAL
DME PARACHUTE SUPPLIER PHONE: NORMAL

## 2024-05-31 ENCOUNTER — APPOINTMENT (OUTPATIENT)
Dept: RADIATION ONCOLOGY | Facility: HOSPITAL | Age: 59
End: 2024-05-31
Attending: RADIOLOGY
Payer: COMMERCIAL

## 2024-06-02 PROBLEM — E87.3 RESPIRATORY ALKALOSIS: Status: RESOLVED | Noted: 2024-01-01 | Resolved: 2024-01-01

## 2024-06-04 ENCOUNTER — TELEPHONE (OUTPATIENT)
Age: 59
End: 2024-06-04

## 2024-06-04 ENCOUNTER — TELEPHONE (OUTPATIENT)
Dept: HEMATOLOGY ONCOLOGY | Facility: CLINIC | Age: 59
End: 2024-06-04

## 2024-06-04 NOTE — TELEPHONE ENCOUNTER
Medication Refill Request   Who are you speaking with? Desi   If it is not the patient, are they listed on an active communication consent form?     N/A   Which medication is being requested for refill?  Please list medication name and dosage  Revlimid 5MG   How many pills does the patient have left? 5   Preferred Pharmacy / Address  [facility] - [place], [place] - [address]   [address] [facility] [address], [facility] [address]    Who is the prescribing provider? Dr. Garcia   Call back number  [phone]   Relevant Information Desi stated that per patients wife, he was taking while he was in the hospital and now only has 5 left

## 2024-06-05 ENCOUNTER — TELEPHONE (OUTPATIENT)
Dept: HEMATOLOGY ONCOLOGY | Facility: CLINIC | Age: 59
End: 2024-06-05

## 2024-06-05 ENCOUNTER — TELEPHONE (OUTPATIENT)
Dept: OBGYN CLINIC | Facility: HOSPITAL | Age: 59
End: 2024-06-05

## 2024-06-05 NOTE — TELEPHONE ENCOUNTER
"Called to schedule follow up post op #1 right leg nail sx 5/29/24 with Dr Hernandez    Wife ivy answered and stated \" patient currently in rehab for other issues and would not be scheduling any appointments at this time\"     "

## 2024-06-05 NOTE — TELEPHONE ENCOUNTER
Opal called to confirm a 6/10/24 @ 3 pm appt. I notified her that there is not one on his schedule for that time, he said it is for a Dr. Foss. She will talk with the patient to confirm

## 2024-06-08 ENCOUNTER — HOSPITAL ENCOUNTER (INPATIENT)
Facility: HOSPITAL | Age: 59
LOS: 3 days | Discharge: HOSPICE | DRG: 862 | End: 2024-06-11
Attending: EMERGENCY MEDICINE | Admitting: INTERNAL MEDICINE
Payer: COMMERCIAL

## 2024-06-08 ENCOUNTER — APPOINTMENT (EMERGENCY)
Dept: CT IMAGING | Facility: HOSPITAL | Age: 59
DRG: 862 | End: 2024-06-08
Payer: COMMERCIAL

## 2024-06-08 ENCOUNTER — APPOINTMENT (EMERGENCY)
Dept: RADIOLOGY | Facility: HOSPITAL | Age: 59
DRG: 862 | End: 2024-06-08
Payer: COMMERCIAL

## 2024-06-08 DIAGNOSIS — R65.10 SIRS (SYSTEMIC INFLAMMATORY RESPONSE SYNDROME) (HCC): ICD-10-CM

## 2024-06-08 DIAGNOSIS — M54.9 BACK PAIN: ICD-10-CM

## 2024-06-08 DIAGNOSIS — C79.51 METASTASIS TO BONE (HCC): ICD-10-CM

## 2024-06-08 DIAGNOSIS — R11.10 VOMITING: ICD-10-CM

## 2024-06-08 DIAGNOSIS — G89.3 CANCER RELATED PAIN: Primary | ICD-10-CM

## 2024-06-08 DIAGNOSIS — Z51.5 PALLIATIVE CARE PATIENT: ICD-10-CM

## 2024-06-08 DIAGNOSIS — R07.9 ACUTE CHEST PAIN: ICD-10-CM

## 2024-06-08 DIAGNOSIS — C90.00 MULTIPLE MYELOMA NOT HAVING ACHIEVED REMISSION (HCC): ICD-10-CM

## 2024-06-08 DIAGNOSIS — R10.9 ACUTE ABDOMINAL PAIN: ICD-10-CM

## 2024-06-08 DIAGNOSIS — C90.00 MULTIPLE MYELOMA (HCC): ICD-10-CM

## 2024-06-08 PROBLEM — M84.422A PATHOLOGICAL FRACTURE OF LEFT HUMERUS: Status: ACTIVE | Noted: 2024-06-08

## 2024-06-08 PROBLEM — E87.5 HYPERKALEMIA: Status: ACTIVE | Noted: 2024-06-08

## 2024-06-08 LAB
2HR DELTA HS TROPONIN: -2 NG/L
4HR DELTA HS TROPONIN: -4 NG/L
ALBUMIN SERPL BCP-MCNC: 3.5 G/DL (ref 3.5–5)
ALP SERPL-CCNC: 257 U/L (ref 34–104)
ALT SERPL W P-5'-P-CCNC: 39 U/L (ref 7–52)
ANION GAP SERPL CALCULATED.3IONS-SCNC: 12 MMOL/L (ref 4–13)
ANISOCYTOSIS BLD QL SMEAR: PRESENT
APTT PPP: 27 SECONDS (ref 23–37)
AST SERPL W P-5'-P-CCNC: 38 U/L (ref 13–39)
ATRIAL RATE: 111 BPM
BACTERIA UR QL AUTO: ABNORMAL /HPF
BASOPHILS # BLD MANUAL: 0 THOUSAND/UL (ref 0–0.1)
BASOPHILS NFR MAR MANUAL: 0 % (ref 0–1)
BILIRUB SERPL-MCNC: 0.91 MG/DL (ref 0.2–1)
BILIRUB UR QL STRIP: NEGATIVE
BNP SERPL-MCNC: 318 PG/ML (ref 0–100)
BUN SERPL-MCNC: 38 MG/DL (ref 5–25)
CALCIUM SERPL-MCNC: 10.4 MG/DL (ref 8.4–10.2)
CARDIAC TROPONIN I PNL SERPL HS: 23 NG/L
CARDIAC TROPONIN I PNL SERPL HS: 25 NG/L
CARDIAC TROPONIN I PNL SERPL HS: 27 NG/L
CHLORIDE SERPL-SCNC: 99 MMOL/L (ref 96–108)
CLARITY UR: CLEAR
CO2 SERPL-SCNC: 23 MMOL/L (ref 21–32)
COLOR UR: ABNORMAL
CREAT SERPL-MCNC: 1.02 MG/DL (ref 0.6–1.3)
EOSINOPHIL # BLD MANUAL: 0.11 THOUSAND/UL (ref 0–0.4)
EOSINOPHIL NFR BLD MANUAL: 1 % (ref 0–6)
ERYTHROCYTE [DISTWIDTH] IN BLOOD BY AUTOMATED COUNT: 19.3 % (ref 11.6–15.1)
GFR SERPL CREATININE-BSD FRML MDRD: 81 ML/MIN/1.73SQ M
GLUCOSE SERPL-MCNC: 145 MG/DL (ref 65–140)
GLUCOSE UR STRIP-MCNC: NEGATIVE MG/DL
HCT VFR BLD AUTO: 47.7 % (ref 36.5–49.3)
HGB BLD-MCNC: 16.1 G/DL (ref 12–17)
HGB UR QL STRIP.AUTO: ABNORMAL
HYALINE CASTS #/AREA URNS LPF: ABNORMAL /LPF
INR PPP: 1.06 (ref 0.84–1.19)
KETONES UR STRIP-MCNC: NEGATIVE MG/DL
LACTATE SERPL-SCNC: 2.3 MMOL/L (ref 0.5–2)
LACTATE SERPL-SCNC: 3.2 MMOL/L (ref 0.5–2)
LEUKOCYTE ESTERASE UR QL STRIP: NEGATIVE
LYMPHOCYTES # BLD AUTO: 0.53 THOUSAND/UL (ref 0.6–4.47)
LYMPHOCYTES # BLD AUTO: 5 % (ref 14–44)
MCH RBC QN AUTO: 33.4 PG (ref 26.8–34.3)
MCHC RBC AUTO-ENTMCNC: 33.8 G/DL (ref 31.4–37.4)
MCV RBC AUTO: 99 FL (ref 82–98)
MONOCYTES # BLD AUTO: 0.96 THOUSAND/UL (ref 0–1.22)
MONOCYTES NFR BLD: 9 % (ref 4–12)
MUCOUS THREADS UR QL AUTO: ABNORMAL
NEUTROPHILS # BLD MANUAL: 9.06 THOUSAND/UL (ref 1.85–7.62)
NEUTS BAND NFR BLD MANUAL: 5 % (ref 0–8)
NEUTS SEG NFR BLD AUTO: 80 % (ref 43–75)
NITRITE UR QL STRIP: NEGATIVE
NON-SQ EPI CELLS URNS QL MICRO: ABNORMAL /HPF
P AXIS: 35 DEGREES
PH UR STRIP.AUTO: 6.5 [PH]
PLATELET # BLD AUTO: 181 THOUSANDS/UL (ref 149–390)
PLATELET BLD QL SMEAR: ADEQUATE
PMV BLD AUTO: 8.9 FL (ref 8.9–12.7)
POTASSIUM SERPL-SCNC: 5.4 MMOL/L (ref 3.5–5.3)
PR INTERVAL: 120 MS
PROCALCITONIN SERPL-MCNC: 0.37 NG/ML
PROT SERPL-MCNC: 6.9 G/DL (ref 6.4–8.4)
PROT UR STRIP-MCNC: ABNORMAL MG/DL
PROTHROMBIN TIME: 14.4 SECONDS (ref 11.6–14.5)
QRS AXIS: -69 DEGREES
QRSD INTERVAL: 74 MS
QT INTERVAL: 314 MS
QTC INTERVAL: 427 MS
RBC # BLD AUTO: 4.82 MILLION/UL (ref 3.88–5.62)
RBC #/AREA URNS AUTO: ABNORMAL /HPF
RBC MORPH BLD: PRESENT
SODIUM SERPL-SCNC: 134 MMOL/L (ref 135–147)
SP GR UR STRIP.AUTO: 1.02 (ref 1–1.03)
T WAVE AXIS: 36 DEGREES
UROBILINOGEN UR STRIP-ACNC: <2 MG/DL
VENTRICULAR RATE: 111 BPM
WBC # BLD AUTO: 10.66 THOUSAND/UL (ref 4.31–10.16)
WBC #/AREA URNS AUTO: ABNORMAL /HPF

## 2024-06-08 PROCEDURE — 96365 THER/PROPH/DIAG IV INF INIT: CPT

## 2024-06-08 PROCEDURE — 84145 PROCALCITONIN (PCT): CPT | Performed by: EMERGENCY MEDICINE

## 2024-06-08 PROCEDURE — 71045 X-RAY EXAM CHEST 1 VIEW: CPT

## 2024-06-08 PROCEDURE — 85007 BL SMEAR W/DIFF WBC COUNT: CPT | Performed by: EMERGENCY MEDICINE

## 2024-06-08 PROCEDURE — 85730 THROMBOPLASTIN TIME PARTIAL: CPT | Performed by: EMERGENCY MEDICINE

## 2024-06-08 PROCEDURE — 80053 COMPREHEN METABOLIC PANEL: CPT | Performed by: EMERGENCY MEDICINE

## 2024-06-08 PROCEDURE — 99285 EMERGENCY DEPT VISIT HI MDM: CPT | Performed by: EMERGENCY MEDICINE

## 2024-06-08 PROCEDURE — 71275 CT ANGIOGRAPHY CHEST: CPT

## 2024-06-08 PROCEDURE — 84484 ASSAY OF TROPONIN QUANT: CPT | Performed by: EMERGENCY MEDICINE

## 2024-06-08 PROCEDURE — 36415 COLL VENOUS BLD VENIPUNCTURE: CPT | Performed by: EMERGENCY MEDICINE

## 2024-06-08 PROCEDURE — 83605 ASSAY OF LACTIC ACID: CPT | Performed by: EMERGENCY MEDICINE

## 2024-06-08 PROCEDURE — 96375 TX/PRO/DX INJ NEW DRUG ADDON: CPT

## 2024-06-08 PROCEDURE — 85027 COMPLETE CBC AUTOMATED: CPT | Performed by: EMERGENCY MEDICINE

## 2024-06-08 PROCEDURE — 99222 1ST HOSP IP/OBS MODERATE 55: CPT | Performed by: HOSPITALIST

## 2024-06-08 PROCEDURE — 74177 CT ABD & PELVIS W/CONTRAST: CPT

## 2024-06-08 PROCEDURE — 99284 EMERGENCY DEPT VISIT MOD MDM: CPT

## 2024-06-08 PROCEDURE — 87040 BLOOD CULTURE FOR BACTERIA: CPT | Performed by: EMERGENCY MEDICINE

## 2024-06-08 PROCEDURE — 93005 ELECTROCARDIOGRAM TRACING: CPT

## 2024-06-08 PROCEDURE — 83880 ASSAY OF NATRIURETIC PEPTIDE: CPT | Performed by: EMERGENCY MEDICINE

## 2024-06-08 PROCEDURE — 93010 ELECTROCARDIOGRAM REPORT: CPT | Performed by: INTERNAL MEDICINE

## 2024-06-08 PROCEDURE — 81001 URINALYSIS AUTO W/SCOPE: CPT | Performed by: EMERGENCY MEDICINE

## 2024-06-08 PROCEDURE — 85610 PROTHROMBIN TIME: CPT | Performed by: EMERGENCY MEDICINE

## 2024-06-08 RX ORDER — POLYETHYLENE GLYCOL 3350 17 G/17G
17 POWDER, FOR SOLUTION ORAL DAILY
Status: DISCONTINUED | OUTPATIENT
Start: 2024-06-09 | End: 2024-06-11 | Stop reason: HOSPADM

## 2024-06-08 RX ORDER — SUCRALFATE 1 G/1
1 TABLET ORAL ONCE
Status: COMPLETED | OUTPATIENT
Start: 2024-06-08 | End: 2024-06-08

## 2024-06-08 RX ORDER — HYDROMORPHONE HCL/PF 1 MG/ML
0.5 SYRINGE (ML) INJECTION EVERY 4 HOURS PRN
Status: DISCONTINUED | OUTPATIENT
Start: 2024-06-08 | End: 2024-06-11 | Stop reason: HOSPADM

## 2024-06-08 RX ORDER — METHOCARBAMOL 500 MG/1
500 TABLET, FILM COATED ORAL 4 TIMES DAILY
Status: DISCONTINUED | OUTPATIENT
Start: 2024-06-08 | End: 2024-06-11 | Stop reason: HOSPADM

## 2024-06-08 RX ORDER — PANTOPRAZOLE SODIUM 40 MG/1
40 TABLET, DELAYED RELEASE ORAL DAILY
Status: DISCONTINUED | OUTPATIENT
Start: 2024-06-09 | End: 2024-06-11 | Stop reason: HOSPADM

## 2024-06-08 RX ORDER — MORPHINE SULFATE 30 MG/1
30 TABLET, FILM COATED, EXTENDED RELEASE ORAL EVERY 8 HOURS SCHEDULED
Status: DISCONTINUED | OUTPATIENT
Start: 2024-06-08 | End: 2024-06-11 | Stop reason: HOSPADM

## 2024-06-08 RX ORDER — LENALIDOMIDE 5 MG/1
5 CAPSULE ORAL DAILY
Status: DISCONTINUED | OUTPATIENT
Start: 2024-06-10 | End: 2024-06-11 | Stop reason: HOSPADM

## 2024-06-08 RX ORDER — CALCIUM CARBONATE 500 MG/1
500 TABLET, CHEWABLE ORAL DAILY PRN
Status: DISCONTINUED | OUTPATIENT
Start: 2024-06-08 | End: 2024-06-11 | Stop reason: HOSPADM

## 2024-06-08 RX ORDER — ONDANSETRON 2 MG/ML
4 INJECTION INTRAMUSCULAR; INTRAVENOUS ONCE
Status: COMPLETED | OUTPATIENT
Start: 2024-06-08 | End: 2024-06-08

## 2024-06-08 RX ORDER — DEXAMETHASONE 4 MG/1
4 TABLET ORAL 2 TIMES DAILY WITH MEALS
Status: DISCONTINUED | OUTPATIENT
Start: 2024-06-08 | End: 2024-06-11 | Stop reason: HOSPADM

## 2024-06-08 RX ORDER — NICOTINE 21 MG/24HR
1 PATCH, TRANSDERMAL 24 HOURS TRANSDERMAL DAILY
Status: DISCONTINUED | OUTPATIENT
Start: 2024-06-09 | End: 2024-06-11 | Stop reason: HOSPADM

## 2024-06-08 RX ORDER — LIDOCAINE 50 MG/G
1 PATCH TOPICAL DAILY
Status: DISCONTINUED | OUTPATIENT
Start: 2024-06-09 | End: 2024-06-09

## 2024-06-08 RX ORDER — AMOXICILLIN 250 MG
1 CAPSULE ORAL
Status: DISCONTINUED | OUTPATIENT
Start: 2024-06-08 | End: 2024-06-11 | Stop reason: HOSPADM

## 2024-06-08 RX ORDER — MORPHINE SULFATE 4 MG/ML
4 INJECTION, SOLUTION INTRAMUSCULAR; INTRAVENOUS ONCE
Status: COMPLETED | OUTPATIENT
Start: 2024-06-08 | End: 2024-06-08

## 2024-06-08 RX ORDER — ACETAMINOPHEN 325 MG/1
650 TABLET ORAL EVERY 6 HOURS PRN
Status: DISCONTINUED | OUTPATIENT
Start: 2024-06-08 | End: 2024-06-11 | Stop reason: HOSPADM

## 2024-06-08 RX ORDER — ONDANSETRON 2 MG/ML
4 INJECTION INTRAMUSCULAR; INTRAVENOUS EVERY 6 HOURS PRN
Status: DISCONTINUED | OUTPATIENT
Start: 2024-06-08 | End: 2024-06-11 | Stop reason: HOSPADM

## 2024-06-08 RX ORDER — SODIUM CHLORIDE, SODIUM GLUCONATE, SODIUM ACETATE, POTASSIUM CHLORIDE, MAGNESIUM CHLORIDE, SODIUM PHOSPHATE, DIBASIC, AND POTASSIUM PHOSPHATE .53; .5; .37; .037; .03; .012; .00082 G/100ML; G/100ML; G/100ML; G/100ML; G/100ML; G/100ML; G/100ML
100 INJECTION, SOLUTION INTRAVENOUS CONTINUOUS
Status: DISCONTINUED | OUTPATIENT
Start: 2024-06-08 | End: 2024-06-11 | Stop reason: HOSPADM

## 2024-06-08 RX ADMIN — CEFEPIME 2000 MG: 2 INJECTION, POWDER, FOR SOLUTION INTRAVENOUS at 10:52

## 2024-06-08 RX ADMIN — CALCIUM CARBONATE (ANTACID) CHEW TAB 500 MG 500 MG: 500 CHEW TAB at 21:43

## 2024-06-08 RX ADMIN — SUCRALFATE 1 G: 1 TABLET ORAL at 13:54

## 2024-06-08 RX ADMIN — SODIUM CHLORIDE 500 ML: 0.9 INJECTION, SOLUTION INTRAVENOUS at 10:45

## 2024-06-08 RX ADMIN — SENNOSIDES, DOCUSATE SODIUM 1 TABLET: 8.6; 5 TABLET ORAL at 21:43

## 2024-06-08 RX ADMIN — ONDANSETRON 4 MG: 2 INJECTION INTRAMUSCULAR; INTRAVENOUS at 10:46

## 2024-06-08 RX ADMIN — MORPHINE SULFATE 30 MG: 30 TABLET, EXTENDED RELEASE ORAL at 21:43

## 2024-06-08 RX ADMIN — ONDANSETRON 4 MG: 2 INJECTION INTRAMUSCULAR; INTRAVENOUS at 17:20

## 2024-06-08 RX ADMIN — METHOCARBAMOL 500 MG: 500 TABLET ORAL at 17:51

## 2024-06-08 RX ADMIN — SODIUM CHLORIDE, SODIUM GLUCONATE, SODIUM ACETATE, POTASSIUM CHLORIDE, MAGNESIUM CHLORIDE, SODIUM PHOSPHATE, DIBASIC, AND POTASSIUM PHOSPHATE 100 ML/HR: .53; .5; .37; .037; .03; .012; .00082 INJECTION, SOLUTION INTRAVENOUS at 17:52

## 2024-06-08 RX ADMIN — APIXABAN 5 MG: 5 TABLET, FILM COATED ORAL at 17:51

## 2024-06-08 RX ADMIN — OXYCODONE HYDROCHLORIDE 15 MG: 10 TABLET ORAL at 19:54

## 2024-06-08 RX ADMIN — DEXAMETHASONE 4 MG: 4 TABLET ORAL at 17:51

## 2024-06-08 RX ADMIN — MORPHINE SULFATE 4 MG: 4 INJECTION INTRAVENOUS at 10:49

## 2024-06-08 RX ADMIN — IOHEXOL 90 ML: 350 INJECTION, SOLUTION INTRAVENOUS at 12:15

## 2024-06-08 RX ADMIN — METHOCARBAMOL 500 MG: 500 TABLET ORAL at 21:43

## 2024-06-08 RX ADMIN — HYDROMORPHONE HYDROCHLORIDE 0.5 MG: 1 INJECTION, SOLUTION INTRAMUSCULAR; INTRAVENOUS; SUBCUTANEOUS at 23:20

## 2024-06-08 RX ADMIN — MORPHINE SULFATE 30 MG: 30 TABLET, EXTENDED RELEASE ORAL at 17:51

## 2024-06-08 NOTE — ASSESSMENT & PLAN NOTE
Initially diagnosed on 2/8/2023 after imaging showed multiple lytic lesions and compression fractures.  Additional workup was consistent with a diagnosis of multiple myeloma  CT imaging on 6/8 shows significant progression of osseous metastases since the May 15th PET/CT with lesions demonstrating diffuse enlargement   Follows outpatient with oncology.  Per last note from 5/2024 patient has received neoadjuvant bortezomib, Laniazid Samad, and dexamethasone with transplant.  Patient had a pending appointment with radiation oncology for CT SIM but will not continue at this time.

## 2024-06-08 NOTE — SEPSIS NOTE
"  Sepsis Note   Fredy Martinez Jr. 58 y.o. male MRN: [id number]  Unit/Bed#: ED-16 Encounter: [id number]       Initial Sepsis Screening       Row Name 06/08/24 1107                Is the patient's history suggestive of a new or worsening infection? Yes (Proceed)  -CL        Suspected source of infection suspect infection, source unknown  -CL        Indicate SIRS criteria Tachycardia > 90 bpm;Tachypnea > 20 resp per min  -CL        Are two or more of the above signs & symptoms of infection both present and new to the patient? Yes (Proceed)  -CL        Assess for evidence of organ dysfunction: Are any of the below criteria present within 6 hours of suspected infection and SIRS criteria that are NOT considered to be chronic conditions? Lactate > 2.0  -CL        Date of presentation of severe sepsis 06/08/24  -CL        Time of presentation of severe sepsis 1107  -CL        Sepsis Note: Click \"NEXT\" below (NOT \"close\") to generate sepsis note based on above information. YES (proceed by clicking \"NEXT\")  -CL                  User Key  (r) = Recorded By, (t) = Taken By, (c) = Cosigned By      Initials Name Provider Type    CL Natalio Sin MD Physician                        Body mass index is 16.77 kg/m².  Wt Readings from Last 1 Encounters:   06/08/24 53 kg (116 lb 13.5 oz)     IBW (Ideal Body Weight): 73 kg    Ideal body weight: 73 kg (160 lb 15 oz)    "

## 2024-06-08 NOTE — ASSESSMENT & PLAN NOTE
Per patient and wife, patient would like to proceed with hospice care as soon as possible at home.  He follows with oncology outpatient and is planning to finish the last 3 pills of his Revlimid.  Inpatient hospice consult placed.  Will work with case management to arrange for services  Pain medication regimen as indicated by outpatient palliative care

## 2024-06-08 NOTE — SEPSIS NOTE
"  Sepsis Note   Fredy Martinez Jr. 58 y.o. male MRN: [id number]  Unit/Bed#: ED-16 Encounter: [id number]       Initial Sepsis Screening       Row Name 06/08/24 1107                Is the patient's history suggestive of a new or worsening infection? Yes (Proceed)  -CL        Suspected source of infection suspect infection, source unknown  -CL        Indicate SIRS criteria Tachycardia > 90 bpm;Tachypnea > 20 resp per min  -CL        Are two or more of the above signs & symptoms of infection both present and new to the patient? Yes (Proceed)  -CL        Assess for evidence of organ dysfunction: Are any of the below criteria present within 6 hours of suspected infection and SIRS criteria that are NOT considered to be chronic conditions? Lactate > 2.0  -CL        Date of presentation of severe sepsis 06/08/24  -CL        Time of presentation of severe sepsis 1107  -CL        Sepsis Note: Click \"NEXT\" below (NOT \"close\") to generate sepsis note based on above information. YES (proceed by clicking \"NEXT\")  -CL                  User Key  (r) = Recorded By, (t) = Taken By, (c) = Cosigned By      Initials Name Provider Type    CL Natalio Sin MD Physician                    Default Flowsheet Data (Last 720 Hours)       Sepsis Reassess       Row Name 06/08/24 1200                   Repeat Volume Status and Tissue Perfusion Assessment Performed    Date of Reassessment: 06/08/24  -CL        Time of Reassessment: 1200  -CL        Sepsis Reassessment Note: Click \"NEXT\" below (NOT \"close\") to generate sepsis reassessment note. YES (proceed by clicking \"NEXT\")  -CL        Repeat Volume Status and Tissue Perfusion Assessment Performed --                  User Key  (r) = Recorded By, (t) = Taken By, (c) = Cosigned By      Initials Name Provider Type    CL Natalio Sin MD Physician                    Body mass index is 16.77 kg/m².  Wt Readings from Last 1 Encounters:   06/08/24 53 kg (116 lb 13.5 oz)     IBW (Ideal Body " Weight): 73 kg    Ideal body weight: 73 kg (160 lb 15 oz)

## 2024-06-08 NOTE — ASSESSMENT & PLAN NOTE
Assessment:  Likely in the setting of worsening multiple myeloma and Revlimid use    Plan:  Zofran as needed.  IV fluids

## 2024-06-08 NOTE — ASSESSMENT & PLAN NOTE
Noted to have met SIRS criteria as noted by tachycardia, tachypnea.  WBC 10.53  Do not have strong suspicion for source at this time given CT imaging did not show any lobar or focal consolidation.  UA is negative.  Received cefepime x 1 in ED.  Will hold off on further antibiotics at this time

## 2024-06-08 NOTE — ED PROVIDER NOTES
History  Chief Complaint   Patient presents with    Vomiting     Pt from [facility], Hx tachycardia, Stg 4 Multiple Myeloma, broken Left hip and shoulder from previous fall, PEG; Patient reports vomiting/nausea since yesterday - unable to keep anything down; last Zofran and morphine 2000 6/7 - no relief; denies CP/SOB/belly pain     Patient is a 58-year-old male, with a history significant for multiple myeloma and PE anticoagulated on Eliquis per my review of the medical record as well as recent fracture of the left hip and shoulder, who presents to the ED today due to gradual onset, constant, progressively worsening, nonradiating, pressure in character, exertional and pleuritic chest pain that began last evening.  There is associated nausea and vomiting, abdominal pain, dyspnea.  Per patient's wife, present in room and friend collateral history, patient is not confused.  She also states that patient's oncologist told him that he has 1 to 2 months, with max 6 months, to live because of how advanced his cancer is.  Patient attempted morphine and Zofran to remit his symptoms with minimal effect.  No known fever.  I had an extensive conversation with patient regarding goals of care and CODE STATUS and patient and wife are both in agreement that patient is DNI DNR and, while patient was considering hospice, he does not desire comfort care at this time.  He is in agreement with workup including CT imaging as well as antibiotics/other medical management to try and prolong his life so he can achieve his remaining 1 to 2 months.  Patient is without other concerns at this time.        Prior to Admission Medications   Prescriptions Last Dose Informant Patient Reported? Taking?   acetaminophen (TYLENOL) 500 mg tablet  Self, Spouse/Significant Other No No   Sig: Take 2 tablets (1,000 mg total) by mouth 3 (three) times a day   apixaban (Eliquis) 5 mg   No No   Sig: Take 1 tablet (5 mg total) by mouth 2 (two) times a day    dexamethasone (DECADRON) 4 mg tablet   No No   Sig: Take 1 tablet (4 mg total) by mouth 2 (two) times a day with meals Continue pantoprazole while receiving this medication.   lenalidomide (Revlimid) 5 MG CAPS  Self, Spouse/Significant Other No No   Sig: TAKE 1 CAPSULE BY MOUTH 1 TIME DAILY   lidocaine (LIDODERM) 5 %   No No   Sig: Apply 1 patch topically over 12 hours daily - apply to middle back - Remove & Discard patch within 12 hours   methocarbamol (ROBAXIN) 500 mg tablet  Self, Spouse/Significant Other No No   Sig: Take 1 tablet (500 mg total) by mouth 4 (four) times a day   morphine (MS CONTIN) 30 mg 12 hr tablet   No No   Sig: Take 1 tablet (30 mg total) by mouth every 8 (eight) hours - scheduled, to reduce and prevent cancer pain Max Daily Amount: 90 mg   naloxone (NARCAN) 4 mg/0.1 mL nasal spray  Self, Spouse/Significant Other No No   Sig: Administer 1 spray into a nostril. If no response after 2-3 minutes, give another dose in the other nostril using a new spray.   ondansetron (ZOFRAN) 4 mg tablet  Self, Spouse/Significant Other No No   Sig: Take 1 tablet (4 mg total) by mouth every 8 (eight) hours as needed for nausea or vomiting   oxyCODONE (ROXICODONE) 20 MG TABS   No No   Sig: Take 0.5-1 tablets (10-20 mg total) by mouth every 4 (four) hours as needed (moderate/severe cancer pain) Max Daily Amount: 120 mg   pantoprazole (PROTONIX) 40 mg tablet  Self, Spouse/Significant Other No No   Sig: Take 1 tablet (40 mg total) by mouth daily - in the morning   polyethylene glycol (MIRALAX) 17 g packet  Self, Spouse/Significant Other No No   Sig: Take 17 g by mouth daily   senna-docusate sodium (SENOKOT S) 8.6-50 mg per tablet  Self, Spouse/Significant Other No No   Sig: Take 1 tablet by mouth daily at bedtime      Facility-Administered Medications: None       Past Medical History:   Diagnosis Date    Cancer (HCC)     bone-    GERD (gastroesophageal reflux disease)     Multiple myeloma (HCC)        Past Surgical  History:   Procedure Laterality Date    APPENDECTOMY      IR BIOPSY BONE MARROW  3/15/2023    [place] OPTX FEM SHFT FX W/INSJ IMED IMPLT W/WO SCREW Left 4/1/2024    Procedure: INSERTION NAIL IM FEMUR ANTEGRADE (TROCHANTERIC);  Surgeon: Pat Bang MD;  Location: [facility];  Service: Orthopedics       Family History   Problem Relation Age of Onset    Diabetes Mother     Heart disease Father     Diabetes Father      I have reviewed and agree with the history as documented.    E-Cigarette/Vaping    E-Cigarette Use Never User      E-Cigarette/Vaping Substances     Social History     Tobacco Use    Smoking status: Some Days     Types: Cigarettes    Tobacco comments:     Smoking 1 cigarette daily   Vaping Use    Vaping status: Never Used   Substance Use Topics    Alcohol use: Not Currently    Drug use: Not Currently     Types: Marijuana     Comment: once a month       Review of Systems   Constitutional:  Negative for fever.   HENT:  Positive for trouble swallowing.    Eyes:  Negative for visual disturbance.   Respiratory:  Positive for shortness of breath.    Cardiovascular:  Positive for chest pain.   Gastrointestinal:  Positive for abdominal pain.   Endocrine: Negative for polyuria.   Genitourinary:  Negative for dysuria.   Musculoskeletal:  Negative for gait problem.   Skin:  Negative for rash.   Allergic/Immunologic: Negative for environmental allergies.   Neurological:  Negative for weakness and numbness.   Hematological:  Negative for adenopathy.   Psychiatric/Behavioral:  Negative for confusion.    All other systems reviewed and are negative.      Physical Exam  Physical Exam  Vitals and nursing note reviewed.   Constitutional:       General: He is in acute distress.      Appearance: He is ill-appearing and toxic-appearing.      Comments: Patient appears uncomfortable during my evaluation.  Increased work of breathing, tachypnea   HENT:      Head: Normocephalic and atraumatic.      Right Ear: External ear  normal.      Left Ear: External ear normal.      Nose: Nose normal. No rhinorrhea.      Mouth/Throat:      Mouth: Mucous membranes are moist.      Pharynx: Oropharynx is clear. No oropharyngeal exudate or posterior oropharyngeal erythema.      Comments: Uvula midline. No oropharyngeal or submandibular mass/swelling  Eyes:      General: No scleral icterus.        Right eye: No discharge.         Left eye: No discharge.      Conjunctiva/sclera: Conjunctivae normal.      Pupils: Pupils are equal, round, and reactive to light.   Neck:      Comments: Patient is spontaneously rotating their neck to the left and right during the history and physical exam interaction without difficulty or apparent discomfort    Cardiovascular:      Rate and Rhythm: Tachycardia present. Rhythm irregular.      Pulses: Normal pulses.      Heart sounds: Normal heart sounds. No murmur heard.     No friction rub. No gallop.      Comments: 2+ Radial  Pulmonary:      Effort: No respiratory distress.      Breath sounds: Normal breath sounds. No stridor. No wheezing, rhonchi or rales.   Abdominal:      General: Abdomen is flat. There is no distension.      Palpations: Abdomen is soft.      Tenderness: There is abdominal tenderness (Diffuse). There is no right CVA tenderness, left CVA tenderness, guarding or rebound.   Musculoskeletal:      Cervical back: Neck supple. No tenderness. No muscular tenderness.      Right lower leg: No edema.      Left lower leg: No edema.   Lymphadenopathy:      Cervical: No cervical adenopathy.   Skin:     General: Skin is warm and dry.      Capillary Refill: Capillary refill takes 2 to 3 seconds.   Neurological:      Mental Status: He is alert.      Comments: Patient is speaking clearly in complete sentences.  Patient is answering appropriately and able follow commands.  No facial droop.  Tongue midline.      Psychiatric:         Mood and Affect: Mood normal.         Behavior: Behavior normal.         Vital Signs  ED  Triage Vitals [06/08/24 0939]   Temperature Pulse Respirations Blood Pressure SpO2   97.8 °F (36.6 °C) (!) 133 22 101/74 95 %      Temp Source Heart Rate Source Patient Position - Orthostatic VS BP Location FiO2 (%)   Oral Monitor Lying Right arm --      Pain Score       No Pain           Vitals:    06/08/24 1130 06/08/24 1200 06/08/24 1358 06/08/24 1500   BP: 122/93 124/83 113/83 108/72   Pulse: (!) 109 (!) 115 (!) 121 (!) 115   Patient Position - Orthostatic VS: Sitting Sitting Lying Lying         Visual Acuity      ED Medications  Medications   naloxone (NARCAN) 0.04 mg/mL syringe 0.04 mg (has no administration in time range)   morphine injection 4 mg (4 mg Intravenous Given 6/8/24 1049)   sodium chloride 0.9 % bolus 500 mL (0 mL Intravenous Stopped 6/8/24 1153)   cefepime (MAXIPIME) 2 g/50 mL dextrose IVPB (0 mg Intravenous Stopped 6/8/24 1152)   ondansetron (ZOFRAN) injection 4 mg (4 mg Intravenous Given 6/8/24 1046)   iohexol (OMNIPAQUE) 350 MG/ML injection (MULTI-DOSE) 90 mL (90 mL Intravenous Given 6/8/24 1215)   sucralfate (CARAFATE) tablet 1 g (1 g Oral Given 6/8/24 1354)       Diagnostic Studies  Results Reviewed       Procedure Component Value Units Date/Time    HS Troponin I 4hr [[id number]]  (Normal) Collected: 06/08/24 1453    Lab Status: Final result Specimen: Blood from Arm, Right Updated: 06/08/24 1611     hs TnI 4hr 23 ng/L      Delta 4hr hsTnI -4 ng/L     Lactic acid 2 Hours [[id number]]  (Abnormal) Collected: 06/08/24 1305    Lab Status: Final result Specimen: Blood from Arm, Right Updated: 06/08/24 1329     LACTIC ACID 2.3 mmol/L     Narrative:      Result may be elevated if tourniquet was used during collection.    Urine Microscopic [[id number]]  (Abnormal) Collected: 06/08/24 1307    Lab Status: Final result Specimen: Urine, Clean Catch Updated: 06/08/24 1324     RBC, UA 1-2 /hpf      WBC, UA None Seen /hpf      Epithelial Cells Occasional /hpf      Bacteria, UA None Seen /hpf      MUCUS THREADS  Occasional     Hyaline Casts, UA 3-5 /lpf     UA w Reflex to Microscopic w Reflex to Culture [[id number]]  (Abnormal) Collected: 06/08/24 1307    Lab Status: Final result Specimen: Urine, Clean Catch Updated: 06/08/24 1320     Color, UA Light Yellow     Clarity, UA Clear     Specific Gravity, UA 1.024     pH, UA 6.5     Leukocytes, UA Negative     Nitrite, UA Negative     Protein, UA 30 (1+) mg/dl      Glucose, UA Negative mg/dl      Ketones, UA Negative mg/dl      Urobilinogen, UA <2.0 mg/dl      Bilirubin, UA Negative     Occult Blood, UA Trace    HS Troponin I 2hr [[id number]]  (Normal) Collected: 06/08/24 1242    Lab Status: Final result Specimen: Blood from Arm, Right Updated: 06/08/24 1312     hs TnI 2hr 25 ng/L      Delta 2hr hsTnI -2 ng/L     RBC Morphology Reflex Test [[id number]] Collected: 06/08/24 1035    Lab Status: Final result Specimen: Blood from Arm, Right Updated: 06/08/24 1201    CBC and differential [[id number]]  (Abnormal) Collected: 06/08/24 1035    Lab Status: Final result Specimen: Blood from Arm, Right Updated: 06/08/24 1134     WBC 10.66 Thousand/uL      RBC 4.82 Million/uL      Hemoglobin 16.1 g/dL      Hematocrit 47.7 %      MCV 99 fL      MCH 33.4 pg      MCHC 33.8 g/dL      RDW 19.3 %      MPV 8.9 fL      Platelets 181 Thousands/uL     Narrative:      This is an appended report.  These results have been appended to a previously verified report.    Manual Differential(PHLEBS Do Not Order) [[id number]]  (Abnormal) Collected: 06/08/24 1035    Lab Status: Final result Specimen: Blood from Arm, Right Updated: 06/08/24 1134     Segmented % 80 %      Bands % 5 %      Lymphocytes % 5 %      Monocytes % 9 %      Eosinophils % 1 %      Basophils % 0 %      Absolute Neutrophils 9.06 Thousand/uL      Absolute Lymphocytes 0.53 Thousand/uL      Absolute Monocytes 0.96 Thousand/uL      Absolute Eosinophils 0.11 Thousand/uL      Absolute Basophils 0.00 Thousand/uL      Total Counted --     RBC Morphology  Present     Platelet Estimate Adequate     Anisocytosis Present    Procalcitonin [[id number]]  (Abnormal) Collected: 06/08/24 1035    Lab Status: Final result Specimen: Blood from Arm, Right Updated: 06/08/24 1111     Procalcitonin 0.37 ng/ml     Comprehensive metabolic panel [[id number]]  (Abnormal) Collected: 06/08/24 1035    Lab Status: Final result Specimen: Blood from Arm, Right Updated: 06/08/24 1109     Sodium 134 mmol/L      Potassium 5.4 mmol/L      Chloride 99 mmol/L      CO2 23 mmol/L      ANION GAP 12 mmol/L      BUN 38 mg/dL      Creatinine 1.02 mg/dL      Glucose 145 mg/dL      Calcium 10.4 mg/dL      AST 38 U/L      ALT 39 U/L      Alkaline Phosphatase 257 U/L      Total Protein 6.9 g/dL      Albumin 3.5 g/dL      Total Bilirubin 0.91 mg/dL      eGFR 81 ml/min/1.73sq m     Narrative:      National [employer] guidelines for Chronic Kidney Disease (CKD):     Stage 1 with normal or high GFR (GFR > 90 mL/min/1.73 square meters)    Stage 2 Mild CKD (GFR = 60-89 mL/min/1.73 square meters)    Stage 3A Moderate CKD (GFR = 45-59 mL/min/1.73 square meters)    Stage 3B Moderate CKD (GFR = 30-44 mL/min/1.73 square meters)    Stage 4 Severe CKD (GFR = 15-29 mL/min/1.73 square meters)    Stage 5 End Stage CKD (GFR <15 mL/min/1.73 square meters)  Note: GFR calculation is accurate only with a steady state creatinine    HS Troponin 0hr (reflex protocol) [[id number]]  (Normal) Collected: 06/08/24 1035    Lab Status: Final result Specimen: Blood from Arm, Right Updated: 06/08/24 1108     hs TnI 0hr 27 ng/L     B-Type Natriuretic Peptide(BNP) [[id number]]  (Abnormal) Collected: 06/08/24 1035    Lab Status: Final result Specimen: Blood from Arm, Right Updated: 06/08/24 1107      pg/mL     Lactic acid [[id number]]  (Abnormal) Collected: 06/08/24 1035    Lab Status: Final result Specimen: Blood from Arm, Right Updated: 06/08/24 1101     LACTIC ACID 3.2 mmol/L     Narrative:      Result may be elevated if  tourniquet was used during collection.    Blood culture #2 [[id number]] Collected: 06/08/24 1045    Lab Status: In process Specimen: Blood from Arm, Right Updated: 06/08/24 1059    Protime-INR [[id number]]  (Normal) Collected: 06/08/24 1035    Lab Status: Final result Specimen: Blood from Arm, Right Updated: 06/08/24 1058     Protime 14.4 seconds      INR 1.06    APTT [[id number]]  (Normal) Collected: 06/08/24 1035    Lab Status: Final result Specimen: Blood from Arm, Right Updated: 06/08/24 1058     PTT 27 seconds     Blood culture #1 [[id number]] Collected: 06/08/24 1035    Lab Status: In process Specimen: Blood from Arm, Right Updated: 06/08/24 1042                   CT pe study w abdomen pelvis w contrast   Final Result by Christopher Murcia MD (06/08 1345)      1.  Limited evaluation for pulmonary embolism due to substantial respiratory motion. No large central embolus is seen. The lobar and distal branches are not well assessed.   2.  Significant progression of osseous metastases since the May 15th PET/CT with lesions demonstrating diffuse enlargement as described above. Spine lesions with associated epidural disease and canal stenosis is better assessed on prior MRI.   3.  New displaced pathologic fracture of the proximal left humerus.   4.  Large colonic fecal burden with marked fecal distention of the rectum is consistent with constipation. No inflammation.            The study was marked in EPIC for immediate notification.            Workstation performed: [id number]         XR chest 1 view portable   ED Interpretation by Natalio Sin MD (06/08 1137)   Per my independent interpretation: Wedge-shaped consolidation in the right lung concern for infarct versus consolidation                 Procedures  Procedures         ED Course  ED Course as of 06/08/24 1614   Sat Jun 08, 2024   1014 ECG per my independent interpretation: Tachcyardic rate, irregular rhythm, no ectopy, left axis, no ST elevations   1108  "LACTIC ACID(!): 3.2  High, patient meets severe sepsis criteria   1109 hs TnI 0hr: 27  High   1109 Potassium(!): 5.4  High, not hemolyzed    1136 Bands %: 5  WNL   1314 Delta 2hr hsTnI: -2  WNL   1339 LACTIC ACID(!): 2.3  Improving    1353 Results reviewed with patient and his wife.  Significant progression of disease emphasized.  Patient again states that he wants to live as long as possible and is in agreement with admission             HEART Risk Score      Flowsheet Row Most Recent Value   Heart Score Risk Calculator    History 1 Filed at: 06/08/2024 1112   ECG 1 Filed at: 06/08/2024 1112   Age 1 Filed at: 06/08/2024 1112   Risk Factors 2 Filed at: 06/08/2024 1112   Troponin 1 Filed at: 06/08/2024 1112   HEART Score 6 Filed at: 06/08/2024 1112                       Initial Sepsis Screening       Row Name 06/08/24 1107                Is the patient's history suggestive of a new or worsening infection? Yes (Proceed)  -CL        Suspected source of infection suspect infection, source unknown  -CL        Indicate SIRS criteria Tachycardia > 90 bpm;Tachypnea > 20 resp per min  -CL        Are two or more of the above signs & symptoms of infection both present and new to the patient? Yes (Proceed)  -CL        Assess for evidence of organ dysfunction: Are any of the below criteria present within 6 hours of suspected infection and SIRS criteria that are NOT considered to be chronic conditions? Lactate > 2.0  -CL        Date of presentation of severe sepsis 06/08/24  -CL        Time of presentation of severe sepsis 1107  -CL        Sepsis Note: Click \"NEXT\" below (NOT \"close\") to generate sepsis note based on above information. YES (proceed by clicking \"NEXT\")  -CL                  User Key  (r) = Recorded By, (t) = Taken By, (c) = Cosigned By      Initials Name Provider Type    CL Natalio Sin MD Physician                  Default Flowsheet Data (Last 720 Hours)       Sepsis Reassess       Row Name 06/08/24 1200 " "                  Repeat Volume Status and Tissue Perfusion Assessment Performed    Date of Reassessment: 06/08/24  -        Time of Reassessment: 1200  -CL        Sepsis Reassessment Note: Click \"NEXT\" below (NOT \"close\") to generate sepsis reassessment note. YES (proceed by clicking \"NEXT\")  -CL        Repeat Volume Status and Tissue Perfusion Assessment Performed --                  User Key  (r) = Recorded By, (t) = Taken By, (c) = Cosigned By      Initials Name Provider Type    CL Natalio Sin MD Physician                      Kayden' Criteria for PE      Flowsheet Row Most Recent Value   Wells' Criteria for PE    Clinical signs and symptoms of DVT 0 Filed at: 06/08/2024 1112   PE is primary diagnosis or equally likely --   HR >100 1.5 Filed at: 06/08/2024 1112   Immobilization at least 3 days or Surgery in the previous 4 weeks 1.5 Filed at: 06/08/2024 1112   Previous, objectively diagnosed PE or DVT 1.5 Filed at: 06/08/2024 1112   Hemoptysis 0 Filed at: 06/08/2024 1112   Malignancy with treatment within 6 months or palliative 1 Filed at: 06/08/2024 1112   Wells' Criteria Total 5.5 Filed at: 06/08/2024 1112                  Medical Decision Making  Patient is a 58-year-old male, with a history significant for multiple myeloma and PE anticoagulated on Eliquis per my review of the medical record as well as recent fracture of the left hip and shoulder, who presents to the ED today due to gradual onset, constant, progressively worsening, nonradiating, pressure in character, exertional and pleuritic chest pain that began last evening.  There is associated nausea and vomiting, abdominal pain, dyspnea.  Per patient's wife, present in room and friend collateral history, patient is not confused.  She also states that patient's oncologist told him that he has 1 to 2 months, with max 6 months, to live because of how advanced his cancer is.  Patient attempted morphine and Zofran to remit his symptoms with minimal " effect.  No known fever.  I had an extensive conversation with patient regarding goals of care and CODE STATUS and patient and wife are both in agreement that patient is DNI DNR and, while patient was considering hospice, he does not desire comfort care at this time.  He is in agreement with workup including CT imaging as well as antibiotics/other medical management to try and prolong his life so he can achieve his remaining 1 to 2 months.  Patient is currently afebrile, tachycardic, tachypneic, otherwise stable.  His physical exam is notable for toxic/ill appearance, increased respiratory effort, tachypnea, tachycardia, clear heart lungs auscultation, soft nontender abdomen.  This presentation is concerning for: Worsening clot burden/PE/failure of outpatient therapy, pulmonary infarct, pneumothorax, rib fracture, ACS.  Patient at risk for biliary pathology/appendicitis.  Will investigate with sepsis panel order set, cardiac workup.  Will manage with fluids, broad-spectrum antibiotics, narcotic analgesia, Zofran, further based upon workup/response.    Amount and/or Complexity of Data Reviewed  Labs: ordered. Decision-making details documented in ED Course.  Radiology: ordered and independent interpretation performed.    Risk  Prescription drug management.  Decision regarding hospitalization.             Disposition  Final diagnoses:   Acute chest pain   Acute abdominal pain   SIRS (systemic inflammatory response syndrome) (HCC)   Vomiting   Multiple myeloma (HCC)     Time reflects when diagnosis was documented in both MDM as applicable and the Disposition within this note       Time User Action Codes Description Comment    6/8/2024 11:12 AM Natalio Sin Add [R07.9] Acute chest pain     6/8/2024 11:12 AM Natalio Sin Add [R10.9] Acute abdominal pain     6/8/2024 11:12 AM Natalio Sin Add [R65.10] SIRS (systemic inflammatory response syndrome) (HCC)     6/8/2024 11:13 AM Natalio Sin  A Add [R11.10] Vomiting     6/8/2024  1:49 PM Natalio Sin Add [C90.00] Multiple myeloma (HCC)     6/8/2024  1:49 PM Natalio Sin Modify [R07.9] Acute chest pain     6/8/2024  1:49 PM Natalio Sin Modify [R65.10] SIRS (systemic inflammatory response syndrome) (HCC)           ED Disposition       ED Disposition   Admit    Condition   Stable    Date/Time   Sat Jun 8, 2024 8164    Comment   Case was discussed with MILLIE and the patient's admission status was agreed to be Admission Status: inpatient status to the service of Dr. Gama .               Follow-up Information    None         Current Discharge Medication List        CONTINUE these medications which have NOT CHANGED    Details   acetaminophen (TYLENOL) 500 mg tablet Take 2 tablets (1,000 mg total) by mouth 3 (three) times a day  Qty: 180 tablet, Refills: 2    Associated Diagnoses: Back pain; Monoclonal gammopathy; Palliative care patient; Cancer related pain      apixaban (Eliquis) 5 mg Take 1 tablet (5 mg total) by mouth 2 (two) times a day  Qty: 60 tablet, Refills: 5    Associated Diagnoses: Acute pulmonary embolism without acute cor pulmonale, unspecified pulmonary embolism type (HCC)      dexamethasone (DECADRON) 4 mg tablet Take 1 tablet (4 mg total) by mouth 2 (two) times a day with meals Continue pantoprazole while receiving this medication.  Qty: 60 tablet, Refills: 1    Associated Diagnoses: Metastasis to bone (HCC)      lenalidomide (Revlimid) 5 MG CAPS TAKE 1 CAPSULE BY MOUTH 1 TIME DAILY  Qty: 28 capsule, Refills: 0    Comments: Auth# [id number] on 5/1  Associated Diagnoses: Multiple myeloma not having achieved remission (HCC)      lidocaine (LIDODERM) 5 % Apply 1 patch topically over 12 hours daily - apply to middle back - Remove & Discard patch within 12 hours  Qty: 10 patch, Refills: 2    Associated Diagnoses: Multiple myeloma not having achieved remission (HCC); Cancer related pain; Back pain; Palliative care  patient      methocarbamol (ROBAXIN) 500 mg tablet Take 1 tablet (500 mg total) by mouth 4 (four) times a day  Qty: 120 tablet, Refills: 2    Associated Diagnoses: Cancer related pain; Multiple myeloma (HCC); Metastasis to bone (HCC); Back pain; Palliative care patient      morphine (MS CONTIN) 30 mg 12 hr tablet Take 1 tablet (30 mg total) by mouth every 8 (eight) hours - scheduled, to reduce and prevent cancer pain Max Daily Amount: 90 mg  Qty: 90 tablet, Refills: 0    Comments: Increase for progressive chronic intractable cancer-related pain, G89.3. Palliative pt. Ongoing. Fill on/after 5/25/24.  Associated Diagnoses: Multiple myeloma not having achieved remission (HCC); Cancer related pain; Back pain; Palliative care patient      naloxone (NARCAN) 4 mg/0.1 mL nasal spray Administer 1 spray into a nostril. If no response after 2-3 minutes, give another dose in the other nostril using a new spray.  Qty: 1 each, Refills: 1    Associated Diagnoses: Opioid use      ondansetron (ZOFRAN) 4 mg tablet Take 1 tablet (4 mg total) by mouth every 8 (eight) hours as needed for nausea or vomiting  Qty: 40 tablet, Refills: 2    Associated Diagnoses: Multiple myeloma (HCC); Metastasis to bone (HCC); Palliative care patient; Nausea & vomiting      oxyCODONE (ROXICODONE) 20 MG TABS Take 0.5-1 tablets (10-20 mg total) by mouth every 4 (four) hours as needed (moderate/severe cancer pain) Max Daily Amount: 120 mg  Qty: 180 tablet, Refills: 0    Comments: Increase for progressive intractable chronic cancer-related pain, G89.3. May fill today.  Associated Diagnoses: Multiple myeloma not having achieved remission (HCC); Cancer related pain; Back pain; Metastasis to bone (HCC); Palliative care patient      pantoprazole (PROTONIX) 40 mg tablet Take 1 tablet (40 mg total) by mouth daily - in the morning  Qty: 90 tablet, Refills: 0    Associated Diagnoses: Multiple myeloma (HCC); Metastasis to bone (HCC); Palliative care patient; Acid  reflux      polyethylene glycol (MIRALAX) 17 g packet Take 17 g by mouth daily    Associated Diagnoses: Therapeutic opioid-induced constipation (OIC)      senna-docusate sodium (SENOKOT S) 8.6-50 mg per tablet Take 1 tablet by mouth daily at bedtime    Associated Diagnoses: Therapeutic opioid-induced constipation (OIC)             No discharge procedures on file.    PDMP Review         Value Time User    PDMP Reviewed  Yes 5/29/2024  9:37 AM Kayla Hobbs MD            ED Provider  Electronically Signed by             Natalio Sin MD  06/08/24 4129

## 2024-06-08 NOTE — ASSESSMENT & PLAN NOTE
Noted to be 5.4 on admission.  Possible component of dehydration or tumor lysis syndrome in the setting of multiple myeloma with active treatment  Gentle fluid rehydration.  No indication for aggressive management or repeated lab draws as patient would like to proceed with hospice during admission

## 2024-06-08 NOTE — H&P
[facility]  H&P  Name: Fredy Martinez Jr. 58 y.o. male I MRN: [id number]  Unit/Bed#: S MS Stella-01 I Date of Admission: 6/8/2024   Date of Service: 6/8/2024 I Hospital Day: 0      Assessment & Plan   Encounter for hospice care  Assessment & Plan  Per patient and wife, patient would like to proceed with hospice care as soon as possible at home.  He follows with oncology outpatient and is planning to finish the last 3 pills of his Revlimid.  Inpatient hospice consult placed.  Will work with case management to arrange for services  Pain medication regimen as indicated by outpatient palliative care    * Nausea & vomiting  Assessment & Plan  Assessment:  Likely in the setting of worsening multiple myeloma and Revlimid use    Plan:  Zofran as needed.  IV fluids    Hyperkalemia  Assessment & Plan  Noted to be 5.4 on admission.  Possible component of dehydration or tumor lysis syndrome in the setting of multiple myeloma with active treatment  Gentle fluid rehydration.  No indication for aggressive management or repeated lab draws as patient would like to proceed with hospice during admission    SIRS (systemic inflammatory response syndrome) (HCC)  Assessment & Plan  Noted to have met SIRS criteria as noted by tachycardia, tachypnea.  WBC 10.53  Do not have strong suspicion for source at this time given CT imaging did not show any lobar or focal consolidation.  UA is negative.  Received cefepime x 1 in ED.  Will hold off on further antibiotics at this time    Pathological fracture of left humerus  Assessment & Plan  Assessment:  Known fracture prior to admission.  Additionally noted on 6/8 CT imaging.  Per patient and wife, they would not like to proceed with any type of surgical intervention.    Plan:  Will hold off on orthopedic surgery consult at this time.      Pulmonary embolism without acute cor pulmonale (HCC)  Assessment & Plan  Noted incidentally during 3/30/2024 hospitalization  Continue  Eliquis 5 mg twice a day    Therapeutic opioid-induced constipation (OIC)  Assessment & Plan  MiraLAX, Senokot, and consider Relistor if needed    Monoclonal gammopathy  Assessment & Plan  Initially diagnosed on 2/8/2023 after imaging showed multiple lytic lesions and compression fractures.  Additional workup was consistent with a diagnosis of multiple myeloma  CT imaging on 6/8 shows significant progression of osseous metastases since the May 15th PET/CT with lesions demonstrating diffuse enlargement   Follows outpatient with oncology.  Per last note from 5/2024 patient has received neoadjuvant bortezomib, Laniazid Samad, and dexamethasone with transplant.  Patient had a pending appointment with radiation oncology for CT SIM but will not continue at this time.           VTE Pharmacologic Prophylaxis: VTE Score: 10 Moderate Risk (Score 3-4) - Pharmacological DVT Prophylaxis Ordered: apixaban (Eliquis).  Code Status: Level 3 - DNAR and DNI   Discussion with family: Updated  (wife) at bedside.    Anticipated Length of Stay: Patient will be admitted on an inpatient basis with an anticipated length of stay of greater than 2 midnights secondary to chest pain.    Chief Complaint: Chest pain    History of Present Illness:  Fredy Martinez  is a 58 y.o. male with a PMH of multiple myeloma on Revlimid and RVD therapy s/p stem cell transplant, pulmonary embolism on Eliquis, and recent left hip and shoulder fracture who presents with chest pain and associated nausea and vomiting of 1 day.  Patient reports that the symptoms have been ongoing however have worsened significantly in the past 1 day at [facility].  Chest pain is reported as gradual onset, constant, nonradiating, pressure, exertional, and pleuritic.  Patient notes that he feels his heart rates beating fast.  He has not had any recent chemotherapy treatments and understands that he has a very poor and terminal prognosis given how advanced his  cancer has progressed.    Per discussion with patient and wife at bedside, they would like to proceed with hospice as soon as possible.  In the meantime, they would like to continue goal-directed care with level 3 CODE STATUS.    Otherwise, patient denies any fevers.  No recent sick contacts or foreign travels.  No tick bites.    Review of Systems:  Review of Systems   Constitutional:  Positive for activity change, appetite change, chills, fatigue and unexpected weight change. Negative for fever.   HENT:  Negative for congestion, ear pain and sore throat.    Eyes:  Negative for pain and visual disturbance.   Respiratory:  Negative for cough and shortness of breath.    Cardiovascular:  Negative for chest pain and palpitations.   Gastrointestinal:  Positive for constipation, nausea and vomiting. Negative for abdominal pain and diarrhea.   Genitourinary:  Negative for dysuria and hematuria.   Musculoskeletal:  Positive for arthralgias, back pain, joint swelling and myalgias.   Skin:  Positive for wound. Negative for color change and rash.   Neurological:  Negative for seizures, syncope, facial asymmetry and headaches.   Hematological:  Bruises/bleeds easily.   All other systems reviewed and are negative.      Past Medical and Surgical History:   Past Medical History:   Diagnosis Date    Cancer (HCC)     bone-    GERD (gastroesophageal reflux disease)     Multiple myeloma (HCC)        Past Surgical History:   Procedure Laterality Date    APPENDECTOMY      IR BIOPSY BONE MARROW  3/15/2023    AK OPTX FEM SHFT FX W/INSJ IMED IMPLT W/WO SCREW Left 4/1/2024    Procedure: INSERTION NAIL IM FEMUR ANTEGRADE (TROCHANTERIC);  Surgeon: Pat Bang MD;  Location: [facility];  Service: Orthopedics       Meds/Allergies:  Prior to Admission medications    Medication Sig Start Date End Date Taking? Authorizing Provider   acetaminophen (TYLENOL) 500 mg tablet Take 2 tablets (1,000 mg total) by mouth 3 (three) times a day 3/8/23    Can Reardon MD   apixaban (Eliquis) 5 mg Take 1 tablet (5 mg total) by mouth 2 (two) times a day 5/21/24   Davis Garcia MD   dexamethasone (DECADRON) 4 mg tablet Take 1 tablet (4 mg total) by mouth 2 (two) times a day with meals Continue pantoprazole while receiving this medication. 5/22/24   Tne Toledo MD   lenalidomide (Revlimid) 5 MG CAPS TAKE 1 CAPSULE BY MOUTH 1 TIME DAILY 5/1/24   Davis Garcia MD   lidocaine (LIDODERM) 5 % Apply 1 patch topically over 12 hours daily - apply to middle back - Remove & Discard patch within 12 hours 5/22/24   Can Reardon MD   methocarbamol (ROBAXIN) 500 mg tablet Take 1 tablet (500 mg total) by mouth 4 (four) times a day 5/1/24   Can Reardon MD   morphine (MS CONTIN) 30 mg 12 hr tablet Take 1 tablet (30 mg total) by mouth every 8 (eight) hours - scheduled, to reduce and prevent cancer pain Max Daily Amount: 90 mg 5/29/24   Can Reardon MD   naloxone (NARCAN) 4 mg/0.1 mL nasal spray Administer 1 spray into a nostril. If no response after 2-3 minutes, give another dose in the other nostril using a new spray. 4/18/23   Can Reardon MD   ondansetron (ZOFRAN) 4 mg tablet Take 1 tablet (4 mg total) by mouth every 8 (eight) hours as needed for nausea or vomiting 5/1/24   Can Reardon MD   oxyCODONE (ROXICODONE) 20 MG TABS Take 0.5-1 tablets (10-20 mg total) by mouth every 4 (four) hours as needed (moderate/severe cancer pain) Max Daily Amount: 120 mg 5/22/24   Can Reardon MD   pantoprazole (PROTONIX) 40 mg tablet Take 1 tablet (40 mg total) by mouth daily - in the morning 5/1/24   Can Reardon MD   polyethylene glycol (MIRALAX) 17 g packet Take 17 g by mouth daily 4/6/24   Sergo Vincent PA-C   senna-docusate sodium (SENOKOT S) 8.6-50 mg per tablet Take 1 tablet by mouth daily at bedtime 4/5/24   Sergo Vincent PA-C     I have reviewed home medications with patient personally.    Allergies: No Known Allergies    Social History:  Marital  "Status: /Civil Union   Occupation: Retired  Patient Pre-hospital Living Situation: Home  Patient Pre-hospital Level of Mobility: non-ambulatory/bed bound  Patient Pre-hospital Diet Restrictions: None  Substance Use History:   Social History     Substance and Sexual Activity   Alcohol Use Not Currently     Social History     Tobacco Use   Smoking Status Some Days    Types: Cigarettes   Smokeless Tobacco Not on file   Tobacco Comments    Smoking 1 cigarette daily     Social History     Substance and Sexual Activity   Drug Use Not Currently    Types: Marijuana    Comment: once a month       Family History:  Family History   Problem Relation Age of Onset    Diabetes Mother     Heart disease Father     Diabetes Father        Physical Exam:     Vitals:   Blood Pressure: 114/70 (06/08/24 1654)  Pulse: 102 (06/08/24 1654)  Temperature: 97.8 °F (36.6 °C) (06/08/24 0939)  Temp Source: Oral (06/08/24 0939)  Respirations: 20 (06/08/24 1654)  Height: 5' 10\" (177.8 cm) (06/08/24 0939)  Weight - Scale: 53 kg (116 lb 13.5 oz) (06/08/24 0939)  SpO2: 99 % (06/08/24 1654)    Physical Exam  Vitals and nursing note reviewed.   Constitutional:       General: He is not in acute distress.     Appearance: He is cachectic. He is ill-appearing. He is not diaphoretic.      Interventions: Nasal cannula in place.      Comments: Frail and chronically ill-appearing   HENT:      Head: Normocephalic and atraumatic.      Nose: Congestion present.      Mouth/Throat:      Mouth: Mucous membranes are dry.   Eyes:      Conjunctiva/sclera: Conjunctivae normal.   Cardiovascular:      Rate and Rhythm: Tachycardia present. Rhythm irregular.      Heart sounds: No murmur heard.  Pulmonary:      Effort: Pulmonary effort is normal. No respiratory distress.      Breath sounds: Rhonchi (Bilaterally) present. No wheezing or rales.   Abdominal:      Palpations: Abdomen is soft.      Tenderness: There is no abdominal tenderness. There is no right CVA " tenderness or left CVA tenderness.   Musculoskeletal:         General: Signs of injury (Left upper shoulder) present. No swelling.      Cervical back: Neck supple.      Right lower leg: No edema.      Left lower leg: No edema.      Comments: Left upper extremity neurovascular intact   Skin:     General: Skin is warm and dry.      Capillary Refill: Capillary refill takes 2 to 3 seconds.   Neurological:      Mental Status: He is alert and oriented to person, place, and time.   Psychiatric:         Mood and Affect: Mood normal.          Additional Data:     Lab Results:  Results from last 7 days   Lab Units 06/08/24  1035   WBC Thousand/uL 10.66*   HEMOGLOBIN g/dL 16.1   HEMATOCRIT % 47.7   PLATELETS Thousands/uL 181   BANDS PCT % 5   LYMPHO PCT % 5*   MONO PCT % 9   EOS PCT % 1     Results from last 7 days   Lab Units 06/08/24  1035   SODIUM mmol/L 134*   POTASSIUM mmol/L 5.4*   CHLORIDE mmol/L 99   CO2 mmol/L 23   BUN mg/dL 38*   CREATININE mg/dL 1.02   ANION GAP mmol/L 12   CALCIUM mg/dL 10.4*   ALBUMIN g/dL 3.5   TOTAL BILIRUBIN mg/dL 0.91   ALK PHOS U/L 257*   ALT U/L 39   AST U/L 38   GLUCOSE RANDOM mg/dL 145*     Results from last 7 days   Lab Units 06/08/24  1035   INR  1.06             Results from last 7 days   Lab Units 06/08/24  1305 06/08/24  1035   LACTIC ACID mmol/L 2.3* 3.2*   PROCALCITONIN ng/ml  --  0.37*       Lines/Drains:  Invasive Devices       Peripheral Intravenous Line  Duration             Peripheral IV 06/08/24 Dorsal (posterior);Right Forearm <1 day                        Imaging: Reviewed radiology reports from this admission including: chest CT scan  CT pe study w abdomen pelvis w contrast   Final Result by Christopher Murcia MD (06/08 1345)      1.  Limited evaluation for pulmonary embolism due to substantial respiratory motion. No large central embolus is seen. The lobar and distal branches are not well assessed.   2.  Significant progression of osseous metastases since the May 15th PET/CT  with lesions demonstrating diffuse enlargement as described above. Spine lesions with associated epidural disease and canal stenosis is better assessed on prior MRI.   3.  New displaced pathologic fracture of the proximal left humerus.   4.  Large colonic fecal burden with marked fecal distention of the rectum is consistent with constipation. No inflammation.            The study was marked in EPIC for immediate notification.            Workstation performed: [id number]         XR chest 1 view portable   ED Interpretation by Natalio Sin MD (06/08 1137)   Per my independent interpretation: Wedge-shaped consolidation in the right lung concern for infarct versus consolidation          EKG and Other Studies Reviewed on Admission:   EKG: Sinus Tachycardia. .    ** Please Note: This note has been constructed using a voice recognition system. **

## 2024-06-08 NOTE — ASSESSMENT & PLAN NOTE
Assessment:  Known fracture prior to admission.  Additionally noted on 6/8 CT imaging.  Per patient and wife, they would not like to proceed with any type of surgical intervention.    Plan:  Will hold off on orthopedic surgery consult at this time.

## 2024-06-09 ENCOUNTER — HOME CARE VISIT (OUTPATIENT)
Dept: HOME HEALTH SERVICES | Facility: HOME HEALTHCARE | Age: 59
End: 2024-06-09
Payer: COMMERCIAL

## 2024-06-09 LAB
ATRIAL RATE: 131 BPM
ATRIAL RATE: 170 BPM
P AXIS: 59 DEGREES
PR INTERVAL: 136 MS
QRS AXIS: 45 DEGREES
QRS AXIS: 51 DEGREES
QRSD INTERVAL: 76 MS
QRSD INTERVAL: 86 MS
QT INTERVAL: 236 MS
QT INTERVAL: 290 MS
QTC INTERVAL: 420 MS
QTC INTERVAL: 428 MS
T WAVE AXIS: 147 DEGREES
T WAVE AXIS: 59 DEGREES
VENTRICULAR RATE: 131 BPM
VENTRICULAR RATE: 191 BPM

## 2024-06-09 PROCEDURE — 99232 SBSQ HOSP IP/OBS MODERATE 35: CPT | Performed by: HOSPITALIST

## 2024-06-09 PROCEDURE — 93005 ELECTROCARDIOGRAM TRACING: CPT

## 2024-06-09 PROCEDURE — 93010 ELECTROCARDIOGRAM REPORT: CPT | Performed by: INTERNAL MEDICINE

## 2024-06-09 RX ORDER — HYDROMORPHONE HCL/PF 1 MG/ML
0.5 SYRINGE (ML) INJECTION ONCE
Status: COMPLETED | OUTPATIENT
Start: 2024-06-09 | End: 2024-06-09

## 2024-06-09 RX ORDER — LIDOCAINE 50 MG/G
2 PATCH TOPICAL DAILY
Status: DISCONTINUED | OUTPATIENT
Start: 2024-06-09 | End: 2024-06-11 | Stop reason: HOSPADM

## 2024-06-09 RX ADMIN — APIXABAN 5 MG: 5 TABLET, FILM COATED ORAL at 17:54

## 2024-06-09 RX ADMIN — PANTOPRAZOLE SODIUM 40 MG: 40 TABLET, DELAYED RELEASE ORAL at 09:35

## 2024-06-09 RX ADMIN — APIXABAN 5 MG: 5 TABLET, FILM COATED ORAL at 09:35

## 2024-06-09 RX ADMIN — LIDOCAINE 5% 1 PATCH: 700 PATCH TOPICAL at 10:50

## 2024-06-09 RX ADMIN — SODIUM CHLORIDE, SODIUM GLUCONATE, SODIUM ACETATE, POTASSIUM CHLORIDE, MAGNESIUM CHLORIDE, SODIUM PHOSPHATE, DIBASIC, AND POTASSIUM PHOSPHATE 100 ML/HR: .53; .5; .37; .037; .03; .012; .00082 INJECTION, SOLUTION INTRAVENOUS at 20:39

## 2024-06-09 RX ADMIN — METHOCARBAMOL 500 MG: 500 TABLET ORAL at 17:54

## 2024-06-09 RX ADMIN — HYDROMORPHONE HYDROCHLORIDE 0.5 MG: 1 INJECTION, SOLUTION INTRAMUSCULAR; INTRAVENOUS; SUBCUTANEOUS at 01:30

## 2024-06-09 RX ADMIN — MORPHINE SULFATE 30 MG: 30 TABLET, EXTENDED RELEASE ORAL at 04:58

## 2024-06-09 RX ADMIN — METHOCARBAMOL 500 MG: 500 TABLET ORAL at 09:35

## 2024-06-09 RX ADMIN — MORPHINE SULFATE 30 MG: 30 TABLET, EXTENDED RELEASE ORAL at 21:57

## 2024-06-09 RX ADMIN — SODIUM CHLORIDE 500 ML: 0.9 INJECTION, SOLUTION INTRAVENOUS at 00:22

## 2024-06-09 RX ADMIN — LIDOCAINE 5% 1 PATCH: 700 PATCH TOPICAL at 09:35

## 2024-06-09 RX ADMIN — MORPHINE SULFATE 30 MG: 30 TABLET, EXTENDED RELEASE ORAL at 14:01

## 2024-06-09 RX ADMIN — METHOCARBAMOL 500 MG: 500 TABLET ORAL at 12:24

## 2024-06-09 RX ADMIN — HYDROMORPHONE HYDROCHLORIDE 0.5 MG: 1 INJECTION, SOLUTION INTRAMUSCULAR; INTRAVENOUS; SUBCUTANEOUS at 06:00

## 2024-06-09 RX ADMIN — OXYCODONE HYDROCHLORIDE 15 MG: 10 TABLET ORAL at 09:34

## 2024-06-09 RX ADMIN — DEXAMETHASONE 4 MG: 4 TABLET ORAL at 09:35

## 2024-06-09 RX ADMIN — DEXAMETHASONE 4 MG: 4 TABLET ORAL at 17:54

## 2024-06-09 NOTE — CASE MANAGEMENT
Case Management Discharge Planning Note    Patient name Fredy Martinez Jr.  Location S /S -01 MRN [id number]  : 1965 Date 2024       Current Admission Date: 2024  Current Admission Diagnosis:Encounter for hospice care   Patient Active Problem List    Diagnosis Date Noted Date Diagnosed    Pathological fracture of left humerus 2024     SIRS (systemic inflammatory response syndrome) (HCC) 2024     Hyperkalemia 2024     Diarrhea 2024     Ambulatory dysfunction 2024     Encounter for hospice care 2024     Cachexia (HCC) 2024     Acute blood loss anemia 2024     SVT (supraventricular tachycardia) 2024     Pulmonary embolism without acute cor pulmonale (HCC) 2024     Closed fracture of neck of left femur (HCC) 2024     Tachycardia 2024     Dental disease 2023     Insomnia 2023     Cancer related pain 2023     Multiple myeloma not having achieved remission (HCC) 2023     Metastasis to bone ([facility]) 2023     Palliative care patient 2023     Unintentional weight loss 2023     Nicotine dependence, cigarettes, uncomplicated 2023     History of alcohol abuse 2023     Acid reflux 2023     Nausea & vomiting 2023     Loss of appetite 2023     Therapeutic opioid-induced constipation (OIC) 2023     Back pain 2023     Monoclonal gammopathy 2023       LOS (days): 1  Geometric Mean LOS (GMLOS) (days):   Days to GMLOS:     OBJECTIVE:  Risk of Unplanned Readmission Score: 22.37         Current admission status: Inpatient   Preferred Pharmacy:   [facility] - [place] - [address]  [address]  [place] [address]  Phone: [phone] Fax: [phone]    [facility] - [place], [place] - [address]  [address]  [address]  [facility] [address]  Phone: [phone] Fax:  [phone]    Primary Care Provider: No primary care provider on file.    Primary Insurance: HELENE BHATTI KATRIN  Secondary Insurance:     DISCHARGE DETAILS:    Discharge planning discussed with:: Maria Eugenia Martinez (Spouse)  Freedom of Choice: Yes  Comments - Freedom of Choice: Reviewed consult received for Inpatient referral to hospice via Case Management  CM contacted family/caregiver?: Yes  Were Treatment Team discharge recommendations reviewed with patient/caregiver?: Yes  Did patient/caregiver verbalize understanding of patient care needs?: Yes  Were patient/caregiver advised of the risks associated with not following Treatment Team discharge recommendations?: Yes    Contacts  Patient Contacts: Maria Eugenia Martinez (Spouse)  Relationship to Patient:: Family  Contact Method: Phone  Phone Number: [phone]  Reason/Outcome: Discharge Planning, Referral, Emergency Contact, Continuity of Care    Requested Home Health Care         Is the patient interested in HHC at discharge?: No    DME Referral Provided  Referral made for DME?: No    Other Referral/Resources/Interventions Provided:  Interventions: Hospice         Treatment Team Recommendation: Hospice                                               CM consult received for Inpatient referral to hospice via case management.  CM reached out to patient's wife to discuss.    Freedom of choice was reviewed and family would like a referral made to [facility] for evaluation.    CM made this referral in [place] and will continue to follow for determination from hospice team.

## 2024-06-09 NOTE — ASSESSMENT & PLAN NOTE
Noted to have met SIRS criteria as noted by tachycardia, tachypnea, WBC 10.53  Low suspicion for infection given CT imaging did not show any lobar or focal consolidation, UA is negative.  Received cefepime x 1 in ED, will hold off on further antibiotics at this time

## 2024-06-09 NOTE — ASSESSMENT & PLAN NOTE
Initially diagnosed on 2/8/2023 after imaging showed multiple lytic lesions and compression fractures.    Additional workup was consistent with a diagnosis of multiple myeloma  CT imaging on 6/8 shows significant progression of osseous metastases  May 15th PET/CT with lesions demonstrating diffuse enlargement   Follows outpatient with oncology  05/2024 patient has received neoadjuvant bortezomib, Laniazid Samad, and dexamethasone with transplant  Patient had a pending appointment with radiation oncology for CT SIM but will not continue at this time.

## 2024-06-09 NOTE — PROGRESS NOTES
On license of [facility]  Progress Note  Name: Fredy Houston I  MRN: [id number]  Unit/Bed#: S -01 I Date of Admission: 6/8/2024   Date of Service: 6/9/2024 I Hospital Day: 1    Assessment & Plan   * Encounter for hospice care  Assessment & Plan  Per patient and wife, patient would like to proceed with hospice care as soon as possible at home.  He follows with oncology outpatient and is planning to finish the last 3 pills of his Revlimid.  Inpatient hospice consult placed.  Will work with case management to arrange for services  Pain medication regimen as indicated by outpatient palliative care    Nausea & vomiting  Assessment & Plan  Assessment:  Likely in the setting of worsening multiple myeloma and Revlimid use    Plan:  Zofran as needed, IV fluids    Therapeutic opioid-induced constipation (OIC)  Assessment & Plan  MiraLAX, Senokot, and consider Relistor if needed    Pulmonary embolism without acute cor pulmonale (HCC)  Assessment & Plan  Noted incidentally during 3/30/2024 hospitalization  Continue Eliquis 5 mg twice a day    Pathological fracture of left humerus  Assessment & Plan  Assessment:  Known fracture prior to admission.    Additionally noted on 6/8 CT imaging.    Per patient and wife, they would not like to proceed with any type of surgical intervention.    Plan:  Will hold off on orthopedic surgery consult at this time.      Monoclonal gammopathy  Assessment & Plan  Initially diagnosed on 2/8/2023 after imaging showed multiple lytic lesions and compression fractures.    Additional workup was consistent with a diagnosis of multiple myeloma  CT imaging on 6/8 shows significant progression of osseous metastases  May 15th PET/CT with lesions demonstrating diffuse enlargement   Follows outpatient with oncology  05/2024 patient has received neoadjuvant bortezomib, Laniazid Samad, and dexamethasone with transplant  Patient had a pending appointment with radiation oncology for CT SIM  but will not continue at this time.    SIRS (systemic inflammatory response syndrome) (HCC)  Assessment & Plan  Noted to have met SIRS criteria as noted by tachycardia, tachypnea, WBC 10.53  Low suspicion for infection given CT imaging did not show any lobar or focal consolidation, UA is negative.  Received cefepime x 1 in ED, will hold off on further antibiotics at this time    Hyperkalemia  Assessment & Plan  Possible component of dehydration or tumor lysis syndrome in the setting of MM with active tx  No indication for aggressive management or repeated lab draws as patient requesting hospice             VTE Pharmacologic Prophylaxis: VTE Score: 10 High Risk (Score >/= 5) - Pharmacological DVT Prophylaxis Ordered: apixaban (Eliquis). Sequential Compression Devices Ordered.    Mobility:   Basic Mobility Inpatient Raw Score: 11  -HLM Goal: 4: Move to chair/commode  JH-HLM Achieved: 2: Bed activities/Dependent transfer  JH-HLM Goal NOT achieved. Continue with multidisciplinary rounding and encourage appropriate mobility to improve upon JH-HLM goals.    Patient Centered Rounds: I performed bedside rounds with nursing staff today.  Discussions with Specialists or Other Care Team Provider: Hospice, CM    Education and Discussions with Family / Patient: Updated  (significant other) at bedside.    Current Length of Stay: 1 day(s)  Current Patient Status: Inpatient   Discharge Plan: Anticipate discharge in 24-48 hrs to home with home services.    Code Status: Level 3 - DNAR and DNI    Subjective:   Pt feeling improved today to both nausea & pain on the various meds prescribed to reduce said sx. NAEO, no new complaints, NAD, only wishing to chat w/ hospice today prior to making the final decision for comfort care etc.    Objective:     Vitals:   Temp (24hrs), Av.8 °F (36.6 °C), Min:97.5 °F (36.4 °C), Max:98 °F (36.7 °C)    Temp:  [97.5 °F (36.4 °C)-98 °F (36.7 °C)] 98 °F (36.7 °C)  HR:  []  93  Resp:  [20-22] 20  BP: ()/(51-93) 96/67  SpO2:  [96 %-100 %] 100 %  Body mass index is 16.77 kg/m².     Input and Output Summary (last 24 hours):     Intake/Output Summary (Last 24 hours) at 6/9/2024 1117  Last data filed at 6/9/2024 0900  Gross per 24 hour   Intake 1680 ml   Output 900 ml   Net 780 ml       Physical Exam:   Physical Exam  Vitals and nursing note reviewed. Exam conducted with a chaperone present.   Constitutional:       General: He is not in acute distress.     Appearance: He is ill-appearing. He is not diaphoretic.   HENT:      Head: Normocephalic.      Nose: Congestion present.   Eyes:      General: No scleral icterus.        Right eye: No discharge.         Left eye: No discharge.      Conjunctiva/sclera: Conjunctivae normal.   Cardiovascular:      Rate and Rhythm: Regular rhythm. Tachycardia present.      Pulses: Normal pulses.   Pulmonary:      Effort: Pulmonary effort is normal. No respiratory distress.   Chest:      Chest wall: No tenderness.   Abdominal:      Tenderness: There is no abdominal tenderness. There is no guarding.   Musculoskeletal:         General: Tenderness, deformity and signs of injury present.      Cervical back: No rigidity or tenderness.   Skin:     General: Skin is warm and dry.      Coloration: Skin is pale.   Neurological:      Mental Status: He is alert and oriented to person, place, and time.   Psychiatric:         Mood and Affect: Mood normal.         Behavior: Behavior normal.          Additional Data:     Labs:  Results from last 7 days   Lab Units 06/08/24  1035   WBC Thousand/uL 10.66*   HEMOGLOBIN g/dL 16.1   HEMATOCRIT % 47.7   PLATELETS Thousands/uL 181   BANDS PCT % 5   LYMPHO PCT % 5*   MONO PCT % 9   EOS PCT % 1     Results from last 7 days   Lab Units 06/08/24  1035   SODIUM mmol/L 134*   POTASSIUM mmol/L 5.4*   CHLORIDE mmol/L 99   CO2 mmol/L 23   BUN mg/dL 38*   CREATININE mg/dL 1.02   ANION GAP mmol/L 12   CALCIUM mg/dL 10.4*   ALBUMIN g/dL  3.5   TOTAL BILIRUBIN mg/dL 0.91   ALK PHOS U/L 257*   ALT U/L 39   AST U/L 38   GLUCOSE RANDOM mg/dL 145*     Results from last 7 days   Lab Units 06/08/24  1035   INR  1.06             Results from last 7 days   Lab Units 06/08/24  1305 06/08/24  1035   LACTIC ACID mmol/L 2.3* 3.2*   PROCALCITONIN ng/ml  --  0.37*       Lines/Drains:  Invasive Devices       Peripheral Intravenous Line  Duration             Peripheral IV 06/08/24 Dorsal (posterior);Right Forearm 1 day                          Imaging: Reviewed radiology reports from this admission including: chest xray, chest CT scan, and abdominal/pelvic CT    Recent Cultures (last 7 days):   Results from last 7 days   Lab Units 06/08/24  1045 06/08/24  1035   BLOOD CULTURE  Received in Microbiology Lab. Culture in Progress. Received in Microbiology Lab. Culture in Progress.       Last 24 Hours Medication List:   Current Facility-Administered Medications   Medication Dose Route Frequency Provider Last Rate    acetaminophen  650 mg Oral Q6H PRN Kyle Brunner, MD      apixaban  5 mg Oral BID Kyle Brunner, MD      calcium carbonate  500 mg Oral Daily PRN Bruno Brumfield MD      dexamethasone  4 mg Oral BID With Meals Kyle Brunner, MD      HYDROmorphone  0.5 mg Intravenous Q4H PRN Kyle Brunner, MD      [START ON 6/10/2024] lenalidomide  5 mg Oral Daily Kyle Brunner, MD      lidocaine  2 patch Topical Daily Gino Johnson DO      methocarbamol  500 mg Oral 4x Daily Kyle Brunner, MD      morphine  30 mg Oral Q8H RUTH Kyle Brunner, MD      multi-electrolyte  100 mL/hr Intravenous Continuous Kyle Brunner,  mL/hr (06/08/24 1752)    naloxone  0.04 mg Intravenous Q1MIN PRN Kyle Brunner, MD      nicotine  1 patch Transdermal Daily Kyle Brunner, MD      ondansetron  4 mg Intravenous Q6H PRN Kyle Brunner, MD      oxyCODONE  15 mg Oral Q4H PRN Kyle Brunner, MD      pantoprazole  40 mg Oral Daily Kyle Brunner, MD      polyethylene glycol  17 g Oral Daily Kyle Brunner, MD       senna-docusate sodium  1 tablet Oral HS Kyle Brunner, MD          Today, Patient Was Seen By: Gino Johnson DO    **Please Note: This note may have been constructed using a voice recognition system.**

## 2024-06-09 NOTE — ASSESSMENT & PLAN NOTE
Assessment:  Likely in the setting of worsening multiple myeloma and Revlimid use    Plan:  Zofran as needed, IV fluids

## 2024-06-09 NOTE — ASSESSMENT & PLAN NOTE
Possible component of dehydration or tumor lysis syndrome in the setting of MM with active tx  No indication for aggressive management or repeated lab draws as patient requesting hospice

## 2024-06-09 NOTE — CASE MANAGEMENT
Case Management Discharge Planning Note    Patient name Fredy Martinez Jr.  Location S /S -01 MRN [id number]  : 1965 Date 2024       Current Admission Date: 2024  Current Admission Diagnosis:Encounter for hospice care   Patient Active Problem List    Diagnosis Date Noted Date Diagnosed    Pathological fracture of left humerus 2024     SIRS (systemic inflammatory response syndrome) (HCC) 2024     Hyperkalemia 2024     Diarrhea 2024     Ambulatory dysfunction 2024     Encounter for hospice care 2024     Cachexia (HCC) 2024     Acute blood loss anemia 2024     SVT (supraventricular tachycardia) 2024     Pulmonary embolism without acute cor pulmonale (HCC) 2024     Closed fracture of neck of left femur (HCC) 2024     Tachycardia 2024     Dental disease 2023     Insomnia 2023     Cancer related pain 2023     Multiple myeloma not having achieved remission (HCC) 2023     Metastasis to bone ([facility]) 2023     Palliative care patient 2023     Unintentional weight loss 2023     Nicotine dependence, cigarettes, uncomplicated 2023     History of alcohol abuse 2023     Acid reflux 2023     Nausea & vomiting 2023     Loss of appetite 2023     Therapeutic opioid-induced constipation (OIC) 2023     Back pain 2023     Monoclonal gammopathy 2023       LOS (days): 1  Geometric Mean LOS (GMLOS) (days):   Days to GMLOS:     OBJECTIVE:  Risk of Unplanned Readmission Score: 21.56         Current admission status: Inpatient   Preferred Pharmacy:   [facility] - [place] - [address]  [address]  [place] [address]  Phone: [phone] Fax: [phone]    [facility] - [place], [place] - [address]  [address]  [address]  [facility] [address]  Phone: [phone] Fax:  [phone]    Primary Care Provider: No primary care provider on file.    Primary Insurance: HELENE DAHL  Secondary Insurance:     DISCHARGE DETAILS:                                          Other Referral/Resources/Interventions Provided:  Interventions: Hospice       CM notified by hospice liaison that they are planning on delivering DME to patient's home tomorrow 6/10 and family is requesting transport home for patient Tuesday 6/11 AM.    Handoff left for weekday CM to coordinate the above.    CM department will continue to follow to assist with discharge coordination.

## 2024-06-10 LAB
ANION GAP SERPL CALCULATED.3IONS-SCNC: 13 MMOL/L (ref 4–13)
BASE EX.OXY STD BLDV CALC-SCNC: 97.2 % (ref 60–80)
BASE EXCESS BLDV CALC-SCNC: -0.6 MMOL/L
BUN SERPL-MCNC: 7 MG/DL (ref 5–25)
CALCIUM SERPL-MCNC: 6.6 MG/DL (ref 8.4–10.2)
CHLORIDE SERPL-SCNC: 105 MMOL/L (ref 96–108)
CO2 SERPL-SCNC: 21 MMOL/L (ref 21–32)
CREAT SERPL-MCNC: 0.2 MG/DL (ref 0.6–1.3)
GFR SERPL CREATININE-BSD FRML MDRD: 175 ML/MIN/1.73SQ M
GLUCOSE SERPL-MCNC: 83 MG/DL (ref 65–140)
HCO3 BLDV-SCNC: 21.5 MMOL/L (ref 24–30)
MAGNESIUM SERPL-MCNC: 2.2 MG/DL (ref 1.9–2.7)
O2 CT BLDV-SCNC: 14.7 ML/DL
PCO2 BLDV: 27.5 MM HG (ref 42–50)
PH BLDV: 7.51 [PH] (ref 7.3–7.4)
PO2 BLDV: 134.1 MM HG (ref 35–45)
POTASSIUM SERPL-SCNC: 3.6 MMOL/L (ref 3.5–5.3)
SODIUM SERPL-SCNC: 139 MMOL/L (ref 135–147)

## 2024-06-10 PROCEDURE — 99232 SBSQ HOSP IP/OBS MODERATE 35: CPT | Performed by: HOSPITALIST

## 2024-06-10 PROCEDURE — 80048 BASIC METABOLIC PNL TOTAL CA: CPT

## 2024-06-10 PROCEDURE — 82805 BLOOD GASES W/O2 SATURATION: CPT

## 2024-06-10 PROCEDURE — 83735 ASSAY OF MAGNESIUM: CPT

## 2024-06-10 PROCEDURE — 93005 ELECTROCARDIOGRAM TRACING: CPT

## 2024-06-10 RX ORDER — OXYCODONE HYDROCHLORIDE 10 MG/1
20 TABLET ORAL EVERY 6 HOURS SCHEDULED
Status: DISCONTINUED | OUTPATIENT
Start: 2024-06-10 | End: 2024-06-11 | Stop reason: HOSPADM

## 2024-06-10 RX ORDER — METOPROLOL TARTRATE 1 MG/ML
5 INJECTION, SOLUTION INTRAVENOUS ONCE
Status: COMPLETED | OUTPATIENT
Start: 2024-06-10 | End: 2024-06-10

## 2024-06-10 RX ADMIN — SODIUM CHLORIDE, SODIUM GLUCONATE, SODIUM ACETATE, POTASSIUM CHLORIDE, MAGNESIUM CHLORIDE, SODIUM PHOSPHATE, DIBASIC, AND POTASSIUM PHOSPHATE 100 ML/HR: .53; .5; .37; .037; .03; .012; .00082 INJECTION, SOLUTION INTRAVENOUS at 15:35

## 2024-06-10 RX ADMIN — MORPHINE SULFATE 30 MG: 30 TABLET, EXTENDED RELEASE ORAL at 21:32

## 2024-06-10 RX ADMIN — METHOCARBAMOL 500 MG: 500 TABLET ORAL at 00:10

## 2024-06-10 RX ADMIN — MORPHINE SULFATE 30 MG: 30 TABLET, EXTENDED RELEASE ORAL at 06:01

## 2024-06-10 RX ADMIN — METHOCARBAMOL 500 MG: 500 TABLET ORAL at 11:25

## 2024-06-10 RX ADMIN — POLYETHYLENE GLYCOL 3350 17 G: 17 POWDER, FOR SOLUTION ORAL at 08:07

## 2024-06-10 RX ADMIN — PANTOPRAZOLE SODIUM 40 MG: 40 TABLET, DELAYED RELEASE ORAL at 08:05

## 2024-06-10 RX ADMIN — OXYCODONE HYDROCHLORIDE 15 MG: 10 TABLET ORAL at 08:07

## 2024-06-10 RX ADMIN — METHOCARBAMOL 500 MG: 500 TABLET ORAL at 17:11

## 2024-06-10 RX ADMIN — OXYCODONE HYDROCHLORIDE 20 MG: 10 TABLET ORAL at 23:16

## 2024-06-10 RX ADMIN — OXYCODONE HYDROCHLORIDE 15 MG: 10 TABLET ORAL at 15:33

## 2024-06-10 RX ADMIN — APIXABAN 5 MG: 5 TABLET, FILM COATED ORAL at 08:05

## 2024-06-10 RX ADMIN — OXYCODONE HYDROCHLORIDE 15 MG: 10 TABLET ORAL at 00:10

## 2024-06-10 RX ADMIN — DEXAMETHASONE 4 MG: 4 TABLET ORAL at 08:05

## 2024-06-10 RX ADMIN — LENALIDOMIDE 5 MG: 5 CAPSULE ORAL at 11:20

## 2024-06-10 RX ADMIN — MORPHINE SULFATE 30 MG: 30 TABLET, EXTENDED RELEASE ORAL at 14:09

## 2024-06-10 RX ADMIN — DEXAMETHASONE 4 MG: 4 TABLET ORAL at 17:11

## 2024-06-10 RX ADMIN — SENNOSIDES, DOCUSATE SODIUM 1 TABLET: 8.6; 5 TABLET ORAL at 21:32

## 2024-06-10 RX ADMIN — METHOCARBAMOL 500 MG: 500 TABLET ORAL at 21:32

## 2024-06-10 RX ADMIN — METOROPROLOL TARTRATE 5 MG: 5 INJECTION, SOLUTION INTRAVENOUS at 17:41

## 2024-06-10 RX ADMIN — APIXABAN 5 MG: 5 TABLET, FILM COATED ORAL at 17:11

## 2024-06-10 RX ADMIN — METHOCARBAMOL 500 MG: 500 TABLET ORAL at 08:05

## 2024-06-10 RX ADMIN — LIDOCAINE 5% 2 PATCH: 700 PATCH TOPICAL at 08:04

## 2024-06-10 NOTE — UTILIZATION REVIEW
Initial Clinical Review    Admission: Date/Time/Statement:   Admission Orders (From admission, onward)       Ordered        06/08/24 1423  INPATIENT ADMISSION  Once                          Orders Placed This Encounter   Procedures    INPATIENT ADMISSION     Standing Status:   Standing     Number of Occurrences:   1     Order Specific Question:   Level of Care     Answer:   Med Surg [16]     Order Specific Question:   Estimated length of stay     Answer:   More than 2 Midnights     Order Specific Question:   Certification     Answer:   I certify that inpatient services are medically necessary for this patient for a duration of greater than two midnights. See H&P and MD Progress Notes for additional information about the patient's course of treatment.     ED Arrival Information       Expected   -    Arrival   6/8/2024 09:30    Acuity   Urgent              Means of arrival   Ambulance    Escorted by   [facility]    Service   Hospitalist    Admission type   Emergency              Arrival complaint   N/V             Chief Complaint   Patient presents with    Vomiting     Pt from [facility], Hx tachycardia, Stg 4 Multiple Myeloma, broken Left hip and shoulder from previous fall, PEG; Patient reports vomiting/nausea since yesterday - unable to keep anything down; last Zofran and morphine 2000 6/7 - no relief; denies CP/SOB/belly pain       Initial Presentation: 58 y.o. male with a PMH of multiple myeloma on Revlimid and RVD therapy s/p stem cell transplant, pulmonary embolism on Eliquis, and recent left hip and shoulder fracture presented to the ED from the nursing home via EMS w/  chest pain and associated nausea and vomiting of 1 day.    Patient reports that the symptoms have been ongoing however have worsened significantly in the past 1 day. Chest pain is reported as gradual onset, constant, nonradiating, pressure, exertional, and pleuritic. Reports fast HR.   In the ED,  tachycardia, tachypnea. WBC  10.53. K 5.4.    CT imaging did not show any lobar or focal consolidation, showed significant progression of osseous metastases since the May 15th PET/CT with lesions demonstrating diffuse enlargement.   UA is negative.   On exam, aaox3, ill appearing, tachycardia, rhythm irregular, rhonchi, TESHA shoulder injury present.  Given IV Morphine, IV Cefepime, IV Zofran, 500 cc IVF bolus, po sucralfate.    Admit as Inpatient for evaluation and treatment of SIRS, nausea and vomiting, hyperkalemia, multiple myeloma.  Plan: hold off on further antibiotics at this time. Zofran as needed. IV fluids. Pt and wife would like to proceed to hospice care, IP hospice consult placed. Pain medication regimen.  No repeated lab draws as pt would like to proceed to hospice care during admission.    Date: 06/09  Day 2: Pt feeling improved today to both nausea & pain. On level 3 DNR/DNI. Cont pain regimen. Cont nausea control prn. IVFs. MiraLAX, Senokot, and consider Relistor if needed. Continue Eliquis 5 mg twice a day. hold off on further antibiotics at this time.      Per IP Hospice Services: DME's bed, o2, transport chair and trapeze) to be delivered Monday afternoon with a Tuesday am dc with a Tuesday hospice soc.     ED Triage Vitals [06/08/24 0939]   Temperature Pulse Respirations Blood Pressure SpO2   97.8 °F (36.6 °C) (!) 133 22 101/74 95 %      Temp Source Heart Rate Source Patient Position - Orthostatic VS BP Location FiO2 (%)   Oral Monitor Lying Right arm --      Pain Score       No Pain          Wt Readings from Last 1 Encounters:   06/08/24 53 kg (116 lb 13.5 oz)     Additional Vital Signs:   Date/Time Temp Pulse Resp BP MAP (mmHg) SpO2 Calculated FIO2 (%) - Nasal Cannula Nasal Cannula O2 Flow Rate (L/min) O2 Device Patient Position - Orthostatic VS   06/10/24 0807 -- -- -- -- -- -- -- -- None (Room air) --   06/10/24 07:28:15 98.2 °F (36.8 °C) 86 16 104/71 82 96 % -- -- None (Room air) Lying   06/10/24 00:09:16 -- 70 -- 93/57  69 96 % -- -- -- --   06/09/24 21:56:12 97.9 °F (36.6 °C) 83 -- 96/65 75 94 % -- -- -- --   06/09/24 15:28:23 98.4 °F (36.9 °C) 90 -- 90/58 69 95 % -- -- -- --   06/09/24 07:30:53 98 °F (36.7 °C) 93 -- 96/67 77 100 % -- -- -- --   06/09/24 0130 -- 102 -- -- -- -- -- -- -- --   06/09/24 0100 -- 95 -- 105/67 80 -- -- -- -- --   06/09/24 00:58:44 -- 119 Abnormal  -- 105/67 80 99 % -- -- -- --   06/08/24 23:59:55 -- 191 Abnormal  -- 82/51 Abnormal  61 Abnormal  98 % -- -- -- --   06/08/24 2359 -- 193 Abnormal  -- -- -- -- -- -- -- --   06/08/24 2320 -- 183 Abnormal  -- -- -- -- -- -- -- --   06/08/24 2300 -- 180 Abnormal  -- -- -- -- -- -- -- --   06/08/24 2110 97.5 °F (36.4 °C) 64 -- 116/70 85 99 % -- -- -- --   06/08/24 1654 -- 102 20 114/70 86 99 % 44 6 L/min Nasal cannula --   06/08/24 1500 -- 115 Abnormal  20 108/72 81 99 % 44 6 L/min Nasal cannula Lying   06/08/24 1358 -- 121 Abnormal  20 113/83 93 99 % 44 6 L/min Nasal cannula Lying   06/08/24 1200 -- 115 Abnormal  22 124/83 99 96 % 44 6 L/min  Nasal cannula Sitting   Nasal Cannula O2 Flow Rate (L/min): As per MD Sin - for comfort. at 06/08/24 1200   06/08/24 1130 -- 109 Abnormal  22 122/93 104 98 % 28 2 L/min Nasal cannula Sitting   06/08/24 1101 -- -- -- -- -- 97 % 28 2 L/min Nasal cannula --   06/08/24 1100 -- 115 Abnormal  22 115/80 94 98 % 28 2 L/min Nasal cannula Sitting   06/08/24 1049 -- 114 Abnormal  22 120/79 95 97 % 28 2 L/min Nasal cannula  Sitting   O2 Device: For comfort; MD Sin aware. at 06/08/24 1049       Pertinent Labs/Diagnostic Test Results:   CT pe study w abdomen pelvis w contrast   Final Result by Christopher Murcia MD (06/08 1348)      1.  Limited evaluation for pulmonary embolism due to substantial respiratory motion. No large central embolus is seen. The lobar and distal branches are not well assessed.   2.  Significant progression of osseous metastases since the May 15th PET/CT with lesions demonstrating diffuse enlargement as  described above. Spine lesions with associated epidural disease and canal stenosis is better assessed on prior MRI.   3.  New displaced pathologic fracture of the proximal left humerus.   4.  Large colonic fecal burden with marked fecal distention of the rectum is consistent with constipation. No inflammation.            The study was marked in EPIC for immediate notification.            Workstation performed: [id number]         XR chest 1 view portable   ED Interpretation by Natalio Sin MD (06/08 1137)   Per my independent interpretation: Wedge-shaped consolidation in the right lung concern for infarct versus consolidation      Final Result by Teresa Dunn MD (06/08 0036)      No acute cardiopulmonary disease.      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 See subsequent chest CT.      New pathologic left humeral neck fracture, better shown on CT.      Workstation performed: [id number]           06/08 EKG result: Sinus tachycardia with Premature atrial complexes  Possible Left atrial enlargement  Left anterior fascicular block  Inferior infarct , age undetermined    06/09 EKG result: Supraventricular tachycardia  ST depression, consider subendocardial injury  Nonspecific T wave abnormality          Results from last 7 days   Lab Units 06/08/24  1035   WBC Thousand/uL 10.66*   HEMOGLOBIN g/dL 16.1   HEMATOCRIT % 47.7   PLATELETS Thousands/uL 181   BANDS PCT % 5         Results from last 7 days   Lab Units 06/08/24  1035   SODIUM mmol/L 134*   POTASSIUM mmol/L 5.4*   CHLORIDE mmol/L 99   CO2 mmol/L 23   ANION GAP mmol/L 12   BUN mg/dL 38*   CREATININE mg/dL 1.02   EGFR ml/min/1.73sq m 81   CALCIUM mg/dL 10.4*     Results from last 7 days   Lab Units 06/08/24  1035   AST U/L 38   ALT U/L 39   ALK PHOS U/L 257*   TOTAL PROTEIN g/dL 6.9   ALBUMIN g/dL 3.5   TOTAL BILIRUBIN mg/dL 0.91         Results from last 7 days  "  Lab Units 06/08/24  1035   GLUCOSE RANDOM mg/dL 145*             No results found for: \"BETA-HYDROXYBUTYRATE\"                   Results from last 7 days   Lab Units 06/08/24  1453 06/08/24  1242 06/08/24  1035   HS TNI 0HR ng/L  --   --  27   HS TNI 2HR ng/L  --  25  --    HSTNI D2 ng/L  --  -2  --    HS TNI 4HR ng/L 23  --   --    HSTNI D4 ng/L -4  --   --          Results from last 7 days   Lab Units 06/08/24  1035   PROTIME seconds 14.4   INR  1.06   PTT seconds 27         Results from last 7 days   Lab Units 06/08/24  1035   PROCALCITONIN ng/ml 0.37*     Results from last 7 days   Lab Units 06/08/24  1305 06/08/24  1035   LACTIC ACID mmol/L 2.3* 3.2*             Results from last 7 days   Lab Units 06/08/24  1035   BNP pg/mL 318*                                     Results from last 7 days   Lab Units 06/08/24  1307   CLARITY UA  Clear   COLOR UA  Light Yellow   SPEC GRAV UA  1.024   PH UA  6.5   GLUCOSE UA mg/dl Negative   KETONES UA mg/dl Negative   BLOOD UA  Trace*   PROTEIN UA mg/dl 30 (1+)*   NITRITE UA  Negative   BILIRUBIN UA  Negative   UROBILINOGEN UA (BE) mg/dl <2.0   LEUKOCYTES UA  Negative   WBC UA /hpf None Seen   RBC UA /hpf 1-2   BACTERIA UA /hpf None Seen   EPITHELIAL CELLS WET PREP /hpf Occasional   MUCUS THREADS  Occasional*                                 Results from last 7 days   Lab Units 06/08/24  1045 06/08/24  1035   BLOOD CULTURE  No Growth at 24 hrs. No Growth at 24 hrs.                   ED Treatment:   Medication Administration from 06/08/2024 0930 to 06/08/2024 1707         Date/Time Order Dose Route Action     06/08/2024 1049 EDT morphine injection 4 mg 4 mg Intravenous Given     06/08/2024 1153 EDT sodium chloride 0.9 % bolus 500 mL 0 mL Intravenous Stopped     06/08/2024 1045 EDT sodium chloride 0.9 % bolus 500 mL 500 mL Intravenous New Bag     06/08/2024 1152 EDT cefepime (MAXIPIME) 2 g/50 mL dextrose IVPB 0 mg Intravenous Stopped     06/08/2024 1052 EDT cefepime (MAXIPIME) 2 " g/50 mL dextrose IVPB 2,000 mg Intravenous New Bag     06/08/2024 1046 EDT ondansetron (ZOFRAN) injection 4 mg 4 mg Intravenous Given     06/08/2024 1215 EDT iohexol (OMNIPAQUE) 350 MG/ML injection (MULTI-DOSE) 90 mL 90 mL Intravenous Given     06/08/2024 1354 EDT sucralfate (CARAFATE) tablet 1 g 1 g Oral Given          Past Medical History:   Diagnosis Date    Cancer (HCC)     bone-    GERD (gastroesophageal reflux disease)     Multiple myeloma (HCC)      Present on Admission:   Monoclonal gammopathy   Multiple myeloma not having achieved remission (HCC)   Nausea & vomiting   Pulmonary embolism without acute cor pulmonale (HCC)   Pathological fracture of left humerus   Therapeutic opioid-induced constipation (OIC)   SIRS (systemic inflammatory response syndrome) ([facility])   Hyperkalemia      Admitting Diagnosis: Acute abdominal pain [R10.9]  Vomiting [R11.10]  Multiple myeloma (HCC) [C90.00]  Acute chest pain [R07.9]  SIRS (systemic inflammatory response syndrome) ([facility]) [R65.10]  Age/Sex: 58 y.o. male  Admission Orders:  I/O    Scheduled Medications:  apixaban, 5 mg, Oral, BID  dexamethasone, 4 mg, Oral, BID With Meals  lenalidomide, 5 mg, Oral, Daily  lidocaine, 2 patch, Topical, Daily  methocarbamol, 500 mg, Oral, 4x Daily  morphine, 30 mg, Oral, Q8H RUTH  nicotine, 1 patch, Transdermal, Daily  pantoprazole, 40 mg, Oral, Daily  polyethylene glycol, 17 g, Oral, Daily  senna-docusate sodium, 1 tablet, Oral, HS      Continuous IV Infusions:  multi-electrolyte, 100 mL/hr, Intravenous, Continuous      PRN Meds:  acetaminophen, 650 mg, Oral, Q6H PRN  calcium carbonate, 500 mg, Oral, Daily PRN 06/08 x 1  HYDROmorphone, 0.5 mg, Intravenous, Q4H PRN 06/08 x 1, 06/09 x 1  naloxone, 0.04 mg, Intravenous, Q1MIN PRN 06/08 x 1  ondansetron, 4 mg, Intravenous, Q6H PRN  oxyCODONE, 15 mg, Oral, Q4H PRN 06/08x 1, 06/09 x 1        IP CONSULT TO HOSPICE  IP CONSULT TO [facility] Utilization Review Department  ATTENTION:  Please call with any questions or concerns to [phone] and carefully listen to the prompts so that you are directed to the right person. All voicemails are confidential.   For Discharge needs, contact Care Management DC Support Team at [phone] opt. [phone]  Send all requests for admission clinical reviews, approved or denied determinations and any other requests to dedicated fax number below belonging to the campus where the patient is receiving treatment. List of dedicated fax numbers for the Facilities:  FACILITY NAME UR FAX NUMBER   ADMISSION DENIALS (Administrative/Medical Necessity) [phone]   DISCHARGE SUPPORT TEAM (NETWORK) [phone]   PARENT CHILD HEALTH (Maternity/NICU/Pediatrics) [phone]   [facility] [phone]   [facility] [phone]   [facility] [phone]   [facility] [phone]   [facility]iatric hospital [phone]   [facility] [phone]   [facility] [phone]   [facility] [phone]   [facility] [phone]   [facility] [phone]   [facility] [phone]   [facility] [phone]

## 2024-06-10 NOTE — UTILIZATION REVIEW
NOTIFICATION OF INPATIENT ADMISSION   AUTHORIZATION REQUEST   SERVICING FACILITY:   [place] [address]  Tax ID: [id number]  NPI: [id number]   ATTENDING PROVIDER:  Attending Name and NPI#: Angelito Villa Md [[id number]]  Address: [address] [place] [address]  Phone: [phone]     ADMISSION INFORMATION:  Place of Service: Inpatient [facility]  Place of Service Code: 21  Inpatient Admission Date/Time: 6/8/24  2:23 PM  Discharge Date/Time: No discharge date for patient encounter.  Admitting Diagnosis Code/Description:  Acute abdominal pain [R10.9]  Vomiting [R11.10]  Multiple myeloma (HCC) [C90.00]  Acute chest pain [R07.9]  SIRS (systemic inflammatory response syndrome) (HCC) [R65.10]     UTILIZATION REVIEW CONTACT:  [facility] Utilization   Network Utilization Review Department  Phone: [phone]  Fax: [phone]  Email: [email or web address]  Contact for approvals/pending authorizations, clinical reviews, and discharge.     PHYSICIAN ADVISORY SERVICES:  Medical Necessity Denial & Yvid-xd-Jmxx Review  Phone: [phone]  Fax: [phone]  Email: [email or web address]     DISCHARGE SUPPORT TEAM:  For Patients Discharge Needs & Updates  Phone: [phone] opt. 2 Fax: [phone]  Email: [email or web address]

## 2024-06-10 NOTE — CASE MANAGEMENT
Case Management Discharge Planning Note    Patient name Fredy Martinez Jr.  Location S /S -01 MRN [id number]  : 1965 Date 6/10/2024       Current Admission Date: 2024  Current Admission Diagnosis:Encounter for hospice care   Patient Active Problem List    Diagnosis Date Noted Date Diagnosed    Pathological fracture of left humerus 2024     SIRS (systemic inflammatory response syndrome) (HCC) 2024     Hyperkalemia 2024     Diarrhea 2024     Ambulatory dysfunction 2024     Encounter for hospice care 2024     Cachexia (HCC) 2024     Acute blood loss anemia 2024     SVT (supraventricular tachycardia) 2024     Pulmonary embolism without acute cor pulmonale ([facility]) 2024     Closed fracture of neck of left femur (HCC) 2024     Tachycardia 2024     Dental disease 2023     Insomnia 2023     Cancer related pain 2023     Multiple myeloma not having achieved remission (HCC) 2023     Metastasis to bone ([facility]) 2023     Palliative care patient 2023     Unintentional weight loss 2023     Nicotine dependence, cigarettes, uncomplicated 2023     History of alcohol abuse 2023     Acid reflux 2023     Nausea & vomiting 2023     Loss of appetite 2023     Therapeutic opioid-induced constipation (OIC) 2023     Back pain 2023     Monoclonal gammopathy 2023       LOS (days): 2  Geometric Mean LOS (GMLOS) (days):   Days to GMLOS:     OBJECTIVE:  Risk of Unplanned Readmission Score: 20.97         Current admission status: Inpatient   Preferred Pharmacy:   [facility] - [place] - [address]  [address]  [place] [address]  Phone: [phone] Fax: [phone]    [facility] - [place], [place] - [address]  [address]  [address]  [facility] [address]  Phone: [phone] Fax:  [phone]    Primary Care Provider: No primary care provider on file.    Primary Insurance: HELENE DAHL  Secondary Insurance:     DISCHARGE DETAILS:    CM received epic secure chat message from Hospice liaison Deloris requesting noon transport for pt tomorrow to home.     Transport referral placed via [facility], pending confirmation of p/u time.

## 2024-06-10 NOTE — PLAN OF CARE
Problem: Potential for Falls  Goal: Patient will remain free of falls  Description: INTERVENTIONS:  - Educate patient/family on patient safety including physical limitations  - Instruct patient to call for assistance with activity   - Consult OT/PT to assist with strengthening/mobility   - Keep Call bell within reach  - Keep bed low and locked with side rails adjusted as appropriate  - Keep care items and personal belongings within reach  - Initiate and maintain comfort rounds  - Make Fall Risk Sign visible to staff  - Offer Toileting every 2 Hours, in advance of need  - Initiate/Maintain bed alarm  - Obtain necessary fall risk management equipment  - Apply yellow socks and bracelet for high fall risk patients  - Consider moving patient to room near nurses station  Outcome: Progressing     Problem: Prexisting or High Potential for Compromised Skin Integrity  Goal: Skin integrity is maintained or improved  Description: INTERVENTIONS:  - Identify patients at risk for skin breakdown  - Assess and monitor skin integrity  - Assess and monitor nutrition and hydration status  - Monitor labs   - Assess for incontinence   - Turn and reposition patient  - Assist with mobility/ambulation  - Relieve pressure over bony prominences  - Avoid friction and shearing  - Provide appropriate hygiene as needed including keeping skin clean and dry  - Evaluate need for skin moisturizer/barrier cream  - Collaborate with interdisciplinary team   - Patient/family teaching  - Consider wound care consult   Outcome: Progressing     Problem: PAIN - ADULT  Goal: Verbalizes/displays adequate comfort level or baseline comfort level  Description: Interventions:  - Encourage patient to monitor pain and request assistance  - Assess pain using appropriate pain scale  - Administer analgesics based on type and severity of pain and evaluate response  - Implement non-pharmacological measures as appropriate and evaluate response  - Consider cultural and  social influences on pain and pain management  - Notify physician/advanced practitioner if interventions unsuccessful or patient reports new pain  Outcome: Progressing

## 2024-06-10 NOTE — ASSESSMENT & PLAN NOTE
Noted to have met SIRS criteria as noted by tachycardia, tachypnea, WBC 10.53  Low suspicion for infection given CT imaging did not show any lobar or focal consolidation, UA is negative.  Received cefepime x 1 in ED, will hold off on further antibiotics at this time   No

## 2024-06-10 NOTE — PROGRESS NOTES
[facility]  Progress Note  Name: Fredy Houston I  MRN: [id number]  Unit/Bed#: S -01 I Date of Admission: 6/8/2024   Date of Service: 6/10/2024 I Hospital Day: 2    Assessment & Plan   * Encounter for hospice care  Assessment & Plan  Per patient and wife, patient would like to proceed with hospice care as soon as possible at home.  He follows with oncology outpatient and is planning to finish the last 3 pills of his Revlimid.  Inpatient hospice consult placed.  Will work with case management to arrange for services  Pain medication regimen as indicated by outpatient palliative care    Nausea & vomiting  Assessment & Plan  Assessment:  Likely in the setting of worsening multiple myeloma and Revlimid use    Plan:  Zofran as needed, IV fluids    Therapeutic opioid-induced constipation (OIC)  Assessment & Plan  MiraLAX, Senokot, and consider Relistor if needed    Pulmonary embolism without acute cor pulmonale (HCC)  Assessment & Plan  Noted incidentally during 3/30/2024 hospitalization  Continue Eliquis 5 mg twice a day    Pathological fracture of left humerus  Assessment & Plan  Assessment:  Known fracture prior to admission.    Additionally noted on 6/8 CT imaging.    Per patient and wife, they would not like to proceed with any type of surgical intervention.    Plan:  Will hold off on orthopedic surgery consult at this time.      Monoclonal gammopathy  Assessment & Plan  Initially diagnosed on 2/8/2023 after imaging showed multiple lytic lesions and compression fractures.    Additional workup was consistent with a diagnosis of multiple myeloma  CT imaging on 6/8 shows significant progression of osseous metastases  May 15th PET/CT with lesions demonstrating diffuse enlargement   Follows outpatient with oncology  05/2024 patient has received neoadjuvant bortezomib, Laniazid Samad, and dexamethasone with transplant  Patient had a pending appointment with radiation oncology for CT SIM  but will not continue at this time.    SIRS (systemic inflammatory response syndrome) (HCC)  Assessment & Plan  Noted to have met SIRS criteria as noted by tachycardia, tachypnea, WBC 10.53  Low suspicion for infection given CT imaging did not show any lobar or focal consolidation, UA is negative.  Received cefepime x 1 in ED, will hold off on further antibiotics at this time    Hyperkalemia  Assessment & Plan  Possible component of dehydration or tumor lysis syndrome in the setting of MM with active tx  No indication for aggressive management or repeated lab draws as patient requesting hospice             VTE Pharmacologic Prophylaxis: VTE Score: 10 High Risk (Score >/= 5) - Pharmacological DVT Prophylaxis Ordered: apixaban (Eliquis). Sequential Compression Devices Ordered.    Mobility:   Basic Mobility Inpatient Raw Score: 12  JH-HLM Goal: 4: Move to chair/commode  JH-HLM Achieved: 1: Laying in bed  JH-HLM Goal NOT achieved. Continue with multidisciplinary rounding and encourage appropriate mobility to improve upon -HLM goals.    Patient Centered Rounds: I performed bedside rounds with nursing staff today.  Discussions with Specialists or Other Care Team Provider: Hospice, CM    Education and Discussions with Family / Patient: Updated  (significant other) at bedside.    Current Length of Stay: 2 day(s)  Current Patient Status: Inpatient   Discharge Plan: Anticipate discharge tomorrow to home with home services. Hospice at home.    Code Status: Level 3 - DNAR and DNI    Subjective:   Pt reports some irritation w/ inadequate timing of pain meds such as when lido patch is switched, otherwise NAEO & no new complaints, NAD. SO notes equipment to be delivered tmrw morning, so arrangements made for dc transport tmrw ~noon. To pursue hospice, wishes to take last chemo pill tmrw AM first.    Objective:     Vitals:   Temp (24hrs), Av °F (36.7 °C), Min:97.9 °F (36.6 °C), Max:98.2 °F (36.8 °C)    Temp:   [97.9 °F (36.6 °C)-98.2 °F (36.8 °C)] 98 °F (36.7 °C)  HR:  [70-95] 95  Resp:  [16] 16  BP: ()/(57-74) 112/74  SpO2:  [94 %-98 %] 98 %  Body mass index is 16.77 kg/m².     Input and Output Summary (last 24 hours):     Intake/Output Summary (Last 24 hours) at 6/10/2024 1541  Last data filed at 6/10/2024 0830  Gross per 24 hour   Intake 180 ml   Output 1420 ml   Net -1240 ml       Physical Exam:   Physical Exam  Vitals and nursing note reviewed. Exam conducted with a chaperone present.   Constitutional:       General: He is not in acute distress.     Appearance: He is not diaphoretic.   HENT:      Head: Normocephalic.   Eyes:      General: No scleral icterus.        Right eye: No discharge.         Left eye: No discharge.      Conjunctiva/sclera: Conjunctivae normal.   Cardiovascular:      Rate and Rhythm: Normal rate and regular rhythm.      Pulses: Normal pulses.   Pulmonary:      Effort: Pulmonary effort is normal. No respiratory distress.   Chest:      Chest wall: No tenderness.   Abdominal:      Tenderness: There is no abdominal tenderness. There is no guarding.   Musculoskeletal:         General: Tenderness, deformity and signs of injury present.      Cervical back: No rigidity or tenderness.   Skin:     General: Skin is warm and dry.      Coloration: Skin is pale.   Neurological:      Mental Status: He is alert and oriented to person, place, and time.   Psychiatric:         Mood and Affect: Mood normal.         Behavior: Behavior normal.          Additional Data:     Labs:  Results from last 7 days   Lab Units 06/08/24  1035   WBC Thousand/uL 10.66*   HEMOGLOBIN g/dL 16.1   HEMATOCRIT % 47.7   PLATELETS Thousands/uL 181   BANDS PCT % 5   LYMPHO PCT % 5*   MONO PCT % 9   EOS PCT % 1     Results from last 7 days   Lab Units 06/08/24  1035   SODIUM mmol/L 134*   POTASSIUM mmol/L 5.4*   CHLORIDE mmol/L 99   CO2 mmol/L 23   BUN mg/dL 38*   CREATININE mg/dL 1.02   ANION GAP mmol/L 12   CALCIUM mg/dL 10.4*    ALBUMIN g/dL 3.5   TOTAL BILIRUBIN mg/dL 0.91   ALK PHOS U/L 257*   ALT U/L 39   AST U/L 38   GLUCOSE RANDOM mg/dL 145*     Results from last 7 days   Lab Units 06/08/24  1035   INR  1.06             Results from last 7 days   Lab Units 06/08/24  1305 06/08/24  1035   LACTIC ACID mmol/L 2.3* 3.2*   PROCALCITONIN ng/ml  --  0.37*       Lines/Drains:  Invasive Devices       Peripheral Intravenous Line  Duration             Peripheral IV 06/08/24 Dorsal (posterior);Right Forearm 2 days                          Imaging: Reviewed radiology reports from this admission including: chest xray, chest CT scan, and abdominal/pelvic CT    Recent Cultures (last 7 days):   Results from last 7 days   Lab Units 06/08/24  1045 06/08/24  1035   BLOOD CULTURE  No Growth at 24 hrs. No Growth at 24 hrs.       Last 24 Hours Medication List:   Current Facility-Administered Medications   Medication Dose Route Frequency Provider Last Rate    acetaminophen  650 mg Oral Q6H PRN Kyle Brunner, MD      apixaban  5 mg Oral BID Kyle Brunner, MD      calcium carbonate  500 mg Oral Daily PRN Bruno Brumfield MD      dexamethasone  4 mg Oral BID With Meals Kyle Brunner, MD      HYDROmorphone  0.5 mg Intravenous Q4H PRN Kyle Brunner, MD      lenalidomide  5 mg Oral Daily Kyle Brunner, MD      lidocaine  2 patch Topical Daily Gino Johnson DO      methocarbamol  500 mg Oral 4x Daily Kyle Brunner, MD      morphine  30 mg Oral Q8H RUTH Kyle Brunner, MD      multi-electrolyte  100 mL/hr Intravenous Continuous Kyle Brunner,  mL/hr (06/10/24 1535)    naloxone  0.04 mg Intravenous Q1MIN PRN Kyle Brunner, MD      nicotine  1 patch Transdermal Daily Kyle Brunner, MD      ondansetron  4 mg Intravenous Q6H PRN Kyle Brunner, MD      oxyCODONE  15 mg Oral Q4H PRN Kyle Brunner, MD      pantoprazole  40 mg Oral Daily Kyle Brunner, MD      polyethylene glycol  17 g Oral Daily Kyle Brunner, MD      senna-docusate sodium  1 tablet Oral HS Kyle Brunner, MD           Today, Patient Was Seen By: Gino Johnson DO    **Please Note: This note may have been constructed using a voice recognition system.**

## 2024-06-10 NOTE — CASE MANAGEMENT
Case Management Discharge Planning Note    Patient name Fredy Martinez Jr.  Location S /S -01 MRN [id number]  : 1965 Date 6/10/2024       Current Admission Date: 2024  Current Admission Diagnosis:Encounter for hospice care   Patient Active Problem List    Diagnosis Date Noted Date Diagnosed    Pathological fracture of left humerus 2024     SIRS (systemic inflammatory response syndrome) (HCC) 2024     Hyperkalemia 2024     Diarrhea 2024     Ambulatory dysfunction 2024     Encounter for hospice care 2024     Cachexia (HCC) 2024     Acute blood loss anemia 2024     SVT (supraventricular tachycardia) 2024     Pulmonary embolism without acute cor pulmonale ([facility]) 2024     Closed fracture of neck of left femur (HCC) 2024     Tachycardia 2024     Dental disease 2023     Insomnia 2023     Cancer related pain 2023     Multiple myeloma not having achieved remission (HCC) 2023     Metastasis to bone ([facility]) 2023     Palliative care patient 2023     Unintentional weight loss 2023     Nicotine dependence, cigarettes, uncomplicated 2023     History of alcohol abuse 2023     Acid reflux 2023     Nausea & vomiting 2023     Loss of appetite 2023     Therapeutic opioid-induced constipation (OIC) 2023     Back pain 2023     Monoclonal gammopathy 2023       LOS (days): 2  Geometric Mean LOS (GMLOS) (days):   Days to GMLOS:     OBJECTIVE:  Risk of Unplanned Readmission Score: 21.06         Current admission status: Inpatient   Preferred Pharmacy:   [facility] - [place] - [address]  [address]  [place] [address]  Phone: [phone] Fax: [phone]    [facility] - [place], [place] - [address]  [address]  [address]  [facility] [address]  Phone: [phone] Fax:  [phone]    Primary Care Provider: No primary care provider on file.    Primary Insurance: HELENE DAHL  Secondary Insurance:     DISCHARGE DETAILS:    Discharge planning discussed with:: patient and spouse  Freedom of Choice: Yes (re: home hospice)  Comments - Freedom of Choice: confirmed plan for in-home hospice with [facility] contacted family/caregiver?: Yes  Were Treatment Team discharge recommendations reviewed with patient/caregiver?: Yes  Did patient/caregiver verbalize understanding of patient care needs?: Yes  Were patient/caregiver advised of the risks associated with not following Treatment Team discharge recommendations?: Yes    Contacts  Patient Contacts: Maria Eugenia Martinez (Spouse)  Relationship to Patient:: Family  Contact Method: In Person  Reason/Outcome: Discharge Planning, Referral, Emergency Contact, Continuity of Care    Requested Home Health Care         Is the patient interested in HHC at discharge?: No    DME Referral Provided  Referral made for DME?: No    Other Referral/Resources/Interventions Provided:  Interventions: Hospice  Referral Comments: Met with patient and spouse at bedside to review plan for d/c home with [facility] for tomorrow, 6/11. Family aware that transport has been arranged for tomorrow at 12:00pm with [facility]. Patient relays concern about pain management for transport - encouraged him to request medication in AM prior to pick-up time as he has both scheduled and PRN medication ordered. Patient/spouse understanding of same. Will follow-up with patient/family tomorrow to confirm DME has been delivered and confirm transport arrangements remain as scheduled. Message sent to Deloris to see if DNR had been signed over the weekend; checking to see if same on file. Will need DNR prior to transport if not complete.    Would you like to participate in our [facility] service program?  : No - Declined    Treatment Team Recommendation: Hospice  Discharge Destination  Plan:: Hospice ([facility])  Transport at Discharge : [facility]  Dispatcher Contacted: Yes  Number/Name of Dispatcher: [facility]  Transported by (Company and Unit #): [facility]  ETA of Transport (Date): 06/11/24  ETA of Transport (Time): 1200 (confirmed)

## 2024-06-10 NOTE — CONSULTS
Visited w/ Pt and spouse on 6/10/24.    Pt was alert and awake and expressed fear of death and dying, primarily stemming form worrying about how spouse would fare in life without him.  Spouse reassured Pt that she would be okay, in part due to the steps they have taken in terms of preparing a will and addressing certain financial issues.  This seemed to comfort Pt, though he still became teary at times.      Pt stated he is in fairly constant pain throughout his body but particularly where he has had several skeletal f/x.  Spouse and Pt talked about their dog and about Pt.'s love for grilling/cooking.    We talked about possible upcoming Hospice consult and about preparing questions for that event, if they had any.  Pt freely and openly admits he is in the end stage of life and spouse seems able to honestly hear and agree with that.

## 2024-06-11 ENCOUNTER — HOME CARE VISIT (OUTPATIENT)
Dept: HOME HOSPICE | Facility: HOSPICE | Age: 59
End: 2024-06-11
Payer: COMMERCIAL

## 2024-06-11 ENCOUNTER — HOME CARE VISIT (OUTPATIENT)
Dept: HOME HEALTH SERVICES | Facility: HOME HEALTHCARE | Age: 59
End: 2024-06-11
Payer: COMMERCIAL

## 2024-06-11 VITALS
TEMPERATURE: 98 F | OXYGEN SATURATION: 97 % | BODY MASS INDEX: 16.73 KG/M2 | RESPIRATION RATE: 16 BRPM | DIASTOLIC BLOOD PRESSURE: 69 MMHG | HEIGHT: 70 IN | SYSTOLIC BLOOD PRESSURE: 112 MMHG | WEIGHT: 116.84 LBS | HEART RATE: 58 BPM

## 2024-06-11 VITALS — HEART RATE: 105 BPM | DIASTOLIC BLOOD PRESSURE: 60 MMHG | SYSTOLIC BLOOD PRESSURE: 100 MMHG | RESPIRATION RATE: 18 BRPM

## 2024-06-11 PROBLEM — E43 SEVERE PROTEIN-CALORIE MALNUTRITION (HCC): Status: ACTIVE | Noted: 2024-06-11

## 2024-06-11 LAB
ATRIAL RATE: 220 BPM
ATRIAL RATE: 68 BPM
P AXIS: 72 DEGREES
PR INTERVAL: 122 MS
QRS AXIS: 42 DEGREES
QRS AXIS: 72 DEGREES
QRSD INTERVAL: 80 MS
QRSD INTERVAL: 82 MS
QT INTERVAL: 216 MS
QT INTERVAL: 374 MS
QTC INTERVAL: 397 MS
QTC INTERVAL: 397 MS
T WAVE AXIS: 251 DEGREES
T WAVE AXIS: 33 DEGREES
VENTRICULAR RATE: 203 BPM
VENTRICULAR RATE: 68 BPM

## 2024-06-11 PROCEDURE — G0299 HHS/HOSPICE OF RN EA 15 MIN: HCPCS

## 2024-06-11 PROCEDURE — 93010 ELECTROCARDIOGRAM REPORT: CPT | Performed by: INTERNAL MEDICINE

## 2024-06-11 PROCEDURE — 99238 HOSP IP/OBS DSCHRG MGMT 30/<: CPT | Performed by: INTERNAL MEDICINE

## 2024-06-11 RX ORDER — OXYCODONE HYDROCHLORIDE 20 MG/1
20 TABLET ORAL EVERY 4 HOURS PRN
Qty: 30 TABLET | Refills: 0 | Status: SHIPPED | OUTPATIENT
Start: 2024-06-11 | End: 2024-06-12

## 2024-06-11 RX ORDER — MORPHINE SULFATE 30 MG/1
30 TABLET, FILM COATED, EXTENDED RELEASE ORAL EVERY 8 HOURS
Qty: 30 TABLET | Refills: 0 | Status: SHIPPED | OUTPATIENT
Start: 2024-06-11 | End: 2024-06-12

## 2024-06-11 RX ORDER — METOPROLOL TARTRATE 1 MG/ML
2.5 INJECTION, SOLUTION INTRAVENOUS EVERY 6 HOURS PRN
Status: DISCONTINUED | OUTPATIENT
Start: 2024-06-11 | End: 2024-06-11 | Stop reason: HOSPADM

## 2024-06-11 RX ADMIN — SODIUM CHLORIDE, SODIUM GLUCONATE, SODIUM ACETATE, POTASSIUM CHLORIDE, MAGNESIUM CHLORIDE, SODIUM PHOSPHATE, DIBASIC, AND POTASSIUM PHOSPHATE 100 ML/HR: .53; .5; .37; .037; .03; .012; .00082 INJECTION, SOLUTION INTRAVENOUS at 01:39

## 2024-06-11 RX ADMIN — MORPHINE SULFATE 30 MG: 30 TABLET, EXTENDED RELEASE ORAL at 05:45

## 2024-06-11 RX ADMIN — LENALIDOMIDE 5 MG: 5 CAPSULE ORAL at 08:02

## 2024-06-11 RX ADMIN — HYDROMORPHONE HYDROCHLORIDE 0.5 MG: 1 INJECTION, SOLUTION INTRAMUSCULAR; INTRAVENOUS; SUBCUTANEOUS at 07:58

## 2024-06-11 RX ADMIN — PANTOPRAZOLE SODIUM 40 MG: 40 TABLET, DELAYED RELEASE ORAL at 08:01

## 2024-06-11 RX ADMIN — OXYCODONE HYDROCHLORIDE 20 MG: 10 TABLET ORAL at 05:45

## 2024-06-11 RX ADMIN — METHOCARBAMOL 500 MG: 500 TABLET ORAL at 11:03

## 2024-06-11 RX ADMIN — HYDROMORPHONE HYDROCHLORIDE 0.5 MG: 1 INJECTION, SOLUTION INTRAMUSCULAR; INTRAVENOUS; SUBCUTANEOUS at 11:58

## 2024-06-11 RX ADMIN — DEXAMETHASONE 4 MG: 4 TABLET ORAL at 08:01

## 2024-06-11 RX ADMIN — LIDOCAINE 5% 2 PATCH: 700 PATCH TOPICAL at 08:02

## 2024-06-11 RX ADMIN — METHOCARBAMOL 500 MG: 500 TABLET ORAL at 08:01

## 2024-06-11 RX ADMIN — OXYCODONE HYDROCHLORIDE 20 MG: 10 TABLET ORAL at 11:03

## 2024-06-11 RX ADMIN — METOROPROLOL TARTRATE 2.5 MG: 5 INJECTION, SOLUTION INTRAVENOUS at 09:05

## 2024-06-11 RX ADMIN — APIXABAN 5 MG: 5 TABLET, FILM COATED ORAL at 08:01

## 2024-06-11 NOTE — CASE MANAGEMENT
Case Management Discharge Planning Note    Patient name Fredy Martinez Jr.  Location S /S -01 MRN [id number]  : 1965 Date 2024       Current Admission Date: 2024  Current Admission Diagnosis:Encounter for hospice care   Patient Active Problem List    Diagnosis Date Noted Date Diagnosed    Severe protein-calorie malnutrition (HCC) 2024     Pathological fracture of left humerus 2024     SIRS (systemic inflammatory response syndrome) (HCC) 2024     Hyperkalemia 2024     Diarrhea 2024     Ambulatory dysfunction 2024     Encounter for hospice care 2024     Cachexia (HCC) 2024     Acute blood loss anemia 2024     SVT (supraventricular tachycardia) 2024     Pulmonary embolism without acute cor pulmonale ([facility]) 2024     Closed fracture of neck of left femur ([facility]) 2024     Tachycardia 2024     Dental disease 2023     Insomnia 2023     Cancer related pain 2023     Multiple myeloma not having achieved remission (HCC) 2023     Metastasis to bone ([facility]) 2023     Palliative care patient 2023     Unintentional weight loss 2023     Nicotine dependence, cigarettes, uncomplicated 2023     History of alcohol abuse 2023     Acid reflux 2023     Nausea & vomiting 2023     Loss of appetite 2023     Therapeutic opioid-induced constipation (OIC) 2023     Back pain 2023     Monoclonal gammopathy 2023       LOS (days): 3  Geometric Mean LOS (GMLOS) (days):   Days to GMLOS:     OBJECTIVE:  Risk of Unplanned Readmission Score: 21.86         Current admission status: Inpatient   Preferred Pharmacy:   [facility] - [place] - [address]  [address]  [place] [address]  Phone: [phone] Fax: [phone]    [facility] - [place], [place] - [address]  [address]  [address]  [facility] [address]  Phone: [phone] Fax: [phone]    Primary Care Provider: No primary care provider on file.    Primary Insurance: HELENE DAHL  Secondary Insurance:     DISCHARGE DETAILS:    Discharge planning discussed with:: patient at bedside; spouse, Mary, by phone ([phone]); hospice liaisons via  group  Rudolph of Choice: Yes (re: home hospice)  Comments - Rudolph of Choice: [facility]  CM contacted family/caregiver?: Yes  Were Treatment Team discharge recommendations reviewed with patient/caregiver?: Yes  Did patient/caregiver verbalize understanding of patient care needs?: Yes  Were patient/caregiver advised of the risks associated with not following Treatment Team discharge recommendations?: Yes    Contacts  Patient Contacts: Mary, spouse  Relationship to Patient:: Family  Contact Method: Phone  Phone Number: [phone]  Reason/Outcome: Discharge Planning, Referral, Emergency Contact, Continuity of Care    Requested Home Health Care         Is the patient interested in HHC at discharge?: No    DME Referral Provided  Referral made for DME?: No    Other Referral/Resources/Interventions Provided:  Interventions: Hospice  Referral Comments: Call made to patient's spouse, Mary, to review d/c for today and transport arranged for 12:00pm. Mary confirmed that DME was getting delivered at this time, however reports that they did not receive the transport chair, and inquiring about same. Message sent to hospice liaisons to relay same. Spouse aware that transport scheduled for 12:00pm, so patient anticipated to be home by 12:30pm and hospice nurse to be out this afternoon for admission visit. Spouse reports that [employer] will be coming between 12-3:00pm to pick-up prior DME (hospital bed, etc) that they had before. Confirmed agreement with transport for noon and does not wish to change time. CM discharge support contacted to check in BLS auth. Spoke with  patient at bedside and he confirmed plan for d/c home today with hospice care. Out-of-hospital DNR reviewed which he signed; MD signed as well and given to RN for transport. Message sent to hospice liaison network team to relay pick-up time, confirmed RN visit for today, and relay that transport chair was not delivered per spouse. All parties aware and in agreement for d/c today with plan for home hospice admission.    Would you like to participate in our [facility] service program?  : No - Declined    Treatment Team Recommendation: Hospice  Discharge Destination Plan:: Hospice ([facility])  Transport at Discharge : [facility]  Dispatcher Contacted: Yes  Number/Name of Dispatcher: [employer]  Transported by (Company and Unit #): [facility]  ETA of Transport (Date): 06/11/24  ETA of Transport (Time): 1200 (confirmed)

## 2024-06-11 NOTE — CASE MANAGEMENT
[facility] received request for transport authorization from Care Manager.   Insurance: [employer]   Type of transport: BLS ()    Per patient's insurance no prior authorization is required for BLS transport.     Care Manager notified: Opal Ng     Please reach out to CM for updates on any clinical information.

## 2024-06-11 NOTE — DISCHARGE SUMMARY
[facility]  Discharge- Fredy Martinez Jr. 1965, 58 y.o. male MRN: [id number]  Unit/Bed#: S -[facility] Encounter: [id number]  Primary Care Provider: No primary care provider on file.   Date and time admitted to hospital: 6/8/2024  9:31 AM    * Encounter for hospice care  Assessment & Plan  Per patient and wife, patient would like to proceed with hospice care as soon as possible at home.  He follows with oncology outpatient and is planning to finish the last 3 pills of his Revlimid.  Inpatient hospice consult placed.  Will work with case management to arrange for services  Pain medication regimen as indicated by outpatient palliative care    Nausea & vomiting  Assessment & Plan  Assessment:  Likely in the setting of worsening multiple myeloma and Revlimid use    Plan:  Zofran as needed, IV fluids    Therapeutic opioid-induced constipation (OIC)  Assessment & Plan  MiraLAX, Senokot, and consider Relistor if needed    Pulmonary embolism without acute cor pulmonale (HCC)  Assessment & Plan  Noted incidentally during 3/30/2024 hospitalization  Continue Eliquis 5 mg twice a day    Pathological fracture of left humerus  Assessment & Plan  Assessment:  Known fracture prior to admission.    Additionally noted on 6/8 CT imaging.    Per patient and wife, they would not like to proceed with any type of surgical intervention.    Plan:  Will hold off on orthopedic surgery consult at this time.      Monoclonal gammopathy  Assessment & Plan  Initially diagnosed on 2/8/2023 after imaging showed multiple lytic lesions and compression fractures.    Additional workup was consistent with a diagnosis of multiple myeloma  CT imaging on 6/8 shows significant progression of osseous metastases  May 15th PET/CT with lesions demonstrating diffuse enlargement   Follows outpatient with oncology  05/2024 patient has received neoadjuvant bortezomib, Laniazid Samad, and dexamethasone with transplant  Patient had a  pending appointment with radiation oncology for CT SIM but will not continue at this time.    SIRS (systemic inflammatory response syndrome) (HCC)  Assessment & Plan  Noted to have met SIRS criteria as noted by tachycardia, tachypnea, WBC 10.53  Low suspicion for infection given CT imaging did not show any lobar or focal consolidation, UA is negative.  Received cefepime x 1 in ED, will hold off on further antibiotics at this time    Hyperkalemia  Assessment & Plan  Possible component of dehydration or tumor lysis syndrome in the setting of MM with active tx  No indication for aggressive management or repeated lab draws as patient requesting hospice    Severe protein-calorie malnutrition (HCC)  Assessment & Plan  Malnutrition Findings:   Adult Malnutrition type: Chronic illness  Adult Degree of Malnutrition: Other severe protein calorie malnutrition  Malnutrition Characteristics: Inadequate energy, Weight loss                  360 Statement: Chronic/severe malnutrition r/t condition/decreased appetite, as evidenced by intake meeting <75% of estimated needs x > 1 month, and 13% wt loss x 4 months (6/8/24: 116#, 4/18/24: 126#). Treatment: liberal PO diet    BMI Findings:  Adult BMI Classifications: Underweight < 18.5        Body mass index is 16.77 kg/m².         Medical Problems       Resolved Problems  Date Reviewed: 5/22/2024   None       Discharging Resident: Gino Johnson DO  Discharging Attending: No att. providers found  PCP: No primary care provider on file.  Admission Date:   Admission Orders (From admission, onward)       Ordered        06/08/24 1423  INPATIENT ADMISSION  Once                          Discharge Date: 06/11/24    Consultations During Hospital Stay:  Hospice, CM    Procedures Performed:   None    Significant Findings / Test Results:   CTA CAP 06/08: Significant progression of osseous metastases since the May 15th PET/CT with lesions demonstrating diffuse enlargement as described above. Spine  lesions with associated epidural disease and canal stenosis is better assessed on prior MRI.  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 New pathologic left humeral neck fracture    Incidental Findings:   None    Test Results Pending at Discharge (will require follow up):  None     Outpatient Tests Requested:  None    Complications:  None    Reason for Admission: pressure CP    Hospital Course:   Fredy Martinez Jr. is a 58 y.o. male patient who originally presented to the hospital on 6/8/2024 due to gradually worsening constant chest pressure described as pleuritic & exertional w/ associated nausea, vomiting, dyspnea, abd pains. Discovered in ER is substantial cancer burden leading to prognosis of onco to be 1-2 months of life left. Pt voiced when of clear understanding a desire for level 3 status, deferred level 4 until after discussion w/ hospice while I/p. Pt wife present & in agreement w/ said decision. His initial workup was limited due to pt interest, but pt allowed CT imaging which revealed findings stated above. Pain & nausea meds were given & various other supportive care measures taken w/in the confines of level 3 care. Hospice was decided upon during course of stay, but pt wished to finish he last chemo drugs ending on 06/11, which he completed. CM & hospice services arranged for various medical equipment to the pt home, which was completed evening of 06/10 / morning of 06/11. Pt to be dc thereafter w/ transportation arranged. Pt no longer warrants hospital care, but should follow w/ hospice services immediately after dc to allow for greatest comfort to be given.      Please see above list of diagnoses and related plan for additional information.     Condition at Discharge: terminal    Discharge Day Visit / Exam:   Subjective:  Pt voiced no new complaints today, NAD, NAEO, prepared for dc later around noon for  "hospice.  Vitals: Blood Pressure: 112/69 (06/11/24 0727)  Pulse: 58 (06/11/24 0727)  Temperature: 98 °F (36.7 °C) (06/11/24 0727)  Temp Source: Oral (06/10/24 0728)  Respirations: 16 (06/10/24 0728)  Height: 5' 10\" (177.8 cm) (06/08/24 0939)  Weight - Scale: 53 kg (116 lb 13.5 oz) (06/08/24 0939)  SpO2: 97 % (06/11/24 0727)  Exam:   Physical Exam  Vitals and nursing note reviewed. Exam conducted with a chaperone present.   Constitutional:       General: He is not in acute distress.     Appearance: He is not diaphoretic.   HENT:      Head: Normocephalic.   Eyes:      General: No scleral icterus.        Right eye: No discharge.         Left eye: No discharge.      Conjunctiva/sclera: Conjunctivae normal.   Cardiovascular:      Rate and Rhythm: Normal rate and regular rhythm.      Pulses: Normal pulses.   Pulmonary:      Effort: Pulmonary effort is normal. No respiratory distress.   Chest:      Chest wall: No tenderness.   Abdominal:      Tenderness: There is no abdominal tenderness. There is no guarding.   Musculoskeletal:         General: Tenderness, deformity and signs of injury present.      Cervical back: No rigidity or tenderness.   Skin:     General: Skin is warm and dry.      Coloration: Skin is pale.   Neurological:      Mental Status: He is alert and oriented to person, place, and time.   Psychiatric:         Mood and Affect: Mood normal.         Behavior: Behavior normal.          Discussion with Family: Updated  (wife) at bedside.    Discharge instructions/Information to patient and family:   See after visit summary for information provided to patient and family.      Provisions for Follow-Up Care:  See after visit summary for information related to follow-up care and any pertinent home health orders.      Mobility at time of Discharge:   Basic Mobility Inpatient Raw Score: 12  JH-HLM Goal: 4: Move to chair/commode  JH-HLM Achieved: 2: Bed activities/Dependent transfer  HLM Goal NOT achieved. " Continue to encourage mobility in post discharge setting.     Disposition:   Home for hospice    Planned Readmission: None    Discharge Medications:  See after visit summary for reconciled discharge medications provided to patient and/or family.      **Please Note: This note may have been constructed using a voice recognition system**

## 2024-06-11 NOTE — DISCHARGE INSTR - AVS FIRST PAGE
Dear Fredy Martinez Jr.,     It was our pleasure to care for you here at [facility].  It is our hope that we were always able to exceed the expected standards for your care during your stay.  You were hospitalized due to increased pains.  You were cared for on the S3 floor by Gino Johnson DO under the service of Michael Dillon MD with the [facility] who covers for your primary care physician (PCP) while you were hospitalized.  If you have any questions or concerns related to this hospitalization, you may contact us at .  For follow up as well as any medication refills, we recommend that you follow up with your primary care physician.  A registered nurse will reach out to you by phone within a few days after your discharge to answer any additional questions that you may have after going home.  However, at this time we provide for you here, the most important instructions / recommendations at discharge:     Notable Medication Adjustments -   As pt is going on hospice, follow hospice given directions for all medications  Testing Required after Discharge -   None  Important follow up information -   All medical equipment to be delivered by noon 06/11  Other Instructions -   We hope you feel better soon.  Please review this entire after visit summary as additional general instructions including medication list, appointments, activity, diet, any pertinent wound care, and other additional recommendations from your care team that may be provided for you.      Sincerely,     Gino Johnson DO

## 2024-06-11 NOTE — ASSESSMENT & PLAN NOTE
Malnutrition Findings:   Adult Malnutrition type: Chronic illness  Adult Degree of Malnutrition: Other severe protein calorie malnutrition  Malnutrition Characteristics: Inadequate energy, Weight loss                  360 Statement: Chronic/severe malnutrition r/t condition/decreased appetite, as evidenced by intake meeting <75% of estimated needs x > 1 month, and 13% wt loss x 4 months (6/8/24: 116#, 4/18/24: 126#). Treatment: liberal PO diet    BMI Findings:  Adult BMI Classifications: Underweight < 18.5        Body mass index is 16.77 kg/m².

## 2024-06-12 ENCOUNTER — HOME CARE VISIT (OUTPATIENT)
Dept: HOME HOSPICE | Facility: HOSPICE | Age: 59
End: 2024-06-12
Payer: COMMERCIAL

## 2024-06-12 ENCOUNTER — HOME CARE VISIT (OUTPATIENT)
Dept: HOME HEALTH SERVICES | Facility: HOME HEALTHCARE | Age: 59
End: 2024-06-12
Payer: COMMERCIAL

## 2024-06-12 VITALS — SYSTOLIC BLOOD PRESSURE: 110 MMHG | DIASTOLIC BLOOD PRESSURE: 70 MMHG

## 2024-06-12 PROCEDURE — G0299 HHS/HOSPICE OF RN EA 15 MIN: HCPCS

## 2024-06-12 PROCEDURE — G0155 HHCP-SVS OF CSW,EA 15 MIN: HCPCS

## 2024-06-12 NOTE — UTILIZATION REVIEW
NOTIFICATION OF ADMISSION DISCHARGE   This is a Notification of Discharge from [facility]. Please be advised that this patient has been discharge from our facility. Below you will find the admission and discharge date and time including the patient’s disposition.   UTILIZATION REVIEW CONTACT:  aMlia Cox  Utilization   Network Utilization Review Department  Phone: [phone] [phone] carefully listen to the prompts. All voicemails are confidential.  Email: [email or web address]     ADMISSION INFORMATION  PRESENTATION DATE: 6/8/2024  9:31 AM  OBERVATION ADMISSION DATE:   INPATIENT ADMISSION DATE: 6/8/24  2:23 PM   DISCHARGE DATE: 6/11/2024  1:05 PM   DISPOSITION:Home/Self Care    Network Utilization Review Department  ATTENTION: Please call with any questions or concerns to [phone] and carefully listen to the prompts so that you are directed to the right person. All voicemails are confidential.   For Discharge needs, contact Care Management DC Support Team at [phone] opt. 2  Send all requests for admission clinical reviews, approved or denied determinations and any other requests to dedicated fax number below belonging to the campus where the patient is receiving treatment. List of dedicated fax numbers for the Facilities:  FACILITY NAME UR FAX NUMBER   ADMISSION DENIALS (Administrative/Medical Necessity) [phone]   DISCHARGE SUPPORT TEAM ([facility]) [phone]   PARENT CHILD HEALTH (Maternity/NICU/Pediatrics) [phone]   [facility] [phone]   General acute hospital [phone]   [facility] [phone]   [facility] [phone]   [facility] [phone]   [facility] [phone]   [facility] [phone]   [facility] [phone]    [facility] [phone]   [facility] [phone]   [facility] [phone]   [facility] [phone]

## 2024-06-12 NOTE — UTILIZATION REVIEW
NOTIFICATION OF ADMISSION DISCHARGE   This is a Notification of Discharge from [facility]. Please be advised that this patient has been discharge from our facility. Below you will find the admission and discharge date and time including the patient’s disposition.   UTILIZATION REVIEW CONTACT:  Malia Cox  Utilization   Network Utilization Review Department  Phone: [phone] carefully listen to the prompts. All voicemails are confidential.  Email: [email or web address]     ADMISSION INFORMATION  PRESENTATION DATE: 6/8/2024  9:31 AM  OBERVATION ADMISSION DATE:   INPATIENT ADMISSION DATE: 6/8/24  2:23 PM   DISCHARGE DATE: 6/11/2024  1:05 PM   DISPOSITION:Home/Self Care    Network Utilization Review Department  ATTENTION: Please call with any questions or concerns to [phone] and carefully listen to the prompts so that you are directed to the right person. All voicemails are confidential.   For Discharge needs, contact Care Management DC Support Team at [phone] opt. 2  Send all requests for admission clinical reviews, approved or denied determinations and any other requests to dedicated fax number below belonging to the campus where the patient is receiving treatment. List of dedicated fax numbers for the Facilities:  FACILITY NAME UR FAX NUMBER   ADMISSION DENIALS (Administrative/Medical Necessity) [phone]   DISCHARGE SUPPORT TEAM ([facility]) [phone]   PARENT CHILD HEALTH (Maternity/NICU/Pediatrics) [phone]   [facility] [phone]   [facility] [phone]   [facility] [phone]   [facility] [phone]   [facility] [phone]   [facility] [phone]   [facility] [phone]   [facility] [phone]   [facility]  [facility] [phone]   [facility] [phone]   [facility] [phone]   [facility] [phone]

## 2024-06-13 ENCOUNTER — HOME CARE VISIT (OUTPATIENT)
Dept: HOME HEALTH SERVICES | Facility: HOME HEALTHCARE | Age: 59
End: 2024-06-13
Payer: COMMERCIAL

## 2024-06-13 ENCOUNTER — HOME CARE VISIT (OUTPATIENT)
Dept: HOME HOSPICE | Facility: HOSPICE | Age: 59
End: 2024-06-13
Payer: COMMERCIAL

## 2024-06-13 LAB
BACTERIA BLD CULT: NORMAL
BACTERIA BLD CULT: NORMAL

## 2024-06-13 PROCEDURE — G0156 HHCP-SVS OF AIDE,EA 15 MIN: HCPCS

## 2024-06-14 ENCOUNTER — HOME CARE VISIT (OUTPATIENT)
Dept: HOME HEALTH SERVICES | Facility: HOME HEALTHCARE | Age: 59
End: 2024-06-14
Payer: COMMERCIAL

## 2024-06-14 PROCEDURE — G0299 HHS/HOSPICE OF RN EA 15 MIN: HCPCS

## 2024-06-15 ENCOUNTER — HOME CARE VISIT (OUTPATIENT)
Dept: HOME HEALTH SERVICES | Facility: HOME HEALTHCARE | Age: 59
End: 2024-06-15
Payer: COMMERCIAL

## 2024-06-15 DIAGNOSIS — Z51.5 HOSPICE CARE: Primary | ICD-10-CM

## 2024-06-15 PROCEDURE — G0299 HHS/HOSPICE OF RN EA 15 MIN: HCPCS

## 2024-06-15 RX ORDER — MORPHINE SULFATE 20 MG/ML
40 SOLUTION ORAL EVERY 4 HOURS
Qty: 60 ML | Refills: 0 | Status: SHIPPED | OUTPATIENT
Start: 2024-06-15 | End: 2024-06-20

## 2024-06-26 NOTE — HOSPICE
PRN vs....wife Mary called vna stating he . SN arrived to pts home and pt with agonal breathing and pt still alive during sn vs. Wife stated she got scared. Neighbor visiting. Wife unable to congitively understand EOL symptoms...Sn stated breathing will become shallow and his chest will stop rising and falling. Wife requested a blood pressure....43/30. 100.6 temperature. pt unresponsive. Wife instructed to continue to give EOL meds as directed...continue with Ativan Morphine and Haldol to keep pt comfortable. TT to hospice team with update.

## 2024-07-02 ENCOUNTER — HOME CARE VISIT (OUTPATIENT)
Dept: HOME HOSPICE | Facility: HOSPICE | Age: 59
End: 2024-07-02
Payer: COMMERCIAL

## 2024-07-02 NOTE — CASE COMMUNICATION
"Fredy DAPHNIE Martinez Jr., Bereavement Final IDG 24 (1MR) Due: 7/3/24  : 1965  SOC: 24  DOD: 24  Diagnosis: Multiple Myeloma   Primary: Wife - Maria Eugenia \"Mary\" Juan Zuñiga was a 58 year old man who lived with his wife of 15 years, Mary. They have been together for 40 years. They had no children and Yogesh had shared being estranged from his family, but had a brother he recently started speaking to again since his diagnos is had been shared. Yogesh was raised [employer], but had been non-practicing since he was 19. Mary said it was hard to see Yogesh's decline and condition. She remembered the death of her mother who [facility]. Yogesh had voiced wanting to get stronger and not being ready to die. Mary spoke of her belief in God and life after death. She tearfully reassured Yogesh that she would be okay when he is gone. Mary  stated she has friends, neighbors and sisters for support. She is assessed at  for bereavement follow-up.    TOD: Mary was present and declined support services.    Call Assignments:  BC to reassess wife Mary Martinez at MR. (Due: 7/3/24)  *I have set the POC and made the call, already. No need to set POC.*  "
